# Patient Record
Sex: FEMALE | Race: WHITE | Employment: OTHER | URBAN - METROPOLITAN AREA
[De-identification: names, ages, dates, MRNs, and addresses within clinical notes are randomized per-mention and may not be internally consistent; named-entity substitution may affect disease eponyms.]

---

## 2017-01-03 ENCOUNTER — TELEPHONE (OUTPATIENT)
Dept: INTERNAL MEDICINE | Facility: CLINIC | Age: 82
End: 2017-01-03

## 2017-01-03 NOTE — TELEPHONE ENCOUNTER
[place], [facility] calling for additional home care orders.  Okayed per nursing standing orders.    Skilled nurse 1x week for 4 weeks, 2 PRN and OT and PT to eval and treat.       Jane Oliver RN

## 2017-01-04 ENCOUNTER — CARE COORDINATION (OUTPATIENT)
Dept: CASE MANAGEMENT | Facility: CLINIC | Age: 82
End: 2017-01-04

## 2017-01-04 NOTE — PROGRESS NOTES
Clinic Care Coordination Contact  Care Team Conversations    Notification received today that patient was discharged from [facility] on 12/30/2016 back home with [facility].  She was at [facility] from 12/9/2016 to 12/12/2016 after fall and left wrist fracture.    She has PCP appointment on 1/10/2017.    Plan: Secure e-mail sent to Nasreen Goff RN with [facility].  I identified my self and asked for progress updates and discharge plan when appropriate.  RN CC will check on home care status in 2-3 weeks.    GERALD Fuchs, RN, PHN  FPA Care Coordinator  [phone]  January 4, 2017

## 2017-01-10 ENCOUNTER — OFFICE VISIT (OUTPATIENT)
Dept: INTERNAL MEDICINE | Facility: CLINIC | Age: 82
End: 2017-01-10
Payer: COMMERCIAL

## 2017-01-10 ENCOUNTER — TELEPHONE (OUTPATIENT)
Dept: INTERNAL MEDICINE | Facility: CLINIC | Age: 82
End: 2017-01-10

## 2017-01-10 VITALS
HEIGHT: 61 IN | DIASTOLIC BLOOD PRESSURE: 70 MMHG | HEART RATE: 84 BPM | WEIGHT: 125.9 LBS | SYSTOLIC BLOOD PRESSURE: 134 MMHG | BODY MASS INDEX: 23.77 KG/M2 | TEMPERATURE: 97.7 F | OXYGEN SATURATION: 92 %

## 2017-01-10 DIAGNOSIS — I10 ESSENTIAL HYPERTENSION: ICD-10-CM

## 2017-01-10 DIAGNOSIS — S62.102D LEFT WRIST FRACTURE, WITH ROUTINE HEALING, SUBSEQUENT ENCOUNTER: ICD-10-CM

## 2017-01-10 DIAGNOSIS — Z09 HOSPITAL DISCHARGE FOLLOW-UP: Primary | ICD-10-CM

## 2017-01-10 LAB
ERYTHROCYTE [DISTWIDTH] IN BLOOD BY AUTOMATED COUNT: 12.8 % (ref 10–15)
HCT VFR BLD AUTO: 34.6 % (ref 35–47)
HGB BLD-MCNC: 11.1 G/DL (ref 11.7–15.7)
MCH RBC QN AUTO: 32.6 PG (ref 26.5–33)
MCHC RBC AUTO-ENTMCNC: 32.1 G/DL (ref 31.5–36.5)
MCV RBC AUTO: 102 FL (ref 78–100)
PLATELET # BLD AUTO: 212 10E9/L (ref 150–450)
RBC # BLD AUTO: 3.4 10E12/L (ref 3.8–5.2)
WBC # BLD AUTO: 8.7 10E9/L (ref 4–11)

## 2017-01-10 PROCEDURE — 85027 COMPLETE CBC AUTOMATED: CPT | Performed by: INTERNAL MEDICINE

## 2017-01-10 PROCEDURE — 99214 OFFICE O/P EST MOD 30 MIN: CPT | Performed by: INTERNAL MEDICINE

## 2017-01-10 PROCEDURE — 36415 COLL VENOUS BLD VENIPUNCTURE: CPT | Performed by: INTERNAL MEDICINE

## 2017-01-10 RX ORDER — LISINOPRIL 20 MG/1
20 TABLET ORAL 2 TIMES DAILY
Qty: 90 TABLET | Refills: 3 | Status: SHIPPED | OUTPATIENT
Start: 2017-01-10 | End: 2017-11-27

## 2017-01-10 RX ORDER — METOPROLOL SUCCINATE 50 MG/1
50 TABLET, EXTENDED RELEASE ORAL DAILY
Qty: 90 TABLET | Refills: 1 | Status: SHIPPED | OUTPATIENT
Start: 2017-01-10 | End: 2017-07-14

## 2017-01-10 NOTE — NURSING NOTE
"Chief Complaint   Patient presents with     Hospital F/U       Initial /70 mmHg  Pulse 84  Temp(Src) 97.7  F (36.5  C) (Oral)  Ht 5' 0.5\" (1.537 m)  Wt 125 lb 14.4 oz (57.108 kg)  BMI 24.17 kg/m2  SpO2 92% Estimated body mass index is 24.17 kg/(m^2) as calculated from the following:    Height as of this encounter: 5' 0.5\" (1.537 m).    Weight as of this encounter: 125 lb 14.4 oz (57.108 kg).  BP completed using cuff size: isael Lewis CMA      "

## 2017-01-10 NOTE — PROGRESS NOTES
SUBJECTIVE:                                                    Tracey Moran is a 89 year old female who presents to clinic today for the following health issues:      Hospital Follow-up Visit:    Hospital/Nursing Home/IP Rehab Facility: [facility]  Date of Admission: 12/9/16  Date of Discharge: 12/30/16  Reason(s) for Admission: Left wrist fracture            Problems taking medications regularly:  None       Medication changes since discharge: Patient not using Metoprolol Succinate        Problems adhering to non-medication therapy:  None    Summary of hospitalization:  [facility] discharge summary reviewed  Diagnostic Tests/Treatments reviewed.  Follow up needed: Orthopedics  Other Healthcare Providers Involved in Patient s Care:         None  Update since discharge: stable.     Post Discharge Medication Reconciliation: discharge medications reconciled, continue medications without change.  Plan of care communicated with patient     Coding guidelines for this visit:  Type of Medical   Decision Making Face-to-Face Visit       within 7 Days of discharge Face-to-Face Visit        within 14 days of discharge   Moderate Complexity 55735 [id number]   High Complexity 02880 44629            DISCHARGE DIAGNOSIS:    1.  Left wrist fracture, with routine healing, subsequent encounter     2.  Essential hypertension     3.  Physical deconditioning         [facility] Course:  Made slow but steady progress with therapies.  Minimal pain and has reached her rehab goals, and will benefit from continued PT/OT /HHA at home to maximize her recovery.  She has strong family support.  Denies any gi, gu, respiratory or cardiac issues.  Eating and drinking well.  She is very ready to get back home, feels she will get better rest in her own home.             Problem list and histories reviewed & adjusted, as indicated.  Additional history: as documented    Patient Active  Problem List   Diagnosis     Hyperlipidemia LDL goal <130     Weakness generalized     Weakness     Advanced directives, counseling/discussion     Essential hypertension     Fall     Left wrist fracture, with routine healing, subsequent encounter     Leukocytosis, unspecified type     Elevated CK     Abrasion of left scapular region, subsequent encounter     History reviewed. No pertinent past surgical history.    Social History   Substance Use Topics     Smoking status: Never Smoker      Smokeless tobacco: Never Used     Alcohol Use: Yes     History reviewed. No pertinent family history.      Current Outpatient Prescriptions   Medication Sig Dispense Refill     lisinopril (PRINIVIL/ZESTRIL) 20 MG tablet Take 20 mg by mouth 2 times daily Hold is SBP <110 call if held x 2 in a row or symptomatic       senna-docusate (SENOKOT-S;PERICOLACE) 8.6-50 MG per tablet Take 1 tablet by mouth 2 times daily as needed for constipation       acetaminophen (TYLENOL) 325 MG tablet Take 3 tablets (975 mg) by mouth 3 times daily 100 tablet      METOPROLOL SUCCINATE ER PO Take 50 mg by mouth daily Hold if SBP <110 or HR <55 call if held x2 in a row or symptomatic       ASPIRIN PO Take 325 mg by mouth daily       Ascorbic Acid (VITAMIN C PO) Take 1,000 mg by mouth daily       Allergies   Allergen Reactions     Demerol [Meperidine]      Dust Mites      Peanuts [Nuts]      Penicillins      Pollen Extract      BP Readings from Last 3 Encounters:   12/30/16 164/74   12/26/16 162/74   12/19/16 161/70    Wt Readings from Last 3 Encounters:   12/30/16 131 lb (59.421 kg)   12/26/16 131 lb (59.421 kg)   12/19/16 131 lb 9.6 oz (59.693 kg)                    ROS:  C: NEGATIVE for fever, chills, change in weight  E/M: NEGATIVE for ear, mouth and throat problems  R: NEGATIVE for significant cough or SOB  CV: NEGATIVE for chest pain, palpitations or peripheral edema  GI: NEGATIVE for nausea, abdominal pain, heartburn, or change in bowel habits  :  "NEGATIVE for frequency, dysuria, or hematuria  H: NEGATIVE for bleeding problems  P: NEGATIVE for changes in mood or affect    OBJECTIVE:                                                    /70 mmHg  Pulse 84  Temp(Src) 97.7  F (36.5  C) (Oral)  Ht 5' 0.5\" (1.537 m)  Wt 125 lb 14.4 oz (57.108 kg)  BMI 24.17 kg/m2  SpO2 92%  Body mass index is 24.17 kg/(m^2).  GENERAL: healthy, alert and no distress  RESP: lungs clear to auscultation - no rales, no rhonchi, no wheezes  CV: regular rates and rhythm, normal S1 S2, no S3 or S4 and no click or rub   MS: extremities- no gross deformities noted, no edema less left hand casted  PSYCH: Alert and oriented times 3; speech- coherent , normal rate and volume; able to articulate logical thoughts, able to abstract reason, no tangential thoughts, no hallucinations or delusions, affect- normal     ASSESSMENT/PLAN:                                                      (Z09) Hospital discharge follow-up  (primary encounter diagnosis)  Comment: stable as noted, repeat CBC per prior   Plan: CBC with platelets            (S62.102D) Left wrist fracture, with routine healing, subsequent encounter  Comment: resolving and f/u Orthopedics for cast removal  Plan: .    (I10) Essential hypertension  Comment: stable on therapy and refilled  Plan: lisinopril (PRINIVIL/ZESTRIL) 20 MG tablet,         metoprolol (TOPROL-XL) 50 MG 24 hr tablet              See Patient Instructions    Guilherme Graves MD  [facility]    THE MEDICATION LIST HAS BEEN FULLY RECONCILED BY THE M.D. AND THE NURSING STAFF.      "

## 2017-01-24 ENCOUNTER — DOCUMENTATION ONLY (OUTPATIENT)
Dept: CARE COORDINATION | Facility: CLINIC | Age: 82
End: 2017-01-24

## 2017-01-24 NOTE — PROGRESS NOTES
[facility] now requests orders and shares plan of care/discharge summaries for some patients through [employer].  Please REPLY TO THIS MESSAGE in order to give authorization for orders when needed.  This is considered a verbal order, you will still receive a faxed copy of orders for signature.  Thank you for your assistance in improving collaboration for our patients.    ORDER    OT 2w1, 1w1 for ADL, IADL . The plan will also include falls prevention plan, monitor and treat pain, monitor skin integrity,  and to achieve the following goals.1. Pt will be indep with meal prep  2. Pt will be safe and indep with shower tasks.

## 2017-02-02 ENCOUNTER — DOCUMENTATION ONLY (OUTPATIENT)
Dept: CASE MANAGEMENT | Facility: CLINIC | Age: 82
End: 2017-02-02

## 2017-02-02 NOTE — PROGRESS NOTES
Clinic Care Coordination Contact  Care Team Conversations    Per [facility] chart review patient was discharged from [facility] on 1/27/2017.  She is to follow up with ortho in February 2017.    She had fall in December 2016.  She is in left wrist splint.  She goes to exercise class 3 times per week.    Plan: RN CC will follow up with patient next week.      GERALD Fuchs, RN, PHN  [facility] Care Coordinator  [phone]  February 2, 2017

## 2017-02-10 ENCOUNTER — CARE COORDINATION (OUTPATIENT)
Dept: CASE MANAGEMENT | Facility: CLINIC | Age: 82
End: 2017-02-10

## 2017-02-10 NOTE — PROGRESS NOTES
Clinic Care Coordination Contact  Care Team Conversations    I spoke with Ayaka briefly.  She said that she is doing well and has started her exercise class again and is walking 2 miles per day.  She said she is now using Ibuprofen instead of Tylenol.    Plan:  TERI is on file for her 3 sons.  CC will reach out to family next week to further assess and check on status of ortho follow up.    GERALD Fuchs, RN, PHN  A Care Coordinator  [phone]  February 10, 2017

## 2017-02-16 NOTE — PROGRESS NOTES
Clinic Care Coordination Contact  [facility]/Vo[employer]    Referral Source: Home Care Discharge    Clinical Data: Care Coordinator Outreach    Outreach attempted x 2.  Left message on voicemail for son Jonny with call back information and requested return call.    Plan: Care Coordinator will try to reach patient again in 3-5 business days.      GERALD Fuchs, RN, PHN  FPA Care Coordinator  [phone]  February 16, 2017

## 2017-02-24 NOTE — PROGRESS NOTES
Clinic Care Coordination Contact  [facility]/Voicemail    Referral Source: Home Care Discharge    Clinical Data: Care Coordinator Outreach    Outreach attempted x 3.      Plan: Care Coordinator will do no further outreaches at this time.  VM left for son, Jonny, advising that he call me if he has questions, concerns or if his mother needs assistance.      GERALD Fuchs, RN, PHN  FPA Care Coordinator  [phone]  February 24, 2017

## 2017-05-18 ENCOUNTER — OFFICE VISIT (OUTPATIENT)
Dept: INTERNAL MEDICINE | Facility: CLINIC | Age: 82
End: 2017-05-18
Payer: COMMERCIAL

## 2017-05-18 VITALS
OXYGEN SATURATION: 95 % | DIASTOLIC BLOOD PRESSURE: 78 MMHG | BODY MASS INDEX: 23.74 KG/M2 | TEMPERATURE: 97.6 F | WEIGHT: 123.6 LBS | SYSTOLIC BLOOD PRESSURE: 122 MMHG | HEART RATE: 74 BPM

## 2017-05-18 DIAGNOSIS — I10 ESSENTIAL HYPERTENSION, BENIGN: ICD-10-CM

## 2017-05-18 DIAGNOSIS — D64.9 ANEMIA, UNSPECIFIED TYPE: Primary | ICD-10-CM

## 2017-05-18 DIAGNOSIS — H61.21 IMPACTED CERUMEN OF RIGHT EAR: ICD-10-CM

## 2017-05-18 DIAGNOSIS — Z23 NEED FOR PNEUMOCOCCAL VACCINATION: ICD-10-CM

## 2017-05-18 LAB — HGB BLD-MCNC: 11.5 G/DL (ref 11.7–15.7)

## 2017-05-18 PROCEDURE — 99214 OFFICE O/P EST MOD 30 MIN: CPT | Mod: 25 | Performed by: INTERNAL MEDICINE

## 2017-05-18 PROCEDURE — 90670 PCV13 VACCINE IM: CPT | Performed by: INTERNAL MEDICINE

## 2017-05-18 PROCEDURE — 36415 COLL VENOUS BLD VENIPUNCTURE: CPT | Performed by: INTERNAL MEDICINE

## 2017-05-18 PROCEDURE — G0009 ADMIN PNEUMOCOCCAL VACCINE: HCPCS | Performed by: INTERNAL MEDICINE

## 2017-05-18 PROCEDURE — 85018 HEMOGLOBIN: CPT | Performed by: INTERNAL MEDICINE

## 2017-05-18 NOTE — PROGRESS NOTES
SUBJECTIVE:                                                    Tracey Moran is a 89 year old female who presents to clinic today for the following health issues:    Multiple concerns:  1. Follow up on anemia  2. Concerns about increasing forgetfulness/ trouble sleeping  3. Irrigation of ears  4. Recommendations of chronic sore on heal -  using abx ointment, hydrocortisone cream   5. Review progress of healing left wrist fracture  6. Renew handicap parking permit     Problem list and histories reviewed & adjusted, as indicated.  Additional history: as documented    Patient Active Problem List   Diagnosis     Hyperlipidemia LDL goal <130     Weakness generalized     Weakness     Advanced directives, counseling/discussion     Essential hypertension     Fall     Left wrist fracture, with routine healing, subsequent encounter     Leukocytosis, unspecified type     Elevated CK     Abrasion of left scapular region, subsequent encounter     History reviewed. No pertinent surgical history.    Social History   Substance Use Topics     Smoking status: Never Smoker     Smokeless tobacco: Never Used     Alcohol use Yes     History reviewed. No pertinent family history.      Current Outpatient Prescriptions   Medication Sig Dispense Refill     lisinopril (PRINIVIL/ZESTRIL) 20 MG tablet Take 1 tablet (20 mg) by mouth 2 times daily Hold is SBP <110 call if held x 2 in a row or symptomatic 90 tablet 3     metoprolol (TOPROL-XL) 50 MG 24 hr tablet Take 1 tablet (50 mg) by mouth daily Hold if SBP <110 or HR <55 call if held x2 in a row or symptomatic 90 tablet 1     senna-docusate (SENOKOT-S;PERICOLACE) 8.6-50 MG per tablet Take 1 tablet by mouth 2 times daily as needed for constipation       acetaminophen (TYLENOL) 325 MG tablet Take 3 tablets (975 mg) by mouth 3 times daily 100 tablet      ASPIRIN PO Take 325 mg by mouth daily       Ascorbic Acid (VITAMIN C PO) Take 1,000 mg by mouth daily       Allergies   Allergen Reactions      Demerol [Meperidine]      Dust Mites      Peanuts [Nuts]      Penicillins      Pollen Extract      BP Readings from Last 3 Encounters:   01/10/17 134/70   12/30/16 164/74   12/26/16 162/74    Wt Readings from Last 3 Encounters:   01/10/17 125 lb 14.4 oz (57.1 kg)   12/30/16 131 lb (59.4 kg)   12/26/16 131 lb (59.4 kg)         Reviewed and updated as needed this visit by clinical staff  Tobacco  Allergies  Med Hx  Surg Hx  Fam Hx  Soc Hx      Reviewed and updated as needed this visit by Provider       ROS:  C: NEGATIVE for fever, chills, change in weight  E/M: NEGATIVE for ear, mouth and throat problems  R: NEGATIVE for significant cough or SOB  CV: NEGATIVE for chest pain, palpitations or peripheral edema  GI: NEGATIVE for nausea, abdominal pain, heartburn, or change in bowel habits  : NEGATIVE for frequency, dysuria, or hematuria  M: NEGATIVE for significant arthralgias or myalgia  H: NEGATIVE for bleeding problems    OBJECTIVE:                                                    /78  Pulse 74  Temp 97.6  F (36.4  C) (Oral)  Wt 123 lb 9.6 oz (56.1 kg)  SpO2 95%  BMI 23.74 kg/m2  Body mass index is 23.74 kg/(m^2).  GENERAL: alert and no distress  EYES: Eyes grossly normal to inspection, extraocular movements - intact, and PERRL  HENT: ear canals- normal; TMs- normal on left, mild cerumen right.; Nose- normal; Mouth- no ulcers, no lesions  RESP: lungs clear to auscultation - no rales, no rhonchi, no wheezes  CV: regular rates and rhythm, normal S1 S2, no S3 or S4 and no click or rub   MS: extremities- no gross deformities noted with healing left wrist fracture  PSYCH: Alert and oriented times 3; speech- coherent , normal rate and volume; able to articulate logical thoughts, able to abstract reason, mild tangential comments, no hallucinations or delusions, affect- normal.  Suspect mild memory impairment.  Benign changes skin forearms bilaterally.  Thickened dystrophic feet/heel and nails, L<R,  discussed need for Dermatology assessment       ASSESSMENT/PLAN:                                                      (D64.9) Anemia, unspecified type  (primary encounter diagnosis)  Comment: repeat lab as noted  as was slowly improving since hospitalization  Plan: Hemoglobin            (I10) Essential hypertension  Comment: stable on therapy  Plan: does not appear to have any orthostatic issues.  No changes    (H61.21) Impacted cerumen of right ear  Comment: irrigated and cleaned  Plan:     (Z23) Need for pneumococcal vaccination  Comment: updated  Plan: Pneumococcal vaccine 13 valent PCV13 IM         (Prevnar) [09347], ADMIN MEDICARE: Pneumococcal        Vaccine ()          I spent quite a bit of time with son discussing his numerous concerns, as listed above, from issues of her diet, weight, OTC medication use, and also the need for a disability parking sticker to be filled out.  I expressed to son that overall she appears to be stable and doing quite well and that at current age will have some good and bad days to to try and look at bigger picture of quality of life and not be to concerned about micro managing certain issues. Discussed also supplementing with Ensure daily.  Offered Neurology assessment for mild memory changes BUT informed him that I would not pursue this issue if it were my mother/father at this point.  He was agreeable.  Advised also Dermatology assessment for feet.    See Patient Instructions    Guilherme Graves MD  [facility]    30 minutes spent with this patient, face to face, discussing treatment options for listed problems above as well as side effects of appropriate medications.  Counseling time extended beyond 50% of the clinic visit.  Medication dosing, treatment plan and follow-up were discussed. Also reviewed need for primary care testing for patient.

## 2017-05-18 NOTE — LETTER
[facility]  [address]  [address]      May 18, 2017      Tracey Moran  [address]   [place] [address]          Dear Tracey,      I have enclosed a copy of your most recent labs done here at the clinic and if available some of your prior labs for comparison.     Your hemoglobin function tests remains slightly abnormal but stable from 12/2016 and should be rechecked here in the clinic in 3-6 months with a follow-up visit with me.  I will look forward to seeing you at that time and please call to make an appointment.    Please call me if you have further questions.        Guilherme Graves MD

## 2017-05-18 NOTE — MR AVS SNAPSHOT
"              After Visit Summary   5/18/2017    Tracey Moran    MRN: [id number]           Patient Information     Date Of Birth          6/16/1925        Visit Information        Provider Department      5/18/2017 10:00 AM Guilherme Graves MD [facility]        Today's Diagnoses     Anemia, unspecified type    -  1    Impacted cerumen of right ear        Essential hypertension, benign        Need for pneumococcal vaccination           Follow-ups after your visit        Follow-up notes from your care team     Return if symptoms worsen or fail to improve.      Who to contact     If you have questions or need follow up information about today's clinic visit or your schedule please contact [facility] directly at [phone].  Normal or non-critical lab and imaging results will be communicated to you by MyChart, letter or phone within 4 business days after the clinic has received the results. If you do not hear from us within 7 days, please contact the clinic through MyChart or phone. If you have a critical or abnormal lab result, we will notify you by phone as soon as possible.  Submit refill requests through [facility] or call your pharmacy and they will forward the refill request to us. Please allow 3 business days for your refill to be completed.          Additional Information About Your Visit        MyChart Information     [facility] lets you send messages to your doctor, view your test results, renew your prescriptions, schedule appointments and more. To sign up, go to [email or web address] . Click on \"Log in\" on the left side of the screen, which will take you to the Welcome page. Then click on \"Sign up Now\" on the right side of the page.     You will be asked to enter the access code listed below, as well as some personal information. Please follow the directions to create your username and password.     Your access code is: [email or web address]  Expires: 8/16/2017 11:54 " AM     Your access code will  in 90 days. If you need help or a new code, please call your [facility] clinic or [phone].        Care EveryWhere ID     This is your Care EveryWhere ID. This could be used by other organizations to access your [facility] medical records  [id number]        Your Vitals Were     Pulse Temperature Pulse Oximetry BMI (Body Mass Index)          74 97.6  F (36.4  C) (Oral) 95% 23.74 kg/m2         Blood Pressure from Last 3 Encounters:   17 122/78   01/10/17 134/70   16 164/74    Weight from Last 3 Encounters:   17 123 lb 9.6 oz (56.1 kg)   01/10/17 125 lb 14.4 oz (57.1 kg)   16 131 lb (59.4 kg)              We Performed the Following     ADMIN MEDICARE: Pneumococcal Vaccine ()     Hemoglobin     Pneumococcal vaccine 13 valent PCV13 IM (Prevnar) [[id number]]        Primary Care Provider Office Phone # Fax #    Guilherme Graves [phone]       [facility] [address] [facility] [address]        Thank you!     Thank you for choosing [facility]  for your care. Our goal is always to provide you with excellent care. Hearing back from our patients is one way we can continue to improve our services. Please take a few minutes to complete the written survey that you may receive in the mail after your visit with us. Thank you!             Your Updated Medication List - Protect others around you: Learn how to safely use, store and throw away your medicines at www.disposemymeds.org.          This list is accurate as of: 17 11:54 AM.  Always use your most recent med list.                   Brand Name Dispense Instructions for use    acetaminophen 325 MG tablet    TYLENOL    100 tablet    Take 3 tablets (975 mg) by mouth 3 times daily       ASPIRIN PO      Take 325 mg by mouth daily       lisinopril 20 MG tablet    PRINIVIL/ZESTRIL    90 tablet    Take 1 tablet (20 mg) by mouth 2 times daily Hold is SBP <110  call if held x 2 in a row or symptomatic       metoprolol 50 MG 24 hr tablet    TOPROL-XL    90 tablet    Take 1 tablet (50 mg) by mouth daily Hold if SBP <110 or HR <55 call if held x2 in a row or symptomatic       senna-docusate 8.6-50 MG per tablet    SENOKOT-S;PERICOLACE     Take 1 tablet by mouth 2 times daily as needed for constipation       VITAMIN C PO      Take 1,000 mg by mouth daily

## 2017-05-18 NOTE — NURSING NOTE
"Chief Complaint   Patient presents with     Anemia     Memory Loss     Forms     Handicap parking permit        Initial /78  Pulse 74  Temp 97.6  F (36.4  C) (Oral)  Wt 123 lb 9.6 oz (56.1 kg)  SpO2 95%  BMI 23.74 kg/m2 Estimated body mass index is 23.74 kg/(m^2) as calculated from the following:    Height as of 1/10/17: 5' 0.5\" (1.537 m).    Weight as of this encounter: 123 lb 9.6 oz (56.1 kg).  Medication Reconciliation: complete   Abena Lewis, SERGO      "

## 2017-07-06 ENCOUNTER — OFFICE VISIT (OUTPATIENT)
Dept: PODIATRY | Facility: CLINIC | Age: 82
End: 2017-07-06
Payer: COMMERCIAL

## 2017-07-06 VITALS
WEIGHT: 124 LBS | HEART RATE: 69 BPM | BODY MASS INDEX: 22.82 KG/M2 | HEIGHT: 62 IN | SYSTOLIC BLOOD PRESSURE: 162 MMHG | DIASTOLIC BLOOD PRESSURE: 77 MMHG

## 2017-07-06 DIAGNOSIS — S91.309A OPEN WOUND OF HEEL, UNSPECIFIED LATERALITY, INITIAL ENCOUNTER: ICD-10-CM

## 2017-07-06 DIAGNOSIS — B35.1 ONYCHOMYCOSIS: ICD-10-CM

## 2017-07-06 DIAGNOSIS — L60.0 ONYCHOCRYPTOSIS: ICD-10-CM

## 2017-07-06 DIAGNOSIS — L85.3 DRY SKIN: Primary | ICD-10-CM

## 2017-07-06 PROCEDURE — 99203 OFFICE O/P NEW LOW 30 MIN: CPT | Mod: 25 | Performed by: PODIATRIST

## 2017-07-06 PROCEDURE — 11720 DEBRIDE NAIL 1-5: CPT | Mod: 59 | Performed by: PODIATRIST

## 2017-07-06 PROCEDURE — 97597 DBRDMT OPN WND 1ST 20 CM/<: CPT | Performed by: PODIATRIST

## 2017-07-06 RX ORDER — AMMONIUM LACTATE 12 G/100G
CREAM TOPICAL 2 TIMES DAILY PRN
Qty: 385 G | Refills: 3 | Status: SHIPPED | OUTPATIENT
Start: 2017-07-06 | End: 2017-12-05

## 2017-07-06 NOTE — Clinical Note
Good morning  I saw Tracey on Thursday for bilateral heel and R hallux pain.  The wounds on her heels were debrided and she underwent a procedure on her R hallux nail, as it was incurvated and tender.  She was given wound care instructions and heel callus instructions and will follow up prn.  Thanks  Ethan

## 2017-07-06 NOTE — PROGRESS NOTES
"Foot & Ankle Surgery  July 6, 2017    CC: heel and toe pain    I was asked to see Tracey Moran regarding the chief complaint by:  Dr. ZOIE Graves    HPI:  Pt is a 89 year old female who presents with above complaint.  \"heels and toe\" pain bilateral for \"weeks, months\".  Describes deep ache.  Pain 7/10 with walking.  Has tried soaking her feet and bandages.  Dry skin on heels, cracks/fissures.  Also has pain R hallux, incurvated bilateral border lat > med.      ROS:   Pos for CC.  The patient denies current nausea, vomiting, chills, fevers, belly pain, calf pain, chest pain or SOB.  Complete remainder of ROS is otherwise neg.    VITALS:    Vitals:    07/06/17 0859   BP: 162/77   BP Location: Left arm   Patient Position: Sitting   Cuff Size: Adult Regular   Pulse: 69   Weight: 124 lb (56.2 kg)   Height: 5' 2.21\" (1.58 m)       PMH:    Past Medical History:   Diagnosis Date     Hypertension        SXHX:  No past surgical history on file.     MEDS:    Current Outpatient Prescriptions   Medication     lisinopril (PRINIVIL/ZESTRIL) 20 MG tablet     metoprolol (TOPROL-XL) 50 MG 24 hr tablet     senna-docusate (SENOKOT-S;PERICOLACE) 8.6-50 MG per tablet     acetaminophen (TYLENOL) 325 MG tablet     ASPIRIN PO     Ascorbic Acid (VITAMIN C PO)     No current facility-administered medications for this visit.        ALL:     Allergies   Allergen Reactions     Demerol [Meperidine]      Dust Mites      Peanuts [Nuts]      Penicillins      Pollen Extract        FMH:  No family history on file.    SocHx:    Social History     Social History     Marital status:      Spouse name: N/A     Number of children: N/A     Years of education: N/A     Occupational History     Not on file.     Social History Main Topics     Smoking status: Never Smoker     Smokeless tobacco: Never Used     Alcohol use Yes     Drug use: No     Sexual activity: Not Currently     Other Topics Concern     Not on file     Social History Narrative "           EXAMINATION:  Gen:   No apparent distress  Neuro:   A&Ox3, no deficits  Psych:    Answering questions appropriately for age and situation with normal affect  Head:    NCAT  Eye:    Visual scanning without deficit  Ear:    Response to auditory stimuli wnl  Lung:    Non-labored breathing on RA noted  Abd:    NTND per patient report  Lymph:    Neg for pitting/non-pitting edema BLE  Vasc:    Pulses palpable, CFT minimally delayed  Neuro:    Light touch sensation intact to all sensory nerve distributions without paresthesias  Derm:    Very dry scaling skin/callusing with fissures bilateral heels R>L without SOI.  R hallux nail thickened, mycotic, incurvated bilateral border lat > med without paronychia/SOI.  Very tender to palpation.    MSK:    ROM, strength wnl without limitation, no pain on palpation noted.  Calf:    Neg for redness, swelling or tenderness      Assessment:  89 year old female with callusing bilateral heels; fissures/partial thickness wounds bilateral heels; mycotic incurvated R hallux nail      Plan:  Discussed etiologies and options  1.  Callusing bilateral heels  -home callus instructions dispensed and discussed  -some of the peeling skin was scraped off, no procedure  -Rx for LacHydrin    2.  Partial thickness wounds/fissures bilateral heels  -Excisional debridement was performed to the wound, partial-thickness(limited to skin breakdown), sharply debridement the wound, excising nonviable tissue to the above dimensions with a tissue nipper  -daily cares - wash/dry, abx ointment/bandaid daily     3.  Onychocryptosis mycotic R hallux nail  -slantback performed bilateral border, leading edge  and removed  -nail care handout dispensed for routine debridement  -consider avulsion options      Follow up:  prn or sooner with acute issues      Patient's medical history was reviewed today    Body mass index is 22.53 kg/(m^2).          Ethan Lutz DPM   Podiatric Foot & Ankle  Surgeon  [facility]  [phone]

## 2017-07-06 NOTE — PATIENT INSTRUCTIONS
DR. DISLA'S CLINIC LOCATIONS:   MONDAY - [place]  TUESDAY - [place]   [address] [facility] [address]   [place] [address] [place] [address]   [phone]       THURSDAY AM - [place] THURSDAY PM - [place]   [address]   [place] [address] [place] [address]   [phone]       FRIDAY AM - [place] SCHEDULE SURGERY: [phone] [address] APPOINTMENTS: [phone]   [place] [address] AFTER HOURS: [phone] FAX NUMBER: [phone]     Follow Up: as needed    FOOT CARE NURSES  If you are interested in having a foot care nurse come out to your   home, please call one of these contacts for more information:  [facility]  [phone] [facility]  [phone]   [facility]  [phone]  [place] [phone]  [place]   [facility]  [phone]  At [place] [facility] [phone]     Tips for treating painful calluses:    1.  Pumice stone/charissa board therapy multiple times weekly to remove callus tissue as it builds up    2.  Good supportive shoes and minimizing barefoot ambulation can slow down callus formation, and can decrease pain levels    3.  Gel inserts can also provide padding to the bottom of the foot, to prevent pain and slow recurrence.    4.  Applying lotion daily/twice daily to the callus tissue can keep it softer and less painful.  Lotions with lactic acid or urea work well, but most lotions are sufficient

## 2017-07-06 NOTE — MR AVS SNAPSHOT
After Visit Summary   7/6/2017    Tracey Moran    MRN: [id number]           Patient Information     Date Of Birth          6/16/1925        Visit Information        Provider Department      7/6/2017 9:15 AM Ethan Disla DPM [facility]        Care Instructions      DR. DISLA'S CLINIC LOCATIONS:   MONDAY - [place] TUESDAY - [place]   [address] [facility] [address]   [place] [address] [place], [place] [address] [phone]       THURSDAY AM - [place] THURSDAY PM - [facility]   [address]   [place] [address] [place], [place] [address] [phone]       FRIDAY AM - [place] SCHEDULE SURGERY: [phone] [address] APPOINTMENTS: [phone]   [place] [address] AFTER HOURS: [phone] FAX NUMBER: [phone]     Follow Up: as needed    FOOT CARE NURSES  If you are interested in having a foot care nurse come out to your   home, please call one of these contacts for more information:  [facility]  [phone] [facility]  [phone]   [facility]  [phone]  [place] Foot Care Clinic [phone]  [place]   [facility]  [phone]  At [place] [facility] Foot Clinic [phone]     Tips for treating painful calluses:    1.  Pumice stone/charissa board therapy multiple times weekly to remove callus tissue as it builds up    2.  Good supportive shoes and minimizing barefoot ambulation can slow down callus formation, and can decrease pain levels    3.  Gel inserts can also provide padding to the bottom of the foot, to prevent pain and slow recurrence.    4.  Applying lotion daily/twice daily to the callus tissue can keep it softer and less painful.  Lotions with lactic acid or urea work well, but most lotions are sufficient            Follow-ups after your visit        Your next 10 appointments already scheduled     Jul 06,  "  9:15 AM CDT   New Visit with Ethan Lutz DPM   [facility]    [address] [address] [phone]              Who to contact     If you have questions or need follow up information about today's clinic visit or your schedule please contact [facility] directly at [phone].  Normal or non-critical lab and imaging results will be communicated to you by Andahart, letter or phone within 4 business days after the clinic has received the results. If you do not hear from us within 7 days, please contact the clinic through [employer] or phone. If you have a critical or abnormal lab result, we will notify you by phone as soon as possible.  Submit refill requests through [employer] or call your pharmacy and they will forward the refill request to us. Please allow 3 business days for your refill to be completed.          Additional Information About Your Visit        Andaharamiando Information     Active[employer] lets you send messages to your doctor, view your test results, renew your prescriptions, schedule appointments and more. To sign up, go to [email or web address] . Click on \"Log in\" on the left side of the screen, which will take you to the Welcome page. Then click on \"Sign up Now\" on the right side of the page.     You will be asked to enter the access code listed below, as well as some personal information. Please follow the directions to create your username and password.     Your access code is: [email or web address]  Expires: 2017 11:54 AM     Your access code will  in 90 days. If you need help or a new code, please call your [facility] clinic or [phone].        Care EveryWhere ID     This is your Care EveryWhere ID. This could be used by other organizations to access your [facility] medical records  [id number]        Your Vitals Were     Pulse Height BMI (Body Mass Index)             69 5' 2.21\" (1.58 m) 22.53 kg/m2          Blood " Pressure from Last 3 Encounters:   07/06/17 162/77   05/18/17 122/78   01/10/17 134/70    Weight from Last 3 Encounters:   07/06/17 124 lb (56.2 kg)   05/18/17 123 lb 9.6 oz (56.1 kg)   01/10/17 125 lb 14.4 oz (57.1 kg)              Today, you had the following     No orders found for display       Primary Care Provider Office Phone # Fax #    Guilherme Graves [phone]       [facility] [address] [facility] [address]        Equal Access to Services     [facility]: Hadii aad ku hadasho Sony, waaxda luqadaha, qaybta kaalmada adeegyada, jose j llanos hayclifford gomez . So [facility] [phone].    ATENCIÓN: Si habla español, tiene a harrison disposición servicios gratuitos de asistencia lingüística. [facility] [phone].    We comply with applicable federal civil rights laws and [place] laws. We do not discriminate on the basis of race, color, national origin, age, disability sex, sexual orientation or gender identity.            Thank you!     Thank you for choosing [facility]  for your care. Our goal is always to provide you with excellent care. Hearing back from our patients is one way we can continue to improve our services. Please take a few minutes to complete the written survey that you may receive in the mail after your visit with us. Thank you!             Your Updated Medication List - Protect others around you: Learn how to safely use, store and throw away your medicines at www.disposemymeds.org.          This list is accurate as of: 7/6/17  9:10 AM.  Always use your most recent med list.                   Brand Name Dispense Instructions for use Diagnosis    acetaminophen 325 MG tablet    TYLENOL    100 tablet    Take 3 tablets (975 mg) by mouth 3 times daily    Fracture of wrist, left, closed, initial encounter       ASPIRIN PO      Take 325 mg by mouth daily        lisinopril 20 MG tablet    PRINIVIL/ZESTRIL    90 tablet    Take 1 tablet (20  mg) by mouth 2 times daily Hold is SBP <110 call if held x 2 in a row or symptomatic    Essential hypertension       metoprolol 50 MG 24 hr tablet    TOPROL-XL    90 tablet    Take 1 tablet (50 mg) by mouth daily Hold if SBP <110 or HR <55 call if held x2 in a row or symptomatic    Essential hypertension       senna-docusate 8.6-50 MG per tablet    SENOKOT-S;PERICOLACE     Take 1 tablet by mouth 2 times daily as needed for constipation        VITAMIN C PO      Take 1,000 mg by mouth daily

## 2017-07-06 NOTE — LETTER
"      7/6/2017         RE: Tracey Moran  [address]  [facility] [address]        Dear Colleague,    Thank you for referring your patient, Tracey Moran, to the [facility]. Please see a copy of my visit note below.    Foot & Ankle Surgery  July 6, 2017    CC: heel and toe pain    I was asked to see Tracey Moran regarding the chief complaint by:  Dr. ZOIE Graves    HPI:  Pt is a 89 year old female who presents with above complaint.  \"heels and toe\" pain bilateral for \"weeks, months\".  Describes deep ache.  Pain 7/10 with walking.  Has tried soaking her feet and bandages.  Dry skin on heels, cracks/fissures.  Also has pain R hallux, incurvated bilateral border lat > med.      ROS:   Pos for CC.  The patient denies current nausea, vomiting, chills, fevers, belly pain, calf pain, chest pain or SOB.  Complete remainder of ROS is otherwise neg.    VITALS:    Vitals:    07/06/17 0859   BP: 162/77   BP Location: Left arm   Patient Position: Sitting   Cuff Size: Adult Regular   Pulse: 69   Weight: 124 lb (56.2 kg)   Height: 5' 2.21\" (1.58 m)       PMH:    Past Medical History:   Diagnosis Date     Hypertension        SXHX:  No past surgical history on file.     MEDS:    Current Outpatient Prescriptions   Medication     lisinopril (PRINIVIL/ZESTRIL) 20 MG tablet     metoprolol (TOPROL-XL) 50 MG 24 hr tablet     senna-docusate (SENOKOT-S;PERICOLACE) 8.6-50 MG per tablet     acetaminophen (TYLENOL) 325 MG tablet     ASPIRIN PO     Ascorbic Acid (VITAMIN C PO)     No current facility-administered medications for this visit.        ALL:     Allergies   Allergen Reactions     Demerol [Meperidine]      Dust Mites      Peanuts [Nuts]      Penicillins      Pollen Extract        FMH:  No family history on file.    SocHx:    Social History     Social History     Marital status:      Spouse name: N/A     Number of children: N/A     Years of education: N/A     Occupational History     Not on file. "     Social History Main Topics     Smoking status: Never Smoker     Smokeless tobacco: Never Used     Alcohol use Yes     Drug use: No     Sexual activity: Not Currently     Other Topics Concern     Not on file     Social History Narrative           EXAMINATION:  Gen:   No apparent distress  Neuro:   A&Ox3, no deficits  Psych:    Answering questions appropriately for age and situation with normal affect  Head:    NCAT  Eye:    Visual scanning without deficit  Ear:    Response to auditory stimuli wnl  Lung:    Non-labored breathing on RA noted  Abd:    NTND per patient report  Lymph:    Neg for pitting/non-pitting edema BLE  Vasc:    Pulses palpable, CFT minimally delayed  Neuro:    Light touch sensation intact to all sensory nerve distributions without paresthesias  Derm:    Very dry scaling skin/callusing with fissures bilateral heels R>L without SOI.  R hallux nail thickened, mycotic, incurvated bilateral border lat > med without paronychia/SOI.  Very tender to palpation.    MSK:    ROM, strength wnl without limitation, no pain on palpation noted.  Calf:    Neg for redness, swelling or tenderness      Assessment:  89 year old female with callusing bilateral heels; fissures/partial thickness wounds bilateral heels; mycotic incurvated R hallux nail      Plan:  Discussed etiologies and options  1.  Callusing bilateral heels  -home callus instructions dispensed and discussed  -some of the peeling skin was scraped off, no procedure  -Rx for LacHydrin    2.  Partial thickness wounds/fissures bilateral heels  -Excisional debridement was performed to the wound, partial-thickness(limited to skin breakdown), sharply debridement the wound, excising nonviable tissue to the above dimensions with a tissue nipper  -daily cares - wash/dry, abx ointment/bandaid daily     3.  Onychocryptosis mycotic R hallux nail  -slantback performed bilateral border, leading edge  and removed  -nail care handout dispensed for routine  debridement  -consider avulsion options      Follow up:  prn or sooner with acute issues      Patient's medical history was reviewed today    Body mass index is 22.53 kg/(m^2).          Ethan Lutz DPM   Podiatric Foot & Ankle Surgeon  [facility]  [phone]        Again, thank you for allowing me to participate in the care of your patient.        Sincerely,        Ethan Lutz DPM, VENESSA

## 2017-07-14 DIAGNOSIS — I10 ESSENTIAL HYPERTENSION: ICD-10-CM

## 2017-07-14 NOTE — TELEPHONE ENCOUNTER
metoprolol (TOPROL-XL) 50 MG 24 hr tablet      Last Written Prescription Date: 1/10/2017  Last Fill Quantity: 90, # refills: 1    Last Office Visit with [facility], [facility] or [facility] prescribing provider:  5/18/2017   Future Office Visit:        BP Readings from Last 3 Encounters:   07/06/17 162/77   05/18/17 122/78   01/10/17 134/70

## 2017-07-18 RX ORDER — METOPROLOL SUCCINATE 50 MG/1
50 TABLET, EXTENDED RELEASE ORAL DAILY
Qty: 90 TABLET | Refills: 2 | Status: SHIPPED | OUTPATIENT
Start: 2017-07-18 | End: 2018-05-16

## 2017-08-23 ENCOUNTER — HOSPITAL ENCOUNTER (EMERGENCY)
Facility: CLINIC | Age: 82
Discharge: HOME | End: 2017-08-23
Attending: EMERGENCY MEDICINE | Admitting: EMERGENCY MEDICINE
Payer: COMMERCIAL

## 2017-08-23 VITALS
TEMPERATURE: 97.7 F | HEIGHT: 62 IN | SYSTOLIC BLOOD PRESSURE: 180 MMHG | OXYGEN SATURATION: 97 % | WEIGHT: 125 LBS | BODY MASS INDEX: 23 KG/M2 | DIASTOLIC BLOOD PRESSURE: 81 MMHG | RESPIRATION RATE: 18 BRPM

## 2017-08-23 DIAGNOSIS — S41.111A SKIN TEAR OF RIGHT UPPER EXTREMITY: ICD-10-CM
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
## 2017-08-23 PROCEDURE — 99282 EMERGENCY DEPT VISIT SF MDM: CPT

## 2017-08-23 NOTE — ED NOTES
Pt states she was getting out of bed and fell on her right elbow. Pt denies LOC, head or neck pain. Pt has a skin tear to right elbow.

## 2017-08-23 NOTE — ED PROVIDER NOTES
"  History     Chief Complaint:  Fall      HPI   Tracey Moran is a 89 year old female who presents via EMS with concern after a fall. The patient states that she got out of bed too quickly this morning which caused her to fall, landing on her right elbow. She was unable to get up afterwards, as she usually ambulates with a walker and does not have the strength to get up on her own. She states her only complaint is some mild right elbow pain, though she specifically states that she does not think there is any bony problem.  Patient denies head trauma, loss of consciousness, neck pain, abdominal pain, fevers, vomiting, chest pain, palpitations, or being on blood thinners. Patient denies all other complaints.  She would like to have wound care to her elbow then go back home.        Allergies:  Demerol [Meperidine]  Dust Mites  Peanuts [Nuts]  Penicillins  Pollen Extract      Medications:    Toprol  Lac-Hydrin  Lisinopril  Senokot    Past Medical History:    Hypertension  Elevated CK  Left Wrist Fracture  Leukocytosis    Past Surgical History:    History reviewed. No pertinent surgical history.     Family History:    History reviewed. No pertinent family history.      Social History:  Presents via EMS   Tobacco use: never  Alcohol use: yes  PCP: Guilherme Graves    Marital Status:        Review of Systems   All other systems reviewed and are negative.    Physical Exam     Patient Vitals for the past 24 hrs:   BP Temp Temp src Heart Rate Resp SpO2 Height Weight   08/23/17 0556 (!) 156/123 97.8  F (36.6  C) Oral 69 20 97 % 1.575 m (5' 2\") 56.7 kg (125 lb)       Physical Exam  General: woman semi-recumbent in bed 5, son later at bedside  HENT: face nontender with full painless ROM mandible, no bony deformity, OP clear, no difficulty controlling secretions, skull nontender  Eyes: PERRL without proptosis  CV:  regular rhythm, cap refill normal in all extremities  Resp: CTAB, normal effort, no crackles or wheezing  GI: " "abdomen soft,  nontender, no guarding  MSK:  Cervical spine:  no midline tenderness, FROM  Thoracic spine: no midline tenderness, no CVAT  Lumbar spine: no midline tenderness  Chest wall: nontender without crepitus  Pelvis stable  Extremities: FROM R elbow without palpable effusion or acute bony deformity, no tenderness to extremities  Skin:   Approximately 4cm very superficial skin tear to R lateral elbow, no active bleeding  No ecchymosis  No laceration  Neuro: awake, alert, GCS 15, responds appropriately to commands, moves all extremities with good tone, ambulatory  Psych: cooperative, pleasant, smiles      Emergency Department Course   Emergency Department Course:  Past medical records, nursing notes, and vitals reviewed.  0609: I performed an exam of the patient and obtained history, as documented above.    The patient passed a \"road test\" prior to discharge from the ED.    0701: I rechecked the patient. Son at bedside agrees with discharge home.    I personally reviewed the findings with the Patient and answered all related questions prior to discharge.  Patient discharged home with instructions regarding supportive care, medications, and reasons to return. The importance of close follow-up was reviewed.        Impression & Plan    Medical Decision Making:  She clearly describes a mechanical fall so medical workup is not indicated.  Head to toe trauma exam is notable only for a skin tear. This was cleansed and Steri-Strips were placed by the tech. The patient does not wish for x-rays and I think this can be safely deferred. Her son agrees with supportive measures discharged without further ED testing or treatment. She is welcome back for acute problems and can otherwise follow up through primary care as needed.      Diagnosis:    ICD-10-CM   1. Skin tear of right upper extremity S41.111A   2. Fall, initial encounter W19.XXXA       Disposition:  Discharged to home with plan as outlined.     8/23/2017   [facility] " EMERGENCY DEPARTMENT  I, Michelle Parth, am serving as a scribe at 6:09 AM on 8/23/2017 to document services personally performed by Zain Silva, based on my observations and the provider's statements to me.       Zain Silva MD  08/23/17 0722

## 2017-08-23 NOTE — ED AVS SNAPSHOT
Emergency Department    [address] [facility] [address]    Phone:  [phone]    Fax:  [phone]                                       Tracey Moran   MRN: [id number]    Department:   Emergency Department   Date of Visit:  8/23/2017           Patient Information     Date Of Birth          6/16/1925        Your diagnoses for this visit were:     Skin tear of right upper extremity     Fall, initial encounter        You were seen by Zain Silva MD.      Follow-up Information     Call Guilherme Graves MD.    Specialty:  Internal Medicine    Why:  As needed    Contact information:    [address] [facility] [address] [phone]          Follow up with  Emergency Department.    Specialty:  EMERGENCY MEDICINE    Why:  As needed, If symptoms worsen    Contact information:    [address] [facility] [address] [phone]      Discharge References/Attachments     SKIN AVULSION (ENGLISH)      24 Hour Appointment Hotline       To make an appointment at any [facility], call [phone] ([phone]). If you don't have a family doctor or clinic, we will help you find one. [facility] clinics are conveniently located to serve the needs of you and your family.             Review of your medicines      Our records show that you are taking the medicines listed below. If these are incorrect, please call your family doctor or clinic.        Dose / Directions Last dose taken    acetaminophen 325 MG tablet   Commonly known as:  TYLENOL   Dose:  975 mg   Quantity:  100 tablet        Take 3 tablets (975 mg) by mouth 3 times daily   Refills:  0        ammonium lactate 12 % cream   Commonly known as:  LAC-HYDRIN   Quantity:  385 g        Apply topically 2 times daily as needed for dry skin   Refills:  3        ASPIRIN PO   Dose:  325 mg        Take 325 mg by mouth daily   Refills:  0        lisinopril 20 MG tablet   Commonly known as:  PRINIVIL/ZESTRIL   Dose:  20  mg   Quantity:  90 tablet        Take 1 tablet (20 mg) by mouth 2 times daily Hold is SBP <110 call if held x 2 in a row or symptomatic   Refills:  3        metoprolol 50 MG 24 hr tablet   Commonly known as:  TOPROL-XL   Dose:  50 mg   Quantity:  90 tablet        Take 1 tablet (50 mg) by mouth daily Hold if SBP <110 or HR <55 call if held x2 in a row or symptomatic   Refills:  2        senna-docusate 8.6-50 MG per tablet   Commonly known as:  SENOKOT-S;PERICOLACE   Dose:  1 tablet        Take 1 tablet by mouth 2 times daily as needed for constipation   Refills:  0        VITAMIN C PO   Dose:  1000 mg        Take 1,000 mg by mouth daily   Refills:  0                Orders Needing Specimen Collection     None      Pending Results     No orders found from 8/21/2017 to 8/24/2017.            Pending Culture Results     No orders found from 8/21/2017 to 8/24/2017.            Pending Results Instructions     If you had any lab results that were not finalized at the time of your Discharge, you can call the ED Lab Result RN at [phone]. You will be contacted by this team for any positive Lab results or changes in treatment. The nurses are available 7 days a week from 10A to 6:30P.  You can leave a message 24 hours per day and they will return your call.        Test Results From Your Hospital Stay               Clinical Quality Measure: Blood Pressure Screening     Your blood pressure was checked while you were in the emergency department today. The last reading we obtained was  BP: (!) 156/123 . Please read the guidelines below about what these numbers mean and what you should do about them.  If your systolic blood pressure (the top number) is less than 120 and your diastolic blood pressure (the bottom number) is less than 80, then your blood pressure is normal. There is nothing more that you need to do about it.  If your systolic blood pressure (the top number) is 120-139 or your diastolic blood pressure (the bottom  "number) is 80-89, your blood pressure may be higher than it should be. You should have your blood pressure rechecked within a year by a primary care provider.  If your systolic blood pressure (the top number) is 140 or greater or your diastolic blood pressure (the bottom number) is 90 or greater, you may have high blood pressure. High blood pressure is treatable, but if left untreated over time it can put you at risk for heart attack, stroke, or kidney failure. You should have your blood pressure rechecked by a primary care provider within the next 4 weeks.  If your provider in the emergency department today gave you specific instructions to follow-up with your doctor or provider even sooner than that, you should follow that instruction and not wait for up to 4 weeks for your follow-up visit.        Thank you for choosing [facility]       Thank you for choosing [facility] for your care. Our goal is always to provide you with excellent care. Hearing back from our patients is one way we can continue to improve our services. Please take a few minutes to complete the written survey that you may receive in the mail after you visit with us. Thank you!        [facility] Information     [facility] lets you send messages to your doctor, view your test results, renew your prescriptions, schedule appointments and more. To sign up, go to [email or web address] . Click on \"Log in\" on the left side of the screen, which will take you to the Welcome page. Then click on \"Sign up Now\" on the right side of the page.     You will be asked to enter the access code listed below, as well as some personal information. Please follow the directions to create your username and password.     Your access code is: [email or web address]  Expires: 2017  7:05 AM     Your access code will  in 90 days. If you need help or a new code, please call your [facility] clinic or [phone].        Care EveryWhere ID     This is your Care EveryWhere ID. This could " be used by other organizations to access your [facility] medical records  [id number]        Equal Access to Services     RUSSEL GREER : Hadii sohan Quintero, eliza moss, jose j martinez. So [facility] [phone].    ATENCIÓN: Si habla español, tiene a harrison disposición servicios gratuitos de asistencia lingüística. Llame al [phone].    We comply with applicable federal civil rights laws and [place] laws. We do not discriminate on the basis of race, color, national origin, age, disability sex, sexual orientation or gender identity.            After Visit Summary       This is your record. Keep this with you and show to your community pharmacist(s) and doctor(s) at your next visit.

## 2017-08-23 NOTE — ED AVS SNAPSHOT
Emergency Department    [address] [place] [address]    Phone:  [phone]    Fax:  [phone]                                       Tracey Moran   MRN: [id number]    Department:   Emergency Department   Date of Visit:  8/23/2017           After Visit Summary Signature Page     I have received my discharge instructions, and my questions have been answered. I have discussed any challenges I see with this plan with the nurse or doctor.    ..........................................................................................................................................  Patient/Patient Representative Signature      ..........................................................................................................................................  Patient Representative Print Name and Relationship to Patient    ..................................................               ................................................  Date                                            Time    ..........................................................................................................................................  Reviewed by Signature/Title    ...................................................              ..............................................  Date                                                            Time

## 2017-08-24 ENCOUNTER — CARE COORDINATION (OUTPATIENT)
Dept: CARE COORDINATION | Facility: CLINIC | Age: 82
End: 2017-08-24

## 2017-08-24 NOTE — LETTER
[place] CARE COORDINATION  [address] [facility] [address]  Phone: [phone]      August 25, 2017      Tracey Moran  [address]   [facility] [address]    Dear Tracey,  I am the Clinic Care Coordinator that works with your primary care provider's clinic. I wanted to introduce myself and provide you with my contact information for you to be able to call me with any questions or concerns. Below is a description of what Clinic Care Coordination is and how I can further assist you.     The Clinic Care Coordinator role is a Registered Nurse and/or  who understands the health care system. The goal of Clinic Care Coordination is to help you manage your health and improve access to the [facility] system in the most efficient manner.  The Registered Nurse can assist you in meeting your health care goals by providing education, coordinating services, and strengthening the communication among your providers. The  can assist you with financial, behavioral, psychosocial, and chemical dependency and counseling/psychiatric resources.    Please feel free to keep this letter and contact information to contact me at [phone] with any further questions or concerns that may arise. We at [facility] are focused on providing you with the highest-quality healthcare experience possible and that all starts with you.       Sincerely,     eKlly Sherwood

## 2017-08-25 NOTE — PROGRESS NOTES
Clinic Care Coordination Contact  OUTREACH    Referral Information:  Referral Source: ED Follow-Up  Reason for Contact: Fall at home  Care Conference: No     Universal Utilization:   ED Visits in last year: 1  Hospital visits in last year: 2  Last PCP appointment: 05/18/16  Missed Appointments: 0  Concerns: None  Multiple Providers or Specialists: Yes, ortho    Clinical Concerns:  Current Medical Concerns: Fall at home. Laceration elbow.    Current Behavioral Concerns: No concerns at this time.     Education Provided to patient: Reviewed discharge instructions.  Clinical Pathway Name: None      Medication Management:  Patient has understanding of regimen and is adherent:  Yes    Functional Status:  Mobility Status: Independent w/Device (SBA)  Equipment Currently Used at Home: walker, rolling (4ww)  Transportation: Drives, family           Psychosocial:  Current living arrangement:: I live alone, I live in a private home (condo)  Financial/Insurance: [facility]       Resources and Interventions:  Current Resources: [employer]        Advanced Care Plans/Directives on file:: No    Referrals Placed: (None at this time)     Patient/Caregiver understanding: Son reports patient is doing well since recent fall in her home.  She does not have any pain or new symptoms. Patient now has [employer]. She is keeping active where she lives, and family is very supportive.  Son brings patient to [employer] every week for shopping. Family does not have any new concerns about patient living in her home alone.    Frequency of Care Coordination:  (N/A)    Upcoming appointment:  (None)     Plan:   No further action at this time. Open to future contact as needed.   Will mail RN CC contact information to patient as requested by son.     Kelly Sherwood RN  Clinic Care Coordinator  [facility]  [phone]

## 2017-09-14 NOTE — NURSING NOTE
"Chief Complaint   Patient presents with     Foot Problems     bilateral heel - callus issues, R hallux - toenail       Initial /77 (BP Location: Left arm, Patient Position: Sitting, Cuff Size: Adult Regular)  Pulse 69  Ht 5' 2.21\" (1.58 m)  Wt 124 lb (56.2 kg)  BMI 22.53 kg/m2 Estimated body mass index is 22.53 kg/(m^2) as calculated from the following:    Height as of this encounter: 5' 2.21\" (1.58 m).    Weight as of this encounter: 124 lb (56.2 kg).  Medication Reconciliation: complete    " never used/caffeine

## 2017-11-24 DIAGNOSIS — I10 ESSENTIAL HYPERTENSION: ICD-10-CM

## 2017-11-24 RX ORDER — LISINOPRIL 20 MG/1
TABLET ORAL
Qty: 90 TABLET | Refills: 3 | OUTPATIENT
Start: 2017-11-24

## 2017-11-24 NOTE — TELEPHONE ENCOUNTER
Routing refill request to provider for review/approval because:   out of range:  Blood pressure

## 2017-11-27 RX ORDER — LISINOPRIL 20 MG/1
20 TABLET ORAL 2 TIMES DAILY
Qty: 60 TABLET | Refills: 0 | Status: SHIPPED | OUTPATIENT
Start: 2017-11-27 | End: 2017-12-13

## 2017-12-05 ENCOUNTER — HOSPITAL ENCOUNTER (INPATIENT)
Facility: CLINIC | Age: 82
LOS: 2 days | Discharge: HOME | DRG: 378 | End: 2017-12-07
Attending: EMERGENCY MEDICINE | Admitting: INTERNAL MEDICINE
Payer: COMMERCIAL

## 2017-12-05 ENCOUNTER — APPOINTMENT (OUTPATIENT)
Dept: GENERAL RADIOLOGY | Facility: CLINIC | Age: 82
DRG: 378 | End: 2017-12-05
Attending: EMERGENCY MEDICINE
Payer: COMMERCIAL

## 2017-12-05 DIAGNOSIS — K59.01 SLOW TRANSIT CONSTIPATION: Primary | ICD-10-CM

## 2017-12-05 DIAGNOSIS — K62.5 RECTAL BLEEDING: ICD-10-CM

## 2017-12-05 DIAGNOSIS — N17.9 AKI (ACUTE KIDNEY INJURY) (H): ICD-10-CM

## 2017-12-05 LAB
ALBUMIN SERPL-MCNC: 3.4 G/DL (ref 3.4–5)
ALP SERPL-CCNC: 49 U/L (ref 40–150)
ALT SERPL W P-5'-P-CCNC: 13 U/L (ref 0–50)
ANION GAP SERPL CALCULATED.3IONS-SCNC: 7 MMOL/L (ref 3–14)
APTT PPP: 25 SEC (ref 22–37)
AST SERPL W P-5'-P-CCNC: 9 U/L (ref 0–45)
BASOPHILS # BLD AUTO: 0 10E9/L (ref 0–0.2)
BASOPHILS NFR BLD AUTO: 0.2 %
BILIRUB DIRECT SERPL-MCNC: <0.1 MG/DL (ref 0–0.2)
BILIRUB SERPL-MCNC: 0.3 MG/DL (ref 0.2–1.3)
BLD PROD TYP BPU: NORMAL
BLD UNIT ID BPU: 0
BLOOD PRODUCT CODE: NORMAL
BPU ID: NORMAL
BUN SERPL-MCNC: 49 MG/DL (ref 7–30)
CALCIUM SERPL-MCNC: 8.5 MG/DL (ref 8.5–10.1)
CHLORIDE SERPL-SCNC: 107 MMOL/L (ref 94–109)
CO2 SERPL-SCNC: 28 MMOL/L (ref 20–32)
CREAT SERPL-MCNC: 1.23 MG/DL (ref 0.52–1.04)
DIFFERENTIAL METHOD BLD: ABNORMAL
EOSINOPHIL # BLD AUTO: 0.2 10E9/L (ref 0–0.7)
EOSINOPHIL NFR BLD AUTO: 1.1 %
ERYTHROCYTE [DISTWIDTH] IN BLOOD BY AUTOMATED COUNT: 13.1 % (ref 10–15)
GFR SERPL CREATININE-BSD FRML MDRD: 41 ML/MIN/1.7M2
GLUCOSE SERPL-MCNC: 142 MG/DL (ref 70–99)
HCT VFR BLD AUTO: 22.8 % (ref 35–47)
HGB BLD-MCNC: 7.7 G/DL (ref 11.7–15.7)
IMM GRANULOCYTES # BLD: 0 10E9/L (ref 0–0.4)
IMM GRANULOCYTES NFR BLD: 0.3 %
INR PPP: 1.15 (ref 0.86–1.14)
INTERPRETATION ECG - MUSE: NORMAL
LYMPHOCYTES # BLD AUTO: 1.7 10E9/L (ref 0.8–5.3)
LYMPHOCYTES NFR BLD AUTO: 11.9 %
MCH RBC QN AUTO: 33.3 PG (ref 26.5–33)
MCHC RBC AUTO-ENTMCNC: 33.8 G/DL (ref 31.5–36.5)
MCV RBC AUTO: 99 FL (ref 78–100)
MONOCYTES # BLD AUTO: 0.6 10E9/L (ref 0–1.3)
MONOCYTES NFR BLD AUTO: 4.2 %
NEUTROPHILS # BLD AUTO: 12 10E9/L (ref 1.6–8.3)
NEUTROPHILS NFR BLD AUTO: 82.3 %
NRBC # BLD AUTO: 0 10*3/UL
NRBC BLD AUTO-RTO: 0 /100
PLATELET # BLD AUTO: 206 10E9/L (ref 150–450)
POTASSIUM SERPL-SCNC: 4.4 MMOL/L (ref 3.4–5.3)
PROT SERPL-MCNC: 6.3 G/DL (ref 6.8–8.8)
RBC # BLD AUTO: 2.31 10E12/L (ref 3.8–5.2)
SODIUM SERPL-SCNC: 142 MMOL/L (ref 133–144)
TRANSFUSION STATUS PATIENT QL: NORMAL
TRANSFUSION STATUS PATIENT QL: NORMAL
WBC # BLD AUTO: 14.5 10E9/L (ref 4–11)

## 2017-12-05 PROCEDURE — 86850 RBC ANTIBODY SCREEN: CPT | Performed by: EMERGENCY MEDICINE

## 2017-12-05 PROCEDURE — 99285 EMERGENCY DEPT VISIT HI MDM: CPT | Mod: 25

## 2017-12-05 PROCEDURE — 71020 XR CHEST 2 VW: CPT

## 2017-12-05 PROCEDURE — 96361 HYDRATE IV INFUSION ADD-ON: CPT

## 2017-12-05 PROCEDURE — 85025 COMPLETE CBC W/AUTO DIFF WBC: CPT | Performed by: EMERGENCY MEDICINE

## 2017-12-05 PROCEDURE — 46600 DIAGNOSTIC ANOSCOPY SPX: CPT

## 2017-12-05 PROCEDURE — S0164 INJECTION PANTROPRAZOLE: HCPCS | Performed by: EMERGENCY MEDICINE

## 2017-12-05 PROCEDURE — 86900 BLOOD TYPING SEROLOGIC ABO: CPT | Performed by: EMERGENCY MEDICINE

## 2017-12-05 PROCEDURE — 80048 BASIC METABOLIC PNL TOTAL CA: CPT | Performed by: EMERGENCY MEDICINE

## 2017-12-05 PROCEDURE — 36430 TRANSFUSION BLD/BLD COMPNT: CPT

## 2017-12-05 PROCEDURE — 93005 ELECTROCARDIOGRAM TRACING: CPT

## 2017-12-05 PROCEDURE — 25000128 H RX IP 250 OP 636: Performed by: EMERGENCY MEDICINE

## 2017-12-05 PROCEDURE — 86901 BLOOD TYPING SEROLOGIC RH(D): CPT | Performed by: EMERGENCY MEDICINE

## 2017-12-05 PROCEDURE — 85730 THROMBOPLASTIN TIME PARTIAL: CPT | Performed by: EMERGENCY MEDICINE

## 2017-12-05 PROCEDURE — 25000125 ZZHC RX 250: Performed by: EMERGENCY MEDICINE

## 2017-12-05 PROCEDURE — P9016 RBC LEUKOCYTES REDUCED: HCPCS | Performed by: EMERGENCY MEDICINE

## 2017-12-05 PROCEDURE — 86923 COMPATIBILITY TEST ELECTRIC: CPT | Performed by: EMERGENCY MEDICINE

## 2017-12-05 PROCEDURE — 96366 THER/PROPH/DIAG IV INF ADDON: CPT

## 2017-12-05 PROCEDURE — 85610 PROTHROMBIN TIME: CPT | Performed by: EMERGENCY MEDICINE

## 2017-12-05 PROCEDURE — 0DJD8ZZ INSPECTION OF LOWER INTESTINAL TRACT, VIA NATURAL OR ARTIFICIAL OPENING ENDOSCOPIC: ICD-10-PCS | Performed by: EMERGENCY MEDICINE

## 2017-12-05 PROCEDURE — 12000007 ZZH R&B INTERMEDIATE

## 2017-12-05 PROCEDURE — 96365 THER/PROPH/DIAG IV INF INIT: CPT

## 2017-12-05 PROCEDURE — 99223 1ST HOSP IP/OBS HIGH 75: CPT | Mod: AI | Performed by: INTERNAL MEDICINE

## 2017-12-05 PROCEDURE — 40000884 ZZH STATISTIC STEP DOWN HRS NIGHT

## 2017-12-05 PROCEDURE — 80076 HEPATIC FUNCTION PANEL: CPT | Performed by: EMERGENCY MEDICINE

## 2017-12-05 RX ORDER — SODIUM CHLORIDE 9 MG/ML
1000 INJECTION, SOLUTION INTRAVENOUS CONTINUOUS
Status: DISCONTINUED | OUTPATIENT
Start: 2017-12-05 | End: 2017-12-06

## 2017-12-05 RX ADMIN — PANTOPRAZOLE SODIUM 40 MG: 40 INJECTION, POWDER, FOR SOLUTION INTRAVENOUS at 19:59

## 2017-12-05 RX ADMIN — SODIUM CHLORIDE 1000 ML: 9 INJECTION, SOLUTION INTRAVENOUS at 20:55

## 2017-12-05 RX ADMIN — SODIUM CHLORIDE 8 MG/HR: 9 INJECTION, SOLUTION INTRAVENOUS at 20:26

## 2017-12-05 RX ADMIN — SODIUM CHLORIDE 1000 ML: 9 INJECTION, SOLUTION INTRAVENOUS at 19:59

## 2017-12-05 ASSESSMENT — ENCOUNTER SYMPTOMS
SHORTNESS OF BREATH: 0
BLOOD IN STOOL: 1

## 2017-12-05 NOTE — IP AVS SNAPSHOT
MRN:[id number]                      After Visit Summary   12/5/2017    Tracey Moran    MRN: [id number]           Thank you!     Thank you for choosing [facility] for your care. Our goal is always to provide you with excellent care. Hearing back from our patients is one way we can continue to improve our services. Please take a few minutes to complete the written survey that you may receive in the mail after you visit with us. Thank you!        Patient Information     Date Of Birth          6/16/1925        Designated Caregiver       Most Recent Value    Caregiver    Will someone help with your care after discharge? yes    Name of designated caregiver Nick Shepherd    Phone number of caregiver [phone]    Caregiver address [place]      About your hospital stay     You were admitted on:  December 5, 2017 You last received care in the:  [address]    You were discharged on:  December 7, 2017        Reason for your hospital stay       GI bleed, suspect lower bleed related to constipation and disimpaction.                  Who to Call     For medical emergencies, please call 911.  For non-urgent questions about your medical care, please call your primary care provider or clinic, [phone]  For questions related to your surgery, please call your surgery clinic        Attending Provider     Provider Specialty    Shaw Vaughan DO Emergency Medicine    Dana Aburto MD Internal Medicine       Primary Care Provider Office Phone # Fax #    Guilherme Graves [phone]      After Care Instructions     Activity       Your activity upon discharge: avoid strenuous activity for 1 week, then activity as tolerated            Diet       Follow this diet upon discharge: Regular diet, no added salt.            Discharge Instructions       Call Dr. Murphy at Pager [phone] if questions, or Cell Phone [phone].                  Follow-up Appointments      "Adult [facility] Follow-up and recommended labs and tests       Follow up with primary care provider, Guilherme Graves, in 5 days, check Hgb and BMP then.                  Your next 10 appointments already scheduled     Dec 18, 2017 11:20 AM CST   Office Visit with Guilherme Graves MD   [facility]    [address] [id number] [phone]           Bring a current list of meds and any records pertaining to this visit. For Physicals, please bring immunization records and any forms needing to be filled out. Please arrive 10 minutes early to complete paperwork.              Pending Results     No orders found from 12/3/2017 to 12/6/2017.            Statement of Approval     Ordered          12/07/17 1219  I have reviewed and agree with all the recommendations and orders detailed in this document.  EFFECTIVE NOW     Approved and electronically signed by:  Corby Murphy MD             Admission Information     Date & Time Provider Department Dept. Phone    12/5/2017 Dana Aburto MD [place] [address] Oncology [phone]      Your Vitals Were     Blood Pressure Pulse Temperature Respirations Height Weight    177/63 73 96.4  F (35.8  C) (Oral) 16 1.575 m (5' 2\") 58.1 kg (128 lb)    Pulse Oximetry BMI (Body Mass Index)                99% 23.41 kg/m2          MyChart Information     [employer] lets you send messages to your doctor, view your test results, renew your prescriptions, schedule appointments and more. To sign up, go to [email or web address] . Click on \"Log in\" on the left side of the screen, which will take you to the Welcome page. Then click on \"Sign up Now\" on the right side of the page.     You will be asked to enter the access code listed below, as well as some personal information. Please follow the directions to create your username and password.     Your access code is: [email or web address]  Expires: 3/7/2018  " 1:45 PM     Your access code will  in 90 days. If you need help or a new code, please call your [facility] clinic or [phone].        Care EveryWhere ID     This is your Care EveryWhere ID. This could be used by other organizations to access your [facility] medical records  [id number]        Equal Access to Services     RUSSEL GREER : Hadii sohan rogers hadsiso Soomaali, waaxda luqadaha, qaybta kaalmada prernatexda, jose j pereyra yakelinedelmira gomez . So [facility] [phone].    ATENCIÓN: Si habla español, tiene a harrison disposición servicios gratuitos de asistencia lingüística. Estevan al [phone].    We comply with applicable federal civil rights laws and [place] laws. We do not discriminate on the basis of race, color, national origin, age, disability, sex, sexual orientation, or gender identity.               Review of your medicines      START taking        Dose / Directions    docusate sodium 100 MG capsule   Commonly known as:  COLACE        Dose:  200 mg   Take 2 capsules (200 mg) by mouth 2 times daily   Quantity:  100 capsule   Refills:  3       polyethylene glycol Packet   Commonly known as:  MIRALAX/GLYCOLAX        Dose:  17 g   Take 17 g by mouth 2 times daily as needed for constipation Use if no bowel movement for 2 days   Quantity:  7 packet   Refills:  1         CONTINUE these medicines which have NOT CHANGED        Dose / Directions    lisinopril 20 MG tablet   Commonly known as:  PRINIVIL/ZESTRIL   Used for:  Essential hypertension        Dose:  20 mg   Take 1 tablet (20 mg) by mouth 2 times daily Hold is SBP <110 call if held x 2 in a row or symptomatic   Quantity:  60 tablet   Refills:  0       metoprolol 50 MG 24 hr tablet   Commonly known as:  TOPROL-XL   Used for:  Essential hypertension        Dose:  50 mg   Take 1 tablet (50 mg) by mouth daily Hold if SBP <110 or HR <55 call if held x2 in a row or symptomatic   Quantity:  90 tablet   Refills:  2         STOP taking     MAGNESIUM PO            VITAMIN C PO                Where to get your medicines      These medications were sent to [facility] [place], [place] - [address]  [address] [place] [address]     Phone:  [phone]     docusate sodium 100 MG capsule    polyethylene glycol Packet                Protect others around you: Learn how to safely use, store and throw away your medicines at www.disposemymeds.org.             Medication List: This is a list of all your medications and when to take them. Check marks below indicate your daily home schedule. Keep this list as a reference.      Medications           Morning Afternoon Evening Bedtime As Needed    docusate sodium 100 MG capsule   Commonly known as:  COLACE   Take 2 capsules (200 mg) by mouth 2 times daily   Last time this was given:  200 mg on 12/7/2017  9:00 AM                    12-7-17                 lisinopril 20 MG tablet   Commonly known as:  PRINIVIL/ZESTRIL   Take 1 tablet (20 mg) by mouth 2 times daily Hold is SBP <110 call if held x 2 in a row or symptomatic   Last time this was given:  20 mg on 12/7/2017 12:50 PM                    12-7-17               metoprolol 50 MG 24 hr tablet   Commonly known as:  TOPROL-XL   Take 1 tablet (50 mg) by mouth daily Hold if SBP <110 or HR <55 call if held x2 in a row or symptomatic   Last time this was given:  25 mg on 12/7/2017  9:00 AM            12-8-17                         polyethylene glycol Packet   Commonly known as:  MIRALAX/GLYCOLAX   Take 17 g by mouth 2 times daily as needed for constipation Use if no bowel movement for 2 days

## 2017-12-05 NOTE — IP AVS SNAPSHOT
[address], [address]    [facility] [address]    Phone:  [phone]                                       After Visit Summary   12/5/2017    Tracey Moran    MRN: [id number]           After Visit Summary Signature Page     I have received my discharge instructions, and my questions have been answered. I have discussed any challenges I see with this plan with the nurse or doctor.    ..........................................................................................................................................  Patient/Patient Representative Signature      ..........................................................................................................................................  Patient Representative Print Name and Relationship to Patient    ..................................................               ................................................  Date                                            Time    ..........................................................................................................................................  Reviewed by Signature/Title    ...................................................              ..............................................  Date                                                            Time

## 2017-12-06 PROBLEM — D69.6 THROMBOCYTOPENIA (H): Status: ACTIVE | Noted: 2017-12-06

## 2017-12-06 PROBLEM — K92.2 GI BLEED: Status: ACTIVE | Noted: 2017-12-06

## 2017-12-06 PROBLEM — K59.01 SLOW TRANSIT CONSTIPATION: Status: ACTIVE | Noted: 2017-12-06

## 2017-12-06 PROBLEM — D62 ANEMIA DUE TO BLOOD LOSS, ACUTE: Status: ACTIVE | Noted: 2017-12-06

## 2017-12-06 PROBLEM — N18.30 CRF (CHRONIC RENAL FAILURE), STAGE 3 (MODERATE) (H): Status: ACTIVE | Noted: 2017-12-06

## 2017-12-06 LAB
ABO + RH BLD: NORMAL
ABO + RH BLD: NORMAL
ANION GAP SERPL CALCULATED.3IONS-SCNC: 10 MMOL/L (ref 3–14)
BLD GP AB SCN SERPL QL: NORMAL
BLD PROD TYP BPU: NORMAL
BLD PROD TYP BPU: NORMAL
BLD UNIT ID BPU: 0
BLOOD BANK CMNT PATIENT-IMP: NORMAL
BLOOD PRODUCT CODE: NORMAL
BPU ID: NORMAL
BUN SERPL-MCNC: 39 MG/DL (ref 7–30)
CALCIUM SERPL-MCNC: 7.4 MG/DL (ref 8.5–10.1)
CHLORIDE SERPL-SCNC: 115 MMOL/L (ref 94–109)
CO2 SERPL-SCNC: 22 MMOL/L (ref 20–32)
CREAT SERPL-MCNC: 0.98 MG/DL (ref 0.52–1.04)
ERYTHROCYTE [DISTWIDTH] IN BLOOD BY AUTOMATED COUNT: 14.8 % (ref 10–15)
GFR SERPL CREATININE-BSD FRML MDRD: 53 ML/MIN/1.7M2
GLUCOSE SERPL-MCNC: 83 MG/DL (ref 70–99)
HBA1C MFR BLD: NORMAL % (ref 4.3–6)
HCT VFR BLD AUTO: 21.8 % (ref 35–47)
HGB BLD-MCNC: 7 G/DL (ref 11.7–15.7)
HGB BLD-MCNC: 7.4 G/DL (ref 11.7–15.7)
HGB BLD-MCNC: 8 G/DL (ref 11.7–15.7)
HGB BLD-MCNC: 8.1 G/DL (ref 11.7–15.7)
MCH RBC QN AUTO: 31.8 PG (ref 26.5–33)
MCHC RBC AUTO-ENTMCNC: 33.9 G/DL (ref 31.5–36.5)
MCV RBC AUTO: 94 FL (ref 78–100)
NUM BPU REQUESTED: 3
PLATELET # BLD AUTO: 124 10E9/L (ref 150–450)
POTASSIUM SERPL-SCNC: 4.1 MMOL/L (ref 3.4–5.3)
RBC # BLD AUTO: 2.33 10E12/L (ref 3.8–5.2)
SODIUM SERPL-SCNC: 147 MMOL/L (ref 133–144)
SPECIMEN EXP DATE BLD: NORMAL
TRANSFUSION STATUS PATIENT QL: NORMAL
TRANSFUSION STATUS PATIENT QL: NORMAL
UPPER GI ENDOSCOPY: NORMAL
WBC # BLD AUTO: 9.1 10E9/L (ref 4–11)

## 2017-12-06 PROCEDURE — 25000132 ZZH RX MED GY IP 250 OP 250 PS 637: Performed by: INTERNAL MEDICINE

## 2017-12-06 PROCEDURE — 40000885 ZZH STATISTIC STEP DOWN HRS EVENING

## 2017-12-06 PROCEDURE — 80048 BASIC METABOLIC PNL TOTAL CA: CPT | Performed by: INTERNAL MEDICINE

## 2017-12-06 PROCEDURE — 43235 EGD DIAGNOSTIC BRUSH WASH: CPT | Performed by: INTERNAL MEDICINE

## 2017-12-06 PROCEDURE — 85018 HEMOGLOBIN: CPT | Performed by: INTERNAL MEDICINE

## 2017-12-06 PROCEDURE — 99232 SBSQ HOSP IP/OBS MODERATE 35: CPT | Performed by: INTERNAL MEDICINE

## 2017-12-06 PROCEDURE — 36415 COLL VENOUS BLD VENIPUNCTURE: CPT | Performed by: INTERNAL MEDICINE

## 2017-12-06 PROCEDURE — 25000125 ZZHC RX 250: Performed by: INTERNAL MEDICINE

## 2017-12-06 PROCEDURE — 25000128 H RX IP 250 OP 636: Performed by: INTERNAL MEDICINE

## 2017-12-06 PROCEDURE — 83036 HEMOGLOBIN GLYCOSYLATED A1C: CPT | Performed by: INTERNAL MEDICINE

## 2017-12-06 PROCEDURE — 0DJ08ZZ INSPECTION OF UPPER INTESTINAL TRACT, VIA NATURAL OR ARTIFICIAL OPENING ENDOSCOPIC: ICD-10-PCS | Performed by: INTERNAL MEDICINE

## 2017-12-06 PROCEDURE — G0500 MOD SEDAT ENDO SERVICE >5YRS: HCPCS | Performed by: INTERNAL MEDICINE

## 2017-12-06 PROCEDURE — S0164 INJECTION PANTROPRAZOLE: HCPCS | Performed by: INTERNAL MEDICINE

## 2017-12-06 PROCEDURE — P9016 RBC LEUKOCYTES REDUCED: HCPCS | Performed by: EMERGENCY MEDICINE

## 2017-12-06 PROCEDURE — 85027 COMPLETE CBC AUTOMATED: CPT | Performed by: INTERNAL MEDICINE

## 2017-12-06 PROCEDURE — 12000000 ZZH R&B MED SURG/OB

## 2017-12-06 RX ORDER — OXYCODONE HYDROCHLORIDE 5 MG/1
5 TABLET ORAL EVERY 4 HOURS PRN
Status: DISCONTINUED | OUTPATIENT
Start: 2017-12-06 | End: 2017-12-07 | Stop reason: HOSPADM

## 2017-12-06 RX ORDER — NITROGLYCERIN 0.4 MG/1
0.4 TABLET SUBLINGUAL EVERY 5 MIN PRN
Status: DISCONTINUED | OUTPATIENT
Start: 2017-12-06 | End: 2017-12-07 | Stop reason: HOSPADM

## 2017-12-06 RX ORDER — AMLODIPINE BESYLATE 5 MG/1
5 TABLET ORAL 2 TIMES DAILY PRN
Status: DISCONTINUED | OUTPATIENT
Start: 2017-12-06 | End: 2017-12-07 | Stop reason: HOSPADM

## 2017-12-06 RX ORDER — BISACODYL 10 MG
10 SUPPOSITORY, RECTAL RECTAL DAILY PRN
Status: DISCONTINUED | OUTPATIENT
Start: 2017-12-06 | End: 2017-12-07 | Stop reason: HOSPADM

## 2017-12-06 RX ORDER — ONDANSETRON 2 MG/ML
4 INJECTION INTRAMUSCULAR; INTRAVENOUS EVERY 6 HOURS PRN
Status: DISCONTINUED | OUTPATIENT
Start: 2017-12-06 | End: 2017-12-07 | Stop reason: HOSPADM

## 2017-12-06 RX ORDER — PANTOPRAZOLE SODIUM 40 MG/1
40 TABLET, DELAYED RELEASE ORAL EVERY MORNING
Status: DISCONTINUED | OUTPATIENT
Start: 2017-12-07 | End: 2017-12-07 | Stop reason: HOSPADM

## 2017-12-06 RX ORDER — ONDANSETRON 4 MG/1
4 TABLET, ORALLY DISINTEGRATING ORAL EVERY 6 HOURS PRN
Status: DISCONTINUED | OUTPATIENT
Start: 2017-12-06 | End: 2017-12-07 | Stop reason: HOSPADM

## 2017-12-06 RX ORDER — SODIUM CHLORIDE 9 MG/ML
INJECTION, SOLUTION INTRAVENOUS CONTINUOUS
Status: DISCONTINUED | OUTPATIENT
Start: 2017-12-06 | End: 2017-12-06

## 2017-12-06 RX ORDER — LIDOCAINE 40 MG/G
CREAM TOPICAL
Status: DISCONTINUED | OUTPATIENT
Start: 2017-12-06 | End: 2017-12-06 | Stop reason: HOSPADM

## 2017-12-06 RX ORDER — OXYCODONE HYDROCHLORIDE 5 MG/1
5-10 TABLET ORAL
Status: DISCONTINUED | OUTPATIENT
Start: 2017-12-06 | End: 2017-12-06

## 2017-12-06 RX ORDER — LIDOCAINE 40 MG/G
CREAM TOPICAL
Status: DISCONTINUED | OUTPATIENT
Start: 2017-12-06 | End: 2017-12-07 | Stop reason: HOSPADM

## 2017-12-06 RX ORDER — METOPROLOL SUCCINATE 25 MG/1
25 TABLET, EXTENDED RELEASE ORAL DAILY
Status: DISCONTINUED | OUTPATIENT
Start: 2017-12-06 | End: 2017-12-07 | Stop reason: HOSPADM

## 2017-12-06 RX ORDER — POTASSIUM CHLORIDE 29.8 MG/ML
20 INJECTION INTRAVENOUS
Status: DISCONTINUED | OUTPATIENT
Start: 2017-12-06 | End: 2017-12-06

## 2017-12-06 RX ORDER — PROCHLORPERAZINE MALEATE 5 MG
5 TABLET ORAL EVERY 6 HOURS PRN
Status: DISCONTINUED | OUTPATIENT
Start: 2017-12-06 | End: 2017-12-07 | Stop reason: HOSPADM

## 2017-12-06 RX ORDER — NALOXONE HYDROCHLORIDE 0.4 MG/ML
.1-.4 INJECTION, SOLUTION INTRAMUSCULAR; INTRAVENOUS; SUBCUTANEOUS
Status: DISCONTINUED | OUTPATIENT
Start: 2017-12-06 | End: 2017-12-07 | Stop reason: HOSPADM

## 2017-12-06 RX ORDER — HYDROMORPHONE HYDROCHLORIDE 1 MG/ML
0.2 INJECTION, SOLUTION INTRAMUSCULAR; INTRAVENOUS; SUBCUTANEOUS
Status: DISCONTINUED | OUTPATIENT
Start: 2017-12-06 | End: 2017-12-06

## 2017-12-06 RX ORDER — DOCUSATE SODIUM 100 MG/1
200 CAPSULE, LIQUID FILLED ORAL 2 TIMES DAILY
Status: DISCONTINUED | OUTPATIENT
Start: 2017-12-06 | End: 2017-12-07 | Stop reason: HOSPADM

## 2017-12-06 RX ORDER — LABETALOL HYDROCHLORIDE 5 MG/ML
10 INJECTION, SOLUTION INTRAVENOUS
Status: DISCONTINUED | OUTPATIENT
Start: 2017-12-06 | End: 2017-12-06

## 2017-12-06 RX ORDER — PROCHLORPERAZINE 25 MG
12.5 SUPPOSITORY, RECTAL RECTAL EVERY 12 HOURS PRN
Status: DISCONTINUED | OUTPATIENT
Start: 2017-12-06 | End: 2017-12-07 | Stop reason: HOSPADM

## 2017-12-06 RX ORDER — POLYETHYLENE GLYCOL 3350 17 G/17G
17 POWDER, FOR SOLUTION ORAL 2 TIMES DAILY PRN
Status: DISCONTINUED | OUTPATIENT
Start: 2017-12-06 | End: 2017-12-07 | Stop reason: HOSPADM

## 2017-12-06 RX ORDER — POTASSIUM CHLORIDE 1.5 G/1.58G
20-40 POWDER, FOR SOLUTION ORAL
Status: DISCONTINUED | OUTPATIENT
Start: 2017-12-06 | End: 2017-12-06

## 2017-12-06 RX ORDER — POTASSIUM CHLORIDE 7.45 MG/ML
10 INJECTION INTRAVENOUS
Status: DISCONTINUED | OUTPATIENT
Start: 2017-12-06 | End: 2017-12-06

## 2017-12-06 RX ORDER — POLYETHYLENE GLYCOL 3350 17 G/17G
17 POWDER, FOR SOLUTION ORAL DAILY PRN
Status: DISCONTINUED | OUTPATIENT
Start: 2017-12-06 | End: 2017-12-06

## 2017-12-06 RX ORDER — POTASSIUM CHLORIDE 1500 MG/1
20-40 TABLET, EXTENDED RELEASE ORAL
Status: DISCONTINUED | OUTPATIENT
Start: 2017-12-06 | End: 2017-12-06

## 2017-12-06 RX ORDER — ACETAMINOPHEN 325 MG/1
650 TABLET ORAL EVERY 4 HOURS PRN
Status: DISCONTINUED | OUTPATIENT
Start: 2017-12-06 | End: 2017-12-07 | Stop reason: HOSPADM

## 2017-12-06 RX ORDER — ACETAMINOPHEN 650 MG/1
650 SUPPOSITORY RECTAL EVERY 4 HOURS PRN
Status: DISCONTINUED | OUTPATIENT
Start: 2017-12-06 | End: 2017-12-07 | Stop reason: HOSPADM

## 2017-12-06 RX ORDER — POTASSIUM CL/LIDO/0.9 % NACL 10MEQ/0.1L
10 INTRAVENOUS SOLUTION, PIGGYBACK (ML) INTRAVENOUS
Status: DISCONTINUED | OUTPATIENT
Start: 2017-12-06 | End: 2017-12-06

## 2017-12-06 RX ADMIN — LABETALOL HYDROCHLORIDE 10 MG: 5 INJECTION, SOLUTION INTRAVENOUS at 01:03

## 2017-12-06 RX ADMIN — SODIUM CHLORIDE 8 MG/HR: 9 INJECTION, SOLUTION INTRAVENOUS at 05:35

## 2017-12-06 RX ADMIN — SODIUM CHLORIDE 8 MG/HR: 9 INJECTION, SOLUTION INTRAVENOUS at 01:00

## 2017-12-06 RX ADMIN — AMLODIPINE BESYLATE 5 MG: 5 TABLET ORAL at 18:57

## 2017-12-06 RX ADMIN — DOCUSATE SODIUM 200 MG: 100 CAPSULE, LIQUID FILLED ORAL at 18:17

## 2017-12-06 RX ADMIN — SODIUM CHLORIDE: 9 INJECTION, SOLUTION INTRAVENOUS at 00:59

## 2017-12-06 RX ADMIN — SODIUM CHLORIDE: 9 INJECTION, SOLUTION INTRAVENOUS at 15:03

## 2017-12-06 RX ADMIN — METOPROLOL SUCCINATE 25 MG: 25 TABLET, EXTENDED RELEASE ORAL at 15:01

## 2017-12-06 NOTE — ED PROVIDER NOTES
History     Chief Complaint:  Rectal Bleeding      HPI   Tracey (June) Dean is a 89 year old female who presents to the emergency department today for evaluation of rectal bleeding. The patient has been constipated over the last few days. Yesterday, she manually disimpacted herself then had a large bowel movement. Since then, patient has had significant rectal bleeding with maroon-colored stool. Bleeding continued today prompting her to called EMS. Patient was transported to the ED for further evaluation. Here, patient denies anticoagulation use. No history of blood in stool. No chest pain or shortness of breath. No abdominal pain. No other concerns voiced.     Allergies:  Demerol [Meperidine]  Penicillins    Medications:    lisinopril (PRINIVIL/ZESTRIL) 20 MG tablet  metoprolol (TOPROL-XL) 50 MG 24 hr tablet  ammonium lactate (LAC-HYDRIN) 12 % cream  senna-docusate (SENOKOT-S;PERICOLACE) 8.6-50 MG per tablet  ASPIRIN PO     Past Medical History:    HTN    Past Surgical History:    History reviewed. No pertinent past surgical history.    Family History:    History reviewed. No pertinent family history.     Social History:  The patient was accompanied to the ED by EMS.  Smoking Status: Never smoker  Smokeless Tobacco: Never used  Alcohol Use: Yes  Marital Status:   [5]     Review of Systems   Respiratory: Negative for shortness of breath.    Cardiovascular: Negative for chest pain.   Gastrointestinal: Positive for blood in stool.   All other systems reviewed and are negative.    Physical Exam   Vitals:  Patient Vitals for the past 24 hrs:   BP Temp Temp src Pulse Heart Rate Resp SpO2 Height Weight   12/05/17 2300 133/57 - - - 71 20 98 % - -   12/05/17 2240 138/59 - - - 71 22 96 % - -   12/05/17 2220 168/64 - - - 77 16 98 % - -   12/05/17 2200 135/56 - - - 75 22 97 % - -   12/05/17 2152 167/65 - - - 81 15 - - -   12/05/17 2140 169/64 - - - 81 14 97 % - -   12/05/17 2115 146/59 98.3  F (36.8  C) - - 75 18 97  "% - -   12/05/17 2100 143/59 - - - 74 11 98 % - -   12/05/17 2058 164/69 97.7  F (36.5  C) - - 74 20 - - -   12/05/17 2056 - - - - 75 14 97 % - -   12/05/17 2045 - - - - 75 13 98 % - -   12/05/17 2040 162/87 - - - 75 15 98 % - -   12/05/17 2030 - - - - 75 12 99 % - -   12/05/17 2016 - - - - 74 15 97 % - -   12/05/17 2015 - - - - 75 19 96 % - -   12/05/17 2005 150/62 - - - 80 14 - - -   12/05/17 1948 151/74 - - 91 - 18 97 % 1.575 m (5' 2\") 58.1 kg (128 lb)   12/05/17 1946 151/74 97.7  F (36.5  C) Oral - 98 16 98 % - -     Physical Exam  General: Alert and cooperative with exam. Patient in mild distress. Normal mentation.  Head:  Scalp is NC/AT  Eyes:  No scleral icterus, PERRL  ENT:  The external nose and ears are normal. The oropharynx is normal and without erythema; mucus membranes are moist. Uvula midline, no evidence of deep space infection.  Neck:  Normal range of motion without rigidity.  CV:  Regular rate and rhythm  Resp:  Breath sounds are clear bilaterally    Non-labored, no retractions or accessory muscle use  GI:  Abdomen is soft, no distension, no tenderness. No peritoneal signs  MS:  No lower extremity edema  Rectal: Moderate non bleeding non thrombosed external hemorrhoids, significant melanotic stool (runny/maroon-colored), anoscopy without identifiable source of bleeding   Skin:  Warm and dry, No rash or lesions noted.  Neuro: Oriented x 3. No gross motor deficits.    Emergency Department Course   ECG:  ECG taken at 2010, ECG read at 2020  Normal sinus rhythm  Left ventricular hypertrophy with reploarization abnormality  Lateral T wave inversions   Rate 77 bpm. SD interval 148. QRS duration 90. QT/QTc 372/420. P-R-T axes 39 -13 229.     Imaging:  Imaging findings were communicated with the patient who voiced understanding of the findings.    Chest XR, 2 Views:  Moderate cardiomegaly is present along with some  scoliosis. There is no evidence for congestive heart failure or any  infiltrates. No " pleural effusions. No evidence for free air.  Reading per radiology    Laboratory:  Laboratory findings were communicated with the patient who voiced understanding of the findings.    basic metabolic panel: Glucose: 142(H), BUN: 49(H), Creatinine: 1.23(H), GFR: 41(L)    CBC: WBC: 14.5(H), HGB: 7.7(L) o/w WNL. ()     ABO/Rh type and screen: A POS    Hepatic panel: Protein total: 6.3(L)    INR: 1.15(H)    PTT: 25    Procedures:    Anoscopy   Procedure explained and verbal consent obtained. The anoscope was pre-lubricatred and inserted, the obturator was removed, and the anoscope was rotated 360 degrees. patient was noted to have significant runny melena. Examination of the anal canal and distal rectum revealed no identified source of bleeding. The patient tolerated the procedure well.    Interventions:  1659- NS 1000 mL IV  Blood transfusion 1 Unit IV     Emergency Department Course:  Nursing notes and vitals reviewed.  I performed an exam of the patient as documented above.     IV was inserted and blood was drawn for laboratory testing, results above.    The patient was sent for a XR while in the emergency department, results above.      8:38 PM: I spoke with Dr. Irving of the [facility] service regarding patient's presentation, findings, and plan of care.      8:45 PM: I spoke with Dr. Aburto of the hospitalist service regarding patient's presentation, findings, and plan of care.    I discussed the treatment plan with the patient. They expressed understanding of this plan and consented to admission. I discussed the patient with Dr. Aburto, who will admit the patient to a monitored bed for further evaluation and treatment.    Impression & Plan      Medical Decision Making:  Tracey Moran is a 89 year old female with a history of hypertension who presents with GI bleeding; not anticoagulated. Patient's medical records and history reviewed. Initial consideration for, but not limited to, upper vs lower GI bleed, anal  fissure, bleeding from hemorrhoid, among others. Anoscopy was performed on initial arrival and demonstrated significant/watery melena on exam; grossly positive for blood. Vitals signs stable. 2 large bore IVs were placed and labs including type and screen were obtained. Labs notable for evaluated of WBC (14.5; no infectious symptoms), significant anemia (HGB 7.7, down from 11.5 in May), elevated BUN (49), and CASSIE (Creatinine 1.23). Chest XR obtained without evidence of free air. Patient was provided IV fluids as well as 1 unit of blood given low HGB and ongoing bleeding. EKG showed normal sinus rhythm without significant findings. Case was discussed in person with Dr. Irving of [facility] who saw the patient in the ED. Patient will be admitted to the hospitalist service for further evaluation and care. Patient remains stable throughout ED course.     Diagnosis:    ICD-10-CM    1. Rectal bleeding K62.5 Blood component     Blood component   2. CASSIE (acute kidney injury) (H) N17.9        Disposition:   Admit to [facility] [place]    Scribe Disclosure:  Zulay PANIAGUA, surekha serving as a scribe at 7:51 PM on 12/5/2017 to document services personally performed by Shaw Vaughan DO, based on my observations and the provider's statements to me.    12/5/2017    EMERGENCY DEPARTMENT       Shaw Vaughan DO  12/05/17 0705

## 2017-12-06 NOTE — ED NOTES
Patient prefers to be called June.  Her son Nick would like a phone call to update him and let him know what room she is in, how he can reach her.  If anything is needed, patient's grand daughter can be called -- Caryn at [phone]

## 2017-12-06 NOTE — CONSULTS
GASTROENTEROLOGY CONSULTATION      REASON FOR CONSULTATION:  Melena.      HISTORY OF PRESENT ILLNESS:  Tracey Moran is a 89-year-old woman who lives in a co-op fairly independently.  She states yesterday she was having difficulty having a bowel movement, attempted to digitally disimpact herself, and found that there was black stool in her rectum.  Also some red blood, but black stool apparently was confirmed by the emergency room as well.  She has had no prior episodes of this.  She is a nonsmoker, nondrinker, and initially denies any nonsteroidal anti-inflammatories.  Her granddaughter-in-law is with her and indicates that she does take ibuprofen on a semi-regular basis, presumably for arthritis.      PAST MEDICAL HISTORY:  She claims very little.  States that she does have hypertension, but denies being on any blood thinners.  She had a fractured wrist in December of last year, tonsils and adenoids as a child, cataract extraction, and a remote cholecystectomy.      SOCIAL HISTORY:  , 4 children.      FAMILY HISTORY:  Negative for colon cancer.      MEDICATIONS:  Lisinopril, metoprolol, Senna, and despite her denial it appears that she is taking oral aspirin.      REVIEW OF SYSTEMS:  A 10-point review of systems is  negative, except as noted above.      PHYSICAL EXAMINATION:   GENERAL:  Well-developed, well-nourished, very alert elderly woman in no acute distress.   VITAL SIGNS:  Temperature is 97.7, pulse 75, blood pressure 162/87, respirations 13.   HEENT:  Pupils round, reactive to light.  Status post cataract extractions bilaterally.  Pharynx benign.  Teeth in good repair.   NECK:  Supple, no obvious thyromegaly or adenopathy.   COR:  Normal S1, S2.   CHEST:  Clear to percussion and auscultation.   ABDOMEN:  Benign.  Bowel sounds are present.  No obvious hepatosplenomegaly, completely nontender.   EXTREMITIES:  Without cyanosis, clubbing or edema.   GASTROINTESTINAL:  Rectal exam done by emergency room  physician suggests melena.   MENTAL STATUS:  Alert, oriented x3.   SKIN:  Normal.      LABORATORY DATA:  BUN 49, creatinine 1.23.  This is a clear change from in 2016, suggesting an upper GI bleeding source.  Liver function tests are completely normal.  Her white count is 14.5, hemoglobin 7.7 (this was 11.1 in January of this year), Platelet count 202,000, MCV is 99.      STUDIES:  Twelve lead EKG  -- Some T-wave flattening, particularly in the precordial leads.  No obvious acute event.       ASSESSMENT:  Gastrointestinal bleed in an elderly woman with a probable history of non-steroidal anti-inflammatory drug use, both aspirin and ibuprofen.  Most likely this is an upper GI bleeding source, appears to be relatively stable at this point in time, and she is anticipated to be getting blood this evening.      PLAN:  Intravenous proton pump inhibitor therapy.  Would keep n.p.o. this evening and plan a likely upper endoscopy in the a.m. to further evaluate.  Further recommendations to follow.         WILBUR BOYER MD             D: 2017 20:57   T: 2017 23:16   MT: MARY#[id number]      Name:     LIV KINGSTON   MRN:      [id number]        Account:       [id number]   :      1925           Consult Date:  2017      Document: [id number]       cc: Guilherme Graves MD

## 2017-12-06 NOTE — ED NOTES
[facility]  ED Nurse Handoff Report    ED Chief complaint: Rectal Bleeding (patient manually disimpacted herself yesterday, passed hard stool followed by normal stool and had been passing bright red blood since disimpaction)      ED Diagnosis:   Final diagnoses:   Rectal bleeding   CASSIE (acute kidney injury) (H)       Code Status: Full Code    Allergies:   Allergies   Allergen Reactions     Demerol [Meperidine]      Dust Mites      Peanuts [Nuts]      Penicillins      Pollen Extract        Activity level - Baseline/Home:  Independent    Activity Level - Current:   Stand with Assist     Needed?: No    Isolation: No  Infection: Not Applicable    Bariatric?: No    Vital Signs:   Vitals:    12/05/17 2040 12/05/17 2045 12/05/17 2056 12/05/17 2058   BP: 162/87   164/69   Pulse:       Resp: 15 13 14 20   Temp:    97.7  F (36.5  C)   TempSrc:       SpO2: 98% 98% 97%    Weight:       Height:           Cardiac Rhythm: ,    sinus rhythm    Pain level:  0/10    Is this patient confused?: No, alert and oriented, hard of hearing      Patient Report: Initial Complaint: Patient presents with GI bleeding.  Patient states bleeding started yesterday and was a bright red/dark red blood. Patient states early yesterday she noted some black bleeding.  Patient states she has chronic constipation. Patient states yesterday she was attempting to manually disimpact her stool and she was able to pass a firm hard stool.  Patient reports normal bowel movements after.  Patient states after the hard stool passed she noted the red bleeding.   Focused Assessment: Patient is hard of hearing, is alert and oriented.  Patient denies light headedness, denies dizziness. Patient denies shortness of breath, denies chest pain, abdominal pain.  Patient has dark red blood coming from rectum.    Tests Performed: Labs, Type and screen, chest xray  Abnormal Results: Results for THEE LIV (MRN [id number]) as of 12/5/2017 21:16   Ref.  Range 12/5/2017 19:48   Sodium Latest Ref Range: 133 - 144 mmol/L 142   Potassium Latest Ref Range: 3.4 - 5.3 mmol/L 4.4   Chloride Latest Ref Range: 94 - 109 mmol/L 107   Carbon Dioxide Latest Ref Range: 20 - 32 mmol/L 28   Urea Nitrogen Latest Ref Range: 7 - 30 mg/dL 49 (H)   Creatinine Latest Ref Range: 0.52 - 1.04 mg/dL 1.23 (H)   GFR Estimate Latest Ref Range: >60 mL/min/1.7m2 41 (L)   GFR Estimate If Black Latest Ref Range: >60 mL/min/1.7m2 49 (L)   Calcium Latest Ref Range: 8.5 - 10.1 mg/dL 8.5   Anion Gap Latest Ref Range: 3 - 14 mmol/L 7   Albumin Latest Ref Range: 3.4 - 5.0 g/dL 3.4   Protein Total Latest Ref Range: 6.8 - 8.8 g/dL 6.3 (L)   Bilirubin Total Latest Ref Range: 0.2 - 1.3 mg/dL 0.3   Alkaline Phosphatase Latest Ref Range: 40 - 150 U/L 49   ALT Latest Ref Range: 0 - 50 U/L 13   AST Latest Ref Range: 0 - 45 U/L 9   Bilirubin Direct Latest Ref Range: 0.0 - 0.2 mg/dL <0.1   Glucose Latest Ref Range: 70 - 99 mg/dL 142 (H)   WBC Latest Ref Range: 4.0 - 11.0 10e9/L 14.5 (H)   Hemoglobin Latest Ref Range: 11.7 - 15.7 g/dL 7.7 (L)   Hematocrit Latest Ref Range: 35.0 - 47.0 % 22.8 (L)   Platelet Count Latest Ref Range: 150 - 450 10e9/L 206   RBC Count Latest Ref Range: 3.8 - 5.2 10e12/L 2.31 (L)   MCV Latest Ref Range: 78 - 100 fl 99   MCH Latest Ref Range: 26.5 - 33.0 pg 33.3 (H)   MCHC Latest Ref Range: 31.5 - 36.5 g/dL 33.8   RDW Latest Ref Range: 10.0 - 15.0 % 13.1   Diff Method Unknown Automated Method   % Neutrophils Latest Units: % 82.3   % Lymphocytes Latest Units: % 11.9   % Monocytes Latest Units: % 4.2   % Eosinophils Latest Units: % 1.1   % Basophils Latest Units: % 0.2   % Immature Granulocytes Latest Units: % 0.3   Nucleated RBCs Latest Ref Range: 0 /100 0   Absolute Neutrophil Latest Ref Range: 1.6 - 8.3 10e9/L 12.0 (H)   Absolute Lymphocytes Latest Ref Range: 0.8 - 5.3 10e9/L 1.7   Absolute Monocytes Latest Ref Range: 0.0 - 1.3 10e9/L 0.6   Absolute Eosinophils Latest Ref Range: 0.0 - 0.7  10e9/L 0.2   Absolute Basophils Latest Ref Range: 0.0 - 0.2 10e9/L 0.0   Abs Immature Granulocytes Latest Ref Range: 0 - 0.4 10e9/L 0.0   Absolute Nucleated RBC Unknown 0.0   INR Latest Ref Range: 0.86 - 1.14  1.15 (H)   PTT Latest Ref Range: 22 - 37 sec 25   ABO Unknown A   RH(D) Unknown Pos   Antibody Screen Unknown Neg   Test Valid Only At Latest Units:     Naylor [place]...   Specimen Expires Unknown 12/08/2017   Blood Component Type Unknown Red Blood Cells L...   Unit Number Unknown [id number]   Division Number Unknown 00   Status of Unit Unknown Released to care ...   Crossmatch Unknown Red Blood Cells   Unit Status Unknown ISS     Treatments provided: 1L Saline Bolus, 125ml Saline infusion.  40mg Protonix bolus with 80 mg protonix drip    Family Comments: Grand daughter Caryn at bedside.  Patient's son Nick is primary care giver.  Available by phone at [phone]. Other son aaron [phone].  Daughter in law Myrna will come tomorrow.     OBS brochure/video discussed/provided to patient: No    ED Medications:   Medications   pantoprazole (PROTONIX) 80 mg in NaCl 0.9 % 100 mL infusion (8 mg/hr Intravenous New Bag 12/5/17 2026)   0.9% sodium chloride BOLUS (0 mLs Intravenous Stopped 12/5/17 2055)     Followed by   0.9% sodium chloride infusion (1,000 mLs Intravenous New Bag 12/5/17 2055)   pantoprazole (PROTONIX) 40 mg IV push injection (40 mg Intravenous Given 12/5/17 1959)       Drips infusing?:  Yes-- protonix, blood infusing.       ED NURSE PHONE NUMBER: [phone]

## 2017-12-06 NOTE — PLAN OF CARE
Problem: Patient Care Overview  Goal: Plan of Care/Patient Progress Review  Outcome: No Change  Pt hemoglobin drop to 7.0 after 1 unit of blood. A second unit is transfusing at this time. No transfusion reaction noted. One dose of labetolol given x 1 for /67. Helpful. AVSS, 98% on room air. Sinus rhythm. Pt had dark red bloody stools. Incontinent of B&B, NPO, bedrest A&O X 4.

## 2017-12-06 NOTE — PLAN OF CARE
Problem: Gastrointestinal Bleeding (Adult)  Goal: Signs and Symptoms of Listed Potential Problems Will be Absent, Minimized or Managed (Gastrointestinal Bleeding)  Signs and symptoms of listed potential problems will be absent, minimized or managed by discharge/transition of care (reference Gastrointestinal Bleeding (Adult) CPG).   Outcome: Improving  RN: pt alert and exceptionally pleasant.  Vitals stable.  Hgb stable at 8.0.  No BM during day shift, reddish smear with vladimir cares.  Mepilex applied to coccyx due to maceration--WOC-RN viewed and deemed not a pressure injury, no consult needed.  Turning & repositioning q2h due to PI risk.  Went to EGD, no ulcers.  GI & Hospitalist following.  Tele sinus rhythm.

## 2017-12-06 NOTE — CONSULTS
GI:  Consult dictated  Will plan on EGD in AM.  PPI therapy tonight and transfuse.  Keep NPO (or clear liquids).  Nir Irving MD  [phone]  After 5 pm or on weekends  [phone]

## 2017-12-06 NOTE — ED NOTES
Patient arrives with [facility] from home for complaints of lower GI bleeding.  Patient reported that she was feeling constipated and attempted to manually disimpact herself yesterday.  Patient states she was able to pass hard stool which was followed by bleeding with more normal stool.  Today patient reports that there has been a lot of blood draining on its own. Patient denies use of blood thinners.  EMS reports that patient had several towels with blood on them in the shower at home as well as a washcloth with her.

## 2017-12-06 NOTE — ED NOTES
Bed: ED04  Expected date: 12/5/17  Expected time:   Means of arrival:   Comments:  536 89 f/gi bleed

## 2017-12-06 NOTE — ED NOTES
Patient had saturated brief, saturated chucks below her and blood through sheet at base of chux.  Patient was cleaned and bed change complete. Patient continues to deny shortness of breath. Patient denies back pain, chest discomfort.  Patient denies any change in condition since start of blood administration. Patient concerned about continuing to bleed.  Patient denies light headedness or dizziness in supine position, patient not up out of bed since arrival.

## 2017-12-06 NOTE — PROGRESS NOTES
"[facility]    Hospitalist Progress Note    Assessment & Plan   Tracey Moran is a 89 year old female who was admitted on 12/5/2017 with GI bleed and found to have:    Impression:   Principal Problem:    GI bleed -- with normal EGD, suspect lower bleed, ?from diverticuli   -- had rectal impaction which she disimpacted, bleeding started after that  Active Problems:    Leukocytosis, unspecified type    Essential hypertension, benign    Anemia due to blood loss, acute    Thrombocytopenia (H)    CRF (chronic renal failure), stage 3 (moderate)      Plan:  Clear liquid diet, serial hgb's, discussed with daughter who does not want to pursue colonoscopy.  Will advance diet tomorrow if hgb stable.  Stop IV PPI and give orally (probably not necessary but will continue until this episode resolved.      DVT Prophylaxis: Pneumatic Compression Devices  Code Status: Full Code    Disposition: Expected discharge in 2 days once hgb stable.    Corby Rodrigues MD  Pager [phone]  Cell Phone [phone]  Text Page (7am to 6pm)    Interval History   Feels OK, is hungry, no abdominal pain.  She thinks she caused the bleeding when she disimpacted herself -- the bright red blood, \"lots of it\", started then.  She has gotten 2 units so far.      Physical Exam   Temp: 98.6  F (37  C) Temp src: Oral BP: 149/65 Pulse: 73 Heart Rate: 76 Resp: 22 SpO2: 96 % O2 Device: None (Room air) Oxygen Delivery: 2 LPM  Vitals:    12/05/17 1948   Weight: 58.1 kg (128 lb)     Vital Signs with Ranges  Temp:  [97.7  F (36.5  C)-98.6  F (37  C)] 98.6  F (37  C)  Pulse:  [73-91] 73  Heart Rate:  [69-98] 76  Resp:  [10-26] 22  BP: (130-187)/(43-87) 149/65  SpO2:  [94 %-100 %] 96 %  I/O last 3 completed shifts:  In: 600   Out: -     Constitutional: Awake, alert, cooperative, no apparent distress  Respiratory: Clear to auscultation bilaterally, no crackles or wheezing  Cardiovascular: Regular rate and rhythm, normal S1 and S2, and no murmur " noted  GI: Normal bowel sounds, soft, non-distended, non-tender  Extrem: No calf tenderness, no ankle edema  Neuro: Ox3, no focal motor or sensory deficits    Medications        metoprolol  25 mg Oral Daily     sodium chloride (PF)  3 mL Intracatheter Q8H     [START ON 12/7/2017] pantoprazole  40 mg Oral QAM     docusate sodium  200 mg Oral BID       Data     Recent Labs  Lab 12/06/17  1159 12/06/17  0745 12/06/17  0255 12/05/17  1948   WBC  --  9.1  --  14.5*   HGB 8.0* 7.4* 7.0* 7.7*   MCV  --  94  --  99   PLT  --  124*  --  206   INR  --   --   --  1.15*   NA  --  147*  --  142   POTASSIUM  --  4.1  --  4.4   CHLORIDE  --  115*  --  107   CO2  --  22  --  28   BUN  --  39*  --  49*   CR  --  0.98  --  1.23*   ANIONGAP  --  10  --  7   RENETTA  --  7.4*  --  8.5   GLC  --  83  --  142*   ALBUMIN  --   --   --  3.4   PROTTOTAL  --   --   --  6.3*   BILITOTAL  --   --   --  0.3   ALKPHOS  --   --   --  49   ALT  --   --   --  13   AST  --   --   --  9       Imaging:   Recent Results (from the past 24 hour(s))   XR Chest 2 Views    Narrative    CHEST TWO VIEWS    12/5/2017 9:31 PM     HISTORY: Evaluate for free air - gastrointestinal bleed.    COMPARISON: 3/5/2016      Impression    IMPRESSION: Moderate cardiomegaly is present along with some  scoliosis. There is no evidence for congestive heart failure or any  infiltrates. No pleural effusions. No evidence for free air.    SON BAUM MD

## 2017-12-06 NOTE — PHARMACY-ADMISSION MEDICATION HISTORY
Admission medication history interview status for the 12/5/2017  admission is complete. See EPIC admission navigator for prior to admission medications     Medication history source reliability:Good    Actions taken by pharmacist (provider contacted, etc):Called son    Additional medication history information not noted on PTA med list :None    Medication reconciliation/reorder completed by provider prior to medication history? Yes    Time spent in this activity: 15 min    Prior to Admission medications    Medication Sig Last Dose Taking? Auth Provider   MAGNESIUM PO Take 1 tablet by mouth daily Unsure of dose.  Yes Unknown, Entered By History   lisinopril (PRINIVIL/ZESTRIL) 20 MG tablet Take 1 tablet (20 mg) by mouth 2 times daily Hold is SBP <110 call if held x 2 in a row or symptomatic 12/5/2017 at am Yes Guilherme Graves MD   metoprolol (TOPROL-XL) 50 MG 24 hr tablet Take 1 tablet (50 mg) by mouth daily Hold if SBP <110 or HR <55 call if held x2 in a row or symptomatic 12/5/2017 at Unknown time Yes Guilherme Graves MD   Ascorbic Acid (VITAMIN C PO) Take 1,000 mg by mouth daily 12/5/2017 at Unknown time Yes Unknown, Entered By History

## 2017-12-06 NOTE — H&P
DATE OF ADMISSION:  12/05/2017      PRIMARY CARE PHYSICIAN:  Dr. Guilherme Graves      CHIEF COMPLAINT:  Rectal bleed.      HISTORY OF PRESENT ILLNESS:  Ms. Tracey Moran is a delightful 89-year-old  lady with a past medical history notable for hypertension, urinary tract infection, mild chronic anemia and wrist fracture who has presented to the Emergency Department via ambulance for evaluation of rectal bleed.  The patient seems to be a reliable historian.  She states she was constipated for a couple of days and yesterday she did manual disimpaction.  Since then, she has had multiple episodes of bloody stools.  On direct questioning, she describes the color of her stool as being dark red.  She reports no associated chest pain, palpitations, dyspnea, abdominal pain, nausea or vomiting.  She has no previous history of rectal bleed and reports no colonoscopy.  She is not on anticoagulation therapy.  At home she takes aspirin 325 mg p.o. daily.      In the Emergency Department, the patient has been evaluated by Dr. Vaughan.  Her stool is maroon colored. The hemoglobin is 7.7, which is down from 11.5 back in 05/2017.  Chemistry profile is remarkable for creatinine of 1.23.  Liver function panel is normal.  Coagulation profile shows INR of 1.15 and PTT of 25.  Electrocardiogram reveals normal sinus rhythm with a rate of 77 beats per minute and voltage criteria for LVH.  She has stable vital signs with blood pressure of 143/59, heart rate of 74, and oxygen saturation of 97% on room air.  She is being admitted to the [facility] under inpatient status.      In the ER the patient received 40 mg of intravenous Protonix.  Plan is for transfusion with 1 unit of packed red blood cells.  Consent has been obtained by the ER attending physician.      PAST MEDICAL HISTORY:   1.  Hypertension.   2.  UTI.   3.  Chronic anemia with a baseline hemoglobin around 11.   4.  Constipation.   5.  Left wrist fracture, status post reduction.       PAST SURGICAL HISTORY:   1.  Bilateral cataract surgery.   2.  Cholecystectomy.   3.  Tonsillectomy.      FAMILY HISTORY:  Negative for colon cancer.      SOCIAL HISTORY:  The patient is  and lives in her apartment.  She ambulates with the assistance of a walker.  She does not smoke or drink alcohol.      MEDICATIONS:    Prior to Admission Medications   Prescriptions Last Dose Informant Patient Reported? Taking?   Ascorbic Acid (VITAMIN C PO) 12/5/2017 at Unknown time Self Yes Yes   Sig: Take 1,000 mg by mouth daily   MAGNESIUM PO   Yes Yes   Sig: Take 1 tablet by mouth daily Unsure of dose.   lisinopril (PRINIVIL/ZESTRIL) 20 MG tablet 12/5/2017 at am  No Yes   Sig: Take 1 tablet (20 mg) by mouth 2 times daily Hold is SBP <110 call if held x 2 in a row or symptomatic   metoprolol (TOPROL-XL) 50 MG 24 hr tablet 12/5/2017 at Unknown time  No Yes   Sig: Take 1 tablet (50 mg) by mouth daily Hold if SBP <110 or HR <55 call if held x2 in a row or symptomatic      Facility-Administered Medications: None        ALLERGIES AND  INTOLERANCES:  Demerol, penicillins.      REVIEW OF SYSTEMS:  A 10-point review of systems was performed thoroughly and was negative except as per stated in the history of present illness.      PHYSICAL EXAMINATION:   GENERAL:  This patient is a very pleasant elderly lady who is in no acute distress.   VITAL SIGNS:  Blood pressure 143/59, heart rate 74, respiratory rate 20, temperature 97.7 degrees Fahrenheit, oxygen saturation 97% on room air.   HEENT:  Pupils are irregular but reactive to light bilaterally.  There is conjunctival pallor.  The sclerae are anicteric.  Oral mucosa appears moist.   NECK:  Supple.  There is no elevated JVP.   LUNGS:  Clear to auscultation bilaterally.  No crackles, wheezing or rhonchi are audible.   CARDIOVASCULAR:  There is normal S1 and S2 with regular rate and rhythm.  No S3, S4 or murmur is audible.   ABDOMEN:  Soft, nontender, nondistended.  Bowel sounds are  present.   EXTREMITIES:  There is no calf tenderness or lower extremity edema.   SKIN:  There is no jaundice, cyanosis or rash.   NEUROLOGIC:  She is awake, alert, oriented x3.  She is hard of hearing.  There is no focal weakness on gross examination.  The speech is normal.      DATA:   1.  Twelve-lead electrocardiogram:  It shows normal sinus rhythm with a rate of 77 beats per minute and left ventricular hypertrophy.   2.  Chemistry profile:  Sodium 142, potassium 4.4, BUN 49, creatinine 1.23, calcium 8.5, total protein 6.3, alkaline phosphatase 49, ALT 13, AST 9, total bilirubin 0.3, direct bilirubin less than 0.9, glucose 142.   3.  Hematology profile:  White count 14.5, hemoglobin 7.7, hematocrit 22.8, platelet count 206,000, MCV 99 with differential of 82% neutrophils.   4.  Coagulation profile:  INR 1.15, PTT 25.      ASSESSMENT AND PLAN:  Ms. Tracey Moran is a delightful 89-year-old  lady with a past medical history notable for mild chronic anemia, hypertension, urinary tract infection and constipation who has presented with rectal bleed.  She is being admitted to [facility].   1.  Gastrointestinal bleed:  She has presented with rectal bleeding since yesterday following manual rectal disimpaction.  The stool is described as dark red blood which has been continuous all day long today.  She has elevated BUN/creatinine ratio of 49:1.23, which does raise the possibility of upper gastrointestinal bleed; however, the history is more consistent with a lower gastrointestinal source in view of lack of associated abdominal pain or other symptoms.  I will keep the patient n.p.o.  I will start the patient on intravenous Protonix drip.  I will monitor her hemoglobin every 6 hours.  I will place an order for transfusion if hemoglobin drops to less than 8. Additionally, I will hold her prior to admission aspirin.  I will keep the patient on Telemetry.  [facility] has been notified of consult.    2.  Acute  blood loss anemia:  Due to above.  She has chronic mild anemia with a baseline hemoglobin around 11.  Her today's hemoglobin is 7.7.  She will be transfused a unit of packed red blood cells per Emergency Room order.  I will also place a conditional order to transfuse for any hemoglobin less than 8.  Hemoglobin will be monitored every 6 hours.   3.  Acute kidney injury:  Creatinine is 1.23.  Previously she had normal renal function with creatinine of 0.7.  This could be prerenal as well as due to physiologic effect of lisinopril.  I will hold her prior to admission lisinopril.  I will hydrate the patient with intravenous normal saline.  I will monitor renal function.  I will avoid nonsteroidal anti-inflammatory drugs or other nephrotoxins.   4.  Hypertension:  The blood pressure is currently stable.  At home, she is on lisinopril 20 mg p.o. b.i.d. as well as Toprol-XL 50 mg p.o. daily.  Due to acute kidney injury, I will hold her lisinopril.  Given the potential risk of hypotension due to gastrointestinal bleed, I will reduce the dose of metoprolol XL from 50 to 25 mg p.o. daily with hold parameters.  Additionally, I will have intravenous labetalol available p.r.n.  Her blood pressure will be closely monitored.   5.  Hyperglycemic:  The blood glucose is 142 and elevated.  This is a nonfasting level.  I will repeat blood glucose in the morning.  Additionally, I will check hemoglobin A1c to rule out the possibility of prediabetes.   6.  Deep venous thrombosis prophylaxis:  Pneumatic compression device.   7.  Code status:  It was discussed with the patient in the presence of her granddaughter-in-law.  She requested to be full code.   8.  Disposition:  I anticipate more than 48 hours of hospital stay.  For now, she will be admitted to [facility] as there is a potential risk of hypotension and further gastrointestinal bleed.      I would like to thank Dr. Guilherme Graves for allowing the Hospitalist Service to participate in the  care of this patient.         JAYASHREE PERRIN MD             D: 2017 21:27   T: 2017 22:10   MT: HENRIQUE      Name:     LIV KINGSTON   MRN:      [id number]        Account:      [id number]   :      1925           Admitted:     [id number]      Document: [id number]       cc: Guilherme Graves MD

## 2017-12-06 NOTE — PROGRESS NOTES
"Notified GI PA pt's dtr-in-law has questions re: EGD appropriateness.  She came to assess.     Of note, night nurse reported incontinent BM, \"Pt had dark red bloody stools.\"  No stool for day shift.   "

## 2017-12-06 NOTE — ED NOTES
Patient denies light headenes, denies headache, denies chest pain, shortness of breath.  Patient complains of no abdominal discomfort.  Patient states she has been eating and drinking normally without problems, suffers from chronic constipation.

## 2017-12-07 VITALS
SYSTOLIC BLOOD PRESSURE: 177 MMHG | TEMPERATURE: 96.4 F | HEIGHT: 62 IN | HEART RATE: 73 BPM | DIASTOLIC BLOOD PRESSURE: 63 MMHG | RESPIRATION RATE: 16 BRPM | BODY MASS INDEX: 23.55 KG/M2 | WEIGHT: 128 LBS | OXYGEN SATURATION: 99 %

## 2017-12-07 LAB
ANION GAP SERPL CALCULATED.3IONS-SCNC: 7 MMOL/L (ref 3–14)
BUN SERPL-MCNC: 23 MG/DL (ref 7–30)
CALCIUM SERPL-MCNC: 8.1 MG/DL (ref 8.5–10.1)
CHLORIDE SERPL-SCNC: 109 MMOL/L (ref 94–109)
CO2 SERPL-SCNC: 26 MMOL/L (ref 20–32)
CREAT SERPL-MCNC: 0.78 MG/DL (ref 0.52–1.04)
GFR SERPL CREATININE-BSD FRML MDRD: 69 ML/MIN/1.7M2
GLUCOSE SERPL-MCNC: 83 MG/DL (ref 70–99)
HGB BLD-MCNC: 8.6 G/DL (ref 11.7–15.7)
HGB BLD-MCNC: 8.7 G/DL (ref 11.7–15.7)
PLATELET # BLD AUTO: 149 10E9/L (ref 150–450)
POTASSIUM SERPL-SCNC: 3.5 MMOL/L (ref 3.4–5.3)
SODIUM SERPL-SCNC: 142 MMOL/L (ref 133–144)

## 2017-12-07 PROCEDURE — 80048 BASIC METABOLIC PNL TOTAL CA: CPT | Performed by: INTERNAL MEDICINE

## 2017-12-07 PROCEDURE — 85049 AUTOMATED PLATELET COUNT: CPT | Performed by: INTERNAL MEDICINE

## 2017-12-07 PROCEDURE — 25000132 ZZH RX MED GY IP 250 OP 250 PS 637: Performed by: INTERNAL MEDICINE

## 2017-12-07 PROCEDURE — 99239 HOSP IP/OBS DSCHRG MGMT >30: CPT | Performed by: INTERNAL MEDICINE

## 2017-12-07 PROCEDURE — 36415 COLL VENOUS BLD VENIPUNCTURE: CPT | Performed by: INTERNAL MEDICINE

## 2017-12-07 PROCEDURE — 85018 HEMOGLOBIN: CPT | Performed by: INTERNAL MEDICINE

## 2017-12-07 RX ORDER — DOCUSATE SODIUM 100 MG/1
200 CAPSULE, LIQUID FILLED ORAL 2 TIMES DAILY
Qty: 100 CAPSULE | Refills: 3 | Status: SHIPPED | OUTPATIENT
Start: 2017-12-07 | End: 2018-11-29

## 2017-12-07 RX ORDER — POLYETHYLENE GLYCOL 3350 17 G/17G
17 POWDER, FOR SOLUTION ORAL 2 TIMES DAILY PRN
Qty: 7 PACKET | Refills: 1 | Status: SHIPPED | OUTPATIENT
Start: 2017-12-07 | End: 2018-11-29

## 2017-12-07 RX ORDER — LISINOPRIL 20 MG/1
20 TABLET ORAL 2 TIMES DAILY
Status: DISCONTINUED | OUTPATIENT
Start: 2017-12-07 | End: 2017-12-07 | Stop reason: HOSPADM

## 2017-12-07 RX ADMIN — PANTOPRAZOLE SODIUM 40 MG: 40 TABLET, DELAYED RELEASE ORAL at 09:00

## 2017-12-07 RX ADMIN — METOPROLOL SUCCINATE 25 MG: 25 TABLET, EXTENDED RELEASE ORAL at 09:00

## 2017-12-07 RX ADMIN — DOCUSATE SODIUM 200 MG: 100 CAPSULE, LIQUID FILLED ORAL at 09:00

## 2017-12-07 RX ADMIN — LISINOPRIL 20 MG: 20 TABLET ORAL at 12:50

## 2017-12-07 NOTE — PROGRESS NOTES
MD Notification    Notified Person:  MD    Notified Persons Name: Dr. Murphy    Notification Date/Time: 12/6/2017 6:41 PM    Notification Interaction:  Talked with Physician    Purpose of Notification: /73    Orders Received: Norvasc PO 5mg BID PRN for systolic greater than 180    Comments:

## 2017-12-07 NOTE — PROGRESS NOTES
SPIRITUAL HE[facility] SERVICES Progress Note  [facility]    Visited pt per admin request. Pt declined visit and was just starting to tell SH about her upcoming discharge when her son called. SH has no plans to follow per discharge but is available upon request.      Agustina Kenney  Chaplain Resident  Pager: [phone]  Office: [phone]

## 2017-12-07 NOTE — DISCHARGE SUMMARY
Discharge instructions reviewed with pt.  IV removed.  Pt had loose BM prior to leaving.  Discharge medications given to pt.  Pt left unit via wheelchair at 1500 to awaiting taxi cab.  Pt denied pain at time of discharge.

## 2017-12-07 NOTE — PLAN OF CARE
Problem: Patient Care Overview  Goal: Plan of Care/Patient Progress Review  Outcome: No Change  A&O x4, very pleasant. Denied pain on shift. Hypertensive at 176/53. Recheck= 169/56. Orders for PRN BP meds when systolic >180. Mepilex dressing in place to coccyx. Dressing changed on shift d/t it falling off. Clear liquid diet. Incontinent of bowel/bladder. Brief in place. T&R q2 hours. Right PIV SL. HGB to be checked at 0600. No bloody stool this shift. Did not get oob on shift. Continue to monitor.

## 2017-12-07 NOTE — PLAN OF CARE
Problem: Patient Care Overview  Goal: Plan of Care/Patient Progress Review  Outcome: No Change  A/Ox4. Hypertensive; /73; MD notified, orders for Norvasc 5mg PO BID PRN for SBP >180, administered x1; recheck 175/78. Reports tenderness to R shoulder/wrist d/t previous fall at home, declines intervention. Blanchable erythema/maceration to coccyx; Mepilex dressing CDI. Multiple bruises/scabs BUE/BLE noted. Tolerating clear liquid diet. Incontinent of bowel/bladder. 1 small BM this shift w/red smear noted. Hgb 8.1(orders to transfuse if less than 8), ordered q8h. Falls risk. Up w/assist x1 + walker. EGD today negative. Will continue to monitor and support.

## 2017-12-07 NOTE — DISCHARGE SUMMARY
[facility]    Discharge Summary  Hospitalist    Date of Admission:  12/5/2017  Date of Discharge:  No discharge date for patient encounter.  Discharging Provider: Corby Rodrigues MD    Discharge Diagnoses   Principal Problem:    GI bleed -- probably lower GI bleed, suspect diverticular, aggravated by constipation  Active Problems:    Leukocytosis -- suspect stress related    Essential hypertension, benign    Anemia due to blood loss, acute    Thrombocytopenia (H)    CRF (chronic renal failure), stage 3 (moderate)    Slow transit constipation    Elevated cardiac enzyme, probably demand ischemia    History of Present Illness   Tracey Moran is an 89 year old female who presented with bright red blood per rectum but also some darker blood as well, that started after she got constipated and manually disimpacted herself.  Hgb on admit was 7.7, down from 11.5 6 months ago.       Hospital Course   Was transfused 2 units of PRBC's and hgb got as low as 7.0 but had increased to 8.7 by time of dicharge, and no further bleeding noted.  Was seen by GI and an EGD was normal, and colonoscopy offered by she declined the procedure.  Suspect she probably had a diverticular bleed related to her constipation, and will treat with Colace 200 mg bid and Miralax 17 gms bid PRN (advised to take if no BM for 2 days).  Initial creat was 1.23 but down to 0.78 by time of discharge.  Advised checking hgb in 5 days.        Corby Rodrigues MD  Pager: [phone]  Cell Phone:  [phone]     Significant Results and Procedures   As above  Code Status   Full Code (discussed with patient twice, and she continued to ask for full code)       Primary Care Physician   Guilherme Graves    Physical Exam   Temp: 96.4  F (35.8  C) Temp src: Oral BP: 177/63   Heart Rate: 74 Resp: 16 SpO2: 99 % O2 Device: Nasal cannula Oxygen Delivery: 2 LPM  Vitals:    12/05/17 1948 12/06/17 1832   Weight: 58.1 kg (128 lb) 58.1 kg (128 lb)      Vital Signs with Ranges  Temp:  [96.4  F (35.8  C)-98.6  F (37  C)] 96.4  F (35.8  C)  Heart Rate:  [70-92] 74  Resp:  [15-24] 16  BP: (130-212)/(51-78) 177/63  SpO2:  [96 %-100 %] 99 %  I/O last 3 completed shifts:  In: 870 [P.O.:570]  Out: -     Exam on discharge: Abdomin soft, normal bowel sounds, non-tender    Discharge Disposition   Discharged to assisted living  Condition at discharge: Good    Consultations This Hospital Stay   GASTROENTEROLOGY IP CONSULT  SOCIAL WORK IP CONSULT    Time Spent on this Encounter   I spent a total of 35 minutes discharging this patient.     Discharge Orders     Reason for your hospital stay   GI bleed, suspect lower bleed related to constipation and disimpaction.     Adult [facility] Follow-up and recommended labs and tests   Follow up with primary care provider, Guilherme Graves, in 5 days, check Hgb and BMP then.     Activity   Your activity upon discharge: avoid strenuous activity for 1 week, then activity as tolerated     Discharge Instructions   Call Dr. Murphy at Pager [phone] if questions, or Cell Phone [phone].     Full Code     Diet   Follow this diet upon discharge: Regular diet, no added salt.       Discharge Medications   Current Discharge Medication List      START taking these medications    Details   docusate sodium (COLACE) 100 MG capsule Take 2 capsules (200 mg) by mouth 2 times daily  Qty: 100 capsule, Refills: 3    Comments: To prevent constipation  Associated Diagnoses: Slow transit constipation      polyethylene glycol (MIRALAX/GLYCOLAX) Packet Take 17 g by mouth 2 times daily as needed for constipation Use if no bowel movement for 2 days  Qty: 7 packet, Refills: 1    Associated Diagnoses: Slow transit constipation         CONTINUE these medications which have NOT CHANGED    Details   lisinopril (PRINIVIL/ZESTRIL) 20 MG tablet Take 1 tablet (20 mg) by mouth 2 times daily Hold is SBP <110 call if held x 2 in a row or symptomatic  Qty: 60 tablet,  Refills: 0    Associated Diagnoses: Essential hypertension      metoprolol (TOPROL-XL) 50 MG 24 hr tablet Take 1 tablet (50 mg) by mouth daily Hold if SBP <110 or HR <55 call if held x2 in a row or symptomatic  Qty: 90 tablet, Refills: 2    Associated Diagnoses: Essential hypertension         STOP taking these medications       MAGNESIUM PO Comments:   Reason for Stopping:         Ascorbic Acid (VITAMIN C PO) Comments:   Reason for Stopping:             Allergies   Allergies   Allergen Reactions     Demerol [Meperidine]      Dust Mites      Peanuts [Nuts]      Penicillins      Pollen Extract      Data   Most Recent 3 CBC's:  Recent Labs   Lab Test  12/07/17   1413  12/07/17   0610  12/06/17   2200   12/06/17 0745   12/05/17   1948   01/10/17   1039   WBC   --    --    --    --   9.1   --   14.5*   --   8.7   HGB  8.7*  8.6*  8.1*   < >  7.4*   < >  7.7*   < >  11.1*   MCV   --    --    --    --   94   --   99   --   102*   PLT   --   149*   --    --   124*   --   206   --   212    < > = values in this interval not displayed.      Most Recent 3 BMP's:  Recent Labs   Lab Test  12/07/17   0610  12/06/17 0745  12/05/17 1948   NA  142  147*  142   POTASSIUM  3.5  4.1  4.4   CHLORIDE  109  115*  107   CO2  26  22  28   BUN  23  39*  49*   CR  0.78  0.98  1.23*   ANIONGAP  7  10  7   RENETTA  8.1*  7.4*  8.5   GLC  83  83  142*     Most Recent 2 LFT's:  Recent Labs   Lab Test  12/05/17   1948  10/17/14   0755   AST  9  22   ALT  13  15   ALKPHOS  49  65   BILITOTAL  0.3  0.3     Most Recent INR's and Anticoagulation Dosing History:  Anticoagulation Dose History     Recent Dosing and Labs Latest Ref Rng & Units 12/5/2017    INR 0.86 - 1.14 1.15(H)        Most Recent 3 Troponin's:  Recent Labs   Lab Test  10/14/14   1542  10/14/14   0646  10/13/14   2041   TROPI  0.092*  0.121*  0.069*     Most Recent Cholesterol Panel:  Recent Labs   Lab Test  05/21/14   1044   CHOL  190   LDL  109   HDL  55   TRIG  124     Most Recent 6  Bacteria Isolates From Any Culture (See EPIC Reports for Culture Details):  Recent Labs   Lab Test  03/05/16   1524  10/16/14   1330  10/15/14   1923  10/13/14   1550  10/13/14   1515  10/13/14   1339   CULT  >100,000 colonies/mL mixed urogenital gloria  No growth  No growth  No growth  10,000 to 50,000 colonies/mL Escherichia coli  50,000 to 100,000 colonies/mL urogenital gloria  *  No growth     Most Recent TSH, T4 and A1c Labs:  Recent Labs   Lab Test  12/06/17   0745   A1C  Canceled, Test credited

## 2017-12-07 NOTE — PROGRESS NOTES
RENAY  I: RENAY was updated that patient would need transportation via cab arranged for today. Patient has no money to pay cab. RNEAY arranged cab ride through  acct. Ride is arranged for 1500.  will do door to door transport.

## 2017-12-07 NOTE — PROGRESS NOTES
GI    Patient seen.  She has not had further bleeding.    She does not want to have a colonoscopy.    I will advance her diet and from a GI standpoint she is OK to d/c.    Naheed Stoll [facility]  Office:  [phone] call if needed after 5PM  Cell:  [phone], not available after 5PM at this number

## 2017-12-08 ENCOUNTER — TELEPHONE (OUTPATIENT)
Dept: INTERNAL MEDICINE | Facility: CLINIC | Age: 82
End: 2017-12-08

## 2017-12-08 NOTE — PROGRESS NOTES
RENAY  I: RENAY spoke with patient's son and discussed d/c plan. RENAY was updated that patient paid for her own cab ride after it was paid for through RENAY acct. Patient, per son, paid $46.00. RENAY will discuss with CTS manager.

## 2017-12-08 NOTE — TELEPHONE ENCOUNTER
"Hospital/TCU/ED for chronic condition Discharge Protocol    \"Hi, my name is Rylie Huertas, a registered nurse, and I am calling from [facility].  I am calling to follow up and see how things are going for you after your recent emergency visit/hospital/TCU stay.\"    Tell me how you are doing now that you are home?\" doing really well      Discharge Instructions    \"Let's review your discharge instructions.  What is/are the follow-up recommendations?  Pt. Response: feeling much better    \"Has an appointment with your primary care provider been scheduled?\"   Yes. (confirm)    \"When you see the provider, I would recommend that you bring your medications with you.\"    Medications    \"Tell me what changed about your medicines when you discharged?\"    Changes to chronic meds?    0-1    \"What questions do you have about your medications?\"    None     New diagnoses of heart failure, COPD, diabetes, or MI?    No                  Medication reconciliation completed? Yes  Was MTM referral placed (*Make sure to put transitions as reason for referral)?   No    Call Summary    \"What questions or concerns do you have about your recent visit and your follow-up care?\"     none    \"If you have questions or things don't continue to improve, we encourage you contact us through the main clinic number (give number).  Even if the clinic is not open, triage nurses are available 24/7 to help you.     We would like you to know that our clinic has extended hours (provide information).  We also have urgent care (provide details on closest location and hours/contact info)\"      \"Thank you for your time and take care!\"         "

## 2017-12-09 LAB
BLD PROD TYP BPU: NORMAL
BLD UNIT ID BPU: 0
BLOOD PRODUCT CODE: NORMAL
BPU ID: NORMAL
TRANSFUSION STATUS PATIENT QL: NORMAL
TRANSFUSION STATUS PATIENT QL: NORMAL

## 2017-12-11 ENCOUNTER — TELEPHONE (OUTPATIENT)
Dept: NURSING | Facility: CLINIC | Age: 82
End: 2017-12-11

## 2017-12-11 NOTE — TELEPHONE ENCOUNTER
"Patient's son calling. Patient admitted to hospital last week with rectal bleeding and was given blood. Per son, they did endoscopy, held off on colonoscopy. Released on Thursday. Had BM Saturday. Took 6 docusate sodium 100 mg capsules yesterday. Son called poison control after she told him this and was told it should not be an issue. Encouraged fluids. Reports patient has been tired and confused. Anxious about not having BM. Not eating much because she wants to have BM. No cramping/pain/swelling/nausea/vomiting that son is aware of. No further rectal bleeding that he knows of. Patient's son said patient does not remember things perfectly. Lives on her own and he is concerned about this. Yesterday she seemed confused more than normal but this morning seemed better- Has good and bad days. Son told her not to take anything today and does not think she ever tried Miralax. Do you want her to restart Colace and start Miralax?  Current instructions on Miralax say to use if no BM for 2 days.   Son is in [place] but coming back tomorrow. Also, should hospital f/u be moved up sooner? They currently have appointment 12/18 for separate issue but d/c AVS says \"Follow up with primary care provider, Guilherme Graves, in 5 days, check Hgb and BMP then.\" Discharged 12/7. Son can bring her in anytime Wednesday or after.   "

## 2017-12-11 NOTE — TELEPHONE ENCOUNTER
Spoke with patient's son and informed him of Dr. Graves's recommendations.  Son understood and asked that writer call and inform patient of instructions.  Patient informed.  Also rescheduled follow-up appointment for 12/13/17.

## 2017-12-11 NOTE — TELEPHONE ENCOUNTER
Very difficult to assess over phone call.  Would avoid meds unless constipated then consider Miralax as ordered

## 2017-12-13 ENCOUNTER — OFFICE VISIT (OUTPATIENT)
Dept: INTERNAL MEDICINE | Facility: CLINIC | Age: 82
End: 2017-12-13
Payer: COMMERCIAL

## 2017-12-13 VITALS
OXYGEN SATURATION: 93 % | DIASTOLIC BLOOD PRESSURE: 64 MMHG | HEART RATE: 85 BPM | WEIGHT: 122.4 LBS | SYSTOLIC BLOOD PRESSURE: 136 MMHG | TEMPERATURE: 97.9 F | BODY MASS INDEX: 22.39 KG/M2

## 2017-12-13 DIAGNOSIS — Z09 HOSPITAL DISCHARGE FOLLOW-UP: Primary | ICD-10-CM

## 2017-12-13 DIAGNOSIS — K92.2 GASTROINTESTINAL HEMORRHAGE, UNSPECIFIED GASTROINTESTINAL HEMORRHAGE TYPE: ICD-10-CM

## 2017-12-13 DIAGNOSIS — I10 ESSENTIAL HYPERTENSION: ICD-10-CM

## 2017-12-13 DIAGNOSIS — D62 ANEMIA DUE TO BLOOD LOSS, ACUTE: ICD-10-CM

## 2017-12-13 LAB — HGB BLD-MCNC: 9 G/DL (ref 11.7–15.7)

## 2017-12-13 PROCEDURE — 85018 HEMOGLOBIN: CPT | Performed by: INTERNAL MEDICINE

## 2017-12-13 PROCEDURE — 99495 TRANSJ CARE MGMT MOD F2F 14D: CPT | Performed by: INTERNAL MEDICINE

## 2017-12-13 PROCEDURE — 36415 COLL VENOUS BLD VENIPUNCTURE: CPT | Performed by: INTERNAL MEDICINE

## 2017-12-13 RX ORDER — LISINOPRIL 20 MG/1
20 TABLET ORAL 2 TIMES DAILY
Qty: 180 TABLET | Refills: 1 | Status: SHIPPED | OUTPATIENT
Start: 2017-12-13 | End: 2018-10-31

## 2017-12-13 NOTE — PROGRESS NOTES
SUBJECTIVE:   Tracey Moran is a 89 year old female who presents to clinic today for the following health issues:    Hospital Follow-up Visit:    Hospital/Nursing Home/IP Rehab Facility: [facility]  Date of Admission: 12/5/17  Date of Discharge: 12/7/17  Reason(s) for Admission: GI Bleed             Problems taking medications regularly:  None       Medication changes since discharge: None       Problems adhering to non-medication therapy:  None    Summary of hospitalization:  [facility] discharge summary reviewed  Diagnostic Tests/Treatments reviewed.  Follow up needed: noted  Other Healthcare Providers Involved in Patient s Care:         None  Update since discharge: stable.     Post Discharge Medication Reconciliation: discharge medications reconciled, continue medications without change.  Plan of care communicated with patient     Coding guidelines for this visit:  Type of Medical   Decision Making Face-to-Face Visit       within 7 Days of discharge Face-to-Face Visit        within 14 days of discharge   Moderate Complexity [id number]   High Complexity [id number]            Discharge Diagnoses   Principal Problem:    GI bleed -- probably lower GI bleed, suspect diverticular, aggravated by constipation  Active Problems:    Leukocytosis -- suspect stress related    Essential hypertension, benign    Anemia due to blood loss, acute    Thrombocytopenia (H)    CRF (chronic renal failure), stage 3 (moderate)    Slow transit constipation    Elevated cardiac enzyme, probably demand ischemia        History of Present Illness     Tracey Moran is an 89 year old female who presented with bright red blood per rectum but also some darker blood as well, that started after she got constipated and manually disimpacted herself.  Hgb on admit was 7.7, down from 11.5 6 months ago.           Hospital Course      Was transfused 2 units of PRBC's and hgb got as low as 7.0 but had increased to 8.7 by time of  dicharge, and no further bleeding noted.  Was seen by GI and an EGD was normal, and colonoscopy offered by she declined the procedure.  Suspect she probably had a diverticular bleed related to her constipation, and will treat with Colace 200 mg bid and Miralax 17 gms bid PRN (advised to take if no BM for 2 days).  Initial creat was 1.23 but down to 0.78 by time of discharge.  Advised checking hgb in 5 days.      Problem list and histories reviewed & adjusted, as indicated.  Additional history: as documented    Patient Active Problem List   Diagnosis     Hyperlipidemia LDL goal <130     Advanced directives, counseling/discussion     Left wrist fracture, with routine healing, subsequent encounter     Leukocytosis, unspecified type     Elevated CK     Abrasion of left scapular region, subsequent encounter     Essential hypertension, benign     GI bleed     Anemia due to blood loss, acute     Thrombocytopenia (H)     CRF (chronic renal failure), stage 3 (moderate)     Slow transit constipation     Past Surgical History:   Procedure Laterality Date     ESOPHAGOSCOPY, GASTROSCOPY, DUODENOSCOPY (EGD), COMBINED N/A 12/6/2017    Procedure: COMBINED ESOPHAGOSCOPY, GASTROSCOPY, DUODENOSCOPY (EGD);  gastroscopy;  Surgeon: Melchor Mancera MD;  Location:  GI       Social History   Substance Use Topics     Smoking status: Never Smoker     Smokeless tobacco: Never Used     Alcohol use Yes     History reviewed. No pertinent family history.      Current Outpatient Prescriptions   Medication Sig Dispense Refill     docusate sodium (COLACE) 100 MG capsule Take 2 capsules (200 mg) by mouth 2 times daily 100 capsule 3     polyethylene glycol (MIRALAX/GLYCOLAX) Packet Take 17 g by mouth 2 times daily as needed for constipation Use if no bowel movement for 2 days 7 packet 1     lisinopril (PRINIVIL/ZESTRIL) 20 MG tablet Take 1 tablet (20 mg) by mouth 2 times daily Hold is SBP <110 call if held x 2 in a row or symptomatic 60 tablet 0      metoprolol (TOPROL-XL) 50 MG 24 hr tablet Take 1 tablet (50 mg) by mouth daily Hold if SBP <110 or HR <55 call if held x2 in a row or symptomatic 90 tablet 2     Allergies   Allergen Reactions     Demerol [Meperidine]      Dust Mites      Peanuts [Nuts]      Penicillins      Pollen Extract      BP Readings from Last 3 Encounters:   12/07/17 177/63   08/23/17 180/81   07/06/17 162/77    Wt Readings from Last 3 Encounters:   12/06/17 128 lb (58.1 kg)   08/23/17 125 lb (56.7 kg)   07/06/17 124 lb (56.2 kg)         Reviewed and updated as needed this visit by clinical staffTobacco  Allergies  Med Hx  Surg Hx  Fam Hx  Soc Hx      Reviewed and updated as needed this visit by Provider       ROS:  C: NEGATIVE for fever, chills, change in weight  E/M: NEGATIVE for ear, mouth and throat problems  R: NEGATIVE for significant cough or SOB  CV: NEGATIVE for chest pain, palpitations or peripheral edema  : NEGATIVE for frequency, dysuria, or hematuria  M: NEGATIVE for significant arthralgias or myalgia  H: NEGATIVE for bleeding problems  P: NEGATIVE for changes in mood or affect    OBJECTIVE:                                                    /64  Pulse 85  Temp 97.9  F (36.6  C) (Oral)  Wt 122 lb 6.4 oz (55.5 kg)  SpO2 93%  BMI 22.39 kg/m2  Body mass index is 22.39 kg/(m^2).  GENERAL: alert and no distress  Missing upper right central incisors  RESP: lungs clear to auscultation - no rales, no rhonchi, no wheezes  CV: regular rates and rhythm, normal S1 S2, no S3 or S4 and  no click or rub   MS: extremities- no gross deformities noted  NEURO: BASELINE MEMORY CHANGES  PSYCH: Alert and oriented times 3; speech- coherent , normal rate and volume; able to articulate logical thoughts, able to abstract reason, no tangential thoughts, no hallucinations or delusions, affect- normal.     Component      Latest Ref Rng & Units 12/6/2017 12/6/2017 12/7/2017 12/7/2017          11:59 AM 10:00 PM  6:10 AM  2:13 PM    Hemoglobin      11.7 - 15.7 g/dL 8.0 (L) 8.1 (L) 8.6 (L) 8.7 (L)       EGD:    Findings:        The esophagus was normal.        The stomach was normal.        The examined duodenum was normal.                                               ASSESSMENT/PLAN:                                                        (Z09) Hospital discharge follow-up  (primary encounter diagnosis)  Comment: appears stable  Plan: Hemoglobin        Son had a long lsit of questions all were answered as best possible.    (K92.2) Gastrointestinal hemorrhage, unspecified gastrointestinal hemorrhage type  Comment: recheck HGB as ordered  Plan: Hemoglobin, using bowel preps for constipation.            (D62) Anemia due to blood loss, acute  Comment:   Hemoglobin   Date Value Ref Range Status   12/13/2017 9.0 (L) 11.7 - 15.7 g/dL Final     Plan: recheck post adnmission    (I10) Essential hypertension  Comment: stable on therapy  Plan: lisinopril (PRINIVIL/ZESTRIL) 20 MG tablet          See Patient Instructions    Guilherme Graves MD  [facility]    THE MEDICATION LIST HAS BEEN FULLY RECONCILED BY THE M.D. AND THE NURSING STAFF.

## 2017-12-13 NOTE — NURSING NOTE
"Chief Complaint   Patient presents with     Hospital F/U       Initial /64  Pulse 85  Temp 97.9  F (36.6  C) (Oral)  Wt 122 lb 6.4 oz (55.5 kg)  SpO2 93%  BMI 22.39 kg/m2 Estimated body mass index is 22.39 kg/(m^2) as calculated from the following:    Height as of 12/6/17: 5' 2\" (1.575 m).    Weight as of this encounter: 122 lb 6.4 oz (55.5 kg).  Medication Reconciliation: complete   Abena Lewis CMA      "

## 2017-12-13 NOTE — LETTER
[facility]  [address] [address]  [phone]      12/13/2017       Tracey Moran  [address]   [facility] [place] [facility] [address]        Dear Tracey,    Your hemoglobin function test is still slightly abnormal and low but stable and should be rechecked here in the clinic 6-8 weeks with a follow-up lab visit with me.  I will look forward to seeing you at that time and please call to make an appointment and I will place orders for you.    Sincerely,      Guilherme Graves MD  Internal Medicine

## 2017-12-13 NOTE — MR AVS SNAPSHOT
"              After Visit Summary   12/13/2017    Tracey Moran    MRN: [id number]           Patient Information     Date Of Birth          6/16/1925        Visit Information        Provider Department      12/13/2017 11:40 AM Guilherme Graves MD [facility]        Today's Diagnoses     Hospital discharge follow-up    -  1    Gastrointestinal hemorrhage, unspecified gastrointestinal hemorrhage type        Anemia due to blood loss, acute        Essential hypertension           Follow-ups after your visit        Follow-up notes from your care team     Return if symptoms worsen or fail to improve.      Who to contact     If you have questions or need follow up information about today's clinic visit or your schedule please contact [facility] directly at [phone].  Normal or non-critical lab and imaging results will be communicated to you by MyChart, letter or phone within 4 business days after the clinic has received the results. If you do not hear from us within 7 days, please contact the clinic through MyChart or phone. If you have a critical or abnormal lab result, we will notify you by phone as soon as possible.  Submit refill requests through [facility] or call your pharmacy and they will forward the refill request to us. Please allow 3 business days for your refill to be completed.          Additional Information About Your Visit        MyChart Information     [facility] lets you send messages to your doctor, view your test results, renew your prescriptions, schedule appointments and more. To sign up, go to [email or web address] . Click on \"Log in\" on the left side of the screen, which will take you to the Welcome page. Then click on \"Sign up Now\" on the right side of the page.     You will be asked to enter the access code listed below, as well as some personal information. Please follow the directions to create your username and password.     Your access code is: " [email or web address]  Expires: 3/7/2018  1:45 PM     Your access code will  in 90 days. If you need help or a new code, please call your [facility] clinic or [phone].        Care EveryWhere ID     This is your Care EveryWhere ID. This could be used by other organizations to access your [facility] medical records  [id number]        Your Vitals Were     Pulse Temperature Pulse Oximetry BMI (Body Mass Index)          85 97.9  F (36.6  C) (Oral) 93% 22.39 kg/m2         Blood Pressure from Last 3 Encounters:   17 136/64   17 177/63   17 180/81    Weight from Last 3 Encounters:   17 122 lb 6.4 oz (55.5 kg)   17 128 lb (58.1 kg)   17 125 lb (56.7 kg)              We Performed the Following     Hemoglobin          Where to get your medicines      These medications were sent to [facility] [address] [place] [address] [id number] [place] [address]  [address] [facility] [address]     Phone:  [phone]     lisinopril 20 MG tablet          Primary Care Provider Office Phone # Fax #    Guilherme Graves [phone]       [address] [place] [phone]        Equal Access to Services     RUSSEL GREER : Hadii sohan ku hadasho Soomaali, waaxda luqadaha, qaybta kaalmada adetrevoryada, jose j walsh. So [facility] [phone].    ATENCIÓN: Si habla español, tiene a harrison disposición servicios gratuitos de asistencia lingüística. Llame al [phone].    We comply with applicable federal civil rights laws and [place] laws. We do not discriminate on the basis of race, color, national origin, age, disability, sex, sexual orientation, or gender identity.            Thank you!     Thank you for choosing [facility]  for your care. Our goal is always to provide you with excellent care. Hearing back from our patients is one way we can continue to improve our services. Please take a few minutes to complete the written survey that  you may receive in the mail after your visit with us. Thank you!             Your Updated Medication List - Protect others around you: Learn how to safely use, store and throw away your medicines at www.disposemymeds.org.          This list is accurate as of: 12/13/17 12:37 PM.  Always use your most recent med list.                   Brand Name Dispense Instructions for use Diagnosis    docusate sodium 100 MG capsule    COLACE    100 capsule    Take 2 capsules (200 mg) by mouth 2 times daily    Slow transit constipation       lisinopril 20 MG tablet    PRINIVIL/ZESTRIL    180 tablet    Take 1 tablet (20 mg) by mouth 2 times daily Hold is SBP <110 call if held x 2 in a row or symptomatic    Essential hypertension       metoprolol 50 MG 24 hr tablet    TOPROL-XL    90 tablet    Take 1 tablet (50 mg) by mouth daily Hold if SBP <110 or HR <55 call if held x2 in a row or symptomatic    Essential hypertension       polyethylene glycol Packet    MIRALAX/GLYCOLAX    7 packet    Take 17 g by mouth 2 times daily as needed for constipation Use if no bowel movement for 2 days    Slow transit constipation

## 2018-02-08 ENCOUNTER — HOSPITAL ENCOUNTER (INPATIENT)
Facility: CLINIC | Age: 83
LOS: 3 days | Discharge: HOME HEALTH | DRG: 871 | End: 2018-02-11
Attending: EMERGENCY MEDICINE | Admitting: INTERNAL MEDICINE
Payer: COMMERCIAL

## 2018-02-08 ENCOUNTER — APPOINTMENT (OUTPATIENT)
Dept: GENERAL RADIOLOGY | Facility: CLINIC | Age: 83
DRG: 871 | End: 2018-02-08
Attending: EMERGENCY MEDICINE
Payer: COMMERCIAL

## 2018-02-08 ENCOUNTER — APPOINTMENT (OUTPATIENT)
Dept: CT IMAGING | Facility: CLINIC | Age: 83
DRG: 871 | End: 2018-02-08
Attending: EMERGENCY MEDICINE
Payer: COMMERCIAL

## 2018-02-08 DIAGNOSIS — I21.4 NSTEMI (NON-ST ELEVATED MYOCARDIAL INFARCTION) (H): ICD-10-CM

## 2018-02-08 DIAGNOSIS — N30.00 ACUTE CYSTITIS WITHOUT HEMATURIA: Primary | ICD-10-CM

## 2018-02-08 DIAGNOSIS — R53.81 PHYSICAL DECONDITIONING: ICD-10-CM
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
## 2018-02-08 LAB
ALBUMIN UR-MCNC: 30 MG/DL
ANION GAP SERPL CALCULATED.3IONS-SCNC: 12 MMOL/L (ref 3–14)
APPEARANCE UR: ABNORMAL
BACTERIA #/AREA URNS HPF: ABNORMAL /HPF
BASOPHILS # BLD AUTO: 0 10E9/L (ref 0–0.2)
BASOPHILS NFR BLD AUTO: 0.1 %
BILIRUB UR QL STRIP: NEGATIVE
BUN SERPL-MCNC: 51 MG/DL (ref 7–30)
CALCIUM SERPL-MCNC: 9.2 MG/DL (ref 8.5–10.1)
CHLORIDE SERPL-SCNC: 104 MMOL/L (ref 94–109)
CK SERPL-CCNC: 786 U/L (ref 30–225)
CK SERPL-CCNC: 873 U/L (ref 30–225)
CO2 SERPL-SCNC: 24 MMOL/L (ref 20–32)
COLOR UR AUTO: YELLOW
CREAT SERPL-MCNC: 0.98 MG/DL (ref 0.52–1.04)
DIFFERENTIAL METHOD BLD: ABNORMAL
EOSINOPHIL # BLD AUTO: 0 10E9/L (ref 0–0.7)
EOSINOPHIL NFR BLD AUTO: 0 %
ERYTHROCYTE [DISTWIDTH] IN BLOOD BY AUTOMATED COUNT: 14.3 % (ref 10–15)
GFR SERPL CREATININE-BSD FRML MDRD: 53 ML/MIN/1.7M2
GLUCOSE SERPL-MCNC: 107 MG/DL (ref 70–99)
GLUCOSE UR STRIP-MCNC: NEGATIVE MG/DL
HCT VFR BLD AUTO: 29.9 % (ref 35–47)
HGB BLD-MCNC: 9.8 G/DL (ref 11.7–15.7)
HGB UR QL STRIP: ABNORMAL
IMM GRANULOCYTES # BLD: 0 10E9/L (ref 0–0.4)
IMM GRANULOCYTES NFR BLD: 0.2 %
INTERPRETATION ECG - MUSE: NORMAL
KETONES UR STRIP-MCNC: 40 MG/DL
LACTATE BLD-SCNC: 1.2 MMOL/L (ref 0.7–2)
LEUKOCYTE ESTERASE UR QL STRIP: ABNORMAL
LYMPHOCYTES # BLD AUTO: 0.8 10E9/L (ref 0.8–5.3)
LYMPHOCYTES NFR BLD AUTO: 6.4 %
MCH RBC QN AUTO: 29.2 PG (ref 26.5–33)
MCHC RBC AUTO-ENTMCNC: 32.8 G/DL (ref 31.5–36.5)
MCV RBC AUTO: 89 FL (ref 78–100)
MONOCYTES # BLD AUTO: 0.7 10E9/L (ref 0–1.3)
MONOCYTES NFR BLD AUTO: 5.3 %
NEUTROPHILS # BLD AUTO: 11.1 10E9/L (ref 1.6–8.3)
NEUTROPHILS NFR BLD AUTO: 88 %
NITRATE UR QL: POSITIVE
NRBC # BLD AUTO: 0 10*3/UL
NRBC BLD AUTO-RTO: 0 /100
PH UR STRIP: 6 PH (ref 5–7)
PLATELET # BLD AUTO: 308 10E9/L (ref 150–450)
POTASSIUM SERPL-SCNC: 4.1 MMOL/L (ref 3.4–5.3)
RBC # BLD AUTO: 3.36 10E12/L (ref 3.8–5.2)
RBC #/AREA URNS AUTO: 45 /HPF (ref 0–2)
SODIUM SERPL-SCNC: 140 MMOL/L (ref 133–144)
SOURCE: ABNORMAL
SP GR UR STRIP: 1.02 (ref 1–1.03)
SQUAMOUS #/AREA URNS AUTO: 2 /HPF (ref 0–1)
TROPONIN I SERPL-MCNC: 1.57 UG/L (ref 0–0.04)
TROPONIN I SERPL-MCNC: 1.86 UG/L (ref 0–0.04)
TROPONIN I SERPL-MCNC: 1.88 UG/L (ref 0–0.04)
UROBILINOGEN UR STRIP-MCNC: NORMAL MG/DL (ref 0–2)
WBC # BLD AUTO: 12.7 10E9/L (ref 4–11)
WBC #/AREA URNS AUTO: 401 /HPF (ref 0–2)

## 2018-02-08 PROCEDURE — 93005 ELECTROCARDIOGRAM TRACING: CPT

## 2018-02-08 PROCEDURE — 80048 BASIC METABOLIC PNL TOTAL CA: CPT | Performed by: EMERGENCY MEDICINE

## 2018-02-08 PROCEDURE — 71045 X-RAY EXAM CHEST 1 VIEW: CPT

## 2018-02-08 PROCEDURE — 84484 ASSAY OF TROPONIN QUANT: CPT | Performed by: EMERGENCY MEDICINE

## 2018-02-08 PROCEDURE — 87088 URINE BACTERIA CULTURE: CPT | Performed by: EMERGENCY MEDICINE

## 2018-02-08 PROCEDURE — 25000132 ZZH RX MED GY IP 250 OP 250 PS 637: Performed by: INTERNAL MEDICINE

## 2018-02-08 PROCEDURE — 74018 RADEX ABDOMEN 1 VIEW: CPT

## 2018-02-08 PROCEDURE — 93010 ELECTROCARDIOGRAM REPORT: CPT | Performed by: INTERNAL MEDICINE

## 2018-02-08 PROCEDURE — 21000001 ZZH R&B HEART CARE

## 2018-02-08 PROCEDURE — 27210995 ZZH RX 272: Performed by: EMERGENCY MEDICINE

## 2018-02-08 PROCEDURE — 70450 CT HEAD/BRAIN W/O DYE: CPT

## 2018-02-08 PROCEDURE — 85025 COMPLETE CBC W/AUTO DIFF WBC: CPT | Performed by: EMERGENCY MEDICINE

## 2018-02-08 PROCEDURE — 81001 URINALYSIS AUTO W/SCOPE: CPT | Performed by: EMERGENCY MEDICINE

## 2018-02-08 PROCEDURE — 83605 ASSAY OF LACTIC ACID: CPT | Performed by: INTERNAL MEDICINE

## 2018-02-08 PROCEDURE — 25000128 H RX IP 250 OP 636: Performed by: INTERNAL MEDICINE

## 2018-02-08 PROCEDURE — 36415 COLL VENOUS BLD VENIPUNCTURE: CPT | Performed by: INTERNAL MEDICINE

## 2018-02-08 PROCEDURE — 84484 ASSAY OF TROPONIN QUANT: CPT | Performed by: INTERNAL MEDICINE

## 2018-02-08 PROCEDURE — 99285 EMERGENCY DEPT VISIT HI MDM: CPT | Mod: 25

## 2018-02-08 PROCEDURE — 25000132 ZZH RX MED GY IP 250 OP 250 PS 637: Performed by: EMERGENCY MEDICINE

## 2018-02-08 PROCEDURE — 96365 THER/PROPH/DIAG IV INF INIT: CPT

## 2018-02-08 PROCEDURE — 87086 URINE CULTURE/COLONY COUNT: CPT | Performed by: EMERGENCY MEDICINE

## 2018-02-08 PROCEDURE — 82550 ASSAY OF CK (CPK): CPT | Performed by: INTERNAL MEDICINE

## 2018-02-08 PROCEDURE — 87186 SC STD MICRODIL/AGAR DIL: CPT | Performed by: EMERGENCY MEDICINE

## 2018-02-08 PROCEDURE — 25000128 H RX IP 250 OP 636: Performed by: EMERGENCY MEDICINE

## 2018-02-08 PROCEDURE — 82550 ASSAY OF CK (CPK): CPT | Performed by: EMERGENCY MEDICINE

## 2018-02-08 PROCEDURE — 99223 1ST HOSP IP/OBS HIGH 75: CPT | Mod: AI | Performed by: INTERNAL MEDICINE

## 2018-02-08 RX ORDER — ONDANSETRON 4 MG/1
4 TABLET, ORALLY DISINTEGRATING ORAL EVERY 6 HOURS PRN
Status: DISCONTINUED | OUTPATIENT
Start: 2018-02-08 | End: 2018-02-11 | Stop reason: HOSPADM

## 2018-02-08 RX ORDER — AMOXICILLIN 250 MG
2 CAPSULE ORAL 2 TIMES DAILY PRN
Status: DISCONTINUED | OUTPATIENT
Start: 2018-02-08 | End: 2018-02-11 | Stop reason: HOSPADM

## 2018-02-08 RX ORDER — ONDANSETRON 2 MG/ML
4 INJECTION INTRAMUSCULAR; INTRAVENOUS EVERY 6 HOURS PRN
Status: DISCONTINUED | OUTPATIENT
Start: 2018-02-08 | End: 2018-02-11 | Stop reason: HOSPADM

## 2018-02-08 RX ORDER — BISACODYL 10 MG
10 SUPPOSITORY, RECTAL RECTAL ONCE
Status: DISCONTINUED | OUTPATIENT
Start: 2018-02-08 | End: 2018-02-11 | Stop reason: HOSPADM

## 2018-02-08 RX ORDER — NALOXONE HYDROCHLORIDE 0.4 MG/ML
.1-.4 INJECTION, SOLUTION INTRAMUSCULAR; INTRAVENOUS; SUBCUTANEOUS
Status: DISCONTINUED | OUTPATIENT
Start: 2018-02-08 | End: 2018-02-11 | Stop reason: HOSPADM

## 2018-02-08 RX ORDER — POLYETHYLENE GLYCOL 3350 17 G/17G
17 POWDER, FOR SOLUTION ORAL DAILY PRN
Status: DISCONTINUED | OUTPATIENT
Start: 2018-02-08 | End: 2018-02-11 | Stop reason: HOSPADM

## 2018-02-08 RX ORDER — SODIUM CHLORIDE 9 MG/ML
INJECTION, SOLUTION INTRAVENOUS CONTINUOUS
Status: DISCONTINUED | OUTPATIENT
Start: 2018-02-08 | End: 2018-02-10

## 2018-02-08 RX ORDER — POTASSIUM CL/LIDO/0.9 % NACL 10MEQ/0.1L
10 INTRAVENOUS SOLUTION, PIGGYBACK (ML) INTRAVENOUS
Status: DISCONTINUED | OUTPATIENT
Start: 2018-02-08 | End: 2018-02-11 | Stop reason: HOSPADM

## 2018-02-08 RX ORDER — POTASSIUM CHLORIDE 7.45 MG/ML
10 INJECTION INTRAVENOUS
Status: DISCONTINUED | OUTPATIENT
Start: 2018-02-08 | End: 2018-02-11 | Stop reason: HOSPADM

## 2018-02-08 RX ORDER — PROCHLORPERAZINE 25 MG
12.5 SUPPOSITORY, RECTAL RECTAL EVERY 12 HOURS PRN
Status: DISCONTINUED | OUTPATIENT
Start: 2018-02-08 | End: 2018-02-11 | Stop reason: HOSPADM

## 2018-02-08 RX ORDER — ACETAMINOPHEN 325 MG/1
650 TABLET ORAL EVERY 4 HOURS PRN
Status: DISCONTINUED | OUTPATIENT
Start: 2018-02-08 | End: 2018-02-11 | Stop reason: HOSPADM

## 2018-02-08 RX ORDER — PROCHLORPERAZINE MALEATE 5 MG
5 TABLET ORAL EVERY 6 HOURS PRN
Status: DISCONTINUED | OUTPATIENT
Start: 2018-02-08 | End: 2018-02-11 | Stop reason: HOSPADM

## 2018-02-08 RX ORDER — ASPIRIN 81 MG/1
81 TABLET ORAL DAILY
Status: DISCONTINUED | OUTPATIENT
Start: 2018-02-09 | End: 2018-02-11 | Stop reason: HOSPADM

## 2018-02-08 RX ORDER — POTASSIUM CHLORIDE 1.5 G/1.58G
20-40 POWDER, FOR SOLUTION ORAL
Status: DISCONTINUED | OUTPATIENT
Start: 2018-02-08 | End: 2018-02-11 | Stop reason: HOSPADM

## 2018-02-08 RX ORDER — LISINOPRIL 20 MG/1
20 TABLET ORAL 2 TIMES DAILY
Status: DISCONTINUED | OUTPATIENT
Start: 2018-02-08 | End: 2018-02-11 | Stop reason: HOSPADM

## 2018-02-08 RX ORDER — DOCUSATE SODIUM 100 MG/1
100 CAPSULE, LIQUID FILLED ORAL 2 TIMES DAILY
Status: DISCONTINUED | OUTPATIENT
Start: 2018-02-08 | End: 2018-02-11 | Stop reason: HOSPADM

## 2018-02-08 RX ORDER — BISACODYL 10 MG
10 SUPPOSITORY, RECTAL RECTAL DAILY PRN
Status: DISCONTINUED | OUTPATIENT
Start: 2018-02-08 | End: 2018-02-11 | Stop reason: HOSPADM

## 2018-02-08 RX ORDER — ACETAMINOPHEN 650 MG/1
650 SUPPOSITORY RECTAL EVERY 4 HOURS PRN
Status: DISCONTINUED | OUTPATIENT
Start: 2018-02-08 | End: 2018-02-11 | Stop reason: HOSPADM

## 2018-02-08 RX ORDER — METOPROLOL SUCCINATE 50 MG/1
50 TABLET, EXTENDED RELEASE ORAL DAILY
Status: DISCONTINUED | OUTPATIENT
Start: 2018-02-09 | End: 2018-02-08

## 2018-02-08 RX ORDER — METOPROLOL SUCCINATE 50 MG/1
50 TABLET, EXTENDED RELEASE ORAL DAILY
Status: DISCONTINUED | OUTPATIENT
Start: 2018-02-08 | End: 2018-02-11 | Stop reason: HOSPADM

## 2018-02-08 RX ORDER — AMOXICILLIN 250 MG
1 CAPSULE ORAL 2 TIMES DAILY PRN
Status: DISCONTINUED | OUTPATIENT
Start: 2018-02-08 | End: 2018-02-11 | Stop reason: HOSPADM

## 2018-02-08 RX ORDER — ASPIRIN 325 MG
325 TABLET ORAL ONCE
Status: COMPLETED | OUTPATIENT
Start: 2018-02-08 | End: 2018-02-08

## 2018-02-08 RX ORDER — LISINOPRIL 20 MG/1
20 TABLET ORAL 2 TIMES DAILY
Status: DISCONTINUED | OUTPATIENT
Start: 2018-02-08 | End: 2018-02-08

## 2018-02-08 RX ORDER — POTASSIUM CHLORIDE 29.8 MG/ML
20 INJECTION INTRAVENOUS
Status: DISCONTINUED | OUTPATIENT
Start: 2018-02-08 | End: 2018-02-08 | Stop reason: RX

## 2018-02-08 RX ORDER — POTASSIUM CHLORIDE 1500 MG/1
20-40 TABLET, EXTENDED RELEASE ORAL
Status: DISCONTINUED | OUTPATIENT
Start: 2018-02-08 | End: 2018-02-11 | Stop reason: HOSPADM

## 2018-02-08 RX ADMIN — LISINOPRIL 20 MG: 20 TABLET ORAL at 19:04

## 2018-02-08 RX ADMIN — HEPARIN SODIUM 650 UNITS/HR: 10000 INJECTION, SOLUTION INTRAVENOUS at 20:06

## 2018-02-08 RX ADMIN — SODIUM CHLORIDE 1000 ML: 9 INJECTION, SOLUTION INTRAVENOUS at 16:40

## 2018-02-08 RX ADMIN — SODIUM CHLORIDE: 9 INJECTION, SOLUTION INTRAVENOUS at 19:04

## 2018-02-08 RX ADMIN — ASPIRIN 325 MG ORAL TABLET 325 MG: 325 PILL ORAL at 15:56

## 2018-02-08 RX ADMIN — METOPROLOL SUCCINATE 50 MG: 50 TABLET, EXTENDED RELEASE ORAL at 20:02

## 2018-02-08 RX ADMIN — Medication 3200 UNITS: at 20:06

## 2018-02-08 RX ADMIN — CEFTRIAXONE 1 G: 10 INJECTION, POWDER, FOR SOLUTION INTRAVENOUS at 14:40

## 2018-02-08 ASSESSMENT — ENCOUNTER SYMPTOMS: CONFUSION: 1

## 2018-02-08 NOTE — ED NOTES
Bed: ED02  Expected date: 2/8/18  Expected time:   Means of arrival:   Comments:  [id number]} 89F increased confusion

## 2018-02-08 NOTE — ED NOTES
DATE:  2/8/2018   TIME OF RECEIPT FROM LAB:  1402  LAB TEST:  Trop  LAB VALUE:  1.570  RESULTS GIVEN WITH READ-BACK TO (PROVIDER):      Dr Muñoz text   TIME LAB VALUE REPORTED TO PROVIDER:   3395

## 2018-02-08 NOTE — H&P
[facility]    History and Physical  Hospitalist       Date of Admission:  2/8/2018    Assessment & Plan   Tracey Moran is a 89 year-old female with history of hypertension, chronic kidney disease stage II-III, chronic normocytic anemia, recent GI bleed suspected diverticular in nature who presents after being found down by her son. Admitted 2/8/2018.    Fall  Found down on bathroom floor by son with last seen normal 2/7/18 afternoon. Multiple pathologies as below potentially contributing.   - Treat as belwo  - PT/OT consulted    Sepsis secondary to urinary tract infection  UA grossly abnormal with positive nitrite, moderate leukocyte esterase, 401 WBC. Associated with WBC 12.7, HRs in 90s-100s, confusion. Afebrile.    - Ceftriaxone IV  - Follow-up urine culture results  - Blood cultures not obtained prior to antibiotics, will defer for now and direct therapy at urine culture results as most likely infectious source    Troponin elevation  Troponin 1.570 on admission, associated with fall of unclear etiology. Associated with more prominent T wave inversions on V2 and V3 on EKG compared to 12/5/17. Patient denies any chest pain at present. No know prior cardiac history.  - Aspirin given in ED  - Echocardiogram to assess for wall motion abnormality, EF, any other structural abnormality  - Note son reports patient and family would be unlikely to pursue angiogram if recommended, can be readdressed if felt to be indicated when patient is more participatory  - Discussed heparin drip until troponin confirmed trending down, son declined at this time. Discussed risks / benefits extensively of heparin versus no heparin in context of possible ACS with son and he expressed understanding.    ADDENDUM: Troponin trending up. Re-evaluated patient, she continues to deny any chest pain or shortness of breath. Repeat EKG unchanged from previous. Discussed again with son, at this time will plan to start heparin drip at  least until peak. He continues to be hesitant to pursue any angiogram, therefore will still defer cardiology consult pending echocardiogram results. Metoprolol XL and lisinopril re-ordered from home medications.     Hypertension   - Continue prior to admission metoprolol XL and lisinopril    Prerenal azotemia  Chronic kidney disease stage II-III  Most recent creatinine 0.78 12/7/17. Creatinine 0.98 on admission, with associated elevated BUN and appears volume depleted on exam.   - IV fluids  - Avoid nephrotoxins  - Follow BMP    Mild rhabdomyolysis   associated with unknown time down. Well below threshold to expect renal consequences at this time.   - Continue IV fluids as above  - Will recheck CK following fluids    Encephalopathy, likely multifactorial  Patient significantly altered from baseline per son. No clear focal abnormalities on exam. Admission head CT negative. Concern for volume depletion, UTI, cardiac event as detailed above, all potential contributors   - Defer any further neuro-imaging at present as no clear focal abnormalities  - Treat as above and reassess  - Re-orient as needed    Chronic normocytic anemia  History of GI bleed 12/2017, which was suspected to be diverticular in nature. EGD unremarkable at that time, patient declined colonoscopy. Son noted a fair amount of stool when he found her in the bathroom with no report of blood. Hemoglobin 9.8 g/dl on admission with last value of 9.0 g/dl 12/13/17.   - Monitor hemoglobin and for clinical evidence of bleeding. Appears volume depleted so may expect some decline with fluids.     Constipation  Chronic issue. AXR with bowel loops at upper limits of normal, large amount of stool in distal sigmoid colon and rectum. Abdomen is soft, non-tender, doubt significant obstruction or ileus at this point.   - Manual disimpaction followed by suppository   - Scheduled docusate   - PRN bowel regimen ordered    DVT Prophylaxis: Pneumatic Compression  Devices  Code Status: Full Code    Disposition: Admit to inpatient. Anticipate greater than or equal to 2 midnights prior to discharge.    Tomy Keene    Primary Care Physician   Guilherme Graves    Chief Complaint   Found down by son    History is obtained from the patient's son, unable to obtain history from the patient due to her acute confusion     History of Present Illness   Tracey Moran is a 89 year old female who presents with the above chief complaint.    Patient's son reports that he was with her on Monday and she was in her usual state of health.  He typically visits her on Monday and Thursday.  She did not answer her phone prior to his usual visit which led to his initial concern.  He went to check on her at her senior co-op and found her laying on the ground in the bathroom. He noted a fair amount of stool in the toilet, with no report of blood. Based on her positioning he suspected she fell while trying to get up from the toilet. She was confused from baseline, but otherwise did not seem to offer specific complaints. He contacted the co-op  when she was hospitalized, and there were no specifically abnormalities noted by staff recently, and the son is otherwise unaware of any recent change in symptoms from baseline. At present the patient denies any chest pain, abdominal pain, shortness of breath, nausea.    In the Emergency Department, the patient was seen by Dr. Casper Muñoz, with whom I discussed the patient's presenting symptoms and emergency department course.  Initial vital signs were a temperature of 97.8F, HR 82, /44, RR 18, SpO2 100% on room air. Work-up was notable for grossly abnormal UA, WBC 12.7, troponin I of 1.570, , negative head CT, abdominal XR with 2 or 3 small bowel loops upper limits of normal, large amount of fecal material in distal sigmoid colon and rectum, CXR unremarkable. The patient was started on IV ceftriaxone and hospitalists were contacted  for admission to the hospital.     Past Medical History      Hypertension  Chronic kidney disease stage II-III   History of GI bleed, suspected diverticular   History of UTI  Chronic anemia  Constipation  History of left wrist fracture     Past Surgical History     Bilateral cataract surgery  Cholecystectomy  Tonsillectomy     Prior to Admission Medications   Prior to Admission Medications   Prescriptions Last Dose Informant Patient Reported? Taking?   docusate sodium (COLACE) 100 MG capsule   No No   Sig: Take 2 capsules (200 mg) by mouth 2 times daily   lisinopril (PRINIVIL/ZESTRIL) 20 MG tablet   No No   Sig: Take 1 tablet (20 mg) by mouth 2 times daily Hold is SBP <110 call if held x 2 in a row or symptomatic   metoprolol (TOPROL-XL) 50 MG 24 hr tablet   No No   Sig: Take 1 tablet (50 mg) by mouth daily Hold if SBP <110 or HR <55 call if held x2 in a row or symptomatic   polyethylene glycol (MIRALAX/GLYCOLAX) Packet   No No   Sig: Take 17 g by mouth 2 times daily as needed for constipation Use if no bowel movement for 2 days      Facility-Administered Medications: None     Allergies   Allergies   Allergen Reactions     Demerol [Meperidine]      Dust Mites      Peanuts [Nuts]      Penicillins      Pollen Extract        Social History   Never smoker. Son denies any alcohol use. No illicit or IV drug use    Lives in a senior co-op and is fairly independent at baseline. Uses a walker at times. Son visits twice a week on Monday and Thursdays.    Family History     Reviewed and non-contributory to chief complaint given the patient's age.    Review of Systems   The 10 point Review of Systems is negative other than noted in the HPI or here.    Physical Exam   Temp: 97.8  F (36.6  C) Temp src: Oral BP: 122/63   Heart Rate: 86 Resp: 13 SpO2: 96 % O2 Device: None (Room air)    Vital Signs with Ranges  Temp:  [97.8  F (36.6  C)] 97.8  F (36.6  C)  Heart Rate:  [81-96] 86  Resp:  [12-20] 13  BP: (112-142)/(44-77)  "122/63  SpO2:  [95 %-100 %] 96 %  0 lbs 0 oz    Constitutional: Elderly female in no acute distress.   Eyes: PERRL, EOMI  HENT: Dry mucous membranes, otherwise oropharynx clear  Respiratory: Clear to auscultation bilaterally, good air movement, normal effort   Cardiovascular: RRR, no m/r/g. Trace lower extremity edema bilaterally.  GI: Soft, non-tender, non-distended. No rebound tenderness or guarding.  Skin: Warm, dry, no apparent rashes  Neurologic: Alert. Speech intermittently intelligible. Oriented to self and her son at the bedside, reports being at \"[place]\", not oriented to date. Following commands. Face grossly symmetric and tongue midline. Strength grossly intact and symmetric bilaterally. More detailed neurologic exam limited by patient's present confusion.   Psychiatric: Normal affect, appropriate    Data   Data reviewed today:  I personally reviewed the EKG tracing showing sinus rhythm.    Recent Labs  Lab 02/08/18  1253   WBC 12.7*   HGB 9.8*   MCV 89         POTASSIUM 4.1   CHLORIDE 104   CO2 24   BUN 51*   CR 0.98   ANIONGAP 12   RENETTA 9.2   *   TROPI 1.570*       Recent Results (from the past 24 hour(s))   Head CT w/o contrast    Narrative    CT SCAN OF THE HEAD WITHOUT CONTRAST   2/8/2018 1:28 PM     HISTORY: altered, falling;     TECHNIQUE:  Axial images of the head and coronal reformations without  IV contrast material. Radiation dose for this scan was reduced using  automated exposure control, adjustment of the mA and/or kV according  to patient size, or iterative reconstruction technique.    COMPARISON: None.    FINDINGS:  There is generalized atrophy of the brain.  White matter  changes are present in the cerebral hemispheres that are consistent  with small vessel ischemic disease in this age patient. There is no  evidence of intracranial hemorrhage, mass, acute infarct or anomaly.  The visualized portions of the sinuses and mastoids appear normal.  There is no evidence of " trauma.      Impression    IMPRESSION: No acute pathology, no bleed, mass, or infarcts are seen.    No bleed or fracture identified.      TREVOR RODRIGUEZ MD   XR Abdomen 1 View    Narrative    ABDOMEN ONE VIEW  2/8/2018 1:32 PM    HISTORY:  Evaluate for swallowed dentures.    COMPARISON:  Pelvis 11/26/2014.      Impression    IMPRESSION:  No evidence for swallowed dentures in the abdomen or  pelvis. Prominent lumbar scoliosis convex left with multilevel  degenerative changes. Two or three small bowel loops in the right low  abdomen and upper pelvis are just above upper limits of normal. This  could represent focal ileus. Early obstruction is not excluded. Large  amount of fecal material in the distal sigmoid colon and rectum.    ZOHREH BRAVO MD   XR Chest 1 View    Narrative    CHEST ONE VIEW  2/8/2018 1:33 PM    HISTORY:  Weakness.    COMPARISON:  12/5/2017      Impression    IMPRESSION:  No acute process or significant interval change. Lungs  are clear. Heart size likely mildly prominent. The patient is tilted  to the right and rotated.    ZOHREH BRAVO MD

## 2018-02-08 NOTE — ED NOTES
[facility]  ED Nurse Handoff Report    ED Chief complaint: Fall (found on the floor by son, ? 24hrs last seen or talked to. Pt at independent living. Per son more confused.)      ED Diagnosis:   Final diagnoses:   None       Code Status: Hospitalist to address.     Allergies:   Allergies   Allergen Reactions     Demerol [Meperidine]      Dust Mites      Peanuts [Nuts]      Penicillins      Pollen Extract        Activity level - Baseline/Home:  Independent    Activity Level - Current:   Stand with Assist     Needed?: No    Isolation: No  Infection: Not Applicable    Bariatric?: No    Vital Signs:   Vitals:    02/08/18 1345 02/08/18 1400 02/08/18 1430 02/08/18 1500   BP:  121/47 112/50 122/63   Resp: 20 13 12 13   Temp:       TempSrc:       SpO2: 96% 95% 97% 96%       Cardiac Rhythm: ,        Pain level:      Is this patient confused?: Yes    Patient Report: Initial Complaint: Pt comes from an independent living via EMS, presents to the ED for evaluation of a fall. Her son reports that the patient has had multiple falls within the last year. This morning, the son called his mother. When she didn't respond, the son went to her AL at 1100 and found her on the bathroom floor with urine and stool incontinence. Son unsure of last time someone talked to pt or if she hit her head or had a LOC.     Focused Assessment: Pt pleasantly confused. Likes to go by June. Per son pt is a little more confused than normal.   Tests Performed:   Results for orders placed or performed during the hospital encounter of 02/08/18 (from the past 24 hour(s))   CBC with platelets differential   Result Value Ref Range    WBC 12.7 (H) 4.0 - 11.0 10e9/L    RBC Count 3.36 (L) 3.8 - 5.2 10e12/L    Hemoglobin 9.8 (L) 11.7 - 15.7 g/dL    Hematocrit 29.9 (L) 35.0 - 47.0 %    MCV 89 78 - 100 fl    MCH 29.2 26.5 - 33.0 pg    MCHC 32.8 31.5 - 36.5 g/dL    RDW 14.3 10.0 - 15.0 %    Platelet Count 308 150 - 450 10e9/L    Diff Method  Automated Method     % Neutrophils 88.0 %    % Lymphocytes 6.4 %    % Monocytes 5.3 %    % Eosinophils 0.0 %    % Basophils 0.1 %    % Immature Granulocytes 0.2 %    Nucleated RBCs 0 0 /100    Absolute Neutrophil 11.1 (H) 1.6 - 8.3 10e9/L    Absolute Lymphocytes 0.8 0.8 - 5.3 10e9/L    Absolute Monocytes 0.7 0.0 - 1.3 10e9/L    Absolute Eosinophils 0.0 0.0 - 0.7 10e9/L    Absolute Basophils 0.0 0.0 - 0.2 10e9/L    Abs Immature Granulocytes 0.0 0 - 0.4 10e9/L    Absolute Nucleated RBC 0.0    Basic metabolic panel   Result Value Ref Range    Sodium 140 133 - 144 mmol/L    Potassium 4.1 3.4 - 5.3 mmol/L    Chloride 104 94 - 109 mmol/L    Carbon Dioxide 24 20 - 32 mmol/L    Anion Gap 12 3 - 14 mmol/L    Glucose 107 (H) 70 - 99 mg/dL    Urea Nitrogen 51 (H) 7 - 30 mg/dL    Creatinine 0.98 0.52 - 1.04 mg/dL    GFR Estimate 53 (L) >60 mL/min/1.7m2    GFR Estimate If Black 64 >60 mL/min/1.7m2    Calcium 9.2 8.5 - 10.1 mg/dL   Troponin I   Result Value Ref Range    Troponin I ES 1.570 (HH) 0.000 - 0.045 ug/L   CK total   Result Value Ref Range    CK Total 786 (H) 30 - 225 U/L   UA with Microscopic   Result Value Ref Range    Color Urine Yellow     Appearance Urine Cloudy     Glucose Urine Negative NEG^Negative mg/dL    Bilirubin Urine Negative NEG^Negative    Ketones Urine 40 (A) NEG^Negative mg/dL    Specific Gravity Urine 1.025 1.003 - 1.035    Blood Urine Large (A) NEG^Negative    pH Urine 6.0 5.0 - 7.0 pH    Protein Albumin Urine 30 (A) NEG^Negative mg/dL    Urobilinogen mg/dL Normal 0.0 - 2.0 mg/dL    Nitrite Urine Positive (A) NEG^Negative    Leukocyte Esterase Urine Moderate (A) NEG^Negative    Source Midstream Urine     WBC Urine 401 (H) 0 - 2 /HPF    RBC Urine 45 (H) 0 - 2 /HPF    Bacteria Urine Many (A) NEG^Negative /HPF    Squamous Epithelial /HPF Urine 2 (H) 0 - 1 /HPF   Head CT w/o contrast    Narrative    CT SCAN OF THE HEAD WITHOUT CONTRAST   2/8/2018 1:28 PM     HISTORY: altered, falling;     TECHNIQUE:   Axial images of the head and coronal reformations without  IV contrast material. Radiation dose for this scan was reduced using  automated exposure control, adjustment of the mA and/or kV according  to patient size, or iterative reconstruction technique.    COMPARISON: None.    FINDINGS:  There is generalized atrophy of the brain.  White matter  changes are present in the cerebral hemispheres that are consistent  with small vessel ischemic disease in this age patient. There is no  evidence of intracranial hemorrhage, mass, acute infarct or anomaly.  The visualized portions of the sinuses and mastoids appear normal.  There is no evidence of trauma.      Impression    IMPRESSION: No acute pathology, no bleed, mass, or infarcts are seen.    No bleed or fracture identified.     XR Abdomen 1 View    Narrative    ABDOMEN ONE VIEW  2/8/2018 1:32 PM    HISTORY:  Evaluate for swallowed dentures.    COMPARISON:  Pelvis 11/26/2014.      Impression    IMPRESSION:  No evidence for swallowed dentures in the abdomen or  pelvis. Prominent lumbar scoliosis convex left with multilevel  degenerative changes. Two or three small bowel loops in the right low  abdomen and upper pelvis are just above upper limits of normal. This  could represent focal ileus. Early obstruction is not excluded. Large  amount of fecal material in the distal sigmoid colon and rectum.    ZOHREH BRAVO MD   XR Chest 1 View    Narrative    CHEST ONE VIEW  2/8/2018 1:33 PM    HISTORY:  Weakness.    COMPARISON:  12/5/2017      Impression    IMPRESSION:  No acute process or significant interval change. Lungs  are clear. Heart size likely mildly prominent. The patient is tilted  to the right and rotated.    ZOHREH BRAVO MD   EKG 12-lead, tracing only   Result Value Ref Range    Interpretation ECG Click View Image link to view waveform and result        Abnormal Results: see above.  Treatments provided: IV abx    Family Comments: son at bedside.     OBS  brochure/video discussed/provided to patient: N/A    ED Medications:   Medications   cefTRIAXone (ROCEPHIN) 1 g in 10 mL SWFI Premix Syringe (1 g Intravenous Given 2/8/18 1440)       Drips infusing?:  No      ED NURSE PHONE NUMBER: *[id number]

## 2018-02-08 NOTE — IP AVS SNAPSHOT
MRN:[id number]                      After Visit Summary   2/8/2018    Tracey Moran    MRN: [id number]           Thank you!     Thank you for choosing [facility] for your care. Our goal is always to provide you with excellent care. Hearing back from our patients is one way we can continue to improve our services. Please take a few minutes to complete the written survey that you may receive in the mail after you visit with us. Thank you!        Patient Information     Date Of Birth          6/16/1925        Designated Caregiver       Most Recent Value    Caregiver    Will someone help with your care after discharge? yes    Name of designated caregiver Nick    Phone number of caregiver [phone]    Caregiver address local      About your hospital stay     You were admitted on:  February 8, 2018 You last received care in the:  [facility]    You were discharged on:  February 11, 2018        Reason for your hospital stay       This is a 89 year old female admitted with a fall and a UTI.                  Who to Call     For medical emergencies, please call 911.  For non-urgent questions about your medical care, please call your primary care provider or clinic, [phone]          Attending Provider     Provider Specialty    Casper Muñoz MD Emergency Medicine    [facility]       Primary Care Provider Office Phone # Fax #    Guilherme Graves [phone]      After Care Instructions     Activity       Your activity upon discharge: activity as tolerated            Diet       Follow this diet upon discharge: Orders Placed This Encounter      Snacks/Supplements Adult: Ensure Plus (Adult); Between Meals      Regular Diet Adult                  Follow-up Appointments     Follow-up and recommended labs and tests        Follow up with primary care provider, Guilherme Graves, within 7 days for hospital follow- up.  No follow up labs  or test are needed.                  Your next 10 appointments already scheduled     Feb 15, 2018 10:20 AM CST   [facility]    [address] [address] [phone]           Bring a current list of meds and any records pertaining to this visit. For Physicals, please bring immunization records and any forms needing to be filled out. Please arrive 10 minutes early to complete paperwork.              Additional Services     Home Care OT Referral for Hospital Discharge       OT to eval and treat    Your provider has ordered home care - occupational therapy. If you have not been contacted within 2 days of your discharge please call the department phone number listed on the top of this document.            Home Care PT Referral for Hospital Discharge       PT to eval and treat    Your provider has ordered home care - physical therapy. If you have not been contacted within 2 days of your discharge please call the department phone number listed on the top of this document.            Home care nursing referral       RN skilled nursing visit. RN to assess vital signs and weight, respiratory and cardiac status, pain level and activity tolerance and home safety.  Home health aide to assist w/ ADLS    Your provider has ordered home care nursing services. If you have not been contacted within 2 days of your discharge please call the inpatient department phone number at [phone] .                  Further instructions from your care team       A follow-up appointment was scheduled for you [see above].  It is very important to go to it--bring these papers and your insurance card with you.  At the visit, talk about your hospital stay.  Tell your doctor how you feel.  Show your new list of medications.  Your doctor will update records, make sure you are still doing OK, and decide if any tests or  "medication changes are needed.        The patient will discharge home with [facility] with RN/PT/OT/HHA.  [facility] will contact the patient directly to arrange the first visit.  [facility]'s phone number is [phone].      Pending Results     Date and Time Order Name Status Description    2018 1806 EKG 12-lead, tracing only Preliminary             Statement of Approval     Ordered          18 1221  I have reviewed and agree with all the recommendations and orders detailed in this document.  EFFECTIVE NOW     Approved and electronically signed by:  Guilherme Zhou MD             Admission Information     Date & Time Provider Department Dept. Phone    2018 Tomy Keene MD [facility] [phone]      Your Vitals Were     Blood Pressure Pulse Temperature Respirations Height Weight    139/76 90 98.6  F (37  C) (Axillary) 16 1.448 m (4' 9\") 59.2 kg (130 lb 9.6 oz)    Pulse Oximetry BMI (Body Mass Index)                97% 28.26 kg/m2          [facility] Information     [employer] lets you send messages to your doctor, view your test results, renew your prescriptions, schedule appointments and more. To sign up, go to [email or web address] . Click on \"Log in\" on the left side of the screen, which will take you to the Welcome page. Then click on \"Sign up Now\" on the right side of the page.     You will be asked to enter the access code listed below, as well as some personal information. Please follow the directions to create your username and password.     Your access code is: [email or web address]  Expires: 3/7/2018  1:45 PM     Your access code will  in 90 days. If you need help or a new code, please call your [facility] clinic or [phone].        Care EveryWhere ID     This is your Care EveryWhere ID. This could be used by other organizations to access your [facility] medical records  [id number]        Equal Access to Services     RUSSEL GREER AH: " Hadii sohan rogers hadsiso Soomaali, waaxda luqadaha, qaybta kaalmada aderusty, jose j kentrellin hayaan hafsa orellana lamanjinderlaxmi nico. So [facility] [phone].    ATENCIÓN: Si habla mike, tiene a harrison disposición servicios gratuitos de asistencia lingüística. Llame al [phone].    We comply with applicable federal civil rights laws and [place] laws. We do not discriminate on the basis of race, color, national origin, age, disability, sex, sexual orientation, or gender identity.               Review of your medicines      START taking        Dose / Directions    cefdinir 300 MG capsule   Commonly known as:  OMNICEF   Indication:  Urinary Tract Infection   Used for:  Acute cystitis without hematuria        Dose:  300 mg   Take 1 capsule (300 mg) by mouth 2 times daily   Quantity:  7 capsule   Refills:  0         CONTINUE these medicines which have NOT CHANGED        Dose / Directions    docusate sodium 100 MG capsule   Commonly known as:  COLACE   Used for:  Slow transit constipation        Dose:  200 mg   Take 2 capsules (200 mg) by mouth 2 times daily   Quantity:  100 capsule   Refills:  3       lisinopril 20 MG tablet   Commonly known as:  PRINIVIL/ZESTRIL   Used for:  Essential hypertension        Dose:  20 mg   Take 1 tablet (20 mg) by mouth 2 times daily Hold is SBP <110 call if held x 2 in a row or symptomatic   Quantity:  180 tablet   Refills:  1       metoprolol succinate 50 MG 24 hr tablet   Commonly known as:  TOPROL-XL   Used for:  Essential hypertension        Dose:  50 mg   Take 1 tablet (50 mg) by mouth daily Hold if SBP <110 or HR <55 call if held x2 in a row or symptomatic   Quantity:  90 tablet   Refills:  2       polyethylene glycol Packet   Commonly known as:  MIRALAX/GLYCOLAX   Used for:  Slow transit constipation        Dose:  17 g   Take 17 g by mouth 2 times daily as needed for constipation Use if no bowel movement for 2 days   Quantity:  7 packet   Refills:  1            Where to get your medicines       These medications were sent to [facility] [place], [place] - [address]  [address], [place] [address]     Phone:  [phone]     cefdinir 300 MG capsule                Protect others around you: Learn how to safely use, store and throw away your medicines at www.disposemymeds.org.        ANTIBIOTIC INSTRUCTION     You've Been Prescribed an Antibiotic - Now What?  Your healthcare team thinks that you or your loved one might have an infection. Some infections can be treated with antibiotics, which are powerful, life-saving drugs. Like all medications, antibiotics have side effects and should only be used when necessary. There are some important things you should know about your antibiotic treatment.      Your healthcare team may run tests before you start taking an antibiotic.    Your team may take samples (e.g., from your blood, urine or other areas) to run tests to look for bacteria. These test can be important to determine if you need an antibiotic at all and, if you do, which antibiotic will work best.      Within a few days, your healthcare team might change or even stop your antibiotic.    Your team may start you on an antibiotic while they are working to find out what is making you sick.    Your team might change your antibiotic because test results show that a different antibiotic would be better to treat your infection.    In some cases, once your team has more information, they learn that you do not need an antibiotic at all. They may find out that you don't have an infection, or that the antibiotic you're taking won't work against your infection. For example, an infection caused by a virus can't be treated with antibiotics. Staying on an antibiotic when you don't need it is more likely to be harmful than helpful.      You may experience side effects from your antibiotic.    Like all medications, antibiotics have side effects. Some of these can be serious.    Let you  healthcare team know if you have any known allergies when you are admitted to the hospital.    One significant side effect of nearly all antibiotics is the risk of severe and sometimes deadly diarrhea caused by Clostridium difficile (C. Difficile). This occurs when a person takes antibiotics because some good germs are destroyed. Antibiotic use allows C. diificile to take over, putting patients at high risk for this serious infection.    As a patient or caregiver, it is important to understand your or your loved one's antibiotic treatment. It is especially important for caregivers to speak up when patients can't speak for themselves. Here are some important questions to ask your healthcare team.    What infection is this antibiotic treating and how do you know I have that infection?    What side effects might occur from this antibiotic?    How long will I need to take this antibiotic?    Is it safe to take this antibiotic with other medications or supplements (e.g., vitamins) that I am taking?     Are there any special directions I need to know about taking this antibiotic? For example, should I take it with food?    How will I be monitored to know whether my infection is responding to the antibiotic?    What tests may help to make sure the right antibiotic is prescribed for me?      Information provided by:  www.cdc.gov/getsmart  U.S. Department of Health and Human Services  Centers for disease Control and Prevention  National Center for Emerging and Zoonotic Infectious Diseases  Division of Healthcare Quality Promotion             Medication List: This is a list of all your medications and when to take them. Check marks below indicate your daily home schedule. Keep this list as a reference.      Medications           Morning Afternoon Evening Bedtime As Needed    cefdinir 300 MG capsule   Commonly known as:  OMNICEF   Take 1 capsule (300 mg) by mouth 2 times daily   Last time this was given:  300 mg on 2/11/2018  10:21 AM                                      docusate sodium 100 MG capsule   Commonly known as:  COLACE   Take 2 capsules (200 mg) by mouth 2 times daily   Last time this was given:  100 mg on 2/11/2018 10:22 AM                                      lisinopril 20 MG tablet   Commonly known as:  PRINIVIL/ZESTRIL   Take 1 tablet (20 mg) by mouth 2 times daily Hold is SBP <110 call if held x 2 in a row or symptomatic   Last time this was given:  20 mg on 2/11/2018 10:21 AM                                      metoprolol succinate 50 MG 24 hr tablet   Commonly known as:  TOPROL-XL   Take 1 tablet (50 mg) by mouth daily Hold if SBP <110 or HR <55 call if held x2 in a row or symptomatic   Last time this was given:  50 mg on 2/11/2018 10:21 AM                                   polyethylene glycol Packet   Commonly known as:  MIRALAX/GLYCOLAX   Take 17 g by mouth 2 times daily as needed for constipation Use if no bowel movement for 2 days

## 2018-02-08 NOTE — IP AVS SNAPSHOT
[facility]    [address] [place], [place] [address]., [address]    [place] [address]    Phone:  [phone]                                       After Visit Summary   2/8/2018    Tracey Moran    MRN: [id number]           After Visit Summary Signature Page     I have received my discharge instructions, and my questions have been answered. I have discussed any challenges I see with this plan with the nurse or doctor.    ..........................................................................................................................................  Patient/Patient Representative Signature      ..........................................................................................................................................  Patient Representative Print Name and Relationship to Patient    ..................................................               ................................................  Date                                            Time    ..........................................................................................................................................  Reviewed by Signature/Title    ...................................................              ..............................................  Date                                                            Time

## 2018-02-08 NOTE — ED PROVIDER NOTES
"  History     Chief Complaint:  Fall       The history is provided by a relative.      Tracey Moran is a 89 year old female with a history of chronic renal failure stage 3 and HTN who lives in independent living who presents to the ED for evaluation of fall. Her son reports that the patient has had multiple falls within the last year. The patient was last seen well yesterday. This morning, the son called his mother as he normally takes her out on Thursdays. When she didn't respond, the son went to her nursing home at 1100 and found her on the bathroom floor with urine and stool incontinence. EMS was called to bring her in for evaluation.     Here in the ED, the patient's son is unsure of the last time someone talked with her, if she lost consciousness, or if she hit her head, but reports visible right arm and elbow injuries. He feels that she is \"dazed\" and different from her baseline. He believes her speech to be normal \"in these situations\", although it is not normal. He and his wife are not only concerned for possible invisible injuries sustained, but also a UTI. Of note, the patient was lifted onto the bed for EMS, so it is unknown if she can ambulate, however, her son thought she had normal upper body strength.       Allergies:  Demerol [Meperidine]  Dust Mites  Peanuts [Nuts]  Penicillins  Pollen Extract    Medications:    Lisinopril  Colace    Past Medical History:    HTN  CRF stage 3    Past Surgical History:    The patient does not have any pertinent past surgical history.    Family History:    No past pertinent family history.    Social History:  Presents with her son.   Negative for tobacco use.  Positive for alcohol use.   Marital Status:   [5]    Review of Systems   Neurological: Negative for syncope.   Psychiatric/Behavioral: Positive for confusion.   All other systems reviewed and are negative.    Physical Exam   First Vitals:  Patient Vitals for the past 24 hrs:   BP Temp Temp src Heart Rate " Resp SpO2   02/08/18 1500 122/63 - - 86 13 96 %   02/08/18 1430 112/50 - - 83 12 97 %   02/08/18 1400 121/47 - - 81 13 95 %   02/08/18 1345 - - - 96 20 96 %   02/08/18 1344 140/77 - - 86 14 -   02/08/18 1342 - - - 90 18 -   02/08/18 1230 142/55 - - - - -   02/08/18 1200 123/44 97.8  F (36.6  C) Oral 82 18 100 %     Physical Exam   Constitutional: She is oriented to person, place, and time.   Cachectic sleepy lethargic moves extremities to command   HENT:   Head: Normocephalic and atraumatic.   Right Ear: External ear normal.   Mouth/Throat: Oropharynx is clear and moist. Mucous membranes are dry.   Eyes: Conjunctivae and EOM are normal. Pupils are equal, round, and reactive to light.   Neck: Normal range of motion. Neck supple. No JVD present.   Cardiovascular: Normal rate, regular rhythm and normal heart sounds.    Pulmonary/Chest: Effort normal and breath sounds normal.   Abdominal: Soft. Bowel sounds are normal. She exhibits no distension. There is no tenderness. There is no rebound.   Musculoskeletal: Normal range of motion.   Lymphadenopathy:     She has no cervical adenopathy.   Neurological: She is alert and oriented to person, place, and time. She displays normal reflexes. No cranial nerve deficit. She exhibits normal muscle tone. Coordination normal.   Skin: Skin is warm and dry.   Psychiatric: She has a normal mood and affect. Her behavior is normal. Judgment normal.   Nursing note and vitals reviewed.      Emergency Department Course   ECG:  Indication: Fall  Time: 1338  Vent. Rate 84 bpm. MN interval 140. QRS duration 78. QT/QTc 400/472. P-R-T axis 38 -18 -69. Normal sinus rhythm. Minimal voltage criteria for LVH, may be normal variant. Nonspecific ST and T wave abnormality. Abnormal ECG. Read time: 1342. Unchanged from previous.     Imaging:  Radiographic findings were communicated with the patient who voiced understanding of the findings.      CT Head without contrast:   IMPRESSION: No acute pathology,  no bleed, mass, or infarcts are seen.    No bleed or fracture identified. As per radiology.    XR Abdomen, 1 view:   IMPRESSION:  No evidence for swallowed dentures in the abdomen or  pelvis. Prominent lumbar scoliosis convex left with multilevel  degenerative changes. Two or three small bowel loops in the right low  abdomen and upper pelvis are just above upper limits of normal. This  could represent focal ileus. Early obstruction is not excluded. Large  amount of fecal material in the distal sigmoid colon and rectum. As per radiology.     XR Chest, 1 view:   IMPRESSION:  No acute process or significant interval change. Lungs  are clear. Heart size likely mildly prominent. The patient is tilted  to the right and rotated. As per radiology.       Laboratory:  1253 Troponin: 1.570 (HH)    CBC: WBC: 12.7 (H), HGB: 9.8 (L), PLT: 308  BMP: Glucose 107 (H), BUN 51 (H), GFR 53 (L), o/w WNL (Creatinine: 0.98)    UA with micro: blood large (A), albumin 30 (A), nitrite positive (A), leukocyte esterase moderate (A),  (H), RBC 45 (H), bacteria many (A), squamous epithelial 2 (H), o/w negative  Urine culture: pending    CK total: 786 (H)    Interventions:  1440 Rocephin 1 g IV    Emergency Department Course:  Nursing notes and vitals reviewed. 1234 I performed an exam of the patient as documented above. EKG obtained in the ED, see results above.     IV inserted. Medicine administered as documented above. Blood drawn. This was sent to the lab for further testing, results above.    The patient was sent for a CT Head and XR Abdomen and Chest while in the emergency department, findings above.     1409 I rechecked the patient and discussed the results of her workup thus far.     1512  I consulted with Dr. Courtney of the hospitalist services. They are in agreement to accept the patient for admission.    Findings and plan explained to the Patient who consents to admission. Discussed the patient with Dr. Courtney, who will  admit the patient to a inpatient [facility] bed for further monitoring, evaluation, and treatment.    Impression & Plan    Medical Decision Making:  Patient presents with found on floor.  Patient is a poor historian and unable to answer the cause of her inability to get up from the floor know how she got there.  Patient is here with her son.  He is convinced that this was clearly mechanical and that the patient could probably go home.  Patient's mouth is gaping open she looks dry her lab to suggest urine infection as well as I cannot rule out non-ST segment elevation MI.  Differential diagnosis also includes arrhythmia.  And syncope.  For now son is okay with hospitalization I did attempt to initiate anticoagulation for non-ST segment elevation MI but he refused this while the hospitalist was interviewing him.  We did give ceftriaxone for UTI.  This is pending culture.  For now we will admit pending reevaluation and look how she improves I am concerned the patient is very cachectic thin appearing and doubtful that she should be living home independently but this is a call between the hospital and her son and her for now I do not recommend discharge home due to her frail state and significantly abnormal lab tests and unclear cause for ending up on the ground.    Diagnosis:    ICD-10-CM   1. NSTEMI (non-ST elevated myocardial infarction) (H) I21.4       Disposition:  Admitted to Dr. Courtney of the hospitalist service.       I, Carla Greene, am serving as a scribe on 2/8/2018 at 12:34 PM to personally document services performed by Casper Muñoz MD based on my observations and the provider's statements to me.       Carla Greene  2/8/2018    EMERGENCY DEPARTMENT       Casper Muñoz MD  02/08/18 3393

## 2018-02-08 NOTE — PHARMACY-ADMISSION MEDICATION HISTORY
Admission medication history interview status for the 2/8/2018  admission is complete. See EPIC admission navigator for prior to admission medications     Medication history source reliability:Good    Actions taken by pharmacist (provider contacted, etc):None     Additional medication history information not noted on PTA med list :takes some supplements but son did not know them.    Medication reconciliation/reorder completed by provider prior to medication history? No    Time spent in this activity: 20min    Prior to Admission medications    Medication Sig Last Dose Taking? Auth Provider   lisinopril (PRINIVIL/ZESTRIL) 20 MG tablet Take 1 tablet (20 mg) by mouth 2 times daily Hold is SBP <110 call if held x 2 in a row or symptomatic 2/7/2018 at pm Yes Guilherme Graves MD   docusate sodium (COLACE) 100 MG capsule Take 2 capsules (200 mg) by mouth 2 times daily 2/7/2018 at pm Yes Corby Murphy MD   polyethylene glycol (MIRALAX/GLYCOLAX) Packet Take 17 g by mouth 2 times daily as needed for constipation Use if no bowel movement for 2 days Past Month at prn Yes Corby Murphy MD   metoprolol (TOPROL-XL) 50 MG 24 hr tablet Take 1 tablet (50 mg) by mouth daily Hold if SBP <110 or HR <55 call if held x2 in a row or symptomatic 2/7/2018 at am Yes Guilherme Graves MD

## 2018-02-09 ENCOUNTER — APPOINTMENT (OUTPATIENT)
Dept: CARDIOLOGY | Facility: CLINIC | Age: 83
DRG: 871 | End: 2018-02-09
Attending: INTERNAL MEDICINE
Payer: COMMERCIAL

## 2018-02-09 LAB
ANION GAP SERPL CALCULATED.3IONS-SCNC: 6 MMOL/L (ref 3–14)
BACTERIA SPEC CULT: ABNORMAL
BUN SERPL-MCNC: 40 MG/DL (ref 7–30)
CALCIUM SERPL-MCNC: 8 MG/DL (ref 8.5–10.1)
CHLORIDE SERPL-SCNC: 109 MMOL/L (ref 94–109)
CK SERPL-CCNC: 644 U/L (ref 30–225)
CO2 SERPL-SCNC: 25 MMOL/L (ref 20–32)
CREAT SERPL-MCNC: 0.82 MG/DL (ref 0.52–1.04)
ERYTHROCYTE [DISTWIDTH] IN BLOOD BY AUTOMATED COUNT: 14.4 % (ref 10–15)
GFR SERPL CREATININE-BSD FRML MDRD: 66 ML/MIN/1.7M2
GLUCOSE SERPL-MCNC: 94 MG/DL (ref 70–99)
HCT VFR BLD AUTO: 25.1 % (ref 35–47)
HGB BLD-MCNC: 8.2 G/DL (ref 11.7–15.7)
LMWH PPP CHRO-ACNC: 0.11 IU/ML
LMWH PPP CHRO-ACNC: 0.26 IU/ML
LMWH PPP CHRO-ACNC: 0.45 IU/ML
Lab: ABNORMAL
MCH RBC QN AUTO: 29.4 PG (ref 26.5–33)
MCHC RBC AUTO-ENTMCNC: 32.7 G/DL (ref 31.5–36.5)
MCV RBC AUTO: 90 FL (ref 78–100)
PLATELET # BLD AUTO: 239 10E9/L (ref 150–450)
POTASSIUM SERPL-SCNC: 3.4 MMOL/L (ref 3.4–5.3)
RBC # BLD AUTO: 2.79 10E12/L (ref 3.8–5.2)
SODIUM SERPL-SCNC: 140 MMOL/L (ref 133–144)
SPECIMEN SOURCE: ABNORMAL
TROPONIN I SERPL-MCNC: 1.66 UG/L (ref 0–0.04)
WBC # BLD AUTO: 11.8 10E9/L (ref 4–11)

## 2018-02-09 PROCEDURE — G0463 HOSPITAL OUTPT CLINIC VISIT: HCPCS

## 2018-02-09 PROCEDURE — 36415 COLL VENOUS BLD VENIPUNCTURE: CPT | Performed by: INTERNAL MEDICINE

## 2018-02-09 PROCEDURE — 93306 TTE W/DOPPLER COMPLETE: CPT | Mod: 26 | Performed by: INTERNAL MEDICINE

## 2018-02-09 PROCEDURE — 82550 ASSAY OF CK (CPK): CPT | Performed by: INTERNAL MEDICINE

## 2018-02-09 PROCEDURE — 85520 HEPARIN ASSAY: CPT | Performed by: INTERNAL MEDICINE

## 2018-02-09 PROCEDURE — 80048 BASIC METABOLIC PNL TOTAL CA: CPT | Performed by: INTERNAL MEDICINE

## 2018-02-09 PROCEDURE — 27210995 ZZH RX 272: Performed by: INTERNAL MEDICINE

## 2018-02-09 PROCEDURE — 36415 COLL VENOUS BLD VENIPUNCTURE: CPT | Performed by: PHYSICIAN ASSISTANT

## 2018-02-09 PROCEDURE — 25000128 H RX IP 250 OP 636: Performed by: PHYSICIAN ASSISTANT

## 2018-02-09 PROCEDURE — 21000001 ZZH R&B HEART CARE

## 2018-02-09 PROCEDURE — 25000128 H RX IP 250 OP 636: Performed by: INTERNAL MEDICINE

## 2018-02-09 PROCEDURE — 25000132 ZZH RX MED GY IP 250 OP 250 PS 637: Performed by: INTERNAL MEDICINE

## 2018-02-09 PROCEDURE — 85520 HEPARIN ASSAY: CPT | Performed by: PHYSICIAN ASSISTANT

## 2018-02-09 PROCEDURE — 84484 ASSAY OF TROPONIN QUANT: CPT | Performed by: INTERNAL MEDICINE

## 2018-02-09 PROCEDURE — 85027 COMPLETE CBC AUTOMATED: CPT | Performed by: INTERNAL MEDICINE

## 2018-02-09 PROCEDURE — 99232 SBSQ HOSP IP/OBS MODERATE 35: CPT | Performed by: PHYSICIAN ASSISTANT

## 2018-02-09 PROCEDURE — 93306 TTE W/DOPPLER COMPLETE: CPT

## 2018-02-09 RX ADMIN — METOPROLOL SUCCINATE 50 MG: 50 TABLET, EXTENDED RELEASE ORAL at 08:55

## 2018-02-09 RX ADMIN — LISINOPRIL 20 MG: 20 TABLET ORAL at 22:47

## 2018-02-09 RX ADMIN — ACETAMINOPHEN 650 MG: 325 TABLET, FILM COATED ORAL at 02:50

## 2018-02-09 RX ADMIN — CEFTRIAXONE 1 G: 10 INJECTION, POWDER, FOR SOLUTION INTRAVENOUS at 15:35

## 2018-02-09 RX ADMIN — Medication 1600 UNITS: at 03:52

## 2018-02-09 RX ADMIN — SODIUM CHLORIDE: 9 INJECTION, SOLUTION INTRAVENOUS at 22:49

## 2018-02-09 RX ADMIN — LISINOPRIL 20 MG: 20 TABLET ORAL at 08:55

## 2018-02-09 RX ADMIN — SODIUM CHLORIDE: 9 INJECTION, SOLUTION INTRAVENOUS at 02:48

## 2018-02-09 RX ADMIN — ASPIRIN 81 MG: 81 TABLET, COATED ORAL at 08:55

## 2018-02-09 NOTE — PROGRESS NOTES
[facility] Nurse Inpatient Adult Pressure Injury (PI) Assessment     Initial Assessment of PI(s) on pt's:   Coccyx, bilateral heels    Data:   Patient History:      per MD note(s): Tracey Moran is a 89 year-old female with history of hypertension, chronic kidney disease stage II-III, chronic normocytic anemia, recent GI bleed suspected diverticular in nature who presents after being found down by her son. Admitted 2018.       Thad Assessment and sub scores:   Thad Score  Av  Min: 16  Max: 16    Positioning: Mepilex dressing and Pillows    Mattress:  Standard , Atmos Air mattress       Moisture Management:  Incontinence Protocol and Diaper    Catheter secured? Not applicable      Current Diet / Nutrition:           Active Diet Order      Combination Diet Regular Diet Adult; Low Saturated Fat Na <2400mg Diet    Labs:   Recent Labs   Lab Test  18   0528   17   0745   17   1948   ALBUMIN   --    --    --    --   3.4   HGB  8.2*   < >  7.4*   < >  7.7*   INR   --    --    --    --   1.15*   WBC  11.8*   < >  9.1   --   14.5*   A1C   --    --   Canceled, Test credited   --    --     < > = values in this interval not displayed.                                                                                                                        Pressure Injury Assessment  (location):   Coccyx     Wound History:   Pt found down at home.  States has had issues previously to backside and knows 'I need to sleep on my side.'     Wound Base: base of upper coccyx crease has a superficial open area approx. 1.5 x 0.2 x 0.1cm, pale pink semi-moist base.  Periwound pink but blanchable and intact.      Left inner lower buttock with large area of scuffy serous dry tissue and right inner lower buttock with large purplish fading bruise, skin intact throughout.      Drainage:  none         Odor: none    Pain:  denies    Skin Assessment:   Bilateral heels    History:   Pt states has been  treating heels for a long time but history unclear and unreliable.      Right heel intact, dry callousy and peely tissue    Left heel with a few superficial cracks, scant dried bloody drainage, otherwise just dry callousy and peely tissue    No erythema, no edema, no pain, no drainage to feet or heels      Feet look relatively quite healthy           Intervention:     Patient's chart evaluated.      Thad Interventions:  Current Thad Interventions and Care Plan reviewed and updated, appropriate at this time.    Skin/wounds assessed, Mepilex to coccyx, Sween to heels    Orders  Written    Supplies  at bedside    Discussed plan of care with Patient and Nurse           Assessment:     Pressure Injury (PI) located on coccyx: coccyx crease with very small Stage 2, present on admission.  Rest of roughened and bruised areas to bilateral buttocks are likely traumas from fall, not pressure injuries; skin remains intact to buttocks and no topical treatment needed.  Pt able to reposition with minimal assist today.     Heels with cracking dry skin but no pressure injury.          Plan:     Nursing to notify the Provider(s) and re-consult the WO Nurse if wound(s) deteriorate(s)or if the wound care plan needs reevaluation.      Plan for PIP and wound care to coccyx:   - dressing: Mepilex 4x4 to upper coccyx, conforming to skin curves, change every other day and prn  - remind and assist pt to reposition every 1-2 hrs, side to side only  - float heels at all times; moisturize heels daily with Sween 24  - HOB as low as tolerated to minimize shear    WOC Nurse will return: weekly and prn

## 2018-02-09 NOTE — PLAN OF CARE
Problem: Patient Care Overview  Goal: Plan of Care/Patient Progress Review  PT - Order received, chart reviewed, noted troponins elevated and up-trending, per RN pt just had ECHO and is awaiting cardiology consult. Will check back later today or tomorrow once cardiology has seen and plan is in place to complete PT eval. RN also reports pt is up and moving quite well within the room.

## 2018-02-09 NOTE — PLAN OF CARE
Problem: Patient Care Overview  Goal: Plan of Care/Patient Progress Review  OT: Orders received, chart reviewed. Pts troponins elevated, awaiting cardiology consult for possible angio. Per nursing, hold OT eval at this time until plan in place.

## 2018-02-09 NOTE — PLAN OF CARE
Problem: Patient Care Overview  Goal: Plan of Care/Patient Progress Review  Outcome: Improving  Pt VSS on RA. Tele NSR. A&Ox4, speech sometimes garbled, neuros otherwise intact. Up with A of 1-2, using commode at bedside. Pt has extensive bruising on her back as well as on her arms and legs. C/o mild pain, given tylenol with relief. Slept. Plan for WOC consult, PT/OT, ECHO, trops. Continue to monitor.

## 2018-02-09 NOTE — PROGRESS NOTES
[facility]    Hospitalist Progress Note    Date of Service (when I saw the patient): 02/09/2018    Assessment & Plan   Tracey Moran is a 89 year-old female with history of hypertension, chronic kidney disease stage II-III, chronic normocytic anemia, recent GI bleed suspected diverticular in nature who presents after being found down by her son. Admitted 2/8/2018.     Fall  Found down on bathroom floor by son with last seen normal 2/7/18 afternoon. Multiple pathologies as below potentially contributing.   - Treat as below  - PT/OT consulted  - Ambulate 4x daily with assist     Sepsis secondary to urinary tract infection  UA grossly abnormal with positive nitrite, moderate leukocyte esterase, 401 WBC. Associated with WBC 12.7, HRs in 90s-100s, confusion. Afebrile.    - Ceftriaxone IV  - Urine culture growing E.coli, sensitivities pending  - Blood cultures not obtained prior to antibiotics, will defer for now and direct therapy at urine culture results as most likely infectious source     Troponin elevation, likely demand ischemia  Troponin 1.570 on admission, associated with fall of unclear etiology. Associated with more prominent T wave inversions on V2 and V3 on EKG compared to 12/5/17. Patient denies any chest pain at present. No know prior cardiac history.  - Continue with aspirin  - Repeat EKG unchanged from previous.   - Echocardiogram without any regional wall motion abnormalities. Grade 1 or early diastolic dysfunction.  LVEF 60-65%.  Mild aortic regurgitation.  - Troponin peaked at 1.881 and repeat this morning is trending down 1.658.    - Discussed options with pt's son.  He states that he and the patient would not want to pursue angiogram or cardiology consult at this time given normal echo, lack of sx, and patient's advanced age.  He would not want her to have a procedure unless it was absolutely necessary.  He understands the risks and benefits.  - Discontinue heparin    - Son requests  repeat troponin level tomorrow to ensure this is still downtrending, although he understands this is not routinely done and would not change the plan.      Hypertension   - Continue prior to admission metoprolol XL and lisinopril     Prerenal azotemia  Chronic kidney disease stage II-III  Most recent creatinine 0.78 12/7/17. Creatinine 0.98 on admission, with associated elevated BUN and appears volume depleted on admission.  - IV fluids, decrease to 75 ml/hr  - Avoid nephrotoxins  - Follow BMP     Mild rhabdomyolysis   associated with unknown time down. Well below threshold to expect renal consequences at this time.   - Continue IV fluids as above  - CK trending down, last level 644     Encephalopathy, likely multifactorial  Patient presented significantly altered from baseline per son. No clear focal abnormalities on exam. Admission head CT negative. Concern for volume depletion, UTI, cardiac event as detailed above, all potential contributors.  - She is clearing up and near her baseline today per her son  - Defer any further neuro-imaging at present as no clear focal abnormalities  - Re-orient as needed     Chronic normocytic anemia  History of GI bleed 12/2017, which was suspected to be diverticular in nature. EGD unremarkable at that time, patient declined colonoscopy. Son noted a fair amount of stool when he found her in the bathroom with no report of blood. Hemoglobin 9.8 g/dl on admission with last value of 9.0 g/dl 12/13/17.   - Hgb 8.2 on 2/9  - Monitor hemoglobin and for clinical evidence of bleeding.  Expect some decline with fluids.      Constipation  Chronic issue. AXR with bowel loops at upper limits of normal, large amount of stool in distal sigmoid colon and rectum. Abdomen is soft, non-tender, doubt significant obstruction or ileus at this point.   - Manual disimpaction followed by suppository   - Scheduled docusate   - PRN bowel regimen ordered     DVT Prophylaxis: Pneumatic Compression  Devices  Code Status: Full Code     Disposition: Inpatient. Anticipate potential discharge tomorrow pending culture results and clinical improvement.  PT/OT and SW to assist with dispo.    Casper Francisco J Muñoz    Interval History   Patient reports feeling well.  She denies any chest pain, shortness of breath, fever or chills.  No nausea, vomiting, abdominal pain.  She is asking when she can go home.  Her son is present at the bedside and updated on plan of care.    -Data reviewed today: I reviewed all new labs and imaging results over the last 24 hours.     Physical Exam   Temp: 97.6  F (36.4  C) Temp src: Oral BP: 156/70 Pulse: 94 Heart Rate: 92 Resp: 20 SpO2: 96 % O2 Device: None (Room air)    Vitals:    02/09/18 0200   Weight: 55.5 kg (122 lb 6.4 oz)     Vital Signs with Ranges  Temp:  [97.2  F (36.2  C)-97.8  F (36.6  C)] 97.6  F (36.4  C)  Pulse:  [] 94  Heart Rate:  [] 92  Resp:  [12-20] 20  BP: (112-184)/(44-77) 156/70  SpO2:  [95 %-100 %] 96 %  I/O last 3 completed shifts:  In: 240 [P.O.:240]  Out: 300 [Urine:300]    Constitutional: Alert, oriented to person, place, situation.  Cooperative, lying in bed in NAD.   Respiratory:  Lungs CTAB.  No crackles, wheezes, or rhonchi, no labored breathing.  Cardiovascular:  Heart RRR, no MRG, no edema.  GI:  Abdomen soft, NT/ND and with normoactive BS  Skin/Integumen:  Warm, dry, non-diaphoretic.  Skin tear noted on right elbow.  MSK: CMS x4 intact.      Medications     NaCl 125 mL/hr at 02/09/18 0248     HEParin 800 Units/hr (02/09/18 0351)       docusate sodium  100 mg Oral BID     cefTRIAXone  1 g Intravenous Q24H     bisacodyl  10 mg Rectal Once     aspirin EC  81 mg Oral Daily     lisinopril  20 mg Oral BID     metoprolol succinate  50 mg Oral Daily       Data     Recent Labs  Lab 02/09/18  0528 02/08/18  2117 02/08/18  1645 02/08/18  1253   WBC 11.8*  --   --  12.7*   HGB 8.2*  --   --  9.8*   MCV 90  --   --  89     --   --  308     --    --  140   POTASSIUM 3.4  --   --  4.1   CHLORIDE 109  --   --  104   CO2 25  --   --  24   BUN 40*  --   --  51*   CR 0.82  --   --  0.98   ANIONGAP 6  --   --  12   RENETTA 8.0*  --   --  9.2   GLC 94  --   --  107*   TROPI  --  1.881* 1.863* 1.570*       Recent Results (from the past 24 hour(s))   Head CT w/o contrast    Narrative    CT SCAN OF THE HEAD WITHOUT CONTRAST   2/8/2018 1:28 PM     HISTORY: altered, falling;     TECHNIQUE:  Axial images of the head and coronal reformations without  IV contrast material. Radiation dose for this scan was reduced using  automated exposure control, adjustment of the mA and/or kV according  to patient size, or iterative reconstruction technique.    COMPARISON: None.    FINDINGS:  There is generalized atrophy of the brain.  White matter  changes are present in the cerebral hemispheres that are consistent  with small vessel ischemic disease in this age patient. There is no  evidence of intracranial hemorrhage, mass, acute infarct or anomaly.  The visualized portions of the sinuses and mastoids appear normal.  There is no evidence of trauma.      Impression    IMPRESSION: No acute pathology, no bleed, mass, or infarcts are seen.    No bleed or fracture identified.      TREVOR RODRIGUEZ MD   XR Abdomen 1 View    Narrative    ABDOMEN ONE VIEW  2/8/2018 1:32 PM    HISTORY:  Evaluate for swallowed dentures.    COMPARISON:  Pelvis 11/26/2014.      Impression    IMPRESSION:  No evidence for swallowed dentures in the abdomen or  pelvis. Prominent lumbar scoliosis convex left with multilevel  degenerative changes. Two or three small bowel loops in the right low  abdomen and upper pelvis are just above upper limits of normal. This  could represent focal ileus. Early obstruction is not excluded. Large  amount of fecal material in the distal sigmoid colon and rectum.    ZOHREH BRAVO MD   XR Chest 1 View    Narrative    CHEST ONE VIEW  2/8/2018 1:33 PM    HISTORY:  Weakness.    COMPARISON:   12/5/2017      Impression    IMPRESSION:  No acute process or significant interval change. Lungs  are clear. Heart size likely mildly prominent. The patient is tilted  to the right and rotated.    ZOHREH BRAVO MD

## 2018-02-09 NOTE — PROGRESS NOTES
"SPIRITUAL HEALTH SERVICES Progress Note  [facility] [place] visit per pt request.  Pt and pt's son each shared that pt has \"known the Lord for 50 years,\" and pt said that--because of her eugene--she is not afraid to die.  Pt also speaks of the blessings present in her daily life, so that pt is happy to live \"until God calls me home.\"  Pt invited  to offer prayer and cites no other needs.   provided spiritual support and prayer.   also spoke individually with pt's son.  Pt's son noted that while pt has been living independently, she might need Assisted Living services soon.  Pt invited conversation about how to approach this topic with pt (who he imagines will be resistant to a move such as this).   provided  and support to pt's son re: this concern.                                                                                                                                           Nir Still M.Div., [facility]  Staff   Pager [phone]    "

## 2018-02-10 ENCOUNTER — APPOINTMENT (OUTPATIENT)
Dept: PHYSICAL THERAPY | Facility: CLINIC | Age: 83
DRG: 871 | End: 2018-02-10
Attending: INTERNAL MEDICINE
Payer: COMMERCIAL

## 2018-02-10 ENCOUNTER — APPOINTMENT (OUTPATIENT)
Dept: OCCUPATIONAL THERAPY | Facility: CLINIC | Age: 83
DRG: 871 | End: 2018-02-10
Attending: INTERNAL MEDICINE
Payer: COMMERCIAL

## 2018-02-10 LAB
ANION GAP SERPL CALCULATED.3IONS-SCNC: 7 MMOL/L (ref 3–14)
BUN SERPL-MCNC: 23 MG/DL (ref 7–30)
CALCIUM SERPL-MCNC: 7.6 MG/DL (ref 8.5–10.1)
CHLORIDE SERPL-SCNC: 111 MMOL/L (ref 94–109)
CO2 SERPL-SCNC: 24 MMOL/L (ref 20–32)
CREAT SERPL-MCNC: 0.65 MG/DL (ref 0.52–1.04)
ERYTHROCYTE [DISTWIDTH] IN BLOOD BY AUTOMATED COUNT: 14.4 % (ref 10–15)
GFR SERPL CREATININE-BSD FRML MDRD: 85 ML/MIN/1.7M2
GLUCOSE SERPL-MCNC: 86 MG/DL (ref 70–99)
HCT VFR BLD AUTO: 24.1 % (ref 35–47)
HGB BLD-MCNC: 7.8 G/DL (ref 11.7–15.7)
MCH RBC QN AUTO: 29.4 PG (ref 26.5–33)
MCHC RBC AUTO-ENTMCNC: 32.4 G/DL (ref 31.5–36.5)
MCV RBC AUTO: 91 FL (ref 78–100)
PLATELET # BLD AUTO: 241 10E9/L (ref 150–450)
POTASSIUM SERPL-SCNC: 3.2 MMOL/L (ref 3.4–5.3)
POTASSIUM SERPL-SCNC: 4.7 MMOL/L (ref 3.4–5.3)
RBC # BLD AUTO: 2.65 10E12/L (ref 3.8–5.2)
SODIUM SERPL-SCNC: 142 MMOL/L (ref 133–144)
TROPONIN I SERPL-MCNC: 0.84 UG/L (ref 0–0.04)
WBC # BLD AUTO: 8.3 10E9/L (ref 4–11)

## 2018-02-10 PROCEDURE — 97116 GAIT TRAINING THERAPY: CPT | Mod: GP | Performed by: PHYSICAL THERAPIST

## 2018-02-10 PROCEDURE — 40000193 ZZH STATISTIC PT WARD VISIT: Performed by: PHYSICAL THERAPIST

## 2018-02-10 PROCEDURE — 97530 THERAPEUTIC ACTIVITIES: CPT | Mod: GP | Performed by: PHYSICAL THERAPIST

## 2018-02-10 PROCEDURE — 25000132 ZZH RX MED GY IP 250 OP 250 PS 637: Performed by: INTERNAL MEDICINE

## 2018-02-10 PROCEDURE — 80048 BASIC METABOLIC PNL TOTAL CA: CPT | Performed by: PHYSICIAN ASSISTANT

## 2018-02-10 PROCEDURE — 84132 ASSAY OF SERUM POTASSIUM: CPT | Performed by: INTERNAL MEDICINE

## 2018-02-10 PROCEDURE — 36415 COLL VENOUS BLD VENIPUNCTURE: CPT | Performed by: PHYSICIAN ASSISTANT

## 2018-02-10 PROCEDURE — 99232 SBSQ HOSP IP/OBS MODERATE 35: CPT | Performed by: INTERNAL MEDICINE

## 2018-02-10 PROCEDURE — 21000001 ZZH R&B HEART CARE

## 2018-02-10 PROCEDURE — 36415 COLL VENOUS BLD VENIPUNCTURE: CPT | Performed by: INTERNAL MEDICINE

## 2018-02-10 PROCEDURE — 97166 OT EVAL MOD COMPLEX 45 MIN: CPT | Mod: GO

## 2018-02-10 PROCEDURE — 85027 COMPLETE CBC AUTOMATED: CPT | Performed by: PHYSICIAN ASSISTANT

## 2018-02-10 PROCEDURE — 40000133 ZZH STATISTIC OT WARD VISIT

## 2018-02-10 PROCEDURE — 97535 SELF CARE MNGMENT TRAINING: CPT | Mod: GO

## 2018-02-10 PROCEDURE — 84484 ASSAY OF TROPONIN QUANT: CPT | Performed by: PHYSICIAN ASSISTANT

## 2018-02-10 PROCEDURE — 97161 PT EVAL LOW COMPLEX 20 MIN: CPT | Mod: GP | Performed by: PHYSICAL THERAPIST

## 2018-02-10 RX ORDER — CEFDINIR 300 MG/1
300 CAPSULE ORAL 2 TIMES DAILY
Status: DISCONTINUED | OUTPATIENT
Start: 2018-02-10 | End: 2018-02-11 | Stop reason: HOSPADM

## 2018-02-10 RX ADMIN — CEFDINIR 300 MG: 300 CAPSULE ORAL at 21:01

## 2018-02-10 RX ADMIN — POTASSIUM CHLORIDE 20 MEQ: 1500 TABLET, EXTENDED RELEASE ORAL at 10:37

## 2018-02-10 RX ADMIN — LISINOPRIL 20 MG: 20 TABLET ORAL at 21:01

## 2018-02-10 RX ADMIN — METOPROLOL SUCCINATE 50 MG: 50 TABLET, EXTENDED RELEASE ORAL at 08:42

## 2018-02-10 RX ADMIN — POTASSIUM CHLORIDE 40 MEQ: 1500 TABLET, EXTENDED RELEASE ORAL at 08:42

## 2018-02-10 RX ADMIN — LISINOPRIL 20 MG: 20 TABLET ORAL at 08:42

## 2018-02-10 RX ADMIN — CEFDINIR 300 MG: 300 CAPSULE ORAL at 15:43

## 2018-02-10 RX ADMIN — ASPIRIN 81 MG: 81 TABLET, COATED ORAL at 08:42

## 2018-02-10 RX ADMIN — POTASSIUM CHLORIDE 40 MEQ: 1500 TABLET, EXTENDED RELEASE ORAL at 06:42

## 2018-02-10 ASSESSMENT — ACTIVITIES OF DAILY LIVING (ADL): PREVIOUS_RESPONSIBILITIES: MEAL PREP;HOUSEKEEPING;LAUNDRY

## 2018-02-10 NOTE — PLAN OF CARE
Problem: Patient Care Overview  Goal: Plan of Care/Patient Progress Review  OT evaluation completed and treatment initiated. Patient is a 89 year-old female with history of hypertension, chronic kidney disease stage II-III, chronic normocytic anemia, recent GI bleed suspected diverticular in nature who presents after being found down by her son. Admitted 2/8/2018. Patient states she lives in a senior co-op. Patient reports mod-I with ambulation/transferring with 4WW. Patient reports she does her own cooking/cleaning and ADLs. Has had 2 falls in the last 6 months.     Discharge Planner OT   Patient plan for discharge: return home   Current status:  Patient significantly [place], pleasant and laughing often with therapist. sit-stand Win with verbal cues. ambulated in room to bathroon with SBA and verbal/tactile cues for direction and for safety. transferred to standard toilet Win. Patient incontinent of urine. clothing managment SBA. donned new briefs Win to reach over feet. toilet hygiene SBA. sit-stand Win. patient stood at sink and completed x1 G/H task- washing hands Win with cues for technique. ambulated back to room and transferred to chair SBA with FWW and cues for safety to not run into objects Patient completed cognitive screen- SLUMs for safe d/c planning. Patient scored 12/30 indicating severe cognitive deficits. Areas of deficits include- STM/recall, executive functioning, language and orientation  Barriers to return to prior living situation: cognitive deficits, decreased balance, increased level of A for daily cares- dressing/toileting.   Recommendations for discharge: TCU. If patient returns home, would recommend increased supervision/assistance with daily cares. Supervision with cooking for safety, assistance with medications.   Rationale for recommendations: Due to patient's cognitive deficits- patient would benefit from increased supervision/assistance in daily activities to remain safe. Patient  currently requiring cues for safe navigation in room and increased level of A for daily ADLs such as dressing and toileting. She would benefit from continued therapy to increase independence and safety in ADLs/IADLs.        Entered by: Halina June 02/10/2018 10:48 AM

## 2018-02-10 NOTE — PROGRESS NOTES
02/10/18 1033   Quick Adds   Type of Visit Initial Occupational Therapy Evaluation   Living Environment   Lives With alone   Living Arrangements apartment  (senior co-op )   Home Accessibility no concerns   Number of Stairs to Enter Home 0   Number of Stairs Within Home 0   Transportation Available family or friend will provide   Self-Care   Dominant Hand right   Usual Activity Tolerance good   Current Activity Tolerance moderate   Regular Exercise yes   Activity/Exercise Type strength training   Exercise Amount/Frequency 3-5 times/wk   Equipment Currently Used at Home walker, rolling   Activity/Exercise/Self-Care Comment Patient does own cleaning and cooking    Functional Level Prior   Ambulation 1-->assistive equipment  (4WW)   Transferring 1-->assistive equipment   Toileting 1-->assistive equipment   Bathing 0-->independent   Dressing 0-->independent   Eating 0-->independent   Communication 0-->understands/communicates without difficulty   Swallowing 0-->swallows foods/liquids without difficulty   Cognition 0 - no cognition issues reported   Fall history within last six months yes   Number of times patient has fallen within last six months 2   Which of the above functional risks had a recent onset or change? ambulation;transferring;fall history;cognition   General Information   Onset of Illness/Injury or Date of Surgery - Date 02/08/18   Referring Physician Tomy Keene MD   Patient/Family Goals Statement return home    Additional Occupational Profile Info/Pertinent History of Current Problem per chart: Tracey Moran is a 89 year-old female with history of hypertension, chronic kidney disease stage II-III, chronic normocytic anemia, recent GI bleed suspected diverticular in nature who presents after being found down by her son. Admitted 2/8/2018; patient found to have Mild rhabdomyolysis, Sepsis secondary to urinary tract infection, elevated troponins thought to be demand ischemia; Chronic normocytic anemia ,  and resolving encephalopathy; see medical record for further information   Precautions/Limitations fall precautions   Weight-Bearing Status - LUE full weight-bearing   Weight-Bearing Status - RUE full weight-bearing   Weight-Bearing Status - LLE full weight-bearing   Weight-Bearing Status - RLE full weight-bearing   Cognitive Status Examination   Orientation person;place   Level of Consciousness alert   Able to Follow Commands mild impairment  (due to [place])   Personal Safety (Cognitive) mild impairment   Memory impaired  (STM )   Visual Perception   Visual Perception Wears glasses   Sensory Examination   Sensory Quick Adds No deficits were identified   Pain Assessment   Patient Currently in Pain No   Posture   Posture forward head position   Range of Motion (ROM)   ROM Quick Adds No deficits were identified   Muscle Tone Assessment   Muscle Tone Quick Adds No deficits were identified   Coordination   Upper Extremity Coordination No deficits were identified   Transfer Skill: Sit to Stand   Level of [place]: Sit/Stand stand-by assist   Physical Assist/Nonphysical Assist: Sit/Stand set-up required;supervision;verbal cues;1 person assist   Transfer Skill: Sit to Stand full weight-bearing   Assistive Device for Transfer: Sit/Stand standard walker   Transfer Skill: Toilet Transfer   Level of [place]: Toilet minimum assist (75% patients effort)   Physical Assist/Nonphysical Assist: Toilet set-up required;supervision;verbal cues;1 person assist   Weight-Bearing Restrictions: Toilet full weight-bearing   Assistive Device standard walker   Lower Body Dressing   Level of [place]: Dress Lower Body minimum assist (75% patients effort)   Physical Assist/Nonphysical Assist: Dress Lower Body verbal cues;supervision;set-up required;1 person assist   Toileting   Level of [place]: Toilet minimum assist (75% patients effort)   Physical Assist/Nonphysical Assist: Toilet set-up required;supervision;verbal cues;1 person  "assist   Grooming   Level of [place]: Grooming minimum assist (75% patients effort)   Physical Assist/Nonphysical Assist: Grooming set-up required;supervision;verbal cues   Instrumental Activities of Daily Living (IADL)   Previous Responsibilities meal prep;housekeeping;laundry   Activities of Daily Living Analysis   Impairments Contributing to Impaired Activities of Daily Living balance impaired;cognition impaired;coordination impaired;flexibility decreased;strength decreased   General Therapy Interventions   Planned Therapy Interventions ADL retraining;IADL retraining;balance training;bed mobility training;cognition;stretching;transfer training   Clinical Impression   Criteria for Skilled Therapeutic Interventions Met yes, treatment indicated   OT Diagnosis decreased independence in ADLs/IADLs   Influenced by the following impairments cognitive deficits, decreased balance, deconditioning/weakness    Assessment of Occupational Performance 3-5 Performance Deficits   Identified Performance Deficits home management, cooking, dressing, toileting, social skills    Clinical Decision Making (Complexity) Moderate complexity   Therapy Frequency daily   Predicted Duration of Therapy Intervention (days/wks) 3 days    Anticipated Discharge Disposition Home with Home Therapy;Transitional Care Facility   Risks and Benefits of Treatment have been explained. Yes   Patient, Family & other staff in agreement with plan of care Yes   [facility] TM \"6 Clicks\"   2016, Trustees of [facility], under license to [employer].  All rights reserved.   6 Clicks Short Forms Daily Activity Inpatient Short Form   [facility]  \"6 Clicks\" Daily Activity Inpatient Short Form   1. Putting on and taking off regular lower body clothing? 3 - A Little   2. Bathing (including washing, rinsing, drying)? 3 - A Little   3. Toileting, which includes using toilet, bedpan or urinal? 3 - A Little   4. Putting on and taking " off regular upper body clothing? 3 - A Little   5. Taking care of personal grooming such as brushing teeth? 3 - A Little   6. Eating meals? 4 - None   Daily Activity Raw Score (Score out of 24.Lower scores equate to lower levels of function) 19   Total Evaluation Time   Total Evaluation Time (Minutes) 10

## 2018-02-10 NOTE — PLAN OF CARE
Problem: Patient Care Overview  Goal: Plan of Care/Patient Progress Review   Pt in stable condition, has been up to the C x 2, tolerating diet, denies any pain.   patient and son would not want to pursue angiogram or cardiology consult at this time given normal echo, lack of sx, and patient's advanced age.  Family does not want her to have a procedure unless it was absolutely necessary.  Family and pt  understands the risks and benefits.  - Discontinued heparin. Seen by WOCHLOÉ, mepilex applied to cocyx area, loose stools, stool softener held today, 2 loose stools today.

## 2018-02-10 NOTE — PROGRESS NOTES
02/10/18 0838   Quick Adds   Type of Visit Initial PT Evaluation   Living Environment   Lives With alone   Living Arrangements apartment  ([employer] Co[employer])   Home Accessibility no concerns   Number of Stairs to Enter Home 0   Number of Stairs Within Home 0   Transportation Available car;family or friend will provide  (patient doesn't drive)   Living Environment Comment lives in a Sr. co-op   Self-Care   Usual Activity Tolerance good   Current Activity Tolerance moderate   Regular Exercise yes   Activity/Exercise Type strength training   Exercise Amount/Frequency 3-5 times/wk   Equipment Currently Used at Home walker, rolling  (4WW)   Activity/Exercise/Self-Care Comment does own cleaning and cooking   Functional Level Prior   Ambulation 1-->assistive equipment  (4WW)   Transferring 1-->assistive equipment  (4WW)   Toileting 1-->assistive equipment   Communication 0-->understands/communicates without difficulty  (Torres Martinez)   Swallowing 0-->swallows foods/liquids without difficulty   Cognition 0 - no cognition issues reported   Fall history within last six months yes   Number of times patient has fallen within last six months 2   Which of the above functional risks had a recent onset or change? ambulation;transferring;fall history;cognition   Prior Functional Level Comment patient normally reports independence with use of a 4WW   General Information   Onset of Illness/Injury or Date of Surgery - Date 02/08/18   Referring Physician Tomy Keene MD   Patient/Family Goals Statement return home   Pertinent History of Current Problem (include personal factors and/or comorbidities that impact the POC) per chart: Tracey Moran is a 89 year-old female with history of hypertension, chronic kidney disease stage II-III, chronic normocytic anemia, recent GI bleed suspected diverticular in nature who presents after being found down by her son. Admitted 2/8/2018; patient found to have Mild rhabdomyolysis, Sepsis secondary to urinary tract  infection, elevated troponins thought to be demand ischemia; Chronic normocytic anemia , and resolving encephalopathy; see medical record for further information   Precautions/Limitations fall precautions   General Observations patient very [place]   General Info Comments Activity: ambulate   Cognitive Status Examination   Orientation orientation to person, place and time   Level of Consciousness alert   Follows Commands and Answers Questions 75% of the time;100% of the time  (secondary to [place])   Personal Safety and Judgment at risk behaviors demonstrated   Memory intact   Cognitive Comment during eval; defer to OT for further testing   Pain Assessment   Patient Currently in Pain No   Integumentary/Edema   Integumentary/Edema Comments scabs and bruises noted on R arm > L   Posture    Posture Comments forward flexed   Range of Motion (ROM)   ROM Comment WFL   Strength   Strength Comments WFL; may have some mild functional weakness but patient reports she feels at her baseline   Bed Mobility   Bed Mobility Comments SBA to independent with bed flat and no bedrail; extra time; reports pulling on her rolling walker to assist at home   Transfer Skills   Transfer Comments sit<>stand and transfer to chair with CGA/SBA and safety cues; no gross LOB   Gait   Gait Comments able to ambulate 200 feet with a rolling walker and CGA/SBA; mild unsteadiness which patient reports is baseline; no gross LOB; needs safety cues with turns; normally uses a 4WW   Balance   Balance Comments baseline impairment and 2 recent falls; uses a 4WW   Sensory Examination   Sensory Perception Comments intact   General Therapy Interventions   Planned Therapy Interventions gait training;transfer training;strengthening;balance training   Clinical Impression   Criteria for Skilled Therapeutic Intervention yes, treatment indicated   PT Diagnosis impaired gait/transfers   Influenced by the following impairments mild functional weakness; impaired balance;  "decreased safety with gait/transfers   Functional limitations due to impairments impaired independence with functional mobility   Clinical Presentation Evolving/Changing   Clinical Presentation Rationale low Hgb; lives alone; low potassium; HTN response to activity   Clinical Decision Making (Complexity) Low complexity   Therapy Frequency` daily   Predicted Duration of Therapy Intervention (days/wks) 2 days   Anticipated Equipment Needs at Discharge front wheeled walker  (4WW)   Anticipated Discharge Disposition Home with Home Therapy;Home with Assist   Risk & Benefits of therapy have been explained Yes   Patient, Family & other staff in agreement with plan of care Yes   [facility] TM \"6 Clicks\"   2016, Trustees of [facility], under license to [employer].  All rights reserved.   6 Clicks Short Forms Basic Mobility Inpatient Short Form   [facility]  \"6 Clicks\" V.2 Basic Mobility Inpatient Short Form   1. Turning from your back to your side while in a flat bed without using bedrails? 4 - None   2. Moving from lying on your back to sitting on the side of a flat bed without using bedrails? 4 - None   3. Moving to and from a bed to a chair (including a wheelchair)? 3 - A Little   4. Standing up from a chair using your arms (e.g., wheelchair, or bedside chair)? 3 - A Little   5. To walk in hospital room? 3 - A Little   6. Climbing 3-5 steps with a railing? 3 - A Little   Basic Mobility Raw Score (Score out of 24.Lower scores equate to lower levels of function) 20   Total Evaluation Time   Total Evaluation Time (Minutes) 10     "

## 2018-02-10 NOTE — PROGRESS NOTES
[facility]    Hospitalist Progress Note    Date of Service (when I saw the patient): 02/10/2018    Assessment & Plan   Tracey Moran is a 89 year old female who was admitted on 2/8/2018 after a fall.    1. Fall  - Likely related to UTI  - Ambulates well per nursing and PT  - No arrhythmias on telemetry  - Echo is essentially unremarkable    2. Sepsis due to UTI w/ E coli  - Afebrile overnight and WBC normalized.  - Change Rocephin to Vantin    3. HTN  - BP elevated today  - Discussed with son, believes elevated BP due to hospitalization and does not want additional meds started currently  - Continue metoprolol and lisinopril    4. NSTEMI type 2  - Likely due to sepsis  - Echo w/o RWMA  - Trops now descending  - Continue ASA and metoprolol  - Son does not want further evaluation    5. Metabolic encephalopathy, likely underlying dementia  - Head CT negative  - OT recommends TCU  - Discussed with son, he would like her discharged to home  - Son willing to discuss with SW    DVT Prophylaxis: Pneumatic Compression Devices  Code Status: Full Code    Disposition: Expected discharge in 1-2 days.    Guilherme Zhuo  Text Page (7 am to 6 pm)    Interval History   The patient is sitting comfortably.  She has no acute complaints.  Ate breakfast w/o difficulty.    -Data reviewed today: I reviewed all new labs and imaging results over the last 24 hours. I personally reviewed no images or EKG's today.    Physical Exam   Temp: 97.9  F (36.6  C) Temp src: Oral BP: 166/65   Heart Rate: 93 Resp: 16 SpO2: 98 % O2 Device: None (Room air)    Vitals:    02/09/18 0200 02/10/18 0603   Weight: 55.5 kg (122 lb 6.4 oz) 58.2 kg (128 lb 4.8 oz)     Vital Signs with Ranges  Temp:  [97.5  F (36.4  C)-98.5  F (36.9  C)] 97.9  F (36.6  C)  Heart Rate:  [] 93  Resp:  [16-20] 16  BP: (145-204)/(63-84) 166/65  SpO2:  [95 %-98 %] 98 %  I/O last 3 completed shifts:  In: 5687 [P.O.:1090; I.V.:4597]  Out: 550 [Urine:550]    Gen:  Thin elderly female, no acute distressed  HEENT: Atraumatic, normocephalic  Lungs: Clear to ausculation without wheezes, rhonchi, or rales  Heart: Regular rate and rhythm, no murmurs, gallops, or rubs  GI: Bowel sound normal, no hepatosplenomegaly or masses  Lymph: No lymphadenopathy or edema  Skin: No rashes     Medications       docusate sodium  100 mg Oral BID     cefTRIAXone  1 g Intravenous Q24H     bisacodyl  10 mg Rectal Once     aspirin EC  81 mg Oral Daily     lisinopril  20 mg Oral BID     metoprolol succinate  50 mg Oral Daily       Data     Recent Labs  Lab 02/10/18  0518 02/09/18  0528 02/08/18  2117  02/08/18  1253   WBC 8.3 11.8*  --   --  12.7*   HGB 7.8* 8.2*  --   --  9.8*   MCV 91 90  --   --  89    239  --   --  308    140  --   --  140   POTASSIUM 3.2* 3.4  --   --  4.1   CHLORIDE 111* 109  --   --  104   CO2 24 25  --   --  24   BUN 23 40*  --   --  51*   CR 0.65 0.82  --   --  0.98   ANIONGAP 7 6  --   --  12   RENETTA 7.6* 8.0*  --   --  9.2   GLC 86 94  --   --  107*   TROPI 0.842* 1.658* 1.881*  < > 1.570*   < > = values in this interval not displayed.    No results found for this or any previous visit (from the past 24 hour(s)).

## 2018-02-10 NOTE — PLAN OF CARE
Problem: Patient Care Overview  Goal: Plan of Care/Patient Progress Review  Outcome: Improving  A&O, forgetful. SBP's elevated, all other VSS on room air. Tele: SR w/ HR 60-70's. No c/o SBO or CP. Up w/ 1 and walker to BSC. IVF running @ 75 ml/hr. Pt wt up 6 lbs today. Incontinent of urine at times, post void residual 132 this AM. Potassium 3.2 this AM, replacement protocol initiated. Per family request, trop drawn this AM. Troponin continuing to trend down. Plan for possible d/c today.

## 2018-02-10 NOTE — PLAN OF CARE
Problem: Patient Care Overview  Goal: Plan of Care/Patient Progress Review  Outcome: Improving  Bp's still slightly elevated 160's systolic, MD aware will continue to monitor.  Tele is SR.  Up with walker, assist of 1, ambulated in halls x2.  Skin bruised, ecchymotic, coccyx blanchable.  Pt denies any c/o of pain.  [place].  Forgetful at times.  Will recheck Hgb in the morning, K 3.2 replaced, now 4.7.  Possibly d/c to TCU tomorrow if stable.

## 2018-02-10 NOTE — PLAN OF CARE
"Problem: Patient Care Overview  Goal: Plan of Care/Patient Progress Review  PT: order received; Initial evaluation completed and treatment initiated. Patient lives in a Sr[employer] Co-op apt at baseline and reports doing her own cooking and cleaning, but \"they check on us frequently\"; Reports 2 falls in the last 6 months; has baseline balance impairment for which she uses a 4WW.  Discharge Planner PT   Patient plan for discharge: return home  Current status: Patient noted to be SBA to independent with bed mobility with extra time and set up assist; sit<>stand transfers with SBA and safety cues with walker; no gross LOB; gait x 200 feet with a rolling walker and CGA/SBA with use; mild path deviations which patient able to correct for; Hypertensive response to walking; /67 at rest and 204/84 with gait; just received BP meds and nurse aware; HR .  Barriers to return to prior living situation: lives alone; functional weakness; falls risk; decreased safety (likely near baseline)  Recommendations for discharge: home with assist from son as needed and Home PT safety eval; defer any further recommendations to OT pending check of patient's cognition. May need transition to a more supportive living environment.  Rationale for recommendations: functional weakness; impaired balance; impaired independence and safety with functional mobility       Entered by: Glenda Moran 02/10/2018 9:35 AM         "

## 2018-02-11 ENCOUNTER — APPOINTMENT (OUTPATIENT)
Dept: OCCUPATIONAL THERAPY | Facility: CLINIC | Age: 83
DRG: 871 | End: 2018-02-11
Payer: COMMERCIAL

## 2018-02-11 ENCOUNTER — APPOINTMENT (OUTPATIENT)
Dept: PHYSICAL THERAPY | Facility: CLINIC | Age: 83
DRG: 871 | End: 2018-02-11
Payer: COMMERCIAL

## 2018-02-11 VITALS
HEIGHT: 57 IN | DIASTOLIC BLOOD PRESSURE: 76 MMHG | RESPIRATION RATE: 16 BRPM | SYSTOLIC BLOOD PRESSURE: 139 MMHG | WEIGHT: 130.6 LBS | TEMPERATURE: 98.6 F | HEART RATE: 90 BPM | OXYGEN SATURATION: 97 % | BODY MASS INDEX: 28.18 KG/M2

## 2018-02-11 LAB
ANION GAP SERPL CALCULATED.3IONS-SCNC: 7 MMOL/L (ref 3–14)
BUN SERPL-MCNC: 14 MG/DL (ref 7–30)
CALCIUM SERPL-MCNC: 8.4 MG/DL (ref 8.5–10.1)
CHLORIDE SERPL-SCNC: 109 MMOL/L (ref 94–109)
CO2 SERPL-SCNC: 25 MMOL/L (ref 20–32)
CREAT SERPL-MCNC: 0.61 MG/DL (ref 0.52–1.04)
ERYTHROCYTE [DISTWIDTH] IN BLOOD BY AUTOMATED COUNT: 14.5 % (ref 10–15)
GFR SERPL CREATININE-BSD FRML MDRD: >90 ML/MIN/1.7M2
GLUCOSE SERPL-MCNC: 95 MG/DL (ref 70–99)
HCT VFR BLD AUTO: 25.6 % (ref 35–47)
HGB BLD-MCNC: 8.2 G/DL (ref 11.7–15.7)
MCH RBC QN AUTO: 29.1 PG (ref 26.5–33)
MCHC RBC AUTO-ENTMCNC: 32 G/DL (ref 31.5–36.5)
MCV RBC AUTO: 91 FL (ref 78–100)
PLATELET # BLD AUTO: 230 10E9/L (ref 150–450)
POTASSIUM SERPL-SCNC: 4.4 MMOL/L (ref 3.4–5.3)
RBC # BLD AUTO: 2.82 10E12/L (ref 3.8–5.2)
SODIUM SERPL-SCNC: 141 MMOL/L (ref 133–144)
WBC # BLD AUTO: 7.7 10E9/L (ref 4–11)

## 2018-02-11 PROCEDURE — 80048 BASIC METABOLIC PNL TOTAL CA: CPT | Performed by: INTERNAL MEDICINE

## 2018-02-11 PROCEDURE — 85027 COMPLETE CBC AUTOMATED: CPT | Performed by: INTERNAL MEDICINE

## 2018-02-11 PROCEDURE — 25000132 ZZH RX MED GY IP 250 OP 250 PS 637: Performed by: INTERNAL MEDICINE

## 2018-02-11 PROCEDURE — 97530 THERAPEUTIC ACTIVITIES: CPT | Mod: GO

## 2018-02-11 PROCEDURE — 40000133 ZZH STATISTIC OT WARD VISIT

## 2018-02-11 PROCEDURE — 36415 COLL VENOUS BLD VENIPUNCTURE: CPT | Performed by: INTERNAL MEDICINE

## 2018-02-11 PROCEDURE — 97535 SELF CARE MNGMENT TRAINING: CPT | Mod: GO

## 2018-02-11 PROCEDURE — 97116 GAIT TRAINING THERAPY: CPT | Mod: GP

## 2018-02-11 PROCEDURE — 40000193 ZZH STATISTIC PT WARD VISIT

## 2018-02-11 PROCEDURE — 99207 ZZC CDG-CODE CATEGORY CHANGED: CPT | Performed by: INTERNAL MEDICINE

## 2018-02-11 PROCEDURE — 99238 HOSP IP/OBS DSCHRG MGMT 30/<: CPT | Performed by: INTERNAL MEDICINE

## 2018-02-11 RX ORDER — CEFDINIR 300 MG/1
300 CAPSULE ORAL 2 TIMES DAILY
Qty: 7 CAPSULE | Refills: 0 | Status: SHIPPED | OUTPATIENT
Start: 2018-02-11 | End: 2018-02-15

## 2018-02-11 RX ADMIN — METOPROLOL SUCCINATE 50 MG: 50 TABLET, EXTENDED RELEASE ORAL at 10:21

## 2018-02-11 RX ADMIN — CEFDINIR 300 MG: 300 CAPSULE ORAL at 10:21

## 2018-02-11 RX ADMIN — ASPIRIN 81 MG: 81 TABLET, COATED ORAL at 10:21

## 2018-02-11 RX ADMIN — DOCUSATE SODIUM 100 MG: 100 CAPSULE, LIQUID FILLED ORAL at 10:22

## 2018-02-11 RX ADMIN — LISINOPRIL 20 MG: 20 TABLET ORAL at 10:21

## 2018-02-11 NOTE — PROGRESS NOTES
Care Transition Initial Assessment -   Reason For Consult: discharge planning  Met with: Patient and Family  (son Jonny)  Active Problems:    Fall       DATA  Lives With: alone  Living Arrangements: apartment  Description of Support System: Supportive, Involved  Who is your support system?: Children  Support Assessment: Adequate family and caregiver support.   Identified issues/concerns regarding health management:       Quality Of Family Relationships: supportive, involved  Transportation Available: family or friend will provide    Per social service protocol for discharge planning.  Patient was admitted on 2-8-18 after a fall.  The tentative date of discharge is 2-11-18.  Reviewed chart and spoke with patient and son regarding discharge plans.  Per patient and son report, patient lives alone in a senior co-op apartment.  Patient uses a rolling walker at baseline.  Patient does her own cooking and cleaning.  Reviewed the therapy discharge recommendations of tcu placement on discharge and patient and son and patient's son states he is not interested in discussing this at his time.  Patient's son states that the first time patient has been up is now and she is in the chair.  Explained to patient and son that it is never too early to discharge plan, we begin doing that the minute someone walks in the door.  Patient's son states that he has some concerns with patient's blood pressure and hemoglobin so he is not ready to discuss discharge planning.  Explained that we would be available when he is ready to discuss discharge planning.      ASSESSMENT  Cognitive Status:  awake  Concerns to be addressed: discharge planning.     PLAN  Financial costs for the patient includes N/A.  Patient given options and choices for discharge tcu versus homecare.  Patient/family is agreeable to the plan?  Yes  Patient Goals and Preferences: TBD.  Patient anticipates discharging to:  TBD.    Will continue to follow and assist with a safe  discharge plan.    RASHAAD Vidal, [facility]  [phone]

## 2018-02-11 NOTE — PLAN OF CARE
Problem: Patient Care Overview  Goal: Plan of Care/Patient Progress Review  Discharge Planner OT   Patient plan for discharge: Home    Current status: Pt required SB-min A with FWW for toilet transfer and standing at sink for morning ADL routine. Hypertensive at rest and with activity.     Barriers to return to prior living situation: Lives alone; Fall risk; Safety concerns with lack of insight into deficits    Recommendations for discharge: TCU; however, pt and family prefer return home. If discharges home, recommend 24 hr supervision for increased A with all IADLs, home RN for med mgmt, and home OT for ongoing intervention.     Rationale for recommendations: Pt continues to be limited by impaired cognition, safety, balance, and activity tolerance; thus, pt would benefit from ongoing OT intervention during hospitalization as well as following discharge.        Entered by: Janette Thurston 02/11/2018 9:28 AM

## 2018-02-11 NOTE — PLAN OF CARE
Problem: Patient Care Overview  Goal: Plan of Care/Patient Progress Review  Discharge Planner PT   Patient plan for discharge: return home  Current status: Sit to/from stand with FWW and SBA with repeated cues for safe hand placement. Amb 300 ft x 1 with FWW and CGA progressing to SBA. Mild lateral gait path deviations but no significant LOB or unsteadiness. Pt reports no negative symptoms throughout. VSS on RA post activity. Pt sitting up in chair with all needs in reach and chair alarm on upon PT exit.  Barriers to return to prior living situation: lives alone; functional weakness; falls risk; decreased safety   Recommendations for discharge: Per the plan established by the Physical Therapist, home with Home PT for home safety eval.  Rationale for recommendations: Pt likely near baseline for mobility, home safety eval for further safety recommendations in living environment.       Entered by: Vane Hayden 02/11/2018 11:54 AM         Pt discharged home today.    PT goals not met.  Physical Therapy Discharge Summary    Reason for therapy discharge:    Discharged to home with home therapy.    Progress towards therapy goal(s). See goals on Care Plan in [facility] electronic health record for goal details.  Goals not met.  Barriers to achieving goals:   discharge from facility.    Therapy recommendation(s):    Continued therapy is recommended.  Rationale/Recommendations:  Home PT to maximize return to PLOF.

## 2018-02-11 NOTE — DISCHARGE INSTRUCTIONS
A follow-up appointment was scheduled for you [see above].  It is very important to go to it--bring these papers and your insurance card with you.  At the visit, talk about your hospital stay.  Tell your doctor how you feel.  Show your new list of medications.  Your doctor will update records, make sure you are still doing OK, and decide if any tests or medication changes are needed.        The patient will discharge home with [facility] with RN/PT/OT/HHA.  [facility] will contact the patient directly to arrange the first visit.  [facility]'s phone number is [phone].

## 2018-02-11 NOTE — DISCHARGE SUMMARY
"Discharge Summary    Tracey Moran MRN# [id number]   YOB: 1925 Age: 89 year old     Date of Admission:  2/8/2018  Date of Discharge:  2/11/2018  Admitting Physician:  Tomy Keene MD  Discharge Physician:  Guilherme Zhou MD  Discharging Service:  Hospitalist     Primary Provider: Guilherme Graves          Admission Diagnoses:   NSTEMI (non-ST elevated myocardial infarction) type II.  Sepsis due to urinary tract infection secondary to E. Coli.  Physical deconditioning.  Hypertension.  Cognitive impairment.          Discharge Diagnosis:   Patient Active Problem List   Diagnosis     Hyperlipidemia LDL goal <130     Advanced directives, counseling/discussion     Left wrist fracture, with routine healing, subsequent encounter     Leukocytosis, unspecified type     Elevated CK     Abrasion of left scapular region, subsequent encounter     Essential hypertension, benign     Anemia due to blood loss, acute     Thrombocytopenia (H)     CRF (chronic renal failure), stage 3 (moderate)     Slow transit constipation     Gastrointestinal hemorrhage, unspecified gastrointestinal hemorrhage type     Fall             Condition on Discharge:       Discharge vitals: Blood pressure 139/76, pulse 90, temperature 98.6  F (37  C), temperature source Axillary, resp. rate 16, height 1.448 m (4' 9\"), weight 59.2 kg (130 lb 9.6 oz), SpO2 97 %.         Gen: Thin elderly female, no acute distressed  HEENT: Atraumatic, normocephalic  Lungs: Clear to ausculation without wheezes, rhonchi, or rales  Heart: Regular rate and rhythm, no murmurs, gallops, or rubs  GI: Bowel sound normal, no hepatosplenomegaly or masses  Lymph: No lymphadenopathy or edema  Skin: No rashes           Procedures / Labs / Imaging:   Most Recent 3 CBC's:  Recent Labs   Lab Test  02/11/18   0543  02/10/18   0518  02/09/18   0528   WBC  7.7  8.3  11.8*   HGB  8.2*  7.8*  8.2*   MCV  91  91  90   PLT  230  241  239      Most Recent 3 BMP's:  Recent Labs "   Lab Test  02/11/18   0543  02/10/18   1246  02/10/18   0518  02/09/18   0528   NA  141   --   142  140   POTASSIUM  4.4  4.7  3.2*  3.4   CHLORIDE  109   --   111*  109   CO2  25   --   24  25   BUN  14   --   23  40*   CR  0.61   --   0.65  0.82   ANIONGAP  7   --   7  6   RENETTA  8.4*   --   7.6*  8.0*   GLC  95   --   86  94     Most Recent 3 Troponin's:  Recent Labs   Lab Test  02/10/18   0518  02/09/18   0528  02/08/18   2117   TROPI  0.842*  1.658*  1.881*     Most Recent 3 INR's:  Recent Labs   Lab Test  12/05/17 1948   INR  1.15*     Most Recent 2 LFT's:  Recent Labs   Lab Test  12/05/17   1948  10/17/14   0755   AST  9  22   ALT  13  15   ALKPHOS  49  65   BILITOTAL  0.3  0.3     Most Recent Cholesterol Panel:  Recent Labs   Lab Test  05/21/14   1044   CHOL  190   LDL  109   HDL  55   TRIG  124     Most Recent 6 Bacteria Isolates From Any Culture (See EPIC Reports for Culture Details):  Recent Labs   Lab Test  02/08/18   1311  03/05/16   1524  10/16/14   1330  10/15/14   1923  10/13/14   1550  10/13/14   1515   CULT  >100,000 colonies/mL  Escherichia coli  *  >100,000 colonies/mL mixed urogenital gloria  No growth  No growth  No growth  10,000 to 50,000 colonies/mL Escherichia coli  50,000 to 100,000 colonies/mL urogenital gloria  *     Most Recent TSH, T4 and HgbA1c:   Recent Labs   Lab Test  12/06/17   0745   A1C  Canceled, Test credited     Results for orders placed or performed during the hospital encounter of 02/08/18   Head CT w/o contrast    Narrative    CT SCAN OF THE HEAD WITHOUT CONTRAST   2/8/2018 1:28 PM     HISTORY: altered, falling;     TECHNIQUE:  Axial images of the head and coronal reformations without  IV contrast material. Radiation dose for this scan was reduced using  automated exposure control, adjustment of the mA and/or kV according  to patient size, or iterative reconstruction technique.    COMPARISON: None.    FINDINGS:  There is generalized atrophy of the brain.  White  matter  changes are present in the cerebral hemispheres that are consistent  with small vessel ischemic disease in this age patient. There is no  evidence of intracranial hemorrhage, mass, acute infarct or anomaly.  The visualized portions of the sinuses and mastoids appear normal.  There is no evidence of trauma.      Impression    IMPRESSION: No acute pathology, no bleed, mass, or infarcts are seen.    No bleed or fracture identified.      TREVOR RODRIGUEZ MD   XR Chest 1 View    Narrative    CHEST ONE VIEW  2/8/2018 1:33 PM    HISTORY:  Weakness.    COMPARISON:  12/5/2017      Impression    IMPRESSION:  No acute process or significant interval change. Lungs  are clear. Heart size likely mildly prominent. The patient is tilted  to the right and rotated.    ZOHREH BRAVO MD   XR Abdomen 1 View    Narrative    ABDOMEN ONE VIEW  2/8/2018 1:32 PM    HISTORY:  Evaluate for swallowed dentures.    COMPARISON:  Pelvis 11/26/2014.      Impression    IMPRESSION:  No evidence for swallowed dentures in the abdomen or  pelvis. Prominent lumbar scoliosis convex left with multilevel  degenerative changes. Two or three small bowel loops in the right low  abdomen and upper pelvis are just above upper limits of normal. This  could represent focal ileus. Early obstruction is not excluded. Large  amount of fecal material in the distal sigmoid colon and rectum.    ZOHREH BRAVO MD             Medications Prior to Admission:     Prescriptions Prior to Admission   Medication Sig Dispense Refill Last Dose     lisinopril (PRINIVIL/ZESTRIL) 20 MG tablet Take 1 tablet (20 mg) by mouth 2 times daily Hold is SBP <110 call if held x 2 in a row or symptomatic 180 tablet 1 2/7/2018 at pm     docusate sodium (COLACE) 100 MG capsule Take 2 capsules (200 mg) by mouth 2 times daily 100 capsule 3 2/7/2018 at pm     polyethylene glycol (MIRALAX/GLYCOLAX) Packet Take 17 g by mouth 2 times daily as needed for constipation Use if no bowel movement for 2 days  7 packet 1 Past Month at prn     metoprolol (TOPROL-XL) 50 MG 24 hr tablet Take 1 tablet (50 mg) by mouth daily Hold if SBP <110 or HR <55 call if held x2 in a row or symptomatic 90 tablet 2 2/7/2018 at am             Discharge Medications:     Current Discharge Medication List      START taking these medications    Details   cefdinir (OMNICEF) 300 MG capsule Take 1 capsule (300 mg) by mouth 2 times daily  Qty: 7 capsule, Refills: 0    Associated Diagnoses: Acute cystitis without hematuria         CONTINUE these medications which have NOT CHANGED    Details   lisinopril (PRINIVIL/ZESTRIL) 20 MG tablet Take 1 tablet (20 mg) by mouth 2 times daily Hold is SBP <110 call if held x 2 in a row or symptomatic  Qty: 180 tablet, Refills: 1    Comments: Profile Rx: patient will contact pharmacy when needed  Associated Diagnoses: Essential hypertension      docusate sodium (COLACE) 100 MG capsule Take 2 capsules (200 mg) by mouth 2 times daily  Qty: 100 capsule, Refills: 3    Comments: To prevent constipation  Associated Diagnoses: Slow transit constipation      polyethylene glycol (MIRALAX/GLYCOLAX) Packet Take 17 g by mouth 2 times daily as needed for constipation Use if no bowel movement for 2 days  Qty: 7 packet, Refills: 1    Associated Diagnoses: Slow transit constipation      metoprolol (TOPROL-XL) 50 MG 24 hr tablet Take 1 tablet (50 mg) by mouth daily Hold if SBP <110 or HR <55 call if held x2 in a row or symptomatic  Qty: 90 tablet, Refills: 2    Associated Diagnoses: Essential hypertension                   Brief History of Illness:   Tracey Moran is a 89 year old female who was admitted for sepsis due to a urinary tract infection.          Hospital Course:   The patient presented to the emergency department after being found down at home by her son.  Patient was noted to have a positive urinalysis and was started on ceftriaxone.  Patient was fluid resuscitated with normal saline.  Troponin was noted to be trending  up and patient was admitted to a monitored bed.  There were no arrhythmias during hospitalization.  Echocardiogram demonstrated any normal ejection fraction without wall motion abnormalities.  Further workup was discussed with the patient's son who declined at this time.  Patient was evaluated by physical and Occupational Therapy who recommended the patient be discharged to a TCU due to cognitive impairment.  This is discussed with the patient's son who felt that the patient would do better at home.  Home physical and occupational therapy consults were ordered.  Patient was also noted to be markedly hypertensive during her hospitalization.  Intensifying the patient's antihypertensive regimen was discussed with the patient's son who declines at this time.  Urine cultures eventually grew E. coli and Rocephin was changed to Vantin at discharge.             Pending Results:   Unresulted Labs Ordered in the Past 30 Days of this Admission     No orders found from 12/10/2017 to 2/9/2018.

## 2018-02-11 NOTE — PLAN OF CARE
Problem: Patient Care Overview  Goal: Plan of Care/Patient Progress Review  Outcome: Improving  Pt A and O x4, but forgetful. BP hypertensive overnight, other VSS, on RA. Pain free. Incontinent x1 of bladder, otherwise up to BR with assist of 1 and walker.  Hgb better, 8.2. Probable discharge today, OT recommending TCU, but pt and son would like to prefer pt to go home- SW to address. Tele SD with PAC's

## 2018-02-11 NOTE — PLAN OF CARE
Problem: Patient Care Overview  Goal: Plan of Care/Patient Progress Review  Outcome: No Change  Pt is a/ox4(forgetful at times).Denies any cp and sob.BP elevated. Schedule lisinopril given.tele- SR/ pac's Assist x1/ walker. Ambulated x 1. No sign of bleeding noted. Voiding well/ incontinent at times. Regular diet. Plan to d/c TCU vs Home Will continue to monitor.

## 2018-02-11 NOTE — PROGRESS NOTES
A follow-up appointment has been made for this patient.     Feb 15, 2018 10:20 AM CST   Office Visit with Gu[facility]me Graves MD   [facility]    [address] [place] [id number] [phone]

## 2018-02-12 ENCOUNTER — TELEPHONE (OUTPATIENT)
Dept: INTERNAL MEDICINE | Facility: CLINIC | Age: 83
End: 2018-02-12

## 2018-02-12 NOTE — TELEPHONE ENCOUNTER
"Hospital/TCU/ED for chronic condition Discharge Protocol    \"Hi, my name is Emma Cat, a registered nurse, and I am calling from [facility].  I am calling to follow up and see how things are going for you after your recent emergency visit/hospital/TCU stay.\"    Tell me how you are doing now that you are home?\" Patient stated, \"I'm doing just fine.\"  Denies any questions or concerns.       Discharge Instructions    \"Let's review your discharge instructions.  What is/are the follow-up recommendations?  Pt. Response: Follow up with Dr. Graves     \"Has an appointment with your primary care provider been scheduled?\"   Yes. (confirm)    \"When you see the provider, I would recommend that you bring your medications with you.\"    Medications    \"Tell me what changed about your medicines when you discharged?\"    Changes to chronic meds?    0-1    \"What questions do you have about your medications?\"    None     New diagnoses of heart failure, COPD, diabetes, or MI?    No              Medication reconciliation completed? Yes  Was MTM referral placed (*Make sure to put transitions as reason for referral)?   No    Call Summary    \"What questions or concerns do you have about your recent visit and your follow-up care?\"     none    \"If you have questions or things don't continue to improve, we encourage you contact us through the main clinic number (give number).  Even if the clinic is not open, triage nurses are available 24/7 to help you.     We would like you to know that our clinic has extended hours (provide information).  We also have urgent care (provide details on closest location and hours/contact info)\"      \"Thank you for your time and take care!\"             "

## 2018-02-12 NOTE — PLAN OF CARE
Problem: Patient Care Overview  Goal: Plan of Care/Patient Progress Review  Occupational Therapy Discharge Summary    Reason for therapy discharge:    Discharged to home with home therapy.    Progress towards therapy goal(s). See [facility] on [facility] in [facility] record for goal details.  Goals not met.  Barriers to achieving goals:   discharge from facility.    Therapy recommendation(s):    Continued therapy is recommended.  Rationale/Recommendations:  TCU; however, pt and family prefer return home. If discharges home, recommend 24 hr supervision for increased A with all IADLs, home RN for med mgmt, and home OT for ongoing intervention. .

## 2018-02-13 ENCOUNTER — DOCUMENTATION ONLY (OUTPATIENT)
Dept: CARE COORDINATION | Facility: CLINIC | Age: 83
End: 2018-02-13

## 2018-02-13 ENCOUNTER — CARE COORDINATION (OUTPATIENT)
Dept: CARE COORDINATION | Facility: CLINIC | Age: 83
End: 2018-02-13

## 2018-02-13 NOTE — PROGRESS NOTES
Dear Dr. Graves  Medicare Home Health regulations requires [facility] to provide an initial assessment visit either within 48 hours of the patient's return home, or on the physician ordered Start of Care date.    There will be a delay in the Initial Assessment for Tracey Moran; MRN [id number]  We anticipate the Start of care will be on 2/14/18.      Sincerely [facility]  Ana Zaragoza  [phone]

## 2018-02-13 NOTE — PROGRESS NOTES
"Clinic Care Coordination Contact  OUTREACH    Referral Information:  Referral Source: IP/TCU Report  Reason for Contact: Post hospital Dx Sepsis due to urinary tract infection secondary to E.Coli.  Venessa Rodríguez  Date of Admission: 2/8/2018  Date of Discharge:  2/11/2018  Spoke to pt and son Jonny via telephone today.   Care Conference: No     Universal Utilization:   ED Visits in last year: 1  Hospital visits in last year: 2  Last PCP appointment: 12/13/17  Missed Appointments: 3     Multiple Providers or Specialists: Cardiology    Clinical Concerns:  Current Medical Concerns:   Patient Active Problem List   Diagnosis     Hyperlipidemia LDL goal <130     Advanced directives, counseling/discussion     Left wrist fracture, with routine healing, subsequent encounter     Leukocytosis, unspecified type     Elevated CK     Abrasion of left scapular region, subsequent encounter     Essential hypertension, benign     Anemia due to blood loss, acute     Thrombocytopenia (H)     CRF (chronic renal failure), stage 3 (moderate)     Slow transit constipation     Gastrointestinal hemorrhage, unspecified gastrointestinal hemorrhage type     Fall       Current Behavioral Concerns: Cognition issues    Education Provided to patient: To call clinic with all medical concerns.  Pt states understanding.     Clinical Pathway: None    Medication Management:  Pt is independent with medication management though unable to complete medication reconciliation as pt was unable to understand what was being asked.  Pt son believes pt is able to set up medications at this time and is safe to do so as he refills medications when needed and \"appears they are gone at the correct time.\"  He believes she is not always taking what is prescribed  as she has been prescribed colace and miralax and has not been taking this as she \"doesn't always want to be running to the bathroom\" he states.  Son \"knows pt has been having constipation issues and manually " "attempts to digitally remove stool from her rectum causing bleeding at times.     Functional Status:  Mobility Status: Independent w/Device  Equipment Currently Used at Home: walker, standard  Transportation: Family provide.     Psychosocial:  Current living arrangement:: I live alone  Financial/Insurance: [place].       Resources and Interventions:  Advanced Care Plans/Directives on file:: No  Patient/Caregiver understanding: Pt states she is \"doing well since returning home\"  She seems somewhat confused as responses to this CC questions are not appropriate for level of conversation.  This CC spoke to pt's son Jonny who states she seems \"to be getting stronger every day\"  Jonny believes pt is having incontinence issues and is not speaking to him about this.  He states when he found her on the floor prior to her visit to the hospital he states \"the room was a disaster\"  He thinks she is embarrassed about her incontinence issues and refuses to ask for help. Jonny feels she would be better to speak to a female person in regards to this.  Informed pt's son that the first step would be to have home care nurse to assess pt for needs.  Jonny states he is waiting to hear from the home care nurse at this time.    Frequency of Care Coordination: Every 2 weeks  Upcoming appointment: 02/15/18     Plan: CC called and spoke to  home care.  Intake person states nurse is scheduled to visit pt tomorrow.  This CC gave intake pt's son's cell phone number to arrange time of SOC with him for tomorrow.  Intake person states she will pass the number on to the nurse and will have her nurse call him as planned.  CC to reach out to pt in the next 2 weeks.       Hillary Briggs RN  [facility]  Care Coordination  [phone]  "

## 2018-02-14 ENCOUNTER — TELEPHONE (OUTPATIENT)
Dept: INTERNAL MEDICINE | Facility: CLINIC | Age: 83
End: 2018-02-14

## 2018-02-14 NOTE — TELEPHONE ENCOUNTER
[facility] calling for additional home care orders.  Okayed per nursing standing orders.    Skilled nurse 2x week for 4 weeks and 3 PRN  OT/PT to eval and treat  Social work evaluation       Jane Oliver RN

## 2018-02-15 ENCOUNTER — OFFICE VISIT (OUTPATIENT)
Dept: INTERNAL MEDICINE | Facility: CLINIC | Age: 83
End: 2018-02-15
Payer: COMMERCIAL

## 2018-02-15 VITALS
BODY MASS INDEX: 27.35 KG/M2 | SYSTOLIC BLOOD PRESSURE: 142 MMHG | HEART RATE: 72 BPM | TEMPERATURE: 98.8 F | WEIGHT: 126.4 LBS | DIASTOLIC BLOOD PRESSURE: 74 MMHG | OXYGEN SATURATION: 95 %

## 2018-02-15 DIAGNOSIS — K59.01 SLOW TRANSIT CONSTIPATION: ICD-10-CM

## 2018-02-15 DIAGNOSIS — Z09 HOSPITAL DISCHARGE FOLLOW-UP: Primary | ICD-10-CM

## 2018-02-15 DIAGNOSIS — N39.0 URINARY TRACT INFECTION WITHOUT HEMATURIA, SITE UNSPECIFIED: ICD-10-CM

## 2018-02-15 DIAGNOSIS — I10 ESSENTIAL HYPERTENSION, BENIGN: ICD-10-CM

## 2018-02-15 LAB — INTERPRETATION ECG - MUSE: NORMAL

## 2018-02-15 PROCEDURE — 99495 TRANSJ CARE MGMT MOD F2F 14D: CPT | Performed by: INTERNAL MEDICINE

## 2018-02-15 NOTE — MR AVS SNAPSHOT
"              After Visit Summary   2/15/2018    Tracey Mroan    MRN: [id number]           Patient Information     Date Of Birth          1925        Visit Information        Provider Department      2/15/2018 10:20 AM Guilehrme Graves MD [facility]        Today's Diagnoses     Hospital discharge follow-up    -  1    Urinary tract infection without hematuria, site unspecified        Slow transit constipation        Essential hypertension, benign           Follow-ups after your visit        Who to contact     If you have questions or need follow up information about today's clinic visit or your schedule please contact [facility] directly at [phone].  Normal or non-critical lab and imaging results will be communicated to you by MyChart, letter or phone within 4 business days after the clinic has received the results. If you do not hear from us within 7 days, please contact the clinic through MyChart or phone. If you have a critical or abnormal lab result, we will notify you by phone as soon as possible.  Submit refill requests through [facility] or call your pharmacy and they will forward the refill request to us. Please allow 3 business days for your refill to be completed.          Additional Information About Your Visit        MyChart Information     [facility] lets you send messages to your doctor, view your test results, renew your prescriptions, schedule appointments and more. To sign up, go to [email or web address] . Click on \"Log in\" on the left side of the screen, which will take you to the Welcome page. Then click on \"Sign up Now\" on the right side of the page.     You will be asked to enter the access code listed below, as well as some personal information. Please follow the directions to create your username and password.     Your access code is: [email or web address]  Expires: 3/7/2018  1:45 PM     Your access code will  in 90 days. If you need help or " a new code, please call your [place] clinic or [phone].        Care EveryWhere ID     This is your Care EveryWhere ID. This could be used by other organizations to access your [place] medical records  [id number]        Your Vitals Were     Pulse Temperature Pulse Oximetry BMI (Body Mass Index)          72 98.8  F (37.1  C) (Oral) 95% 27.35 kg/m2         Blood Pressure from Last 3 Encounters:   02/15/18 142/74   02/11/18 139/76   12/13/17 136/64    Weight from Last 3 Encounters:   02/15/18 126 lb 6.4 oz (57.3 kg)   02/11/18 130 lb 9.6 oz (59.2 kg)   12/13/17 122 lb 6.4 oz (55.5 kg)              Today, you had the following     No orders found for display       Primary Care Provider Office Phone # Fax #    Guilherme Graves [phone]       [address] [facility] [address]        Equal Access to Services     [facility]YES[facility] : Hadii aad ku hadasho Soomaali, waaxda luqadaha, qaybta kaalmada adeegyada, waxay viet gomez . So [facility] [phone].    ATENCIÓN: Si habla español, tiene a harrison disposición servicios gratuitos de asistencia lingüística. Llame al [phone].    We comply with applicable federal civil rights laws and [place] laws. We do not discriminate on the basis of race, color, national origin, age, disability, sex, sexual orientation, or gender identity.            Thank you!     Thank you for choosing [facility]  for your care. Our goal is always to provide you with excellent care. Hearing back from our patients is one way we can continue to improve our services. Please take a few minutes to complete the written survey that you may receive in the mail after your visit with us. Thank you!             Your Updated Medication List - Protect others around you: Learn how to safely use, store and throw away your medicines at www.disposemymeds.org.          This list is accurate as of 2/15/18 11:26 AM.  Always use your most recent med  list.                   Brand Name Dispense Instructions for use Diagnosis    docusate sodium 100 MG capsule    COLACE    100 capsule    Take 2 capsules (200 mg) by mouth 2 times daily    Slow transit constipation       lisinopril 20 MG tablet    PRINIVIL/ZESTRIL    180 tablet    Take 1 tablet (20 mg) by mouth 2 times daily Hold is SBP <110 call if held x 2 in a row or symptomatic    Essential hypertension       metoprolol succinate 50 MG 24 hr tablet    TOPROL-XL    90 tablet    Take 1 tablet (50 mg) by mouth daily Hold if SBP <110 or HR <55 call if held x2 in a row or symptomatic    Essential hypertension       polyethylene glycol Packet    MIRALAX/GLYCOLAX    7 packet    Take 17 g by mouth 2 times daily as needed for constipation Use if no bowel movement for 2 days    Slow transit constipation

## 2018-02-15 NOTE — NURSING NOTE
"Chief Complaint   Patient presents with     Hospital F/U       Initial /74  Pulse 72  Temp 98.8  F (37.1  C) (Oral)  Wt 126 lb 6.4 oz (57.3 kg)  SpO2 95%  BMI 27.35 kg/m2 Estimated body mass index is 27.35 kg/(m^2) as calculated from the following:    Height as of 2/9/18: 4' 9\" (1.448 m).    Weight as of this encounter: 126 lb 6.4 oz (57.3 kg).  Medication Reconciliation: complete   Abena Lewis CMA      "

## 2018-02-15 NOTE — PROGRESS NOTES
SUBJECTIVE:   Tracey Moran is a 89 year old female who presents to clinic today for the following health issues:    Hospital Follow-up Visit:    Hospital/Nursing Home/IP Rehab Facility: [facility]  Date of Admission: 2/8/18  Date of Discharge: 2/11/18  Reason(s) for Admission: Acute cystitis, NSTEMI            Problems taking medications regularly:  None       Medication changes since discharge: None       Problems adhering to non-medication therapy:  None    Summary of hospitalization:  [facility] discharge summary reviewed  Diagnostic Tests/Treatments reviewed.  Follow up needed: noted  Other Healthcare Providers Involved in Patient s Care:         None  Update since discharge: stable.     Post Discharge Medication Reconciliation: discharge medications reconciled, continue medications without change.  Plan of care communicated with patient     Coding guidelines for this visit:  Type of Medical   Decision Making Face-to-Face Visit       within 7 Days of discharge Face-to-Face Visit        within 14 days of discharge   Moderate Complexity 34063 [id number]   High Complexity 88860 36367               Hospital Course:   The patient presented to the emergency department after being found down at home by her son.  Patient was noted to have a positive urinalysis and was started on ceftriaxone.  Patient was fluid resuscitated with normal saline.  Troponin was noted to be trending up and patient was admitted to a monitored bed.  There were no arrhythmias during hospitalization.  Echocardiogram demonstrated any normal ejection fraction without wall motion abnormalities.  Further workup was discussed with the patient's son who declined at this time.  Patient was evaluated by physical and Occupational Therapy who recommended the patient be discharged to a TCU due to cognitive impairment.  This is discussed with the patient's son who felt that the patient would do better at home.  Home physical and  occupational therapy consults were ordered.  Patient was also noted to be markedly hypertensive during her hospitalization.  Intensifying the patient's antihypertensive regimen was discussed with the patient's son who declines at this time.  Urine cultures eventually grew E. coli and Rocephin was changed to Vantin at discharge.     Problem list and histories reviewed & adjusted, as indicated.  Additional history: as documented    Patient Active Problem List   Diagnosis     Hyperlipidemia LDL goal <130     Advanced directives, counseling/discussion     Left wrist fracture, with routine healing, subsequent encounter     Leukocytosis, unspecified type     Elevated CK     Abrasion of left scapular region, subsequent encounter     Essential hypertension, benign     Anemia due to blood loss, acute     Thrombocytopenia (H)     CRF (chronic renal failure), stage 3 (moderate)     Slow transit constipation     Gastrointestinal hemorrhage, unspecified gastrointestinal hemorrhage type     Fall     Past Surgical History:   Procedure Laterality Date     ESOPHAGOSCOPY, GASTROSCOPY, DUODENOSCOPY (EGD), COMBINED N/A 12/6/2017    Procedure: COMBINED ESOPHAGOSCOPY, GASTROSCOPY, DUODENOSCOPY (EGD);  gastroscopy;  Surgeon: Melchor Mancera MD;  Location: [facility]       Social History   Substance Use Topics     Smoking status: Never Smoker     Smokeless tobacco: Never Used     Alcohol use Yes     History reviewed. No pertinent family history.      Current Outpatient Prescriptions   Medication Sig Dispense Refill     lisinopril (PRINIVIL/ZESTRIL) 20 MG tablet Take 1 tablet (20 mg) by mouth 2 times daily Hold is SBP <110 call if held x 2 in a row or symptomatic 180 tablet 1     docusate sodium (COLACE) 100 MG capsule Take 2 capsules (200 mg) by mouth 2 times daily 100 capsule 3     polyethylene glycol (MIRALAX/GLYCOLAX) Packet Take 17 g by mouth 2 times daily as needed for constipation Use if no bowel movement for 2 days 7 packet 1      metoprolol (TOPROL-XL) 50 MG 24 hr tablet Take 1 tablet (50 mg) by mouth daily Hold if SBP <110 or HR <55 call if held x2 in a row or symptomatic 90 tablet 2     Allergies   Allergen Reactions     Demerol [Meperidine]      Dust Mites      Peanuts [Nuts]      Penicillins      Pollen Extract      BP Readings from Last 3 Encounters:   02/11/18 139/76   12/13/17 136/64   12/07/17 177/63    Wt Readings from Last 3 Encounters:   02/11/18 130 lb 9.6 oz (59.2 kg)   12/13/17 122 lb 6.4 oz (55.5 kg)   12/06/17 128 lb (58.1 kg)                    Reviewed and updated as needed this visit by clinical staff  Tobacco  Allergies  Meds  Med Hx  Surg Hx  Fam Hx  Soc Hx      Reviewed and updated as needed this visit by Provider         ROS:  C: NEGATIVE for fever, chills, change in weight  E/M: NEGATIVE for ear, mouth and throat problems  R: NEGATIVE for significant cough or SOB  CV: NEGATIVE for chest pain, palpitations or peripheral edema  GI: NEGATIVE for nausea, abdominal pain, heartburn, or change in bowel habits  : NEGATIVE for frequency, dysuria, or hematuria  M: NEGATIVE for significant arthralgias or myalgia  H: NEGATIVE for bleeding problems  P: NEGATIVE for changes in mood or affect    OBJECTIVE:                                                    /74  Pulse 72  Temp 98.8  F (37.1  C) (Oral)  Wt 126 lb 6.4 oz (57.3 kg)  SpO2 95%  BMI 27.35 kg/m2  There is no height or weight on file to calculate BMI.  GENERAL: alert and no distress  EYES: Eyes grossly normal to inspection, extraocular movements - intact, and PERRL  HENT: ear canals- normal; TMs- normal; Nose- normal; Mouth- no ulcers, no lesions  NECK: no tenderness, no adenopathy, no asymmetry, no masses, no stiffness; thyroid- normal to palpation  RESP: lungs clear to auscultation - no rales, no rhonchi, no wheezes  CV: regular rates and rhythm, normal S1 S2, no S3 or S4 and no click or rub   MS: extremities- no gross deformities noted  All scab noted  to right elbow appears healing.  Decreased skin turgor noted.  Mild baseline memory impairment noted  PSYCH: Alert and oriented times 3; speech- coherent , normal rate and volume; able to articulate logical thoughts, able to abstract reason, no tangential thoughts, no hallucinations or delusions, affect- normal     ASSESSMENT/PLAN:                                                      (Z09) Hospital discharge follow-up  (primary encounter diagnosis)  Comment: Appears to be improving and is markedly better.  Continue with home care as needed and PT OT as scheduled although at present time it appears that it would be of limited benefit.  Plan: Discussed with the son who has numerous questions about his mother and suggested that he needs to consider more aggressive monitoring of his mother rather than independent living situation    (N39.0) Urinary tract infection without hematuria, site unspecified  Comment: Resolved on antibiotics continue with encouragement of fluid  Plan:     (K59.01) Slow transit constipation  Comment: Discussed a constipation regimen with the son and the mother.  Encouraged him to maintain adequate hydration and suggest a daily dosing of MiraLAX and stool softeners  Plan:     (I10) Essential hypertension, benign  Comment: Stable on current therapy continue as directed.  Plan:       See Patient Instructions    Guilherme Graves MD  [facility]    THE MEDICATION LIST HAS BEEN FULLY RECONCILED BY THE M.D. AND THE NURSING STAFF.  >40 minutes spent with patient and sone.

## 2018-02-23 ENCOUNTER — TELEPHONE (OUTPATIENT)
Dept: NURSING | Facility: CLINIC | Age: 83
End: 2018-02-23

## 2018-02-23 DIAGNOSIS — D62 ANEMIA DUE TO BLOOD LOSS, ACUTE: Primary | ICD-10-CM

## 2018-02-23 NOTE — TELEPHONE ENCOUNTER
Nasreen from homecare calling. She has a few more visits left with patient (sees her once a week) and is wondering if PCP would like her to draw any bloodwork? Patient's son was wondering if repeat hemoglobin should be done. And/or if patient should follow up in clinic?

## 2018-02-23 NOTE — TELEPHONE ENCOUNTER
Nasreen KOLB informed. She will draw Tuesday and bring to a [facility] lab. Please order, unsure if you want differential or not?

## 2018-02-23 NOTE — TELEPHONE ENCOUNTER
I am okay with patient having a repeat cbc drawn at next available opportunity and I will review results  to determine need for follow-up clinic appointment

## 2018-02-27 ENCOUNTER — TELEPHONE (OUTPATIENT)
Dept: NURSING | Facility: CLINIC | Age: 83
End: 2018-02-27

## 2018-02-27 DIAGNOSIS — D62 ANEMIA DUE TO BLOOD LOSS, ACUTE: ICD-10-CM

## 2018-02-27 DIAGNOSIS — D62 ANEMIA DUE TO BLOOD LOSS, ACUTE: Primary | ICD-10-CM

## 2018-02-27 LAB
BASOPHILS # BLD AUTO: 0.1 10E9/L (ref 0–0.2)
BASOPHILS NFR BLD AUTO: 1 %
DIFFERENTIAL METHOD BLD: NORMAL
EOSINOPHIL # BLD AUTO: 0.7 10E9/L (ref 0–0.7)
EOSINOPHIL NFR BLD AUTO: 13.9 %
ERYTHROCYTE [DISTWIDTH] IN BLOOD BY AUTOMATED COUNT: 15.4 % (ref 10–15)
HCT VFR BLD AUTO: 29.4 % (ref 35–47)
HGB BLD-MCNC: 8.4 G/DL (ref 11.7–15.7)
LYMPHOCYTES # BLD AUTO: 1.4 10E9/L (ref 0.8–5.3)
LYMPHOCYTES NFR BLD AUTO: 26.9 %
MCH RBC QN AUTO: 29.1 PG (ref 26.5–33)
MCHC RBC AUTO-ENTMCNC: 28.6 G/DL (ref 31.5–36.5)
MCV RBC AUTO: 102 FL (ref 78–100)
MONOCYTES # BLD AUTO: 0.4 10E9/L (ref 0–1.3)
MONOCYTES NFR BLD AUTO: 7.9 %
NEUTROPHILS # BLD AUTO: 2.6 10E9/L (ref 1.6–8.3)
NEUTROPHILS NFR BLD AUTO: 50.3 %
PLATELET # BLD AUTO: 212 10E9/L (ref 150–450)
RBC # BLD AUTO: 2.89 10E12/L (ref 3.8–5.2)
WBC # BLD AUTO: 5.2 10E9/L (ref 4–11)

## 2018-02-27 PROCEDURE — 36415 COLL VENOUS BLD VENIPUNCTURE: CPT | Performed by: INTERNAL MEDICINE

## 2018-02-27 PROCEDURE — 85025 COMPLETE CBC W/AUTO DIFF WBC: CPT | Performed by: INTERNAL MEDICINE

## 2018-02-27 NOTE — TELEPHONE ENCOUNTER
Informed patient's son of result note from today's labs:     May inform son that white blood cell count as well as platelet count are normal.  Hemoglobin still remains low at 8.4 which is slightly better than 2 weeks ago and essentially unchanged from 2 months ago.    He is wondering when they should follow up next with Dr. Graves or do any further labs? Reports patient is doing well but would like to see hemoglobin get >9. Last home care visit potentially Friday.

## 2018-02-27 NOTE — TELEPHONE ENCOUNTER
Does not do a whole lot a good to repeat her hemoglobin so close together as it is unlikely to improve in such a short period of time.  I will place orders for a repeat hemoglobin in 3 weeks with iron studies also included and they can make a lab appointment at that time.  Might benefit from a short trial of over-the-counter iron I would want to get her iron studies done at the next lab prior to starting that.

## 2018-03-27 DIAGNOSIS — D62 ANEMIA DUE TO BLOOD LOSS, ACUTE: ICD-10-CM

## 2018-03-27 LAB
FERRITIN SERPL-MCNC: 9 NG/ML (ref 8–252)
HGB BLD-MCNC: 8.8 G/DL (ref 11.7–15.7)
IRON SATN MFR SERPL: 9 % (ref 15–46)
IRON SERPL-MCNC: 27 UG/DL (ref 35–180)
TIBC SERPL-MCNC: 316 UG/DL (ref 240–430)

## 2018-03-27 PROCEDURE — 83550 IRON BINDING TEST: CPT | Performed by: INTERNAL MEDICINE

## 2018-03-27 PROCEDURE — 82728 ASSAY OF FERRITIN: CPT | Performed by: INTERNAL MEDICINE

## 2018-03-27 PROCEDURE — 85018 HEMOGLOBIN: CPT | Performed by: INTERNAL MEDICINE

## 2018-03-27 PROCEDURE — 83540 ASSAY OF IRON: CPT | Performed by: INTERNAL MEDICINE

## 2018-03-27 PROCEDURE — 36415 COLL VENOUS BLD VENIPUNCTURE: CPT | Performed by: INTERNAL MEDICINE

## 2018-04-09 ENCOUNTER — OFFICE VISIT (OUTPATIENT)
Dept: INTERNAL MEDICINE | Facility: CLINIC | Age: 83
End: 2018-04-09
Payer: COMMERCIAL

## 2018-04-09 VITALS
DIASTOLIC BLOOD PRESSURE: 70 MMHG | BODY MASS INDEX: 26.53 KG/M2 | SYSTOLIC BLOOD PRESSURE: 140 MMHG | WEIGHT: 122.6 LBS | RESPIRATION RATE: 20 BRPM | HEART RATE: 76 BPM | OXYGEN SATURATION: 97 % | TEMPERATURE: 98.1 F

## 2018-04-09 DIAGNOSIS — R82.90 NONSPECIFIC FINDING ON EXAMINATION OF URINE: ICD-10-CM

## 2018-04-09 DIAGNOSIS — N30.00 ACUTE CYSTITIS WITHOUT HEMATURIA: Primary | ICD-10-CM

## 2018-04-09 LAB
ALBUMIN UR-MCNC: NEGATIVE MG/DL
APPEARANCE UR: CLEAR
BACTERIA #/AREA URNS HPF: ABNORMAL /HPF
BILIRUB UR QL STRIP: NEGATIVE
COLOR UR AUTO: YELLOW
GLUCOSE UR STRIP-MCNC: NEGATIVE MG/DL
HGB UR QL STRIP: ABNORMAL
KETONES UR STRIP-MCNC: NEGATIVE MG/DL
LEUKOCYTE ESTERASE UR QL STRIP: ABNORMAL
NITRATE UR QL: POSITIVE
PH UR STRIP: 6 PH (ref 5–7)
RBC #/AREA URNS AUTO: ABNORMAL /HPF
SOURCE: ABNORMAL
SP GR UR STRIP: 1.01 (ref 1–1.03)
UROBILINOGEN UR STRIP-ACNC: 0.2 EU/DL (ref 0.2–1)
WBC #/AREA URNS AUTO: >100 /HPF

## 2018-04-09 PROCEDURE — 99213 OFFICE O/P EST LOW 20 MIN: CPT | Performed by: PHYSICIAN ASSISTANT

## 2018-04-09 PROCEDURE — 81001 URINALYSIS AUTO W/SCOPE: CPT | Performed by: PHYSICIAN ASSISTANT

## 2018-04-09 PROCEDURE — 87086 URINE CULTURE/COLONY COUNT: CPT | Performed by: PHYSICIAN ASSISTANT

## 2018-04-09 RX ORDER — CEFDINIR 300 MG/1
300 CAPSULE ORAL 2 TIMES DAILY
Qty: 14 CAPSULE | Refills: 0 | Status: SHIPPED | OUTPATIENT
Start: 2018-04-09 | End: 2018-08-20

## 2018-04-09 ASSESSMENT — PAIN SCALES - GENERAL: PAINLEVEL: NO PAIN (0)

## 2018-04-09 NOTE — MR AVS SNAPSHOT
"              After Visit Summary   2018    Tracey Moran    MRN: [id number]           Patient Information     Date Of Birth          1925        Visit Information        Provider Department      2018 1:00 PM Abbie Perez PA-C [facility]        Today's Diagnoses     Dysuria    -  1      Care Instructions    Schedule follow up with Dr. Graves to discuss anemia     I will call with results          Follow-ups after your visit        Who to contact     If you have questions or need follow up information about today's clinic visit or your schedule please contact [facility] directly at [phone].  Normal or non-critical lab and imaging results will be communicated to you by MyChart, letter or phone within 4 business days after the clinic has received the results. If you do not hear from us within 7 days, please contact the clinic through MyChart or phone. If you have a critical or abnormal lab result, we will notify you by phone as soon as possible.  Submit refill requests through [employer] or call your pharmacy and they will forward the refill request to us. Please allow 3 business days for your refill to be completed.          Additional Information About Your Visit        MyChart Information     [employer] lets you send messages to your doctor, view your test results, renew your prescriptions, schedule appointments and more. To sign up, go to [email or web address] . Click on \"Log in\" on the left side of the screen, which will take you to the Welcome page. Then click on \"Sign up Now\" on the right side of the page.     You will be asked to enter the access code listed below, as well as some personal information. Please follow the directions to create your username and password.     Your access code is: [email or web address]  Expires: 2018  1:46 PM     Your access code will  in 90 days. If you need help or a new code, please call your [facility] " or [phone].        Care EveryWhere ID     This is your Care EveryWhere ID. This could be used by other organizations to access your [facility] medical records  [id number]        Your Vitals Were     Pulse Temperature Respirations Pulse Oximetry Breastfeeding? BMI (Body Mass Index)    76 98.1  F (36.7  C) (Oral) 20 97% No 26.53 kg/m2       Blood Pressure from Last 3 Encounters:   04/09/18 140/70   02/15/18 142/74   02/11/18 139/76    Weight from Last 3 Encounters:   04/09/18 122 lb 9.6 oz (55.6 kg)   02/15/18 126 lb 6.4 oz (57.3 kg)   02/11/18 130 lb 9.6 oz (59.2 kg)              We Performed the Following     UA with Microscopic reflex to Culture        Primary Care Provider Office Phone # Fax #    Guilherme Graves [phone]       [address] [place] [id number]        Equal Access to Services     SHARON GREER : Hadii aad ku hadasho Soomaali, waaxda luqadaha, qaybta kaalmada adeegyada, waxay viet gomez . So [facility] [phone].    ATENCIÓN: Si habla español, tiene a harrison disposición servicios gratuitos de asistencia lingüística. [facility] [phone].    We comply with applicable federal civil rights laws and [place] laws. We do not discriminate on the basis of race, color, national origin, age, disability, sex, sexual orientation, or gender identity.            Thank you!     Thank you for choosing [facility]  for your care. Our goal is always to provide you with excellent care. Hearing back from our patients is one way we can continue to improve our services. Please take a few minutes to complete the written survey that you may receive in the mail after your visit with us. Thank you!             Your Updated Medication List - Protect others around you: Learn how to safely use, store and throw away your medicines at www.disposemymeds.org.          This list is accurate as of 4/9/18  1:46 PM.  Always use your most recent med list.                    Brand Name Dispense Instructions for use Diagnosis    docusate sodium 100 MG capsule    COLACE    100 capsule    Take 2 capsules (200 mg) by mouth 2 times daily    Slow transit constipation       lisinopril 20 MG tablet    PRINIVIL/ZESTRIL    180 tablet    Take 1 tablet (20 mg) by mouth 2 times daily Hold is SBP <110 call if held x 2 in a row or symptomatic    Essential hypertension       metoprolol succinate 50 MG 24 hr tablet    TOPROL-XL    90 tablet    Take 1 tablet (50 mg) by mouth daily Hold if SBP <110 or HR <55 call if held x2 in a row or symptomatic    Essential hypertension       polyethylene glycol Packet    MIRALAX/GLYCOLAX    7 packet    Take 17 g by mouth 2 times daily as needed for constipation Use if no bowel movement for 2 days    Slow transit constipation

## 2018-04-09 NOTE — PROGRESS NOTES
SUBJECTIVE:   Tracey Moran is a 89 year old female who presents to clinic today for the following health issues:    Chief Complaint   Patient presents with     UTI     Screening for a UA- Pt was in the hospital for a UTI.         URINARY TRACT SYMPTOMS    - pt reports to clinic with her son   - son reports concerns for UTI  - Tracey denies any symptoms   - son reports Tracey was in the hospital back in february for UTI   - review of chart confirms this, pt was DC on cefdinir 300 mg bid   - son notes he is very cautious of recurrence and notes Tracey does get confusion with an acute UTI   - son notes she has been partly confused recently  - son reports his sister had trouble waking his mom a few days prior and had to call EMT  - son reports he was out of town and when he returned Tracey appeared tired       Problem list and histories reviewed & adjusted, as indicated.  Additional history: as documented    Reviewed and updated as needed this visit by clinical staff  Tobacco  Allergies  Meds  Problems       Reviewed and updated as needed this visit by Provider  Meds  Problems         OBJECTIVE:     /70  Pulse 76  Temp 98.1  F (36.7  C) (Oral)  Resp 20  Wt 122 lb 9.6 oz (55.6 kg)  SpO2 97%  Breastfeeding? No  BMI 26.53 kg/m2  Body mass index is 26.53 kg/(m^2).  GENERAL: healthy, alert and no distress  RESP: lungs clear to auscultation - no rales, rhonchi or wheezes  CV: regular rate and rhythm, normal S1 S2, no S3 or S4, no murmur, click or rub, no peripheral edema and peripheral pulses strong  ABDOMEN: soft, nontender, no hepatosplenomegaly, no masses and bowel sounds normal    Diagnostic Test Results:  Results for orders placed or performed in visit on 04/09/18 (from the past 24 hour(s))   UA with Microscopic reflex to Culture   Result Value Ref Range    Color Urine Yellow     Appearance Urine Clear     Glucose Urine Negative NEG^Negative mg/dL    Bilirubin Urine Negative NEG^Negative     Ketones Urine Negative NEG^Negative mg/dL    Specific Gravity Urine 1.015 1.003 - 1.035    pH Urine 6.0 5.0 - 7.0 pH    Protein Albumin Urine Negative NEG^Negative mg/dL    Urobilinogen Urine 0.2 0.2 - 1.0 EU/dL    Nitrite Urine Positive (A) NEG^Negative    Blood Urine Trace (A) NEG^Negative    Leukocyte Esterase Urine Large (A) NEG^Negative    Source Midstream Urine     WBC Urine >100 (A) OTO5^0 - 5 /HPF    RBC Urine O - 2 OTO2^O - 2 /HPF    Bacteria Urine Few (A) NEG^Negative /HPF       ASSESSMENT/PLAN:       1. Acute cystitis without hematuria  2. Nonspecific finding on examination of urine  - UA with Microscopic reflex to Culture  - Urine Culture Aerobic Bacterial  - cefdinir (OMNICEF) 300 MG capsule; Take 1 capsule (300 mg) by mouth 2 times daily  Dispense: 14 capsule; Refill: 0  - of note chart shows allergy to penicillin, son notes his mom has had penicillin without concerns and I noted she was DC on this from hospital and son noted she had no problems with this    - reviewed proper hydration   - will watch for culture results     Son had concerns for his mother's anemia, I reviewed chart and reviewed Dr. Graves's noted with recommendation to schedule follow up with Dr. Graves to discuss next step in evaluation process     Pt and her son agree to above plan and all questions are answered.     Abbie Perez PA-C  [facility]

## 2018-04-10 LAB
BACTERIA SPEC CULT: NORMAL
SPECIMEN SOURCE: NORMAL

## 2018-04-25 DIAGNOSIS — Z53.9 DIAGNOSIS NOT YET DEFINED: Primary | ICD-10-CM

## 2018-04-25 PROCEDURE — G0180 MD CERTIFICATION HHA PATIENT: HCPCS | Performed by: INTERNAL MEDICINE

## 2018-05-03 DIAGNOSIS — K92.2 GASTROINTESTINAL HEMORRHAGE, UNSPECIFIED GASTROINTESTINAL HEMORRHAGE TYPE: ICD-10-CM

## 2018-05-03 LAB — HGB BLD-MCNC: 8.6 G/DL (ref 11.7–15.7)

## 2018-05-03 PROCEDURE — 36415 COLL VENOUS BLD VENIPUNCTURE: CPT | Performed by: INTERNAL MEDICINE

## 2018-05-03 PROCEDURE — 85018 HEMOGLOBIN: CPT | Performed by: INTERNAL MEDICINE

## 2018-05-04 ENCOUNTER — TELEPHONE (OUTPATIENT)
Dept: INTERNAL MEDICINE | Facility: CLINIC | Age: 83
End: 2018-05-04

## 2018-05-04 DIAGNOSIS — D50.9 IRON DEFICIENCY ANEMIA, UNSPECIFIED IRON DEFICIENCY ANEMIA TYPE: Primary | ICD-10-CM

## 2018-05-04 NOTE — TELEPHONE ENCOUNTER
Yes, could get some OTC iron supplements and take BID with meals and do this for 4-6 weeks then recheck HGB and iron studies

## 2018-05-04 NOTE — TELEPHONE ENCOUNTER
Spoke with patient's son(CTC), stated understanding.  Will get OTC iron supplement for patient and have her make a lab appointment in 4-6 weeks.  Labs pended, please sign.

## 2018-05-04 NOTE — TELEPHONE ENCOUNTER
Spoke with patient's son per consent to communicate.  Son understood letter.  He states that they are not quite ready to consider GI and possible colonoscopy as discussed in past due to patient's age.  Son was wondering if there are any iron supplements or dietary changes that patient should try.  He is willing to come in with patient for follow-up appointment if necessary.  Please advise.

## 2018-05-04 NOTE — TELEPHONE ENCOUNTER
Pt having oral surgery on MOnday and son calling to have Hemoglobin faxed to oral surgeron office . Hgb faxed [phone]. MD with hemoglobin 8.6 any advisement ?

## 2018-05-04 NOTE — LETTER
[facility]  [address] [address]  [phone]        July 5, 2018    Tracey Moran  [address]   [place] [address]              Dear Tracey Moran    This is to remind you that your non-fasting labs is due.    You may call our office at [phone] to schedule an appointment.    Please disregard this notice if you have already had your labs drawn or made an appointment.        Sincerely,        Guilherme Graves MD

## 2018-05-16 DIAGNOSIS — I10 ESSENTIAL HYPERTENSION: ICD-10-CM

## 2018-05-16 RX ORDER — METOPROLOL SUCCINATE 50 MG/1
50 TABLET, EXTENDED RELEASE ORAL DAILY
Qty: 90 TABLET | Refills: 2 | Status: ON HOLD | OUTPATIENT
Start: 2018-05-16 | End: 2018-11-19

## 2018-08-14 ENCOUNTER — TELEPHONE (OUTPATIENT)
Dept: LAB | Facility: CLINIC | Age: 83
End: 2018-08-14

## 2018-08-20 ENCOUNTER — RADIANT APPOINTMENT (OUTPATIENT)
Dept: GENERAL RADIOLOGY | Facility: CLINIC | Age: 83
End: 2018-08-20
Attending: INTERNAL MEDICINE
Payer: COMMERCIAL

## 2018-08-20 ENCOUNTER — OFFICE VISIT (OUTPATIENT)
Dept: INTERNAL MEDICINE | Facility: CLINIC | Age: 83
End: 2018-08-20
Payer: COMMERCIAL

## 2018-08-20 VITALS
RESPIRATION RATE: 16 BRPM | BODY MASS INDEX: 28.2 KG/M2 | WEIGHT: 130.3 LBS | DIASTOLIC BLOOD PRESSURE: 80 MMHG | OXYGEN SATURATION: 94 % | SYSTOLIC BLOOD PRESSURE: 154 MMHG | HEART RATE: 76 BPM | TEMPERATURE: 98.3 F

## 2018-08-20 DIAGNOSIS — D62 ANEMIA DUE TO BLOOD LOSS, ACUTE: Primary | ICD-10-CM

## 2018-08-20 DIAGNOSIS — M25.539 PAIN IN WRIST, UNSPECIFIED LATERALITY: ICD-10-CM

## 2018-08-20 DIAGNOSIS — G89.29 CHRONIC PAIN OF RIGHT KNEE: ICD-10-CM

## 2018-08-20 DIAGNOSIS — M25.561 CHRONIC PAIN OF RIGHT KNEE: ICD-10-CM

## 2018-08-20 LAB
FERRITIN SERPL-MCNC: 11 NG/ML (ref 8–252)
HGB BLD-MCNC: 10.9 G/DL (ref 11.7–15.7)
IRON SERPL-MCNC: 96 UG/DL (ref 35–180)
VIT B12 SERPL-MCNC: 1508 PG/ML (ref 193–986)

## 2018-08-20 PROCEDURE — 82607 VITAMIN B-12: CPT | Performed by: INTERNAL MEDICINE

## 2018-08-20 PROCEDURE — 83540 ASSAY OF IRON: CPT | Performed by: INTERNAL MEDICINE

## 2018-08-20 PROCEDURE — 73562 X-RAY EXAM OF KNEE 3: CPT | Mod: RT

## 2018-08-20 PROCEDURE — 36415 COLL VENOUS BLD VENIPUNCTURE: CPT | Performed by: INTERNAL MEDICINE

## 2018-08-20 PROCEDURE — 85018 HEMOGLOBIN: CPT | Performed by: INTERNAL MEDICINE

## 2018-08-20 PROCEDURE — 99214 OFFICE O/P EST MOD 30 MIN: CPT | Performed by: INTERNAL MEDICINE

## 2018-08-20 PROCEDURE — 82728 ASSAY OF FERRITIN: CPT | Performed by: INTERNAL MEDICINE

## 2018-08-20 PROCEDURE — 73110 X-RAY EXAM OF WRIST: CPT | Mod: LT

## 2018-08-20 NOTE — LETTER
[facility]  [address] [address]  [phone]      8/21/2018       Tracey Moran  [address]   [place] [facility] [id number]        Dear Tracey,    Your hemoglobin result is still low but is slightly improved as have your iron studies.  Both of these results could be better but are showing improvement thus I would continue with iron rich diet and proper nutrition and see if this does not correct itself with a repeat of your labs again in 4-6 weeks for comparison.    Sincerely,      Guilherme Graves MD  Internal Medicine

## 2018-08-20 NOTE — PROGRESS NOTES
SUBJECTIVE:   Tracey Moran is a 89 year old female who presents to clinic today for the following health issues:    Follow up hgb from 5/3/18.     Component      Latest Ref Rng & Units 3/27/2018 5/3/2018   Iron      35 - 180 ug/dL 27 (L)    Iron Binding Cap      240 - 430 ug/dL 316    Iron Saturation Index      15 - 46 % 9 (L)    Hemoglobin      11.7 - 15.7 g/dL 8.8 (L) 8.6 (L)   Ferritin      8 - 252 ng/mL 9      Other concerns:  Patient is once again accompanied by her son who oftentimes has more concerned about the issues of pain note than the patient does.  Patient is primarily noting the intermittent right knee pain apparently started after she moved some heavy wooden chairs for friend of hers way back in April.  Son is noted that when they go walking at [employer] a couple times a week she is a little bit slower.  Son is also concerned about a little bit of a slight bony prominence noted to the lateral aspect of the left wrist.  Of note in December 2016 the patient was noted to have an impacted comminuted fracture of the distal radius with volar angulation and impaction noted.  This was treated  Son states he is also here to follow-up on regards to the patient's slightly lower hemoglobin.  We have discussed that in the past and at that time the patient was having significant problems with her dentition with poor oral intake and nutritional upkeep which is now corrected.  The son is here today under the guidance that they should have followed up sooner but would like to get her hemoglobin and iron studies repeated.  1. R knee pain- began in April after twisting wrong. Wearing knee brace and using Aleve. Per son, patient has been complaining more frequently  2. L wrist bump    Problem list and histories reviewed & adjusted, as indicated.  Additional history: as documented    Patient Active Problem List   Diagnosis     Hyperlipidemia LDL goal <130     Advanced directives, counseling/discussion     Left wrist  fracture, with routine healing, subsequent encounter     Leukocytosis, unspecified type     Elevated CK     Abrasion of left scapular region, subsequent encounter     Essential hypertension, benign     Anemia due to blood loss, acute     Thrombocytopenia (H)     CRF (chronic renal failure), stage 3 (moderate)     Slow transit constipation     Gastrointestinal hemorrhage, unspecified gastrointestinal hemorrhage type     Fall     Past Surgical History:   Procedure Laterality Date     ESOPHAGOSCOPY, GASTROSCOPY, DUODENOSCOPY (EGD), COMBINED N/A 12/6/2017    Procedure: COMBINED ESOPHAGOSCOPY, GASTROSCOPY, DUODENOSCOPY (EGD);  gastroscopy;  Surgeon: Melchor Mancera MD;  Location:  GI       Social History   Substance Use Topics     Smoking status: Never Smoker     Smokeless tobacco: Never Used     Alcohol use Yes     No family history on file.      Current Outpatient Prescriptions   Medication Sig Dispense Refill     cefdinir (OMNICEF) 300 MG capsule Take 1 capsule (300 mg) by mouth 2 times daily 14 capsule 0     docusate sodium (COLACE) 100 MG capsule Take 2 capsules (200 mg) by mouth 2 times daily 100 capsule 3     lisinopril (PRINIVIL/ZESTRIL) 20 MG tablet Take 1 tablet (20 mg) by mouth 2 times daily Hold is SBP <110 call if held x 2 in a row or symptomatic 180 tablet 1     metoprolol succinate (TOPROL-XL) 50 MG 24 hr tablet Take 1 tablet (50 mg) by mouth daily Hold if SBP <110 or HR <55 call if held x2 in a row or symptomatic 90 tablet 2     polyethylene glycol (MIRALAX/GLYCOLAX) Packet Take 17 g by mouth 2 times daily as needed for constipation Use if no bowel movement for 2 days 7 packet 1     Allergies   Allergen Reactions     Demerol [Meperidine]      Dust Mites      Peanuts [Nuts]      Penicillins      Pollen Extract      BP Readings from Last 3 Encounters:   04/09/18 140/70   02/15/18 142/74   02/11/18 139/76    Wt Readings from Last 3 Encounters:   04/09/18 122 lb 9.6 oz (55.6 kg)   02/15/18 126 lb 6.4 oz  (57.3 kg)   02/11/18 130 lb 9.6 oz (59.2 kg)           Reviewed and updated as needed this visit by clinical staff       Reviewed and updated as needed this visit by Provider         ROS:  CONSTITUTIONAL: NEGATIVE for fever, chills, change in weight  ENT/MOUTH: NEGATIVE for ear, mouth and throat problems  RESP: NEGATIVE for significant cough or SOB  CV: NEGATIVE for chest pain, palpitations or peripheral edema  GI: NEGATIVE for nausea, abdominal pain, heartburn, or change in bowel habits  : NEGATIVE for frequency, dysuria, or hematuria  MUSCULOSKELETAL: NEGATIVE for significant arthralgias or myalgia other than above  HEME: NEGATIVE for bleeding problems  PSYCHIATRIC: NEGATIVE for changes in mood or affect    OBJECTIVE:                                                    /80  Pulse 76  Temp 98.3  F (36.8  C) (Oral)  Resp 16  Wt 130 lb 4.8 oz (59.1 kg)  SpO2 94%  BMI 28.2 kg/m2    RESP: lungs clear to auscultation - no rales, no rhonchi, no wheezes  CV: regular rates and rhythm, normal S1 S2, no S3 or S4 and no murmur, no click or rub -  ABDOMEN: soft, no tenderness, no  hepatosplenomegaly, no masses, normal bowel sounds  MS: extremities- no gross deformities noted, no edema  SKIN: no suspicious lesions, no rashes  NEURO: strength and tone- normal, sensory exam- grossly normal, mentation- intact, speech- normal, reflexes- symmetric  The right knee demonstrates no distinct swelling and full range of motion with minimal point tenderness noted to the collateral ligaments with no posterior knee swelling.  The left wrist demonstrates slight variation consistent with her prior comminuted fracture and healing.  There is no point tenderness noted to the distal radial ulnar or radius at the base of the hand  PSYCH: Alert and oriented times 3; speech- coherent , normal rate and volume; able to articulate logical thoughts, able to abstract reason, no tangential thoughts, no hallucinations or delusions, affect-  normal       ASSESSMENT/PLAN:                                                      (D62) Anemia due to blood loss, acute  (primary encounter diagnosis)  Comment: We will recheck labs accordingly pending result  Plan: Hemoglobin, Ferritin, IRON, Vitamin B12            (M25.561,  G89.29) Chronic pain of right knee  Comment: Suspect soft tissue musculotendinous injury but will rule out bony abnormality and consideration of orthopedic referral for candidacy of steroid injection versus physical therapy treat  Plan: XR Knee Right 3 Views, ORTHO  REFERRAL            (M25.539) Pain in wrist, unspecified laterality  Comment: Suspect post trauma changes with no focal symptomatic nor functional change per  Plan: XR Wrist Left G/E 3 Views        Son would like to get x-ray for reassurance      See Patient Instructions    Guilherme Graves MD  [facility]    25 minutes spent with this patient, face to face, discussing treatment options for listed problems above as well as side effects of appropriate medications.  Counseling time extended beyond 50% of the clinic visit.  Medication dosing, treatment plan and follow-up were discussed. Also reviewed need for primary care testing for patient.

## 2018-08-20 NOTE — MR AVS SNAPSHOT
After Visit Summary   8/20/2018    Tracey Moran    MRN: [id number]           Patient Information     Date Of Birth          6/16/1925        Visit Information        Provider Department      8/20/2018 10:20 AM Guilherme Graves MD [facility]        Today's Diagnoses     Anemia due to blood loss, acute    -  1    Chronic pain of right knee        Pain in wrist, unspecified laterality           Follow-ups after your visit        Additional Services     ORTHO  REFERRAL       [facility] is referring you to the Orthopedic  Services at [facility].       The  Representative will assist you in the coordination of your Orthopedic and Musculoskeletal Care as prescribed by your physician.    The  Representative will call you within 1 business day to help schedule your appointment, or you may contact the  Representative at:    All areas ~ [phone]     Type of Referral : Non Surgical       Timeframe requested: 3 - 5 days    Coverage of these services is subject to the terms and limitations of your health insurance plan.  Please call member services at your health plan with any benefit or coverage questions.      If X-rays, CT or MRI's have been performed, please contact the facility where they were done to arrange for , prior to your scheduled appointment.  Please bring this referral request to your appointment and present it to your specialist.                  Follow-up notes from your care team     Return if symptoms worsen or fail to improve.      Future tests that were ordered for you today     Open Future Orders        Priority Expected Expires Ordered    XR Knee Right 3 Views Routine 8/20/2018 8/20/2019 8/20/2018    XR Wrist Left G/E 3 Views Routine 8/20/2018 8/20/2019 8/20/2018            Who to contact     If you have questions or need follow up information about today's clinic visit or  your schedule please contact [facility] directly at [phone].  Normal or non-critical lab and imaging results will be communicated to you by MyChart, letter or phone within 4 business days after the clinic has received the results. If you do not hear from us within 7 days, please contact the clinic through MyChart or phone. If you have a critical or abnormal lab result, we will notify you by phone as soon as possible.  Submit refill requests through [employer] or call your pharmacy and they will forward the refill request to us. Please allow 3 business days for your refill to be completed.          Additional Information About Your Visit        Care EveryWhere ID     This is your Care EveryWhere ID. This could be used by other organizations to access your [place] medical records  [id number]        Your Vitals Were     Pulse Temperature Respirations Pulse Oximetry BMI (Body Mass Index)       76 98.3  F (36.8  C) (Oral) 16 94% 28.2 kg/m2        Blood Pressure from Last 3 Encounters:   08/20/18 154/80   04/09/18 140/70   02/15/18 142/74    Weight from Last 3 Encounters:   08/20/18 130 lb 4.8 oz (59.1 kg)   04/09/18 122 lb 9.6 oz (55.6 kg)   02/15/18 126 lb 6.4 oz (57.3 kg)              We Performed the Following     Ferritin     Hemoglobin     IRON     ORTHO  REFERRAL     Vitamin B12        Primary Care Provider Office Phone # Fax #    Guilherme Graves [phone]       [address] [place] [address]        Equal Access to Services     RUSSEL GREER : Hadii aad ku hadasho Soomaali, waaxda luqadaha, qaybta kaalmada adeegyaivette, jose j walsh. So [facility] [phone].    ATENCIÓN: Si habla español, tiene a harrison disposición servicios gratuitos de asistencia lingüística. Llame al [phone].    We comply with applicable federal civil rights laws and [place] laws. We do not discriminate on the basis of race, color, national origin, age,  disability, sex, sexual orientation, or gender identity.            Thank you!     Thank you for choosing [facility]  for your care. Our goal is always to provide you with excellent care. Hearing back from our patients is one way we can continue to improve our services. Please take a few minutes to complete the written survey that you may receive in the mail after your visit with us. Thank you!             Your Updated Medication List - Protect others around you: Learn how to safely use, store and throw away your medicines at www.disposemymeds.org.          This list is accurate as of 8/20/18 11:02 AM.  Always use your most recent med list.                   Brand Name Dispense Instructions for use Diagnosis    docusate sodium 100 MG capsule    COLACE    100 capsule    Take 2 capsules (200 mg) by mouth 2 times daily    Slow transit constipation       lisinopril 20 MG tablet    PRINIVIL/ZESTRIL    180 tablet    Take 1 tablet (20 mg) by mouth 2 times daily Hold is SBP <110 call if held x 2 in a row or symptomatic    Essential hypertension       metoprolol succinate 50 MG 24 hr tablet    TOPROL-XL    90 tablet    Take 1 tablet (50 mg) by mouth daily Hold if SBP <110 or HR <55 call if held x2 in a row or symptomatic    Essential hypertension       polyethylene glycol Packet    MIRALAX/GLYCOLAX    7 packet    Take 17 g by mouth 2 times daily as needed for constipation Use if no bowel movement for 2 days    Slow transit constipation

## 2018-08-23 ENCOUNTER — TELEPHONE (OUTPATIENT)
Dept: INTERNAL MEDICINE | Facility: CLINIC | Age: 83
End: 2018-08-23

## 2018-08-23 DIAGNOSIS — M25.569 ACUTE KNEE PAIN, UNSPECIFIED LATERALITY: Primary | ICD-10-CM

## 2018-08-23 NOTE — TELEPHONE ENCOUNTER
Reason for Call: Request for an order or referral:    Order or referral being requested: Orthopedics    Date needed: as soon as possible    Has the patient been seen by the PCP for this problem? YES    Additional comments: Strained ligament in her knee, its getting worse by the day.    Phone number Patient can be reached at:  [phone] (Jonny (Son)    Best Time:  Anytime    Can we leave a detailed message on this number?  YES    Call taken on 8/23/2018 at 4:21 PM by SOPHIA RHODES

## 2018-08-24 ENCOUNTER — OFFICE VISIT (OUTPATIENT)
Dept: ORTHOPEDICS | Facility: CLINIC | Age: 83
End: 2018-08-24
Payer: COMMERCIAL

## 2018-08-24 VITALS — WEIGHT: 130 LBS | BODY MASS INDEX: 28.13 KG/M2 | SYSTOLIC BLOOD PRESSURE: 122 MMHG | DIASTOLIC BLOOD PRESSURE: 78 MMHG

## 2018-08-24 DIAGNOSIS — M17.11 PRIMARY OSTEOARTHRITIS OF RIGHT KNEE: Primary | ICD-10-CM

## 2018-08-24 PROCEDURE — 20610 DRAIN/INJ JOINT/BURSA W/O US: CPT | Mod: RT | Performed by: ORTHOPAEDIC SURGERY

## 2018-08-24 PROCEDURE — 99203 OFFICE O/P NEW LOW 30 MIN: CPT | Mod: 25 | Performed by: ORTHOPAEDIC SURGERY

## 2018-08-24 RX ORDER — LIDOCAINE HYDROCHLORIDE 10 MG/ML
8 INJECTION, SOLUTION EPIDURAL; INFILTRATION; INTRACAUDAL; PERINEURAL
Status: DISCONTINUED | OUTPATIENT
Start: 2018-08-24 | End: 2018-11-16

## 2018-08-24 RX ORDER — TESTOSTERONE CYPIONATE 200 MG/ML
2 INJECTION INTRAMUSCULAR
Status: DISCONTINUED | OUTPATIENT
Start: 2018-08-24 | End: 2018-11-16

## 2018-08-24 RX ADMIN — TESTOSTERONE CYPIONATE 2 ML: 200 INJECTION INTRAMUSCULAR at 10:46

## 2018-08-24 RX ADMIN — LIDOCAINE HYDROCHLORIDE 8 ML: 10 INJECTION, SOLUTION EPIDURAL; INFILTRATION; INTRACAUDAL; PERINEURAL at 10:46

## 2018-08-24 NOTE — MR AVS SNAPSHOT
After Visit Summary   8/24/2018    Tracey Moran    MRN: [id number]           Patient Information     Date Of Birth          6/16/1925        Visit Information        Provider Department      8/24/2018 9:50 AM Alec Barrera MD [facility] ORTHOPEDIC SURGERY        Today's Diagnoses     Primary osteoarthritis of right knee    -  1      Care Instructions    Return to clinic as needed  Straight leg raising for strengthening  Flat surface walking  Exercise biking          Follow-ups after your visit        Who to contact     If you have questions or need follow up information about today's clinic visit or your schedule please contact [facility] ORTHOPEDIC SURGERY directly at [phone].  Normal or non-critical lab and imaging results will be communicated to you by MyChart, letter or phone within 4 business days after the clinic has received the results. If you do not hear from us within 7 days, please contact the clinic through MyChart or phone. If you have a critical or abnormal lab result, we will notify you by phone as soon as possible.  Submit refill requests through [email or web address] or call your pharmacy and they will forward the refill request to us. Please allow 3 business days for your refill to be completed.          Additional Information About Your Visit        Care EveryWhere ID     This is your Care EveryWhere ID. This could be used by other organizations to access your [place] medical records  [id number]        Your Vitals Were     BMI (Body Mass Index)                   28.13 kg/m2            Blood Pressure from Last 3 Encounters:   08/24/18 122/78   08/20/18 154/80   04/09/18 140/70    Weight from Last 3 Encounters:   08/24/18 130 lb (59 kg)   08/20/18 130 lb 4.8 oz (59.1 kg)   04/09/18 122 lb 9.6 oz (55.6 kg)              We Performed the Following     Large Joint Injection/Arthocentesis        Primary Care Provider Office Phone # Fax #    Guilherme Graves [phone]       [address] [facility] [address]        Equal Access to Services     MINERVASHARON ZOEY : Hadii sohan ku sangita Quintero, waemanuelda luqadaha, qaybta kaalmada ayah, jose j kentrellin hayaalaxmi grafftrevor orellana laChacortaclifford walsh. So [facility] [phone].    ATENCIÓN: Si habla español, tiene a harrison disposición servicios gratuitos de asistencia lingüística. Llame al [phone].    We comply with applicable federal civil rights laws and [place] laws. We do not discriminate on the basis of race, color, national origin, age, disability, sex, sexual orientation, or gender identity.            Thank you!     Thank you for choosing [facility] ORTHOPEDIC SURGERY  for your care. Our goal is always to provide you with excellent care. Hearing back from our patients is one way we can continue to improve our services. Please take a few minutes to complete the written survey that you may receive in the mail after your visit with us. Thank you!             Your Updated Medication List - Protect others around you: Learn how to safely use, store and throw away your medicines at www.disposemymeds.org.          This list is accurate as of 8/24/18 11:02 AM.  Always use your most recent med list.                   Brand Name Dispense Instructions for use Diagnosis    docusate sodium 100 MG capsule    COLACE    100 capsule    Take 2 capsules (200 mg) by mouth 2 times daily    Slow transit constipation       lisinopril 20 MG tablet    PRINIVIL/ZESTRIL    180 tablet    Take 1 tablet (20 mg) by mouth 2 times daily Hold is SBP <110 call if held x 2 in a row or symptomatic    Essential hypertension       metoprolol succinate 50 MG 24 hr tablet    TOPROL-XL    90 tablet    Take 1 tablet (50 mg) by mouth daily Hold if SBP <110 or HR <55 call if held x2 in a row or symptomatic    Essential hypertension       polyethylene glycol Packet    MIRALAX/GLYCOLAX    7 packet    Take 17 g by mouth 2 times daily as needed for constipation Use if no bowel  movement for 2 days    Slow transit constipation

## 2018-08-24 NOTE — PATIENT INSTRUCTIONS
Return to clinic as needed  Straight leg raising for strengthening  Flat surface walking  Exercise biking

## 2018-08-24 NOTE — LETTER
8/24/2018         RE: Tracey Moran  [address]  [facility] [address]        Dear Colleague,    Thank you for referring your patient, Tracey Moran, to the [facility] ORTHOPEDIC SURGERY. Please see a copy of my visit note below.    HISTORY OF PRESENT ILLNESS:    Tracey Moran is a 89 year old female who is seen in consultation at the request of Dr. Graves for chronic right knee pain.  Patient reports that she has had knee increased knee pain over the last 3 weeks. Patient states she was walking and pulling chairs around.   Her son who came with her today tells me that she was doing well until about 4 months ago.  Since then her knee pain has gotten more steady.  She states that pain in her knee is present at all times it is a matter of how much some days worse than others.  She has been using a patella sleeve that belongs to her son.  She is not sure whether it is helping her.  She has been taking Aleve in addition to Tylenol since Tylenol alone seems to be very ineffective.    Present symptoms: anterior knee pain, sharp pain with movement, and from moving from sit to stand. Son notes altered gate. Denies swelling  Treatments tried to this point: Tylenol, Aleve, Compression sleeve.  Orthopedic PMH: none.    Past Medical History:   Diagnosis Date     Hypertension        Past Surgical History:   Procedure Laterality Date     ESOPHAGOSCOPY, GASTROSCOPY, DUODENOSCOPY (EGD), COMBINED N/A 12/6/2017    Procedure: COMBINED ESOPHAGOSCOPY, GASTROSCOPY, DUODENOSCOPY (EGD);  gastroscopy;  Surgeon: Melchor Mancera MD;  Location: [facility]       No family history on file.    Social History     Social History     Marital status:      Spouse name: N/A     Number of children: N/A     Years of education: N/A     Occupational History     Not on file.     Social History Main Topics     Smoking status: Never Smoker     Smokeless tobacco: Never Used     Alcohol use Yes     Drug use: No     Sexual activity: Not  Currently     Other Topics Concern     Not on file     Social History Narrative       Current Outpatient Prescriptions   Medication Sig Dispense Refill     docusate sodium (COLACE) 100 MG capsule Take 2 capsules (200 mg) by mouth 2 times daily 100 capsule 3     lisinopril (PRINIVIL/ZESTRIL) 20 MG tablet Take 1 tablet (20 mg) by mouth 2 times daily Hold is SBP <110 call if held x 2 in a row or symptomatic 180 tablet 1     metoprolol succinate (TOPROL-XL) 50 MG 24 hr tablet Take 1 tablet (50 mg) by mouth daily Hold if SBP <110 or HR <55 call if held x2 in a row or symptomatic 90 tablet 2     polyethylene glycol (MIRALAX/GLYCOLAX) Packet Take 17 g by mouth 2 times daily as needed for constipation Use if no bowel movement for 2 days 7 packet 1       Allergies   Allergen Reactions     Demerol [Meperidine]      Dust Mites      Peanuts [Nuts]      Penicillins      Pollen Extract        REVIEW OF SYSTEMS:  CONSTITUTIONAL:  NEGATIVE for fever, chills, change in weight  INTEGUMENTARY/SKIN:  NEGATIVE for worrisome rashes, moles or lesions  EYES:  NEGATIVE for vision changes or irritation  ENT/MOUTH:  NEGATIVE for ear, mouth and throat problems  RESP:  NEGATIVE for significant cough or SOB  BREAST:  NEGATIVE for masses, tenderness or discharge  CV:  Hypertension, NEGATIVE for chest pain, palpitations or peripheral edema  GI:  Constipation, NEGATIVE for nausea, abdominal pain, heartburn, or change in bowel habits  :  Negative   MUSCULOSKELETAL:  See HPI above  NEURO:  NEGATIVE for weakness, dizziness or paresthesias  ENDOCRINE:  NEGATIVE for temperature intolerance, skin/hair changes  HEME/ALLERGY/IMMUNE: low blood count NEGATIVE for bleeding problems  PSYCHIATRIC:  NEGATIVE for changes in mood or affect      PHYSICAL EXAM:  /78 (BP Location: Right arm, Patient Position: Chair, Cuff Size: Adult Regular)  Wt 130 lb (59 kg)  BMI 28.13 kg/m2  Body mass index is 28.13 kg/(m^2).   GENERAL APPEARANCE: healthy, alert and no  distress   HEENT: No apparent thyroid megaly. Clear sclera with normal ocular movement  RESPIRATORY: No labored breathing  SKIN: no suspicious lesions or rashes  NEURO: Normal strength and tone, mentation intact and speech normal  VASCULAR: Good pulses, and capillary refill   LYMPH: no lymphadenopathy   PSYCH:  mentation appears normal and affect normal/bright    MUSCULOSKELETAL:  Slow gait  Mild valgus alignment, right knee  No significant effusion  Range of motion is actually well-maintained  Minimal patellofemoral crepitus  Significant pain with patellofemoral compression  Extensor mechanism is intact  Skin is intact without erythema or warmth  Calf is not tender  No significant lateral joint line pain     ASSESSMENT:  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  Findings were thoroughly explained.  To minimize the use of naproxen which may cause internal bleeding, despite potential complications/side effects from cortisone injection, she may benefit from the cortisone injection.  We also talked in length about exercise program.  For her age group, what I would recommend is gentle exercise biking, straight leg raising for strengthening and flat surface walking.  All the questions were answered.  Follow-up as needed.        Large Joint Injection/Arthocentesis  Date/Time: 8/24/2018 10:46 AM  Performed by: MAYANK CONSTANTINO  Authorized by: MAYANK CONSTANTINO     Indications:  Pain and osteoarthritis  Needle Size:  22 G  Guidance: landmark guided    Approach:  Anteromedial  Location:  Knee  Site:  R knee joint  Medications:  2 mL Dexamethasone Sodium Phosphate 120 MG/30ML; 8 mL lidocaine (PF) 1 %  Outcome:  Tolerated well, no immediate complications  Procedure discussed: discussed risks, benefits, and alternatives    Consent Given by:  Patient  Timeout: timeout called immediately prior to procedure    Prep: patient was prepped and draped in  usual sterile fashion                Imaging Interpretation:       Recent Results (from the past 744 hour(s))   XR Wrist Left G/E 3 Views    Narrative    XR WRIST LEFT G/E 3 VIEWS 8/20/2018 12:14 PM    COMPARISON: 12/9/2016.    HISTORY: Pain.      Impression    IMPRESSION: Osteopenia about the LEFT wrist. Old healed distal LEFT  radial fracture with significant resultant dorsal angulation of the  distal radial articular surface. No acute fractures are suspected in  the LEFT wrist. Degenerative changes at the triscaphe and first  carpometacarpal joints.    GILMA HORN MD   XR Knee Right 3 Views    Narrative    XR KNEE RT 3 VW 8/20/2018 12:25 PM    COMPARISON: None.    HISTORY: Chronic RIGHT knee pain.      Impression    IMPRESSION: Osteopenia, but no fractures are seen in the RIGHT knee.  Mild joint space loss in the patellofemoral compartment. Small amount  of fluid in the joint.    MD Alec MENDEZ MD  Department of Orthopedic Surgery        Disclaimer: This note consists of symbols derived from keyboarding, dictation and/or voice recognition software. As a result, there may be errors in the script that have gone undetected. Please consider this when interpreting information found in this chart.      Again, thank you for allowing me to participate in the care of your patient.        Sincerely,        Alec Barrera MD

## 2018-08-24 NOTE — PROGRESS NOTES
HISTORY OF PRESENT ILLNESS:    Tracey Moran is a 89 year old female who is seen in consultation at the request of Dr. Graves for chronic right knee pain.  Patient reports that she has had knee increased knee pain over the last 3 weeks. Patient states she was walking and pulling chairs around.   Her son who came with her today tells me that she was doing well until about 4 months ago.  Since then her knee pain has gotten more steady.  She states that pain in her knee is present at all times it is a matter of how much some days worse than others.  She has been using a patella sleeve that belongs to her son.  She is not sure whether it is helping her.  She has been taking Aleve in addition to Tylenol since Tylenol alone seems to be very ineffective.    Present symptoms: anterior knee pain, sharp pain with movement, and from moving from sit to stand. Son notes altered gate. Denies swelling  Treatments tried to this point: Tylenol, Aleve, Compression sleeve.  Orthopedic PMH: none.    Past Medical History:   Diagnosis Date     Hypertension        Past Surgical History:   Procedure Laterality Date     ESOPHAGOSCOPY, GASTROSCOPY, DUODENOSCOPY (EGD), COMBINED N/A 12/6/2017    Procedure: COMBINED ESOPHAGOSCOPY, GASTROSCOPY, DUODENOSCOPY (EGD);  gastroscopy;  Surgeon: Melchor Mancera MD;  Location: [facility]       No family history on file.    Social History     Social History     Marital status:      Spouse name: N/A     Number of children: N/A     Years of education: N/A     Occupational History     Not on file.     Social History Main Topics     Smoking status: Never Smoker     Smokeless tobacco: Never Used     Alcohol use Yes     Drug use: No     Sexual activity: Not Currently     Other Topics Concern     Not on file     Social History Narrative       Current Outpatient Prescriptions   Medication Sig Dispense Refill     docusate sodium (COLACE) 100 MG capsule Take 2 capsules (200 mg) by mouth 2 times daily 100 capsule  3     lisinopril (PRINIVIL/ZESTRIL) 20 MG tablet Take 1 tablet (20 mg) by mouth 2 times daily Hold is SBP <110 call if held x 2 in a row or symptomatic 180 tablet 1     metoprolol succinate (TOPROL-XL) 50 MG 24 hr tablet Take 1 tablet (50 mg) by mouth daily Hold if SBP <110 or HR <55 call if held x2 in a row or symptomatic 90 tablet 2     polyethylene glycol (MIRALAX/GLYCOLAX) Packet Take 17 g by mouth 2 times daily as needed for constipation Use if no bowel movement for 2 days 7 packet 1       Allergies   Allergen Reactions     Demerol [Meperidine]      Dust Mites      Peanuts [Nuts]      Penicillins      Pollen Extract        REVIEW OF SYSTEMS:  CONSTITUTIONAL:  NEGATIVE for fever, chills, change in weight  INTEGUMENTARY/SKIN:  NEGATIVE for worrisome rashes, moles or lesions  EYES:  NEGATIVE for vision changes or irritation  ENT/MOUTH:  NEGATIVE for ear, mouth and throat problems  RESP:  NEGATIVE for significant cough or SOB  BREAST:  NEGATIVE for masses, tenderness or discharge  CV:  Hypertension, NEGATIVE for chest pain, palpitations or peripheral edema  GI:  Constipation, NEGATIVE for nausea, abdominal pain, heartburn, or change in bowel habits  :  Negative   MUSCULOSKELETAL:  See HPI above  NEURO:  NEGATIVE for weakness, dizziness or paresthesias  ENDOCRINE:  NEGATIVE for temperature intolerance, skin/hair changes  HEME/ALLERGY/IMMUNE: low blood count NEGATIVE for bleeding problems  PSYCHIATRIC:  NEGATIVE for changes in mood or affect      PHYSICAL EXAM:  /78 (BP Location: Right arm, Patient Position: Chair, Cuff Size: Adult Regular)  Wt 130 lb (59 kg)  BMI 28.13 kg/m2  Body mass index is 28.13 kg/(m^2).   GENERAL APPEARANCE: healthy, alert and no distress   HEENT: No apparent thyroid megaly. Clear sclera with normal ocular movement  RESPIRATORY: No labored breathing  SKIN: no suspicious lesions or rashes  NEURO: Normal strength and tone, mentation intact and speech normal  VASCULAR: Good pulses, and  capillary refill   LYMPH: no lymphadenopathy   PSYCH:  mentation appears normal and affect normal/bright    MUSCULOSKELETAL:  Slow gait  Mild valgus alignment, right knee  No significant effusion  Range of motion is actually well-maintained  Minimal patellofemoral crepitus  Significant pain with patellofemoral compression  Extensor mechanism is intact  Skin is intact without erythema or warmth  Calf is not tender  No significant lateral joint line pain     ASSESSMENT:  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  Findings were thoroughly explained.  To minimize the use of naproxen which may cause internal bleeding, despite potential complications/side effects from cortisone injection, she may benefit from the cortisone injection.  We also talked in length about exercise program.  For her age group, what I would recommend is gentle exercise biking, straight leg raising for strengthening and flat surface walking.  All the questions were answered.  Follow-up as needed.        Large Joint Injection/Arthocentesis  Date/Time: 8/24/2018 10:46 AM  Performed by: MAYANK CONSTANTINO  Authorized by: MAYANK CONSTANTINO     Indications:  Pain and osteoarthritis  Needle Size:  22 G  Guidance: landmark guided    Approach:  Anteromedial  Location:  Knee  Site:  R knee joint  Medications:  2 mL Dexamethasone Sodium Phosphate 120 MG/30ML; 8 mL lidocaine (PF) 1 %  Outcome:  Tolerated well, no immediate complications  Procedure discussed: discussed risks, benefits, and alternatives    Consent Given by:  Patient  Timeout: timeout called immediately prior to procedure    Prep: patient was prepped and draped in usual sterile fashion                Imaging Interpretation:       Recent Results (from the past 744 hour(s))   XR Wrist Left G/E 3 Views    Narrative    XR WRIST LEFT G/E 3 VIEWS 8/20/2018 12:14 PM    COMPARISON: 12/9/2016.    HISTORY: Pain.      Impression     IMPRESSION: Osteopenia about the LEFT wrist. Old healed distal LEFT  radial fracture with significant resultant dorsal angulation of the  distal radial articular surface. No acute fractures are suspected in  the LEFT wrist. Degenerative changes at the triscaphe and first  carpometacarpal joints.    GILMA HORN MD   XR Knee Right 3 Views    Narrative    XR KNEE RT 3 VW 8/20/2018 12:25 PM    COMPARISON: None.    HISTORY: Chronic RIGHT knee pain.      Impression    IMPRESSION: Osteopenia, but no fractures are seen in the RIGHT knee.  Mild joint space loss in the patellofemoral compartment. Small amount  of fluid in the joint.    MD Alec MENDEZ MD  Department of Orthopedic Surgery        Disclaimer: This note consists of symbols derived from keyboarding, dictation and/or voice recognition software. As a result, there may be errors in the script that have gone undetected. Please consider this when interpreting information found in this chart.

## 2018-09-06 NOTE — TELEPHONE ENCOUNTER
I have placed a referral for the patient to [facility] [phone] and the son may call and make an appointment.  I would suggest that they see the orthopedist first to determine what they feel would be appropriate treatment options and then at that point we can determine the benefit or need for an exercise program and/or pain management.

## 2018-09-06 NOTE — TELEPHONE ENCOUNTER
The son, Jonny, called; he is totally displeased with the treatment his mother is receiving from the [facility], his mother recently received a Cortizone injection, and it did not help & the ortho dept is not explaining what to expect from the injection and unhappy with their communication/release info (paper work) towards the patient.  The son would like to have a different referral, he did like [facility].  He would like to have pain management, exercise program set up - next steps as in what to do since the Cortizone inj did not work - regarding right knee.  Mild arthritis?  Soft tissue?, please advise for next step

## 2018-09-07 ENCOUNTER — TRANSFERRED RECORDS (OUTPATIENT)
Dept: HEALTH INFORMATION MANAGEMENT | Facility: CLINIC | Age: 83
End: 2018-09-07

## 2018-10-11 ENCOUNTER — RADIANT APPOINTMENT (OUTPATIENT)
Dept: GENERAL RADIOLOGY | Facility: CLINIC | Age: 83
End: 2018-10-11
Attending: INTERNAL MEDICINE
Payer: COMMERCIAL

## 2018-10-11 ENCOUNTER — OFFICE VISIT (OUTPATIENT)
Dept: INTERNAL MEDICINE | Facility: CLINIC | Age: 83
End: 2018-10-11
Payer: COMMERCIAL

## 2018-10-11 VITALS
HEART RATE: 78 BPM | BODY MASS INDEX: 27.27 KG/M2 | RESPIRATION RATE: 16 BRPM | OXYGEN SATURATION: 95 % | TEMPERATURE: 97.7 F | SYSTOLIC BLOOD PRESSURE: 130 MMHG | DIASTOLIC BLOOD PRESSURE: 50 MMHG | WEIGHT: 126 LBS

## 2018-10-11 DIAGNOSIS — M25.569 KNEE PAIN, UNSPECIFIED CHRONICITY, UNSPECIFIED LATERALITY: ICD-10-CM

## 2018-10-11 DIAGNOSIS — I10 ESSENTIAL HYPERTENSION, BENIGN: ICD-10-CM
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
## 2018-10-11 PROCEDURE — 72040 X-RAY EXAM NECK SPINE 2-3 VW: CPT | Mod: FY

## 2018-10-11 PROCEDURE — 99214 OFFICE O/P EST MOD 30 MIN: CPT | Performed by: INTERNAL MEDICINE

## 2018-10-11 PROCEDURE — 70150 X-RAY EXAM OF FACIAL BONES: CPT | Mod: FY

## 2018-10-11 NOTE — MR AVS SNAPSHOT
After Visit Summary   10/11/2018    Tracey Moran    MRN: [id number]           Patient Information     Date Of Birth          6/16/1925        Visit Information        Provider Department      10/11/2018 9:40 AM Guilherme Graves MD [facility]        Today's Diagnoses     Fall, subsequent encounter    -  1    Knee pain, unspecified chronicity, unspecified laterality        Essential hypertension, benign          Care Instructions      At your visit today, we discussed your risk for falls and preventive options.    Fall Prevention  Falls often occur due to slipping, tripping or losing your balance. Millions of people fall every year and injure themselves. Here are ways to reduce your risk of falling again.    Think about your fall, was there anything that caused your fall that can be fixed, removed, or replaced?    Make your home safe by keeping walkways clear of objects you may trip over.    Use non-slip pads under rugs. Do not use area rugs or small throw rugs.    Use non-slip mats in bathtubs and showers.    Install handrails and lights on staircases.    Do not walk in poorly lit areas.    Do not stand on chairs or wobbly ladders.    Use caution when reaching overhead or looking upward. This position can cause a loss of balance.    Be sure your shoes fit properly, have non-slip bottoms and are in good condition.     Wear shoes both inside and out. Avoid going barefoot or wearing slippers.    Be cautious when going up and down stairs, curbs, and when walking on uneven sidewalks.    If your balance is poor, consider using a cane or walker.    If your fall was related to alcohol use, stop or limit alcohol intake.     If your fall was related to use of sleeping medicines, talk to your doctor about this. You may need to reduce your dosage at bedtime if you awaken during the night to go to the bathroom.      To reduce the need for nighttime bathroom trips:  ? Avoid drinking fluids  for several hours before going to bed  ? Empty your bladder before going to bed  ? Men can keep a urinal at the bedside    Stay as active as you can. Balance, flexibility, strength, and endurance all come from exercise. They all play a role in preventing falls. Ask your healthcare provider which types of activity are right for you.    Get your vision checked on a regular basis.    If you have pets, know where they are before you stand up or walk so you don't trip over them.    Use night lights.  Date Last Reviewed: 11/5/2015 2000-2017 [employer]. [address] [place] [address]. All rights reserved. This information is not intended as a substitute for professional medical care. Always follow your healthcare professional's instructions.                Follow-ups after your visit        Follow-up notes from your care team     Return if symptoms worsen or fail to improve.      Future tests that were ordered for you today     Open Future Orders        Priority Expected Expires Ordered    XR Facial Bones 1/2 Views Routine 10/11/2018 10/11/2019 10/11/2018    XR Cervical Spine 2/3 Views Routine 10/11/2018 10/11/2019 10/11/2018            Who to contact     If you have questions or need follow up information about today's clinic visit or your schedule please contact [facility] directly at [phone].  Normal or non-critical lab and imaging results will be communicated to you by MyChart, letter or phone within 4 business days after the clinic has received the results. If you do not hear from us within 7 days, please contact the clinic through MyChart or phone. If you have a critical or abnormal lab result, we will notify you by phone as soon as possible.  Submit refill requests through [facility] or call your pharmacy and they will forward the refill request to us. Please allow 3 business days for your refill to be completed.          Additional Information About Your  Visit        Care EveryWhere ID     This is your Care EveryWhere ID. This could be used by other organizations to access your [facility] medical records  [id number]        Your Vitals Were     Pulse Temperature Respirations Pulse Oximetry BMI (Body Mass Index)       78 97.7  F (36.5  C) (Oral) 16 95% 27.27 kg/m2        Blood Pressure from Last 3 Encounters:   10/11/18 130/50   08/24/18 122/78   08/20/18 154/80    Weight from Last 3 Encounters:   10/11/18 126 lb (57.2 kg)   08/24/18 130 lb (59 kg)   08/20/18 130 lb 4.8 oz (59.1 kg)               Primary Care Provider Office Phone # Fax #    Guilherme Graves [phone]       [address] [place] [phone]        Equal Access to Services     [facility] HospitalYES[facility] : Hadii aad ku hadasho Soomaali, waaxda luqadaha, qaybta kaalmada adeegyada, waxay idiin hayaan hafsa gomez . So [facility] [phone].    ATENCIÓN: Si habla español, tiene a harrison disposición servicios gratuitos de asistencia lingüística. Llame al [phone].    We comply with applicable federal civil rights laws and [place] laws. We do not discriminate on the basis of race, color, national origin, age, disability, sex, sexual orientation, or gender identity.            Thank you!     Thank you for choosing [facility]  for your care. Our goal is always to provide you with excellent care. Hearing back from our patients is one way we can continue to improve our services. Please take a few minutes to complete the written survey that you may receive in the mail after your visit with us. Thank you!             Your Updated Medication List - Protect others around you: Learn how to safely use, store and throw away your medicines at www.disposemymeds.org.          This list is accurate as of 10/11/18 10:12 AM.  Always use your most recent med list.                   Brand Name Dispense Instructions for use Diagnosis    docusate sodium 100 MG capsule    COLACE    100  capsule    Take 2 capsules (200 mg) by mouth 2 times daily    Slow transit constipation       lisinopril 20 MG tablet    PRINIVIL/ZESTRIL    180 tablet    Take 1 tablet (20 mg) by mouth 2 times daily Hold is SBP <110 call if held x 2 in a row or symptomatic    Essential hypertension       metoprolol succinate 50 MG 24 hr tablet    TOPROL-XL    90 tablet    Take 1 tablet (50 mg) by mouth daily Hold if SBP <110 or HR <55 call if held x2 in a row or symptomatic    Essential hypertension       polyethylene glycol Packet    MIRALAX/GLYCOLAX    7 packet    Take 17 g by mouth 2 times daily as needed for constipation Use if no bowel movement for 2 days    Slow transit constipation

## 2018-10-11 NOTE — PROGRESS NOTES
SUBJECTIVE:   Tracey Moran is a 89 year old female who presents to clinic today for the following health issues:    Fall      Duration: Fell on 10/8/18    Description (location/character/radiation): Left side of face and back of head    Intensity:  No pain    Accompanying signs and symptoms: Brusing and swelling     History (similar episodes/previous evaluation): Fallen before    Precipitating or alleviating factors: Was emptying the . Did not notice how bad it was until someone else brought it up    Therapies tried and outcome: None     Patient states that she was emptying the  and slipped and fell without any obvious prodromal complaints.      Problem list and histories reviewed & adjusted, as indicated.  Additional history: as documented    Patient Active Problem List   Diagnosis     Hyperlipidemia LDL goal <130     Advanced directives, counseling/discussion     Left wrist fracture, with routine healing, subsequent encounter     Leukocytosis, unspecified type     Elevated CK     Abrasion of left scapular region, subsequent encounter     Essential hypertension, benign     Anemia due to blood loss, acute     Thrombocytopenia (H)     CRF (chronic renal failure), stage 3 (moderate) (H)     Slow transit constipation     Gastrointestinal hemorrhage, unspecified gastrointestinal hemorrhage type     Fall     Past Surgical History:   Procedure Laterality Date     ESOPHAGOSCOPY, GASTROSCOPY, DUODENOSCOPY (EGD), COMBINED N/A 12/6/2017    Procedure: COMBINED ESOPHAGOSCOPY, GASTROSCOPY, DUODENOSCOPY (EGD);  gastroscopy;  Surgeon: Melchor Mancera MD;  Location:  GI       Social History   Substance Use Topics     Smoking status: Never Smoker     Smokeless tobacco: Never Used     Alcohol use Yes     No family history on file.      Current Outpatient Prescriptions   Medication Sig Dispense Refill     docusate sodium (COLACE) 100 MG capsule Take 2 capsules (200 mg) by mouth 2 times daily 100 capsule 3      lisinopril (PRINIVIL/ZESTRIL) 20 MG tablet Take 1 tablet (20 mg) by mouth 2 times daily Hold is SBP <110 call if held x 2 in a row or symptomatic 180 tablet 1     metoprolol succinate (TOPROL-XL) 50 MG 24 hr tablet Take 1 tablet (50 mg) by mouth daily Hold if SBP <110 or HR <55 call if held x2 in a row or symptomatic 90 tablet 2     polyethylene glycol (MIRALAX/GLYCOLAX) Packet Take 17 g by mouth 2 times daily as needed for constipation Use if no bowel movement for 2 days 7 packet 1     Allergies   Allergen Reactions     Demerol [Meperidine]      Dust Mites      Peanuts [Nuts]      Penicillins      Pollen Extract      BP Readings from Last 3 Encounters:   08/24/18 122/78   08/20/18 154/80   04/09/18 140/70    Wt Readings from Last 3 Encounters:   08/24/18 130 lb (59 kg)   08/20/18 130 lb 4.8 oz (59.1 kg)   04/09/18 122 lb 9.6 oz (55.6 kg)         Reviewed and updated as needed this visit by clinical staff       Reviewed and updated as needed this visit by Provider       ROS:  CONSTITUTIONAL: NEGATIVE for fever, chills, change in weight  EYES: NEGATIVE for vision changes or irritation  ENT/MOUTH: NEGATIVE for ear, mouth and throat problems  RESP: NEGATIVE for significant cough or SOB  CV: NEGATIVE for chest pain, palpitations or peripheral edema  GI: NEGATIVE for nausea, abdominal pain, heartburn, or change in bowel habits  : NEGATIVE for frequency, dysuria, or hematuria  HEME: NEGATIVE for bleeding problems  PSYCHIATRIC: NEGATIVE for changes in mood or affect    OBJECTIVE:                                                    /50 (BP Location: Left arm, Patient Position: Chair, Cuff Size: Adult Regular)  Pulse 78  Temp 97.7  F (36.5  C) (Oral)  Resp 16  Wt 126 lb (57.2 kg)  SpO2 95%  BMI 27.27 kg/m2  Body mass index is 27.27 kg/(m^2).  GENERAL:  alert and no distress, frail  EYES: Eyes grossly normal to inspection, extraocular movements - intact, and PERRL  HENT: ear canals- normal; TMs- normal; Nose-  normal; Mouth- no ulcers, no lesions  NECK: no midline cervical tenderness, no adenopathy, no asymmetry, no masses, no stiffness; thyroid- normal to palpation  There is resolving superficial ecchymosis noted to the left eye supra and infraorbital ridge and the left temple area.  This area is nontender with no focal pain or discomfort and full range of motion of the ocular muscles.  There is also no focal cervical midline neck tenderness.  There is full range of motion of the neck without pain or discomfort noted  RESP: lungs clear to auscultation - no rales, no rhonchi, no wheezes  CV: regular rates and rhythm, normal S1 S2, no S3 or S4 and no click or rub   MS: extremities- no gross deformities noted.  PSYCH: Alert and oriented times 3; speech- coherent , normal rate and volume; able to articulate logical thoughts, able to abstract reason, no tangential thoughts, no hallucinations or delusions, affect- normal     ASSESSMENT/PLAN:                                                      (W19.XXXD) Fall, subsequent encounter  (primary encounter diagnosis)  Comment: Appears of mechanical fall with resolving superficial bruise.  Plan: XR Facial Bones 1/2 Views, XR Cervical Spine         2/3 Views        I suggest observation as has been done and conservative x-rays for reassurance.    (M25.569) Knee pain, unspecified chronicity, unspecified laterality  Comment: Status post injection clinically stable  Plan: See orthopedic consult note    (I10) Essential hypertension, benign  Comment: Stable on therapy continue with medical management with no distinct evidence of orthostasis  Plan:       See Patient Instructions    Guilherme Graves MD  [facility]    THE MEDICATION LIST HAS BEEN FULLY RECONCILED BY THE M.D. AND THE NURSING STAFF.

## 2018-10-11 NOTE — PATIENT INSTRUCTIONS
At your visit today, we discussed your risk for falls and preventive options.    Fall Prevention  Falls often occur due to slipping, tripping or losing your balance. Millions of people fall every year and injure themselves. Here are ways to reduce your risk of falling again.    Think about your fall, was there anything that caused your fall that can be fixed, removed, or replaced?    Make your home safe by keeping walkways clear of objects you may trip over.    Use non-slip pads under rugs. Do not use area rugs or small throw rugs.    Use non-slip mats in bathtubs and showers.    Install handrails and lights on staircases.    Do not walk in poorly lit areas.    Do not stand on chairs or wobbly ladders.    Use caution when reaching overhead or looking upward. This position can cause a loss of balance.    Be sure your shoes fit properly, have non-slip bottoms and are in good condition.     Wear shoes both inside and out. Avoid going barefoot or wearing slippers.    Be cautious when going up and down stairs, curbs, and when walking on uneven sidewalks.    If your balance is poor, consider using a cane or walker.    If your fall was related to alcohol use, stop or limit alcohol intake.     If your fall was related to use of sleeping medicines, talk to your doctor about this. You may need to reduce your dosage at bedtime if you awaken during the night to go to the bathroom.      To reduce the need for nighttime bathroom trips:  ? Avoid drinking fluids for several hours before going to bed  ? Empty your bladder before going to bed  ? Men can keep a urinal at the bedside    Stay as active as you can. Balance, flexibility, strength, and endurance all come from exercise. They all play a role in preventing falls. Ask your healthcare provider which types of activity are right for you.    Get your vision checked on a regular basis.    If you have pets, know where they are before you stand up or walk so you don't trip over  them.    Use night lights.  Date Last Reviewed: 11/5/2015 2000-2017 [employer]. [address] [place] [address], [place] [address]. All rights reserved. This information is not intended as a substitute for professional medical care. Always follow your healthcare professional's instructions.

## 2018-10-31 DIAGNOSIS — I10 ESSENTIAL HYPERTENSION: ICD-10-CM

## 2018-10-31 RX ORDER — LISINOPRIL 20 MG/1
TABLET ORAL
Qty: 180 TABLET | Refills: 0 | Status: SHIPPED | OUTPATIENT
Start: 2018-10-31 | End: 2019-02-21

## 2018-10-31 NOTE — TELEPHONE ENCOUNTER
"Requested Prescriptions   Pending Prescriptions Disp Refills     lisinopril (PRINIVIL/ZESTRIL) 20 MG tablet [Pharmacy Med Name: LISINOPRIL 20MG     TAB] 180 tablet 1     Sig: TAKE ONE TABLET BY MOUTH TWICE DAILY. HOLD IF SYSTOLIC BLOOD PRESSURE IS LESS THAN 110 MMHG. CALL IF HELD TWICE IN A ROW OR IF SYMPTOMATIC.    ACE Inhibitors (Including Combos) Protocol Passed    10/31/2018 11:33 AM       Passed - Blood pressure under 140/90 in past 12 months    BP Readings from Last 3 Encounters:   10/11/18 130/50   08/24/18 122/78   08/20/18 154/80                Passed - Recent (12 mo) or future (30 days) visit within the authorizing provider's specialty    Patient had office visit in the last 12 months or has a visit in the next 30 days with authorizing provider or within the authorizing provider's specialty.  See \"Patient Info\" tab in inbasket, or \"Choose Columns\" in Meds & Orders section of the refill encounter.             Passed - Patient is age 18 or older       Passed - No active pregnancy on record       Passed - Normal serum creatinine on file in past 12 months    Recent Labs   Lab Test  02/11/18   0543   CR  0.61            Passed - Normal serum potassium on file in past 12 months    Recent Labs   Lab Test  02/11/18   0543   POTASSIUM  4.4            Passed - No positive pregnancy test in past 12 months          "

## 2018-11-16 ENCOUNTER — HOSPITAL ENCOUNTER (INPATIENT)
Facility: CLINIC | Age: 83
LOS: 3 days | Discharge: NURSING, REHAB OR LONG-TERM CARE | DRG: 690 | End: 2018-11-19
Attending: EMERGENCY MEDICINE | Admitting: HOSPITALIST
Payer: COMMERCIAL

## 2018-11-16 ENCOUNTER — APPOINTMENT (OUTPATIENT)
Dept: GENERAL RADIOLOGY | Facility: CLINIC | Age: 83
DRG: 690 | End: 2018-11-16
Attending: EMERGENCY MEDICINE
Payer: COMMERCIAL

## 2018-11-16 ENCOUNTER — APPOINTMENT (OUTPATIENT)
Dept: CT IMAGING | Facility: CLINIC | Age: 83
DRG: 690 | End: 2018-11-16
Attending: EMERGENCY MEDICINE
Payer: COMMERCIAL

## 2018-11-16 DIAGNOSIS — I21.4 NSTEMI (NON-ST ELEVATED MYOCARDIAL INFARCTION) (H): ICD-10-CM

## 2018-11-16 DIAGNOSIS — N39.0 URINARY TRACT INFECTION IN FEMALE: ICD-10-CM

## 2018-11-16 DIAGNOSIS — R74.8 ELEVATED CK: ICD-10-CM
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
## 2018-11-16 DIAGNOSIS — M17.10 ARTHRITIS OF KNEE: Primary | ICD-10-CM

## 2018-11-16 DIAGNOSIS — E86.0 DEHYDRATION: ICD-10-CM

## 2018-11-16 LAB
ALBUMIN UR-MCNC: 30 MG/DL
ANION GAP SERPL CALCULATED.3IONS-SCNC: 8 MMOL/L (ref 3–14)
APPEARANCE UR: ABNORMAL
BACTERIA #/AREA URNS HPF: ABNORMAL /HPF
BASOPHILS # BLD AUTO: 0 10E9/L (ref 0–0.2)
BASOPHILS NFR BLD AUTO: 0.1 %
BILIRUB UR QL STRIP: NEGATIVE
BUN SERPL-MCNC: 23 MG/DL (ref 7–30)
CALCIUM SERPL-MCNC: 9.3 MG/DL (ref 8.5–10.1)
CHLORIDE SERPL-SCNC: 108 MMOL/L (ref 94–109)
CK SERPL-CCNC: 379 U/L (ref 30–225)
CO2 SERPL-SCNC: 26 MMOL/L (ref 20–32)
COLOR UR AUTO: YELLOW
CREAT SERPL-MCNC: 1.13 MG/DL (ref 0.52–1.04)
DIFFERENTIAL METHOD BLD: ABNORMAL
EOSINOPHIL # BLD AUTO: 0 10E9/L (ref 0–0.7)
EOSINOPHIL NFR BLD AUTO: 0.1 %
ERYTHROCYTE [DISTWIDTH] IN BLOOD BY AUTOMATED COUNT: 13.8 % (ref 10–15)
GFR SERPL CREATININE-BSD FRML MDRD: 45 ML/MIN/1.7M2
GLUCOSE SERPL-MCNC: 80 MG/DL (ref 70–99)
GLUCOSE UR STRIP-MCNC: NEGATIVE MG/DL
HCT VFR BLD AUTO: 33.1 % (ref 35–47)
HGB BLD-MCNC: 10.8 G/DL (ref 11.7–15.7)
HGB UR QL STRIP: ABNORMAL
IMM GRANULOCYTES # BLD: 0 10E9/L (ref 0–0.4)
IMM GRANULOCYTES NFR BLD: 0.2 %
INTERPRETATION ECG - MUSE: NORMAL
KETONES UR STRIP-MCNC: 5 MG/DL
LEUKOCYTE ESTERASE UR QL STRIP: ABNORMAL
LYMPHOCYTES # BLD AUTO: 0.9 10E9/L (ref 0.8–5.3)
LYMPHOCYTES NFR BLD AUTO: 5.7 %
MCH RBC QN AUTO: 32.1 PG (ref 26.5–33)
MCHC RBC AUTO-ENTMCNC: 32.6 G/DL (ref 31.5–36.5)
MCV RBC AUTO: 99 FL (ref 78–100)
MONOCYTES # BLD AUTO: 1.1 10E9/L (ref 0–1.3)
MONOCYTES NFR BLD AUTO: 7 %
MUCOUS THREADS #/AREA URNS LPF: PRESENT /LPF
NEUTROPHILS # BLD AUTO: 13.1 10E9/L (ref 1.6–8.3)
NEUTROPHILS NFR BLD AUTO: 86.9 %
NITRATE UR QL: POSITIVE
PH UR STRIP: 6 PH (ref 5–7)
PLATELET # BLD AUTO: 241 10E9/L (ref 150–450)
POTASSIUM SERPL-SCNC: 4 MMOL/L (ref 3.4–5.3)
RBC # BLD AUTO: 3.36 10E12/L (ref 3.8–5.2)
RBC #/AREA URNS AUTO: 32 /HPF (ref 0–2)
SODIUM SERPL-SCNC: 142 MMOL/L (ref 133–144)
SOURCE: ABNORMAL
SP GR UR STRIP: 1.01 (ref 1–1.03)
TROPONIN I SERPL-MCNC: 0.69 UG/L (ref 0–0.04)
TROPONIN I SERPL-MCNC: 1.59 UG/L (ref 0–0.04)
TROPONIN I SERPL-MCNC: 1.61 UG/L (ref 0–0.04)
UROBILINOGEN UR STRIP-MCNC: NORMAL MG/DL (ref 0–2)
WBC # BLD AUTO: 15.1 10E9/L (ref 4–11)
WBC #/AREA URNS AUTO: 149 /HPF (ref 0–5)

## 2018-11-16 PROCEDURE — 87800 DETECT AGNT MULT DNA DIREC: CPT | Performed by: EMERGENCY MEDICINE

## 2018-11-16 PROCEDURE — 12000000 ZZH R&B MED SURG/OB

## 2018-11-16 PROCEDURE — 80048 BASIC METABOLIC PNL TOTAL CA: CPT | Performed by: EMERGENCY MEDICINE

## 2018-11-16 PROCEDURE — 84484 ASSAY OF TROPONIN QUANT: CPT | Performed by: EMERGENCY MEDICINE

## 2018-11-16 PROCEDURE — 71046 X-RAY EXAM CHEST 2 VIEWS: CPT

## 2018-11-16 PROCEDURE — 25000132 ZZH RX MED GY IP 250 OP 250 PS 637: Performed by: HOSPITALIST

## 2018-11-16 PROCEDURE — 96361 HYDRATE IV INFUSION ADD-ON: CPT

## 2018-11-16 PROCEDURE — 87077 CULTURE AEROBIC IDENTIFY: CPT | Performed by: EMERGENCY MEDICINE

## 2018-11-16 PROCEDURE — 87186 SC STD MICRODIL/AGAR DIL: CPT | Performed by: EMERGENCY MEDICINE

## 2018-11-16 PROCEDURE — 84484 ASSAY OF TROPONIN QUANT: CPT | Performed by: HOSPITALIST

## 2018-11-16 PROCEDURE — 72125 CT NECK SPINE W/O DYE: CPT

## 2018-11-16 PROCEDURE — 36415 COLL VENOUS BLD VENIPUNCTURE: CPT

## 2018-11-16 PROCEDURE — 99285 EMERGENCY DEPT VISIT HI MDM: CPT | Mod: 25

## 2018-11-16 PROCEDURE — 81001 URINALYSIS AUTO W/SCOPE: CPT | Performed by: EMERGENCY MEDICINE

## 2018-11-16 PROCEDURE — 87088 URINE BACTERIA CULTURE: CPT | Performed by: EMERGENCY MEDICINE

## 2018-11-16 PROCEDURE — 25000128 H RX IP 250 OP 636: Performed by: HOSPITALIST

## 2018-11-16 PROCEDURE — 85025 COMPLETE CBC W/AUTO DIFF WBC: CPT | Performed by: EMERGENCY MEDICINE

## 2018-11-16 PROCEDURE — 25000128 H RX IP 250 OP 636: Performed by: EMERGENCY MEDICINE

## 2018-11-16 PROCEDURE — 25000132 ZZH RX MED GY IP 250 OP 250 PS 637: Performed by: EMERGENCY MEDICINE

## 2018-11-16 PROCEDURE — 96365 THER/PROPH/DIAG IV INF INIT: CPT

## 2018-11-16 PROCEDURE — 82550 ASSAY OF CK (CPK): CPT | Performed by: EMERGENCY MEDICINE

## 2018-11-16 PROCEDURE — 87040 BLOOD CULTURE FOR BACTERIA: CPT | Performed by: EMERGENCY MEDICINE

## 2018-11-16 PROCEDURE — 99223 1ST HOSP IP/OBS HIGH 75: CPT | Mod: AI | Performed by: HOSPITALIST

## 2018-11-16 PROCEDURE — 93005 ELECTROCARDIOGRAM TRACING: CPT

## 2018-11-16 PROCEDURE — 36415 COLL VENOUS BLD VENIPUNCTURE: CPT | Performed by: HOSPITALIST

## 2018-11-16 PROCEDURE — 70450 CT HEAD/BRAIN W/O DYE: CPT

## 2018-11-16 PROCEDURE — 87086 URINE CULTURE/COLONY COUNT: CPT | Performed by: EMERGENCY MEDICINE

## 2018-11-16 RX ORDER — ASPIRIN 81 MG/1
324 TABLET, CHEWABLE ORAL ONCE
Status: COMPLETED | OUTPATIENT
Start: 2018-11-16 | End: 2018-11-16

## 2018-11-16 RX ORDER — DOCUSATE SODIUM 100 MG/1
200 CAPSULE, LIQUID FILLED ORAL 2 TIMES DAILY
Status: DISCONTINUED | OUTPATIENT
Start: 2018-11-16 | End: 2018-11-19 | Stop reason: HOSPADM

## 2018-11-16 RX ORDER — POLYETHYLENE GLYCOL 3350 17 G/17G
17 POWDER, FOR SOLUTION ORAL 2 TIMES DAILY PRN
Status: DISCONTINUED | OUTPATIENT
Start: 2018-11-16 | End: 2018-11-19 | Stop reason: HOSPADM

## 2018-11-16 RX ORDER — METOPROLOL SUCCINATE 50 MG/1
50 TABLET, EXTENDED RELEASE ORAL DAILY
Status: DISCONTINUED | OUTPATIENT
Start: 2018-11-16 | End: 2018-11-17

## 2018-11-16 RX ORDER — MULTIPLE VITAMINS W/ MINERALS TAB 9MG-400MCG
1 TAB ORAL DAILY
Status: DISCONTINUED | OUTPATIENT
Start: 2018-11-17 | End: 2018-11-19 | Stop reason: HOSPADM

## 2018-11-16 RX ORDER — SODIUM CHLORIDE 9 MG/ML
INJECTION, SOLUTION INTRAVENOUS CONTINUOUS
Status: DISCONTINUED | OUTPATIENT
Start: 2018-11-16 | End: 2018-11-19

## 2018-11-16 RX ORDER — SODIUM CHLORIDE 9 MG/ML
INJECTION, SOLUTION INTRAVENOUS ONCE
Status: COMPLETED | OUTPATIENT
Start: 2018-11-16 | End: 2018-11-16

## 2018-11-16 RX ORDER — BISACODYL 10 MG
10 SUPPOSITORY, RECTAL RECTAL DAILY PRN
Status: DISCONTINUED | OUTPATIENT
Start: 2018-11-16 | End: 2018-11-19 | Stop reason: HOSPADM

## 2018-11-16 RX ORDER — ACETAMINOPHEN 500 MG
1000 TABLET ORAL EVERY 8 HOURS PRN
COMMUNITY
End: 2019-05-15

## 2018-11-16 RX ORDER — CALCIUM CARBONATE 500 MG/1
1000 TABLET, CHEWABLE ORAL 4 TIMES DAILY PRN
Status: DISCONTINUED | OUTPATIENT
Start: 2018-11-16 | End: 2018-11-19 | Stop reason: HOSPADM

## 2018-11-16 RX ORDER — ASPIRIN 81 MG/1
81 TABLET ORAL DAILY
Status: DISCONTINUED | OUTPATIENT
Start: 2018-11-17 | End: 2018-11-19 | Stop reason: HOSPADM

## 2018-11-16 RX ORDER — ONDANSETRON 4 MG/1
4 TABLET, ORALLY DISINTEGRATING ORAL EVERY 6 HOURS PRN
Status: DISCONTINUED | OUTPATIENT
Start: 2018-11-16 | End: 2018-11-19 | Stop reason: HOSPADM

## 2018-11-16 RX ORDER — CEFTRIAXONE 1 G/1
1 INJECTION, POWDER, FOR SOLUTION INTRAMUSCULAR; INTRAVENOUS EVERY 24 HOURS
Status: DISCONTINUED | OUTPATIENT
Start: 2018-11-17 | End: 2018-11-18

## 2018-11-16 RX ORDER — ACETAMINOPHEN 500 MG
1000 TABLET ORAL EVERY 8 HOURS PRN
Status: DISCONTINUED | OUTPATIENT
Start: 2018-11-16 | End: 2018-11-19 | Stop reason: HOSPADM

## 2018-11-16 RX ORDER — ACETAMINOPHEN 650 MG/1
650 SUPPOSITORY RECTAL EVERY 4 HOURS PRN
Status: DISCONTINUED | OUTPATIENT
Start: 2018-11-16 | End: 2018-11-19 | Stop reason: HOSPADM

## 2018-11-16 RX ORDER — CEFTRIAXONE 1 G/1
1 INJECTION, POWDER, FOR SOLUTION INTRAMUSCULAR; INTRAVENOUS ONCE
Status: COMPLETED | OUTPATIENT
Start: 2018-11-16 | End: 2018-11-16

## 2018-11-16 RX ORDER — NALOXONE HYDROCHLORIDE 0.4 MG/ML
.1-.4 INJECTION, SOLUTION INTRAMUSCULAR; INTRAVENOUS; SUBCUTANEOUS
Status: DISCONTINUED | OUTPATIENT
Start: 2018-11-16 | End: 2018-11-19 | Stop reason: HOSPADM

## 2018-11-16 RX ORDER — MULTIPLE VITAMINS W/ MINERALS TAB 9MG-400MCG
1 TAB ORAL DAILY
Status: ON HOLD | COMMUNITY
End: 2019-10-29

## 2018-11-16 RX ORDER — ONDANSETRON 2 MG/ML
4 INJECTION INTRAMUSCULAR; INTRAVENOUS EVERY 6 HOURS PRN
Status: DISCONTINUED | OUTPATIENT
Start: 2018-11-16 | End: 2018-11-19 | Stop reason: HOSPADM

## 2018-11-16 RX ADMIN — SODIUM CHLORIDE 1000 ML: 9 INJECTION, SOLUTION INTRAVENOUS at 13:23

## 2018-11-16 RX ADMIN — DOCUSATE SODIUM 200 MG: 100 CAPSULE, LIQUID FILLED ORAL at 21:37

## 2018-11-16 RX ADMIN — ASPIRIN 81 MG 324 MG: 81 TABLET ORAL at 14:49

## 2018-11-16 RX ADMIN — CEFTRIAXONE SODIUM 1 G: 1 INJECTION, POWDER, FOR SOLUTION INTRAMUSCULAR; INTRAVENOUS at 15:34

## 2018-11-16 RX ADMIN — SODIUM CHLORIDE: 9 INJECTION, SOLUTION INTRAVENOUS at 15:31

## 2018-11-16 RX ADMIN — METOPROLOL SUCCINATE 50 MG: 50 TABLET, EXTENDED RELEASE ORAL at 19:42

## 2018-11-16 RX ADMIN — SODIUM CHLORIDE: 9 INJECTION, SOLUTION INTRAVENOUS at 19:42

## 2018-11-16 ASSESSMENT — ENCOUNTER SYMPTOMS
LIGHT-HEADEDNESS: 0
NECK PAIN: 0
NUMBNESS: 0
FEVER: 0

## 2018-11-16 ASSESSMENT — ACTIVITIES OF DAILY LIVING (ADL): ADLS_ACUITY_SCORE: 19

## 2018-11-16 NOTE — IP AVS SNAPSHOT
` `     [place] [address] [facility] UNIT: [phone]            Medication Administration Report for Tracey Moran as of 11/19/18 1839   Legend:    Given Hold Not Given Due Canceled Entry Other Actions    Time Time (Time) Time  Time-Action       Inactive    Active    Linked        Medications 11/13/18 11/14/18 11/15/18 11/16/18 11/17/18 11/18/18 11/19/18      Dose: 650 mg  Freq: EVERY 4 HOURS PRN Route: RE  PRN Reason: mild pain  Start: 11/16/18 1802   End: 11/19/18 1826   Admin Instructions: Alternate ibuprofen (if ordered) with acetaminophen.  Maximum acetaminophen dose from all sources = 75 mg/kg/day not to exceed 4 grams/day.    Admin. Amount: 1 suppository (1 × 650 mg suppository)  Dispense Loc: [facility]           1826-Med Discontinued         Dose: 1,000 mg  Freq: EVERY 8 HOURS PRN Route: PO  PRN Reason: mild pain  Start: 11/16/18 1802   End: 11/19/18 1826   Admin Instructions: Maximum acetaminophen dose from all sources = 75 mg/kg/day not to exceed 4 gram    Admin. Amount: 2 tablet (2 × 500 mg tablet)  Last Admin: 11/19/18 1139  Dispense Loc:  [facility]          1733 (1,000 mg)-Given        0433 (1,000 mg)-Given       1139 (1,000 mg)-Given       1826-Med Discontinued         Dose: 81 mg  Freq: DAILY Route: PO  Start: 11/17/18 0900   End: 11/19/18 1826   Admin Instructions: DO NOT CRUSH.    Admin. Amount: 1 tablet (1 × 81 mg tablet)  Last Admin: 11/19/18 0832  Dispense Loc: [facility] 66A         0906 (81 mg)-Given        1017 (81 mg)-Given        0832 (81 mg)-Given       1826-Med Discontinued         Dose: 10 mg  Freq: DAILY PRN Route: RE  PRN Reason: constipation  Start: 11/16/18 1802   End: 11/19/18 1826   Admin Instructions: Hold for loose stools.  This is the third step of a three step constipation treatment.    Admin. Amount: 1 suppository (1 × 10 mg suppository)  Dispense Loc:  [facility]           1826-Med Discontinued         Dose: 1,000 mg  Freq: 4 TIMES DAILY PRN Route: PO  PRN Reason:  heartburn  Start: 11/16/18 1802   End: 11/19/18 1826   Admin. Amount: 2 tablet (2 × 500 mg tablet)  Dispense Loc:  [facility]           1826-Med Discontinued         Dose: 2 g  Freq: EVERY 12 HOURS Route: IV  Indications of Use: BACTEREMIA  Start: 11/18/18 0915   End: 11/19/18 1826   Admin. Amount: 2 g = 100 mL Conc: 2 g/100 mL  Last Admin: 11/19/18 0918  Dispense Loc:  Main Pharmacy  Infused Over: 30 Minutes  Volume: 100 mL          1018 (2 g)-Given       2022 (2 g)-Given        0918 (2 g)-Given       1826-Med Discontinued         Dose: 2 g  Freq: 4 TIMES DAILY PRN Route: TD  PRN Reason: moderate pain  Start: 11/16/18 1802   End: 11/19/18 1826   Admin Instructions: Apply to knees and back as needed  Send dosing card with product.    Admin. Amount: 2 g  Dispense Loc:  Main Pharmacy           1826-Med Discontinued         Dose: 200 mg  Freq: 2 TIMES DAILY Route: PO  Start: 11/16/18 2100   End: 11/19/18 1826   Admin. Amount: 2 capsule (2 × 100 mg capsule)  Last Admin: 11/19/18 0832  Dispense Loc:  [facility]        2137 (200 mg)-Given        0906 (200 mg)-Given       2205 (200 mg)-Given        1017 (200 mg)-Given       2023 (200 mg)-Given        0832 (200 mg)-Given       1826-Med Discontinued         Dose: 20 mg  Freq: 2 TIMES DAILY Route: PO  Start: 11/18/18 2100   End: 11/19/18 1826   Admin Instructions: Hold for SBP < 100    Admin. Amount: 1 tablet (1 × 20 mg tablet)  Last Admin: 11/19/18 0832  Dispense Loc:  [facility]          2023 (20 mg)-Given        0832 (20 mg)-Given       1826-Med Discontinued         Dose: 30 mL  Freq: DAILY PRN Route: PO  PRN Reason: constipation  Start: 11/16/18 1802   End: 11/19/18 1826   Admin Instructions: Hold for loose stools.  This is the second step of a three step constipation treatment.    Admin. Amount: 30 mL  Dispense Loc:  [facility]  Volume: 30 mL           1826-Med Discontinued         Dose: 1 mg  Freq: AT BEDTIME PRN Route: PO  PRN Reason: sleep  Start: 11/16/18 1802   End:  11/19/18 1826   Admin Instructions: Do not give unless at least 6 hours of uninterrupted sleep is expected.    Admin. Amount: 1 tablet (1 × 1 mg tablet)  Dispense Loc:  [facility]           1826-Med Discontinued         Dose: 75 mg  Freq: DAILY Route: PO  Start: 11/18/18 0900   End: 11/19/18 1826   Admin Instructions: DO NOT CRUSH. Tablet may be split in half along score line.    Hold it if SBP<100 or HR<55    Admin. Amount: 1 tablet (1 × 25 mg tablet) + 1 tablet (1 × 50 mg tablet)  Last Admin: 11/19/18 0832  Dispense Loc:  [facility]   Mixture Administration Information:   Medication Type Amount   metoprolol succinate 25 MG TB24 Medications 25 mg   metoprolol succinate 50 MG TB24 Medications 50 mg                  1017 (75 mg)-Given        0832 (75 mg)-Given       1826-Med Discontinued         Dose: 1 tablet  Freq: DAILY Route: PO  Start: 11/17/18 0900   End: 11/19/18 1826   Admin. Amount: 1 tablet  Last Admin: 11/19/18 0831  Dispense Loc:  [facility]         0906 (1 tablet)-Given        1017 (1 tablet)-Given        0831 (1 tablet)-Given       1826-Med Discontinued         Dose: 0.1-0.4 mg  Freq: EVERY 2 MIN PRN Route: IV  PRN Reason: opioid reversal  Start: 11/16/18 1802   End: 11/19/18 1826   Admin Instructions: For respiratory rate LESS than or EQUAL to 8.  Partial reversal dose:  0.1 mg titrated q 2 minutes for Analgesia Side Effects Monitoring Sedation Level of 3 (frequently drowsy, arousable, drifts to sleep during conversation).Full reversal dose:  0.4 mg bolus for Analgesia Side Effects Monitoring Sedation Level of 4 (somnolent, minimal or no response to stimulation).  For ordered IV doses 0.1-2mg give IVP. Give each 0.4mg over 15 seconds in emergency situations. For non-emergent situations further dilute in 9mL of NS to facilitate titration of response.    Admin. Amount: 0.1-0.4 mg = 0.25-1 mL Conc: 0.4 mg/mL  Dispense Loc:  [facility]  Volume: 1 mL           1826-Med Discontinued         Dose: 4 mg  Freq:  EVERY 6 HOURS PRN Route: PO  PRN Reasons: nausea,vomiting  Start: 11/16/18 1802   End: 11/19/18 1826   Admin Instructions: This is Step 1 of nausea and vomiting management.  If nausea not resolved in 15 minutes, go to Step 2 prochlorperazine (COMPAZINE). Do not push through foil backing. Peel back foil and gently remove. Place on tongue immediately. Administration with liquid unnecessary  With dry hands, peel back foil backing and gently remove tablet; do not push oral disintegrating tablet through foil backing; administer immediately on tongue and oral disintegrating tablet dissolves in seconds; then swallow with saliva; liquid not required.    Admin. Amount: 1 tablet (1 × 4 mg tablet)  Dispense Loc: [facility]           1826-Med Discontinued      Or    Dose: 4 mg  Freq: EVERY 6 HOURS PRN Route: IV  PRN Reasons: nausea,vomiting  Start: 11/16/18 1802   End: 11/19/18 1826   Admin Instructions: This is Step 1 of nausea and vomiting management.  If nausea not resolved in 15 minutes, go to Step 2 prochlorperazine (COMPAZINE).  Irritant. For ordered IV doses 0.1-4 mg, give IV Push undiluted over 2-5 minutes.    Admin. Amount: 4 mg = 2 mL Conc: 4 mg/2 mL  Dispense Loc: [facility]  Infused Over: 2-5 Minutes  Volume: 2 mL           1826-Med Discontinued         Dose: 17 g  Freq: 2 TIMES DAILY PRN Route: PO  PRN Reason: constipation  Start: 11/16/18 1802   End: 11/19/18 1826   Admin Instructions: 1 Packet = 17 grams. Mixed prescribed dose in 8 ounces of water. Follow with 8 oz. of water.    Admin. Amount: 17 g  Dispense Loc: [facility]           1826-Med Discontinued      Discontinued Medications  Medications 11/13/18 11/14/18 11/15/18 11/16/18 11/17/18 11/18/18 11/19/18         Dose: 1 g  Freq: EVERY 24 HOURS Route: IV  Indications of Use: URINARY TRACT INFECTION  Start: 11/17/18 1500   End: 11/18/18 0908   Admin. Amount: 1 g  Last Admin: 11/17/18 1438  Dispense Loc: [facility]  Infused Over: 15-30 Minutes  Volume: 10 mL          1438 (1 g)-New Bag        0908-Med Discontinued          Dose: 20 mg  Freq: DAILY Route: PO  Start: 11/17/18 2000   End: 11/18/18 1053   Admin Instructions: Hold for SBP < 100    Admin. Amount: 1 tablet (1 × 20 mg tablet)  Last Admin: 11/18/18 1017  Dispense Loc:  [facility]         2205 (20 mg)-Given        1017 (20 mg)-Given       1053-Med Discontinued          Dose: 50 mg  Freq: DAILY Route: PO  Start: 11/16/18 1815   End: 11/17/18 1505   Admin Instructions: DO NOT CRUSH. Tablet may be split in half along score line.    Admin. Amount: 1 tablet (1 × 50 mg tablet)  Last Admin: 11/17/18 0906  Dispense Loc:  [facility]        1942 (50 mg)-Given        0906 (50 mg)-Given       1505-Med Discontinued           Rate: 75 mL/hr   Freq: CONTINUOUS Route: IV  Start: 11/16/18 1815   End: 11/19/18 1157   Last Admin: 11/19/18 0924  Dispense Loc: [facility]  Volume: 1,000 mL        1942 ( )-New Bag        0518 ( )-New Bag       1445 ( )-New Bag       1645 ( )-Rate/Dose Change       2306 ( )-Rate/Dose Verify        0427 ( )-New Bag       1902 ( )-New Bag       2021 ( )-Rate/Dose Verify        0924 ( )-New Bag       1157-Med Discontinued         Dose: 1,250 mg  Freq: EVERY 24 HOURS Route: IV  Indications of Use: BACTEREMIA  Last Dose: 1,250 mg (11/17/18 2306)  Start: 11/17/18 2300   End: 11/18/18 0908   Admin Instructions: Vesicant.    Admin. Amount: 1,250 mg  Last Admin: 11/17/18 2306  Dispense Loc:  Main Pharmacy  Infused Over: 90 Minutes  Volume: 250 mL   Mixture Administration Information:   Medication Type Amount   vancomycin 100 mg/mL SOLR Medications 1,250 mg   sodium chloride 0.9 % SOLN Base 250 mL                 2306 (1,250 mg)-New Bag        0908-Med Discontinued     Medications 11/13/18 11/14/18 11/15/18 11/16/18 11/17/18 11/18/18 11/19/18

## 2018-11-16 NOTE — ED NOTES
"[facility]  ED Nurse Handoff Report    ED Chief complaint: Fall (Patient brought in by EMS. Patient states fell last night. States was unable to get up. Was crawling around on the floor. Patient states called 911 herself. Patient lives independently.)      ED Diagnosis:   Final diagnoses:   Fall, initial encounter   NSTEMI (non-ST elevated myocardial infarction) (H)   Urinary tract infection in female   Dehydration   Elevated CK       Code Status: Full Code in ED, to be addressed by admitting provider    Allergies:   Allergies   Allergen Reactions     Demerol [Meperidine]      Dust Mites      Peanuts [Nuts]      Penicillins      Pollen Extract        Activity level - Baseline/Home:  Independent with walker    Activity Level - Current:   Unable to Assess     Needed?: No    Isolation: No  Infection: Not Applicable  Bariatric?: No    Vital Signs:   Vitals:    11/16/18 1246 11/16/18 1400 11/16/18 1530   BP: 142/78     Pulse: 95     Resp: 16 14 21   Temp: 97.3  F (36.3  C)     TempSrc: Oral     SpO2: 95%     Weight: 54.9 kg (121 lb)     Height: 1.575 m (5' 2\")         Cardiac Rhythm: ,        Pain level: 0-10 Pain Scale: 0    Is this patient confused?: No  [place] - Suicide Severity Rating Scale Completed?  Yes  If yes, what color did the patient score?  White    Patient Report: Initial Complaint: fall  Focused Assessment: tachycardic to 100's, otherwise VSS on R/A. Pt presents to ED after a fall last night, pt unable to get off of floor independently, paramedics assisted pt up and to ED at 1300 today. Significant UTI noted and trop elevated. Respiratory exam WNL, Cardiac exam exhibits trigeminal PVC's at times, otherwise WNL. Neuro exam WNL. Plan to admit.  Tests Performed: labs, EKG, Head/neck CT, UA, Blood cultures x2, CXR  Abnormal Results:   Labs Ordered and Resulted from Time of ED Arrival Up to the Time of Departure from the ED   CBC WITH PLATELETS DIFFERENTIAL - Abnormal; Notable for " the following:        Result Value    WBC 15.1 (*)     RBC Count 3.36 (*)     Hemoglobin 10.8 (*)     Hematocrit 33.1 (*)     Absolute Neutrophil 13.1 (*)     All other components within normal limits   BASIC METABOLIC PANEL - Abnormal; Notable for the following:     Creatinine 1.13 (*)     GFR Estimate 45 (*)     GFR Estimate If Black 54 (*)     All other components within normal limits   TROPONIN I - Abnormal; Notable for the following:     Troponin I ES 0.687 (*)     All other components within normal limits   ROUTINE UA WITH MICROSCOPIC - Abnormal; Notable for the following:     Ketones Urine 5 (*)     Blood Urine Moderate (*)     Protein Albumin Urine 30 (*)     Nitrite Urine Positive (*)     Leukocyte Esterase Urine Large (*)     WBC Urine 149 (*)     RBC Urine 32 (*)     Bacteria Urine Moderate (*)     Mucous Urine Present (*)     All other components within normal limits   CK TOTAL - Abnormal; Notable for the following:     CK Total 379 (*)     All other components within normal limits   VITAL SIGNS   PULSE OXIMETRY NURSING   CARDIAC CONTINUOUS MONITORING   URINE CULTURE AEROBIC BACTERIAL   BLOOD CULTURE   BLOOD CULTURE       Treatments provided: 1 L NS bolus, 324 ASA PO chewable, 1g rocephin IV     Family Comments: son and daughter in law at bedside    OBS brochure/video discussed/provided to patient/family: N/A              Name of person given brochure if not patient: NA              Relationship to patient: NA    ED Medications:   Medications   cefTRIAXone (ROCEPHIN) 1 g vial to attach to  mL bag for ADULTS or NS 50 mL bag for PEDS (1 g Intravenous New Bag 11/16/18 1534)   0.9% sodium chloride BOLUS (0 mLs Intravenous Stopped 11/16/18 1430)   aspirin chewable tablet 324 mg (324 mg Oral Given 11/16/18 1449)   sodium chloride 0.9% infusion ( Intravenous New Bag 11/16/18 1531)       Drips infusing?:  Yes, MIVF    For the majority of the shift this patient was Green.   Interventions performed were  NA.    Severe Sepsis OR Septic Shock Diagnosis Present: No    To be done/followed up on inpatient unit:  N/A    ED NURSE PHONE NUMBER: [phone]

## 2018-11-16 NOTE — IP AVS SNAPSHOT
MRN:[id number]                      After Visit Summary   11/16/2018    Tracey Moran    MRN: [id number]           Thank you!     Thank you for choosing [facility] for your care. Our goal is always to provide you with excellent care. Hearing back from our patients is one way we can continue to improve our services. Please take a few minutes to complete the written survey that you may receive in the mail after you visit with us. Thank you!        Patient Information     Date Of Birth          6/16/1925        Designated Caregiver       Most Recent Value    Caregiver    Will someone help with your care after discharge? yes    Name of designated caregiver Jonny    Phone number of caregiver see chart    Caregiver address unknown      About your hospital stay     You were admitted on:  November 16, 2018 You last received care in the:  [place] [facility] [address] [facility]    You were discharged on:  November 19, 2018        Reason for your hospital stay       Fall, weakness, urinary tract infection                  Who to Call     For medical emergencies, please call 911.  For non-urgent questions about your medical care, please call your primary care provider or clinic, [phone]          Attending Provider     Provider Specialty    Katarina Lara MD Emergency Medicine    Richardson Rainey MD Internal Medicine       Primary Care Provider Office Phone # Fax #    Guilherme Graves [phone]      After Care Instructions     Activity - Up with assistive device           Advance Diet as Tolerated       Follow this diet upon discharge:       Combination Diet Low Saturated Fat Na <2400mg Diet            Fall precautions           General info for SNF       Length of Stay Estimate: Short Term Care: Estimated # of Days <30  Condition at Discharge: Improving  Level of care:skilled   Rehabilitation Potential: Good  Admission H&P remains valid and up-to-date: Yes  Recent  "Chemotherapy: N/A  Use Nursing Home Standing Orders: Yes            Mantoux instructions       Give two-step Mantoux (PPD) Per Facility Policy Yes                  Follow-up Appointments     Follow Up and recommended labs and tests       Follow up with Nursing home physician.  No follow up labs or test are needed.                  Additional Services     Occupational Therapy Adult Consult       Evaluate and treat as clinically indicated.    Reason:  Physical deconditioning, right knee pain            Physical Therapy Adult Consult       Evaluate and treat as clinically indicated.    Reason:  Physical deconditioning, right knee pain                  Further instructions from your care team       Discharge to [facility], phone [phone]    Pending Results     Date and Time Order Name Status Description    11/18/2018 0943 Blood culture Preliminary     11/18/2018 0943 Blood culture Preliminary     11/16/2018 1455 Blood culture Preliminary             Statement of Approval     Ordered          11/19/18 1439  I have reviewed and agree with all the recommendations and orders detailed in this document.  EFFECTIVE NOW     Approved and electronically signed by:  Jean Johnson MD             Admission Information     Date & Time Provider Department Dept. Phone    11/16/2018 Richardson Rainey MD [place] [address] [facility] [phone]      Your Vitals Were     Blood Pressure Pulse Temperature Respirations Height Weight    139/67 (BP Location: Left arm) 69 97.3  F (36.3  C) (Oral) 16 1.575 m (5' 2\") 62.4 kg (137 lb 9.1 oz)    Pulse Oximetry BMI (Body Mass Index)                93% 25.16 kg/m2          [employer] Information     [employer] lets you send messages to your doctor, view your test results, renew your prescriptions, schedule appointments and more. To sign up, go to [email or web address] . Click on \"Log in\" on the left side of the screen, which will take you to the Welcome " "page. Then click on \"Sign up Now\" on the right side of the page.     You will be asked to enter the access code listed below, as well as some personal information. Please follow the directions to create your username and password.     Your access code is: [email or web address]  Expires: 2019  3:22 PM     Your access code will  in 90 days. If you need help or a new code, please call your Agency clinic or [phone].        Care EveryWhere ID     This is your Care EveryWhere ID. This could be used by other organizations to access your Agency medical records  [id number]        Equal Access to Services     [facility] HospitalYESENIA : Mona Quintero, eliza moss, meño poon, jose j gomez . So [facility] [phone].    ATENCIÓN: Si habla español, tiene a harrison disposición servicios gratuitos de asistencia lingüística. Llame al [phone].    We comply with applicable federal civil rights laws and [place] laws. We do not discriminate on the basis of race, color, national origin, age, disability, sex, sexual orientation, or gender identity.               Review of your medicines      START taking        Dose / Directions    aspirin 81 MG EC tablet   Used for:  NSTEMI (non-ST elevated myocardial infarction) (H)        Dose:  81 mg   Start taking on:  2018   Take 1 tablet (81 mg) by mouth daily   Refills:  0       cephALEXin 500 MG capsule   Commonly known as:  KEFLEX   Used for:  Urinary tract infection in female        Dose:  500 mg   Take 1 capsule (500 mg) by mouth 3 times daily for 10 days   Quantity:  28 capsule   Refills:  0       diclofenac 1 % Gel topical gel   Commonly known as:  VOLTAREN   Used for:  Arthritis of knee        Dose:  2 g   Place 2 g onto the skin 4 times daily as needed for moderate pain   Refills:  0         CONTINUE these medicines which may have CHANGED, or have new prescriptions. If we are uncertain of the size of tablets/capsules you " have at home, strength may be listed as something that might have changed.        Dose / Directions    metoprolol succinate 25 MG 24 hr tablet   Commonly known as:  TOPROL-XL   This may have changed:    - medication strength  - how much to take  - additional instructions   Used for:  NSTEMI (non-ST elevated myocardial infarction) (H)        Dose:  75 mg   Start taking on:  11/20/2018   Take 3 tablets (75 mg) by mouth daily   Quantity:  30 tablet   Refills:  0         CONTINUE these medicines which have NOT CHANGED        Dose / Directions    acetaminophen 500 MG tablet   Commonly known as:  TYLENOL        Dose:  1000 mg   Take 1,000 mg by mouth every 8 hours as needed for mild pain   Refills:  0       docusate sodium 100 MG capsule   Commonly known as:  COLACE   Used for:  Slow transit constipation        Dose:  200 mg   Take 2 capsules (200 mg) by mouth 2 times daily   Quantity:  100 capsule   Refills:  3       lisinopril 20 MG tablet   Commonly known as:  PRINIVIL/ZESTRIL   Used for:  Essential hypertension        TAKE ONE TABLET BY MOUTH TWICE DAILY. HOLD IF SYSTOLIC BLOOD PRESSURE IS LESS THAN 110 MMHG. CALL IF HELD TWICE IN A ROW OR IF SYMPTOMATIC.   Quantity:  180 tablet   Refills:  0       multivitamin, therapeutic with minerals Tabs tablet        Dose:  1 tablet   Take 1 tablet by mouth daily   Refills:  0       polyethylene glycol Packet   Commonly known as:  MIRALAX/GLYCOLAX   Used for:  Slow transit constipation        Dose:  17 g   Take 17 g by mouth 2 times daily as needed for constipation Use if no bowel movement for 2 days   Quantity:  7 packet   Refills:  1            Where to get your medicines      Some of these will need a paper prescription and others can be bought over the counter. Ask your nurse if you have questions.     You don't need a prescription for these medications     aspirin 81 MG EC tablet    cephALEXin 500 MG capsule    diclofenac 1 % Gel topical gel    metoprolol succinate 25 MG 24  hr tablet                Protect others around you: Learn how to safely use, store and throw away your medicines at www.disposemymeds.org.        ANTIBIOTIC INSTRUCTION     You've Been Prescribed an Antibiotic - Now What?  Your healthcare team thinks that you or your loved one might have an infection. Some infections can be treated with antibiotics, which are powerful, life-saving drugs. Like all medications, antibiotics have side effects and should only be used when necessary. There are some important things you should know about your antibiotic treatment.      Your healthcare team may run tests before you start taking an antibiotic.    Your team may take samples (e.g., from your blood, urine or other areas) to run tests to look for bacteria. These test can be important to determine if you need an antibiotic at all and, if you do, which antibiotic will work best.      Within a few days, your healthcare team might change or even stop your antibiotic.    Your team may start you on an antibiotic while they are working to find out what is making you sick.    Your team might change your antibiotic because test results show that a different antibiotic would be better to treat your infection.    In some cases, once your team has more information, they learn that you do not need an antibiotic at all. They may find out that you don't have an infection, or that the antibiotic you're taking won't work against your infection. For example, an infection caused by a virus can't be treated with antibiotics. Staying on an antibiotic when you don't need it is more likely to be harmful than helpful.      You may experience side effects from your antibiotic.    Like all medications, antibiotics have side effects. Some of these can be serious.    Let you healthcare team know if you have any known allergies when you are admitted to the hospital.    One significant side effect of nearly all antibiotics is the risk of severe and sometimes  deadly diarrhea caused by Clostridium difficile (C. Difficile). This occurs when a person takes antibiotics because some good germs are destroyed. Antibiotic use allows C. diificile to take over, putting patients at high risk for this serious infection.    As a patient or caregiver, it is important to understand your or your loved one's antibiotic treatment. It is especially important for caregivers to speak up when patients can't speak for themselves. Here are some important questions to ask your healthcare team.    What infection is this antibiotic treating and how do you know I have that infection?    What side effects might occur from this antibiotic?    How long will I need to take this antibiotic?    Is it safe to take this antibiotic with other medications or supplements (e.g., vitamins) that I am taking?     Are there any special directions I need to know about taking this antibiotic? For example, should I take it with food?    How will I be monitored to know whether my infection is responding to the antibiotic?    What tests may help to make sure the right antibiotic is prescribed for me?      Information provided by:  www.cdc.gov/getsmart  U.S. Department of Health and Human Services  Centers for disease Control and Prevention  National Center for Emerging and Zoonotic Infectious Diseases  Division of Healthcare Quality Promotion             Medication List: This is a list of all your medications and when to take them. Check marks below indicate your daily home schedule. Keep this list as a reference.      Medications           Morning Afternoon Evening Bedtime As Needed    acetaminophen 500 MG tablet   Commonly known as:  TYLENOL   Take 1,000 mg by mouth every 8 hours as needed for mild pain   Last time this was given:  1,000 mg on 11/19/2018 11:39 AM                                aspirin 81 MG EC tablet   Take 1 tablet (81 mg) by mouth daily   Start taking on:  11/20/2018   Last time this was given:  81  mg on 11/19/2018  8:32 AM                                cephALEXin 500 MG capsule   Commonly known as:  KEFLEX   Take 1 capsule (500 mg) by mouth 3 times daily for 10 days                                diclofenac 1 % Gel topical gel   Commonly known as:  VOLTAREN   Place 2 g onto the skin 4 times daily as needed for moderate pain                                docusate sodium 100 MG capsule   Commonly known as:  COLACE   Take 2 capsules (200 mg) by mouth 2 times daily   Last time this was given:  200 mg on 11/19/2018  8:32 AM                                lisinopril 20 MG tablet   Commonly known as:  PRINIVIL/ZESTRIL   TAKE ONE TABLET BY MOUTH TWICE DAILY. HOLD IF SYSTOLIC BLOOD PRESSURE IS LESS THAN 110 MMHG. CALL IF HELD TWICE IN A ROW OR IF SYMPTOMATIC.   Last time this was given:  20 mg on 11/19/2018  8:32 AM                                metoprolol succinate 25 MG 24 hr tablet   Commonly known as:  TOPROL-XL   Take 3 tablets (75 mg) by mouth daily   Start taking on:  11/20/2018   Last time this was given:  75 mg on 11/19/2018  8:32 AM                                multivitamin, therapeutic with minerals Tabs tablet   Take 1 tablet by mouth daily   Last time this was given:  1 tablet on 11/19/2018  8:31 AM                                polyethylene glycol Packet   Commonly known as:  MIRALAX/GLYCOLAX   Take 17 g by mouth 2 times daily as needed for constipation Use if no bowel movement for 2 days

## 2018-11-16 NOTE — IP AVS SNAPSHOT
[facility] [address] [facility]    [address] [place] [address]    Phone:  [phone]                                       After Visit Summary   11/16/2018    Tracey Moran    MRN: [id number]           After Visit Summary Signature Page     I have received my discharge instructions, and my questions have been answered. I have discussed any challenges I see with this plan with the nurse or doctor.    ..........................................................................................................................................  Patient/Patient Representative Signature      ..........................................................................................................................................  Patient Representative Print Name and Relationship to Patient    ..................................................               ................................................  Date                                   Time    ..........................................................................................................................................  Reviewed by Signature/Title    ...................................................              ..............................................  Date                                               Time          22EPIC Rev 08/18

## 2018-11-16 NOTE — PHARMACY-ADMISSION MEDICATION HISTORY
Admission medication history interview status for the 11/16/2018  admission is complete. See EPIC admission navigator for prior to admission medications     Medication history source reliability:Good    Actions taken by pharmacist (provider contacted, etc): Interviewed patient who reported she didn't take her morning medications because she did not have breakfast. Also interviewed family member who could report doses and frequencies of medications.      Additional medication history information not noted on PTA med list :None    Medication reconciliation/reorder completed by provider prior to medication history? No    Time spent in this activity: 10 minutes    Prior to Admission medications    Medication Sig Last Dose Taking? Auth Provider   acetaminophen (TYLENOL) 500 MG tablet Take 1,000 mg by mouth every 8 hours as needed for mild pain 11/15/2018 at Unknown time Yes Unknown, Entered By History   docusate sodium (COLACE) 100 MG capsule Take 2 capsules (200 mg) by mouth 2 times daily 11/15/2018 at 2000 Yes Corby Murphy MD   lisinopril (PRINIVIL/ZESTRIL) 20 MG tablet TAKE ONE TABLET BY MOUTH TWICE DAILY. HOLD IF SYSTOLIC BLOOD PRESSURE IS LESS THAN 110 MMHG. CALL IF HELD TWICE IN A ROW OR IF SYMPTOMATIC. 11/15/2018 at 2000 Yes Guilherme Graves MD   metoprolol succinate (TOPROL-XL) 50 MG 24 hr tablet Take 1 tablet (50 mg) by mouth daily Hold if SBP <110 or HR <55 call if held x2 in a row or symptomatic 11/15/2018 at 0800 Yes Guilherme Graves MD   multivitamin, therapeutic with minerals (THERA-VIT-M) TABS tablet Take 1 tablet by mouth daily 11/15/2018 at 0800 Yes Unknown, Entered By History   polyethylene glycol (MIRALAX/GLYCOLAX) Packet Take 17 g by mouth 2 times daily as needed for constipation Use if no bowel movement for 2 days Past Week at Unknown time Yes Corby Murphy MD

## 2018-11-16 NOTE — IP AVS SNAPSHOT
"    [place] [address] [facility]: [phone]                                              INTERAGENCY TRANSFER FORM - PHYSICIAN ORDERS   2018                    Hospital Admission Date: 2018  LIV [place]   : 1925  Sex: Female        Attending Provider: (none)    Allergies:  Demerol [Meperidine], Dust Mites, Peanuts [Nuts], Penicillins, Pollen Extract    Infection:  None   Service:  HOSPITALIST    Ht:  1.575 m (5' 2\")   Wt:  62.4 kg (137 lb 9.1 oz)   Admission Wt:  54.9 kg (121 lb)    BMI:  25.16 kg/m 2   BSA:  1.65 m 2            Patient PCP Information     Provider PCP Type    Guilherme Graves MD General      ED Clinical Impression     Diagnosis Description Comment Added By Time Added    Fall, initial encounter [W19.XXXA] Fall, initial encounter [W19.XXXA]  Katarina Lara MD 2018  3:11 PM    NSTEMI (non-ST elevated myocardial infarction) (H) [I21.4] NSTEMI (non-ST elevated myocardial infarction) (H) [I21.4]  Katarina Lara MD 2018  3:11 PM    Urinary tract infection in female [N39.0] Urinary tract infection in female [N39.0]  Katarina Lara MD 2018  3:11 PM    Dehydration [E86.0] Dehydration [E86.0]  Katarina Lara MD 2018  3:12 PM    Elevated CK [R74.8] Elevated CK [R74.8]  Katarina Lara MD 2018  3:12 PM      Hospital Problems as of 2018              Priority Class Noted POA    Fall Medium  2018 Yes      Non-Hospital Problems as of 2018              Priority Class Noted    Hyperlipidemia LDL goal <130 Medium  2014    Advanced directives, counseling/discussion Medium  2014    Left wrist fracture, with routine healing, subsequent encounter Medium  2016    Leukocytosis, unspecified type Medium  2016    Elevated CK Medium  12/15/2016    Abrasion of left scapular region, subsequent encounter Medium  12/15/2016    Essential hypertension, benign Medium  2017    Anemia " due to blood loss, acute Medium  12/6/2017    Thrombocytopenia (H) Medium  12/6/2017    CRF (chronic renal failure), stage 3 (moderate) (H) Medium  12/6/2017    Slow transit constipation Medium  12/6/2017    Gastrointestinal hemorrhage, unspecified gastrointestinal hemorrhage type Medium  12/13/2017      Code Status History     Date Active Date Inactive Code Status Order ID Comments User Context    11/16/2018  6:02 PM 11/19/2018  6:26 PM Full Code [id number]  Richardson Rainey MD Inpatient    2/11/2018 12:20 PM 11/16/2018  6:02 PM Full Code [id number]  Guilherme Zhou MD Outpatient    2/8/2018  4:11 PM 2/11/2018 12:20 PM Full Code [id number]  Tomy Keene MD Inpatient    12/7/2017 12:18 PM 2/8/2018  4:11 PM Full Code [id number]  Corby Murphy MD Outpatient    12/6/2017 12:20 AM 12/7/2017 12:18 PM Full Code [id number]  Dana Aburto MD Inpatient    12/12/2016  9:38 AM 12/6/2017 12:20 AM Full Code [id number]  Willam Martinez PA-C Outpatient    12/9/2016  9:29 PM 12/12/2016  9:38 AM Full Code [id number]  Adan Mohan MD ED    10/21/2014 10:50 AM 12/9/2016  9:29 PM Full Code [id number]  Hair Bello MD Outpatient    10/14/2014 12:55 PM 10/21/2014 10:50 AM Full Code [id number]  Candido Martinez MD Inpatient         Medication Review      START taking        Dose / Directions Comments    aspirin 81 MG EC tablet   Used for:  NSTEMI (non-ST elevated myocardial infarction) (H)        Dose:  81 mg   Start taking on:  11/20/2018   Take 1 tablet (81 mg) by mouth daily   Refills:  0        cephALEXin 500 MG capsule   Commonly known as:  KEFLEX   Used for:  Urinary tract infection in female        Dose:  500 mg   Take 1 capsule (500 mg) by mouth 3 times daily for 10 days   Quantity:  28 capsule   Refills:  0        diclofenac 1 % Gel topical gel   Commonly known as:  VOLTAREN   Used for:  Arthritis of knee        Dose:  2 g   Place 2 g onto the skin 4 times daily as needed for  moderate pain   Refills:  0          CONTINUE these medications which may have CHANGED, or have new prescriptions. If we are uncertain of the size of tablets/capsules you have at home, strength may be listed as something that might have changed.        Dose / Directions Comments    metoprolol succinate 25 MG 24 hr tablet   Commonly known as:  TOPROL-XL   This may have changed:    - medication strength  - how much to take  - additional instructions   Used for:  NSTEMI (non-ST elevated myocardial infarction) (H)        Dose:  75 mg   Start taking on:  11/20/2018   Take 3 tablets (75 mg) by mouth daily   Quantity:  30 tablet   Refills:  0          CONTINUE these medications which have NOT CHANGED        Dose / Directions Comments    acetaminophen 500 MG tablet   Commonly known as:  TYLENOL        Dose:  1000 mg   Take 1,000 mg by mouth every 8 hours as needed for mild pain   Refills:  0        docusate sodium 100 MG capsule   Commonly known as:  COLACE   Used for:  Slow transit constipation        Dose:  200 mg   Take 2 capsules (200 mg) by mouth 2 times daily   Quantity:  100 capsule   Refills:  3    To prevent constipation       lisinopril 20 MG tablet   Commonly known as:  PRINIVIL/ZESTRIL   Used for:  Essential hypertension        TAKE ONE TABLET BY MOUTH TWICE DAILY. HOLD IF SYSTOLIC BLOOD PRESSURE IS LESS THAN 110 MMHG. CALL IF HELD TWICE IN A ROW OR IF SYMPTOMATIC.   Quantity:  180 tablet   Refills:  0        multivitamin, therapeutic with minerals Tabs tablet        Dose:  1 tablet   Take 1 tablet by mouth daily   Refills:  0        polyethylene glycol Packet   Commonly known as:  MIRALAX/GLYCOLAX   Used for:  Slow transit constipation        Dose:  17 g   Take 17 g by mouth 2 times daily as needed for constipation Use if no bowel movement for 2 days   Quantity:  7 packet   Refills:  1                  Further instructions from your care team       Discharge to [facility], phone  [phone]    Summary of Visit     Reason for your hospital stay       Fall, weakness, urinary tract infection             After Care     Activity - Up with assistive device           Advance Diet as Tolerated       Follow this diet upon discharge:       Combination Diet Low Saturated Fat Na <2400mg Diet       Fall precautions           General info for SNF       Length of Stay Estimate: Short Term Care: Estimated # of Days <30  Condition at Discharge: Improving  Level of care:skilled   Rehabilitation Potential: Good  Admission H&P remains valid and up-to-date: Yes  Recent Chemotherapy: N/A  Use Nursing Home Standing Orders: Yes       Mantoux instructions       Give two-step Mantoux (PPD) Per Facility Policy Yes             Referrals     Occupational Therapy Adult Consult       Evaluate and treat as clinically indicated.    Reason:  Physical deconditioning, right knee pain       Physical Therapy Adult Consult       Evaluate and treat as clinically indicated.    Reason:  Physical deconditioning, right knee pain             Follow-Up Appointment Instructions     Future Labs/Procedures    Follow Up and recommended labs and tests     Comments:    Follow up with Nursing home physician.  No follow up labs or test are needed.      Follow-Up Appointment Instructions     Follow Up and recommended labs and tests       Follow up with Nursing home physician.  No follow up labs or test are needed.             Statement of Approval     Ordered          11/19/18 1439  I have reviewed and agree with all the recommendations and orders detailed in this document.  EFFECTIVE NOW     Approved and electronically signed by:  Jena Johnson MD

## 2018-11-16 NOTE — H&P
[facility]    History and Physical  Hospitalist    Tracey Moran MRN# [id number]   Age: 89 year old YOB: 1925     Date of Admission:  11/16/2018    Primary care provider: Guilherme Graves          Assessment and Plan:     Tracey Moran is a 89 year old  female with medical history of hypertension, history of GI bleed in the past, dementia brought into the ED via EMS after a fall at home.      Status post fall at home unclear etiology -likely from UTI/physical deconditioning.  Physical deconditioning likely from senile fragility  Patient/son feels that she fell around 2100 last night and was not able to get up, laid on the floor all night.  Patient thinks she was wearing socks she slipped and fell on the rug.  She is unable to provide any history regarding the fall but able to recollect that she called 911 this morning.  Unclear if patient lost any consciousness [history limited as patient has dementia]  In the ED at the time of evaluation hemodynamically stable, abrasions on bilateral knee/elbow areas. CBC 15.1, hemoglobin 10.8, platelets 241.  Creatinine 1.13. CT head no acute pathology. CT C-spine no acute pathology. Chest x-ray left basilar atelectasis.  Admit inpatient.  Telemetry monitoring.  Neurochecks overnight.  Treat underlying UTI/elevated troponin as below.  Aggressive incentive spirometry  PRN Tylenol for pain.  Voltaren gel topical application.  Wound care evaluation for skin tears.  Will check TSH, CPK levels, magnesium, phosphorus and vitamin B12 and folic acid levels.  PT, OT assessment a.m.   assistance with transition of care.    Urinary tract infection  Leukocytosis likely reactive from fall/UTI  Has mild incontinence of urine, increased frequency of urination.  UA moderate blood, nitrite positive, large leukocyte esterase, moderate bacteria, 149 WBC.  Follow urine culture/blood culture results.  Continue IV ceftriaxone 1 g daily.    Elevated troponin  likely in the setting of fall/mild rhabdomyolysis/acute kidney injury.  Denies any chest pain or palpitations or shortness of breath.  EKG showed sinus tachycardia, left ventricular hypertrophy, ST-T wave changes V5 to V6 [no significant change when compared to previous EKG]  , troponin 0.687.  Patient admitted in February 2018 with similar complaints of fall in the setting of urinary tract infection and had rhabdomyolysis and elevated troponin [1.570].   Echocardiogram 2/2018 showed normal left ventricular size, estimated EF at 60-65%.  Grade 1 early diastolic dysfunction.  Mild aortic regurgitation/mild pulmonary hypertension/ascending aorta mildly dilated.She was managed medically and discharged home.    Telemetry monitoring.  Trend troponin.  Note son reports patient and family decline echocardiogram at this time, they would like to trend troponin and discuss further tomorrow.  Discussed heparin drip until troponin confirmed trending down, son declined at this time.  Continue PTA metoprolol XL  Unclear why patient not on aspirin at home [possible previous GI bleed] received 325 mg aspirin in the ED, started on 81 mg aspirin.  Need to discuss with family a.m. if he would like to continue aspirin on discharge.  Check lipid panel a.m.    Prerenal azotemia  Most recent creatinine 0.61 in 2/2018.   Creatinine 1.13 on admission, with associated elevated BUN and appears volume depleted on exam.   IV fluids. Follow BMP  Avoid nephrotoxins.  Hold PTA lisinopril overnight.    Mild rhabdomyolysis   associated with unknown time down. Well below threshold to expect renal consequences at this time.   Continue IV fluids as above  Will recheck CK following fluids     Acute Encephalopathy, likely multifactorial   No clear focal abnormalities on exam.   Admission head CT negative.   Concern for volume depletion, UTI, cardiac event as detailed above, all potential contributors   Defer any further neuro-imaging at  present as no clear focal abnormalities  Treat as above and reassess  Re-orient as needed     Chronic normocytic anemia  History of GI bleed 12/2017, which was suspected to be diverticular in nature.   EGD unremarkable at that time, patient declined colonoscopy.   Hemoglobin 10.8 g/dl on admission with last value of 8.6 g/dl in 5/2018.   Appears volume depleted so may expect some decline with fluids.     Hypertension   Continue prior to admission metoprolol XL   PTA lisinopril, consider restarting a.m. after following up BMP.     Constipation  Chronic issue.   Scheduled docusate   PRN bowel regimen ordered    DVT Prophylaxis: scd  Code Status: Full Code, discussed with patient and her son by the bedside    Disposition: Expected discharge in 2 days pending clinical improvement  Patient, family, interdisciplinary team involved in care and agrees with plan.    Total time  > 40 minutes. More than 50% of time spent in direct patient care, care coordination, patient/caregiver counseling, and formalizing plan of care.    Richardson Rainey MD          Chief Complaint:     History is obtained from the patient, her family by the bedside    Tracey Moran is a 89 year old  female with medical history of hypertension, history of GI bleed in the past, dementia brought into the ED via EMS after a fall at home.      Lives alone at home, unclear etiology of fall.  Last spoke to her son last night.  Patient/son feels that she fell around 2100 last night and was not able to get up, laid on the floor all night.  Patient thinks she was wearing socks she slipped and fell on the rug.  She is unable to provide any history regarding the fall but able to recollect that she called 911 this morning.  Unclear if patient lost any consciousness [history limited as patient has dementia]    She denies any chest pain or palpitations.  No shortness of breath, no cough or wheezing.  No neck pain or hip pain.  No focal weakness, no history of seizures.   Denies any diarrhea or constipation.  No nausea vomiting.  Has mild incontinence of urine, increased frequency of urination.  No fever or chills.    Patient admitted in February 2018 with similar complaints of fall in the setting of urinary tract infection and had rhabdomyolysis and elevated troponin.  She was managed medically and discharged home.    In the ED at the time of evaluation hemodynamically stable, abrasions on bilateral knee/elbow areas.  EKG showed sinus tachycardia, left ventricular hypertrophy, ST-T wave changes V5 to V6 [no significant change when compared to previous EKG]  CT head no acute pathology.  CT C-spine no acute pathology.  Chest x-ray left basilar atelectasis.    CBC 15.1, hemoglobin 10.8, platelets 241.  Creatinine 1.13.  , troponin 0 0.687.  UA moderate blood, nitrite positive, large leukocyte esterase, moderate bacteria, 149 WBC.  Received 1 L fluid bolus, aspirin 325 mg and Rocephin 1 g IV in the ED.            Review of Systems:     GENERAL: No weight changes, no fever or chills  EENT: No new vision changes, no sinus problems, no difficulty swallowing, no hearing difficulty  PULMONARY: No shortness of breath, no cough, no wheezing  CARDIAC: no chest pain, no irregular or fast heart beats   GI: No abdominal pain, nausea, vomiting, diarrhea, constipation, black or bloody stools  :  urgency, no hematuria.   NEURO: No significant headaches, no slurred speech, no dizziness  ENDOCRINE: No excessive thirst, no excessive bruising.  MUSCULOSKELETAL: Chronic knee pains  SKIN: Skin tears  PSYCHIATRY no anxiety or depression    Medical History:     Past Medical History:   Diagnosis Date     Hypertension         Surgical History:      Past Surgical History:   Procedure Laterality Date     ESOPHAGOSCOPY, GASTROSCOPY, DUODENOSCOPY (EGD), COMBINED N/A 12/6/2017    Procedure: COMBINED ESOPHAGOSCOPY, GASTROSCOPY, DUODENOSCOPY (EGD);  gastroscopy;  Surgeon: Melchor Mancera MD;  Location: [facility]              Social History:      Social History   Substance Use Topics     Smoking status: Never Smoker     Smokeless tobacco: Never Used     Alcohol use Yes             Family History:   Reviewed and non-contributory to chief complaint given the patient's age.         Allergies:     Allergies   Allergen Reactions     Demerol [Meperidine]      Dust Mites      Peanuts [Nuts]      Penicillins      Pollen Extract              Medications:   Home medications reviewed         Physical Exam      Admission Weight: 54.9 kg (121 lb)  Current Weight: 54.9 kg (121 lb)    Vital Signs with Ranges  Temp:  [97.3  F (36.3  C)] 97.3  F (36.3  C)  Pulse:  [95] 95  Heart Rate:  [108] 108  Resp:  [14-16] 14  BP: (142)/(78) 142/78  SpO2:  [95 %] 95 %    PHYSICAL EXAM  GENERAL: Patient is in no distress.  Awake and able to answer simple commands.  HEENT: Oropharynx pink, moist. Pupils equal  HEART: Regular rate and rhythm. S1S2.  Systolic murmur right sternal area.  LUNGS: Clear to auscultation bilaterally. No expiratory wheeze.  Respirations unlabored  ABDOMEN: Soft, no abdominal tenderness, bowel sounds heard   NEURO: Cranial nerves II-XII intact. Motor and sensory system grossly intact  EXTREMITIES: No pedal edema. 2+ peripheral pulses.  SKIN: Warm, dry.  Multiple areas of skin tears over bilateral elbows and bilateral knees noninfected.  No oozing  PSYCHIATRY Cooperative, pleasantly demented.       Data:     Results for orders placed or performed during the hospital encounter of 11/16/18 (from the past 24 hour(s))   CBC with platelets differential   Result Value Ref Range    WBC 15.1 (H) 4.0 - 11.0 10e9/L    RBC Count 3.36 (L) 3.8 - 5.2 10e12/L    Hemoglobin 10.8 (L) 11.7 - 15.7 g/dL    Hematocrit 33.1 (L) 35.0 - 47.0 %    MCV 99 78 - 100 fl    MCH 32.1 26.5 - 33.0 pg    MCHC 32.6 31.5 - 36.5 g/dL    RDW 13.8 10.0 - 15.0 %    Platelet Count 241 150 - 450 10e9/L    Diff Method Automated Method     % Neutrophils 86.9 %    % Lymphocytes  5.7 %    % Monocytes 7.0 %    % Eosinophils 0.1 %    % Basophils 0.1 %    % Immature Granulocytes 0.2 %    Absolute Neutrophil 13.1 (H) 1.6 - 8.3 10e9/L    Absolute Lymphocytes 0.9 0.8 - 5.3 10e9/L    Absolute Monocytes 1.1 0.0 - 1.3 10e9/L    Absolute Eosinophils 0.0 0.0 - 0.7 10e9/L    Absolute Basophils 0.0 0.0 - 0.2 10e9/L    Abs Immature Granulocytes 0.0 0 - 0.4 10e9/L   Basic metabolic panel   Result Value Ref Range    Sodium 142 133 - 144 mmol/L    Potassium 4.0 3.4 - 5.3 mmol/L    Chloride 108 94 - 109 mmol/L    Carbon Dioxide 26 20 - 32 mmol/L    Anion Gap 8 3 - 14 mmol/L    Glucose 80 70 - 99 mg/dL    Urea Nitrogen 23 7 - 30 mg/dL    Creatinine 1.13 (H) 0.52 - 1.04 mg/dL    GFR Estimate 45 (L) >60 mL/min/1.7m2    GFR Estimate If Black 54 (L) >60 mL/min/1.7m2    Calcium 9.3 8.5 - 10.1 mg/dL   Troponin I   Result Value Ref Range    Troponin I ES 0.687 (HH) 0.000 - 0.045 ug/L   CK total   Result Value Ref Range    CK Total 379 (H) 30 - 225 U/L   EKG 12-lead, tracing only   Result Value Ref Range    Interpretation ECG Click View Image link to view waveform and result    UA with Microscopic   Result Value Ref Range    Color Urine Yellow     Appearance Urine Slightly Cloudy     Glucose Urine Negative NEG^Negative mg/dL    Bilirubin Urine Negative NEG^Negative    Ketones Urine 5 (A) NEG^Negative mg/dL    Specific Gravity Urine 1.011 1.003 - 1.035    Blood Urine Moderate (A) NEG^Negative    pH Urine 6.0 5.0 - 7.0 pH    Protein Albumin Urine 30 (A) NEG^Negative mg/dL    Urobilinogen mg/dL Normal 0.0 - 2.0 mg/dL    Nitrite Urine Positive (A) NEG^Negative    Leukocyte Esterase Urine Large (A) NEG^Negative    Source Catheterized Urine     WBC Urine 149 (H) 0 - 5 /HPF    RBC Urine 32 (H) 0 - 2 /HPF    Bacteria Urine Moderate (A) NEG^Negative /HPF    Mucous Urine Present (A) NEG^Negative /LPF   CT Cervical Spine w/o Contrast    Narrative    CT CERVICAL SPINE WITHOUT CONTRAST   11/16/2018 2:06 PM     HISTORY: Patient  fell. Neck trauma.     TECHNIQUE: Axial images of the cervical spine were obtained without  intravenous contrast. Multiplanar reformations were performed.   Radiation dose for this scan was reduced using automated exposure  control, adjustment of the mA and/or kV according to patient size, or  iterative reconstruction technique.    COMPARISON: None.    FINDINGS: There is no evidence of fracture. There is multilevel  degenerative disc disease and degenerative facet arthropathy,  especially from C2-C3 through C6-C7. There is acquired fusion of the  left C2-C3 facet joint. Degenerative changes are seen at the medial  atlantoaxial joint with soft tissue posterior to the joint with a few  calcifications and with sclerotic rimmed erosions at the base of the  odontoid suggesting a crystalline arthropathy such as gout or CPPD.  There is no neural impingement.    Right mastoid air cells are partially opacified. This is unchanged  since 2/8/2018.      Impression    IMPRESSION:  1. No evidence of acute trauma.  2. Degenerative changes.  3. Chronic opacification of right mastoid air cells. This is  unchanged.    RAFAELA HERNANDEZ MD   CT Head w/o Contrast    Narrative    CT SCAN OF THE HEAD WITHOUT CONTRAST   11/16/2018 2:06 PM     HISTORY:  Fall.     TECHNIQUE:  Axial images of the head and coronal reformations without  IV contrast material. Radiation dose for this scan was reduced using  automated exposure control, adjustment of the mA and/or kV according  to patient size, or iterative reconstruction technique.    COMPARISON: 2/8/2018.    FINDINGS: There is no evidence of intracranial hemorrhage, mass, acute  infarct or anomaly. There is generalized atrophy of the brain. There  is low attenuation in the white matter of the cerebral hemispheres  consistent with sequelae of small vessel ischemic disease. Ventricular  size is within normal limits without evidence of hydrocephalus.     Mild mucosal thickening in the paranasal  sinuses. The bony calvarium  and bones of the skull base appear intact.       Impression    IMPRESSION:     1. No evidence of acute intracranial hemorrhage, mass, or herniation.  2. There is generalized atrophy of the brain. White matter changes are  present in the cerebral hemispheres that are consistent with small  vessel ischemic disease in this age patient.     PETER JACKSON MD   Chest XR,  PA & LAT    Narrative    XR CHEST 2 VW 11/16/2018 2:31 PM    COMPARISON: 2/8/2018    HISTORY: Fall.      Impression    IMPRESSION: Enlarged cardiac silhouette is again seen. LEFT basilar  atelectasis is noted, lungs otherwise clear. No pleural effusion or  pneumothorax seen on either side.    GILMA HORN MD

## 2018-11-16 NOTE — ED NOTES
DATE:  11/16/2018   TIME OF RECEIPT FROM LAB:  1401  LAB TEST:  Troponin  LAB VALUE:  0.687  RESULTS GIVEN WITH READ-BACK TO (PROVIDER):  Dr. Lara  TIME LAB VALUE REPORTED TO PROVIDER:   1899

## 2018-11-16 NOTE — IP AVS SNAPSHOT
` `     [place] [address] [facility]: [phone]                 INTERAGENCY TRANSFER FORM - NOTES (H&P, Discharge Summary, Consults, Procedures, Therapies)   2018                    Hospital Admission Date: 2018  LIV KINGSTON   : 1925  Sex: Female        Patient PCP Information     Provider PCP Type    Guilherme Graves MD General         History & Physicals      H&P by Richardson Rainey MD at 2018  3:19 PM     Author:  Richardson Rainey MD Service:  Hospitalist Author Type:  Physician    Filed:  2018  8:46 PM Date of Service:  2018  3:19 PM Creation Time:  2018  3:18 PM    Status:  Addendum :  Richardson Rainey MD (Physician)         [facility]    History and Physical  Hospitalist    Liv Kingston MRN# [id number]   Age: 89 year old YOB: 1925     Date of Admission:  2018    Primary care provider: Guilherme Graves          Assessment and Plan:     Liv Kingston is a 89 year old  female with medical history of[SN1.1] hypertension, history of GI bleed in the past, dementia brought into the ED via EMS after a fall at home.      Status post fall at home unclear etiology -likely from UTI/physical deconditioning.  Physical deconditioning likely from senile fragility  Patient/son feels that she fell around 2100 last night and was not able to get up, laid on the floor all night.  Patient thinks she was wearing socks she slipped and fell on the rug.  She is unable to provide any history regarding the fall but able to recollect that she called 911 this morning.  Unclear if patient lost any consciousness [history limited as patient has dementia]  In the ED at the time of evaluation hemodynamically stable, abrasions on bilateral knee/elbow areas. CBC 15.1, hemoglobin 10.8, platelets 241.  Creatinine 1.13. CT head no acute pathology. CT C-spine no acute pathology. Chest x-ray left basilar atelectasis.  Admit  inpatient.  Telemetry monitoring.  Neurochecks overnight.  Treat underlying UTI/elevated troponin as below.  Aggressive incentive spirometry  PRN Tylenol for pain.  Voltaren gel topical application.  Wound care evaluation for skin tears.  Will check TSH, CPK levels, magnesium, phosphorus and vitamin B12 and folic acid levels.  PT, OT assessment a.m.   assistance with transition of care.    Urinary tract infection  Leukocytosis likely reactive from fall/UTI  Has mild incontinence of urine, increased frequency of urination.  UA moderate blood, nitrite positive, large leukocyte esterase, moderate bacteria, 149 WBC.  Follow urine culture/blood culture results.  Continue IV ceftriaxone 1 g daily.    Elevated troponin likely in the setting of fall/mild rhabdomyolysis/acute kidney injury.  Denies any chest pain or palpitations or shortness of breath.  EKG showed sinus tachycardia, left ventricular hypertrophy, ST-T wave changes V5 to V6 [no significant change when compared to previous EKG]  , troponin 0.687.  Patient admitted in February 2018 with similar complaints of fall in the setting of urinary tract infection and had rhabdomyolysis and elevated troponin [1.570].   Echocardiogram 2/2018 showed normal left ventricular size, estimated EF at 60-65%.  Grade 1 early diastolic dysfunction.  Mild aortic regurgitation/mild pulmonary hypertension/ascending aorta mildly dilated.She was managed medically and discharged home.    Telemetry monitoring.  Trend troponin.[SN1.2]  Note son reports patient and family[SN1.3] decline echocardiogram at this time, they would like to trend troponin and discuss further tomorrow.[SN1.2]  Discussed heparin drip until troponin confirmed trending down, son declined at this time.[SN1.3]  Continue PTA m[SN1.2]etoprolol XL[SN1.3]  Unclear why patient not on aspirin at home [possible previous GI bleed] received 325 mg aspirin in the ED, started on 81 mg aspirin.  Need to discuss  with family a.m. if he would like to continue aspirin on discharge.  Check lipid panel a.m.[SN1.2]    Prerenal azotemia  Most recent creatinine 0.61 in 2/2018.   Creatinine 1.13 on admission, with associated elevated BUN and appears volume depleted on exam.   IV fluids[SN1.3].[SN1.2] Follow BMP  Avoid nephrotoxins[SN1.3].  Hold PTA lisinopril overnight.[SN1.2]    Mild rhabdomyolysis   associated with unknown time down. Well below threshold to expect renal consequences at this time.   Continue IV fluids as above  Will recheck CK following fluids   [SN1.3]  Acute[SN1.2] Encephalopathy, likely multifactorial   No clear focal abnormalities on exam.   Admission head CT negative.   Concern for volume depletion, UTI, cardiac event as detailed above, all potential contributors   Defer any further neuro-imaging at present as no clear focal abnormalities  Treat as above and reassess  Re-orient as needed     Chronic normocytic anemia  History of GI bleed 12/2017, which was suspected to be diverticular in nature.   EGD unremarkable at that time, patient declined colonoscopy.   Hemoglobin 10.8 g/dl on admission with last value of 8.6 g/dl in 5/2018.   Appears volume depleted so may expect some decline with fluids.     Hypertension   Continue prior to admission metoprolol XL[SN1.3]   PTA lisinopril, consider restarting a.m. after following up BMP.[SN1.2]     Constipation  Chronic issue.   Scheduled docusate   PRN bowel regimen ordered[SN1.3]    DVT Prophylaxis:[SN1.1] scd[SN1.2]  Code Status:[SN1.1] Full Code, discussed with patient and her son by the bedside[SN1.2]    Disposition: Expected discharge in[SN1.1] 2 days pending clinical improvement[SN1.2]  Patient, family, interdisciplinary team involved in care and agrees with plan.    Total time[SN1.1]  >[SN1.2] 40 minutes. More than 50% of time spent in direct patient care, care coordination, patient/caregiver counseling, and formalizing plan of care.[SN1.1]    Richardson  Brigid[SN1.3], MD          Chief Complaint:[SN1.1]     History is obtained from the patient, her family by the bedside[SN1.2]    Tracey Moran is a 89 year old  female with medical history of[SN1.1] hypertension, history of GI bleed in the past, dementia brought into the ED via EMS after a fall at home.      Lives alone at home, unclear etiology of fall.  Last spoke to her son last night.  Patient/son feels that she fell around 2100 last night and was not able to get up, laid on the floor all night.  Patient thinks she was wearing socks she slipped and fell on the rug.  She is unable to provide any history regarding the fall but able to recollect that she called 911 this morning.  Unclear if patient lost any consciousness [history limited as patient has dementia]    She denies any chest pain or palpitations.  No shortness of breath, no cough or wheezing.  No neck pain or hip pain.  No focal weakness, no history of seizures.  Denies any diarrhea or constipation.  No nausea vomiting.  Has mild incontinence of urine, increased frequency of urination.  No fever or chills.    Patient admitted in February 2018 with similar complaints of fall in the setting of urinary tract infection and had rhabdomyolysis and elevated troponin.  She was managed medically and discharged home.    In the ED at the time of evaluation hemodynamically stable, abrasions on bilateral knee/elbow areas.  EKG showed sinus tachycardia, left ventricular hypertrophy, ST-T wave changes V5 to V6 [no significant change when compared to previous EKG]  CT head no acute pathology.  CT C-spine no acute pathology.  Chest x-ray left basilar atelectasis.    CBC 15.1, hemoglobin 10.8, platelets 241.  Creatinine 1.13.  , troponin 0 0.687.  UA moderate blood, nitrite positive, large leukocyte esterase, moderate bacteria, 149 WBC.  Received 1 L fluid bolus, aspirin 325 mg and Rocephin 1 g IV in the ED.[SN1.2]            Review of Systems:     GENERAL: No  weight changes, no fever or chills  EENT: No new vision changes, no sinus problems, no difficulty swallowing, no hearing difficulty  PULMONARY: No shortness of breath, no cough, no wheezing  CARDIAC: no chest pain, no irregular or fast heart beats   GI: No abdominal pain, nausea, vomiting, diarrhea, constipation, black or bloody stools  :  urgency, no hematuria.   NEURO: No significant headaches, no slurred speech, no dizziness  ENDOCRINE: No excessive thirst, no excessive bruising.  MUSCULOSKELETAL:[SN1.1] Chronic knee pains[SN1.2]  SKIN:[SN1.1] Skin tears[SN1.2]  PSYCHIATRY no anxiety or depression    Medical History:[SN1.1]     Past Medical History:   Diagnosis Date     Hypertension[SN1.3]         Surgical History:[SN1.1]      Past Surgical History:   Procedure Laterality Date     ESOPHAGOSCOPY, GASTROSCOPY, DUODENOSCOPY (EGD), COMBINED N/A 12/6/2017    Procedure: COMBINED ESOPHAGOSCOPY, GASTROSCOPY, DUODENOSCOPY (EGD);  gastroscopy;  Surgeon: Melchor Mancera MD;  Location:  GI[SN1.3]             Social History:[SN1.1]      Social History   Substance Use Topics     Smoking status: Never Smoker     Smokeless tobacco: Never Used     Alcohol use Yes[SN1.3]             Family History:[SN1.1]   Reviewed and non-contributory to chief complaint given the patient's age.[SN1.2]         Allergies:[SN1.1]     Allergies   Allergen Reactions     Demerol [Meperidine]      Dust Mites      Peanuts [Nuts]      Penicillins      Pollen Extract[SN1.3]              Medications:[SN1.1]   Home medications reviewed[SN1.2]         Physical Exam      Admission Weight: 54.9 kg (121 lb)  Current Weight: 54.9 kg (121 lb)    Vital Signs with Ranges[SN1.1]  Temp:  [97.3  F (36.3  C)] 97.3  F (36.3  C)  Pulse:  [95] 95  Heart Rate:  [108] 108  Resp:  [14-16] 14  BP: (142)/(78) 142/78  SpO2:  [95 %] 95 %[SN1.3]    PHYSICAL EXAM  GENERAL: Patient is in no distress.[SN1.1]  Awake and able to answer simple commands.[SN1.2]  HEENT: Oropharynx  pink, moist. Pupils equal  HEART: Regular rate and rhythm. S1S2.[SN1.1]  Systolic murmur right sternal area.[SN1.2]  LUNGS: Clear to auscultation bilaterally. No expiratory wheeze.  Respirations unlabored  ABDOMEN: Soft, no abdominal tenderness, bowel sounds heard   NEURO: Cranial nerves II-XII intact. Motor and sensory system[SN1.1] grossly intact[SN1.2]  EXTREMITIES: No pedal edema. 2+ peripheral pulses.  SKIN: Warm, dry.[SN1.1]  Multiple areas of skin tears over bilateral elbows and bilateral knees noninfected.  No oozing[SN1.2]  PSYCHIATRY Cooperative[SN1.1], pleasantly demented.[SN1.2]       Data:[SN1.1]     Results for orders placed or performed during the hospital encounter of 11/16/18 (from the past 24 hour(s))   CBC with platelets differential   Result Value Ref Range    WBC 15.1 (H) 4.0 - 11.0 10e9/L    RBC Count 3.36 (L) 3.8 - 5.2 10e12/L    Hemoglobin 10.8 (L) 11.7 - 15.7 g/dL    Hematocrit 33.1 (L) 35.0 - 47.0 %    MCV 99 78 - 100 fl    MCH 32.1 26.5 - 33.0 pg    MCHC 32.6 31.5 - 36.5 g/dL    RDW 13.8 10.0 - 15.0 %    Platelet Count 241 150 - 450 10e9/L    Diff Method Automated Method     % Neutrophils 86.9 %    % Lymphocytes 5.7 %    % Monocytes 7.0 %    % Eosinophils 0.1 %    % Basophils 0.1 %    % Immature Granulocytes 0.2 %    Absolute Neutrophil 13.1 (H) 1.6 - 8.3 10e9/L    Absolute Lymphocytes 0.9 0.8 - 5.3 10e9/L    Absolute Monocytes 1.1 0.0 - 1.3 10e9/L    Absolute Eosinophils 0.0 0.0 - 0.7 10e9/L    Absolute Basophils 0.0 0.0 - 0.2 10e9/L    Abs Immature Granulocytes 0.0 0 - 0.4 10e9/L   Basic metabolic panel   Result Value Ref Range    Sodium 142 133 - 144 mmol/L    Potassium 4.0 3.4 - 5.3 mmol/L    Chloride 108 94 - 109 mmol/L    Carbon Dioxide 26 20 - 32 mmol/L    Anion Gap 8 3 - 14 mmol/L    Glucose 80 70 - 99 mg/dL    Urea Nitrogen 23 7 - 30 mg/dL    Creatinine 1.13 (H) 0.52 - 1.04 mg/dL    GFR Estimate 45 (L) >60 mL/min/1.7m2    GFR Estimate If Black 54 (L) >60 mL/min/1.7m2    Calcium  9.3 8.5 - 10.1 mg/dL   Troponin I   Result Value Ref Range    Troponin I ES 0.687 (HH) 0.000 - 0.045 ug/L   CK total   Result Value Ref Range    CK Total 379 (H) 30 - 225 U/L   EKG 12-lead, tracing only   Result Value Ref Range    Interpretation ECG Click View Image link to view waveform and result    UA with Microscopic   Result Value Ref Range    Color Urine Yellow     Appearance Urine Slightly Cloudy     Glucose Urine Negative NEG^Negative mg/dL    Bilirubin Urine Negative NEG^Negative    Ketones Urine 5 (A) NEG^Negative mg/dL    Specific Gravity Urine 1.011 1.003 - 1.035    Blood Urine Moderate (A) NEG^Negative    pH Urine 6.0 5.0 - 7.0 pH    Protein Albumin Urine 30 (A) NEG^Negative mg/dL    Urobilinogen mg/dL Normal 0.0 - 2.0 mg/dL    Nitrite Urine Positive (A) NEG^Negative    Leukocyte Esterase Urine Large (A) NEG^Negative    Source Catheterized Urine     WBC Urine 149 (H) 0 - 5 /HPF    RBC Urine 32 (H) 0 - 2 /HPF    Bacteria Urine Moderate (A) NEG^Negative /HPF    Mucous Urine Present (A) NEG^Negative /LPF   CT Cervical Spine w/o Contrast    Narrative    CT CERVICAL SPINE WITHOUT CONTRAST   11/16/2018 2:06 PM     HISTORY: Patient fell. Neck trauma.     TECHNIQUE: Axial images of the cervical spine were obtained without  intravenous contrast. Multiplanar reformations were performed.   Radiation dose for this scan was reduced using automated exposure  control, adjustment of the mA and/or kV according to patient size, or  iterative reconstruction technique.    COMPARISON: None.    FINDINGS: There is no evidence of fracture. There is multilevel  degenerative disc disease and degenerative facet arthropathy,  especially from C2-C3 through C6-C7. There is acquired fusion of the  left C2-C3 facet joint. Degenerative changes are seen at the medial  atlantoaxial joint with soft tissue posterior to the joint with a few  calcifications and with sclerotic rimmed erosions at the base of the  odontoid suggesting a  crystalline arthropathy such as gout or CPPD.  There is no neural impingement.    Right mastoid air cells are partially opacified. This is unchanged  since 2/8/2018.      Impression    IMPRESSION:  1. No evidence of acute trauma.  2. Degenerative changes.  3. Chronic opacification of right mastoid air cells. This is  unchanged.    RAFAELA HERNANDEZ MD   CT Head w/o Contrast    Narrative    CT SCAN OF THE HEAD WITHOUT CONTRAST   11/16/2018 2:06 PM     HISTORY:  Fall.     TECHNIQUE:  Axial images of the head and coronal reformations without  IV contrast material. Radiation dose for this scan was reduced using  automated exposure control, adjustment of the mA and/or kV according  to patient size, or iterative reconstruction technique.    COMPARISON: 2/8/2018.    FINDINGS: There is no evidence of intracranial hemorrhage, mass, acute  infarct or anomaly. There is generalized atrophy of the brain. There  is low attenuation in the white matter of the cerebral hemispheres  consistent with sequelae of small vessel ischemic disease. Ventricular  size is within normal limits without evidence of hydrocephalus.     Mild mucosal thickening in the paranasal sinuses. The bony calvarium  and bones of the skull base appear intact.       Impression    IMPRESSION:     1. No evidence of acute intracranial hemorrhage, mass, or herniation.  2. There is generalized atrophy of the brain. White matter changes are  present in the cerebral hemispheres that are consistent with small  vessel ischemic disease in this age patient.     PETER JACKSON MD   Chest XR,  PA & LAT    Narrative    XR CHEST 2 VW 11/16/2018 2:31 PM    COMPARISON: 2/8/2018    HISTORY: Fall.      Impression    IMPRESSION: Enlarged cardiac silhouette is again seen. LEFT basilar  atelectasis is noted, lungs otherwise clear. No pleural effusion or  pneumothorax seen on either side.    GILMA HORN MD[SN1.2]        Revision History        User Key Date/Time User Provider Type Action     > [N/A] 11/16/2018  8:46 PM Richardson Rainey MD Physician Addend     SN1.2 11/16/2018  4:23 PM Richardson Rainey MD Physician Sign     SN1.3 11/16/2018  3:19 PM Richardson Rainey MD Physician      SN1.1 11/16/2018  3:18 PM Richardson Rainey MD Physician                      Discharge Summaries      Discharge Summaries by Jena Johnson MD at 11/19/2018  1:45 PM     Author:  Jena Johnson MD Service:  Hospitalist Author Type:  Physician    Filed:  11/19/2018  2:45 PM Date of Service:  11/19/2018  1:45 PM Creation Time:  11/19/2018  1:45 PM    Status:  Signed :  Jena Johnson MD (Physician)         [facility]  Discharge Summary  Hospitalist    Date of Admission:  11/16/2018  Date of Discharge:  11/19/2018  Discharging Provider:[MW1.1] Jena Johnson[MW1.2], MD[MW1.1]    Discharge Diagnoses[MW1.2]   Urinary tract infection  Bacteremia with staph lugdunensis  Physical deconditioning and fall  Rhabdomyolysis, mild, resolved  Elevated troponin secondary to demand ischemia  Acute kidney injury, resolved  Acute encephalopathy, resolving, multifactorial  Hypertension  Chronic constipation[MW1.1]    History of Present Illness[MW1.2]   Tracey Moran is a 89 year old  female with PMH of hypertension, history of GI bleed in the past, dementia brought into the ED via EMS after a fall at home. Please see admission H&P for complete details.[MW1.1]    Hospital Course[MW1.2]   Tracey Moran was admitted on 11/16/2018.  The following problems were addressed during her hospitalization:    Urinary tract infection  Presented with mild incontinence of urine, increased frequency of urination. UA moderate blood, nitrite positive, large leukocyte esterase, moderate bacteria, 149 WBC; leucocytosis WBCs 15.1 on admission  - started on Ceftriaxone on admission  - UC positive for >100k col E coli[MW1.1].[MW1.3]  pansensitive  - WBCs down from 18.5 to 14.5 today  - also with  bacteriemia for Strep[MW1.1] lugdunensis[MW1.3] so ABX changed to Ancef as per ID[MW1.1]  -discharge with keflex 500 mg po TID x 10 days[MW1.3]  -Leukocytosis resolved     Positive blood culture for Staph lugdunensis  - 1/2 bottles from 11/16  - ? contaminant vs true bacteriemia  - ID consulted  - switched to Ancef   - repeat BC 11/18; follow up final cultures  - if more BC return back positive for Staph lugdunensis may need further workup     s/p fall-likely from UTI/physical deconditioning.  Physical deconditioning likely from senile fragility  - Patient/son feels that she fell around 2100 last night and was not able to get up, laid on the floor all night.  Patient thinks she was wearing socks she slipped and fell on the rug.  She is unable to provide any history regarding the fall but able to recollect that she called 911.  Unclear if patient lost any consciousness [history limited as patient has dementia]  - CT head no acute pathology. CT C-spine no acute pathology. Chest x-ray left basilar atelectasis.  - Telemetry  - echo EF 55-60%, mod pulm HTN, mild AO regurg; TSH 2.95  - treat UTI as above  - iv hydration  - Wound care evaluation for skin tears.  - PT/OT- rec TCU  - SW     Mild rhabdomyolysis  - from prolonged immobilization  - CK on admission 379--up to 657--184 today     Elevated troponin- likely demand ischemia in the setting of fall/mild rhabdomyolysis/acute kidney injury.  - Denies any chest pain or palpitations or shortness of breath.  - EKG showed sinus tachycardia, left ventricular hypertrophy, ST-T wave changes V5 to V6 [no significant change when compared to previous EKG]  -   - serial troponins 0.0687--1.615--1.593--1.007- trending down  Patient admitted in February 2018 with similar complaints of fall in the setting of urinary tract infection and had rhabdomyolysis and elevated troponin [1.570].   Echocardiogram 2/2018 showed normal left ventricular size, estimated EF at 60-65%.  Grade 1  early diastolic dysfunction.  Mild aortic regurgitation/mild pulmonary hypertension/ascending aorta mildly dilated.She was managed medically and discharged home.  - admitting provider discussed with the patient son on admission and he declined heparin  - started on ASA  - PTA Toprol was increased from 50 mg po daily to 75 mg po daily  - echo as above  - fasting lipid profile- LDL 55, HDL 62, total choles 134     CASSIE, multifactorial, resolved  Most recent creatinine 0.61 in 2/2018.  Likely prerenal etiology from dehydration and ATN from rhabdomyolysis.  Creatinine 1.13 on admission, with associated elevated BUN and appeared volume depleted on exam.   - started on iv fluids- cr improved to 0.70  - resumed PTA Lisinopril       Acute Encephalopathy, likely multifactorial   - CT head negative  - Concern for volume depletion, UTI, cardiac event as detailed above, all potential contributors   - Treat as above and reassess  - Re-orient as needed      Chronic normocytic anemia  History of GI bleed 12/2017, which was suspected to be diverticular in nature.   EGD unremarkable at that time, patient declined colonoscopy.   Hemoglobin 10.8 g/dl on admission with last value of 8.6 g/dl in 5/2018.   - Hb 11.4--10.2- stable      Hypertension   [PTA on Toprol XL 50 mg po daily and Lisinopril 20 mg po BID]  - BP on higher side; HR also in 90's  - increased PTA Toprol to 75 mg po daily on 11/17  - resume PTA Lisinopril at 20 mg BID with holding parameters  - adjust meds as needed.      Constipation  Chronic issue.   Scheduled docusate   PRN bowel regimen ordered    Jena Johnson MD  ~~~~~~~~~~~~~~~~~~~~~~~~~~~~~~~~~~~~~~~~~~~~~~~~~~~~~~~~~~~[MW1.1]    Pending Results[MW1.2]   These results will be followed up by Hospitalist.[MW1.1]  Unresulted Labs Ordered in the Past 30 Days of this Admission     Date and Time Order Name Status Description    11/18/2018 0943 Blood culture Preliminary     11/18/2018 0943 Blood culture Preliminary      11/16/2018 1455 Blood culture Preliminary           Code Status[MW1.2]   Full Code[MW1.3]       Primary Care Physician   Guilherme Graves    Physical Exam   Temp: 97.3  F (36.3  C) Temp src: Oral BP: 139/67 Pulse: 69 Heart Rate: 69 Resp: 16 SpO2: 93 % O2 Device: None (Room air)    Vitals:    11/16/18 1246 11/18/18 0645 11/19/18 0657   Weight: 54.9 kg (121 lb) 59.4 kg (130 lb 15.3 oz) 62.4 kg (137 lb 9.1 oz)[MW1.2]     Vital Signs with Ranges[MW1.1]  Temp:  [97.3  F (36.3  C)-99.4  F (37.4  C)] 97.3  F (36.3  C)  Pulse:  [69] 69  Heart Rate:  [69-95] 69  Resp:  [16] 16  BP: (132-150)/(66-88) 139/67  SpO2:  [93 %-95 %] 93 %  I/O last 3 completed shifts:  In: 2273.5 [P.O.:100; I.V.:2173.5]  Out: -[MW1.2]     Constitutional: Alert, NAD, pleasant and cooperative[MW1.1]      Discharge Disposition[MW1.2]   Discharged to short-term care facility[MW1.3]  Condition at discharge:[MW1.1] Stable[MW1.3]    Consultations This Hospital Stay   SOCIAL WORK IP CONSULT  PHYSICAL THERAPY ADULT IP CONSULT  OCCUPATIONAL THERAPY ADULT IP CONSULT  WOUND OSTOMY CONTINENCE NURSE  IP CONSULT  PHARMACY TO DOSE VANCO  INFECTIOUS DISEASES IP CONSULT  PHYSICAL THERAPY ADULT IP CONSULT  OCCUPATIONAL THERAPY ADULT IP CONSULT    Time Spent on this Encounter[MW1.2]   Jena PANIAGUA, personally saw the patient today and spent 35 minutes discharging this patient.[MW1.1]    Discharge Orders     General info for SNF   Length of Stay Estimate: Short Term Care: Estimated # of Days <30  Condition at Discharge: Improving  Level of care:skilled   Rehabilitation Potential: Good  Admission H&P remains valid and up-to-date: Yes  Recent Chemotherapy: N/A  Use Nursing Home Standing Orders: Yes     Mantoux instructions   Give two-step Mantoux (PPD) Per Facility Policy Yes     Follow Up and recommended labs and tests   Follow up with Nursing home physician.  No follow up labs or test are needed.     Reason for your hospital stay   Fall, weakness, urinary tract  infection     Activity - Up with assistive device     Physical Therapy Adult Consult   Evaluate and treat as clinically indicated.    Reason:  Physical deconditioning, right knee pain     Occupational Therapy Adult Consult   Evaluate and treat as clinically indicated.    Reason:  Physical deconditioning, right knee pain     Fall precautions     Advance Diet as Tolerated   Follow this diet upon discharge:      Combination Diet Low Saturated Fat Na <2400mg Diet       Discharge Medications   Current Discharge Medication List      START taking these medications    Details   aspirin 81 MG EC tablet Take 1 tablet (81 mg) by mouth daily    Associated Diagnoses: NSTEMI (non-ST elevated myocardial infarction) (H)      cephALEXin (KEFLEX) 500 MG capsule Take 1 capsule (500 mg) by mouth 3 times daily for 10 days  Qty: 28 capsule, Refills: 0    Associated Diagnoses: Urinary tract infection in female      diclofenac (VOLTAREN) 1 % GEL topical gel Place 2 g onto the skin 4 times daily as needed for moderate pain    Associated Diagnoses: Arthritis of knee         CONTINUE these medications which have CHANGED    Details   metoprolol succinate (TOPROL-XL) 25 MG 24 hr tablet Take 3 tablets (75 mg) by mouth daily  Qty: 30 tablet    Associated Diagnoses: NSTEMI (non-ST elevated myocardial infarction) (H)         CONTINUE these medications which have NOT CHANGED    Details   acetaminophen (TYLENOL) 500 MG tablet Take 1,000 mg by mouth every 8 hours as needed for mild pain      docusate sodium (COLACE) 100 MG capsule Take 2 capsules (200 mg) by mouth 2 times daily  Qty: 100 capsule, Refills: 3    Comments: To prevent constipation  Associated Diagnoses: Slow transit constipation      lisinopril (PRINIVIL/ZESTRIL) 20 MG tablet TAKE ONE TABLET BY MOUTH TWICE DAILY. HOLD IF SYSTOLIC BLOOD PRESSURE IS LESS THAN 110 MMHG. CALL IF HELD TWICE IN A ROW OR IF SYMPTOMATIC.  Qty: 180 tablet, Refills: 0    Associated Diagnoses: Essential hypertension       multivitamin, therapeutic with minerals (THERA-VIT-M) TABS tablet Take 1 tablet by mouth daily      polyethylene glycol (MIRALAX/GLYCOLAX) Packet Take 17 g by mouth 2 times daily as needed for constipation Use if no bowel movement for 2 days  Qty: 7 packet, Refills: 1    Associated Diagnoses: Slow transit constipation           Allergies   Allergies   Allergen Reactions     Demerol [Meperidine]      Dust Mites      Peanuts [Nuts]      Penicillins      Pollen Extract      Data[MW1.2]          Revision History        User Key Date/Time User Provider Type Action    > MW1.2 11/19/2018  2:45 PM Jena Johnson MD Physician Sign     MW1.3 11/19/2018  2:44 PM Jena Johnson MD Physician      MW1.1 11/19/2018  1:45 PM Jena Johnson MD Physician                      Consult Notes      Consults by Gustabo Youssef at 11/19/2018 11:19 AM     Author:  Gustabo Youssef Service:  Social Work Author Type:      Filed:  11/19/2018  3:00 PM Date of Service:  11/19/2018 11:19 AM Creation Time:  11/19/2018 11:19 AM    Status:  Addendum :  Gustabo Youssef ()     Consult Orders:    1. Social Work IP Consult [[id number]] ordered by Richardson Rainey MD at 11/16/18 1600                Care Transition Initial Assessment -   Reason For Consult: discharge planning  Spoke with:  Jonny Gill  Active Problems:    Fall       DATA  Lives With: alone  Living Arrangements: apartment, independent living facility ([facility], large apt [address] SF)       Identified issues/concerns regarding health management: SW was consulted for discharge planning. Patient is a 89 year old female anticipated to be ready for a TCU stay today. Spoke with Jonny gill about TCU options and he prefers [facility]. If no availability there, [facility] is his second choice. He will provide transport at discharge. Referrals were sent via [facility]charge on the [facility].        Transportation Available: family or friend  will provide  ASSESSMENT  Cognitive Status: Some disorientation/forgetfulness noted per RN notes.  Concerns to be addressed: SW is following to coordinate the discharge   PLAN  Financial costs for the patient includes: Private room fees reviewed with Jonny.  Patient/family is agreeable to the plan? Yes  Patient/family Goals and Preferences: TCU  Patient anticipates discharging to: TCU[RH1.1]      Addendum  PAS-RR    Per DHS regulation, SW completed and submitted PAS-RR to [place] [employer] Direct Connect via the Senior LinkAge Line.  PAS-RR confirmation # is :[id number]  Further questions may be directed to [facility] LinkAge Line at #[phone], option #4 for PAS-RR staff.[RH1.2]  Patient has been accepted at [facility], per Joanna in Admissions.  Discharge orders are completed and have been faxed to [facility].   Spoke with son, Jonny who will provide transport at 1600.[RH1.3]     Revision History        User Key Date/Time User Provider Type Action    > RH1.3 11/19/2018  3:00 PM Gustabo Youssef  Addend     RH1.2 11/19/2018 11:46 AM Gustabo Youssef  Addend     RH1.1 11/19/2018 11:23 AM Gustabo Youssef  Sign            Consults signed by Anant Miranda MD at 11/18/2018  2:58 PM      Author:  Anant Miranda MD Service:  Infectious Disease Author Type:  Physician    Filed:  11/18/2018  2:58 PM Date of Service:  11/18/2018  9:21 AM Creation Time:  11/18/2018  9:39 AM    Status:  Signed :  Anant Miranda MD (Physician)         Consult Date:  11/18/2018      IMPRESSION:   1.  A 89-year-old female with a fall at home, by history appears to have been a slip and fall without preceding signs of infection or other illness.  No clear loss of consciousness.   2.  At presentation leukocytosis, but no fever, neither prior to or currently any obvious infection symptoms.   3.  Abnormal urinalysis and positive urine culture, E. coli in urine that is pansensitive, no clear symptoms  and not bacteremic with this.  Probably just asymptomatic bacteria but hard to exclude completely.   4.  Late positive blood culture 1 out of 2 for staph lugdunensis.  This is probably a contaminant, although Staph lugdunensis unlike other coagulase-negative Staph is some potential for a true bacteremia occurring, likely view is a contaminant unless second culture positive, conceivably a transient bacteremia related to the fall and skin related transient bacteremia.  No artificial material or obvious secondary site of infection.   5.  Rhabdomyolysis and renal insufficiency, improving.   6.  Encephalopathy.  At presentation, improving.   7.  Penicillin allergy.      RECOMMENDATIONS:   1.  Simplify to Ancef, which covers both of the isolated organisms.   2.  If no other positive cultures, probably simply treat with oral antibiotics as soon as the next day or so if the second culture comes up positive for Staph lugdunensis will be of some further note consideration of bigger workup, although it sounds like some minimizing of workup is the plan from the family's standpoint.      HISTORY OF PRESENT ILLNESS:  This 89-year-old female is seen in consultation due to fall and now bacteremia and positive urine culture.  The patient has a history of functioning reasonably well.  She slipped on the floor and fell.  She remembers well before the procedure that she did not otherwise have signs of illness including no fevers, chills, sweats or localizing symptoms.  This included no respiratory, cardiovascular or urinary tract symptoms.  When she was finally found to have been down for an indeterminate amount of time and at presentation and mild rhabdomyolysis.  She had leukocytosis, presumed reactive from this, but was cultured.  The urine was grossly abnormal and urine cultures growing E. coli.  The blood cultures now having of one positive for Staph lugdunensis.  She has no major artificial material in place and has not had major  bacteremia problems historically.      PAST MEDICAL HISTORY:  Minimal infection problems historically, generally healthy 89-year-old.      SOCIAL AND  FAMILY HISTORY:  Living independently prior to this.      ALLERGIES:  Penicillin, the patient does not recall what type of reaction she had.      MEDICATIONS:  As listed.      REVIEW OF SYSTEMS:  Currently not really having much pain feels relatively okay.  Unclear with her mental status is still off baseline, but seems slightly abnormal still.      PHYSICAL EXAMINATION:   GENERAL:  The patient appears her stated age.  She is quite delightful.   VITAL SIGNS:  Currently are normal and she is afebrile.   HEENT:  No thrush or intraoral lesions.  Pupils reactive.   NECK:  Supple, nontender.   HEART:  Regular rhythm, no particular major murmur, slight irregularity with missed beats.   LUNGS:  Relatively clear, but a few crackles at the right base.   ABDOMEN:  Soft and nontender.   EXTREMITIES:  No major joint abnormalities.  Slight abrasion in the face.  No significant signs of infection of the skin.      LABORATORY DATA:  White count still 14,000 with mildly elevated initially.  Creatinine initially elevated, now improved.  Blood cultures 1 of 2 growing Staph lugdunensis.  Urinalysis 149 white cells and culture with a pansensitive E. coli.         CRISTI JONES MD             D: 2018   T: 2018   MT: JOSE CARLOS      Name:     LIV KINGSTON   MRN:      [id number]        Account:       [id number]   :      1925           Consult Date:  2018      Document: [id number]    [SD1.1]   Revision History        User Key Date/Time User Provider Type Action    > SD1.1 2018  2:58 PM Cristi Jones MD Physician Sign            Consults by Cristi Jones MD at 2018  9:13 AM     Author:  Cristi Jones MD Service:  Infectious Disease Author Type:  Physician    Filed:  2018  9:13 AM Date of Service:  2018  9:13 AM Creation Time:  2018   9:12 AM    Status:  Signed :  Anant Miranda MD (Physician)         ID consult dictated IMP 1 89 yo fall wo preceding infection sxs, in SUAREZ 1/2 BC staph lugadensis, UC E coli    REC 1 unclear significance, for now to ancef covering both cxs[SD1.1]     Revision History        User Key Date/Time User Provider Type Action    > SD1.1 11/18/2018  9:13 AM Anant Miranda MD Physician Sign                     Progress Notes - Physician (Notes from 11/16/18 through 11/19/18)      Progress Notes by Jena Johnson MD at 11/19/2018  1:26 PM     Author:  Jena Johnson MD Service:  Hospitalist Author Type:  Physician    Filed:  11/19/2018  1:27 PM Date of Service:  11/19/2018  1:26 PM Creation Time:  11/19/2018  1:26 PM    Status:  Signed :  Jena Johnson MD (Physician)         Hospitalist Progress Note  11/19/2018    Patient seen this morning. Stable for discharge to TCU when bed available.[MW1.1]    Jena Johnson[MW1.2], MD[MW1.1]     Revision History        User Key Date/Time User Provider Type Action    > MW1.2 11/19/2018  1:27 PM Jena Johnson MD Physician Sign     MW1.1 11/19/2018  1:26 PM Jena Johnson MD Physician             Progress Notes by Anant Miranda MD at 11/19/2018  8:22 AM     Author:  Anant Miranda MD Service:  Infectious Disease Author Type:  Physician    Filed:  11/19/2018  8:25 AM Date of Service:  11/19/2018  8:22 AM Creation Time:  11/19/2018  8:22 AM    Status:  Signed :  Anant Miranda MD (Physician)         [facility]  Infectious Disease Progress Note          Assessment and Plan:   IMPRESSION:   1.  A 89-year-old female with a fall at home, by history appears to have been a slip and fall without preceding signs of infection or other illness.  No clear loss of consciousness.   2.  At presentation leukocytosis, but no fever, neither prior to or currently any obvious infection symptoms.   3.   "Abnormal urinalysis and positive urine culture, E. coli in urine that is pansensitive, no clear symptoms and not bacteremic with this.  Probably just asymptomatic bacteria but hard to exclude completely.   4.  Late positive blood culture 1 out of 2 for staph lugdunensis.  This is probably a contaminant, although Staph lugdunensis unlike other coagulase-negative Staph is some potential for a true bacteremia occurring, likely view is a contaminant unless second culture positive, conceivably a transient bacteremia related to the fall and skin related transient bacteremia.  No artificial material or obvious secondary site of infection.   5.  Rhabdomyolysis and renal insufficiency, improving.   6.  Encephalopathy.  At presentation, improving.   7.  Penicillin allergy.       RECOMMENDATIONS:   1.  Continue Ancef, which covers both of the isolated organisms.   2. With no other positive cultures, probably simply treating with oral antibiotics as soon as now if disposition, if so 10 days keflex 500 tid, if here any way continiue IV         Interval History:   no new complaints and doing well; no cp, sob, n/v/d, or abd pain. No fever cxs same WBC 10 K              Medications:[SD1.1]       aspirin  81 mg Oral Daily     ceFAZolin  2 g Intravenous Q12H     docusate sodium  200 mg Oral BID     lisinopril  20 mg Oral BID     metoprolol succinate  75 mg Oral Daily     multivitamin, therapeutic with minerals  1 tablet Oral Daily[SD1.2]                  Physical Exam:[SD1.1]   Blood pressure 139/67, pulse 69, temperature 97.3  F (36.3  C), temperature source Oral, resp. rate 16, height 1.575 m (5' 2\"), weight 62.4 kg (137 lb 9.1 oz), SpO2 93 %, not currently breastfeeding.  Wt Readings from Last 2 Encounters:   11/19/18 62.4 kg (137 lb 9.1 oz)   10/11/18 57.2 kg (126 lb)[SD1.2]     Vital Signs with Ranges[SD1.1]  Temp:  [97.3  F (36.3  C)-99.4  F (37.4  C)] 97.3  F (36.3  C)  Pulse:  [69] 69  Heart Rate:  [69-95] 69  Resp:  [16] " 16  BP: (132-150)/(66-88) 139/67  SpO2:  [93 %-95 %] 93 %[SD1.2]    Constitutional: Awake, alert, cooperative, no apparent distress   Lungs: Clear to auscultation bilaterally, no crackles or wheezing   Cardiovascular: Regular rate and rhythm, normal S1 and S2, and no murmur noted   Abdomen: Normal bowel sounds, soft, non-distended, non-tender   Skin: No rashes, no cyanosis, no edema   Other:           Data:   All microbiology laboratory data reviewed.[SD1.1]  Recent Labs   Lab Test  11/19/18   0755  11/18/18   0830  11/17/18   0925   WBC  10.0  14.5*  18.5*   HGB  9.1*  10.2*  11.4*   HCT  28.1*  31.1*  34.8*   MCV  100  98  99   PLT  183  211  270     Recent Labs   Lab Test  11/17/18   0925  11/16/18   1318  02/11/18   0543   CR  0.79  1.13*  0.61     No lab results found.  Recent Labs   Lab Test  11/18/18   1034  11/18/18   1027  11/16/18   1510  11/16/18   1505  11/16/18   1404  04/09/18   1326  02/08/18   1311  03/05/16   1524  10/16/14   1330   CULT  No growth after 12 hours  No growth after 12 hours  No growth after 3 days  Cultured on the 1st day of incubation:  Staphylococcus lugdunensis  *  Critical Value/Significant Value, preliminary result only, called to and read back by  MARIA ALEJANDRA NEVILLE RN  11.17.18 ND    (Note)  POSITIVE for STAPHYLOCOCCUS LUGDUNENSIS by DoubleVerify multiplex nucleic  acid test. Although human commensal, S. lugdunensis is a common  pathogen involved in foreign-body-related infections, e.g. prosthetic  valve endocarditis. Final identification and antimicrobial  susceptibility testing will be verified by standard methods.    Specimen tested with Verigene multiplex, gram-positive blood culture  nucleic acid test for the following targets: Staph aureus, Staph  epidermidis, Staph lugdunensis, other Staph species, Enterococcus  faecalis, Enterococcus faecium, Streptococcus species, S. agalactiae,  S. anginosus grp., S. pneumoniae, S. pyogenes, Listeria sp., mecA  (methicillin  resistance) and Robert/B (vancomycin resistance).    Critical Value/Significant Value called to and read back by BRODY NATH RN @0039 11/18/18. SCG      >100,000 colonies/mL  Escherichia coli  *  >100,000 colonies/mL  mixed urogenital gloria  Susceptibility testing not routinely done    >100,000 colonies/mL  Escherichia coli  *  >100,000 colonies/mL mixed urogenital gloria  No growth[SD1.2]          Revision History        User Key Date/Time User Provider Type Action    > SD1.2 11/19/2018  8:25 AM Anant Miranda MD Physician Sign     SD1.1 11/19/2018  8:22 AM Anant Miranda MD Physician             Progress Notes by Manfred Smith OT at 11/18/2018  1:49 PM     Author:  Manfred Smith OT Service:  Acute IP Rehab Author Type:  Occupational Therapist    Filed:  11/18/2018  1:49 PM Date of Service:  11/18/2018  1:49 PM Creation Time:  11/18/2018  1:49 PM    Status:  Signed :  Manfred Smith OT (Occupational Therapist)          11/18/18 1056   Quick Adds   Type of Visit Initial Occupational Therapy Evaluation   Living Environment   Lives With alone   Living Arrangements apartment;independent living facility  ([facility], large apt [address] SF)   Home Accessibility no concerns   Number of Stairs to Enter Home 0   Number of Stairs Within Home 0   Transportation Available family or friend will provide   Self-Care   Dominant Hand right   Usual Activity Tolerance good   Current Activity Tolerance poor   Regular Exercise yes   Activity/Exercise Type strength training   Exercise Amount/Frequency 3-5 times/wk  (exercise class)   Equipment Currently Used at Home walker, rolling;grab bar;raised toilet  (4ww, HH shower, toilet frame, Lifeline necklace)   Activity/Exercise/Self-Care Comment Pt walks to exerice room with her 4ww   Functional Level Prior   Ambulation 1-->assistive equipment   Transferring 1-->assistive equipment   Toileting 1-->assistive equipment   Bathing 1-->assistive equipment   Dressing  0-->independent   Eating 0-->independent   Communication 0-->understands/communicates without difficulty   Swallowing 0-->swallows foods/liquids without difficulty   Cognition 1 - attention or memory deficits   Fall history within last six months yes   Number of times patient has fallen within last six months 4   Prior Functional Level Comment Son confirmed pt indep with all self-cares including bathing (stands up in walk-in shower), meds (takes 2 only plus vitamins), meal prep, laundry and cleaning. Family provides transport when needed and does grocery shopping.    General Information   Onset of Illness/Injury or Date of Surgery - Date 11/16/18   Referring Physician Dr. Rainey   Patient/Family Goals Statement son open to TCU   Additional Occupational Profile Info/Pertinent History of Current Problem 89yo female admitted following fall at her apartment,  was down at least overnight. Diagnosed with UTI, elevated troponins (now downtrending), mild rhabdomyolis/acute kidney injury   Precautions/Limitations fall precautions   Cognitive Status Examination   Orientation person;place   Level of Consciousness confused   Able to Follow Commands success, 1-step commands   Personal Safety (Cognitive) decreased awareness, need for assist;at risk behaviors demonstrated   Memory impaired   Cognitive Comment Pt now wearing hearing aids -was not during PT eval earlier   Visual Perception   Visual Perception No deficits were identified   Pain Assessment   Patient Currently in Pain Yes, see Vital Sign flowsheet  (c/o pain in legs from fall)   Integumentary/Edema   Integumentary/Edema Comments multiple bruises, wounds from fall, covered in bandaging. See nursing notes for details. Son reports bruises on arms caused from repeatedly attempting to get up from floor, not from fall.   Range of Motion (ROM)   ROM Quick Adds No deficits were identified   Strength   Strength Comments B ofelia 3+/5 due to OA   Hand Strength   Hand Strength  Comments B  somewhat weak but functional   Coordination   Coordination Comments Able to bilaterally manipulate ADL supplies, eating utensils   Mobility   Bed Mobility Comments Mod A of 1   Transfer Skill: Bed to Chair/Chair to Bed   Level of Russell: Bed to Chair moderate assist (50% patients effort)   Physical Assist/Nonphysical Assist: Bed to Chair 1 person assist   Weight-Bearing Restrictions full weight-bearing   Assistive Device - Transfer Skill Bed to Chair Chair to Bed Rehab Eval rolling walker   Transfer Skill: Sit to Stand   Level of Russell: Sit/Stand moderate assist (50% patients effort)   Physical Assist/Nonphysical Assist: Sit/Stand 1 person assist   Transfer Skill: Sit to Stand full weight-bearing   Assistive Device for Transfer: Sit/Stand rolling walker   Balance   Balance Comments Unsteady in standing, able to maintain static sitting balance indep   Grooming   Level of Russell: Grooming stand-by assist   Physical Assist/Nonphysical Assist: Grooming verbal cues;set-up required  (sitting up in bed)   Eating/Self Feeding   Level of Russell: Eating independent   Physical Assist/Nonphysical Assist: Eating set-up required  (sitting up in bed)   Activities of Daily Living Analysis   Impairments Contributing to Impaired Activities of Daily Living balance impaired;cognition impaired;pain;strength decreased   General Therapy Interventions   Planned Therapy Interventions ADL retraining;transfer training;cognition   Clinical Impression   Criteria for Skilled Therapeutic Interventions Met yes, treatment indicated   OT Diagnosis decreased ADL performance   Influenced by the following impairments strength, cognition, balance, pain from fall   Assessment of Occupational Performance 3-5 Performance Deficits   Identified Performance Deficits functional mobility, grooming, dressing, toileting   Clinical Decision Making (Complexity) Low complexity   Therapy Frequency daily   Predicted Duration of  "Therapy Intervention (days/wks) 3 days   Anticipated Discharge Disposition Transitional Care Facility   Risks and Benefits of Treatment have been explained. Yes   Patient, Family & other staff in agreement with plan of care Yes   [facility] TM \"6 Clicks\"   2016, Trustees of [facility], under license to [employer].  All rights reserved.   6 Clicks Short Forms Daily Activity Inpatient Short Form   [facility]  \"6 Clicks\" Daily Activity Inpatient Short Form   1. Putting on and taking off regular lower body clothing? 2 - A Lot   2. Bathing (including washing, rinsing, drying)? 2 - A Lot   3. Toileting, which includes using toilet, bedpan or urinal? 2 - A Lot   4. Putting on and taking off regular upper body clothing? 3 - A Little   5. Taking care of personal grooming such as brushing teeth? 4 - None   6. Eating meals? 4 - None   Daily Activity Raw Score (Score out of 24.Lower scores equate to lower levels of function) 17   Total Evaluation Time   Total Evaluation Time (Minutes) 15[MR1.1]        Revision History        User Key Date/Time User Provider Type Action    > MR1.1 11/18/2018  1:49 PM Manfred Smith, OT Occupational Therapist Sign            Progress Notes by Johana aPtterson MD at 11/18/2018 10:46 AM     Author:  Johana Patterson MD Service:  Hospitalist Author Type:  Physician    Filed:  11/18/2018 11:04 AM Date of Service:  11/18/2018 10:46 AM Creation Time:  11/18/2018 10:46 AM    Status:  Signed :  Johana Patterson MD (Physician)         [facility]    Hospitalist Progress Note      Assessment & Plan   Tracey Moran is a 89 year old  female with PMH of hypertension, history of GI bleed in the past, dementia brought into the ED via EMS after a fall at home.     UTI  - Has mild incontinence of urine, increased frequency of urination.  - UA moderate blood, nitrite positive, large leukocyte esterase, moderate bacteria, 149 WBC; " leucocytosis WBCs 15.1 on admission  - started on Ceftriaxone on admission  - UC positive for >[id number] E coli- pansensitive  - WBCs down from 18.5 to 14.5 today  - also with bacteriemia for Strep du    - see below- so ABX chabged to Ancef as per ID  - monitor fever curve and WBCs trend    Positive blood culture for Staph lugdunensis  - 1/2 bottles from 11/16  - ?contaminant vs true bacteriemia  - ID consulted  - switched to Ancef   - repeat BC today 11/18; follow up cultures  - if more BC return back positive for Staph lugdunensis- may need further workup    s/p fall-likely from UTI/physical deconditioning.  Physical deconditioning likely from senile fragility  - Patient/son feels that she fell around 2100 last night and was not able to get up, laid on the floor all night.  Patient thinks she was wearing socks she slipped and fell on the rug.  She is unable to provide any history regarding the fall but able to recollect that she called 911.  Unclear if patient lost any consciousness [history limited as patient has dementia]  - CT head no acute pathology. CT C-spine no acute pathology. Chest x-ray left basilar atelectasis.  - Telemetry  - echo EF 55-60%, mod pulm HTN, mild AO regurg; TSH 2.95  - treat UTI as above  - iv hydration  - Wound care evaluation for skin tears.  - PT/OT- rec TCU  - SW    Mild rhabdomyolysis  - from prolonged immobilization  - CK on admission 379--up to 657--184 today    Elevated troponin- likely demand ischemia in the setting of fall/mild rhabdomyolysis/acute kidney injury.  - Denies any chest pain or palpitations or shortness of breath.  - EKG showed sinus tachycardia, left ventricular hypertrophy, ST-T wave changes V5 to V6 [no significant change when compared to previous EKG]  -   - serial troponins 0.0687--1.615--1.593--1.007- trending down  Patient admitted in February 2018 with similar complaints of fall in the setting of urinary tract infection and had rhabdomyolysis and  elevated troponin [1.570].   Echocardiogram 2/2018 showed normal left ventricular size, estimated EF at 60-65%.  Grade 1 early diastolic dysfunction.  Mild aortic regurgitation/mild pulmonary hypertension/ascending aorta mildly dilated.She was managed medically and discharged home.  - admitting provider discussed with the patient son on admission and he declined heparin  - started on ASA  - PTA Toprol was increased from 50 mg po daily to 75 mg po daily  - echo as above  - fasting lipid profile- LDL 55, HDL 62, total choles 134    CASSIE- resolved  Most recent creatinine 0.61 in 2/2018.   Creatinine 1.13 on admission, with associated elevated BUN and appeared volume depleted on exam.   - started on iv fluids- cr improved to 0.70  - resumed PTA Lisinopril      Acute Encephalopathy, likely multifactorial   - CT head negative; no FNDs  - Concern for volume depletion, UTI, cardiac event as detailed above, all potential contributors   - Treat as above and reassess  - Re-orient as needed  - now seems at baseline      Chronic normocytic anemia  History of GI bleed 12/2017, which was suspected to be diverticular in nature.   EGD unremarkable at that time, patient declined colonoscopy.   Hemoglobin 10.8 g/dl on admission with last value of 8.6 g/dl in 5/2018.   - Hb 11.4--10.2- stable     Hypertension   [PTA on Toprol XL 50 mg po daily and Lisinopril 20 mg po BID]  - BP on higher side; HR also in 90's  - increased PTA Toprol to 75 mg po daily on 11/17  - resume PTA Lisinopril at 20 mg BID with holding parameters  - adjust meds as needed.      Constipation  Chronic issue.   Scheduled docusate   PRN bowel regimen ordered    DVT Prophylaxis: Pneumatic Compression Devices  Code Status: Full Code  Expected discharge: in 1-2 days, recommended to TCU vs home once known plan for ABX    Johana Patterson MD    Interval History   Doing fine, up in the chair, denies chest pain, no SOB; no abd pain, no N/V; states that her legs are weak  and sore; discussed with RN     -Data reviewed today: I reviewed all new labs and imaging results over the last 24 hours. I personally reviewed no images or EKG's today.    Physical Exam   Temp: 97.8  F (36.6  C) Temp src: Oral BP: 172/84   Heart Rate: 102 Resp: 16 SpO2: 93 % O2 Device: None (Room air)    Vitals:    11/16/18 1246 11/18/18 0645   Weight: 54.9 kg (121 lb) 59.4 kg (130 lb 15.3 oz)     Vital Signs with Ranges  Temp:  [97.1  F (36.2  C)-98.8  F (37.1  C)] 97.8  F (36.6  C)  Heart Rate:  [] 102  Resp:  [16] 16  BP: (137-172)/(67-84) 172/84  SpO2:  [93 %-96 %] 93 %  I/O last 3 completed shifts:  In: 2682 [P.O.:360; I.V.:2322]  Out: -     Constitutional: Awake, alert, NAD  Respiratory: Bilateral air entry, no wheezing, no rales, no crackles  Cardiovascular: S1S2, RRR, no murmurs, no rubs  GI: abdomen- soft, nonT, nonD, BS present  Skin/Integumen: bilateral knees ecchymosis and some skin tear  Neuro: AAO to self and partially to time, no FNDs    Medications     sodium chloride 75 mL/hr at 11/18/18 0427       aspirin  81 mg Oral Daily     ceFAZolin  2 g Intravenous Q12H     docusate sodium  200 mg Oral BID     lisinopril  20 mg Oral Daily     metoprolol succinate  75 mg Oral Daily     multivitamin, therapeutic with minerals  1 tablet Oral Daily       Data     Recent Labs  Lab 11/18/18  0830 11/17/18  0925 11/16/18  2245 11/16/18  1920 11/16/18  1318   WBC 14.5* 18.5*  --   --  15.1*   HGB 10.2* 11.4*  --   --  10.8*   MCV 98 99  --   --  99    270  --   --  241   NA  --  139  --   --  142   POTASSIUM  --  3.5  --   --  4.0   CHLORIDE  --  106  --   --  108   CO2  --  24  --   --  26   BUN  --  15  --   --  23   CR  --  0.79  --   --  1.13*   ANIONGAP  --  9  --   --  8   RENETTA  --  8.2*  --   --  9.3   GLC  --  103*  --   --  80   ALBUMIN  --  3.0*  --   --   --    PROTTOTAL  --  6.5*  --   --   --    BILITOTAL  --  0.7  --   --   --    ALKPHOS  --  62  --   --   --    ALT  --  19  --   --   --     AST  --  42  --   --   --    TROPI  --  1.007* 1.593* 1.615* 0.687*       No results found for this or any previous visit (from the past 24 hour(s)).[DN1.1]     Revision History        User Key Date/Time User Provider Type Action    > DN1.1 11/18/2018 11:04 AM Johana Patterson MD Physician Sign            Progress Notes by Sunita Barillas PT at 11/18/2018  9:12 AM     Author:  Sunita Barillas PT Service:  (none) Author Type:  Physical Therapist    Filed:  11/18/2018  9:12 AM Date of Service:  11/18/2018  9:12 AM Creation Time:  11/18/2018  9:12 AM    Status:  Signed :  Sunita Barillas PT (Physical Therapist)          11/18/18 0900   Quick Adds   Type of Visit Initial PT Evaluation   Living Environment   Lives With alone   Living Arrangements apartment   Home Accessibility no concerns   Number of Stairs to Enter Home 0   Number of Stairs Within Home 0   Living Environment Comment Pt lives alone in sr apt   Self-Care   Dominant Hand right   Usual Activity Tolerance good   Current Activity Tolerance fair   Regular Exercise yes   Activity/Exercise Type (pt states she goes to exercise group )   Exercise Amount/Frequency 3-5 times/wk   Equipment Currently Used at Home walker, rolling   Functional Level Prior   Ambulation 1-->assistive equipment   Transferring 1-->assistive equipment   Toileting 1-->assistive equipment   Bathing 1-->assistive equipment   Dressing 0-->independent   Eating 0-->independent   Communication 0-->understands/communicates without difficulty   Swallowing 0-->swallows foods/liquids without difficulty   Cognition 1 - attention or memory deficits   Fall history within last six months yes   Number of times patient has fallen within last six months 4   Which of the above functional risks had a recent onset or change? fall history   Prior Functional Level Comment Pt normally is IND with 4wh walker   General Information   Onset of Illness/Injury or Date of Surgery - Date 11/16/18    Referring Physician Dr Rainey   Patient/Family Goals Statement pt confused, pleasant   Pertinent History of Current Problem (include personal factors and/or comorbidities that impact the POC) Pt fell at her apt, was down at least overnight. Admitted with fall, UTI, elevated troponins (now downtrending), mild rhabdomyolis/acute kidney injury   Precautions/Limitations fall precautions   Weight-Bearing Status - LUE full weight-bearing   Weight-Bearing Status - RUE full weight-bearing   Weight-Bearing Status - LLE full weight-bearing   Weight-Bearing Status - RLE full weight-bearing   Cognitive Status Examination   Orientation person   Level of Consciousness confused   Follows Commands and Answers Questions 50% of the time;able to follow single-step instructions   Personal Safety and Judgment impaired;at risk behaviors demonstrated;impulsive   Memory impaired   Pain Assessment   Patient Currently in Pain (yes but unable to rate pain - feet )   Integumentary/Edema   Integumentary/Edema Comments multiple bruises, wounds from fall, covered in bandaging. See nursing notes for details.   Posture    Posture Forward head position   Range of Motion (ROM)   ROM Comment LE ROM decreased   Strength   Strength Comments decreased d/t pain and weakness, grossly 4/5 but symmetrical   Bed Mobility   Bed Mobility Comments min assist 1 and rail   Transfer Skills   Transfer Comments sit to/from stand with mod assist 1   Gait   Gait Comments took 3 steps with walker and min to mod assist 2   Balance   Balance Comments unsteady with all standing activity even with walker   Sensory Examination   Sensory Perception no deficits were identified   Coordination   Coordination no deficits were identified   Muscle Tone   Muscle Tone no deficits were identified   General Therapy Interventions   Planned Therapy Interventions balance training;gait training;strengthening;transfer training   Clinical Impression   Criteria for Skilled Therapeutic  "Intervention yes, treatment indicated   PT Diagnosis impaired functional mobility   Influenced by the following impairments decreased strength, confusion, balance impairment   Functional limitations due to impairments fall risk, inability to ambulate household distances, increased caregiver burden   Clinical Presentation Stable/Uncomplicated   Clinical Presentation Rationale per medical record   Clinical Decision Making (Complexity) Low complexity   Therapy Frequency` daily   Predicted Duration of Therapy Intervention (days/wks) 3 days   Anticipated Discharge Disposition Transitional Care Facility   Risk & Benefits of therapy have been explained Yes   Patient, Family & other staff in agreement with plan of care Yes   [facility] \"6 Clicks\"   2016, Trustees of [facility], under license to [employer].  All rights reserved.   6 Clicks Short Forms Basic Mobility Inpatient Short Form   [facility]  \"6 Clicks\" V.2 Basic Mobility Inpatient Short Form   1. Turning from your back to your side while in a flat bed without using bedrails? 3 - A Little   2. Moving from lying on your back to sitting on the side of a flat bed without using bedrails? 3 - A Little   3. Moving to and from a bed to a chair (including a wheelchair)? 2 - A Lot   4. Standing up from a chair using your arms (e.g., wheelchair, or bedside chair)? 2 - A Lot   5. To walk in hospital room? 2 - A Lot   6. Climbing 3-5 steps with a railing? 1 - Total   Basic Mobility Raw Score (Score out of 24.Lower scores equate to lower levels of function) 13   Total Evaluation Time   Total Evaluation Time (Minutes) 15[RB1.1]        Revision History        User Key Date/Time User Provider Type Action    > RB1.1 11/18/2018  9:12 AM Sunita Barillas, PT Physical Therapist Sign            Progress Notes by Cristofer Edwards, RN at 11/17/2018 10:23 PM     Author:  Cristofer Edwards, RN Service:  (none) Author Type:  Registered Nurse    Filed:  " 11/17/2018 10:24 PM Date of Service:  11/17/2018 10:23 PM Creation Time:  11/17/2018 10:23 PM    Status:  Signed :  Cristofer Edwards, RN (Registered Nurse)         Called and left sonJonny a voicemail with direction to call back.  No detailed information left on voicemail, but want to inform Jonny regarding positive blood culture and starting IV Vanco.[RP1.1]     Revision History        User Key Date/Time User Provider Type Action    > RP1.1 11/17/2018 10:24 PM Cristofer Edwards, RN Registered Nurse Sign            Progress Notes by Tomy Keene MD at 11/17/2018  9:56 PM     Author:  Tomy Keene MD Service:  Hospitalist Author Type:  Physician    Filed:  11/17/2018  9:57 PM Date of Service:  11/17/2018  9:56 PM Creation Time:  11/17/2018  9:56 PM    Status:  Signed :  Tomy Keene MD (Physician)         Hospitalist service cross-cover note:     Called regarding positive blood cultures. Admitted with concern for UTI with E coli in urine, blood cultures with GPC in clusters. Given discordant results, may be secondary to contaminant, but pending further data will start vancomycin IV.     Tomy Keene MD   Hospitalist  [phone][IM1.1]         Revision History        User Key Date/Time User Provider Type Action    > IM1.1 11/17/2018  9:57 PM Tomy Keene MD Physician Sign            Progress Notes by Johana Patterson MD at 11/17/2018  3:06 PM     Author:  Johana Patterson MD Service:  Hospitalist Author Type:  Physician    Filed:  11/17/2018  8:09 PM Date of Service:  11/17/2018  3:06 PM Creation Time:  11/17/2018  3:06 PM    Status:  Signed :  Johana Patterson MD (Physician)         [facility]    Hospitalist Progress Note      Assessment & Plan   Tracey Moran is a 89 year old  female with PMH of hypertension, history of GI bleed in the past, dementia brought into the ED via EMS after a fall at home.[DN1.1]     UTI  -[DN1.2] Has mild incontinence  of urine, increased frequency of urination.[DN1.1]  -[DN1.2] UA moderate blood, nitrite positive, large leukocyte esterase, moderate bacteria, 149 WBC[DN1.1]; leucocytosis WBCs 15.1 on admission  - started on Ceftriaxone on admission  - UC positive for >[id number] E coli  - follow up sensitivities  - WBCs up to 18.5 today  - continue Ceftriaxone for now and monitor fever curve and WBCs trend    s/p[DN1.2] fall at home unclear etiology -likely from UTI/physical deconditioning.  Physical deconditioning likely from senile fragility[DN1.1]  -[DN1.2] Patient/son feels that she fell around 2100 last night and was not able to get up, laid on the floor all night.  Patient thinks she was wearing socks she slipped and fell on the rug.  She is unable to provide any history regarding the fall but able to recollect that she called 91[DN1.1]1.[DN1.2]  Unclear if patient lost any consciousness [history limited as patient has dementia][DN1.1]  -[DN1.2] CT head no acute pathology. CT C-spine no acute pathology. Chest x-ray left basilar atelectasis.[DN1.1]  - Telemetry  - echo EF 55-60%, mod pulm HTN, mild AO regurg; TSH 2.95  - treat UTI as above  - iv hydration  -[DN1.2] Wound care evaluation for skin tears.[DN1.1]  - PT/OT  - SW    Mild rhabdomyolysis  - from prolonged immobilization  - CK on admission 379--up to 657 today  - continue iv fluids  - repeat CK in am[DN1.2]     Elevated troponin[DN1.1]-[DN1.2] likely[DN1.1]  Demand ischemia in the setting of[DN1.2] fall/mild rhabdomyolysis/acute kidney injury.[DN1.1]  -[DN1.2] Denies any chest pain or palpitations or shortness of breath.[DN1.1]  -[DN1.2] EKG showed sinus tachycardia, left ventricular hypertrophy, ST-T wave changes V5 to V6 [no significant change when compared to previous EKG]  , troponin 0.687.[DN1.1]  - serial troponins 0.0687--1.615--1.593--1.007- trending down[DN1.2]  Patient admitted in February 2018 with similar complaints of fall in the setting of urinary  tract infection and had rhabdomyolysis and elevated troponin [1.570].   Echocardiogram 2/2018 showed normal left ventricular size, estimated EF at 60-65%.  Grade 1 early diastolic dysfunction.  Mild aortic regurgitation/mild pulmonary hypertension/ascending aorta mildly dilated.She was managed medically and discharged home.[DN1.1]  - admitting provider discussed with the patient son on admission and he declined heparin  - started on ASA  - PTA Toprol was increased from 50 mg po daily to 75 mg po daily  - echo as above  - fasting lipid profile- LDL 55, HDL 62, total choles 134    CASSIE- resolved[DN1.2]  Most recent creatinine 0.61 in 2/2018.   Creatinine 1.13 on admission, with associated elevated BUN and appears volume depleted on exam.[DN1.1]   - started on iv fluids- cr today 0.70  - resume PTA Lisinopril (at a lowe dose 20 mg po daily) starting tomorrow[DN1.2]     Acute Encephalopathy, likely multifactorial[DN1.1]   - CT head negative; no FNDs  -[DN1.2] Concern for volume depletion, UTI, cardiac event as detailed above, all potential contributors[DN1.1]   -[DN1.2] Treat as above and reassess[DN1.1]  -[DN1.2] Re-orient as needed      Chronic normocytic anemia  History of GI bleed 12/2017, which was suspected to be diverticular in nature.   EGD unremarkable at that time, patient declined colonoscopy.   Hemoglobin 10.8 g/dl on admission with last value of 8.6 g/dl in 5/2018.[DN1.1]   - Hb today 11.4[DN1.2]      Hypertension[DN1.1]   [PTA on Toprol XL 50 mg po daily and Lisinopril 20 mg po BID]  - BP on higher side; HR also in 90's  - plan to increase PTA Toprol to 75 mg po daily  - resume PTA Lisinopril at 20 mg po daily  - adjust meds as needed[DN1.2].      Constipation  Chronic issue.   Scheduled docusate   PRN bowel regimen ordered    DVT Prophylaxis:[DN1.1] Pneumatic Compression Devices[DN1.2]  Code Status: Full Code  Expected discharge:[DN1.1] couple of days[DN1.2], recommended to[DN1.1] TCU vs home[DN1.2]  once[DN1.1] clinically better[DN1.2].    Johana aPtterson MD    Interval History[DN1.1]   Doing better; denies chest pain, no SOB; no abd pain, no N/V; remembers the fall, states she felt because of slippery floor; she said that she cooks and clean for herself; discussed with RN and son at bedside[DN1.2]     -Data reviewed today: I reviewed all new labs and imaging results over the last 24 hours. I personally reviewed[DN1.1] the echo image(s) showing as above[DN1.2].    Physical Exam   Temp: 97.6  F (36.4  C) Temp src: Oral BP: 144/85   Heart Rate: 94 Resp: 16 SpO2: 95 % O2 Device: None (Room air)    Vitals:    11/16/18 1246   Weight: 54.9 kg (121 lb)     Vital Signs with Ranges  Temp:  [97.4  F (36.3  C)-98.2  F (36.8  C)] 97.6  F (36.4  C)  Heart Rate:  [] 94  Resp:  [11-22] 16  BP: (132-152)/(60-85) 144/85  SpO2:  [94 %-97 %] 95 %  I/O last 3 completed shifts:  In: 2414 [P.O.:480; I.V.:1934]  Out: -     Constitutional:[DN1.1] Awake, alert, NAD[DN1.2]  Respiratory:[DN1.1] Bilateral air entry, no wheezing, no rales, no crackles[DN1.2]  Cardiovascular:[DN1.1] S1S2, RRR, no murmurs, no rubs[DN1.2]  GI:[DN1.1] abdomen- soft, nonT, nonD, BS present[DN1.2]  Skin/Integumen:[DN1.1] bilateral knees ecchymosis and some skin tear  Neuro: AAO to self and partially to time, no FNDs[DN1.2]    Medications     sodium chloride 100 mL/hr at 11/17/18 1445       aspirin  81 mg Oral Daily     cefTRIAXone  1 g Intravenous Q24H     docusate sodium  200 mg Oral BID     [START ON 11/18/2018] metoprolol succinate  75 mg Oral Daily     multivitamin, therapeutic with minerals  1 tablet Oral Daily       Data     Recent Labs  Lab 11/17/18  0925 11/16/18  2245 11/16/18  1920 11/16/18  1318   WBC 18.5*  --   --  15.1*   HGB 11.4*  --   --  10.8*   MCV 99  --   --  99     --   --  241     --   --  142   POTASSIUM 3.5  --   --  4.0   CHLORIDE 106  --   --  108   CO2 24  --   --  26   BUN 15  --   --  23   CR 0.79  --   --   1.13*   ANIONGAP 9  --   --  8   RENETTA 8.2*  --   --  9.3   *  --   --  80   ALBUMIN 3.0*  --   --   --    PROTTOTAL 6.5*  --   --   --    BILITOTAL 0.7  --   --   --    ALKPHOS 62  --   --   --    ALT 19  --   --   --    AST 42  --   --   --    TROPI 1.007* 1.593* 1.615* 0.687*       No results found for this or any previous visit (from the past 24 hour(s)).[DN1.1]     Revision History        User Key Date/Time User Provider Type Action    > DN1.2 11/17/2018  8:09 PM Johana Patterson MD Physician Sign     DN1.1 11/17/2018  3:06 PM Johana Patterson MD Physician             ED Provider Notes by Katarina Lara MD at 11/16/2018 12:33 PM     Author:  Katarina Lara MD Service:  Emergency Medicine Author Type:  Physician    Filed:  11/17/2018  2:08 PM Date of Service:  11/16/2018 12:33 PM Creation Time:  11/16/2018  1:02 PM    Status:  Signed :  Katarina Lara MD (Physician)           History     Chief Complaint:  Fall    HPI   Tracey Moran is a 89 year old female who presents[BA1.1] via EMS after a fall. Patient has a history of falls, anemia, and chronic right knee pain. Patient fell last night around 2100 and was not able to get up.[BA1.2] She was able to crawl around on her knees.[MB1.1] She states that she laid on the floor all night. She states that she was wearing socks, slipped, and fell. She denies feeling light-headed prior to the fall. She lives alone. She hit her face during the fall but denies any loss of consciousness. She denies any chest pain, neck pain, and hip pain.[BA1.2] She is not on any blood thinners. C collar was placed by EMS per their protocol.[MB1.1] She denies any new numbness.[BA1.2]    Allergies:[BA1.1]  Demerol  Dust mites  Peanuts  Penicillins  Pollen extract[BA1.2]     Medications:[BA1.1]    Docusate sodium (colace) 100 mg capsule  Lisinopril (prinivil/zestril) 20 mg tablet  Metoprolol succinate (toprol-xl) 50 mg 24 hr tablet  Polyethylene  "glycol (miralax/glycolax) packet[BA1.2]    Past Medical History:[BA1.1]    Hypertension  Hyperlipidemia LDL goal <130  Advanced directives, counseling/discussion  Left wrist fracture, with routine healing, subsequent encounter  Leukocytosis, unspecified type  Elevated CK  Abrasion of left scapular region, subsequent encounter  Anemia due to blood loss, acute  Thrombocytopenia (H)  CRF (chronic renal failure), stage 3 (moderate) (H)  Slow transit constipation  Gastrointestinal hemorrhage, unspecified gastrointestinal hemorrhage type  Fall[BA1.2]    Past Surgical History:[BA1.1]    History reviewed. No pertinent past surgical history.[BA1.2]    Family History:[BA1.1]    History reviewed. No pertinent family history.[BA1.2]    Social History:[BA1.1]  Patient is   Tobacco Use: No  Alcohol Use: Yes  PCP:[BA1.2] Guilherme Graves[BA1.3]     Review of Systems   Constitutional: Negative for[BA1.1] fever[BA1.2].   Cardiovascular: Negative for[BA1.1] chest pain[BA1.2].   Musculoskeletal: Negative for[BA1.1] neck pain[BA1.2].[BA1.1]        No hip pain[BA1.2]   Neurological: Negative for[BA1.1] light-headedness[BA1.2] and[BA1.1] numbness[BA1.2].[BA1.1]        No LOC   All other systems reviewed and are negative[BA1.2].    Physical Exam   First Vitals:[BA1.1]  Patient Vitals for the past 24 hrs:   BP Temp Temp src Pulse Heart Rate Resp SpO2 Height Weight   11/16/18 1530 - - - - 107 21 - - -   11/16/18 1400 - - - - 108 14 - - -   11/16/18 1246 142/78 97.3  F (36.3  C) Oral 95 - 16 95 % 1.575 m (5' 2\") 54.9 kg (121 lb)[BA1.3]       Physical Exam[BA1.1]    Physical Exam   Constitutional:  Patient is oriented to person[BA1.2] and place only[BA1.4]. They appear well-developed and well-nourished. Mild distress secondary to[BA1.2] C-collar in place.[BA1.5]   HENT:[BA1.2]    Patient has residual blood on her left lower cheek. No evidence of malocclusion. Mucous membranes are dry.[BA1.4]   Mouth/Throat:   Oropharynx is clear[BA1.2] " but dry mucous membranes[MB1.1]. No dental injury.[BA1.4]  Eyes:    Conjunctivae normal and EOM are normal. Pupils are equal, round, and reactive to light.   Neck:[BA1.2]    Patient is in a cervical collar.[BA1.4]  Cardiovascular:[BA1.2] Mildly tachycardic[BA1.4] rate, regular rhythm and normal heart sounds.  Exam reveals no gallop and no friction rub.  No murmur heard.  Pulmonary/Chest:  Effort normal and breath sounds normal. Patient has no wheezes. Patient has no rales.   Abdominal:   Soft. Bowel sounds are normal. Patient exhibits no mass. There is no tenderness. There is no rebound and no guarding.   Musculoskeletal:[BA1.2]  Patient has abrasions on bilateral knee. Normal ROM of knees, ankles and hips.[BA1.4]   Neurological:   Patient is alert and oriented to person[BA1.2] and place[BA1.4]. Patient[BA1.2] is generally weak[MB1.1]. No cranial nerve deficit or sensory deficit. GCS 15[BA1.2]. Appears to have memory impairment.[BA1.4]   Skin:[BA1.2]   Abrasions to both knees[MB1.1]  Psychiatric:[BA1.2]   Normal mood.[BA1.4]         Emergency Department Course   ECG:[BA1.1]  @[BA1.6] 1355[BA1.7]  Indication:[BA1.6] Medical Evaluation[BA1.8]  Vent. Rate[BA1.6] 111[BA1.7] bpm. RI interval[BA1.6] 136[BA1.7] ms. QRS duration[BA1.6] 86[BA1.7] ms. QT/QTc[BA1.6] 368[BA1.7]/[BA1.6]500[BA1.7] ms. P-R-T axis[BA1.6] 62 -15 145[BA1.7].[BA1.6]   Sinus tachycardia. Possible left atrial enlargement. Left ventricular hypertrophy. Cannot rule out septal infarct, age undetermined. ST & T wave abnormality, consider lateral ischemia.[BA1.7] Abnormal ECG.  No significant change when compared to previous ECG from[BA1.6] 2[BA1.7]/[BA1.6]8[BA1.7]/[BA1.6]18[BA1.7]   Read @[BA1.6] 1400[BA1.7] by [BA1.6] Jane[BA1.8].[BA1.6]    Imaging:[BA1.1]  Radiographic findings were communicated with the patient who voiced understanding of the findings.[BA1.9]  CT head w/o contrast:[BA1.10]  1. No evidence of acute intracranial hemorrhage, mass, or  herniation.  2. There is generalized atrophy of the brain. White matter changes are  present in the cerebral hemispheres that are consistent with small  vessel ischemic disease in this age patient. Per Radiology read.[BA1.9]  CT cervical spine w/o contrast:[BA1.10]  1. No evidence of acute trauma.  2. Degenerative changes.  3. Chronic opacification of right mastoid air cells. This is  Unchanged. Per Radiology read.  XR chest 2 views:[BA1.11]  Enlarged cardiac silhouette is again seen. LEFT basilar  atelectasis is noted, lungs otherwise clear. No pleural effusion or  pneumothorax seen on either side. Per Radiology read.[BA1.12]    Laboratory:[BA1.1]  CBC:  WBC[BA1.10] 15.1 high[BA1.13], HGB[BA1.10] 10.8 low[BA1.13], PLT[BA1.10] 241[BA1.13]  BMP:[BA1.10] Creatinine 1.13 high, GFR 45 low,[BA1.9] o/w WNL.   CK Total:[BA1.10] 379 high[BA1.9]  Troponin:[BA1.10] 0.687 (HH)[BA1.9]  UA:[BA1.10] Blood moderate (A), Protein Albumin 30 (A), Nitrite positive (A), Leukocyte Esterase large (A),  high, RBC 32 high, Bacteria moderate (A), Mucous present (A), o/w WNL[BA1.11]  Urine Culture: Pending  Blood Culture x2: Pending[BA1.14]    Interventions:[BA1.1]  1323: NS 1L IV[BA1.9]  1449: Aspirin 324mg PO[BA1.11]  1534[BA1.4]: Rocephin 1g IV[BA1.8]    Emergency Department Course:[BA1.1]  Nursing notes and vitals reviewed.  I performed an exam of the patient as documented above.[BA1.5]   Findings and plan explained to the patient who consents to admission.   3:13 PM I discussed the patient with Dr. Rainey of the hospitalist service, who will admit the patient to a[BA1.8] [facility][BA1.7] bed for further monitoring, evaluation, and treatment.[BA1.8]      Impression & Plan      Medical Decision Making:[BA1.1]  Tracey Moran is a 89 year old female[BA1.5] presenting after an unwitnessed fall. She states this was last night and due to wearing the socks on a hardwood floor. She tells me this was a mechanical fall although there is no one  else to collaborate. She does live independently and her son called her this morning at 0800 and she had not mentioned that she had fallen. He does state that she has needed to go to the bathroom more frequently. On her initial evaluation I did keep her in the cervical collar and scanned her head and neck. Fortunately there is intercranial injury, skull injury, or cervical spine injury. Urinalysis is positive for infection, urine culture is pending, and she was given rocephin for this. Her CBC shows an elevated white blood cell count. She is chronically anemic. Her metabolic panel shows a slightly elevated creatinine otherwise unremarkable. Her cardiac enzyme is elevated at 0.687. I note that she was admitted one year ago for very similar presentation with a fall, non STEMI, elevated troponin, elevated CPK. At the time she was discharged the troponin did come to 0.84 but never did normalize. Her EKG here does show a mild tachycardia with ST segment depression in the lateral leads. This is relatively unchanged from her EKG earlier this year.     At this time she was given a liter of normal saline and then to maintenance of 125cc's an hour. After seeing that her head did not show any acute injury, she was given aspirin orally. Family did arrive and was able to give more information. She does live on her own. [facility] and she did have to go to the bathroom when they were at [facility] the other day and son said that is unusual. She otherwise does quite well living on her own.    At this time I am admitting her for her UTI, non STEMI, dehydration. I spoke with Dr. Rainey the hospitalist and discussed heparinizing here but at this time, I think she is more chronically elevated in her troponin. Her EKG does not show any significant change. They will trend her troponins and anticoagulate as necessary.[BA1.4]      Diagnosis:[BA1.1]    ICD-10-CM    1. Fall, initial encounter W19.XXXA  Blood culture     Blood culture   2. NSTEMI (non-ST elevated myocardial infarction) (H) I21.4    3. Urinary tract infection in female N39.0    4. Dehydration E86.0    5. Elevated CK R74.8[BA1.3]        Disposition:[BA1.1]  Admitted to the hospitalist[BA1.8]    I, Bradley Aasen, am serving as a scribe on 11/16/2018 at 1:03 PM to personally document services performed by Katarina Lara MD based on my observations and the provider's statements to me.[BA1.2]        Katarina Lara MD  11/17/18 1408  [MB1.2]     Revision History        User Key Date/Time User Provider Type Action    > MB1.2 11/17/2018  2:08 PM Katarina Lara MD Physician Sign     MB1.1 11/17/2018  2:04 PM Katarina Lara MD Physician      BA1.3 11/16/2018  3:48 PM Aasen, Shamar Scribe Share     BA1.12 11/16/2018  3:47 PM Aasen, Shamar Scribe      BA1.4 11/16/2018  3:36 PM Aaseileen, Shamar Scribe Share     [N/A] 11/16/2018  3:17 PM Aasen, Shamar Scribe Share     BA1.7 11/16/2018  3:15 PM Aaseileen, Shamar Scribe Share     BA1.8 11/16/2018  3:13 PM Aaseileen, Shamar Scribe      BA1.14 11/16/2018  3:06 PM Aasen, Shamar Scribe Share     BA1.11 11/16/2018  3:01 PM Aasen, Shamar Scribe Share     BA1.9 11/16/2018  2:18 PM Aaseileen, Shamar Scribe Share     BA1.13 11/16/2018  2:02 PM Aaseileen, Shamar Scribe Share     BA1.6 11/16/2018  1:20 PM Aasen, Shamar Scribe Share     BA1.10 11/16/2018  1:17 PM Aaseileen, Shamar Scribe Share     BA1.5 11/16/2018  1:12 PM Aasen, Shamar Scribe Share     BA1.2 11/16/2018  1:09 PM Aasen, Bradley Scribe Share     BA1.1 11/16/2018  1:02 PM Aasen, Bradley Scribe Share            Progress Notes by Rylie Aguilar RN at 11/16/2018  9:41 PM     Author:  Rylie Aguilar, RN Service:   Nursing Author Type:  Registered Nurse    Filed:  11/16/2018 10:05 PM Date of Service:  11/16/2018  9:41 PM Creation Time:  11/16/2018  9:41 PM    Status:  Signed :  Rylie Aguilar, RN (Registered Nurse)          Talked with son Jonny[AW1.1], pt agreed it was OK to give information, code given. Jonny[AW1.2] expressed concerns about[AW1.1] rising troponin levels and clarification of path forward if level goes up. Said he was taken off guard earlier when MD called. Still reluctant to blood thinners. Does want clarification about what was done with the last admission with similar issues. Asked about advanced directives, he is not aware of any ADs. Pt c/o being unable to chew meat at dinner and stated she has dentures, nurse asked son to bring in pt's dentures, he said she would not benefit from them.[AW1.2]      Revision History        User Key Date/Time User Provider Type Action    > AW1.2 11/16/2018 10:05 PM Rylie Aguilar, RN Registered Nurse Sign     AW1.1 11/16/2018  9:41 PM Rylie Aguilar RN Registered Nurse             Progress Notes by Richardson Rainey MD at 11/16/2018  8:46 PM     Author:  Richardson Rainey MD Service:  Hospitalist Author Type:  Physician    Filed:  11/16/2018  8:52 PM Date of Service:  11/16/2018  8:46 PM Creation Time:  11/16/2018  8:46 PM    Status:  Signed :  Richardson Rainey MD (Physician)         Troponin increased to 1.615  Patient appears calm comfortable and asymptomatic.    Earlier at the time of admission this evening, Discussed risks / benefits extensively of heparin versus no heparin in context of possible ACS with son and he expressed understanding, declined heparin.    Called patient's son and discussed again at this time to consider restarting heparin drip.  He declined heparin drip or echocardiogram.  He would like his mom to get some sleep overnight and would like to follow a.m. troponin/discuss further in the morning.  Plan of care discussed with patient's RN.[SN1.1]       Revision History        User Key Date/Time User Provider Type Action    > SN1.1 11/16/2018  8:52 PM Richadrson Rainey MD Physician Sign            ED Notes by Agustina Gr RN at 11/16/2018   "3:39 PM     Author:  Agustina Gr RN Service:  Emergency Medicine Author Type:  Registered Nurse    Filed:  11/16/2018  3:45 PM Date of Service:  11/16/2018  3:39 PM Creation Time:  11/16/2018  3:45 PM    Status:  Signed :  Agustina Gr RN (Registered Nurse)         [facility]  ED Nurse Handoff Report    ED Chief complaint:[EW1.1] Fall (Patient brought in by EMS. Patient states fell last night. States was unable to get up. Was crawling around on the floor. Patient states called 911 herself. Patient lives independently.)[EW1.2]      ED Diagnosis:[EW1.1]   Final diagnoses:   Fall, initial encounter   NSTEMI (non-ST elevated myocardial infarction) (H)   Urinary tract infection in female   Dehydration   Elevated CK[EW1.2]       Code Status: Full Code in ED, to be addressed by admitting provider    Allergies:[EW1.1]   Allergies   Allergen Reactions     Demerol [Meperidine]      Dust Mites      Peanuts [Nuts]      Penicillins      Pollen Extract[EW1.2]        Activity level - Baseline/Home:  Independent with walker    Activity Level - Current:   Unable to Assess     Needed?: No    Isolation: No  Infection: Not Applicable  Bariatric?: No    Vital Signs:[EW1.1]   Vitals:    11/16/18 1246 11/16/18 1400 11/16/18 1530   BP: 142/78     Pulse: 95     Resp: 16 14 21   Temp: 97.3  F (36.3  C)     TempSrc: Oral     SpO2: 95%     Weight: 54.9 kg (121 lb)     Height: 1.575 m (5' 2\")[EW1.2]         Cardiac Rhythm: ,        Pain level:[EW1.1] 0-10 Pain Scale: 0[EW1.2]    Is this patient confused?: No  [place] - Suicide Severity Rating Scale Completed?  Yes  If yes, what color did the patient score?  White    Patient Report: Initial Complaint: fall  Focused Assessment: tachycardic to 100's, otherwise VSS on R/A. Pt presents to ED after a fall last night, pt unable to get off of floor independently, paramedics assisted pt up and to ED at 1300 today. Significant UTI noted and trop elevated. " Respiratory exam WNL, Cardiac exam exhibits trigeminal PVC's at times, otherwise WNL. Neuro exam WNL. Plan to admit.  Tests Performed: labs, EKG, Head/neck CT, UA, Blood cultures x2, CXR  Abnormal Results:[EW1.1]   Labs Ordered and Resulted from Time of ED Arrival Up to the Time of Departure from the ED   CBC WITH PLATELETS DIFFERENTIAL - Abnormal; Notable for the following:        Result Value    WBC 15.1 (*)     RBC Count 3.36 (*)     Hemoglobin 10.8 (*)     Hematocrit 33.1 (*)     Absolute Neutrophil 13.1 (*)     All other components within normal limits   BASIC METABOLIC PANEL - Abnormal; Notable for the following:     Creatinine 1.13 (*)     GFR Estimate 45 (*)     GFR Estimate If Black 54 (*)     All other components within normal limits   TROPONIN I - Abnormal; Notable for the following:     Troponin I ES 0.687 (*)     All other components within normal limits   ROUTINE UA WITH MICROSCOPIC - Abnormal; Notable for the following:     Ketones Urine 5 (*)     Blood Urine Moderate (*)     Protein Albumin Urine 30 (*)     Nitrite Urine Positive (*)     Leukocyte Esterase Urine Large (*)     WBC Urine 149 (*)     RBC Urine 32 (*)     Bacteria Urine Moderate (*)     Mucous Urine Present (*)     All other components within normal limits   CK TOTAL - Abnormal; Notable for the following:     CK Total 379 (*)     All other components within normal limits   VITAL SIGNS   PULSE OXIMETRY NURSING   CARDIAC CONTINUOUS MONITORING   URINE CULTURE AEROBIC BACTERIAL   BLOOD CULTURE   BLOOD CULTURE[EW1.3]       Treatments provided: 1 L NS bolus, 324 ASA PO chewable, 1g rocephin IV     Family Comments: son and daughter in law at bedside    OBS brochure/video discussed/provided to patient/family: N/A              Name of person given brochure if not patient: NA              Relationship to patient: NA    ED Medications:[EW1.1]   Medications   cefTRIAXone (ROCEPHIN) 1 g vial to attach to  mL bag for ADULTS or NS 50 mL bag for  PEDS (1 g Intravenous New Bag 11/16/18 1534)   0.9% sodium chloride BOLUS (0 mLs Intravenous Stopped 11/16/18 1430)   aspirin chewable tablet 324 mg (324 mg Oral Given 11/16/18 1449)   sodium chloride 0.9% infusion ( Intravenous New Bag 11/16/18 1531)[EW1.2]       Drips infusing?:  Yes, MIVF    For the majority of the shift this patient was Green.   Interventions performed were NA.    Severe Sepsis OR Septic Shock Diagnosis Present: No    To be done/followed up on inpatient unit:  N/A    ED NURSE PHONE NUMBER: [phone][EW1.1]            Revision History        User Key Date/Time User Provider Type Action    > EW1.3 11/16/2018  3:45 PM Agustina Gr RN Registered Nurse Sign     EW1.2 11/16/2018  3:40 PM Agustina Gr RN Registered Nurse      EW1.1 11/16/2018  3:39 PM Agusitna Gr RN Registered Nurse             ED Notes by Caryn France RN at 11/16/2018  2:08 PM     Author:  Caryn France RN Service:  (none) Author Type:  Registered Nurse    Filed:  11/16/2018  2:11 PM Date of Service:  11/16/2018  2:08 PM Creation Time:  11/16/2018  2:11 PM    Status:  Signed :  Caryn France RN (Registered Nurse)         DATE:  11/16/2018   TIME OF RECEIPT FROM LAB:  1408  LAB TEST:  Troponin  LAB VALUE:  0.687  RESULTS GIVEN WITH READ-BACK TO (PROVIDER):  Dr. Lara  TIME LAB VALUE REPORTED TO PROVIDER:   1410[CK1.1]       Revision History        User Key Date/Time User Provider Type Action    > CK1.1 11/16/2018  2:11 PM Caryn France RN Registered Nurse Sign            ED Notes by Sofia Rosa RN at 11/16/2018 12:52 PM     Author:  Sofia Rosa RN Service:  (none) Author Type:  Registered Nurse    Filed:  11/16/2018 12:52 PM Date of Service:  11/16/2018 12:52 PM Creation Time:  11/16/2018 12:52 PM    Status:  Signed :  Van Grinsven, Gloria, RN (Registered Nurse)         Bed: ED27  Expected date:   Expected time:   Means of arrival:   Comments:  511  89 F last  night/non injuries/bigeminy  1225     Revision History        User Key Date/Time User Provider Type Action    > MV1.1 11/16/2018 12:52 PM Sofia Rosa RN Registered Nurse Sign                  Procedure Notes     No notes of this type exist for this encounter.         Progress Notes - Therapies (Notes from 11/16/18 through 11/19/18)      Progress Notes by Manfred Smith OT at 11/18/2018  1:49 PM     Author:  Manfred Smith OT Service:  Acute IP Rehab Author Type:  Occupational Therapist    Filed:  11/18/2018  1:49 PM Date of Service:  11/18/2018  1:49 PM Creation Time:  11/18/2018  1:49 PM    Status:  Signed :  Mafnred Smith OT (Occupational Therapist)          11/18/18 1056   Quick Adds   Type of Visit Initial Occupational Therapy Evaluation   Living Environment   Lives With alone   Living Arrangements apartment;independent living facility  ([employer] [employer], large [address])   Home Accessibility no concerns   Number of Stairs to Enter Home 0   Number of Stairs Within Home 0   Transportation Available family or friend will provide   Self-Care   Dominant Hand right   Usual Activity Tolerance good   Current Activity Tolerance poor   Regular Exercise yes   Activity/Exercise Type strength training   Exercise Amount/Frequency 3-5 times/wk  (exercise class)   Equipment Currently Used at Home walker, rolling;grab bar;raised toilet  (4ww, HH shower, toilet frame, [employer] necklace)   Activity/Exercise/Self-Care Comment Pt walks to exerice room with her 4ww   Functional Level Prior   Ambulation 1-->assistive equipment   Transferring 1-->assistive equipment   Toileting 1-->assistive equipment   Bathing 1-->assistive equipment   Dressing 0-->independent   Eating 0-->independent   Communication 0-->understands/communicates without difficulty   Swallowing 0-->swallows foods/liquids without difficulty   Cognition 1 - attention or memory deficits   Fall history within last six months yes   Number of times  patient has fallen within last six months 4   Prior Functional Level Comment Son confirmed pt indep with all self-cares including bathing (stands up in walk-in shower), meds (takes 2 only plus vitamins), meal prep, laundry and cleaning. Family provides transport when needed and does grocery shopping.    General Information   Onset of Illness/Injury or Date of Surgery - Date 11/16/18   Referring Physician Dr. Rainey   Patient/Family Goals Statement son open to TCU   Additional Occupational Profile Info/Pertinent History of Current Problem 89yo female admitted following fall at her apartment,  was down at least overnight. Diagnosed with UTI, elevated troponins (now downtrending), mild rhabdomyolis/acute kidney injury   Precautions/Limitations fall precautions   Cognitive Status Examination   Orientation person;place   Level of Consciousness confused   Able to Follow Commands success, 1-step commands   Personal Safety (Cognitive) decreased awareness, need for assist;at risk behaviors demonstrated   Memory impaired   Cognitive Comment Pt now wearing hearing aids -was not during PT eval earlier   Visual Perception   Visual Perception No deficits were identified   Pain Assessment   Patient Currently in Pain Yes, see Vital Sign flowsheet  (c/o pain in legs from fall)   Integumentary/Edema   Integumentary/Edema Comments multiple bruises, wounds from fall, covered in bandaging. See nursing notes for details. Son reports bruises on arms caused from repeatedly attempting to get up from floor, not from fall.   Range of Motion (ROM)   ROM Quick Adds No deficits were identified   Strength   Strength Comments B ofelia 3+/5 due to OA   Hand Strength   Hand Strength Comments B  somewhat weak but functional   Coordination   Coordination Comments Able to bilaterally manipulate ADL supplies, eating utensils   Mobility   Bed Mobility Comments Mod A of 1   Transfer Skill: Bed to Chair/Chair to Bed   Level of [place]: Bed to Chair  "moderate assist (50% patients effort)   Physical Assist/Nonphysical Assist: Bed to Chair 1 person assist   Weight-Bearing Restrictions full weight-bearing   Assistive Device - Transfer Skill Bed to Chair Chair to Bed Rehab Eval rolling walker   Transfer Skill: Sit to Stand   Level of [place]: Sit/Stand moderate assist (50% patients effort)   Physical Assist/Nonphysical Assist: Sit/Stand 1 person assist   Transfer Skill: Sit to Stand full weight-bearing   Assistive Device for Transfer: Sit/Stand rolling walker   Balance   Balance Comments Unsteady in standing, able to maintain static sitting balance indep   Grooming   Level of [place]: Grooming stand-by assist   Physical Assist/Nonphysical Assist: Grooming verbal cues;set-up required  (sitting up in bed)   Eating/Self Feeding   Level of [place]: Eating independent   Physical Assist/Nonphysical Assist: Eating set-up required  (sitting up in bed)   Activities of Daily Living Analysis   Impairments Contributing to Impaired Activities of Daily Living balance impaired;cognition impaired;pain;strength decreased   General Therapy Interventions   Planned Therapy Interventions ADL retraining;transfer training;cognition   Clinical Impression   Criteria for Skilled Therapeutic Interventions Met yes, treatment indicated   OT Diagnosis decreased ADL performance   Influenced by the following impairments strength, cognition, balance, pain from fall   Assessment of Occupational Performance 3-5 Performance Deficits   Identified Performance Deficits functional mobility, grooming, dressing, toileting   Clinical Decision Making (Complexity) Low complexity   Therapy Frequency daily   Predicted Duration of Therapy Intervention (days/wks) 3 days   Anticipated Discharge Disposition Transitional Care Facility   Risks and Benefits of Treatment have been explained. Yes   Patient, Family & other staff in agreement with plan of care Yes   [facility] AM-PAC TM \"6 Clicks\"   " "2016, Trustees of [facility], under license to [employer].  All rights reserved.   6 Clicks Short Forms Daily Activity Inpatient Short Form   [facility] AM-PAC  \"6 Clicks\" Daily Activity Inpatient Short Form   1. Putting on and taking off regular lower body clothing? 2 - A Lot   2. Bathing (including washing, rinsing, drying)? 2 - A Lot   3. Toileting, which includes using toilet, bedpan or urinal? 2 - A Lot   4. Putting on and taking off regular upper body clothing? 3 - A Little   5. Taking care of personal grooming such as brushing teeth? 4 - None   6. Eating meals? 4 - None   Daily Activity Raw Score (Score out of 24.Lower scores equate to lower levels of function) 17   Total Evaluation Time   Total Evaluation Time (Minutes) 15[MR1.1]        Revision History        User Key Date/Time User Provider Type Action    > MR1.1 11/18/2018  1:49 PM Manfred Smith OT Occupational Therapist Sign            Progress Notes by Sunita Barillas PT at 11/18/2018  9:12 AM     Author:  Sunita Barillas PT Service:  (none) Author Type:  Physical Therapist    Filed:  11/18/2018  9:12 AM Date of Service:  11/18/2018  9:12 AM Creation Time:  11/18/2018  9:12 AM    Status:  Signed :  Sunita Barillas PT (Physical Therapist)          11/18/18 0900   Quick Adds   Type of Visit Initial PT Evaluation   Living Environment   Lives With alone   Living Arrangements apartment   Home Accessibility no concerns   Number of Stairs to Enter Home 0   Number of Stairs Within Home 0   Living Environment Comment Pt lives alone in sr apt   Self-Care   Dominant Hand right   Usual Activity Tolerance good   Current Activity Tolerance fair   Regular Exercise yes   Activity/Exercise Type (pt states she goes to exercise group )   Exercise Amount/Frequency 3-5 times/wk   Equipment Currently Used at Home walker, rolling   Functional Level Prior   Ambulation 1-->assistive equipment   Transferring 1-->assistive equipment   Toileting " 1-->assistive equipment   Bathing 1-->assistive equipment   Dressing 0-->independent   Eating 0-->independent   Communication 0-->understands/communicates without difficulty   Swallowing 0-->swallows foods/liquids without difficulty   Cognition 1 - attention or memory deficits   Fall history within last six months yes   Number of times patient has fallen within last six months 4   Which of the above functional risks had a recent onset or change? fall history   Prior Functional Level Comment Pt normally is IND with 4wh walker   General Information   Onset of Illness/Injury or Date of Surgery - Date 11/16/18   Referring Physician Dr Rainey   Patient/Family Goals Statement pt confused, pleasant   Pertinent History of Current Problem (include personal factors and/or comorbidities that impact the POC) Pt fell at her apt, was down at least overnight. Admitted with fall, UTI, elevated troponins (now downtrending), mild rhabdomyolis/acute kidney injury   Precautions/Limitations fall precautions   Weight-Bearing Status - LUE full weight-bearing   Weight-Bearing Status - RUE full weight-bearing   Weight-Bearing Status - LLE full weight-bearing   Weight-Bearing Status - RLE full weight-bearing   Cognitive Status Examination   Orientation person   Level of Consciousness confused   Follows Commands and Answers Questions 50% of the time;able to follow single-step instructions   Personal Safety and Judgment impaired;at risk behaviors demonstrated;impulsive   Memory impaired   Pain Assessment   Patient Currently in Pain (yes but unable to rate pain - feet )   Integumentary/Edema   Integumentary/Edema Comments multiple bruises, wounds from fall, covered in bandaging. See nursing notes for details.   Posture    Posture Forward head position   Range of Motion (ROM)   ROM Comment LE ROM decreased   Strength   Strength Comments decreased d/t pain and weakness, grossly 4/5 but symmetrical   Bed Mobility   Bed Mobility Comments min  "assist 1 and rail   Transfer Skills   Transfer Comments sit to/from stand with mod assist 1   Gait   Gait Comments took 3 steps with walker and min to mod assist 2   Balance   Balance Comments unsteady with all standing activity even with walker   Sensory Examination   Sensory Perception no deficits were identified   Coordination   Coordination no deficits were identified   Muscle Tone   Muscle Tone no deficits were identified   General Therapy Interventions   Planned Therapy Interventions balance training;gait training;strengthening;transfer training   Clinical Impression   Criteria for Skilled Therapeutic Intervention yes, treatment indicated   PT Diagnosis impaired functional mobility   Influenced by the following impairments decreased strength, confusion, balance impairment   Functional limitations due to impairments fall risk, inability to ambulate household distances, increased caregiver burden   Clinical Presentation Stable/Uncomplicated   Clinical Presentation Rationale per medical record   Clinical Decision Making (Complexity) Low complexity   Therapy Frequency` daily   Predicted Duration of Therapy Intervention (days/wks) 3 days   Anticipated Discharge Disposition Transitional Care Facility   Risk & Benefits of therapy have been explained Yes   Patient, Family & other staff in agreement with plan of care Yes   [facility] TM \"6 Clicks\"   2016, Trustees of [facility], under license to [employer].  All rights reserved.   6 Clicks Short Forms Basic Mobility Inpatient Short Form   [facility] AM-PAC  \"6 Clicks\" V.2 Basic Mobility Inpatient Short Form   1. Turning from your back to your side while in a flat bed without using bedrails? 3 - A Little   2. Moving from lying on your back to sitting on the side of a flat bed without using bedrails? 3 - A Little   3. Moving to and from a bed to a chair (including a wheelchair)? 2 - A Lot   4. Standing up from a chair using your arms (e.g., " wheelchair, or bedside chair)? 2 - A Lot   5. To walk in hospital room? 2 - A Lot   6. Climbing 3-5 steps with a railing? 1 - Total   Basic Mobility Raw Score (Score out of 24.Lower scores equate to lower levels of function) 13   Total Evaluation Time   Total Evaluation Time (Minutes) 15[RB1.1]        Revision History        User Key Date/Time User Provider Type Action    > RB1.1 11/18/2018  9:12 AM Sunita Barillas, PT Physical Therapist Sign

## 2018-11-16 NOTE — IP AVS SNAPSHOT
"` `     [place] [address] [facility]: [phone]                                              INTERAGENCY TRANSFER FORM - NURSING   2018                    Hospital Admission Date: 2018  LIV KINGSTON   : 1925  Sex: Female        Attending Provider: (none)    Allergies:  Demerol [Meperidine], Dust Mites, Peanuts [Nuts], Penicillins, Pollen Extract    Infection:  None   Service:  HOSPITALIST    Ht:  1.575 m (5' 2\")   Wt:  62.4 kg (137 lb 9.1 oz)   Admission Wt:  54.9 kg (121 lb)    BMI:  25.16 kg/m 2   BSA:  1.65 m 2            Patient PCP Information     Provider PCP Type    Guihlerme Graves MD General      Current Code Status     Date Active Code Status Order ID Comments User Context       Prior      Code Status History     Date Active Date Inactive Code Status Order ID Comments User Context    2018  6:02 PM 2018  6:26 PM Full Code [id number]  Richardson Rainey MD Inpatient    2018 12:20 PM 2018  6:02 PM Full Code [id number]  Guilherme Zhou MD Outpatient    2018  4:11 PM 2018 12:20 PM Full Code [id number]  Tomy Keene MD Inpatient    2017 12:18 PM 2018  4:11 PM Full Code [id number]  Corby Murphy MD Outpatient    2017 12:20 AM 2017 12:18 PM Full Code [id number]  Dana Aburto MD Inpatient    2016  9:38 AM 2017 12:20 AM Full Code [id number]  Willam Martinez PA-C Outpatient    2016  9:29 PM 2016  9:38 AM Full Code [id number]  Adan Mohan MD ED    10/21/2014 10:50 AM 2016  9:29 PM Full Code [id number]  Hair Bello MD Outpatient    10/14/2014 12:55 PM 10/21/2014 10:50 AM Full Code [id number]  Candido Martinez MD Inpatient      Advance Directives        Scanned docmt in ACP Activity?           No scanned doc        Hospital Problems as of 2018              Priority Class Noted POA    Fall Medium  2018 Yes      Non-Hospital Problems as of " 11/19/2018              Priority Class Noted    Hyperlipidemia LDL goal <130 Medium  5/21/2014    Advanced directives, counseling/discussion Medium  12/8/2014    Left wrist fracture, with routine healing, subsequent encounter Medium  12/13/2016    Leukocytosis, unspecified type Medium  12/13/2016    Elevated CK Medium  12/15/2016    Abrasion of left scapular region, subsequent encounter Medium  12/15/2016    Essential hypertension, benign Medium  5/18/2017    Anemia due to blood loss, acute Medium  12/6/2017    Thrombocytopenia (H) Medium  12/6/2017    CRF (chronic renal failure), stage 3 (moderate) (H) Medium  12/6/2017    Slow transit constipation Medium  12/6/2017    Gastrointestinal hemorrhage, unspecified gastrointestinal hemorrhage type Medium  12/13/2017      Immunizations     Name Date      Pneumo Conj 13-V (2010&after) 05/18/17     Pneumococcal 23 valent 01/01/12     Tdap (Adacel,Boostrix) 01/01/12          END      ASSESSMENT     Discharge Profile Flowsheet     DISCHARGE NEEDS ASSESSMENT     Resources List  Home Care 02/11/18 1245    Concerns To Be Addressed  adjustment to diagnosis/illness concerns 02/13/18 1533   Other Resources  Home Care 02/11/18 1245    Concerns Comments  -- 08/25/17 1503   PAS Number  -- 08/25/17 1503    Equipment Currently Used at Home  walker, rolling;grab bar;raised toilet (4ww, HH shower, toilet frame, Lif[employer] necklace) 11/18/18 1349   Existing Resources/Services  None 02/12/15 1122    Transportation Available  family or friend will provide 11/18/18 1349   SKIN      # of Referrals Placed by CTS  Senior Linkage Line;Post Acute Facilities;Transportation 12/12/16 1437   Inspection of bony prominences  Full 11/19/18 1112    [facility] Name  [facility] 02/11/18 1238   Full except areas not inspected   Spine;Hip, left;Hip, right;Buttock, left;Buttock, right;Sacrum;Coccyx 11/17/18 2300    Primary Care MD Name  Guilherme Graves 02/11/18 1238   Inspection under devices  Full 11/19/18  "1112    Equipment Used at Home  walker, rolling 02/12/15 1122   Not Inspected under devices  -- (L chin dressing bilat lower arms) 11/16/18 1932    GASTROINTESTINAL (ADULT,PEDIATRIC,OB)     Skin WDL  ex 11/19/18 1112    GI WDL  ex 11/19/18 0506   Skin Color/Characteristics  bruised (ecchymotic) 11/19/18 1112    Abdominal Appearance  obese 11/19/18 1112   Skin Temperature  warm 11/19/18 1112    All Quadrants Bowel Sounds  audible and active in all quadrants 11/19/18 1112   Skin Moisture  dry 11/19/18 1112    Last Bowel Movement  11/18/18 11/18/18 1936   Skin Elasticity  slow return to original state 11/19/18 1112    GI Signs/Symptoms  fecal incontinence 11/19/18 1112   Skin Integrity  abrasion(s);bruise(s);scab(s);scar(s);skin tear(s) 11/19/18 1112    Passing flatus  yes 11/19/18 0506   SAFETY      COMMUNICATION ASSESSMENT     Safety WDL  WDL 11/19/18 1112    Patient's communication style  spoken language (English or Bilingual) 11/16/18 1236   Safety Factors  ID band on;upper side rails raised x 2;call light in reach;wheels locked;bed in low position 11/19/18 1112    FINAL RESOURCES     All Alarms  alarm(s) activated and audible 11/19/18 1112                 Assessment WDL (Within Defined Limits) Definitions           Safety WDL     Effective: 09/28/15    Row Information: <b>WDL Definition:</b> Bed in low position, wheels locked; call light in reach; upper side rails up x 2; ID band on<br> <font color=\"gray\"><i>Item=AS safety wdl>>List=AS safety wdl>>Version=F14</i></font>      Skin WDL     Effective: 09/28/15    Row Information: <b>WDL Definition:</b> Warm; dry; intact; elastic; without discoloration; pressure points without redness<br> <font color=\"gray\"><i>Item=AS skin wdl>>List=AS skin wdl>>Version=F14</i></font>      Vitals     Vital Signs Flowsheet     VITAL SIGNS     Side Effects Monitoring: Respiratory Depth  N 11/19/18 0555    Temp  98.1  F (36.7  C) 11/19/18 1556   Side Effects Monitoring: Sedation Level  S " "11/19/18 0555    Temp src  Oral 11/19/18 1556   CARSON COMA SCALE      Resp  16 11/19/18 1556   Best Eye Response  4-->(E4) spontaneous 11/19/18 1112    Pulse  82 11/19/18 1556   Best Motor Response  6-->(M6) obeys commands 11/19/18 1112    Heart Rate  69 11/19/18 0801   Best Verbal Response  5-->(V5) oriented 11/19/18 1112    Pulse/Heart Rate Source  Monitor 11/19/18 1556   Carson Coma Scale Score  15 11/19/18 1112    BP  97/42 11/19/18 1556   HEIGHT AND WEIGHT      BP Location  Right arm 11/19/18 1556   Height  1.575 m (5' 2\") 11/16/18 1250    OXYGEN THERAPY     Weight  62.4 kg (137 lb 9.1 oz) 11/19/18 0658    SpO2  97 % 11/19/18 1556   Weight Method  Bed scale 11/19/18 0658    O2 Device  None (Room air) 11/19/18 1556   BSA (Calculated - sq m)  1.55 11/16/18 1250    PAIN/COMFORT     BMI (Calculated)  22.18 11/16/18 1250    Patient Currently in Pain  sleeping: patient not able to self report 11/19/18 0555   EKG MONITORING      Preferred Pain Scale  number (Numeric Rating Pain Scale) 11/19/18 0432   Cardiac Regularity  Regular 11/16/18 1256    Patient's Stated Pain Goal  No pain 11/19/18 0432   POSITIONING      0-10 Pain Scale  5 11/19/18 0432   Body Position  supine, head elevated 11/19/18 1331    Pain Location  Knee 11/19/18 0432   Head of Bed (HOB)  HOB at 20-30 degrees 11/19/18 1331    Pain Orientation  Right 11/19/18 0432   Positioning/Transfer Devices  pillows;in use 11/19/18 1331    Pain Descriptors  Discomfort 11/18/18 1905   Chair  Upright in chair 11/19/18 1207    Pain Intervention(s)  Medication (See eMAR) 11/19/18 0432   DAILY CARE      Response to Interventions  Absence of nonverbal indicators of pain 11/19/18 0555   Activity Management  ambulated in room 11/19/18 1331    ANALGESIA SIDE EFFECTS MONITORING     Activity Assistance Provided  assistance, 1 person 11/19/18 1331    Side Effects Monitoring: Respiratory Quality  R 11/19/18 0555   Assistive Device Utilized  gait belt;walker 11/19/18 1331       "      Patient Lines/Drains/Airways Status    Active LINES/DRAINS/AIRWAYS     None            Patient Lines/Drains/Airways Status    Active PICC/CVC     None            Intake/Output Detail Report     Date Intake     Output    Shift P.O. I.V. IV Piggyback Total Total       Day 11/18/18 0700 - 11/18/18 1459 -- -- -- -- -- 0    Yahaira 11/18/18 1500 - 11/18/18 2259 100 1577.5 -- 1677.5 -- 1677.5    Noc 11/18/18 2300 - 11/19/18 0659 -- 596 -- 596 -- 596    Day 11/19/18 0700 - 11/19/18 1459 -- -- -- -- -- 0    Yahaira 11/19/18 1500 - 11/19/18 2259 -- -- -- -- -- 0      Last Void/BM       Most Recent Value    Urine Occurrence 1 at 11/19/2018 1207    Stool Occurrence 1 at 11/19/2018 1207      Case Management/Discharge Planning     Case Management/Discharge Planning Flowsheet     REFERRAL INFORMATION     Transportation Available  family or friend will provide 11/18/18 1349    Arrived From  home or self-care 02/11/18 0952   Equipment Used at Home  walker, rolling 02/12/15 1122    Referral Source  physician 11/19/18 1115   FINAL RESOURCES      # of Referrals Placed by CTS  Senior Linkage Line;Post Acute Facilities;Transportation 12/12/16 1437   Equipment Currently Used at Home  walker, rolling;grab bar;raised toilet (4ww, HH shower, toilet frame, Lif[employer] necklace) 11/18/18 1349    Reason For Consult  discharge planning 11/19/18 1115   Resources List  Home Care 02/11/18 1245    CTS Assigned to Case  -- (Gustabo Youssef) 11/19/18 1115   Other Resources  Home Care 02/11/18 1245    Primary Care Clinic Name  [facility] 02/11/18 1238   PAS Number  -- 08/25/17 1503    Primary Care MD Name  Guilherme Graves 02/11/18 1238   Existing Resources/Services  None 02/12/15 1122    LIVING ENVIRONMENT     ABUSE RISK SCREEN      Lives With  alone 11/18/18 1057   QUESTION TO PATIENT:  Has a member of your family or a partner(now or in the past) intimidated, hurt, manipulated, or controlled you in any way?  no 11/16/18 1254    Living Arrangements   apartment;independent living facility ([facility], large apt [address]) 11/18/18 1349   QUESTION TO PATIENT: Do you feel safe going back to the place where you are living?  yes 11/16/18 1251    COPING/STRESS     OBSERVATION: Is there reason to believe there has been maltreatment of a vulnerable adult (ie. Physical/Sexual/Emotional abuse, self neglect, lack of adequate food, shelter, medical care, or financial exploitation)?  no 11/16/18 1251    Major Change/Loss/Stressor  none 11/16/18 2106   OTHER      ASSESSMENT/CONCERNS TO BE ADDRESSED     Are you depressed or being treated for depression?  No 11/16/18 2106    Concerns To Be Addressed  adjustment to diagnosis/illness concerns 02/13/18 1533   HOMICIDE RISK      Concerns Comments  -- 08/25/17 1503   Feels Like Hurting Others  no 11/16/18 1251    DISCHARGE PLANNING

## 2018-11-16 NOTE — ED PROVIDER NOTES
History     Chief Complaint:  Fall    HPI   Tracey Moran is a 89 year old female who presents via EMS after a fall. Patient has a history of falls, anemia, and chronic right knee pain. Patient fell last night around 2100 and was not able to get up. She was able to crawl around on her knees. She states that she laid on the floor all night. She states that she was wearing socks, slipped, and fell. She denies feeling light-headed prior to the fall. She lives alone. She hit her face during the fall but denies any loss of consciousness. She denies any chest pain, neck pain, and hip pain. She is not on any blood thinners. C collar was placed by EMS per their protocol. She denies any new numbness.    Allergies:  Demerol  Dust mites  Peanuts  Penicillins  Pollen extract     Medications:    Docusate sodium (colace) 100 mg capsule  Lisinopril (prinivil/zestril) 20 mg tablet  Metoprolol succinate (toprol-xl) 50 mg 24 hr tablet  Polyethylene glycol (miralax/glycolax) packet    Past Medical History:    Hypertension  Hyperlipidemia LDL goal <130  Advanced directives, counseling/discussion  Left wrist fracture, with routine healing, subsequent encounter  Leukocytosis, unspecified type  Elevated CK  Abrasion of left scapular region, subsequent encounter  Anemia due to blood loss, acute  Thrombocytopenia (H)  CRF (chronic renal failure), stage 3 (moderate) (H)  Slow transit constipation  Gastrointestinal hemorrhage, unspecified gastrointestinal hemorrhage type  Fall    Past Surgical History:    History reviewed. No pertinent past surgical history.    Family History:    History reviewed. No pertinent family history.    Social History:  Patient is   Tobacco Use: No  Alcohol Use: Yes  PCP: Guilherme Graves     Review of Systems   Constitutional: Negative for fever.   Cardiovascular: Negative for chest pain.   Musculoskeletal: Negative for neck pain.        No hip pain   Neurological: Negative for light-headedness and numbness.  "       No LOC   All other systems reviewed and are negative.    Physical Exam   First Vitals:  Patient Vitals for the past 24 hrs:   BP Temp Temp src Pulse Heart Rate Resp SpO2 Height Weight   11/16/18 1530 - - - - 107 21 - - -   11/16/18 1400 - - - - 108 14 - - -   11/16/18 1246 142/78 97.3  F (36.3  C) Oral 95 - 16 95 % 1.575 m (5' 2\") 54.9 kg (121 lb)       Physical Exam    Physical Exam   Constitutional:  Patient is oriented to person and place only. They appear well-developed and well-nourished. Mild distress secondary to C-collar in place.   HENT:    Patient has residual blood on her left lower cheek. No evidence of malocclusion. Mucous membranes are dry.   Mouth/Throat:   Oropharynx is clear but dry mucous membranes. No dental injury.  Eyes:    Conjunctivae normal and EOM are normal. Pupils are equal, round, and reactive to light.   Neck:    Patient is in a cervical collar.  Cardiovascular: Mildly tachycardic rate, regular rhythm and normal heart sounds.  Exam reveals no gallop and no friction rub.  No murmur heard.  Pulmonary/Chest:  Effort normal and breath sounds normal. Patient has no wheezes. Patient has no rales.   Abdominal:   Soft. Bowel sounds are normal. Patient exhibits no mass. There is no tenderness. There is no rebound and no guarding.   Musculoskeletal:  Patient has abrasions on bilateral knee. Normal ROM of knees, ankles and hips.   Neurological:   Patient is alert and oriented to person and place. Patient is generally weak. No cranial nerve deficit or sensory deficit. GCS 15. Appears to have memory impairment.   Skin:   Abrasions to both knees  Psychiatric:   Normal mood.         Emergency Department Course   ECG:  @ 1355  Indication: Medical Evaluation  Vent. Rate 111 bpm. NV interval 136 ms. QRS duration 86 ms. QT/QTc 368/500 ms. P-R-T axis 62 -15 145.   Sinus tachycardia. Possible left atrial enlargement. Left ventricular hypertrophy. Cannot rule out septal infarct, age undetermined. ST & " T wave abnormality, consider lateral ischemia. Abnormal ECG.  No significant change when compared to previous ECG from 2/8/18   Read @ 1400 by Dr. Lara.    Imaging:  Radiographic findings were communicated with the patient who voiced understanding of the findings.  CT head w/o contrast:  1. No evidence of acute intracranial hemorrhage, mass, or herniation.  2. There is generalized atrophy of the brain. White matter changes are  present in the cerebral hemispheres that are consistent with small  vessel ischemic disease in this age patient. Per Radiology read.  CT cervical spine w/o contrast:  1. No evidence of acute trauma.  2. Degenerative changes.  3. Chronic opacification of right mastoid air cells. This is  Unchanged. Per Radiology read.  XR chest 2 views:  Enlarged cardiac silhouette is again seen. LEFT basilar  atelectasis is noted, lungs otherwise clear. No pleural effusion or  pneumothorax seen on either side. Per Radiology read.    Laboratory:  CBC:  WBC 15.1 high, HGB 10.8 low,   BMP: Creatinine 1.13 high, GFR 45 low, o/w WNL.   CK Total: 379 high  Troponin: 0.687 ()  UA: Blood moderate (A), Protein Albumin 30 (A), Nitrite positive (A), Leukocyte Esterase large (A),  high, RBC 32 high, Bacteria moderate (A), Mucous present (A), o/w WNL  Urine Culture: Pending  Blood Culture x2: Pending    Interventions:  1323: NS 1L IV  1449: Aspirin 324mg PO  1534: Rocephin 1g IV    Emergency Department Course:  Nursing notes and vitals reviewed.  I performed an exam of the patient as documented above.   Findings and plan explained to the patient who consents to admission.   3:13 PM I discussed the patient with Dr. Rainey of the hospitalist service, who will admit the patient [facility] [place] [facility] [place] for further monitoring, evaluation, and treatment.      Impression & Plan      Medical Decision Making:  Tracey Moran is a 89 year old female presenting after an unwitnessed fall. She states this was last night and  due to wearing the socks on a hardwood floor. She tells me this was a mechanical fall although there is no one else to collaborate. She does live independently and her son called her this morning at 0800 and she had not mentioned that she had fallen. He does state that she has needed to go to the bathroom more frequently. On her initial evaluation I did keep her in the cervical collar and scanned her head and neck. Fortunately there is intercranial injury, skull injury, or cervical spine injury. Urinalysis is positive for infection, urine culture is pending, and she was given rocephin for this. Her CBC shows an elevated white blood cell count. She is chronically anemic. Her metabolic panel shows a slightly elevated creatinine otherwise unremarkable. Her cardiac enzyme is elevated at 0.687. I note that she was admitted one year ago for very similar presentation with a fall, non STEMI, elevated troponin, elevated CPK. At the time she was discharged the troponin did come to 0.84 but never did normalize. Her EKG here does show a mild tachycardia with ST segment depression in the lateral leads. This is relatively unchanged from her EKG earlier this year.     At this time she was given a liter of normal saline and then to maintenance of 125cc's an hour. After seeing that her head did not show any acute injury, she was given aspirin orally. Family did arrive and was able to give more information. She does live on her own. Son takes her to [employer] to walk every other day and she did have [facility] and son said that is unusual. She otherwise does quite well living on her own.    At this time I am admitting her for her UTI, non STEMI, dehydration. I spoke with Dr. Rainey the hospitalist and discussed heparinizing here but at this time, I think she is more chronically elevated in her troponin. Her EKG does not show any significant change. They will trend her troponins and  anticoagulate as necessary.      Diagnosis:    ICD-10-CM    1. Fall, initial encounter W19.XXXA Blood culture     Blood culture   2. NSTEMI (non-ST elevated myocardial infarction) (H) I21.4    3. Urinary tract infection in female N39.0    4. Dehydration E86.0    5. Elevated CK R74.8        Disposition:  Admitted to the hospitalist    I, Bradley Aasen, am serving as a scribe on 11/16/2018 at 1:03 PM to personally document services performed by Katarina Lara MD based on my observations and the provider's statements to me.        Katarina Lara MD  11/17/18 [id number]

## 2018-11-16 NOTE — ED NOTES
Bed: ED27  Expected date:   Expected time:   Means of arrival:   Comments:  511  89 F last night/non injuries/bigeminy  1223

## 2018-11-16 NOTE — IP AVS SNAPSHOT
"[facility] [place] [address] [facility]: [phone]                                              INTERAGENCY TRANSFER FORM - LAB / IMAGING / EKG / EMG RESULTS   2018                    Hospital Admission Date: 2018  LIV KINGSTON   : 1925  Sex: Female        Attending Provider: Richardson Rainey MD     Allergies:  Demerol [Meperidine], Dust Mites, Peanuts [Nuts], Penicillins, Pollen Extract    Infection:  None   Service:  HOSPITALIST    Ht:  1.575 m (5' 2\")   Wt:  62.4 kg (137 lb 9.1 oz)   Admission Wt:  54.9 kg (121 lb)    BMI:  25.16 kg/m 2   BSA:  1.65 m 2            Patient PCP Information     Provider PCP Type    Guilherme Graves MD General         Lab Results - 3 Days      Blood culture [[id number]]  Resulted: 18 1449, Result status: Preliminary result    Ordering provider: Johana Patterson MD  18 0943 Resulting lab: [facility]AT[facility]    Specimen Information    Type Source Collected On   Blood  18 1027   Comment:  Right Arm          Components       Value Reference Range Flag Lab   Specimen Description Blood Right Arm      Special Requests Aerobic and anaerobic bottles received   75   Culture Micro No growth after 20 hours   225            Blood culture [[id number]]  Resulted: 18 1449, Result status: Preliminary result    Ordering provider: Johana Patterson MD  18 0943 Resulting lab: [facility]    Specimen Information    Type Source Collected On   Blood  18 1034   Comment:  Right Hand          Components       Value Reference Range Flag Lab   Specimen Description Blood Right Hand      Special Requests Received in aerobic bottle only   75   Culture Micro No growth after 20 hours   225            CBC with platelets [[id number]] (Abnormal)  Resulted: 18 0806, Result status: Final result    Ordering provider: Johana Patterson MD  18 0000 Resulting lab: [facility] " [facility]    Specimen Information    Type Source Collected On   Blood  11/19/18 0755          Components       Value Reference Range Flag Lab   WBC 10.0 4.0 - 11.0 10e9/L  FrStHsLb   RBC Count 2.82 3.8 - 5.2 10e12/L L FrStHsLb   Hemoglobin 9.1 11.7 - 15.7 g/dL L FrStHsLb   Hematocrit 28.1 35.0 - 47.0 % L FrStHsLb    78 - 100 fl  FrStHsLb   MCH 32.3 26.5 - 33.0 pg  FrStHsLb   MCHC 32.4 31.5 - 36.5 g/dL  FrStHsLb   RDW 13.5 10.0 - 15.0 %  FrStHsLb   Platelet Count 183 150 - 450 10e9/L  FrStHsLb            Blood culture [[id number]] (Abnormal)  Resulted: 11/19/18 0740, Result status: Final result    Ordering provider: Katarina Lara MD  11/16/18 1455 Resulting lab: MICRO RAPID TESTING LAB    Specimen Information    Type Source Collected On   Blood Arm, Right 11/16/18 1505   Comment:  Right Arm          Components       Value Reference Range Flag Lab   Specimen Description Blood Right Arm      Special Requests Aerobic and anaerobic bottles received   FrStHsLb   Culture Micro --  A 226   Result:         Cultured on the 1st day of incubation:  Staphylococcus lugdunensis     Culture Micro --   226   Result:         Critical Value/Significant Value, preliminary result only, called to and read back by  MARIA ALEJANDRA NEVILLE RN [id number] 11.17.18 ND     Culture Micro --   226   Result:         (Note)  POSITIVE for STAPHYLOCOCCUS LUGDUNENSIS by Verigene multiplex nucleic  acid test. Although human commensal, S. lugdunensis is a common  pathogen involved in foreign-body-related infections, e.g. prosthetic  valve endocarditis. Final identification and antimicrobial  susceptibility testing will be verified by standard methods.    Specimen tested with Verigene multiplex, gram-positive blood culture  nucleic acid test for the following targets: Staph aureus, Staph  epidermidis, Staph lugdunensis, other Staph species, Enterococcus  faecalis, Enterococcus faecium, Streptococcus species, S. agalactiae,  S. anginosus  grp., S. pneumoniae, S. pyogenes, Listeria sp., mecA  (methicillin resistance) and Robert/B (vancomycin resistance).    Critical Value/Significant Value called to and read back by BRODY NATH RN @0039 11/18/18. [facility] [place]                Blood culture [[id number]]  Resulted: 11/19/18 0705, Result status: Preliminary result    Ordering provider: Katarina Lara MD  11/16/18 1450 Resulting lab: INFECTIOUS DISE[facility]ST[facility] LAB[facility]AT[facility]    Specimen Information    Type Source Collected On   Blood Arm, Forearm Only, Right 11/16/18 1510   Comment:  Right Arm          Components       Value Reference Range Flag Lab   Specimen Description Blood Right Arm      Special Requests Aerobic and anaerobic bottles received   FrStHsLb   Culture Micro No growth after 3 days   225            CK total [[id number]]  Resulted: 11/18/18 0857, Result status: Final result    Ordering provider: Johana Patterson MD  11/18/18 0000 Resulting lab: [facility]    Specimen Information    Type Source Collected On   Blood  11/18/18 0830          Components       Value Reference Range Flag Lab   CK Total 184 30 - 225 U/L  FrStHsLb            Lactic acid level STAT for sepsis protocol [[id number]]  Resulted: 11/18/18 0841, Result status: Final result    Ordering provider: Johana Patterson MD  11/18/18 0818 Resulting lab: [facility]    Specimen Information    Type Source Collected On   Blood  11/18/18 0830          Components       Value Reference Range Flag Lab   Lactate for Sepsis Protocol 0.7 0.7 - 2.0 mmol/L  FrStHsLb            CBC with platelets [[id number]] (Abnormal)  Resulted: 11/18/18 0840, Result status: Final result    Ordering provider: Johana Patterson MD  11/18/18 0000 Resulting lab: [facility]    Specimen Information    Type Source Collected On   Blood  11/18/18 0830          Components       Value Reference Range Flag Lab   WBC 14.5 4.0 - 11.0 10e9/L H FrStHsLb   RBC Count 3.16  3.8 - 5.2 10e12/L L FrStHsLb   Hemoglobin 10.2 11.7 - 15.7 g/dL L FrStHsLb   Hematocrit 31.1 35.0 - 47.0 % L FrStHsLb   MCV 98 78 - 100 fl  FrStHsLb   MCH 32.3 26.5 - 33.0 pg  FrStHsLb   MCHC 32.8 31.5 - 36.5 g/dL  FrStHsLb   RDW 13.6 10.0 - 15.0 %  FrStHsLb   Platelet Count 211 150 - 450 10e9/L  FrStHsLb            Urine Culture [[id number]] (Abnormal)  Resulted: 11/17/18 2216, Result status: Final result    Ordering provider: Katarina Lara MD  11/16/18 1420 Resulting lab: [facility]    Specimen Information    Type Source Collected On   Catheterized Urine  11/16/18 1404          Components       Value Reference Range Flag Lab   Specimen Description Catheterized Urine      Special Requests Specimen received in preservative   75   Culture Micro --  A 225   Result:         >100,000 colonies/mL  Escherichia coli              Vitamin B12 [[id number]] (Abnormal)  Resulted: 11/17/18 1808, Result status: Final result    Ordering provider: Richardson Rainey MD  11/17/18 0000 Resulting lab: [facility]    Specimen Information    Type Source Collected On   Blood  11/17/18 0925          Components       Value Reference Range Flag Lab   Vitamin B12 4403 193 - 986 pg/mL H 51            Folate [[id number]]  Resulted: 11/17/18 1416, Result status: Final result    Ordering provider: Richardson Rainey MD  11/17/18 0000 Resulting lab: [facility]    Specimen Information    Type Source Collected On   Blood  11/17/18 0925          Components       Value Reference Range Flag Lab   Folate 51.5 >5.4 ng/mL  51            TSH [[id number]]  Resulted: 11/17/18 0955, Result status: Final result    Ordering provider: Richardson Rainey MD  11/17/18 0000 Resulting lab: [facility]    Specimen Information    Type Source Collected On   Blood  11/17/18 0925          Components       Value Reference Range Flag Lab   TSH 2.95 0.40 -  4.00 mU/L  FrStHsLb            Troponin I [[id number]] (Abnormal)  Resulted: 11/17/18 0953, Result status: Final result    Ordering provider: Richardson Rainey MD  11/17/18 0000 Resulting lab: [facility]    Specimen Information    Type Source Collected On   Blood  11/17/18 0925          Components       Value Reference Range Flag Lab   Troponin I ES 1.007 0.000 - 0.045 ug/L HH FrStHsLb   Comment:         The 99th percentile for upper reference range is 0.045 ug/L.  Troponin values   in the range of 0.045 - 0.120 ug/L may be associated with risks of adverse   clinical events.  Critical result, provider not notified due to previous critical result   notification.              CK total [[id number]] (Abnormal)  Resulted: 11/17/18 0952, Result status: Final result    Ordering provider: Richardson Rainey MD  11/17/18 0000 Resulting lab: [facility]    Specimen Information    Type Source Collected On   Blood  11/17/18 0925          Components       Value Reference Range Flag Lab   CK Total 657 30 - 225 U/L H FrStHsLb            Magnesium [[id number]]  Resulted: 11/17/18 0952, Result status: Final result    Ordering provider: Richardson Rainey MD  11/17/18 0000 Resulting lab: [facility]    Specimen Information    Type Source Collected On   Blood  11/17/18 0925          Components       Value Reference Range Flag Lab   Magnesium 1.6 1.6 - 2.3 mg/dL  FrStHsLb            Phosphorus [[id number]]  Resulted: 11/17/18 0952, Result status: Final result    Ordering provider: Richardson Rainey MD  11/17/18 0000 Resulting lab: [facility]    Specimen Information    Type Source Collected On   Blood  11/17/18 0925          Components       Value Reference Range Flag Lab   Phosphorus 3.1 2.5 - 4.5 mg/dL  FrStHsLb            Lipid panel reflex to direct LDL [[id number]]  Resulted: 11/17/18 0952, Result status: Final result    Ordering provider: Richardson Rainey MD   11/17/18 0000 Resulting lab: [facility]    Specimen Information    Type Source Collected On   Blood  11/17/18 0925          Components       Value Reference Range Flag Lab   Cholesterol 134 <200 mg/dL  FrStHsLb   Triglycerides 84 <150 mg/dL  FrStHsLb   HDL Cholesterol 62 >49 mg/dL  FrStHsLb   LDL Cholesterol Calculated 55 <100 mg/dL  FrStHsLb   Comment:  Desirable:       <100 mg/dl   Non HDL Cholesterol 72 <130 mg/dL  FrStHsLb            Comprehensive metabolic panel [[id number]] (Abnormal)  Resulted: 11/17/18 0952, Result status: Final result    Ordering provider: Richardson Rainey MD  11/17/18 0000 Resulting lab: [facility]    Specimen Information    Type Source Collected On   Blood  11/17/18 0925          Components       Value Reference Range Flag Lab   Sodium 139 133 - 144 mmol/L  FrStHsLb   Potassium 3.5 3.4 - 5.3 mmol/L  FrStHsLb   Chloride 106 94 - 109 mmol/L  FrStHsLb   Carbon Dioxide 24 20 - 32 mmol/L  FrStHsLb   Anion Gap 9 3 - 14 mmol/L  FrStHsLb   Glucose 103 70 - 99 mg/dL H FrStHsLb   Urea Nitrogen 15 7 - 30 mg/dL  FrStHsLb   Creatinine 0.79 0.52 - 1.04 mg/dL  FrStHsLb   GFR Estimate 67 >60 mL/min/1.7m2  FrStHsLb   Comment:  Non  GFR Calc   GFR Estimate If Black 82 >60 mL/min/1.7m2  FrStHsLb   Comment:  African American GFR Calc   Calcium 8.2 8.5 - 10.1 mg/dL L FrStHsLb   Bilirubin Total 0.7 0.2 - 1.3 mg/dL  FrStHsLb   Albumin 3.0 3.4 - 5.0 g/dL L FrStHsLb   Protein Total 6.5 6.8 - 8.8 g/dL L FrStHsLb   Alkaline Phosphatase 62 40 - 150 U/L  FrStHsLb   ALT 19 0 - 50 U/L  FrStHsLb   AST 42 0 - 45 U/L  FrStHsLb            CBC with platelets [[id number]] (Abnormal)  Resulted: 11/17/18 0937, Result status: Final result    Ordering provider: Richardson Rainey MD  11/17/18 0000 Resulting lab: [facility]    Specimen Information    Type Source Collected On   Blood  11/17/18 0925          Components       Value Reference Range Flag Lab   WBC 18.5  4.0 - 11.0 10e9/L H FrStHsLb   RBC Count 3.53 3.8 - 5.2 10e12/L L FrStHsLb   Hemoglobin 11.4 11.7 - 15.7 g/dL L FrStHsLb   Hematocrit 34.8 35.0 - 47.0 % L FrStHsLb   MCV 99 78 - 100 fl  FrStHsLb   MCH 32.3 26.5 - 33.0 pg  FrStHsLb   MCHC 32.8 31.5 - 36.5 g/dL  FrStHsLb   RDW 13.9 10.0 - 15.0 %  FrStHsLb   Platelet Count 270 150 - 450 10e9/L  FrStHsLb            Troponin I [[id number]] (Abnormal)  Resulted: 11/16/18 2325, Result status: Final result    Ordering provider: Richardson Rainey MD  11/16/18 2046 Resulting lab: [facility]    Specimen Information    Type Source Collected On   Blood  11/16/18 2245          Components       Value Reference Range Flag Lab   Troponin I ES 1.593 0.000 - 0.045 ug/L  FrStHsLb   Comment:         The 99th percentile for upper reference range is 0.045 ug/L.  Troponin values   in the range of 0.045 - 0.120 ug/L may be associated with risks of adverse   clinical events.  Critical result, provider not notified due to previous critical result   notification.              Troponin I [[id number]] (Abnormal)  Resulted: 11/16/18 1958, Result status: Final result    Ordering provider: Richardson Rainey MD  11/16/18 1802 Resulting lab: [facility]    Specimen Information    Type Source Collected On   Blood  11/16/18 1920          Components       Value Reference Range Flag Lab   Troponin I ES 1.615 0.000 - 0.045 ug/L  FrStHsLb   Comment:         The 99th percentile for upper reference range is 0.045 ug/L.  Troponin values   in the range of 0.045 - 0.120 ug/L may be associated with risks of adverse   clinical events.  Critical result, provider not notified due to previous critical result   notification.              UA with Microscopic [[id number]] (Abnormal)  Resulted: 11/16/18 1420, Result status: Final result    Ordering provider: Katarina Lara MD  11/16/18 1315 Resulting lab: [facility]    Specimen Information    Type Source  Collected On   Catheterized Urine Urine catheter 11/16/18 1404          Components       Value Reference Range Flag Lab   Color Urine Yellow   FrStHsLb   Appearance Urine Slightly Cloudy   FrStHsLb   Glucose Urine Negative NEG^Negative mg/dL  FrStHsLb   Bilirubin Urine Negative NEG^Negative  FrStHsLb   Ketones Urine 5 NEG^Negative mg/dL A FrStHsLb   Specific [place] Urine 1.011 1.003 - 1.035  FrStHsLb   Blood Urine Moderate NEG^Negative A FrStHsLb   pH Urine 6.0 5.0 - 7.0 pH  FrStHsLb   Protein Albumin Urine 30 NEG^Negative mg/dL A FrStHsLb   Urobilinogen mg/dL Normal 0.0 - 2.0 mg/dL  FrStHsLb   Nitrite Urine Positive NEG^Negative A FrStHsLb   Leukocyte Esterase Urine Large NEG^Negative A FrStHsLb   Source Catheterized Urine   FrStHsLb   WBC Urine 149 0 - 5 /HPF H FrStHsLb   RBC Urine 32 0 - 2 /HPF H FrStHsLb   Bacteria Urine Moderate NEG^Negative /HPF A FrStHsLb   Mucous Urine Present NEG^Negative /LPF A FrStHsLb            Troponin I [[id number]] (Abnormal)  Resulted: 11/16/18 1408, Result status: Final result    Ordering provider: Katarina Lara MD  11/16/18 1315 Resulting lab: [facility]    Specimen Information    Type Source Collected On   Blood  11/16/18 1318          Components       Value Reference Range Flag Lab   Troponin I ES 0.687 0.000 - 0.045 ug/L HH FrStHsLb   Comment:         The 99th percentile for upper reference range is 0.045 ug/L.  Troponin values   in the range of 0.045 - 0.120 ug/L may be associated with risks of adverse   clinical events.  Critical Value called to and read back by  ROBBY HEIN RN ER @ 1403 BY DW.              CK total [[id number]] (Abnormal)  Resulted: 11/16/18 1405, Result status: Final result    Ordering provider: Katarina Lara MD  11/16/18 1315 Resulting lab: [facility]    Specimen Information    Type Source Collected On   Blood  11/16/18 1318          Components       Value Reference Range Flag Lab   CK Total 379 30 -  225 U/L H FrStHsLb            Basic metabolic panel [[id number]] (Abnormal)  Resulted: 11/16/18 1402, Result status: Final result    Ordering provider: Katarina Lara MD  11/16/18 1312 Resulting lab: [facility]    Specimen Information    Type Source Collected On   Blood  11/16/18 1318          Components       Value Reference Range Flag Lab   Sodium 142 133 - 144 mmol/L  FrStHsLb   Potassium 4.0 3.4 - 5.3 mmol/L  FrStHsLb   Chloride 108 94 - 109 mmol/L  FrStHsLb   Carbon Dioxide 26 20 - 32 mmol/L  FrStHsLb   Anion Gap 8 3 - 14 mmol/L  FrStHsLb   Glucose 80 70 - 99 mg/dL  FrStHsLb   Urea Nitrogen 23 7 - 30 mg/dL  FrStHsLb   Creatinine 1.13 0.52 - 1.04 mg/dL H FrStHsLb   GFR Estimate 45 >60 mL/min/1.7m2 L FrStHsLb   Comment:  Non  GFR Calc   GFR Estimate If Black 54 >60 mL/min/1.7m2 L FrStHsLb   Comment:  African American GFR Calc   Calcium 9.3 8.5 - 10.1 mg/dL  FrStHsLb            CBC with platelets differential [[id number]] (Abnormal)  Resulted: 11/16/18 1331, Result status: Final result    Ordering provider: Katarina Lara MD  11/16/18 0591 Resulting lab: [facility]    Specimen Information    Type Source Collected On   Blood  11/16/18 1318          Components       Value Reference Range Flag Lab   WBC 15.1 4.0 - 11.0 10e9/L H FrStHsLb   RBC Count 3.36 3.8 - 5.2 10e12/L L FrStHsLb   Hemoglobin 10.8 11.7 - 15.7 g/dL L FrStHsLb   Hematocrit 33.1 35.0 - 47.0 % L FrStHsLb   MCV 99 78 - 100 fl  FrStHsLb   MCH 32.1 26.5 - 33.0 pg  FrStHsLb   MCHC 32.6 31.5 - 36.5 g/dL  FrStHsLb   RDW 13.8 10.0 - 15.0 %  FrStHsLb   Platelet Count 241 150 - 450 10e9/L  FrStHsLb   Diff Method Automated Method   FrStHsLb   % Neutrophils 86.9 %  FrStHsLb   % Lymphocytes 5.7 %  FrStHsLb   % Monocytes 7.0 %  FrStHsLb   % Eosinophils 0.1 %  FrStHsLb   % Basophils 0.1 %  FrStHsLb   % Immature Granulocytes 0.2 %  FrStHsLb   Absolute Neutrophil 13.1 1.6 - 8.3 10e9/L H FrStHsLb    Absolute Lymphocytes 0.9 0.8 - 5.3 10e9/L  FrStHsLb   Absolute Monocytes 1.1 0.0 - 1.3 10e9/L  FrStHsLb   Absolute Eosinophils 0.0 0.0 - 0.7 10e9/L  FrStHsLb   Absolute Basophils 0.0 0.0 - 0.2 10e9/L  FrStHsLb   Abs Immature Granulocytes 0.0 0 - 0.4 10e9/L  FrStHsLb            Testing Performed By     Lab - Abbreviation Name Director Address Valid Date Range    14 - FrStHsLb [facility] Unknown [address] [place] [address] 05/08/15 1057 - Present    51 - Unknown [facility] [place] Unknown [address] [facility] [address] 12/31/14 1010 - Present    75 - Unknown [facility] Unknown [address] [facility] [address] 01/15/15 1019 - Present    225 - Unknown [facility] Unknown [address] [facility] [address] 12/19/14 0954 - Present    226 - Unknown MICRO RAPID TESTING LAB Unknown [address] [facility] [address] 12/19/14 0955 - Present            Unresulted Labs     None         Imaging Results - 3 Days      Chest XR,  PA & LAT [[id number]]  Resulted: 11/16/18 1544, Result status: Final result    Ordering provider: Katarina Lara MD  11/16/18 1347 Resulted by: Gilma Horn MD    Performed: 11/16/18 1417 - 11/16/18 1431 Resulting lab: RADIOLOGY RESULTS    Narrative:       XR CHEST 2 VW 11/16/2018 2:31 PM    COMPARISON: 2/8/2018    HISTORY: Fall.      Impression:       IMPRESSION: Enlarged cardiac silhouette is again seen. LEFT basilar  atelectasis is noted, lungs otherwise clear. No pleural effusion or  pneumothorax seen on either side.    GILMA HORN MD      CT Cervical Spine w/o Contrast [[id number]]  Resulted: 11/16/18 1456, Result status: Final result    Ordering provider: Katarina Lara MD  11/16/18 1315 Resulted by: Jona Logan MD    Performed: 11/16/18 1332 - 11/16/18 1406 Resulting lab: RADIOLOGY RESULTS    Narrative:       CT CERVICAL SPINE WITHOUT  CONTRAST   11/16/2018 2:06 PM     HISTORY: Patient fell. Neck trauma.     TECHNIQUE: Axial images of the cervical spine were obtained without  intravenous contrast. Multiplanar reformations were performed.   Radiation dose for this scan was reduced using automated exposure  control, adjustment of the mA and/or kV according to patient size, or  iterative reconstruction technique.    COMPARISON: None.    FINDINGS: There is no evidence of fracture. There is multilevel  degenerative disc disease and degenerative facet arthropathy,  especially from C2-C3 through C6-C7. There is acquired fusion of the  left C2-C3 facet joint. Degenerative changes are seen at the medial  atlantoaxial joint with soft tissue posterior to the joint with a few  calcifications and with sclerotic rimmed erosions at the base of the  odontoid suggesting a crystalline arthropathy such as gout or CPPD.  There is no neural impingement.    Right mastoid air cells are partially opacified. This is unchanged  since 2/8/2018.      Impression:       IMPRESSION:  1. No evidence of acute trauma.  2. Degenerative changes.  3. Chronic opacification of right mastoid air cells. This is  unchanged.    RAFAELA HERNANDEZ MD      CT Head w/o Contrast [[id number]]  Resulted: 11/16/18 1419, Result status: Final result    Ordering provider: Katarina Lara MD  11/16/18 1315 Resulted by: Clark Bellamy MD    Performed: 11/16/18 1331 - 11/16/18 1406 Resulting lab: RADIOLOGY RESULTS    Narrative:       CT SCAN OF THE HEAD WITHOUT CONTRAST   11/16/2018 2:06 PM     HISTORY:  Fall.     TECHNIQUE:  Axial images of the head and coronal reformations without  IV contrast material. Radiation dose for this scan was reduced using  automated exposure control, adjustment of the mA and/or kV according  to patient size, or iterative reconstruction technique.    COMPARISON: 2/8/2018.    FINDINGS: There is no evidence of intracranial hemorrhage, mass, acute  infarct or anomaly. There  is generalized atrophy of the brain. There  is low attenuation in the white matter of the cerebral hemispheres  consistent with sequelae of small vessel ischemic disease. Ventricular  size is within normal limits without evidence of hydrocephalus.     Mild mucosal thickening in the paranasal sinuses. The bony calvarium  and bones of the skull base appear intact.       Impression:       IMPRESSION:     1. No evidence of acute intracranial hemorrhage, mass, or herniation.  2. There is generalized atrophy of the brain. White matter changes are  present in the cerebral hemispheres that are consistent with small  vessel ischemic disease in this age patient.     PETER JACKSON MD      Testing Performed By     Lab - Abbreviation Name Director Address Valid Date Range    104 - Rad Rslts RADIOLOGY RESULTS Unknown Unknown 05 1553 - Present               ECG/EMG Results      Echocardiogram Complete [[id number]]  Resulted: 18 1102, Result status: Edited Result - FINAL    Ordering provider: Christy Hawkins MD  18 Resulted by: Francisco J Sethi MD    Performed: 18 1131 - 18 1132 Resulting lab: RADIOLOGY RESULTS    Narrative:       [id number]  [facility]  [id number]  [id number]^ROSS^CHRISTY           [facility]  Echocardiography Laboratory  [address] [place] [address]        Name: LIV KINGSTON  MRN: [id number]  : 1925  Study Date: 2018 11:02 AM  Age: 89 yrs  Gender: Female  Patient Location: [facility]  Reason For Study: Abn EKG  Ordering Physician: CHRISTY HAWKINS  Referring Physician: Chiquis  Performed By: Kamila Kulkarni RDCS     BSA: 1.5 m2  Height: 62 in  Weight: 121 lb  HR: 77  BP: 130/81 mmHg  _____________________________________________________________________________  __        Procedure  Complete Portable Echo Adult.  _____________________________________________________________________________  __        Interpretation Summary     The visual  ejection fraction is estimated at 55-60%.  There is moderate concentric left ventricular hypertrophy.  The left ventricle is normal in size.  The left atrium is mildly dilated.  Right ventricular systolic pressure is elevated, consistent with moderate  pulmonary hypertension.  There is mild (1+) aortic regurgitation.  Mild aortic root dilatation.  The ascending aorta is Mildly dilated.     No significant change since 2/9/2018  _____________________________________________________________________________  __        Left Ventricle  The left ventricle is normal in size. There is moderate concentric left  ventricular hypertrophy. Left ventricular systolic function is normal. The  visual ejection fraction is estimated at 55-60%. Grade II or moderate  diastolic dysfunction. No regional wall motion abnormalities noted. There is  no thrombus seen in the left ventricle.     Right Ventricle  The right ventricle is normal in structure, function and size. There is no  mass or thrombus in the right ventricle.     Atria  The left atrium is mildly dilated. Right atrial size is normal. There is no  atrial shunt seen. The left atrial appendage is not well visualized.     Mitral Valve  There is mild mitral annular calcification. There is no mitral regurgitation  noted. There is no mitral valve stenosis.        Tricuspid Valve  Normal tricuspid valve. The right ventricular systolic pressure is  approximated at 49.7 mmHg plus the right atrial pressure. Right ventricular  systolic pressure is elevated, consistent with moderate pulmonary  hypertension. There is moderate (2+) tricuspid regurgitation. There is no  tricuspid stenosis.     Aortic Valve  There is mild trileaflet aortic sclerosis. There is mild (1+) aortic  regurgitation. No aortic stenosis is present.     Pulmonic Valve  The pulmonic valve is not well seen, but is grossly normal. There is no  pulmonic valvular regurgitation. There is no pulmonic valvular stenosis.      Vessels  Mild aortic root dilatation. The ascending aorta is Mildly dilated. The IVC is  normal in size and reactivity with respiration, suggesting normal central  venous pressure. The pulmonary artery is normal size.     Pericardium  The pericardium appears normal. There is no pleural effusion.        Rhythm  Sinus rhythm was noted.  _____________________________________________________________________________  __  MMode/2D Measurements & Calculations  IVSd: 1.5 cm     LVIDd: 4.3 cm  LVIDs: 2.8 cm  LVPWd: 1.4 cm  FS: 33.7 %  LV mass(C)d: 242.1 grams  LV mass(C)dI: 156.8 grams/m2  Ao root diam: 3.9 cm  asc Aorta Diam: 4.0 cm  LA Volume (BP): 41.1 ml  LA Volume Index (BP): 26.7 ml/m2  RWT: 0.65           Doppler Measurements & Calculations  MV E max laura: 60.6 cm/sec  MV A max laura: 107.2 cm/sec  MV E/A: 0.57  MV dec time: 0.31 sec  AI P1/2t: 687.3 msec  TR max laura: 352.4 cm/sec  TR max P.7 mmHg  E/E' av.5  Lateral E/e': 11.6  Medial E/e': 15.5           _____________________________________________________________________________  __           Report approved by: Dr. Francisco J Young 2018 12:45 PM       1    Type Source Collected On     18 1102          View Image (below)              Encounter-Level Documents:     There are no encounter-level documents.      Order-Level Documents:     There are no order-level documents.

## 2018-11-17 ENCOUNTER — APPOINTMENT (OUTPATIENT)
Dept: CARDIOLOGY | Facility: CLINIC | Age: 83
DRG: 690 | End: 2018-11-17
Attending: HOSPITALIST
Payer: COMMERCIAL

## 2018-11-17 LAB
ALBUMIN SERPL-MCNC: 3 G/DL (ref 3.4–5)
ALP SERPL-CCNC: 62 U/L (ref 40–150)
ALT SERPL W P-5'-P-CCNC: 19 U/L (ref 0–50)
ANION GAP SERPL CALCULATED.3IONS-SCNC: 9 MMOL/L (ref 3–14)
AST SERPL W P-5'-P-CCNC: 42 U/L (ref 0–45)
BACTERIA SPEC CULT: ABNORMAL
BILIRUB SERPL-MCNC: 0.7 MG/DL (ref 0.2–1.3)
BUN SERPL-MCNC: 15 MG/DL (ref 7–30)
CALCIUM SERPL-MCNC: 8.2 MG/DL (ref 8.5–10.1)
CHLORIDE SERPL-SCNC: 106 MMOL/L (ref 94–109)
CHOLEST SERPL-MCNC: 134 MG/DL
CK SERPL-CCNC: 657 U/L (ref 30–225)
CO2 SERPL-SCNC: 24 MMOL/L (ref 20–32)
CREAT SERPL-MCNC: 0.79 MG/DL (ref 0.52–1.04)
ERYTHROCYTE [DISTWIDTH] IN BLOOD BY AUTOMATED COUNT: 13.9 % (ref 10–15)
FOLATE SERPL-MCNC: 51.5 NG/ML
GFR SERPL CREATININE-BSD FRML MDRD: 67 ML/MIN/1.7M2
GLUCOSE SERPL-MCNC: 103 MG/DL (ref 70–99)
HCT VFR BLD AUTO: 34.8 % (ref 35–47)
HDLC SERPL-MCNC: 62 MG/DL
HGB BLD-MCNC: 11.4 G/DL (ref 11.7–15.7)
LDLC SERPL CALC-MCNC: 55 MG/DL
Lab: ABNORMAL
MAGNESIUM SERPL-MCNC: 1.6 MG/DL (ref 1.6–2.3)
MCH RBC QN AUTO: 32.3 PG (ref 26.5–33)
MCHC RBC AUTO-ENTMCNC: 32.8 G/DL (ref 31.5–36.5)
MCV RBC AUTO: 99 FL (ref 78–100)
NONHDLC SERPL-MCNC: 72 MG/DL
PHOSPHATE SERPL-MCNC: 3.1 MG/DL (ref 2.5–4.5)
PLATELET # BLD AUTO: 270 10E9/L (ref 150–450)
POTASSIUM SERPL-SCNC: 3.5 MMOL/L (ref 3.4–5.3)
PROT SERPL-MCNC: 6.5 G/DL (ref 6.8–8.8)
RBC # BLD AUTO: 3.53 10E12/L (ref 3.8–5.2)
SODIUM SERPL-SCNC: 139 MMOL/L (ref 133–144)
SPECIMEN SOURCE: ABNORMAL
TRIGL SERPL-MCNC: 84 MG/DL
TROPONIN I SERPL-MCNC: 1.01 UG/L (ref 0–0.04)
TSH SERPL DL<=0.005 MIU/L-ACNC: 2.95 MU/L (ref 0.4–4)
VIT B12 SERPL-MCNC: 4403 PG/ML (ref 193–986)
WBC # BLD AUTO: 18.5 10E9/L (ref 4–11)

## 2018-11-17 PROCEDURE — 84100 ASSAY OF PHOSPHORUS: CPT | Performed by: HOSPITALIST

## 2018-11-17 PROCEDURE — 25000132 ZZH RX MED GY IP 250 OP 250 PS 637: Performed by: HOSPITALIST

## 2018-11-17 PROCEDURE — 82550 ASSAY OF CK (CPK): CPT | Performed by: HOSPITALIST

## 2018-11-17 PROCEDURE — 82607 VITAMIN B-12: CPT | Performed by: HOSPITALIST

## 2018-11-17 PROCEDURE — 80053 COMPREHEN METABOLIC PANEL: CPT | Performed by: HOSPITALIST

## 2018-11-17 PROCEDURE — 99232 SBSQ HOSP IP/OBS MODERATE 35: CPT | Performed by: INTERNAL MEDICINE

## 2018-11-17 PROCEDURE — 84443 ASSAY THYROID STIM HORMONE: CPT | Performed by: HOSPITALIST

## 2018-11-17 PROCEDURE — 85027 COMPLETE CBC AUTOMATED: CPT | Performed by: HOSPITALIST

## 2018-11-17 PROCEDURE — 12000000 ZZH R&B MED SURG/OB

## 2018-11-17 PROCEDURE — 25000132 ZZH RX MED GY IP 250 OP 250 PS 637: Performed by: INTERNAL MEDICINE

## 2018-11-17 PROCEDURE — 93306 TTE W/DOPPLER COMPLETE: CPT

## 2018-11-17 PROCEDURE — 36415 COLL VENOUS BLD VENIPUNCTURE: CPT | Performed by: HOSPITALIST

## 2018-11-17 PROCEDURE — 25000128 H RX IP 250 OP 636: Performed by: HOSPITALIST

## 2018-11-17 PROCEDURE — 83735 ASSAY OF MAGNESIUM: CPT | Performed by: HOSPITALIST

## 2018-11-17 PROCEDURE — 80061 LIPID PANEL: CPT | Performed by: HOSPITALIST

## 2018-11-17 PROCEDURE — 82746 ASSAY OF FOLIC ACID SERUM: CPT | Performed by: HOSPITALIST

## 2018-11-17 PROCEDURE — 93306 TTE W/DOPPLER COMPLETE: CPT | Mod: 26 | Performed by: INTERNAL MEDICINE

## 2018-11-17 PROCEDURE — 84484 ASSAY OF TROPONIN QUANT: CPT | Performed by: HOSPITALIST

## 2018-11-17 RX ORDER — CEFAZOLIN SODIUM 1 G/50ML
1250 SOLUTION INTRAVENOUS EVERY 24 HOURS
Status: DISCONTINUED | OUTPATIENT
Start: 2018-11-17 | End: 2018-11-18

## 2018-11-17 RX ORDER — LISINOPRIL 20 MG/1
20 TABLET ORAL DAILY
Status: DISCONTINUED | OUTPATIENT
Start: 2018-11-17 | End: 2018-11-18

## 2018-11-17 RX ADMIN — SODIUM CHLORIDE: 9 INJECTION, SOLUTION INTRAVENOUS at 05:18

## 2018-11-17 RX ADMIN — DOCUSATE SODIUM 200 MG: 100 CAPSULE, LIQUID FILLED ORAL at 22:05

## 2018-11-17 RX ADMIN — CEFTRIAXONE SODIUM 1 G: 1 INJECTION, POWDER, FOR SOLUTION INTRAMUSCULAR; INTRAVENOUS at 14:38

## 2018-11-17 RX ADMIN — MULTIPLE VITAMINS W/ MINERALS TAB 1 TABLET: TAB at 09:06

## 2018-11-17 RX ADMIN — SODIUM CHLORIDE: 9 INJECTION, SOLUTION INTRAVENOUS at 14:45

## 2018-11-17 RX ADMIN — ASPIRIN 81 MG: 81 TABLET, COATED ORAL at 09:06

## 2018-11-17 RX ADMIN — DOCUSATE SODIUM 200 MG: 100 CAPSULE, LIQUID FILLED ORAL at 09:06

## 2018-11-17 RX ADMIN — VANCOMYCIN HYDROCHLORIDE 1250 MG: 5 INJECTION, POWDER, LYOPHILIZED, FOR SOLUTION INTRAVENOUS at 23:06

## 2018-11-17 RX ADMIN — LISINOPRIL 20 MG: 20 TABLET ORAL at 22:05

## 2018-11-17 RX ADMIN — METOPROLOL SUCCINATE 50 MG: 50 TABLET, EXTENDED RELEASE ORAL at 09:06

## 2018-11-17 ASSESSMENT — ACTIVITIES OF DAILY LIVING (ADL)
ADLS_ACUITY_SCORE: 19
ADLS_ACUITY_SCORE: 20
ADLS_ACUITY_SCORE: 19
ADLS_ACUITY_SCORE: 19
ADLS_ACUITY_SCORE: 21
ADLS_ACUITY_SCORE: 20

## 2018-11-17 NOTE — PROGRESS NOTES
Troponin increased to 1.615  Patient appears calm comfortable and asymptomatic.    Earlier at the time of admission this evening, Discussed risks / benefits extensively of heparin versus no heparin in context of possible ACS with son and he expressed understanding, declined heparin.    Called patient's son and discussed again at this time to consider restarting heparin drip.  He declined heparin drip or echocardiogram.  He would like his mom to get some sleep overnight and would like to follow a.m. troponin/discuss further in the morning.  Plan of care discussed with patient's RN.

## 2018-11-17 NOTE — PROGRESS NOTES
Talked with son Jonny, pt agreed it was OK to give information, code given. Jonny expressed concerns about rising troponin levels and clarification of path forward if level goes up. Said he was taken off guard earlier when MD called. Still reluctant to blood thinners. Does want clarification about what was done with the last admission with similar issues. Asked about advanced directives, he is not aware of any ADs. Pt c/o being unable to chew meat at dinner and stated she has dentures, nurse asked son to bring in pt's dentures, he said she would not benefit from them.

## 2018-11-17 NOTE — PLAN OF CARE
Problem: Patient Care Overview  Goal: Plan of Care/Patient Progress Review  Outcome: No Change  A&O x4 but forgetful.  Low sat fat/low Na+ diet.  Refused to get out of bed to walk to the bathroom due to fear of falling, said she uses a walker at home but does not want to fall again.  VSS on room air.  Denies pain.  [place], hearing aids in place.  Tele NSR.  Neuros intact.  Incontinent of bowel/bladder at times; asks to use the bedpan at times.  Dressing on L chin and bilateral forearms CDI.  Abrasions on bilateral knees. PIV with IVF infusing @ 100.  Troponin 1.596 trending down.  Asymptomatic. Denies SOB/pain.  Continue to monitor.

## 2018-11-17 NOTE — PLAN OF CARE
Problem: Patient Care Overview  Goal: Plan of Care/Patient Progress Review  Outcome: No Change  VSS. Tele:SR. Denies pain. Neuros intact. Wounds covered in dressings from ED, WOC to see. Therapies consulted. Report called to 66 RN, Rylie. Pt and her belongings will transfer to [place] [id number].

## 2018-11-17 NOTE — PROGRESS NOTES
[facility]    Hospitalist Progress Note      Assessment & Plan   Tracey Moran is a 89 year old  female with PMH of hypertension, history of GI bleed in the past, dementia brought into the ED via EMS after a fall at home.     UTI  - Has mild incontinence of urine, increased frequency of urination.  - UA moderate blood, nitrite positive, large leukocyte esterase, moderate bacteria, 149 WBC; leucocytosis WBCs 15.1 on admission  - started on Ceftriaxone on admission  - UC positive for >[id number] E coli  - follow up sensitivities  - WBCs up to 18.5 today  - continue Ceftriaxone for now and monitor fever curve and WBCs trend    s/p fall at home unclear etiology -likely from UTI/physical deconditioning.  Physical deconditioning likely from senile fragility  - Patient/son feels that she fell around 2100 last night and was not able to get up, laid on the floor all night.  Patient thinks she was wearing socks she slipped and fell on the rug.  She is unable to provide any history regarding the fall but able to recollect that she called 911.  Unclear if patient lost any consciousness [history limited as patient has dementia]  - CT head no acute pathology. CT C-spine no acute pathology. Chest x-ray left basilar atelectasis.  - Telemetry  - echo EF 55-60%, mod pulm HTN, mild AO regurg; TSH 2.95  - treat UTI as above  - iv hydration  - Wound care evaluation for skin tears.  - PT/OT  - SW    Mild rhabdomyolysis  - from prolonged immobilization  - CK on admission 379--up to 657 today  - continue iv fluids  - repeat CK in am     Elevated troponin- likely  Demand ischemia in the setting of fall/mild rhabdomyolysis/acute kidney injury.  - Denies any chest pain or palpitations or shortness of breath.  - EKG showed sinus tachycardia, left ventricular hypertrophy, ST-T wave changes V5 to V6 [no significant change when compared to previous EKG]  , troponin 0.687.  - serial troponins 0.0687--1.615--1.593--1.007-  trending down  Patient admitted in February 2018 with similar complaints of fall in the setting of urinary tract infection and had rhabdomyolysis and elevated troponin [1.570].   Echocardiogram 2/2018 showed normal left ventricular size, estimated EF at 60-65%.  Grade 1 early diastolic dysfunction.  Mild aortic regurgitation/mild pulmonary hypertension/ascending aorta mildly dilated.She was managed medically and discharged home.  - admitting provider discussed with the patient son on admission and he declined heparin  - started on ASA  - PTA Toprol was increased from 50 mg po daily to 75 mg po daily  - echo as above  - fasting lipid profile- LDL 55, HDL 62, total choles 134    CASSIE- resolved  Most recent creatinine 0.61 in 2/2018.   Creatinine 1.13 on admission, with associated elevated BUN and appears volume depleted on exam.   - started on iv fluids- cr today 0.70  - resume PTA Lisinopril (at a lowe dose 20 mg po daily) starting tomorrow     Acute Encephalopathy, likely multifactorial   - CT head negative; no FNDs  - Concern for volume depletion, UTI, cardiac event as detailed above, all potential contributors   - Treat as above and reassess  - Re-orient as needed      Chronic normocytic anemia  History of GI bleed 12/2017, which was suspected to be diverticular in nature.   EGD unremarkable at that time, patient declined colonoscopy.   Hemoglobin 10.8 g/dl on admission with last value of 8.6 g/dl in 5/2018.   - Hb today 11.4      Hypertension   [PTA on Toprol XL 50 mg po daily and Lisinopril 20 mg po BID]  - BP on higher side; HR also in 90's  - plan to increase PTA Toprol to 75 mg po daily  - resume PTA Lisinopril at 20 mg po daily  - adjust meds as needed.      Constipation  Chronic issue.   Scheduled docusate   PRN bowel regimen ordered    DVT Prophylaxis: Pneumatic Compression Devices  Code Status: Full Code  Expected discharge: couple of days, recommended to TCU vs home once clinically better.    Johana Garcia  MD Leonardo    Interval History   Doing better; denies chest pain, no SOB; no abd pain, no N/V; remembers the fall, states she felt because of slippery floor; she said that she cooks and clean for herself; discussed with RN and son at bedside     -Data reviewed today: I reviewed all new labs and imaging results over the last 24 hours. I personally reviewed the echo image(s) showing as above.    Physical Exam   Temp: 97.6  F (36.4  C) Temp src: Oral BP: 144/85   Heart Rate: 94 Resp: 16 SpO2: 95 % O2 Device: None (Room air)    Vitals:    11/16/18 1246   Weight: 54.9 kg (121 lb)     Vital Signs with Ranges  Temp:  [97.4  F (36.3  C)-98.2  F (36.8  C)] 97.6  F (36.4  C)  Heart Rate:  [] 94  Resp:  [11-22] 16  BP: (132-152)/(60-85) 144/85  SpO2:  [94 %-97 %] 95 %  I/O last 3 completed shifts:  In: 2414 [P.O.:480; I.V.:1934]  Out: -     Constitutional: Awake, alert, NAD  Respiratory: Bilateral air entry, no wheezing, no rales, no crackles  Cardiovascular: S1S2, RRR, no murmurs, no rubs  GI: abdomen- soft, nonT, nonD, BS present  Skin/Integumen: bilateral knees ecchymosis and some skin tear  Neuro: AAO to self and partially to time, no FNDs    Medications     sodium chloride 100 mL/hr at 11/17/18 1445       aspirin  81 mg Oral Daily     cefTRIAXone  1 g Intravenous Q24H     docusate sodium  200 mg Oral BID     [START ON 11/18/2018] metoprolol succinate  75 mg Oral Daily     multivitamin, therapeutic with minerals  1 tablet Oral Daily       Data     Recent Labs  Lab 11/17/18  0925 11/16/18  2245 11/16/18  1920 11/16/18  1318   WBC 18.5*  --   --  15.1*   HGB 11.4*  --   --  10.8*   MCV 99  --   --  99     --   --  241     --   --  142   POTASSIUM 3.5  --   --  4.0   CHLORIDE 106  --   --  108   CO2 24  --   --  26   BUN 15  --   --  23   CR 0.79  --   --  1.13*   ANIONGAP 9  --   --  8   RENETTA 8.2*  --   --  9.3   *  --   --  80   ALBUMIN 3.0*  --   --   --    PROTTOTAL 6.5*  --   --   --    BILITOTAL  0.7  --   --   --    ALKPHOS 62  --   --   --    ALT 19  --   --   --    AST 42  --   --   --    TROPI 1.007* 1.593* 1.615* 0.687*       No results found for this or any previous visit (from the past 24 hour(s)).

## 2018-11-17 NOTE — PLAN OF CARE
Problem: Patient Care Overview  Goal: Plan of Care/Patient Progress Review  PT: Orders received; Patient having an ECHO at time of attempted evaluation. Patient has elevated troponins and Abnormal EKG per chart review.

## 2018-11-17 NOTE — PLAN OF CARE
Problem: Patient Care Overview  Goal: Plan of Care/Patient Progress Review  OT: OT orders received, however, patient has elevated troponins and abnormal EKG per chart review. Defer OT evaluation to tomorrow.

## 2018-11-17 NOTE — PLAN OF CARE
Problem: Patient Care Overview  Goal: Plan of Care/Patient Progress Review  Outcome: Improving  AOx4, [place], VSS on r/a.  Denies pain.  Up to chair for meals.  Troponin trending down, 1.007 now.  IVF remains at 100 mL/hr to RFA PIV.  Tele SR with PVC's.  Echo done, see results.  Tolerating low sat fat/<2400 g Na diet.   Up with 1 with GB to chair today.  11/16 BC's report no growth. Encouraging IS/pulmonary hygiene.  Incontinent of B&B, soft BM today, pt refused Senna, educated regarding Colace.      Problem: Fall Risk (Adult)  Goal: Identify Related Risk Factors and Signs and Symptoms  Related risk factors and signs and symptoms are identified upon initiation of Human Response Clinical Practice Guideline (CPG).   Outcome: Improving  Up to chair with 2 and GB.  Strength improving.  Encouraged to be in chair for meals at minimum.  Family educated regarding safety and adaptive equipment use.

## 2018-11-17 NOTE — PLAN OF CARE
Problem: Patient Care Overview  Goal: Plan of Care/Patient Progress Review  Outcome: No Change  Pt transferred to unit at 1930. Pt A/O x4, forgetful. VSS on RA. Pt denies pain/nausea/SOB. Troponins elevated 0.687/1.615, MD aware and ordered echo for AM. Next trop draw scheduled for 2230. Denies chest pain. Family declining heparin drip. CT head/spine negative. Cardic diet, fair appetite for dinner. Incontinent of urine, can use bed pan. IVF infusing 100ml/hr. On IV rocephin for UTI. Abrasions/cuts to bilateral elbows and knees, dressings to bilateral arms and L chin. Abrasion to 2nd toe on right foot, dressing applied. Bruising to bilat arms. WOC consulted. Nursing continue to monitor.    2250: updated Jonny about plan for echocardiogram tomorrow. Son agreeable.

## 2018-11-17 NOTE — PROVIDER NOTIFICATION
MD Notification    Notified Person: MD    Notified Person Name: Brigid    Notification Date/Time: 11/16/18 2035    Notification Interaction: Talked to MD    Purpose of Notification: troponin 1.615 @ 1920, up from 0.687 @ 1318. Pt is asymptomatic, denies chest pain.     Orders Received: MD is going to call son to see what interventions he wants to pursue. The son has declined intervention. Order to redraw troponin at 2230 to trend. Call MD with update.     Comments:

## 2018-11-18 ENCOUNTER — APPOINTMENT (OUTPATIENT)
Dept: PHYSICAL THERAPY | Facility: CLINIC | Age: 83
DRG: 690 | End: 2018-11-18
Attending: HOSPITALIST
Payer: COMMERCIAL

## 2018-11-18 ENCOUNTER — APPOINTMENT (OUTPATIENT)
Dept: OCCUPATIONAL THERAPY | Facility: CLINIC | Age: 83
DRG: 690 | End: 2018-11-18
Attending: HOSPITALIST
Payer: COMMERCIAL

## 2018-11-18 LAB
CK SERPL-CCNC: 184 U/L (ref 30–225)
ERYTHROCYTE [DISTWIDTH] IN BLOOD BY AUTOMATED COUNT: 13.6 % (ref 10–15)
HCT VFR BLD AUTO: 31.1 % (ref 35–47)
HGB BLD-MCNC: 10.2 G/DL (ref 11.7–15.7)
LACTATE BLD-SCNC: 0.7 MMOL/L (ref 0.7–2)
MCH RBC QN AUTO: 32.3 PG (ref 26.5–33)
MCHC RBC AUTO-ENTMCNC: 32.8 G/DL (ref 31.5–36.5)
MCV RBC AUTO: 98 FL (ref 78–100)
PLATELET # BLD AUTO: 211 10E9/L (ref 150–450)
RBC # BLD AUTO: 3.16 10E12/L (ref 3.8–5.2)
WBC # BLD AUTO: 14.5 10E9/L (ref 4–11)

## 2018-11-18 PROCEDURE — 85027 COMPLETE CBC AUTOMATED: CPT | Performed by: INTERNAL MEDICINE

## 2018-11-18 PROCEDURE — 99232 SBSQ HOSP IP/OBS MODERATE 35: CPT | Performed by: INTERNAL MEDICINE

## 2018-11-18 PROCEDURE — 82550 ASSAY OF CK (CPK): CPT | Performed by: INTERNAL MEDICINE

## 2018-11-18 PROCEDURE — 97161 PT EVAL LOW COMPLEX 20 MIN: CPT | Mod: GP | Performed by: PHYSICAL THERAPIST

## 2018-11-18 PROCEDURE — 97165 OT EVAL LOW COMPLEX 30 MIN: CPT | Mod: GO

## 2018-11-18 PROCEDURE — 25000132 ZZH RX MED GY IP 250 OP 250 PS 637: Performed by: HOSPITALIST

## 2018-11-18 PROCEDURE — 25000128 H RX IP 250 OP 636: Performed by: INTERNAL MEDICINE

## 2018-11-18 PROCEDURE — 97530 THERAPEUTIC ACTIVITIES: CPT | Mod: GP | Performed by: PHYSICAL THERAPIST

## 2018-11-18 PROCEDURE — 40000133 ZZH STATISTIC OT WARD VISIT

## 2018-11-18 PROCEDURE — 83605 ASSAY OF LACTIC ACID: CPT | Performed by: INTERNAL MEDICINE

## 2018-11-18 PROCEDURE — 97530 THERAPEUTIC ACTIVITIES: CPT | Mod: GO

## 2018-11-18 PROCEDURE — 87040 BLOOD CULTURE FOR BACTERIA: CPT | Performed by: INTERNAL MEDICINE

## 2018-11-18 PROCEDURE — 97116 GAIT TRAINING THERAPY: CPT | Mod: GP | Performed by: PHYSICAL THERAPIST

## 2018-11-18 PROCEDURE — 97535 SELF CARE MNGMENT TRAINING: CPT | Mod: GO

## 2018-11-18 PROCEDURE — 12000000 ZZH R&B MED SURG/OB

## 2018-11-18 PROCEDURE — 40000193 ZZH STATISTIC PT WARD VISIT: Performed by: PHYSICAL THERAPIST

## 2018-11-18 PROCEDURE — 25000132 ZZH RX MED GY IP 250 OP 250 PS 637: Performed by: INTERNAL MEDICINE

## 2018-11-18 PROCEDURE — 36415 COLL VENOUS BLD VENIPUNCTURE: CPT | Performed by: INTERNAL MEDICINE

## 2018-11-18 RX ORDER — CEFAZOLIN SODIUM 2 G/100ML
2 INJECTION, SOLUTION INTRAVENOUS EVERY 12 HOURS
Status: DISCONTINUED | OUTPATIENT
Start: 2018-11-18 | End: 2018-11-19 | Stop reason: HOSPADM

## 2018-11-18 RX ORDER — LISINOPRIL 20 MG/1
20 TABLET ORAL 2 TIMES DAILY
Status: DISCONTINUED | OUTPATIENT
Start: 2018-11-18 | End: 2018-11-19 | Stop reason: HOSPADM

## 2018-11-18 RX ADMIN — LISINOPRIL 20 MG: 20 TABLET ORAL at 20:23

## 2018-11-18 RX ADMIN — LISINOPRIL 20 MG: 20 TABLET ORAL at 10:17

## 2018-11-18 RX ADMIN — SODIUM CHLORIDE: 9 INJECTION, SOLUTION INTRAVENOUS at 19:02

## 2018-11-18 RX ADMIN — DOCUSATE SODIUM 200 MG: 100 CAPSULE, LIQUID FILLED ORAL at 20:23

## 2018-11-18 RX ADMIN — ACETAMINOPHEN 1000 MG: 500 TABLET, FILM COATED ORAL at 17:33

## 2018-11-18 RX ADMIN — CEFAZOLIN SODIUM 2 G: 2 INJECTION, SOLUTION INTRAVENOUS at 10:18

## 2018-11-18 RX ADMIN — CEFAZOLIN SODIUM 2 G: 2 INJECTION, SOLUTION INTRAVENOUS at 20:22

## 2018-11-18 RX ADMIN — MULTIPLE VITAMINS W/ MINERALS TAB 1 TABLET: TAB at 10:17

## 2018-11-18 RX ADMIN — METOPROLOL SUCCINATE 75 MG: 50 TABLET, EXTENDED RELEASE ORAL at 10:17

## 2018-11-18 RX ADMIN — DOCUSATE SODIUM 200 MG: 100 CAPSULE, LIQUID FILLED ORAL at 10:17

## 2018-11-18 RX ADMIN — SODIUM CHLORIDE: 9 INJECTION, SOLUTION INTRAVENOUS at 04:27

## 2018-11-18 RX ADMIN — ASPIRIN 81 MG: 81 TABLET, COATED ORAL at 10:17

## 2018-11-18 ASSESSMENT — ACTIVITIES OF DAILY LIVING (ADL)
ADLS_ACUITY_SCORE: 20
ADLS_ACUITY_SCORE: 20
ADLS_ACUITY_SCORE: 22
ADLS_ACUITY_SCORE: 21
ADLS_ACUITY_SCORE: 22
ADLS_ACUITY_SCORE: 21

## 2018-11-18 NOTE — PROGRESS NOTES
11/18/18 0900   Quick Adds   Type of Visit Initial PT Evaluation   Living Environment   Lives With alone   Living Arrangements apartment   Home Accessibility no concerns   Number of Stairs to Enter Home 0   Number of Stairs Within Home 0   Living Environment Comment Pt lives alone in sr apt   Self-Care   Dominant Hand right   Usual Activity Tolerance good   Current Activity Tolerance fair   Regular Exercise yes   Activity/Exercise Type (pt states she goes to exercise group )   Exercise Amount/Frequency 3-5 times/wk   Equipment Currently Used at Home walker, rolling   Functional Level Prior   Ambulation 1-->assistive equipment   Transferring 1-->assistive equipment   Toileting 1-->assistive equipment   Bathing 1-->assistive equipment   Dressing 0-->independent   Eating 0-->independent   Communication 0-->understands/communicates without difficulty   Swallowing 0-->swallows foods/liquids without difficulty   Cognition 1 - attention or memory deficits   Fall history within last six months yes   Number of times patient has fallen within last six months 4   Which of the above functional risks had a recent onset or change? fall history   Prior Functional Level Comment Pt normally is IND with 4wh walker   General Information   Onset of Illness/Injury or Date of Surgery - Date 11/16/18   Referring Physician Dr Rainey   Patient/Family Goals Statement pt confused, pleasant   Pertinent History of Current Problem (include personal factors and/or comorbidities that impact the POC) Pt fell at her apt, was down at least overnight. Admitted with fall, UTI, elevated troponins (now downtrending), mild rhabdomyolis/acute kidney injury   Precautions/Limitations fall precautions   Weight-Bearing Status - LUE full weight-bearing   Weight-Bearing Status - RUE full weight-bearing   Weight-Bearing Status - LLE full weight-bearing   Weight-Bearing Status - RLE full weight-bearing   Cognitive Status Examination   Orientation person   Level  of Consciousness confused   Follows Commands and Answers Questions 50% of the time;able to follow single-step instructions   Personal Safety and Judgment impaired;at risk behaviors demonstrated;impulsive   Memory impaired   Pain Assessment   Patient Currently in Pain (yes but unable to rate pain - feet )   Integumentary/Edema   Integumentary/Edema Comments multiple bruises, wounds from fall, covered in bandaging. See nursing notes for details.   Posture    Posture Forward head position   Range of Motion (ROM)   ROM Comment LE ROM decreased   Strength   Strength Comments decreased d/t pain and weakness, grossly 4/5 but symmetrical   Bed Mobility   Bed Mobility Comments min assist 1 and rail   Transfer Skills   Transfer Comments sit to/from stand with mod assist 1   Gait   Gait Comments took 3 steps with walker and min to mod assist 2   Balance   Balance Comments unsteady with all standing activity even with walker   Sensory Examination   Sensory Perception no deficits were identified   Coordination   Coordination no deficits were identified   Muscle Tone   Muscle Tone no deficits were identified   General Therapy Interventions   Planned Therapy Interventions balance training;gait training;strengthening;transfer training   Clinical Impression   Criteria for Skilled Therapeutic Intervention yes, treatment indicated   PT Diagnosis impaired functional mobility   Influenced by the following impairments decreased strength, confusion, balance impairment   Functional limitations due to impairments fall risk, inability to ambulate household distances, increased caregiver burden   Clinical Presentation Stable/Uncomplicated   Clinical Presentation Rationale per medical record   Clinical Decision Making (Complexity) Low complexity   Therapy Frequency` daily   Predicted Duration of Therapy Intervention (days/wks) 3 days   Anticipated Discharge Disposition Transitional Care Facility   Risk & Benefits of therapy have been explained  "Yes   Patient, Family & other staff in agreement with plan of care Yes   [facility] AM-PAC TM \"6 Clicks\"   2016, Trustees of [facility], under license to [employer].  All rights reserved.   6 Clicks Short Forms Basic Mobility Inpatient Short Form   [facility] AM-PAC  \"6 Clicks\" V.2 Basic Mobility Inpatient Short Form   1. Turning from your back to your side while in a flat bed without using bedrails? 3 - A Little   2. Moving from lying on your back to sitting on the side of a flat bed without using bedrails? 3 - A Little   3. Moving to and from a bed to a chair (including a wheelchair)? 2 - A Lot   4. Standing up from a chair using your arms (e.g., wheelchair, or bedside chair)? 2 - A Lot   5. To walk in hospital room? 2 - A Lot   6. Climbing 3-5 steps with a railing? 1 - Total   Basic Mobility Raw Score (Score out of 24.Lower scores equate to lower levels of function) 13   Total Evaluation Time   Total Evaluation Time (Minutes) 15     "

## 2018-11-18 NOTE — PLAN OF CARE
Problem: Patient Care Overview  Goal: Plan of Care/Patient Progress Review  Outcome: No Change  A&O x3, disoriented to time.  [place].  Low sat fat/low Na+ diet.  Assist of 2, no out of bed overnight.  VSS on room air.  Denies pain except some soreness with repositioning.  Turn/repo q 2hrs.  Incontinent of bowel/bladder.  Tele NSR.  Neuros intact.  PIV with IVF infusing @75.  IV abx.  Dressings on L side of chin and bilateral forearms CDI.  Continue to monitor.

## 2018-11-18 NOTE — PLAN OF CARE
Problem: Patient Care Overview  Goal: Plan of Care/Patient Progress Review  Outcome: No Change  2000-2300: Alert, disoriented to time.  Refused to get out of bed, p[resumably an Ax2 due to weakness.  Turn/repo.  Incontinent.  Low fat, 2gm Na diet.  Running IVF.  Tele: SR with PAC's.  Dressings on face, arms, and knees from injuries related to fall PTA.  Neuros intact.  [place].  Positive blood culture, will start IV Vanco tonight.  Urine sensitive returned, currently on IV rocephin.  Tolerates PCD's.  PT, OT, SW, and WOC consults.

## 2018-11-18 NOTE — CONSULTS
ID consult dictated IMP 1 89 yo fall wo preceding infection sxs, in SUAREZ 1/2 BC staph lugadensis, UC E coli    REC 1 unclear significance, for now to ancef covering both cxs

## 2018-11-18 NOTE — PROGRESS NOTES
[facility]    Hospitalist Progress Note      Assessment & Plan   Tarcey Moran is a 89 year old  female with PMH of hypertension, history of GI bleed in the past, dementia brought into the ED via EMS after a fall at home.     UTI  - Has mild incontinence of urine, increased frequency of urination.  - UA moderate blood, nitrite positive, large leukocyte esterase, moderate bacteria, 149 WBC; leucocytosis WBCs 15.1 on admission  - started on Ceftriaxone on admission  - UC positive for >500130cte E coli- pansensitive  - WBCs down from 18.5 to 14.5 today  - also with bacteriemia for Strep du    - see below- so ABX chabged to Ancef as per ID  - monitor fever curve and WBCs trend    Positive blood culture for Staph lugdunensis  - 1/2 bottles from 11/16  - ?contaminant vs true bacteriemia  - ID consulted  - switched to Ancef   - repeat BC today 11/18; follow up cultures  - if more BC return back positive for Staph lugdunensis- may need further workup    s/p fall-likely from UTI/physical deconditioning.  Physical deconditioning likely from senile fragility  - Patient/son feels that she fell around 2100 last night and was not able to get up, laid on the floor all night.  Patient thinks she was wearing socks she slipped and fell on the rug.  She is unable to provide any history regarding the fall but able to recollect that she called 911.  Unclear if patient lost any consciousness [history limited as patient has dementia]  - CT head no acute pathology. CT C-spine no acute pathology. Chest x-ray left basilar atelectasis.  - Telemetry  - echo EF 55-60%, mod pulm HTN, mild AO regurg; TSH 2.95  - treat UTI as above  - iv hydration  - Wound care evaluation for skin tears.  - PT/OT- rec TCU  - SW    Mild rhabdomyolysis  - from prolonged immobilization  - CK on admission 379--up to 657--184 today    Elevated troponin- likely demand ischemia in the setting of fall/mild rhabdomyolysis/acute kidney injury.  - Denies any  chest pain or palpitations or shortness of breath.  - EKG showed sinus tachycardia, left ventricular hypertrophy, ST-T wave changes V5 to V6 [no significant change when compared to previous EKG]  -   - serial troponins 0.0687--1.615--1.593--1.007- trending down  Patient admitted in February 2018 with similar complaints of fall in the setting of urinary tract infection and had rhabdomyolysis and elevated troponin [1.570].   Echocardiogram 2/2018 showed normal left ventricular size, estimated EF at 60-65%.  Grade 1 early diastolic dysfunction.  Mild aortic regurgitation/mild pulmonary hypertension/ascending aorta mildly dilated.She was managed medically and discharged home.  - admitting provider discussed with the patient son on admission and he declined heparin  - started on ASA  - PTA Toprol was increased from 50 mg po daily to 75 mg po daily  - echo as above  - fasting lipid profile- LDL 55, HDL 62, total choles 134    CASSIE- resolved  Most recent creatinine 0.61 in 2/2018.   Creatinine 1.13 on admission, with associated elevated BUN and appeared volume depleted on exam.   - started on iv fluids- cr improved to 0.70  - resumed PTA Lisinopril      Acute Encephalopathy, likely multifactorial   - CT head negative; no FNDs  - Concern for volume depletion, UTI, cardiac event as detailed above, all potential contributors   - Treat as above and reassess  - Re-orient as needed  - now seems at baseline      Chronic normocytic anemia  History of GI bleed 12/2017, which was suspected to be diverticular in nature.   EGD unremarkable at that time, patient declined colonoscopy.   Hemoglobin 10.8 g/dl on admission with last value of 8.6 g/dl in 5/2018.   - Hb 11.4--10.2- stable     Hypertension   [PTA on Toprol XL 50 mg po daily and Lisinopril 20 mg po BID]  - BP on higher side; HR also in 90's  - increased PTA Toprol to 75 mg po daily on 11/17  - resume PTA Lisinopril at 20 mg BID with holding parameters  - adjust meds as  needed.      Constipation  Chronic issue.   Scheduled docusate   PRN bowel regimen ordered    DVT Prophylaxis: Pneumatic Compression Devices  Code Status: Full Code  Expected discharge: in 1-2 days, recommended to TCU vs home once known plan for ABX    Johana Patterson MD    Interval History   Doing fine, up in the chair, denies chest pain, no SOB; no abd pain, no N/V; states that her legs are weak and sore; discussed with RN     -Data reviewed today: I reviewed all new labs and imaging results over the last 24 hours. I personally reviewed no images or EKG's today.    Physical Exam   Temp: 97.8  F (36.6  C) Temp src: Oral BP: 172/84   Heart Rate: 102 Resp: 16 SpO2: 93 % O2 Device: None (Room air)    Vitals:    11/16/18 1246 11/18/18 0645   Weight: 54.9 kg (121 lb) 59.4 kg (130 lb 15.3 oz)     Vital Signs with Ranges  Temp:  [97.1  F (36.2  C)-98.8  F (37.1  C)] 97.8  F (36.6  C)  Heart Rate:  [] 102  Resp:  [16] 16  BP: (137-172)/(67-84) 172/84  SpO2:  [93 %-96 %] 93 %  I/O last 3 completed shifts:  In: 2682 [P.O.:360; I.V.:2322]  Out: -     Constitutional: Awake, alert, NAD  Respiratory: Bilateral air entry, no wheezing, no rales, no crackles  Cardiovascular: S1S2, RRR, no murmurs, no rubs  GI: abdomen- soft, nonT, nonD, BS present  Skin/Integumen: bilateral knees ecchymosis and some skin tear  Neuro: AAO to self and partially to time, no FNDs    Medications     sodium chloride 75 mL/hr at 11/18/18 0427       aspirin  81 mg Oral Daily     ceFAZolin  2 g Intravenous Q12H     docusate sodium  200 mg Oral BID     lisinopril  20 mg Oral Daily     metoprolol succinate  75 mg Oral Daily     multivitamin, therapeutic with minerals  1 tablet Oral Daily       Data     Recent Labs  Lab 11/18/18  0830 11/17/18  0925 11/16/18  2245 11/16/18  1920 11/16/18  1318   WBC 14.5* 18.5*  --   --  15.1*   HGB 10.2* 11.4*  --   --  10.8*   MCV 98 99  --   --  99    270  --   --  241   NA  --  139  --   --  142    POTASSIUM  --  3.5  --   --  4.0   CHLORIDE  --  106  --   --  108   CO2  --  24  --   --  26   BUN  --  15  --   --  23   CR  --  0.79  --   --  1.13*   ANIONGAP  --  9  --   --  8   RENETTA  --  8.2*  --   --  9.3   GLC  --  103*  --   --  80   ALBUMIN  --  3.0*  --   --   --    PROTTOTAL  --  6.5*  --   --   --    BILITOTAL  --  0.7  --   --   --    ALKPHOS  --  62  --   --   --    ALT  --  19  --   --   --    AST  --  42  --   --   --    TROPI  --  1.007* 1.593* 1.615* 0.687*       No results found for this or any previous visit (from the past 24 hour(s)).

## 2018-11-18 NOTE — PLAN OF CARE
"Problem: Patient Care Overview  Goal: Plan of Care/Patient Progress Review  Discharge Planner PT   Patient plan for discharge: none stated, pt very confused  Current status: PT - Eval completed and treatment initiated. Pt is a 89 y/o female admitted after a fall, was down at home at least overnight. She states she wears a lifeline type pendant and when they called asking if she needed help she said no because she could \"still roll onto her back.\" Pt has h/o dementia, now has UTI, elevated troponins (now downtrending) likely d/t fall with mild rhabdomyolysis/acute kidney injury. Pt is extremely confused, only oriented to self, unable to give history and living situation, looking at old chart it appears pt lives in independent senior apartment, pt does report she does her own cooking and laundry, RN states son told him that he calls her daily and checks in on her 2x/week. Pt normally ambulates with wh walker and modified IND. Currently, pt reports painful legs and feet from fall, transfers with min to mod assist 2, took 4 steps with a walker and then unable to continue due to pain. Very difficult transfer to chair d/t confusion/difficulty following commands. Pt is very pleasant and cooperative, but extremely confused.  Barriers to return to prior living situation: Lives alone, currently unable to ambulate household distances, heavy assist 2 for mobility.  Recommendations for discharge: TCU  Rationale for recommendations: Pt would benefit from continued therapy at TCU to address mobility and strength deficits.       Entered by: Sunita Barillas 11/18/2018 9:19 AM           "

## 2018-11-18 NOTE — PLAN OF CARE
"Problem: Patient Care Overview  Goal: Plan of Care/Patient Progress Review  Discharge Planner OT   Patient plan for discharge: did not state, son reports he and she open to TCU at [facility]  Current status: OT eval completed and treatment initiated on 89yo female admitted after fall with UTI. Son present who helped provide and confirm baseline information. Son put patient's hearing aids in which greatly helped her to participate. Pt lives in a large,  indep co-op apartment. She ambulates indep with her 4ww, she is indep with all self-cares including dressing, shower, toileting, meal prep, med management, laundry (on another floor and she uses elevator) and cleaning. Family provides transportation. She has had several falls, which she contributes to her slipper linoleum floor in kitchen. During this fall, she did push her Lifeline button but then told them she was \"okay\", she told her son the same thing when he called, but she could not get up and spend the whole night on the floor (?). Son reports that the bruises on her arms were not caused from backward fall at refrigerator but from her attempting to repeatedly get up from floor. She does have some cognitive impairment at baseline per son but manages very well in current setting. Today patient oriented to self, , place, not month and required cueing to get year correct. She did follow 1-step commands 100% of time however required frequent redirection to task and conversation. Performed bed mobility with Mod A of 1, sit-stand and bed to chair Mod A of 1 using FWW. Son will bring in her 4ww. Pt c/o R knee and R foot pain with standing (son reports she does have baseline R knee and BLE weakness due to OA and recently completed 8 weeks of OP PT). BUE ROM WNL, B ofelia strength 3+/5. Able to don socks from BS chair.   Barriers to return to prior living situation: impaired balance, fall risk, weakness, requiring assist with self-cares  Recommendations for " discharge: TCU  Rationale for recommendations: pt will benefit from daily therapies in TCU setting to advance safety and indep with ADLs and mobilities prior to return home to current [facility] living situation       Entered by: Manfred Smith 11/18/2018 1:27 PM

## 2018-11-18 NOTE — PROVIDER NOTIFICATION
MD Notification    Notified Person: MD    Notified Person Name:  Bernadineyocasta    Notification Date/Time: 11/17/18 2147    Notification Interaction: Paged    Purpose of Notification: 2145: Notified of positive blood culture collected 11/16/18 from right arm, gram positive cocci in clusters.    Orders Received: MD ordered IV vancomycin at 2200.    Comments: Patient is on Rocephin and had 1gm IV at 1438.

## 2018-11-18 NOTE — PHARMACY-VANCOMYCIN DOSING SERVICE
Pharmacy Vancomycin Initial Note  Date of Service 2018  Patient's  1925  89 year old, female    Indication: Bacteremia    Current estimated CrCl = Estimated Creatinine Clearance: 38.6 mL/min (based on Cr of 0.79).    Creatinine for last 3 days  2018:  1:18 PM Creatinine 1.13 mg/dL  2018:  9:25 AM Creatinine 0.79 mg/dL    Recent Vancomycin Level(s) for last 3 days  No results found for requested labs within last 72 hours.      Vancomycin IV Administrations (past 72 hours)      No vancomycin orders with administrations in past 72 hours.                Nephrotoxins and other renal medications (Future)    Start     Dose/Rate Route Frequency Ordered Stop    18 2300  vancomycin (VANCOCIN) 1,250 mg in sodium chloride 0.9 % 250 mL intermittent infusion      1,250 mg  over 90 Minutes Intravenous EVERY 24 HOURS 18  lisinopril (PRINIVIL/ZESTRIL) tablet 20 mg      20 mg Oral DAILY 18 194            Contrast Orders - past 72 hours     None                Plan:  1.  Start vancomycin  1250 mg IV q24h.   2.  Goal Trough Level: 15-20 mg/L   3.  Pharmacy will check trough levels as appropriate in 1-3 Days.    4. Serum creatinine levels will be ordered daily for the first week of therapy and at least twice weekly for subsequent weeks.    5. [place] method utilized to dose vancomycin therapy: Method 1    Zan Portillo

## 2018-11-18 NOTE — CONSULTS
Consult Date:  11/18/2018      IMPRESSION:   1.  A 89-year-old female with a fall at home, by history appears to have been a slip and fall without preceding signs of infection or other illness.  No clear loss of consciousness.   2.  At presentation leukocytosis, but no fever, neither prior to or currently any obvious infection symptoms.   3.  Abnormal urinalysis and positive urine culture, E. coli in urine that is pansensitive, no clear symptoms and not bacteremic with this.  Probably just asymptomatic bacteria but hard to exclude completely.   4.  Late positive blood culture 1 out of 2 for staph lugdunensis.  This is probably a contaminant, although Staph lugdunensis unlike other coagulase-negative Staph is some potential for a true bacteremia occurring, likely view is a contaminant unless second culture positive, conceivably a transient bacteremia related to the fall and skin related transient bacteremia.  No artificial material or obvious secondary site of infection.   5.  Rhabdomyolysis and renal insufficiency, improving.   6.  Encephalopathy.  At presentation, improving.   7.  Penicillin allergy.      RECOMMENDATIONS:   1.  Simplify to Ancef, which covers both of the isolated organisms.   2.  If no other positive cultures, probably simply treat with oral antibiotics as soon as the next day or so if the second culture comes up positive for Staph lugdunensis will be of some further note consideration of bigger workup, although it sounds like some minimizing of workup is the plan from the family's standpoint.      HISTORY OF PRESENT ILLNESS:  This 89-year-old female is seen in consultation due to fall and now bacteremia and positive urine culture.  The patient has a history of functioning reasonably well.  She slipped on the floor and fell.  She remembers well before the procedure that she did not otherwise have signs of illness including no fevers, chills, sweats or localizing symptoms.  This included no  respiratory, cardiovascular or urinary tract symptoms.  When she was finally found to have been down for an indeterminate amount of time and at presentation and mild rhabdomyolysis.  She had leukocytosis, presumed reactive from this, but was cultured.  The urine was grossly abnormal and urine cultures growing E. coli.  The blood cultures now having of one positive for Staph lugdunensis.  She has no major artificial material in place and has not had major bacteremia problems historically.      PAST MEDICAL HISTORY:  Minimal infection problems historically, generally healthy 89-year-old.      SOCIAL AND  FAMILY HISTORY:  Living independently prior to this.      ALLERGIES:  Penicillin, the patient does not recall what type of reaction she had.      MEDICATIONS:  As listed.      REVIEW OF SYSTEMS:  Currently not really having much pain feels relatively okay.  Unclear with her mental status is still off baseline, but seems slightly abnormal still.      PHYSICAL EXAMINATION:   GENERAL:  The patient appears her stated age.  She is quite delightful.   VITAL SIGNS:  Currently are normal and she is afebrile.   HEENT:  No thrush or intraoral lesions.  Pupils reactive.   NECK:  Supple, nontender.   HEART:  Regular rhythm, no particular major murmur, slight irregularity with missed beats.   LUNGS:  Relatively clear, but a few crackles at the right base.   ABDOMEN:  Soft and nontender.   EXTREMITIES:  No major joint abnormalities.  Slight abrasion in the face.  No significant signs of infection of the skin.      LABORATORY DATA:  White count still 14,000 with mildly elevated initially.  Creatinine initially elevated, now improved.  Blood cultures 1 of 2 growing Staph lugdunensis.  Urinalysis 149 white cells and culture with a pansensitive E. coli.         CRISTI JONES MD             D: 2018   T: 2018   MT: JOSE CARLOS      Name:     LIV KINGSTON   MRN:      [id number]        Account:       [id number]   :       06/16/1925           Consult Date:  11/18/2018      Document: [id number]

## 2018-11-18 NOTE — PROGRESS NOTES
Called and left son, Jonny a voicemail with direction to call back.  No detailed information left on voicemail, but want to inform Jonny regarding positive blood culture and starting IV Vanco.

## 2018-11-18 NOTE — PLAN OF CARE
Problem: Fall Risk (Adult)  Goal: Identify Related Risk Factors and Signs and Symptoms  Related risk factors and signs and symptoms are identified upon initiation of Human Response Clinical Practice Guideline (CPG).   Outcome: Improving  VSS, A/O, forgetful and very [place].  Neuro checks WDL.  Troponins trending down.  Incontinent of bowel and bladder, turn and reposition q 2hrs.  Up with assist 2 and GB.  Dressing over many abrasions from fall CDI.

## 2018-11-18 NOTE — PROGRESS NOTES
Hospitalist service cross-cover note:     Called regarding positive blood cultures. Admitted with concern for UTI with E coli in urine, blood cultures with GPC in clusters. Given discordant results, may be secondary to contaminant, but pending further data will start vancomycin IV.     Tomy Keene MD   Hospitalist  [phone]

## 2018-11-18 NOTE — PLAN OF CARE
Problem: Patient Care Overview  Goal: Plan of Care/Patient Progress Review  Outcome: No Change  AOx3, reoriented to time.  VSS on r/a, ex HTN.  Medication adjustment by MD today.  IVF remains at 100 mL/hr, ancef added per ID.  Soft BM's continue with incontinence of B&B, although intermittently continent of urine.  Up with strong 2 to chair.  PT unable to walk distance with PT due to soreness, likely bruising from PTA fall.  PT recommending TCU per conversation.    Sepsis protocol today, lactic for sepsis = 0.7 now.    Problem: Fall Risk (Adult)  Goal: Identify Related Risk Factors and Signs and Symptoms  Related risk factors and signs and symptoms are identified upon initiation of Human Response Clinical Practice Guideline (CPG).   Outcome: Improving  Encouraging up for meals and additional mobility if able to tolerate.

## 2018-11-19 ENCOUNTER — APPOINTMENT (OUTPATIENT)
Dept: PHYSICAL THERAPY | Facility: CLINIC | Age: 83
DRG: 690 | End: 2018-11-19
Payer: COMMERCIAL

## 2018-11-19 VITALS
HEART RATE: 82 BPM | HEIGHT: 62 IN | RESPIRATION RATE: 16 BRPM | TEMPERATURE: 98.1 F | BODY MASS INDEX: 25.32 KG/M2 | WEIGHT: 137.57 LBS | DIASTOLIC BLOOD PRESSURE: 42 MMHG | SYSTOLIC BLOOD PRESSURE: 97 MMHG | OXYGEN SATURATION: 97 %

## 2018-11-19 LAB
BACTERIA SPEC CULT: ABNORMAL
ERYTHROCYTE [DISTWIDTH] IN BLOOD BY AUTOMATED COUNT: 13.5 % (ref 10–15)
HCT VFR BLD AUTO: 28.1 % (ref 35–47)
HGB BLD-MCNC: 9.1 G/DL (ref 11.7–15.7)
Lab: ABNORMAL
MCH RBC QN AUTO: 32.3 PG (ref 26.5–33)
MCHC RBC AUTO-ENTMCNC: 32.4 G/DL (ref 31.5–36.5)
MCV RBC AUTO: 100 FL (ref 78–100)
PLATELET # BLD AUTO: 183 10E9/L (ref 150–450)
RBC # BLD AUTO: 2.82 10E12/L (ref 3.8–5.2)
SPECIMEN SOURCE: ABNORMAL
WBC # BLD AUTO: 10 10E9/L (ref 4–11)

## 2018-11-19 PROCEDURE — 99239 HOSP IP/OBS DSCHRG MGMT >30: CPT | Performed by: INTERNAL MEDICINE

## 2018-11-19 PROCEDURE — 25000128 H RX IP 250 OP 636: Performed by: INTERNAL MEDICINE

## 2018-11-19 PROCEDURE — 25000132 ZZH RX MED GY IP 250 OP 250 PS 637: Performed by: HOSPITALIST

## 2018-11-19 PROCEDURE — 97110 THERAPEUTIC EXERCISES: CPT | Mod: GP

## 2018-11-19 PROCEDURE — 85027 COMPLETE CBC AUTOMATED: CPT | Performed by: INTERNAL MEDICINE

## 2018-11-19 PROCEDURE — 36415 COLL VENOUS BLD VENIPUNCTURE: CPT | Performed by: INTERNAL MEDICINE

## 2018-11-19 PROCEDURE — 97530 THERAPEUTIC ACTIVITIES: CPT | Mod: GP

## 2018-11-19 PROCEDURE — 25000132 ZZH RX MED GY IP 250 OP 250 PS 637: Performed by: INTERNAL MEDICINE

## 2018-11-19 PROCEDURE — 40000193 ZZH STATISTIC PT WARD VISIT

## 2018-11-19 RX ORDER — CEPHALEXIN 500 MG/1
500 CAPSULE ORAL 3 TIMES DAILY
Qty: 28 CAPSULE | Refills: 0 | DISCHARGE
Start: 2018-11-19 | End: 2019-02-25

## 2018-11-19 RX ORDER — METOPROLOL SUCCINATE 25 MG/1
75 TABLET, EXTENDED RELEASE ORAL DAILY
Qty: 30 TABLET | DISCHARGE
Start: 2018-11-20 | End: 2018-11-23

## 2018-11-19 RX ADMIN — SODIUM CHLORIDE: 9 INJECTION, SOLUTION INTRAVENOUS at 09:24

## 2018-11-19 RX ADMIN — CEFAZOLIN SODIUM 2 G: 2 INJECTION, SOLUTION INTRAVENOUS at 09:18

## 2018-11-19 RX ADMIN — DOCUSATE SODIUM 200 MG: 100 CAPSULE, LIQUID FILLED ORAL at 08:32

## 2018-11-19 RX ADMIN — ASPIRIN 81 MG: 81 TABLET, COATED ORAL at 08:32

## 2018-11-19 RX ADMIN — METOPROLOL SUCCINATE 75 MG: 50 TABLET, EXTENDED RELEASE ORAL at 08:32

## 2018-11-19 RX ADMIN — ACETAMINOPHEN 1000 MG: 500 TABLET, FILM COATED ORAL at 11:39

## 2018-11-19 RX ADMIN — LISINOPRIL 20 MG: 20 TABLET ORAL at 08:32

## 2018-11-19 RX ADMIN — MULTIPLE VITAMINS W/ MINERALS TAB 1 TABLET: TAB at 08:31

## 2018-11-19 RX ADMIN — ACETAMINOPHEN 1000 MG: 500 TABLET, FILM COATED ORAL at 04:33

## 2018-11-19 ASSESSMENT — ACTIVITIES OF DAILY LIVING (ADL)
ADLS_ACUITY_SCORE: 21
ADLS_ACUITY_SCORE: 20
ADLS_ACUITY_SCORE: 21
ADLS_ACUITY_SCORE: 20
ADLS_ACUITY_SCORE: 20

## 2018-11-19 NOTE — PLAN OF CARE
Problem: Patient Care Overview  Goal: Plan of Care/Patient Progress Review  Outcome: Improving  Awaiting bed.  AOx3, reoriented to time.  VSS on r/a.  IV SL, ancef given.  WBC's 10 now.  Tylenol x1 for knee discomfort.  Son to provide transportation to TCU upon bed availability. Tele NSR. Tolerating low fat/low Na diet.  Up with 1 to chair and bathroom/BSC. SW has sent 2 referrals out.

## 2018-11-19 NOTE — PLAN OF CARE
Problem: Patient Care Overview  Goal: Plan of Care/Patient Progress Review  Occupational Therapy Discharge Summary    Reason for therapy discharge:    pt to d/c to TCU - will defer OT care to next level    Progress towards therapy goal(s). See goals on Care Plan in [facility] electronic health record for goal details.  Goals not met.  Barriers to achieving goals:   goals to be worked on at next level of care.    Therapy recommendation(s):    Continued therapy is recommended.  Rationale/Recommendations:  continue skilled OT at TCU .

## 2018-11-19 NOTE — PLAN OF CARE
Problem: Patient Care Overview  Goal: Plan of Care/Patient Progress Review  Discharge Planner PT   Patient plan for discharge: Not stated  Current status:     Pt seated in chair upon arrival. Pt continues to be very confused. [place]. Assist to frandy hearing aids prior to session. Continue sto have difficulty following commands. Not appropriate for TE exercises due to impaired cogntion this day. Focus on functional mobility and STS transfers. STS from low chair with CGA and use of 4WW, locks brakes appropriately. SPT chair > bed with cues however pt reaching for bed and performed transfer without safely using walker. No LOB. Pt impulsive. Attempts to lay down prior to staff preparedness. Pt returned to sitting EOB with CGA. Pt performed 2 STS from EOB with use of 4WW. Standing tolerance < 1 minute, attempted gait however pt ambulated 3 steps forward then stated she wants to return to bed. Sit > supine CGA. min A x 1 to scoot toward HOB. Alarm on end of session   Barriers to return to prior living situation: A x 1-2 for mobility, impaired cognition, weakness, impaired balance, fall risk  Recommendations for discharge: TCU  Rationale for recommendations: TCU to improve above impairments as pt below baseline and is currently a fall risk          Entered by: Carmen Lozano 11/19/2018 10:08 AM       Physical Therapy Discharge Summary    Reason for therapy discharge:    Discharged to transitional care facility.    Progress towards therapy goal(s). See goals on Care Plan in [facility] electronic health record for goal details.  Goals partially met.  Barriers to achieving goals:   discharge from facility.    Therapy recommendation(s):    Continued therapy is recommended.  Rationale/Recommendations:  See above.

## 2018-11-19 NOTE — DISCHARGE SUMMARY
[facility]  Discharge Summary  Hospitalist    Date of Admission:  11/16/2018  Date of Discharge:  11/19/2018  Discharging Provider: Jena Johnson MD    Discharge Diagnoses   Urinary tract infection  Bacteremia with staph lugdunensis  Physical deconditioning and fall  Rhabdomyolysis, mild, resolved  Elevated troponin secondary to demand ischemia  Acute kidney injury, resolved  Acute encephalopathy, resolving, multifactorial  Hypertension  Chronic constipation    History of Present Illness   Tracey Moran is a 89 year old  female with PMH of hypertension, history of GI bleed in the past, dementia brought into the ED via EMS after a fall at home. Please see admission H&P for complete details.    Hospital Course   Tracey Moran was admitted on 11/16/2018.  The following problems were addressed during her hospitalization:    Urinary tract infection  Presented with mild incontinence of urine, increased frequency of urination. UA moderate blood, nitrite positive, large leukocyte esterase, moderate bacteria, 149 WBC; leucocytosis WBCs 15.1 on admission  - started on Ceftriaxone on admission  - UC positive for >100k col E coli.  pansensitive  - WBCs down from 18.5 to 14.5 today  - also with bacteriemia for Strep lugdunensis so ABX changed to Ancef as per ID  -discharge with keflex 500 mg po TID x 10 days  -Leukocytosis resolved     Positive blood culture for Staph lugdunensis  - 1/2 bottles from 11/16  - ? contaminant vs true bacteriemia  - ID consulted  - switched to Ancef   - repeat BC 11/18; follow up final cultures  - if more BC return back positive for Staph lugdunensis may need further workup     s/p fall-likely from UTI/physical deconditioning.  Physical deconditioning likely from senile fragility  - Patient/son feels that she fell around 2100 last night and was not able to get up, laid on the floor all night.  Patient thinks she was wearing socks she slipped and fell on the rug.  She is unable to  provide any history regarding the fall but able to recollect that she called 911.  Unclear if patient lost any consciousness [history limited as patient has dementia]  - CT head no acute pathology. CT C-spine no acute pathology. Chest x-ray left basilar atelectasis.  - Telemetry  - echo EF 55-60%, mod pulm HTN, mild AO regurg; TSH 2.95  - treat UTI as above  - iv hydration  - Wound care evaluation for skin tears.  - PT/OT- rec TCU  - SW     Mild rhabdomyolysis  - from prolonged immobilization  - CK on admission 379--up to 657--184 today     Elevated troponin- likely demand ischemia in the setting of fall/mild rhabdomyolysis/acute kidney injury.  - Denies any chest pain or palpitations or shortness of breath.  - EKG showed sinus tachycardia, left ventricular hypertrophy, ST-T wave changes V5 to V6 [no significant change when compared to previous EKG]  -   - serial troponins 0.0687--1.615--1.593--1.007- trending down  Patient admitted in February 2018 with similar complaints of fall in the setting of urinary tract infection and had rhabdomyolysis and elevated troponin [1.570].   Echocardiogram 2/2018 showed normal left ventricular size, estimated EF at 60-65%.  Grade 1 early diastolic dysfunction.  Mild aortic regurgitation/mild pulmonary hypertension/ascending aorta mildly dilated.She was managed medically and discharged home.  - admitting provider discussed with the patient son on admission and he declined heparin  - started on ASA  - PTA Toprol was increased from 50 mg po daily to 75 mg po daily  - echo as above  - fasting lipid profile- LDL 55, HDL 62, total choles 134     CASSIE, multifactorial, resolved  Most recent creatinine 0.61 in 2/2018.  Likely prerenal etiology from dehydration and from rhabdomyolysis.  Creatinine 1.13 on admission, with associated elevated BUN and appeared volume depleted on exam.   - started on iv fluids- cr improved to 0.70  - resumed PTA Lisinopril       Acute Encephalopathy,  likely multifactorial   - CT head negative  - Concern for volume depletion, UTI, cardiac event as detailed above, all potential contributors   - Treat as above and reassess  - Re-orient as needed      Chronic normocytic anemia  History of GI bleed 12/2017, which was suspected to be diverticular in nature.   EGD unremarkable at that time, patient declined colonoscopy.   Hemoglobin 10.8 g/dl on admission with last value of 8.6 g/dl in 5/2018.   - Hb 11.4--10.2- stable      Hypertension   [PTA on Toprol XL 50 mg po daily and Lisinopril 20 mg po BID]  - BP on higher side; HR also in 90's  - increased PTA Toprol to 75 mg po daily on 11/17  - resume PTA Lisinopril at 20 mg BID with holding parameters  - adjust meds as needed.      Constipation  Chronic issue.   Scheduled docusate   PRN bowel regimen ordered    Jena Johnson MD  ~~~~~~~~~~~~~~~~~~~~~~~~~~~~~~~~~~~~~~~~~~~~~~~~~~~~~~~~~~~    Pending Results   These results will be followed up by Hospitalist.  Unresulted Labs Ordered in the Past 30 Days of this Admission     Date and Time Order Name Status Description    11/18/2018 0943 Blood culture Preliminary     11/18/2018 0943 Blood culture Preliminary     11/16/2018 1455 Blood culture Preliminary           Code Status   Full Code       Primary Care Physician   Guilherme Graves    Physical Exam   Temp: 97.3  F (36.3  C) Temp src: Oral BP: 139/67 Pulse: 69 Heart Rate: 69 Resp: 16 SpO2: 93 % O2 Device: None (Room air)    Vitals:    11/16/18 1246 11/18/18 0645 11/19/18 0657   Weight: 54.9 kg (121 lb) 59.4 kg (130 lb 15.3 oz) 62.4 kg (137 lb 9.1 oz)     Vital Signs with Ranges  Temp:  [97.3  F (36.3  C)-99.4  F (37.4  C)] 97.3  F (36.3  C)  Pulse:  [69] 69  Heart Rate:  [69-95] 69  Resp:  [16] 16  BP: (132-150)/(66-88) 139/67  SpO2:  [93 %-95 %] 93 %  I/O last 3 completed shifts:  In: 2273.5 [P.O.:100; I.V.:2173.5]  Out: -     Constitutional: Alert, NAD, pleasant and cooperative      Discharge Disposition   Discharged to  short-term care facility  Condition at discharge: Stable    Consultations This Hospital Stay   SOCIAL WORK IP CONSULT  PHYSICAL THERAPY ADULT IP CONSULT  OCCUPATIONAL THERAPY ADULT IP CONSULT  WOUND OSTOMY CONTINENCE NURSE  IP CONSULT  PHARMACY TO DOSE VANCO  INFECTIOUS DISEASES IP CONSULT  PHYSICAL THERAPY ADULT IP CONSULT  OCCUPATIONAL THERAPY ADULT IP CONSULT    Time Spent on this Encounter   I, Jena Johnson, personally saw the patient today and spent 35 minutes discharging this patient.    Discharge Orders     General info for SNF   Length of Stay Estimate: Short Term Care: Estimated # of Days <30  Condition at Discharge: Improving  Level of care:skilled   Rehabilitation Potential: Good  Admission H&P remains valid and up-to-date: Yes  Recent Chemotherapy: N/A  Use Nursing Home Standing Orders: Yes     Mantoux instructions   Give two-step Mantoux (PPD) Per Facility Policy Yes     Follow Up and recommended labs and tests   Follow up with Nursing home physician.  No follow up labs or test are needed.     Reason for your hospital stay   Fall, weakness, urinary tract infection     Activity - Up with assistive device     Physical Therapy Adult Consult   Evaluate and treat as clinically indicated.    Reason:  Physical deconditioning, right knee pain     Occupational Therapy Adult Consult   Evaluate and treat as clinically indicated.    Reason:  Physical deconditioning, right knee pain     Fall precautions     Advance Diet as Tolerated   Follow this diet upon discharge:      Combination Diet Low Saturated Fat Na <2400mg Diet       Discharge Medications   Current Discharge Medication List      START taking these medications    Details   aspirin 81 MG EC tablet Take 1 tablet (81 mg) by mouth daily    Associated Diagnoses: NSTEMI (non-ST elevated myocardial infarction) (H)      cephALEXin (KEFLEX) 500 MG capsule Take 1 capsule (500 mg) by mouth 3 times daily for 10 days  Qty: 28 capsule, Refills: 0    Associated  Diagnoses: Urinary tract infection in female      diclofenac (VOLTAREN) 1 % GEL topical gel Place 2 g onto the skin 4 times daily as needed for moderate pain    Associated Diagnoses: Arthritis of knee         CONTINUE these medications which have CHANGED    Details   metoprolol succinate (TOPROL-XL) 25 MG 24 hr tablet Take 3 tablets (75 mg) by mouth daily  Qty: 30 tablet    Associated Diagnoses: NSTEMI (non-ST elevated myocardial infarction) (H)         CONTINUE these medications which have NOT CHANGED    Details   acetaminophen (TYLENOL) 500 MG tablet Take 1,000 mg by mouth every 8 hours as needed for mild pain      docusate sodium (COLACE) 100 MG capsule Take 2 capsules (200 mg) by mouth 2 times daily  Qty: 100 capsule, Refills: 3    Comments: To prevent constipation  Associated Diagnoses: Slow transit constipation      lisinopril (PRINIVIL/ZESTRIL) 20 MG tablet TAKE ONE TABLET BY MOUTH TWICE DAILY. HOLD IF SYSTOLIC BLOOD PRESSURE IS LESS THAN 110 MMHG. CALL IF HELD TWICE IN A ROW OR IF SYMPTOMATIC.  Qty: 180 tablet, Refills: 0    Associated Diagnoses: Essential hypertension      multivitamin, therapeutic with minerals (THERA-VIT-M) TABS tablet Take 1 tablet by mouth daily      polyethylene glycol (MIRALAX/GLYCOLAX) Packet Take 17 g by mouth 2 times daily as needed for constipation Use if no bowel movement for 2 days  Qty: 7 packet, Refills: 1    Associated Diagnoses: Slow transit constipation           Allergies   Allergies   Allergen Reactions     Demerol [Meperidine]      Dust Mites      Peanuts [Nuts]      Penicillins      Pollen Extract      Data

## 2018-11-19 NOTE — PLAN OF CARE
Problem: Patient Care Overview  Goal: Plan of Care/Patient Progress Review  Outcome: No Change  [id number]  Disoriented to time. LS clear. BS present. Assist x2, walker and GB to bedside chair. Low grade temp. VSS on RA. Pain on RLE, managed with prn tylenol. Discharge pending.

## 2018-11-19 NOTE — PLAN OF CARE
Problem: Patient Care Overview  Goal: Plan of Care/Patient Progress Review  Discharge Planner PT   Patient plan for discharge: -  Current status: order received. Per discussion with facility, pt is assisted with all ADL's at baseline. Pt is having home OT to assess new power chair in home setting. Will defer OT evaluation at this time.   Barriers to return to prior living situation: defer to medical team  Recommendations for discharge: defer to medical team  Rationale for recommendations: per discussion with facility, pt does not have acute OT needs at this time as facility A with all ADL's and is set to have HHOT for power chair assessment. Defer to next level of care.        Entered by: Agustina Obrien 11/19/2018 11:50 AM

## 2018-11-19 NOTE — CONSULTS
[facility]  [address] [facility] [address]  Phone: [phone]    08/01/18    Aysha Alegre  [address] [id number]      To whom it may concern:     Aysha is my patient. She struggles with anxiety and insomnia. She would benefit from her own dorm room to help her best manage her anxiety and to promote a healthy sleep environment.       Sincerely,          Bernice Paige MD             Care Transition Initial Assessment - RENAY  Reason For Consult: discharge planning  Spoke with:  Jonny Gill  Active Problems:    Fall       DATA  Lives With: alone  Living Arrangements: apartment, independent living facility ([facility], large apt [address] SF)       Identified issues/concerns regarding health management: SW was consulted for discharge planning. Patient is a 89 year old female anticipated to be ready for a TCU stay today. Spoke with Jonny gill about TCU options and he prefers [facility]. If no availability there, [facility] is his second choice. He will provide transport at discharge. Referrals were sent via Discharge on the Double.        Transportation Available: family or friend will provide  ASSESSMENT  Cognitive Status: Some disorientation/forgetfulness noted per RN notes.  Concerns to be addressed: SW is following to coordinate the discharge   PLAN  Financial costs for the patient includes: Private room fees reviewed with Jonny.  Patient/family is agreeable to the plan? Yes  Patient/family Goals and Preferences: TCU  Patient anticipates discharging to: TCU      Addendum  PAS-RR    Per DHS regulation, RENAY completed and submitted PAS-RR to [place] [employer] Direct Connect via the [facility] LinkAge Line.  PAS-RR confirmation # is :[id number]  Further questions may be directed to [facility] LinkAge Line at #[phone], option #4 for PAS-RR staff.  Patient has been accepted at [facility], per Joanna in Admissions.  Discharge orders are completed and have been faxed to [facility].   Spoke with Jonny gill who will provide transport at 1600.

## 2018-11-19 NOTE — PLAN OF CARE
Problem: Patient Care Overview  Goal: Plan of Care/Patient Progress Review  Outcome: No Change  RN [id number]  Alert and oriented, forgetful.  Hard of hearing, wears hearing aides.  Up with 1-2 assist.  Fall risk.  Denies pain.  Iv fluids and antibiotics per orders.  Tele: sinus dysrhythmia.  Lung sounds diminished, 93% room air.  Incontinent of B&B.  Dressing CDI to left arm. Neuro check WNL.

## 2018-11-19 NOTE — PROGRESS NOTES
Hospitalist Progress Note  11/19/2018    Patient seen this morning. Stable for discharge to TCU when bed available.    Jena Johnson MD

## 2018-11-19 NOTE — PLAN OF CARE
Problem: Patient Care Overview  Goal: Plan of Care/Patient Progress Review  Outcome: No Change  A&O 3, disoriented to time.  [place].  Low sat fat/low Na+ diet.  Assist of 2.  Turn/repo q 2hrs.  Incontinent of bowel/bladder.  VSS on room air.  Tele NSR.  Neuros intact.  Tylenol given x1 for R knee pain, effective for decrease in pain; states pain started 2 weeks ago with a fall.  PIV with IVF infusing @75.  Continue to monitor.

## 2018-11-19 NOTE — PROGRESS NOTES
[facility]  Infectious Disease Progress Note          Assessment and Plan:   IMPRESSION:   1.  A 89-year-old female with a fall at home, by history appears to have been a slip and fall without preceding signs of infection or other illness.  No clear loss of consciousness.   2.  At presentation leukocytosis, but no fever, neither prior to or currently any obvious infection symptoms.   3.  Abnormal urinalysis and positive urine culture, E. coli in urine that is pansensitive, no clear symptoms and not bacteremic with this.  Probably just asymptomatic bacteria but hard to exclude completely.   4.  Late positive blood culture 1 out of 2 for staph lugdunensis.  This is probably a contaminant, although Staph lugdunensis unlike other coagulase-negative Staph is some potential for a true bacteremia occurring, likely view is a contaminant unless second culture positive, conceivably a transient bacteremia related to the fall and skin related transient bacteremia.  No artificial material or obvious secondary site of infection.   5.  Rhabdomyolysis and renal insufficiency, improving.   6.  Encephalopathy.  At presentation, improving.   7.  Penicillin allergy.       RECOMMENDATIONS:   1.  Continue Ancef, which covers both of the isolated organisms.   2. With no other positive cultures, probably simply treating with oral antibiotics as soon as now if disposition, if so 10 days keflex 500 tid, if here any way continiue IV         Interval History:   no new complaints and doing well; no cp, sob, n/v/d, or abd pain. No fever cxs same WBC 10 K              Medications:       aspirin  81 mg Oral Daily     ceFAZolin  2 g Intravenous Q12H     docusate sodium  200 mg Oral BID     lisinopril  20 mg Oral BID     metoprolol succinate  75 mg Oral Daily     multivitamin, therapeutic with minerals  1 tablet Oral Daily                  Physical Exam:   Blood pressure 139/67, pulse 69, temperature 97.3  F (36.3  C),  "temperature source Oral, resp. rate 16, height 1.575 m (5' 2\"), weight 62.4 kg (137 lb 9.1 oz), SpO2 93 %, not currently breastfeeding.  Wt Readings from Last 2 Encounters:   11/19/18 62.4 kg (137 lb 9.1 oz)   10/11/18 57.2 kg (126 lb)     Vital Signs with Ranges  Temp:  [97.3  F (36.3  C)-99.4  F (37.4  C)] 97.3  F (36.3  C)  Pulse:  [69] 69  Heart Rate:  [69-95] 69  Resp:  [16] 16  BP: (132-150)/(66-88) 139/67  SpO2:  [93 %-95 %] 93 %    Constitutional: Awake, alert, cooperative, no apparent distress   Lungs: Clear to auscultation bilaterally, no crackles or wheezing   Cardiovascular: Regular rate and rhythm, normal S1 and S2, and no murmur noted   Abdomen: Normal bowel sounds, soft, non-distended, non-tender   Skin: No rashes, no cyanosis, no edema   Other:           Data:   All microbiology laboratory data reviewed.  Recent Labs   Lab Test  11/19/18   0755  11/18/18   0830  11/17/18   0925   WBC  10.0  14.5*  18.5*   HGB  9.1*  10.2*  11.4*   HCT  28.1*  31.1*  34.8*   MCV  100  98  99   PLT  183  211  270     Recent Labs   Lab Test  11/17/18   0925  11/16/18   1318  02/11/18   0543   CR  0.79  1.13*  0.61     No lab results found.  Recent Labs   Lab Test  11/18/18   1034  11/18/18   1027  11/16/18   1510  11/16/18   1505  11/16/18   1404  04/09/18   1326  02/08/18   1311  03/05/16   1524  10/16/14   1330   CULT  No growth after 12 hours  No growth after 12 hours  No growth after 3 days  Cultured on the 1st day of incubation:  Staphylococcus lugdunensis  *  Critical Value/Significant Value, preliminary result only, called to and read back by  MARIA ALEJANDRA NEVILLE RN  11.17.18 NDP    (Note)  POSITIVE for STAPHYLOCOCCUS LUGDUNENSIS by [employer] multiplex nucleic  acid test. Although human commensal, S. lugdunensis is a common  pathogen involved in foreign-body-related infections, e.g. prosthetic  valve endocarditis. Final identification and antimicrobial  susceptibility testing will be verified by standard " methods.    Specimen tested with [employer] multiplex, gram-positive blood culture  nucleic acid test for the following targets: Staph aureus, Staph  epidermidis, Staph lugdunensis, other Staph species, Enterococcus  faecalis, Enterococcus faecium, Streptococcus species, S. agalactiae,  S. anginosus grp., S. pneumoniae, S. pyogenes, Listeria sp., mecA  (methicillin resistance) and Robert/B (vancomycin resistance).    Critical Value/Significant Value called to and read back by BRODY NATH RN @0039 11/18/18. SCG      >100,000 colonies/mL  Escherichia coli  *  >100,000 colonies/mL  mixed urogenital gloria  Susceptibility testing not routinely done    >100,000 colonies/mL  Escherichia coli  *  >100,000 colonies/mL mixed urogenital gloria  No growth

## 2018-11-19 NOTE — PROGRESS NOTES
Discharge    Patient discharged to [facility].  via private transportation with son  Care plan note: Pt is A&O x 3, calm and cooperative for cares. AVS printout explained to pt and son, questions answered. Pt and son expressed understanding. Pt has been discharged to [facility]. TCU. Transpiration provided by son Jonny. Pt left from station 66 at 1600.     Listed belongings gathered and returned to patient. Yes  Care Plan and Patient education resolved: Yes  Prescriptions if needed, hard copies sent with patient  No  Home and hospital acquired medications returned to patient: NA  Medication Bin checked and emptied on discharge Yes  Follow up appointment made for patient: Yes

## 2018-11-20 ENCOUNTER — TRANSFERRED RECORDS (OUTPATIENT)
Dept: HEALTH INFORMATION MANAGEMENT | Facility: CLINIC | Age: 83
End: 2018-11-20

## 2018-11-20 ENCOUNTER — NURSING HOME VISIT (OUTPATIENT)
Dept: GERIATRICS | Facility: CLINIC | Age: 83
End: 2018-11-20
Payer: COMMERCIAL

## 2018-11-20 VITALS
HEIGHT: 62 IN | WEIGHT: 136.6 LBS | RESPIRATION RATE: 18 BRPM | BODY MASS INDEX: 25.14 KG/M2 | TEMPERATURE: 97.2 F | SYSTOLIC BLOOD PRESSURE: 115 MMHG | DIASTOLIC BLOOD PRESSURE: 72 MMHG | HEART RATE: 84 BPM | OXYGEN SATURATION: 95 %

## 2018-11-20 DIAGNOSIS — N18.30 CRF (CHRONIC RENAL FAILURE), STAGE 3 (MODERATE) (H): ICD-10-CM
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
## 2018-11-20 DIAGNOSIS — R79.89 ELEVATED TROPONIN: ICD-10-CM

## 2018-11-20 DIAGNOSIS — I10 ESSENTIAL HYPERTENSION: ICD-10-CM

## 2018-11-20 DIAGNOSIS — N17.9 ACUTE KIDNEY INJURY (H): ICD-10-CM

## 2018-11-20 DIAGNOSIS — I24.89 DEMAND ISCHEMIA (H): ICD-10-CM

## 2018-11-20 DIAGNOSIS — Z87.39 HISTORY OF RHABDOMYOLYSIS: ICD-10-CM

## 2018-11-20 DIAGNOSIS — R53.81 PHYSICAL DECONDITIONING: ICD-10-CM

## 2018-11-20 DIAGNOSIS — K59.09 CHRONIC CONSTIPATION: ICD-10-CM

## 2018-11-20 DIAGNOSIS — N39.0 URINARY TRACT INFECTION WITHOUT HEMATURIA, SITE UNSPECIFIED: Primary | ICD-10-CM

## 2018-11-20 DIAGNOSIS — G93.40 ENCEPHALOPATHY: ICD-10-CM

## 2018-11-20 PROCEDURE — 99310 SBSQ NF CARE HIGH MDM 45: CPT | Performed by: NURSE PRACTITIONER

## 2018-11-20 NOTE — LETTER
11/20/2018        RE: Tracey Moran  [address]  [facility] [address]        [facility]  PRIMARY CARE PROVIDER AND CLINIC:  Guilherme Graves [address] [place] [address] / [facility] [address]  Chief Complaint   Patient presents with     Establish Care     [facility] Record Number:  [id number]  Place of Service where encounter took place:  [facility] ([facility]) [[id number]]    HPI:    Tracey Moran is a 89 year old  (6/16/1925),admitted to the above facility from  [facility].  Hospital stay 11/16/18 through 11/19/18.  Admitted to this facility for  rehab, medical management and nursing care.  HPI information obtained from: facility chart records, facility staff, patient report and [facility] chart review.      Hospital Course  Tracey Moran was admitted on 11/16/2018.  The following problems were addressed during her hospitalization:  Urinary tract infection  Presented with mild incontinence of urine, increased frequency of urination. UA moderate blood, nitrite positive, large leukocyte esterase, moderate bacteria, 149 WBC; leucocytosis WBCs 15.1 on admission  - started on Ceftriaxone on admission  - UC positive for >100k col E coli.  pansensitive  - WBCs down from 18.5 to 14.5 today  - also with bacteriemia for Strep lugdunensis so ABX changed to Ancef as per ID  -discharge with keflex 500 mg po TID x 10 days  -Leukocytosis resolved  Positive blood culture for Staph lugdunensis  - 1/2 bottles from 11/16  - ? contaminant vs true bacteriemia  - ID consulted  - switched to Ancef   - repeat BC 11/18; follow up final cultures  - if more BC return back positive for Staph lugdunensis may need further workup  s/p fall-likely from UTI/physical deconditioning.  Physical deconditioning likely from senile fragility  - Patient/son feels that she fell around 2100 last night and was not able to get up, laid on the floor all night.  Patient thinks she was  wearing socks she slipped and fell on the rug.  She is unable to provide any history regarding the fall but able to recollect that she called 911.  Unclear if patient lost any consciousness [history limited as patient has dementia]  - CT head no acute pathology. CT C-spine no acute pathology. Chest x-ray left basilar atelectasis.  - Telemetry  - echo EF 55-60%, mod pulm HTN, mild AO regurg; TSH 2.95  - treat UTI as above  - iv hydration  - Wound care evaluation for skin tears.  - PT/OT- rec TCU  - SW  Mild rhabdomyolysis  - from prolonged immobilization  - CK on admission 379--up to 657--184 today  Elevated troponin- likely demand ischemia in the setting of fall/mild rhabdomyolysis/acute kidney injury.  - Denies any chest pain or palpitations or shortness of breath.  - EKG showed sinus tachycardia, left ventricular hypertrophy, ST-T wave changes V5 to V6 [no significant change when compared to previous EKG]  -   - serial troponins 0.0687--1.615--1.593--1.007- trending down  Patient admitted in February 2018 with similar complaints of fall in the setting of urinary tract infection and had rhabdomyolysis and elevated troponin [1.570].   Echocardiogram 2/2018 showed normal left ventricular size, estimated EF at 60-65%.  Grade 1 early diastolic dysfunction.  Mild aortic regurgitation/mild pulmonary hypertension/ascending aorta mildly dilated.She was managed medically and discharged home.  - admitting provider discussed with the patient son on admission and he declined heparin  - started on ASA  - PTA Toprol was increased from 50 mg po daily to 75 mg po daily  - echo as above  - fasting lipid profile- LDL 55, HDL 62, total choles 134  CASSIE, multifactorial, resolved  Most recent creatinine 0.61 in 2/2018.  Likely prerenal etiology from dehydration and ATN from rhabdomyolysis.  Creatinine 1.13 on admission, with associated elevated BUN and appeared volume depleted on exam.   - started on iv fluids- cr improved  to 0.70  - resumed PTA Lisinopril   Acute Encephalopathy, likely multifactorial   - CT head negative  - Concern for volume depletion, UTI, cardiac event as detailed above, all potential contributors   - Treat as above and reassess  - Re-orient as needed  Chronic normocytic anemia  History of GI bleed 12/2017, which was suspected to be diverticular in nature.   EGD unremarkable at that time, patient declined colonoscopy.   Hemoglobin 10.8 g/dl on admission with last value of 8.6 g/dl in 5/2018.   - Hb 11.4--10.2- stable  Hypertension   [PTA on Toprol XL 50 mg po daily and Lisinopril 20 mg po BID]  - BP on higher side; HR also in 90's  - increased PTA Toprol to 75 mg po daily on 11/17  - resume PTA Lisinopril at 20 mg BID with holding parameters  - adjust meds as needed.  Constipation  Chronic issue.   Scheduled docusate   PRN bowel regimen ordered    Current issues are:      Urinary tract infection without hematuria, site unspecified  Physical deconditioning  Fall, subsequent encounter  Patient denies bladder symptoms and is afebrile. At TCU for rehab. [place] lives in [place], she is typically up with walker. She is completing Keflex course and second set of BC still pending, negative/no growth to date.   Lab Results   Component Value Date    WBC 10.0 11/19/2018    WBC 14.5 11/18/2018     History of rhabdomyolysis  Acute kidney injury (H)  CRF (chronic renal failure), stage 3 (moderate) (H)  Most recently renal function stabilized. Encouraging intake.   Lab Results   Component Value Date    CR 0.79 11/17/2018    CR 1.13 11/16/2018     Lab Results   Component Value Date    POTASSIUM 3.5 11/17/2018    POTASSIUM 4.0 11/16/2018       Elevated troponin  Demand ischemia (H)  Essential hypertension  Denies chest pain. Since admission -139. Takes ASA, Toprol XL, lisinopril. Renal function as noted above.     Chronic constipation  Reports regular stools with scheduled Colace and PRN miralax.     Encephalopathy  Alert,  oriented to self and month/year. Pleasant.     Last 3 BPs:       HR Ranges:   Pulse Readings from Last 4 Encounters:   11/19/18 84   11/19/18 82   10/11/18 78   08/20/18 76     Admission Weight: 136.6 lbs    Current Weight:   Wt Readings from Last 5 Encounters:   11/19/18 136 lb 9.6 oz (62 kg)   11/19/18 137 lb 9.1 oz (62.4 kg)   10/11/18 126 lb (57.2 kg)   08/24/18 130 lb (59 kg)   08/20/18 130 lb 4.8 oz (59.1 kg)              CODE STATUS/ADVANCE DIRECTIVES DISCUSSION:   CPR/Full code   Patient's living condition: lives alone    ALLERGIES:Demerol [meperidine]; Dust mites; Peanuts [nuts]; Penicillins; and Pollen extract  PAST MEDICAL HISTORY:  has a past medical history of Hypertension.  PAST SURGICAL HISTORY:  has a past surgical history that includes Esophagoscopy, gastroscopy, duodenoscopy (EGD), combined (N/A, 12/6/2017).  FAMILY HISTORY: family history is not on file.  SOCIAL HISTORY:  reports that she has never smoked. She has never used smokeless tobacco. She reports that she drinks alcohol. She reports that she does not use illicit drugs.    Post Discharge Medication Reconciliation Status: discharge medications reconciled, continue medications without change.  Current Outpatient Prescriptions   Medication Sig Dispense Refill     acetaminophen (TYLENOL) 500 MG tablet Take 1,000 mg by mouth every 8 hours as needed for mild pain       aspirin 81 MG EC tablet Take 1 tablet (81 mg) by mouth daily       cephALEXin (KEFLEX) 500 MG capsule Take 1 capsule (500 mg) by mouth 3 times daily for 10 days 28 capsule 0     diclofenac (VOLTAREN) 1 % GEL topical gel Place 2 g onto the skin 4 times daily as needed for moderate pain       docusate sodium (COLACE) 100 MG capsule Take 2 capsules (200 mg) by mouth 2 times daily 100 capsule 3     lisinopril (PRINIVIL/ZESTRIL) 20 MG tablet TAKE ONE TABLET BY MOUTH TWICE DAILY. HOLD IF SYSTOLIC BLOOD PRESSURE IS LESS THAN 110 MMHG. CALL IF HELD TWICE IN A ROW OR IF SYMPTOMATIC. 180  "tablet 0     metoprolol succinate (TOPROL-XL) 25 MG 24 hr tablet Take 3 tablets (75 mg) by mouth daily 30 tablet      multivitamin, therapeutic with minerals (THERA-VIT-M) TABS tablet Take 1 tablet by mouth daily       polyethylene glycol (MIRALAX/GLYCOLAX) Packet Take 17 g by mouth 2 times daily as needed for constipation Use if no bowel movement for 2 days 7 packet 1       ROS:  10 point ROS of systems including Constitutional, Eyes, Respiratory, Cardiovascular, Gastroenterology, Genitourinary, Integumentary, Musculoskeletal, Psychiatric were all negative except for pertinent positives noted in my HPI.    Exam:  /72  Pulse 84  Temp 97.2  F (36.2  C)  Resp 18  Ht 5' 2\" (1.575 m)  Wt 136 lb 9.6 oz (62 kg)  SpO2 95%  BMI 24.98 kg/m2  GENERAL APPEARANCE:  Alert, in no distress, pleasant, cooperative, oriented x self, month/year  EYES:  EOM, lids, pupils and irises normal, sclera clear and conjunctiva normal, no discharge or mattering on lids or lashes noted  ENT:  Mouth normal, moist mucous membranes, nose normal without drainage or crusting, external ears without lesions, hearing acuity impaired wears hearing aids. Poor dentition and missing teeth  NECK: supple, symmetrical  RESP:  respiratory effort and palpation of chest normal, no chest wall tenderness, no respiratory distress, Lung sounds scattered crackles both bases, patient is on room air no cough  CV:  Palpation and auscultation of heart done, rate and rhythm controlled and regular, no murmur, no rub or gallop. Edema trace bilateral lower extremities. VASCULAR: no open areas lower legs  ABDOMEN:  normal bowel sounds, soft, nontender.  M/S:   Gait and station wheelchair bound and unsafe without assistance, no tenderness or swelling of the joints; able to move all extremities reports arthritic pain right knee  SKIN:  Inspection and palpation of skin and subcutaneous tissue: skin tears/bruising right arm and leg otherwise warm, dry and intact " without rashes  NEURO: cranial nerves 2-12 grossly intact, no facial asymmetry, no speech deficits and able to follow directions, moves all extremities symmetrically  PSYCH:  insight and judgement at baseline, memory forgetful, affect and mood normal     Lab/Diagnostic data:  CBC RESULTS:   Recent Labs   Lab Test  11/19/18   0755  11/18/18   0830   WBC  10.0  14.5*   RBC  2.82*  3.16*   HGB  9.1*  10.2*   HCT  28.1*  31.1*   MCV  100  98   MCH  32.3  32.3   MCHC  32.4  32.8   RDW  13.5  13.6   PLT  183  211       Last Basic Metabolic Panel:  Recent Labs   Lab Test  11/17/18   0925  11/16/18   1318   NA  139  142   POTASSIUM  3.5  4.0   CHLORIDE  106  108   RENETTA  8.2*  9.3   CO2  24  26   BUN  15  23   CR  0.79  1.13*   GLC  103*  80       Liver Function Studies -   Recent Labs   Lab Test  11/17/18   0925  12/05/17   1948   PROTTOTAL  6.5*  6.3*   ALBUMIN  3.0*  3.4   BILITOTAL  0.7  0.3   ALKPHOS  62  49   AST  42  9   ALT  19  13       TSH   Date Value Ref Range Status   11/17/2018 2.95 0.40 - 4.00 mU/L Final       Lab Results   Component Value Date    A1C Canceled, Test credited 12/06/2017       ASSESSMENT/PLAN:  Urinary tract infection without hematuria, site unspecified  Physical deconditioning  Fall, subsequent encounter  Acute onset with fall, injury, infection. Completing keflex - f/u with BC final results. Monitor vs. Therapies - f/u with progress next week. Monitor for safety.     History of rhabdomyolysis  Acute kidney injury (H)  CRF (chronic renal failure), stage 3 (moderate) (H)  Improving, chronic renal impairment - appears at baseline. Encourage fluids. Avoid nephrotoxic meds. BMP next week.     Elevated troponin  Demand ischemia (H)  Essential hypertension  No symptoms, chronic HTN. VS stable since admission. Meds and VS checks as ordered. BMP next week.     Chronic constipation  Chronic, well managed with current meds. No changes. Staff to update provider if not effective.      Encephalopathy  Resolved. Monitor for safety.     Orders:  1. BMP, CBC on 11/26 diagnosis UTI    Total time spent with patient visit at the skilled nursing facility was 35 min including patient visit and review of past records. Greater than 50% of total time spent with counseling and coordinating care due to review of history, current status and issues and POC to address them    Electronically signed by:  WILLIAM Gutierrez CNP                    Sincerely,        WILLIAM Gutierrez CNP

## 2018-11-20 NOTE — PROGRESS NOTES
[place] [facility]  PRIMARY CARE PROVIDER AND CLINIC:  Guilherme Graves [address] [place] [address] / [facility] [address]  Chief Complaint   Patient presents with     [facility] Care     [place] Medical Record Number:  [id number]  Place of Service where encounter took place:  [facility] ([facility] [[id number]]    HPI:    Tracey Moran is a 89 year old  (6/16/1925),admitted to the above facility from  [facility].  Hospital stay 11/16/18 through 11/19/18.  Admitted to this facility for  rehab, medical management and nursing care.  HPI information obtained from: facility chart records, facility staff, patient report and [facility] chart review.      Hospital Course  Tracey Moran was admitted on 11/16/2018.  The following problems were addressed during her hospitalization:  Urinary tract infection  Presented with mild incontinence of urine, increased frequency of urination. UA moderate blood, nitrite positive, large leukocyte esterase, moderate bacteria, 149 WBC; leucocytosis WBCs 15.1 on admission  - started on Ceftriaxone on admission  - UC positive for >100k col E coli.  pansensitive  - WBCs down from 18.5 to 14.5 today  - also with bacteriemia for Strep lugdunensis so ABX changed to Ancef as per ID  -discharge with keflex 500 mg po TID x 10 days  -Leukocytosis resolved  Positive blood culture for Staph lugdunensis  - 1/2 bottles from 11/16  - ? contaminant vs true bacteriemia  - ID consulted  - switched to Ancef   - repeat BC 11/18; follow up final cultures  - if more BC return back positive for Staph lugdunensis may need further workup  s/p fall-likely from UTI/physical deconditioning.  Physical deconditioning likely from senile fragility  - Patient/son feels that she fell around 2100 last night and was not able to get up, laid on the floor all night.  Patient thinks she was wearing socks she slipped and fell on the rug.  She is unable to provide any history regarding  the fall but able to recollect that she called 911.  Unclear if patient lost any consciousness [history limited as patient has dementia]  - CT head no acute pathology. CT C-spine no acute pathology. Chest x-ray left basilar atelectasis.  - Telemetry  - echo EF 55-60%, mod pulm HTN, mild AO regurg; TSH 2.95  - treat UTI as above  - iv hydration  - Wound care evaluation for skin tears.  - PT/OT- rec TCU  - SW  Mild rhabdomyolysis  - from prolonged immobilization  - CK on admission 379--up to 657--184 today  Elevated troponin- likely demand ischemia in the setting of fall/mild rhabdomyolysis/acute kidney injury.  - Denies any chest pain or palpitations or shortness of breath.  - EKG showed sinus tachycardia, left ventricular hypertrophy, ST-T wave changes V5 to V6 [no significant change when compared to previous EKG]  -   - serial troponins 0.0687--1.615--1.593--1.007- trending down  Patient admitted in February 2018 with similar complaints of fall in the setting of urinary tract infection and had rhabdomyolysis and elevated troponin [1.570].   Echocardiogram 2/2018 showed normal left ventricular size, estimated EF at 60-65%.  Grade 1 early diastolic dysfunction.  Mild aortic regurgitation/mild pulmonary hypertension/ascending aorta mildly dilated.She was managed medically and discharged home.  - admitting provider discussed with the patient son on admission and he declined heparin  - started on ASA  - PTA Toprol was increased from 50 mg po daily to 75 mg po daily  - echo as above  - fasting lipid profile- LDL 55, HDL 62, total choles 134  CASSIE, multifactorial, resolved  Most recent creatinine 0.61 in 2/2018.  Likely prerenal etiology from dehydration and ATN from rhabdomyolysis.  Creatinine 1.13 on admission, with associated elevated BUN and appeared volume depleted on exam.   - started on iv fluids- cr improved to 0.70  - resumed PTA Lisinopril   Acute Encephalopathy, likely multifactorial   - CT head  negative  - Concern for volume depletion, UTI, cardiac event as detailed above, all potential contributors   - Treat as above and reassess  - Re-orient as needed  Chronic normocytic anemia  History of GI bleed 12/2017, which was suspected to be diverticular in nature.   EGD unremarkable at that time, patient declined colonoscopy.   Hemoglobin 10.8 g/dl on admission with last value of 8.6 g/dl in 5/2018.   - Hb 11.4--10.2- stable  Hypertension   [PTA on Toprol XL 50 mg po daily and Lisinopril 20 mg po BID]  - BP on higher side; HR also in 90's  - increased PTA Toprol to 75 mg po daily on 11/17  - resume PTA Lisinopril at 20 mg BID with holding parameters  - adjust meds as needed.  Constipation  Chronic issue.   Scheduled docusate   PRN bowel regimen ordered    Current issues are:      Urinary tract infection without hematuria, site unspecified  Physical deconditioning  Fall, subsequent encounter  Patient denies bladder symptoms and is afebrile. At TCU for rehab. Reports lives in [place], she is typically up with walker. She is completing Keflex course and second set of BC still pending, negative/no growth to date.   Lab Results   Component Value Date    WBC 10.0 11/19/2018    WBC 14.5 11/18/2018     History of rhabdomyolysis  Acute kidney injury (H)  CRF (chronic renal failure), stage 3 (moderate) (H)  Most recently renal function stabilized. Encouraging intake.   Lab Results   Component Value Date    CR 0.79 11/17/2018    CR 1.13 11/16/2018     Lab Results   Component Value Date    POTASSIUM 3.5 11/17/2018    POTASSIUM 4.0 11/16/2018       Elevated troponin  Demand ischemia (H)  Essential hypertension  Denies chest pain. Since admission -139. Takes ASA, Toprol XL, lisinopril. Renal function as noted above.     Chronic constipation  Reports regular stools with scheduled Colace and PRN miralax.     Encephalopathy  Alert, oriented to self and month/year. Pleasant.     Last 3 BPs:       HR Ranges:   Pulse Readings  from Last 4 Encounters:   11/19/18 84   11/19/18 82   10/11/18 78   08/20/18 76     Admission Weight: 136.6 lbs    Current Weight:   Wt Readings from Last 5 Encounters:   11/19/18 136 lb 9.6 oz (62 kg)   11/19/18 137 lb 9.1 oz (62.4 kg)   10/11/18 126 lb (57.2 kg)   08/24/18 130 lb (59 kg)   08/20/18 130 lb 4.8 oz (59.1 kg)              CODE STATUS/ADVANCE DIRECTIVES DISCUSSION:   CPR/Full code   Patient's living condition: lives alone    ALLERGIES:Demerol [meperidine]; Dust mites; Peanuts [nuts]; Penicillins; and Pollen extract  PAST MEDICAL HISTORY:  has a past medical history of Hypertension.  PAST SURGICAL HISTORY:  has a past surgical history that includes Esophagoscopy, gastroscopy, duodenoscopy (EGD), combined (N/A, 12/6/2017).  FAMILY HISTORY: family history is not on file.  SOCIAL HISTORY:  reports that she has never smoked. She has never used smokeless tobacco. She reports that she drinks alcohol. She reports that she does not use illicit drugs.    Post Discharge Medication Reconciliation Status: discharge medications reconciled, continue medications without change.  Current Outpatient Prescriptions   Medication Sig Dispense Refill     acetaminophen (TYLENOL) 500 MG tablet Take 1,000 mg by mouth every 8 hours as needed for mild pain       aspirin 81 MG EC tablet Take 1 tablet (81 mg) by mouth daily       cephALEXin (KEFLEX) 500 MG capsule Take 1 capsule (500 mg) by mouth 3 times daily for 10 days 28 capsule 0     diclofenac (VOLTAREN) 1 % GEL topical gel Place 2 g onto the skin 4 times daily as needed for moderate pain       docusate sodium (COLACE) 100 MG capsule Take 2 capsules (200 mg) by mouth 2 times daily 100 capsule 3     lisinopril (PRINIVIL/ZESTRIL) 20 MG tablet TAKE ONE TABLET BY MOUTH TWICE DAILY. HOLD IF SYSTOLIC BLOOD PRESSURE IS LESS THAN 110 MMHG. CALL IF HELD TWICE IN A ROW OR IF SYMPTOMATIC. 180 tablet 0     metoprolol succinate (TOPROL-XL) 25 MG 24 hr tablet Take 3 tablets (75 mg) by  "mouth daily 30 tablet      multivitamin, therapeutic with minerals (THERA-VIT-M) TABS tablet Take 1 tablet by mouth daily       polyethylene glycol (MIRALAX/GLYCOLAX) Packet Take 17 g by mouth 2 times daily as needed for constipation Use if no bowel movement for 2 days 7 packet 1       ROS:  10 point ROS of systems including Constitutional, Eyes, Respiratory, Cardiovascular, Gastroenterology, Genitourinary, Integumentary, Musculoskeletal, Psychiatric were all negative except for pertinent positives noted in my HPI.    Exam:  /72  Pulse 84  Temp 97.2  F (36.2  C)  Resp 18  Ht 5' 2\" (1.575 m)  Wt 136 lb 9.6 oz (62 kg)  SpO2 95%  BMI 24.98 kg/m2  GENERAL APPEARANCE:  Alert, in no distress, pleasant, cooperative, oriented x self, month/year  EYES:  EOM, lids, pupils and irises normal, sclera clear and conjunctiva normal, no discharge or mattering on lids or lashes noted  ENT:  Mouth normal, moist mucous membranes, nose normal without drainage or crusting, external ears without lesions, hearing acuity impaired wears hearing aids. Poor dentition and missing teeth  NECK: supple, symmetrical  RESP:  respiratory effort and palpation of chest normal, no chest wall tenderness, no respiratory distress, Lung sounds scattered crackles both bases, patient is on room air no cough  CV:  Palpation and auscultation of heart done, rate and rhythm controlled and regular, no murmur, no rub or gallop. Edema trace bilateral lower extremities. VASCULAR: no open areas lower legs  ABDOMEN:  normal bowel sounds, soft, nontender.  M/S:   Gait and station wheelchair bound and unsafe without assistance, no tenderness or swelling of the joints; able to move all extremities reports arthritic pain right knee  SKIN:  Inspection and palpation of skin and subcutaneous tissue: skin tears/bruising right arm and leg otherwise warm, dry and intact without rashes  NEURO: cranial nerves 2-12 grossly intact, no facial asymmetry, no speech " deficits and able to follow directions, moves all extremities symmetrically  PSYCH:  insight and judgement at baseline, memory forgetful, affect and mood normal     Lab/Diagnostic data:  CBC RESULTS:   Recent Labs   Lab Test  11/19/18   0755  11/18/18   0830   WBC  10.0  14.5*   RBC  2.82*  3.16*   HGB  9.1*  10.2*   HCT  28.1*  31.1*   MCV  100  98   MCH  32.3  32.3   MCHC  32.4  32.8   RDW  13.5  13.6   PLT  183  211       Last Basic Metabolic Panel:  Recent Labs   Lab Test  11/17/18   0925  11/16/18   1318   NA  139  142   POTASSIUM  3.5  4.0   CHLORIDE  106  108   RENETTA  8.2*  9.3   CO2  24  26   BUN  15  23   CR  0.79  1.13*   GLC  103*  80       Liver Function Studies -   Recent Labs   Lab Test  11/17/18   0925  12/05/17   1948   PROTTOTAL  6.5*  6.3*   ALBUMIN  3.0*  3.4   BILITOTAL  0.7  0.3   ALKPHOS  62  49   AST  42  9   ALT  19  13       TSH   Date Value Ref Range Status   11/17/2018 2.95 0.40 - 4.00 mU/L Final       Lab Results   Component Value Date    A1C Canceled, Test credited 12/06/2017       ASSESSMENT/PLAN:  Urinary tract infection without hematuria, site unspecified  Physical deconditioning  Fall, subsequent encounter  Acute onset with fall, injury, infection. Completing keflex - f/u with BC final results. Monitor vs. Therapies - f/u with progress next week. Monitor for safety.     History of rhabdomyolysis  Acute kidney injury (H)  CRF (chronic renal failure), stage 3 (moderate) (H)  Improving, chronic renal impairment - appears at baseline. Encourage fluids. Avoid nephrotoxic meds. BMP next week.     Elevated troponin  Demand ischemia (H)  Essential hypertension  No symptoms, chronic HTN. VS stable since admission. Meds and VS checks as ordered. BMP next week.     Chronic constipation  Chronic, well managed with current meds. No changes. Staff to update provider if not effective.     Encephalopathy  Resolved. Monitor for safety.     Orders:  1. BMP, CBC on 11/26 diagnosis UTI    Total time spent  with patient visit at the skilled nursing facility was 35 min including patient visit and review of past records. Greater than 50% of total time spent with counseling and coordinating care due to review of history, current status and issues and POC to address them    Electronically signed by:  WILLIAM Gutierrez CNP

## 2018-11-20 NOTE — MR AVS SNAPSHOT
"              After Visit Summary   11/20/2018    Tracey Moran    MRN: [id number]           Patient Information     Date Of Birth          6/16/1925        Visit Information        Provider Department      11/20/2018 9:00 AM Mariana Salazar APRN CNP Geriatrics Transitional [facility]        Today's Diagnoses     Urinary tract infection without hematuria, site unspecified    -  1    Physical deconditioning        Fall, subsequent encounter        History of rhabdomyolysis        Elevated troponin        Demand ischemia (H)        Acute kidney injury (H)        Essential hypertension        Chronic constipation        Encephalopathy        CRF (chronic renal failure), stage 3 (moderate) (H)           Follow-ups after your visit        Who to contact     If you have questions or need follow up information about today's clinic visit or your schedule please contact GERIATRICS TRANSITIONAL CARE directly at [phone].  Normal or non-critical lab and imaging results will be communicated to you by ZoomCarehart, letter or phone within 4 business days after the clinic has received the results. If you do not hear from us within 7 days, please contact the clinic through ZoomCarehart or phone. If you have a critical or abnormal lab result, we will notify you by phone as soon as possible.  Submit refill requests through CloudMade or call your pharmacy and they will forward the refill request to us. Please allow 3 business days for your refill to be completed.          Additional Information About Your Visit        ZoomCareharTalkBin Information     CloudMade lets you send messages to your doctor, view your test results, renew your prescriptions, schedule appointments and more. To sign up, go to [email or web address] . Click on \"Log in\" on the left side of the screen, which will take you to the Welcome page. Then click on \"Sign up Now\" on the right side of the page.     You will be asked to enter the access code listed below, as well as some personal " "information. Please follow the directions to create your username and password.     Your access code is: [email or web address]  Expires: 2019  3:22 PM     Your access code will  in 90 days. If you need help or a new code, please call your [facility] clinic or [phone].        Care EveryWhere ID     This is your Care EveryWhere ID. This could be used by other organizations to access your [facility] medical records  [id number]        Your Vitals Were     Pulse Temperature Respirations Height Pulse Oximetry BMI (Body Mass Index)    84 97.2  F (36.2  C) 18 5' 2\" (1.575 m) 95% 24.98 kg/m2       Blood Pressure from Last 3 Encounters:   18 115/72   18 97/42   10/11/18 130/50    Weight from Last 3 Encounters:   18 136 lb 9.6 oz (62 kg)   18 137 lb 9.1 oz (62.4 kg)   10/11/18 126 lb (57.2 kg)              Today, you had the following     No orders found for display       Primary Care Provider Office Phone # Fax #    Guilherme Graves [phone]       [address] [place] [phone]        Equal Access to Services     RUSSEL GREER AH: Hadii sohan ku hadasho Soomaali, waaxda luqadaha, qaybta kaalmada adeegyada, waxay viet hayclifford gomez . So [facility] [phone].    ATENCIÓN: Si habla español, tiene a harrison disposición servicios gratuitos de asistencia lingüística. [facility] [phone].    We comply with applicable federal civil rights laws and [place] laws. We do not discriminate on the basis of race, color, national origin, age, disability, sex, sexual orientation, or gender identity.            Thank you!     Thank you for choosing G[facility]AT[facility]  for your care. Our goal is always to provide you with excellent care. Hearing back from our patients is one way we can continue to improve our services. Please take a few minutes to complete the written survey that you may receive in the mail after your visit with us. Thank you!             Your Updated " Medication List - Protect others around you: Learn how to safely use, store and throw away your medicines at www.disposemymeds.org.          This list is accurate as of 11/20/18 10:25 AM.  Always use your most recent med list.                   Brand Name Dispense Instructions for use Diagnosis    acetaminophen 500 MG tablet    TYLENOL     Take 1,000 mg by mouth every 8 hours as needed for mild pain        aspirin 81 MG EC tablet      Take 1 tablet (81 mg) by mouth daily    NSTEMI (non-ST elevated myocardial infarction) (H)       cephALEXin 500 MG capsule    KEFLEX    28 capsule    Take 1 capsule (500 mg) by mouth 3 times daily for 10 days    Urinary tract infection in female       diclofenac 1 % Gel topical gel    VOLTAREN     Place 2 g onto the skin 4 times daily as needed for moderate pain    Arthritis of knee       docusate sodium 100 MG capsule    COLACE    100 capsule    Take 2 capsules (200 mg) by mouth 2 times daily    Slow transit constipation       lisinopril 20 MG tablet    PRINIVIL/ZESTRIL    180 tablet    TAKE ONE TABLET BY MOUTH TWICE DAILY. HOLD IF SYSTOLIC BLOOD PRESSURE IS LESS THAN 110 MMHG. CALL IF HELD TWICE IN A ROW OR IF SYMPTOMATIC.    Essential hypertension       metoprolol succinate 25 MG 24 hr tablet    TOPROL-XL    30 tablet    Take 3 tablets (75 mg) by mouth daily    NSTEMI (non-ST elevated myocardial infarction) (H)       multivitamin, therapeutic with minerals Tabs tablet      Take 1 tablet by mouth daily        polyethylene glycol Packet    MIRALAX/GLYCOLAX    7 packet    Take 17 g by mouth 2 times daily as needed for constipation Use if no bowel movement for 2 days    Slow transit constipation

## 2018-11-22 VITALS
RESPIRATION RATE: 18 BRPM | OXYGEN SATURATION: 93 % | BODY MASS INDEX: 24.87 KG/M2 | TEMPERATURE: 96.8 F | HEART RATE: 75 BPM | WEIGHT: 136 LBS | SYSTOLIC BLOOD PRESSURE: 171 MMHG | DIASTOLIC BLOOD PRESSURE: 88 MMHG

## 2018-11-22 LAB
BACTERIA SPEC CULT: NO GROWTH
Lab: NORMAL
SPECIMEN SOURCE: NORMAL

## 2018-11-23 ENCOUNTER — NURSING HOME VISIT (OUTPATIENT)
Dept: GERIATRICS | Facility: CLINIC | Age: 83
End: 2018-11-23
Payer: COMMERCIAL

## 2018-11-23 DIAGNOSIS — D62 ANEMIA DUE TO BLOOD LOSS, ACUTE: ICD-10-CM

## 2018-11-23 DIAGNOSIS — N17.9 ACUTE RENAL FAILURE, UNSPECIFIED ACUTE RENAL FAILURE TYPE (H): ICD-10-CM

## 2018-11-23 DIAGNOSIS — B96.20 E. COLI UTI (URINARY TRACT INFECTION): Primary | ICD-10-CM

## 2018-11-23 DIAGNOSIS — R53.81 PHYSICAL DECONDITIONING: ICD-10-CM

## 2018-11-23 DIAGNOSIS — M62.81 GENERALIZED MUSCLE WEAKNESS: ICD-10-CM

## 2018-11-23 DIAGNOSIS — N18.2 CKD (CHRONIC KIDNEY DISEASE) STAGE 2, GFR 60-89 ML/MIN: ICD-10-CM

## 2018-11-23 DIAGNOSIS — I10 ESSENTIAL HYPERTENSION, BENIGN: ICD-10-CM

## 2018-11-23 DIAGNOSIS — N39.0 E. COLI UTI (URINARY TRACT INFECTION): Primary | ICD-10-CM

## 2018-11-23 PROBLEM — E03.9 HYPOTHYROIDISM, UNSPECIFIED TYPE: Status: ACTIVE | Noted: 2018-11-23

## 2018-11-23 PROCEDURE — 99309 SBSQ NF CARE MODERATE MDM 30: CPT | Performed by: NURSE PRACTITIONER

## 2018-11-23 RX ORDER — METOPROLOL SUCCINATE 50 MG/1
100 TABLET, EXTENDED RELEASE ORAL DAILY
COMMUNITY
Start: 2018-11-24 | End: 2019-02-21

## 2018-11-23 NOTE — PROGRESS NOTES
[place] [facility]    Chief Complaint   Patient presents with     custodial Acute       [place] Medical Record Number:  [id number]  Place of Service where encounter took place:  [facility] [[id number]]    HPI:    Tracey Moran is a 89 year old  (6/16/1925), who is being seen today for an episodic care visit.  HPI information obtained from: facility chart records, facility staff, patient report and [facility] chart review.Today's concern is: pt feeling stronger.   2nd set BC's came out at (-) for final result     Essential hypertension, benign  E. coli UTI (urinary tract infection)   CKD (chronic kidney disease) stage 2, GFR 60-89 ml/min  Acute renal failure, unspecified acute renal failure type (H)  Generalized muscle weakness  Physical deconditioning  Anemia due to blood loss, acute     HPI/ROS:  No CP, SOB, Cough, dizziness, fevers, chills, HA, N/V, dysuria or Bowel Abnormalities except stool occas looser. Appetite is good.  No pains    ALLERGIES: Demerol [meperidine]; Dust mites; Peanuts [nuts]; Penicillins; and Pollen extract  Past Medical, Surgical, Family and Social History reviewed and updated in [facility].    Current Outpatient Prescriptions   Medication Sig Dispense Refill     acetaminophen (TYLENOL) 500 MG tablet Take 1,000 mg by mouth every 8 hours as needed for mild pain       aspirin 81 MG EC tablet Take 1 tablet (81 mg) by mouth daily       cephALEXin (KEFLEX) 500 MG capsule Take 1 capsule (500 mg) by mouth 3 times daily for 10 days (Patient taking differently: Take 500 mg by mouth 3 times daily 11/19 thru 11/29/18 then dc) 28 capsule 0     diclofenac (VOLTAREN) 1 % GEL topical gel Place 2 g onto the skin 4 times daily as needed for moderate pain       docusate sodium (COLACE) 100 MG capsule Take 2 capsules (200 mg) by mouth 2 times daily 100 capsule 3     lisinopril (PRINIVIL/ZESTRIL) 20 MG tablet TAKE ONE TABLET BY MOUTH TWICE DAILY. HOLD IF SYSTOLIC  BLOOD PRESSURE IS LESS THAN 110 MMHG. CALL IF HELD TWICE IN A ROW OR IF SYMPTOMATIC. 180 tablet 0     [START ON 11/24/2018] metoprolol succinate (TOPROL-XL) 50 MG 24 hr tablet Take 50 mg by mouth every 12 hours Hold if SBP < 110 or HR <55 and call if held twice in a row or symptoms.    FYI: *dose increased on 11/23/18 from 75 mg daily**       multivitamin, therapeutic with minerals (THERA-VIT-M) TABS tablet Take 1 tablet by mouth daily       polyethylene glycol (MIRALAX/GLYCOLAX) Packet Take 17 g by mouth 2 times daily as needed for constipation Use if no bowel movement for 2 days 7 packet 1     Medications reviewed:  Medications reconciled to facility chart and changes were made to reflect current medications as identified as above med list. Below are the changes that were made:   Medications stopped since last EPIC medication reconciliation:   There are no discontinued medications.    Medications started since last [facility] medication reconciliation:  No orders of the defined types were placed in this encounter.         REVIEW OF SYSTEMS:  See above under HPI    Physical Exam:  /88  Pulse 75  Temp 96.8  F (36  C)  Resp 18  Wt 136 lb (61.7 kg)  SpO2 93%  BMI 24.87 kg/m2   GENERAL APPEARANCE:  Alert, in no distress, oriented, cooperative  ENT:  Mouth and posterior oropharynx normal, moist mucous membranes, [place]  EYES:  EOM, conjunctivae, lids, pupils and irises normal  NECK:  No adenopathy,masses or thyromegaly  RESP:  respiratory effort and palpation of chest normal, lungs clear to auscultation except a few crackles in bases, no respiratory distress  CV:  Palpation and auscultation of heart done , regular rate and rhythm, no murmur, rub, or gallop. Trace pedal edema.  ABDOMEN:  normal bowel sounds, soft, nontender, no hepatosplenomegaly or other masses  M/S:   RUIZ equally, up with assist  SKIN:  Inspection of skin and subcutaneous tissue baseline   NEURO:   Cranial nerves 2-12 are normal tested and grossly  at patient's baseline  PSYCH:  normal insight, judgement and memory, affect and mood normal       Recent Labs:      CBC RESULTS:   Recent Labs   Lab Test  11/19/18   0755  11/18/18   0830   WBC  10.0  14.5*   RBC  2.82*  3.16*   HGB  9.1*  10.2*   HCT  28.1*  31.1*   MCV  100  98   MCH  32.3  32.3   MCHC  32.4  32.8   RDW  13.5  13.6   PLT  183  211       Last Basic Metabolic Panel:  Recent Labs   Lab Test  11/17/18   0925  11/16/18   1318   NA  139  142   POTASSIUM  3.5  4.0   CHLORIDE  106  108   RENETTA  8.2*  9.3   CO2  24  26   BUN  15  23   CR  0.79  1.13*   GLC  103*  80       Liver Function Studies -   Recent Labs   Lab Test  11/17/18   0925  12/05/17   1948   PROTTOTAL  6.5*  6.3*   ALBUMIN  3.0*  3.4   BILITOTAL  0.7  0.3   ALKPHOS  62  49   AST  42  9   ALT  19  13       TSH   Date Value Ref Range Status   11/17/2018 2.95 0.40 - 4.00 mU/L Final        ASSESSMENT / PLAN:  (N39.0,  B96.20) E. coli UTI (urinary tract infection)  (primary encounter diagnosis)  Comment/Plan: Abx thru 11/29--no symptoms currently monitor.    (I10) Essential hypertension, benign  Comment/Plan: trending higher, will increased the BB from 75 mg daily to 50 mg  Bid and give additional 25 mg tonight.  Cont with ACE_I also. Monitor.    (N18.2) CKD (chronic kidney disease) stage 2, GFR 60-89 ml/min   (N17.9) Acute renal failure, unspecified acute renal failure type (H)  Comment/Plan: corrected and  at baseline, check prn if condition warrants    (M62.81) Generalized muscle weakness   (R53.81) Physical deconditioning  Comment/Plan: PT OT    (D62) Anemia due to blood loss, acute  Comment/Plan: steady Hgb, check prn as condition warrants, has hx GIB        Electronically signed by  WILLIAM Ornelas CNP

## 2018-11-24 LAB
BACTERIA SPEC CULT: NO GROWTH
BACTERIA SPEC CULT: NO GROWTH
Lab: NORMAL
Lab: NORMAL
SPECIMEN SOURCE: NORMAL
SPECIMEN SOURCE: NORMAL

## 2018-11-26 ENCOUNTER — TRANSFERRED RECORDS (OUTPATIENT)
Dept: HEALTH INFORMATION MANAGEMENT | Facility: CLINIC | Age: 83
End: 2018-11-26

## 2018-11-26 ENCOUNTER — RECORDS - HEALTHEAST (OUTPATIENT)
Dept: LAB | Facility: CLINIC | Age: 83
End: 2018-11-26

## 2018-11-26 ENCOUNTER — NURSING HOME VISIT (OUTPATIENT)
Dept: GERIATRICS | Facility: CLINIC | Age: 83
End: 2018-11-26
Payer: COMMERCIAL

## 2018-11-26 VITALS
WEIGHT: 137.1 LBS | BODY MASS INDEX: 25.23 KG/M2 | DIASTOLIC BLOOD PRESSURE: 63 MMHG | HEIGHT: 62 IN | OXYGEN SATURATION: 96 % | HEART RATE: 78 BPM | SYSTOLIC BLOOD PRESSURE: 153 MMHG | RESPIRATION RATE: 18 BRPM | TEMPERATURE: 97.8 F

## 2018-11-26 DIAGNOSIS — B96.20 E. COLI UTI (URINARY TRACT INFECTION): Primary | ICD-10-CM

## 2018-11-26 DIAGNOSIS — I10 ESSENTIAL HYPERTENSION, BENIGN: ICD-10-CM

## 2018-11-26 DIAGNOSIS — B37.2 YEAST DERMATITIS: ICD-10-CM

## 2018-11-26 DIAGNOSIS — M62.81 GENERALIZED MUSCLE WEAKNESS: ICD-10-CM

## 2018-11-26 DIAGNOSIS — D62 ANEMIA DUE TO BLOOD LOSS, ACUTE: ICD-10-CM

## 2018-11-26 DIAGNOSIS — N39.0 E. COLI UTI (URINARY TRACT INFECTION): Primary | ICD-10-CM

## 2018-11-26 DIAGNOSIS — R53.81 PHYSICAL DECONDITIONING: ICD-10-CM

## 2018-11-26 DIAGNOSIS — N18.2 CKD (CHRONIC KIDNEY DISEASE) STAGE 2, GFR 60-89 ML/MIN: ICD-10-CM

## 2018-11-26 LAB
ANION GAP SERPL CALCULATED.3IONS-SCNC: 7 MMOL/L (ref 5–18)
ANION GAP SERPL CALCULATED.3IONS-SCNC: 7 MMOL/L (ref 5–18)
BASOPHILS # BLD AUTO: 0.1 THOU/UL (ref 0–0.2)
BASOPHILS NFR BLD AUTO: 1 % (ref 0–2)
BUN SERPL-MCNC: 9 MG/DL (ref 8–28)
BUN SERPL-MCNC: 9 MG/DL (ref 8–28)
CALCIUM SERPL-MCNC: 8.9 MG/DL (ref 8.5–10.5)
CALCIUM SERPL-MCNC: 8.9 MG/DL (ref 8.5–10.5)
CHLORIDE BLD-SCNC: 106 MMOL/L (ref 98–107)
CHLORIDE SERPLBLD-SCNC: 106 MMOL/L (ref 98–107)
CO2 SERPL-SCNC: 28 MMOL/L (ref 22–31)
CO2 SERPL-SCNC: 28 MMOL/L (ref 22–31)
CREAT SERPL-MCNC: 0.65 MG/DL (ref 0.6–1.1)
CREAT SERPL-MCNC: 0.65 MG/DL (ref 0.6–1.1)
DIFFERENTIAL: ABNORMAL
EOSINOPHIL # BLD AUTO: 0.7 THOU/UL (ref 0–0.4)
EOSINOPHIL NFR BLD AUTO: 8 % (ref 0–6)
ERYTHROCYTE [DISTWIDTH] IN BLOOD BY AUTOMATED COUNT: 13.7 % (ref 11–14.5)
ERYTHROCYTE [DISTWIDTH] IN BLOOD BY AUTOMATED COUNT: 13.7 % (ref 11–14.5)
GFR SERPL CREATININE-BSD FRML MDRD: >60 ML/MIN/1.73M2
GFR SERPL CREATININE-BSD FRML MDRD: >60 ML/MIN/1.73M2
GLUCOSE BLD-MCNC: 76 MG/DL (ref 70–125)
GLUCOSE SERPL-MCNC: 76 MG/DL (ref 70–125)
HCT VFR BLD AUTO: 29.8 % (ref 35–47)
HCT VFR BLD AUTO: 29.8 % (ref 35–47)
HEMOGLOBIN: 9.6 G/DL (ref 12–16)
HGB BLD-MCNC: 9.6 G/DL (ref 12–16)
LYMPHOCYTES # BLD AUTO: 1.3 THOU/UL (ref 0.8–4.4)
LYMPHOCYTES NFR BLD AUTO: 15 % (ref 20–40)
MCH RBC QN AUTO: 32.5 PG (ref 27–34)
MCH RBC QN AUTO: 32.5 PG (ref 27–34)
MCHC RBC AUTO-ENTMCNC: 32.2 G/DL (ref 32–36)
MCHC RBC AUTO-ENTMCNC: 32.2 G/DL (ref 32–36)
MCV RBC AUTO: 101 FL (ref 80–100)
MCV RBC AUTO: 101 FL (ref 80–100)
MONOCYTES # BLD AUTO: 0.6 THOU/UL (ref 0–0.9)
MONOCYTES NFR BLD AUTO: 7 % (ref 2–10)
NEUTROPHILS # BLD AUTO: 6.3 THOU/UL (ref 2–7.7)
NEUTROPHILS NFR BLD AUTO: 71 % (ref 50–70)
PLATELET # BLD AUTO: 297 THOU/UL (ref 140–440)
PLATELET # BLD AUTO: 297 THOU/UL (ref 140–440)
PMV BLD AUTO: 10.2 FL (ref 8.5–12.5)
POTASSIUM BLD-SCNC: 3.6 MMOL/L (ref 3.5–5)
POTASSIUM SERPL-SCNC: 3.6 MMOL/L (ref 3.5–5)
RBC # BLD AUTO: 2.95 MILL/UL (ref 3.8–5.4)
RBC # BLD AUTO: 2.95 MILL/UL (ref 3.8–5.4)
SODIUM SERPL-SCNC: 141 MMOL/L (ref 136–145)
SODIUM SERPL-SCNC: 141 MMOL/L (ref 136–145)
WBC # BLD AUTO: 8.9 THOU/UL (ref 4–11)
WBC: 8.9 THOU/UL (ref 4–11)

## 2018-11-26 PROCEDURE — 99309 SBSQ NF CARE MODERATE MDM 30: CPT | Performed by: NURSE PRACTITIONER

## 2018-11-26 RX ORDER — NYSTATIN 100000 [USP'U]/G
POWDER TOPICAL 2 TIMES DAILY
COMMUNITY
End: 2018-11-26

## 2018-11-26 RX ORDER — NYSTATIN 100000 [USP'U]/G
POWDER TOPICAL 2 TIMES DAILY PRN
COMMUNITY
Start: 2018-11-26 | End: 2018-12-10

## 2018-11-26 NOTE — PROGRESS NOTES
[facility]    Chief Complaint   Patient presents with     Results       [place] Medical Record Number:  [id number]  Place of Service where encounter took place:  [facility] [[id number]]    HPI:    Tracey Moran is a 89 year old  (6/16/1925), who is being seen today for an episodic care visit.  HPI information obtained from: facility chart records, facility staff, patient report and [facility] chart review.Today's concern is:        E. coli UTI (urinary tract infection)  Yeast dermatitis--groin hardik frontal perineal/vaginal soreness  Essential hypertension, benign: 150-160's SBP mostly  CKD (chronic kidney disease) stage 2, GFR 60-89 ml/min  Generalized muscle weakness  Physical deconditioning--feeling stronger but per staff/PT still very weak and no LCD  Anemia due to blood loss, acute    HPI/ROS:  No CP, SOB, Cough, dizziness, fevers, chills, HA, N/V, dysuria or Bowel Abnormalities Appetite is good.  No pains    ALLERGIES: Demerol [meperidine]; Dust mites; Peanuts [nuts]; Penicillins; and Pollen extract  Past Medical, Surgical, Family and Social History reviewed and updated in [facility].    Current Outpatient Prescriptions   Medication Sig Dispense Refill     acetaminophen (TYLENOL) 500 MG tablet Take 1,000 mg by mouth every 8 hours as needed for mild pain       aspirin 81 MG EC tablet Take 1 tablet (81 mg) by mouth daily       cephALEXin (KEFLEX) 500 MG capsule Take 1 capsule (500 mg) by mouth 3 times daily for 10 days (Patient taking differently: Take 500 mg by mouth 3 times daily 11/19 thru 11/29/18 then dc) 28 capsule 0     diclofenac (VOLTAREN) 1 % GEL topical gel Place 2 g onto the skin 4 times daily as needed for moderate pain       docusate sodium (COLACE) 100 MG capsule Take 2 capsules (200 mg) by mouth 2 times daily 100 capsule 3     lisinopril (PRINIVIL/ZESTRIL) 20 MG tablet TAKE ONE TABLET BY MOUTH TWICE DAILY. HOLD IF SYSTOLIC BLOOD PRESSURE IS LESS  "THAN 110 MMHG. CALL IF HELD TWICE IN A ROW OR IF SYMPTOMATIC. 180 tablet 0     metoprolol succinate (TOPROL-XL) 50 MG 24 hr tablet Take 50 mg by mouth every 12 hours Hold if SBP < 110 or HR <55 and call if held twice in a row or symptoms.    FYI: *dose increased on 11/23/18 from 75 mg daily**       multivitamin, therapeutic with minerals (THERA-VIT-M) TABS tablet Take 1 tablet by mouth daily       nystatin (MYCOSTATIN) 895707 UNIT/GM POWD Apply topically 2 times daily as needed Apply to bilateral groin rash prn       polyethylene glycol (MIRALAX/GLYCOLAX) Packet Take 17 g by mouth 2 times daily as needed for constipation Use if no bowel movement for 2 days 7 packet 1     Medications reviewed:  Medications reconciled to facility chart and changes were made to reflect current medications as identified as above med list. Below are the changes that were made:   Medications stopped since last EPIC medication reconciliation:   There are no discontinued medications.    Medications started since last [facility] medication reconciliation:  No orders of the defined types were placed in this encounter.         REVIEW OF SYSTEMS:  See above under HPI    Physical Exam:  /63  Pulse 78  Temp 97.8  F (36.6  C)  Resp 18  Ht 5' 2\" (1.575 m)  Wt 137 lb 1.6 oz (62.2 kg)  SpO2 96%  BMI 25.08 kg/m2   GENERAL APPEARANCE:  Alert, in no distress, oriented, cooperative, forgetful  ENT:  Mouth and posterior oropharynx normal, moist mucous membranes, [place]  RESP:  respiratory effort  of chest normal, lungs clear to auscultation, no respiratory distress  CV:  Auscultation of heart done ,RRR, no murmur, rub, or gallop. Trace pedal edema bilat.  ABDOMEN:  normal bowel sounds, soft, nontender, no hepatosplenomegaly or other masses  M/S:   RUIZ equally, up with assist  SKIN:  Inspection of skin and subcutaneous tissue baseline  G/U: perineal, outer labial red rash C/w yeast--no open areas  NEURO:   Cranial nerves 2-12 are normal tested and " grossly at patient's baseline  PSYCH:  normal insight, judgement and memory, affect and mood normal     Last 3 BPs: 11/26: 153/63, 11/25: 157/87, 11/24: 163/70 mmHg      Recent Labs:   Hemoglobin   Date Value Ref Range Status   11/26/2018 9.6 (L) 12.0 - 16.0 g/dL Final   11/19/2018 9.1 (L) 11.7 - 15.7 g/dL Final       CBC RESULTS:   Recent Labs   Lab Test  11/19/18   0755  11/18/18   0830   WBC  10.0  14.5*   RBC  2.82*  3.16*   HGB  9.1*  10.2*   HCT  28.1*  31.1*   MCV  100  98   MCH  32.3  32.3   MCHC  32.4  32.8   RDW  13.5  13.6   PLT  183  211     Last Comprehensive Metabolic Panel:  Sodium   Date Value Ref Range Status   11/26/2018 141 136 - 145 mmol/L Final     Potassium   Date Value Ref Range Status   11/26/2018 3.6 3.5 - 5.0 mmol/L Final     Chloride   Date Value Ref Range Status   11/26/2018 106 98 - 107 mmol/L Final     Carbon Dioxide   Date Value Ref Range Status   11/26/2018 28 22 - 31 mmol/L Final     Anion Gap   Date Value Ref Range Status   11/26/2018 7 5 - 18 mmol/L Final     Glucose   Date Value Ref Range Status   11/26/2018 76 70 - 125 mg/dL Final     Urea Nitrogen   Date Value Ref Range Status   11/26/2018 9 8 - 28 mg/dL Final     Creatinine   Date Value Ref Range Status   11/26/2018 0.65 0.60 - 1.10 mg/dL Final     GFR Estimate   Date Value Ref Range Status   11/26/2018 >60 >60 ml/min/1.73m2 Final     Calcium   Date Value Ref Range Status   11/26/2018 8.9 8.5 - 10.5 mg/dL Final         Last Basic Metabolic Panel:  Recent Labs   Lab Test  11/17/18   0925  11/16/18   1318   NA  139  142   POTASSIUM  3.5  4.0   CHLORIDE  106  108   RENETTA  8.2*  9.3   CO2  24  26   BUN  15  23   CR  0.79  1.13*   GLC  103*  80           ASSESSMENT / PLAN:  (N39.0,  B96.20) E. coli UTI (urinary tract infection)  (primary encounter diagnosis)  Comment/Plan: Abx thru 11/29--no symptoms currently monitor.    (B37.2) Yeast dermatitis  Comment/Plan: good skin care, add nystatin, monitor.    (I10) Essential hypertension,  benign  Comment/Plan: a wee but better, have increased the BB from 75 mg daily to 50 mg  Bid last week.  Cont with ACE_I also. Monitor.    (N18.2) CKD (chronic kidney disease) stage 2, GFR 60-89 ml/min  Comment/Plan: corrected and  at baseline, check prn if condition warrants    (M62.81) Generalized muscle weakness   (R53.81) Physical deconditioning  Comment/Plan: PT OT--no LCD but stronger    (D62) Anemia due to blood loss, acute  Comment/Plan: steady Hgb==9.6 today, will, check prn as condition warrants, has hx GIB        Electronically signed by  WILLIAM Ornelas CNP

## 2018-11-29 ENCOUNTER — NURSING HOME VISIT (OUTPATIENT)
Dept: GERIATRICS | Facility: CLINIC | Age: 83
End: 2018-11-29
Payer: COMMERCIAL

## 2018-11-29 VITALS
RESPIRATION RATE: 20 BRPM | DIASTOLIC BLOOD PRESSURE: 67 MMHG | WEIGHT: 126.2 LBS | TEMPERATURE: 97.6 F | HEIGHT: 62 IN | BODY MASS INDEX: 23.22 KG/M2 | HEART RATE: 81 BPM | SYSTOLIC BLOOD PRESSURE: 140 MMHG | OXYGEN SATURATION: 95 %

## 2018-11-29 DIAGNOSIS — F03.90 SENILE DEMENTIA WITHOUT BEHAVIORAL DISTURBANCE (H): ICD-10-CM

## 2018-11-29 DIAGNOSIS — N39.0 E. COLI UTI: ICD-10-CM

## 2018-11-29 DIAGNOSIS — B96.20 E. COLI UTI: ICD-10-CM

## 2018-11-29 DIAGNOSIS — I10 ESSENTIAL HYPERTENSION: ICD-10-CM

## 2018-11-29 DIAGNOSIS — R53.81 PHYSICAL DECONDITIONING: Primary | ICD-10-CM

## 2018-11-29 PROCEDURE — 99305 1ST NF CARE MODERATE MDM 35: CPT | Performed by: INTERNAL MEDICINE

## 2018-11-29 RX ORDER — POLYETHYLENE GLYCOL 3350 17 G/17G
17 POWDER, FOR SOLUTION ORAL DAILY PRN
COMMUNITY
End: 2021-01-18

## 2018-11-29 NOTE — LETTER
11/29/2018        RE: Tracey Moran  [address]  [facility] [address]        PRIMARY CARE PROVIDER AND CLINIC RESPONSIBLE:  Guilherme Graves, [address]  / [facility] [address]        ADMISSION HISTORY AND PHYSICAL EXAMINATION     Chief Complaint   Patient presents with     Fatigue     Fall         HISTORY OF PRESENT ILLNESS:  89 year old female, (6/16/1925), admitted to the [facility] TCU for continuation of medical care and rehab.  HPI information obtained from: facility chart records, facility staff, patient report and [facility] chart review.    Tracey Moran is a 89 year old female with history of hypertension, dementia and GI bleed who was admitted at [facility] from 11/16 to 11/19/18 due to fall at home. She found to have E coli UTI and bacteriemia for Strep lugdunensis. She was seen ID and treated IV Ancef and then Keflex. She also rhabdomyolysis and acute renal failure, treated with IV fluid and improved. She has confusion related to encephalopathy. CT head no acute pathology. CT C-spine no acute pathology. Chest x-ray left basilar atelectasis. She elevated troponin. Echo EF 55-60%, mod pulm HTN, mild AO regurgitation. She has skin tears at knees. She had constipation, stool laxatives was started but has diarrhea, changed laxative prn today. Her BP is elevated. Slept poor, appetite good. she is poor historian. She denies fever, cp, abdominal pain. Patient is seen and examined by ESAU Salazar NP. Please see ESAU Salazar's admit noted dated 11/20/18 for details of admission, past medical history, family history, allergies, medication list, social history and other details pertinent with this admission. Hospital admission and dc summary reviewed.      Past Medical History:   Diagnosis Date     Hypertension        Past Surgical History:   Procedure Laterality Date     ESOPHAGOSCOPY, GASTROSCOPY, DUODENOSCOPY (EGD), COMBINED N/A 12/6/2017    Procedure: COMBINED  ESOPHAGOSCOPY, GASTROSCOPY, DUODENOSCOPY (EGD);  gastroscopy;  Surgeon: Melchor Mancera MD;  Location:  GI       Current Outpatient Prescriptions   Medication Sig     acetaminophen (TYLENOL) 500 MG tablet Take 1,000 mg by mouth every 8 hours as needed for mild pain     aspirin 81 MG EC tablet Take 1 tablet (81 mg) by mouth daily     cephALEXin (KEFLEX) 500 MG capsule Take 1 capsule (500 mg) by mouth 3 times daily for 10 days (Patient taking differently: Take 500 mg by mouth 3 times daily 11/19 thru 11/29/18 then dc)     diclofenac (VOLTAREN) 1 % GEL topical gel Place 2 g onto the skin 4 times daily as needed for moderate pain     docusate sodium (COLACE) 100 MG capsule Take 2 capsules (200 mg) by mouth 2 times daily     lisinopril (PRINIVIL/ZESTRIL) 20 MG tablet TAKE ONE TABLET BY MOUTH TWICE DAILY. HOLD IF SYSTOLIC BLOOD PRESSURE IS LESS THAN 110 MMHG. CALL IF HELD TWICE IN A ROW OR IF SYMPTOMATIC.     metoprolol succinate (TOPROL-XL) 50 MG 24 hr tablet Take 50 mg by mouth every 12 hours Hold if SBP < 110 or HR <55 and call if held twice in a row or symptoms.    FYI: *dose increased on 11/23/18 from 75 mg daily**     multivitamin, therapeutic with minerals (THERA-VIT-M) TABS tablet Take 1 tablet by mouth daily     nystatin (MYCOSTATIN) 207929 UNIT/GM POWD Apply topically 2 times daily as needed Apply to bilateral groin rash prn     polyethylene glycol (MIRALAX/GLYCOLAX) Packet Take 17 g by mouth 2 times daily as needed for constipation Use if no bowel movement for 2 days     No current facility-administered medications for this visit.        Allergies   Allergen Reactions     Demerol [Meperidine]      Dust Mites      Peanuts [Nuts]      Penicillins      Pollen Extract        Social History     Social History     Marital status:      Spouse name: N/A     Number of children: N/A     Years of education: N/A     Occupational History     Not on file.     Social History Main Topics     Smoking status: Never  "Smoker     Smokeless tobacco: Never Used     Alcohol use Yes     Drug use: No     Sexual activity: Not Currently     Other Topics Concern     Not on file     Social History Narrative          Information reviewed:  Medications, vital signs, orders, nursing notes, problem list, hospital information.     ROS: All 10 point review of system completed, those pertinent positive, please see H&P, the remaining ROS is negative.    /67  Pulse 81  Temp 97.6  F (36.4  C)  Resp 20  Ht 5' 2\" (1.575 m)  Wt 126 lb 3.2 oz (57.2 kg)  SpO2 95%  BMI 23.08 kg/m2    PHYSICAL EXAMINATION:   GENERAL:  No acute distress.  SKIN:  Dry and warm.  There is no rash at area of skin examined.  HEENT:  Head without trauma.  Pupils round, reactive. Exam of conjunctiva and lids are normal. Sclera without icterus. There is no oral thrush. Hard hearing.  NECK:  Supple. No jugular venous distension.  CHEST: No reproducible chest tenderness.   LUNGS:  Normal respiratory effort. Lungs reveal decreased breath sounds at bases. No rales or wheezes.  HEART:  Regular rate and rhythm.  No murmur, gallops or rubs auscultated.  ABDOMEN:  Soft, bowel sounds positive.  There is no tenderness or guarding.   EXTREMITIES: No edema. Bilateral knee cap wounds left>right.   NEUROLOGIC:  Alert and oriented x3.  Moving all ext. Gait not tested.  PSYCH: Recent and remote memory is impaired. Mood and affect are normal.    Lab/Diagnostic data:  Reviewed    CBC Lab Results (Last 5 results in 365 days)       WBC RBC Hgb Hct MCV MCH MCHC RDW Plt Neut # Lymph # Eos # Baso # Immat. Gran #   11/26/18 0000 8.9 2.95 9.6 29.8 101 32.5 32.2 13.7 297 -- -- -- -- --   11/19/18 0755 10.0 2.82 9.1 28.1 100 32.3 32.4 13.5 183 -- -- -- -- --   11/18/18 0830 14.5 3.16 10.2 31.1 98 32.3 32.8 13.6 211 -- -- -- -- --   11/17/18 0925 18.5 3.53 11.4 34.8 99 32.3 32.8 13.9 270 -- -- -- -- --   11/16/18 1318 15.1 3.36 10.8 33.1 99 32.1 32.6 13.8 241 13.1 0.9 0.0 0.0 0.0   [facility] (Last " 5 results in 365 days)       Na+ K+ Cl CO2 ANION GAP Glc BUN Creat GFR GFR-Black Calcium Mg ALT AST A1C TSH LDL HIV   11/26/18 0000 141 3.6 106 28 7 76 9 0.65 >60 >60 8.9 -- -- -- -- -- -- --   11/17/18 0925 -- -- -- -- -- -- -- -- -- -- -- -- -- -- -- -- 55 --   11/17/18 0925 -- -- -- -- -- -- -- -- -- -- -- 1.6 -- -- -- -- -- --   11/17/18 0925 139 3.5 106 24 9 103 15 0.79 67 82 8.2 -- 19 42 -- -- -- --   11/16/18 1318 142 4.0 108 26 8 80 23 1.13 45 54 9.3 -- -- -- -- -- -- --     Results for orders placed or performed during the hospital encounter of 11/16/18   CT Head w/o Contrast    Narrative    CT SCAN OF THE HEAD WITHOUT CONTRAST   11/16/2018 2:06 PM     HISTORY:  Fall.     TECHNIQUE:  Axial images of the head and coronal reformations without  IV contrast material. Radiation dose for this scan was reduced using  automated exposure control, adjustment of the mA and/or kV according  to patient size, or iterative reconstruction technique.    COMPARISON: 2/8/2018.    FINDINGS: There is no evidence of intracranial hemorrhage, mass, acute  infarct or anomaly. There is generalized atrophy of the brain. There  is low attenuation in the white matter of the cerebral hemispheres  consistent with sequelae of small vessel ischemic disease. Ventricular  size is within normal limits without evidence of hydrocephalus.     Mild mucosal thickening in the paranasal sinuses. The bony calvarium  and bones of the skull base appear intact.       Impression    IMPRESSION:     1. No evidence of acute intracranial hemorrhage, mass, or herniation.  2. There is generalized atrophy of the brain. White matter changes are  present in the cerebral hemispheres that are consistent with small  vessel ischemic disease in this age patient.     PETER JACKSON MD   CT Cervical Spine w/o Contrast    Narrative    CT CERVICAL SPINE WITHOUT CONTRAST   11/16/2018 2:06 PM     HISTORY: Patient fell. Neck trauma.     TECHNIQUE: Axial images of the cervical  spine were obtained without  intravenous contrast. Multiplanar reformations were performed.   Radiation dose for this scan was reduced using automated exposure  control, adjustment of the mA and/or kV according to patient size, or  iterative reconstruction technique.    COMPARISON: None.    FINDINGS: There is no evidence of fracture. There is multilevel  degenerative disc disease and degenerative facet arthropathy,  especially from C2-C3 through C6-C7. There is acquired fusion of the  left C2-C3 facet joint. Degenerative changes are seen at the medial  atlantoaxial joint with soft tissue posterior to the joint with a few  calcifications and with sclerotic rimmed erosions at the base of the  odontoid suggesting a crystalline arthropathy such as gout or CPPD.  There is no neural impingement.    Right mastoid air cells are partially opacified. This is unchanged  since 2/8/2018.      Impression    IMPRESSION:  1. No evidence of acute trauma.  2. Degenerative changes.  3. Chronic opacification of right mastoid air cells. This is  unchanged.    RAFAELA HERNANDEZ MD   Chest XR,  PA & LAT    Narrative    XR CHEST 2 VW 11/16/2018 2:31 PM    COMPARISON: 2/8/2018    HISTORY: Fall.      Impression    IMPRESSION: Enlarged cardiac silhouette is again seen. LEFT basilar  atelectasis is noted, lungs otherwise clear. No pleural effusion or  pneumothorax seen on either side.    GILMA HORN MD         ASSESSMENT / PLAN:     Physical deconditioning  Plan: PT/OT with increase independence, self-care, strength and endurance and other cares that help return to home/facility of origin, fall precautions. Care conference with patient and family for the progress of rehab and disposition issues will be discussed as planned. Rehab evaluation and other evaluations including CPT are at rehab logs, to be reviewed separately.  Fall risk assessment as well as cognitive evaluation so far performed:  CHRISTIANO:  5/30  Tinetti: 4 /28 --> 14/28  Bed  mobility: sba  Transfers: sba  Ambulating distance: 400  Feet  Fww    Essential hypertension  Plan: BP is elevated, increase Toprol  mg daily, add Hydralazine 10 mg tid and continue Lisinopril.    E. coli UTI  Plan: Continue Keflex    Senile dementia without behavioral disturbance  Plan: Fall and delirium prevention.    Other problems with same care. Primary care doctor and other specialists to address those chronic problems in next clinic appointment to be scheduled upon discharge from the TCU.      Total time spent with patient visit was 35 min including patient visit, review of past records, patients counseling and coordinating care.      Electronically signed by:  Kike Burns MD            Sincerely,        Kike Burns MD

## 2018-11-29 NOTE — PROGRESS NOTES
PRIMARY CARE PROVIDER AND CLINIC RESPONSIBLE:  Guilherme Graves, [address] [place] [id number]        ADMISSION HISTORY AND PHYSICAL EXAMINATION     Chief Complaint   Patient presents with     Fatigue     Fall         HISTORY OF PRESENT ILLNESS:  89 year old female, (6/16/1925), admitted to the [facility] TCU for continuation of medical care and rehab.  HPI information obtained from: facility chart records, facility staff, patient report and [facility] chart review.    Tracey Moran is a 89 year old female with history of hypertension, dementia and GI bleed who was admitted at [facility] from 11/16 to 11/19/18 due to fall at home. She found to have E coli UTI and bacteriemia for Strep lugdunensis. She was seen ID and treated IV Ancef and then Keflex. She also rhabdomyolysis and acute renal failure, treated with IV fluid and improved. She has confusion related to encephalopathy. CT head no acute pathology. CT C-spine no acute pathology. Chest x-ray left basilar atelectasis. She elevated troponin. Echo EF 55-60%, mod pulm HTN, mild AO regurgitation. She has skin tears at knees. She had constipation, stool laxatives was started but has diarrhea, changed laxative prn today. Her BP is elevated. Slept poor, appetite good. she is poor historian. She denies fever, cp, abdominal pain. Patient is seen and examined by ESAU Salazar NP. Please see ESAU Salazar's admit noted dated 11/20/18 for details of admission, past medical history, family history, allergies, medication list, social history and other details pertinent with this admission. Hospital admission and dc summary reviewed.      Past Medical History:   Diagnosis Date     Hypertension        Past Surgical History:   Procedure Laterality Date     ESOPHAGOSCOPY, GASTROSCOPY, DUODENOSCOPY (EGD), COMBINED N/A 12/6/2017    Procedure: COMBINED ESOPHAGOSCOPY, GASTROSCOPY, DUODENOSCOPY (EGD);  gastroscopy;  Surgeon: Melchor Mancera MD;  Location:  [facility]       Current Outpatient Prescriptions   Medication Sig     acetaminophen (TYLENOL) 500 MG tablet Take 1,000 mg by mouth every 8 hours as needed for mild pain     aspirin 81 MG EC tablet Take 1 tablet (81 mg) by mouth daily     cephALEXin (KEFLEX) 500 MG capsule Take 1 capsule (500 mg) by mouth 3 times daily for 10 days (Patient taking differently: Take 500 mg by mouth 3 times daily 11/19 thru 11/29/18 then dc)     diclofenac (VOLTAREN) 1 % GEL topical gel Place 2 g onto the skin 4 times daily as needed for moderate pain     docusate sodium (COLACE) 100 MG capsule Take 2 capsules (200 mg) by mouth 2 times daily     lisinopril (PRINIVIL/ZESTRIL) 20 MG tablet TAKE ONE TABLET BY MOUTH TWICE DAILY. HOLD IF SYSTOLIC BLOOD PRESSURE IS LESS THAN 110 MMHG. CALL IF HELD TWICE IN A ROW OR IF SYMPTOMATIC.     metoprolol succinate (TOPROL-XL) 50 MG 24 hr tablet Take 50 mg by mouth every 12 hours Hold if SBP < 110 or HR <55 and call if held twice in a row or symptoms.    FYI: *dose increased on 11/23/18 from 75 mg daily**     multivitamin, therapeutic with minerals (THERA-VIT-M) TABS tablet Take 1 tablet by mouth daily     nystatin (MYCOSTATIN) 843318 UNIT/GM POWD Apply topically 2 times daily as needed Apply to bilateral groin rash prn     polyethylene glycol (MIRALAX/GLYCOLAX) Packet Take 17 g by mouth 2 times daily as needed for constipation Use if no bowel movement for 2 days     No current facility-administered medications for this visit.        Allergies   Allergen Reactions     Demerol [Meperidine]      Dust Mites      Peanuts [Nuts]      Penicillins      Pollen Extract        Social History     Social History     Marital status:      Spouse name: N/A     Number of children: N/A     Years of education: N/A     Occupational History     Not on file.     Social History Main Topics     Smoking status: Never Smoker     Smokeless tobacco: Never Used     Alcohol use Yes     Drug use: No     Sexual activity: Not  "Currently     Other Topics Concern     Not on file     Social History Narrative          Information reviewed:  Medications, vital signs, orders, nursing notes, problem list, hospital information.     ROS: All 10 point review of system completed, those pertinent positive, please see H&P, the remaining ROS is negative.    /67  Pulse 81  Temp 97.6  F (36.4  C)  Resp 20  Ht 5' 2\" (1.575 m)  Wt 126 lb 3.2 oz (57.2 kg)  SpO2 95%  BMI 23.08 kg/m2    PHYSICAL EXAMINATION:   GENERAL:  No acute distress.  SKIN:  Dry and warm.  There is no rash at area of skin examined.  HEENT:  Head without trauma.  Pupils round, reactive. Exam of conjunctiva and lids are normal. Sclera without icterus. There is no oral thrush. Hard hearing.  NECK:  Supple. No jugular venous distension.  CHEST: No reproducible chest tenderness.   LUNGS:  Normal respiratory effort. Lungs reveal decreased breath sounds at bases. No rales or wheezes.  HEART:  Regular rate and rhythm.  No murmur, gallops or rubs auscultated.  ABDOMEN:  Soft, bowel sounds positive.  There is no tenderness or guarding.   EXTREMITIES: No edema. Bilateral knee cap wounds left>right.   NEUROLOGIC:  Alert and oriented x3.  Moving all ext. Gait not tested.  PSYCH: Recent and remote memory is impaired. Mood and affect are normal.    Lab/Diagnostic data:  Reviewed    CBC Lab Results (Last 5 results in 365 days)       WBC RBC Hgb Hct MCV MCH MCHC RDW Plt Neut # Lymph # Eos # Baso # Immat. Gran #   11/26/18 0000 8.9 2.95 9.6 29.8 101 32.5 32.2 13.7 297 -- -- -- -- --   11/19/18 0755 10.0 2.82 9.1 28.1 100 32.3 32.4 13.5 183 -- -- -- -- --   11/18/18 0830 14.5 3.16 10.2 31.1 98 32.3 32.8 13.6 211 -- -- -- -- --   11/17/18 0925 18.5 3.53 11.4 34.8 99 32.3 32.8 13.9 270 -- -- -- -- --   11/16/18 1318 15.1 3.36 10.8 33.1 99 32.1 32.6 13.8 241 13.1 0.9 0.0 0.0 0.0   CMP Labs (Last 5 results in 365 days)       Na+ K+ Cl CO2 ANION GAP Glc BUN Creat GFR GFR-Black Calcium Mg ALT AST " A1C TSH LDL HIV   11/26/18 0000 141 3.6 106 28 7 76 9 0.65 >60 >60 8.9 -- -- -- -- -- -- --   11/17/18 0925 -- -- -- -- -- -- -- -- -- -- -- -- -- -- -- -- 55 --   11/17/18 0925 -- -- -- -- -- -- -- -- -- -- -- 1.6 -- -- -- -- -- --   11/17/18 0925 139 3.5 106 24 9 103 15 0.79 67 82 8.2 -- 19 42 -- -- -- --   11/16/18 1318 142 4.0 108 26 8 80 23 1.13 45 54 9.3 -- -- -- -- -- -- --     Results for orders placed or performed during the hospital encounter of 11/16/18   CT Head w/o Contrast    Narrative    CT SCAN OF THE HEAD WITHOUT CONTRAST   11/16/2018 2:06 PM     HISTORY:  Fall.     TECHNIQUE:  Axial images of the head and coronal reformations without  IV contrast material. Radiation dose for this scan was reduced using  automated exposure control, adjustment of the mA and/or kV according  to patient size, or iterative reconstruction technique.    COMPARISON: 2/8/2018.    FINDINGS: There is no evidence of intracranial hemorrhage, mass, acute  infarct or anomaly. There is generalized atrophy of the brain. There  is low attenuation in the white matter of the cerebral hemispheres  consistent with sequelae of small vessel ischemic disease. Ventricular  size is within normal limits without evidence of hydrocephalus.     Mild mucosal thickening in the paranasal sinuses. The bony calvarium  and bones of the skull base appear intact.       Impression    IMPRESSION:     1. No evidence of acute intracranial hemorrhage, mass, or herniation.  2. There is generalized atrophy of the brain. White matter changes are  present in the cerebral hemispheres that are consistent with small  vessel ischemic disease in this age patient.     PETER JACKSON MD   CT Cervical Spine w/o Contrast    Narrative    CT CERVICAL SPINE WITHOUT CONTRAST   11/16/2018 2:06 PM     HISTORY: Patient fell. Neck trauma.     TECHNIQUE: Axial images of the cervical spine were obtained without  intravenous contrast. Multiplanar reformations were performed.    Radiation dose for this scan was reduced using automated exposure  control, adjustment of the mA and/or kV according to patient size, or  iterative reconstruction technique.    COMPARISON: None.    FINDINGS: There is no evidence of fracture. There is multilevel  degenerative disc disease and degenerative facet arthropathy,  especially from C2-C3 through C6-C7. There is acquired fusion of the  left C2-C3 facet joint. Degenerative changes are seen at the medial  atlantoaxial joint with soft tissue posterior to the joint with a few  calcifications and with sclerotic rimmed erosions at the base of the  odontoid suggesting a crystalline arthropathy such as gout or CPPD.  There is no neural impingement.    Right mastoid air cells are partially opacified. This is unchanged  since 2/8/2018.      Impression    IMPRESSION:  1. No evidence of acute trauma.  2. Degenerative changes.  3. Chronic opacification of right mastoid air cells. This is  unchanged.    RAFAELA HERNANDEZ MD   Chest XR,  PA & LAT    Narrative    XR CHEST 2 VW 11/16/2018 2:31 PM    COMPARISON: 2/8/2018    HISTORY: Fall.      Impression    IMPRESSION: Enlarged cardiac silhouette is again seen. LEFT basilar  atelectasis is noted, lungs otherwise clear. No pleural effusion or  pneumothorax seen on either side.    GILMA HORN MD         ASSESSMENT / PLAN:     Physical deconditioning  Plan: PT/OT with increase independence, self-care, strength and endurance and other cares that help return to home/facility of origin, fall precautions. Care conference with patient and family for the progress of rehab and disposition issues will be discussed as planned. Rehab evaluation and other evaluations including CPT are at rehab logs, to be reviewed separately.  Fall risk assessment as well as cognitive evaluation so far performed:  SLUMS:  5/30  Tinetti: 4 /28 --> 14/28  Bed mobility: sba  Transfers: sba  Ambulating distance: 400  Feet  Fww    Essential hypertension  Plan:  BP is elevated, increase Toprol  mg daily, add Hydralazine 10 mg tid and continue Lisinopril.    E. coli UTI  Plan: Continue Keflex    Senile dementia without behavioral disturbance  Plan: Fall and delirium prevention.    Other problems with same care. Primary care doctor and other specialists to address those chronic problems in next clinic appointment to be scheduled upon discharge from the TCU.      Total time spent with patient visit was 35 min including patient visit, review of past records, patients counseling and coordinating care.      Electronically signed by:  Kike Burns MD

## 2018-12-04 ENCOUNTER — TELEPHONE (OUTPATIENT)
Dept: INTERNAL MEDICINE | Facility: CLINIC | Age: 83
End: 2018-12-04

## 2018-12-04 VITALS
RESPIRATION RATE: 20 BRPM | DIASTOLIC BLOOD PRESSURE: 63 MMHG | WEIGHT: 123.4 LBS | HEART RATE: 90 BPM | SYSTOLIC BLOOD PRESSURE: 137 MMHG | BODY MASS INDEX: 22.71 KG/M2 | HEIGHT: 62 IN | TEMPERATURE: 97.6 F | OXYGEN SATURATION: 96 %

## 2018-12-04 NOTE — TELEPHONE ENCOUNTER
Changes appear appropriate but patient should be seen to have blood pressure checked to determine if therapy is adequate

## 2018-12-04 NOTE — TELEPHONE ENCOUNTER
Reason for Call:  Other call back    Detailed comments: Pt's son called to check on the pt's medication. Pt was in the hospital for a fall and a UTI.  She is at [facility].  Her BP spiked, so they changed her medication. Metoprolol was changed to 100 mg and hydralazine was added.  Pt's son wants to know if Dr Graves approves of these med changes. Pt's son would like pt to go back to her previous dose.    Phone Number Patient can be reached at: Other phone number: [phone]    Best Time: anytime    Can we leave a detailed message on this number? YES    Call taken on 12/4/2018 at 12:52 PM by LORAINE GOLD

## 2018-12-04 NOTE — TELEPHONE ENCOUNTER
Pt's son Jonny is calling.  Pt is at [facility] and doing well. Her mobility is coming back, and she seems to be getting stronger (after getting out of the hospital for a fall and UTI).  And her BP is now under control (BP was elevated getting out of hospital and transferring to TCU).  Son is wondering if pt needs to be taking the HYDRALAZINE HCL 10mg? Because this is a diuretic, and he is worried that it is just an extra RX that pt doesn't need to be taking. He says her dementia seems to be getting worse, but  He is wondering about long term treatment for pt, if there needs to be any med changes, or any additions?  Informed son that PCP does want to see her in clinic for evaluation and BP recheck. He says he will plan to call back to schedule appt.

## 2018-12-05 ENCOUNTER — NURSING HOME VISIT (OUTPATIENT)
Dept: GERIATRICS | Facility: CLINIC | Age: 83
End: 2018-12-05
Payer: COMMERCIAL

## 2018-12-05 ENCOUNTER — TELEPHONE (OUTPATIENT)
Dept: INTERNAL MEDICINE | Facility: CLINIC | Age: 83
End: 2018-12-05

## 2018-12-05 DIAGNOSIS — I10 BENIGN ESSENTIAL HYPERTENSION: Primary | ICD-10-CM

## 2018-12-05 DIAGNOSIS — R53.81 PHYSICAL DECONDITIONING: ICD-10-CM

## 2018-12-05 DIAGNOSIS — F03.90 DEMENTIA WITHOUT BEHAVIORAL DISTURBANCE, UNSPECIFIED DEMENTIA TYPE: ICD-10-CM

## 2018-12-05 PROCEDURE — 99309 SBSQ NF CARE MODERATE MDM 30: CPT | Performed by: NURSE PRACTITIONER

## 2018-12-05 NOTE — TELEPHONE ENCOUNTER
Son has been notified.  He will talk with the NP at the TCU also.  He will be calling to schedule an appointment in the near future.

## 2018-12-05 NOTE — TELEPHONE ENCOUNTER
Reason for Call:  Same Day Appointment, Requested Provider:  Guilherme Cisneros MD    PCP: Guilherme Cisneros    Reason for visit: BP -  diuretic    Duration of symptoms: in records    Have you been treated for this in the past? Yes    Additional comments: the son would like to the patient to be seen today/tomorrow regarding her BP - diuretic .  Per son, he had indicated that dr cisneros wants to see patient asap, and the son is wondering why a 89 yr old is kept on a  diuretic.  Transitional care follow-up too    Can we leave a detailed message on this number? YES    Phone number patient can be reached at: Cell number on file:    [phone]      Best Time: anytime    Call taken on 12/5/2018 at 10:31 AM by Aster Juarez

## 2018-12-05 NOTE — TELEPHONE ENCOUNTER
Suggest continue with current medication until patient is seen in clinic and can recheck blood pressure and discuss in full discontinuation of medication.

## 2018-12-05 NOTE — LETTER
12/5/2018        RE: Tracey Moran  [address]  [facility] [place] [facility] [address]        [facility]    Chief Complaint   Patient presents with     [facility] Acute       [place] [facility] Record Number:  [id number]  Place of Service where encounter took place:  [facility] [[id number]]    HPI:    Tracey Moran is a 89 year old  (6/16/1925), who is being seen today for an episodic care visit.  HPI information obtained from: facility chart records, facility staff, patient report and [facility] chart review.Today's concern is:    Hypertension. Metoprolol Succinate increased on 11/23/18 to 50 mg BID. On 11/29/18, Metoprolol Succinate changed to 100 mg daily and Hydralazine 10 mg PO TID added. Upon review of blood pressures over the past 5 days, systolic range from 110-197, most < 150. Diastolic range from 58-82. Readings more elevated in the morning.     Dementia. CPT Score 3.9/5.6. SLUMS Score 5/30. Recommend 24 hour supervision. Care conference held 12/3/18 and son is considering discharge options. PTA lived alone in a senior co-op.     Physical Deconditioning. Working with Physical and Occupational Therapy. Working on upper extremity strengthening. Ambulating up to 400 feet with [id number]. Requires cueing for safety. Tinetti Score 16/28. SBA for toileting. Independent with upper body dressing. Independent-to-minimal assistance with lower body dressing.    Last 3 BPs:       HR Ranges: 70-98    Current Weight:       ALLERGIES: Demerol [meperidine]; Dust mites; Peanuts [nuts]; Penicillins; and Pollen extract  Past Medical, Surgical, Family and Social History reviewed and updated in [facility].    Current Outpatient Prescriptions   Medication Sig Dispense Refill     acetaminophen (TYLENOL) 500 MG tablet Take 1,000 mg by mouth every 8 hours as needed for mild pain       aspirin 81 MG EC tablet Take 1 tablet (81 mg) by mouth daily       diclofenac  "(VOLTAREN) 1 % GEL topical gel Place 2 g onto the skin 4 times daily as needed for moderate pain       DOCUSATE SODIUM PO Take 100 mg by mouth daily as needed for constipation       HYDRALAZINE HCL PO Take 10 mg by mouth every 8 hours HOLD if SBP <90       lisinopril (PRINIVIL/ZESTRIL) 20 MG tablet TAKE ONE TABLET BY MOUTH TWICE DAILY. HOLD IF SYSTOLIC BLOOD PRESSURE IS LESS THAN 110 MMHG. CALL IF HELD TWICE IN A ROW OR IF SYMPTOMATIC. 180 tablet 0     metoprolol succinate (TOPROL-XL) 50 MG 24 hr tablet Take 100 mg by mouth daily Hold if SBP < 110 or HR <55 and call if held twice in a row or symptoms.       multivitamin, therapeutic with minerals (THERA-VIT-M) TABS tablet Take 1 tablet by mouth daily       nystatin (MYCOSTATIN) [id number] UNIT/GM POWD Apply topically 2 times daily as needed Apply to bilateral groin rash prn       polyethylene glycol (MIRALAX/GLYCOLAX) packet Take 17 g by mouth as needed        Medications reviewed:  Medications reconciled to facility chart and changes were made to reflect current medications as identified as above med list. Below are the changes that were made:   Medications stopped since last EPIC medication reconciliation:   There are no discontinued medications.    Medications started since last [facility] medication reconciliation:  No orders of the defined types were placed in this encounter.    REVIEW OF SYSTEMS:  Limited secondary to cognitive impairment but today pt reports no pain    Physical Exam:  /63  Pulse 90  Temp 97.6  F (36.4  C)  Resp 20  Ht 5' 2\" (1.575 m)  Wt 123 lb 6.4 oz (56 kg)  SpO2 96%  BMI 22.57 kg/m2  GENERAL APPEARANCE:  Alert, in no distress  ENT:  Mouth and posterior oropharynx normal, moist mucous membranes  EYES:  EOM, conjunctivae, lids, pupils and irises normal  NECK:  Did no palpate  RESP:  respiratory effort and palpation of chest normal, lungs clear to auscultation , no respiratory distress  CV:  Palpation and auscultation of heart done , " regular rate and rhythm, no murmur, rub, or gallop  ABDOMEN:  normal bowel sounds, soft, nontender, no hepatosplenomegaly or other masses  M/S:   Active movement of bilateral upper and lower extremities. No edema.  SKIN:  Inspection of skin and subcutaneous tissue baseline, Palpation of skin and subcutaneous tissue baseline  NEURO:   Cranial nerves 2-12 are normal tested and grossly at patient's baseline  PSYCH:  memory impaired     Recent Labs:   Last Complete Blood Count:  Lab Results   Component Value Date    WBC 8.9 11/26/2018     Lab Results   Component Value Date    RBC 2.95 11/26/2018     Lab Results   Component Value Date    HGB 9.6 11/26/2018     Lab Results   Component Value Date    HCT 29.8 11/26/2018     Lab Results   Component Value Date     11/26/2018     Lab Results   Component Value Date    MCH 32.5 11/26/2018     Lab Results   Component Value Date    MCHC 32.2 11/26/2018     Lab Results   Component Value Date    RDW 13.7 11/26/2018     Lab Results   Component Value Date     11/26/2018     Last Comprehensive Metabolic Panel:  Sodium   Date Value Ref Range Status   11/26/2018 141 136 - 145 mmol/L Final     Potassium   Date Value Ref Range Status   11/26/2018 3.6 3.5 - 5.0 mmol/L Final     Chloride   Date Value Ref Range Status   11/26/2018 106 98 - 107 mmol/L Final     Carbon Dioxide   Date Value Ref Range Status   11/26/2018 28 22 - 31 mmol/L Final     Anion Gap   Date Value Ref Range Status   11/26/2018 7 5 - 18 mmol/L Final     Glucose   Date Value Ref Range Status   11/26/2018 76 70 - 125 mg/dL Final     Urea Nitrogen   Date Value Ref Range Status   11/26/2018 9 8 - 28 mg/dL Final     Creatinine   Date Value Ref Range Status   11/26/2018 0.65 0.60 - 1.10 mg/dL Final     GFR Estimate   Date Value Ref Range Status   11/26/2018 >60 >60 ml/min/1.73m2 Final     Calcium   Date Value Ref Range Status   11/26/2018 8.9 8.5 - 10.5 mg/dL Final     Assessment/Plan:  Hypertension. Metoprolol  Succinate increased on 11/23/18 to 50 mg BID. On 11/29/18, Metoprolol Succinate changed to 100 mg daily and Hydralazine 10 mg PO TID added. Since that time, blood pressure has improved with higher readings in the morning likely prior to medication administration. Per review of EPIC, noted son, Jonny, reached out to PCP regarding blood pressure medication changes. Contacted son via telephone and explained rationale for blood pressure medication changes. Explained Hydralazine was not a diuretic. Recommended continuing medications as ordered with close PCP follow-up outpatient. Son was in agreement with plan. Wants to ensure PCP is notified of any changes. Explained PCP was able to view TCU documentation.     Dementia. CPT Score 3.9/5.6. SLUMS Score 5/30. Recommend 24 hour supervision based on cognitive testing. Per review of documentation, and discussion with son, alternate discharge options are being considered.  to remain involved to ensure appropriate discharge disposition.     Physical Deconditioning. Secondary to co-morbidities and recent hospitalization. Continue Physical and Occupational Therapy. Son reports possible discharge next week.     Electronically signed by  WILLIAM Mora CNP                      Sincerely,        WILLIAM Mora CNP

## 2018-12-05 NOTE — TELEPHONE ENCOUNTER
Please note I did not say that I needed to see the patient as soon as possible.  I did not start the hydralazine as I was not involved with the patient's care while she was in the hospital but the medication was started due to poorly controlled blood pressure.  If the son is that concerned about the medication then can stop the medicine and have her follow-up in the clinic to recheck her blood pressure.  I have suggested that they continue with the medicine until they are seen in the clinic for follow-up of a blood pressure check.  The patient will have to be scheduled when appointments are available for routine post hospital follow-up and if wants to see sooner then could potentially see Katarina

## 2018-12-05 NOTE — PROGRESS NOTES
[facility]    Chief Complaint   Patient presents with     halfway Acute       [place] Medical Record Number:  [id number]  Place of Service where encounter took place:  [facility] ([facility]) [[id number]]    HPI:    Tracey Moran is a 89 year old  (6/16/1925), who is being seen today for an episodic care visit.  HPI information obtained from: facility chart records, facility staff, patient report and [facility] chart review.Today's concern is:    Hypertension. Metoprolol Succinate increased on 11/23/18 to 50 mg BID. On 11/29/18, Metoprolol Succinate changed to 100 mg daily and Hydralazine 10 mg PO TID added. Upon review of blood pressures over the past 5 days, systolic range from 110-197, most < 150. Diastolic range from 58-82. Readings more elevated in the morning.     Dementia. CPT Score 3.9/5.6. SLUMS Score 5/30. Recommend 24 hour supervision. Care conference held 12/3/18 and son is considering discharge options. PTA lived alone in a senior co-op.     Physical Deconditioning. Working with Physical and Occupational Therapy. Working on upper extremity strengthening. Ambulating up to 400 feet with [id number]. Requires cueing for safety. Tinetti Score 16/28. SBA for toileting. Independent with upper body dressing. Independent-to-minimal assistance with lower body dressing.    Last 3 BPs:       HR Ranges: 70-98    Current Weight:       ALLERGIES: Demerol [meperidine]; Dust mites; Peanuts [nuts]; Penicillins; and Pollen extract  Past Medical, Surgical, Family and Social History reviewed and updated in [facility].    Current Outpatient Prescriptions   Medication Sig Dispense Refill     acetaminophen (TYLENOL) 500 MG tablet Take 1,000 mg by mouth every 8 hours as needed for mild pain       aspirin 81 MG EC tablet Take 1 tablet (81 mg) by mouth daily       diclofenac (VOLTAREN) 1 % GEL topical gel Place 2 g onto the skin 4 times daily as needed for moderate pain        "DOCUSATE SODIUM PO Take 100 mg by mouth daily as needed for constipation       HYDRALAZINE HCL PO Take 10 mg by mouth every 8 hours HOLD if SBP <90       lisinopril (PRINIVIL/ZESTRIL) 20 MG tablet TAKE ONE TABLET BY MOUTH TWICE DAILY. HOLD IF SYSTOLIC BLOOD PRESSURE IS LESS THAN 110 MMHG. CALL IF HELD TWICE IN A ROW OR IF SYMPTOMATIC. 180 tablet 0     metoprolol succinate (TOPROL-XL) 50 MG 24 hr tablet Take 100 mg by mouth daily Hold if SBP < 110 or HR <55 and call if held twice in a row or symptoms.       multivitamin, therapeutic with minerals (THERA-VIT-M) TABS tablet Take 1 tablet by mouth daily       nystatin (MYCOSTATIN) 053046 UNIT/GM POWD Apply topically 2 times daily as needed Apply to bilateral groin rash prn       polyethylene glycol (MIRALAX/GLYCOLAX) packet Take 17 g by mouth as needed        Medications reviewed:  Medications reconciled to facility chart and changes were made to reflect current medications as identified as above med list. Below are the changes that were made:   Medications stopped since last EPIC medication reconciliation:   There are no discontinued medications.    Medications started since last [facility] medication reconciliation:  No orders of the defined types were placed in this encounter.    REVIEW OF SYSTEMS:  Limited secondary to cognitive impairment but today pt reports no pain    Physical Exam:  /63  Pulse 90  Temp 97.6  F (36.4  C)  Resp 20  Ht 5' 2\" (1.575 m)  Wt 123 lb 6.4 oz (56 kg)  SpO2 96%  BMI 22.57 kg/m2  GENERAL APPEARANCE:  Alert, in no distress  ENT:  Mouth and posterior oropharynx normal, moist mucous membranes  EYES:  EOM, conjunctivae, lids, pupils and irises normal  NECK:  Did no palpate  RESP:  respiratory effort and palpation of chest normal, lungs clear to auscultation , no respiratory distress  CV:  Palpation and auscultation of heart done , regular rate and rhythm, no murmur, rub, or gallop  ABDOMEN:  normal bowel sounds, soft, nontender, no " hepatosplenomegaly or other masses  M/S:   Active movement of bilateral upper and lower extremities. No edema.  SKIN:  Inspection of skin and subcutaneous tissue baseline, Palpation of skin and subcutaneous tissue baseline  NEURO:   Cranial nerves 2-12 are normal tested and grossly at patient's baseline  PSYCH:  memory impaired     Recent Labs:   Last Complete Blood Count:  Lab Results   Component Value Date    WBC 8.9 11/26/2018     Lab Results   Component Value Date    RBC 2.95 11/26/2018     Lab Results   Component Value Date    HGB 9.6 11/26/2018     Lab Results   Component Value Date    HCT 29.8 11/26/2018     Lab Results   Component Value Date     11/26/2018     Lab Results   Component Value Date    MCH 32.5 11/26/2018     Lab Results   Component Value Date    MCHC 32.2 11/26/2018     Lab Results   Component Value Date    RDW 13.7 11/26/2018     Lab Results   Component Value Date     11/26/2018     Last Comprehensive Metabolic Panel:  Sodium   Date Value Ref Range Status   11/26/2018 141 136 - 145 mmol/L Final     Potassium   Date Value Ref Range Status   11/26/2018 3.6 3.5 - 5.0 mmol/L Final     Chloride   Date Value Ref Range Status   11/26/2018 106 98 - 107 mmol/L Final     Carbon Dioxide   Date Value Ref Range Status   11/26/2018 28 22 - 31 mmol/L Final     Anion Gap   Date Value Ref Range Status   11/26/2018 7 5 - 18 mmol/L Final     Glucose   Date Value Ref Range Status   11/26/2018 76 70 - 125 mg/dL Final     Urea Nitrogen   Date Value Ref Range Status   11/26/2018 9 8 - 28 mg/dL Final     Creatinine   Date Value Ref Range Status   11/26/2018 0.65 0.60 - 1.10 mg/dL Final     GFR Estimate   Date Value Ref Range Status   11/26/2018 >60 >60 ml/min/1.73m2 Final     Calcium   Date Value Ref Range Status   11/26/2018 8.9 8.5 - 10.5 mg/dL Final     Assessment/Plan:  Hypertension. Metoprolol Succinate increased on 11/23/18 to 50 mg BID. On 11/29/18, Metoprolol Succinate changed to 100 mg daily and  Hydralazine 10 mg PO TID added. Since that time, blood pressure has improved with higher readings in the morning likely prior to medication administration. Per review of EPIC, noted son, Jonny, reached out to PCP regarding blood pressure medication changes. Contacted son via telephone and explained rationale for blood pressure medication changes. Explained Hydralazine was not a diuretic. Recommended continuing medications as ordered with close PCP follow-up outpatient. Son was in agreement with plan. Wants to ensure PCP is notified of any changes. Explained PCP was able to view TCU documentation.     Dementia. CPT Score 3.9/5.6. SLUMS Score 5/30. Recommend 24 hour supervision based on cognitive testing. Per review of documentation, and discussion with son, alternate discharge options are being considered.  to remain involved to ensure appropriate discharge disposition.     Physical Deconditioning. Secondary to co-morbidities and recent hospitalization. Continue Physical and Occupational Therapy. Son reports possible discharge next week.     Electronically signed by  WILLIAM Mora CNP

## 2018-12-06 NOTE — TELEPHONE ENCOUNTER
Patients son informed of message below. [facility] and feels her care right now is well managed. He will call to schedule an appointment to follow up from the hospital for either next week or the week after.   Halie Christine CMA on 12/6/2018 at 10:53 AM

## 2018-12-10 ENCOUNTER — NURSING HOME VISIT (OUTPATIENT)
Dept: GERIATRICS | Facility: CLINIC | Age: 83
End: 2018-12-10
Payer: COMMERCIAL

## 2018-12-10 VITALS
WEIGHT: 120.6 LBS | HEART RATE: 75 BPM | BODY MASS INDEX: 22.19 KG/M2 | HEIGHT: 62 IN | RESPIRATION RATE: 20 BRPM | TEMPERATURE: 97.6 F | SYSTOLIC BLOOD PRESSURE: 145 MMHG | DIASTOLIC BLOOD PRESSURE: 66 MMHG | OXYGEN SATURATION: 96 %

## 2018-12-10 DIAGNOSIS — N18.2 CKD (CHRONIC KIDNEY DISEASE) STAGE 2, GFR 60-89 ML/MIN: ICD-10-CM

## 2018-12-10 DIAGNOSIS — B37.2 YEAST DERMATITIS: ICD-10-CM

## 2018-12-10 DIAGNOSIS — M62.81 GENERALIZED MUSCLE WEAKNESS: ICD-10-CM

## 2018-12-10 DIAGNOSIS — I10 ESSENTIAL HYPERTENSION: ICD-10-CM

## 2018-12-10 DIAGNOSIS — R53.81 PHYSICAL DECONDITIONING: ICD-10-CM

## 2018-12-10 DIAGNOSIS — N39.0 E. COLI UTI (URINARY TRACT INFECTION): Primary | ICD-10-CM

## 2018-12-10 DIAGNOSIS — D62 ANEMIA DUE TO BLOOD LOSS, ACUTE: ICD-10-CM

## 2018-12-10 DIAGNOSIS — B96.20 E. COLI UTI (URINARY TRACT INFECTION): Primary | ICD-10-CM

## 2018-12-10 PROCEDURE — 99316 NF DSCHRG MGMT 30 MIN+: CPT | Performed by: NURSE PRACTITIONER

## 2018-12-10 ASSESSMENT — MIFFLIN-ST. JEOR: SCORE: 905.29

## 2018-12-10 NOTE — PROGRESS NOTES
[facility] DISCHARGE SUMMARY    PATIENT'S NAME: Tracey Moran  YOB: 1925    PRIMARY CARE PROVIDER AND CLINIC RESPONSIBLE AFTER TRANSFER: Guilherme Graves     CODE STATUS: Full Code    TRANSFERRING PROVIDERS: Carolee Caruso, WILLIAM GOMEZ, Dr. Kike Burns,      DATE OF SNF ADMISSION:  November / 19 / 2018    DATE OF SNF DISCHARGE (including anticipating DC): December / 12 / 2018    DISCHARGE DISPOSITION: [facility] Provider    Nursing Facility: [facility] stay 11/16/18 to 11/19/19     Condition on Discharge:  Improving.    Function:  Ambulates: 500 ft. W/ fww, Transfers: Sup  Cognitive Scores: SLUMS 5/30-, CPT 3.9/5.6 and personal meds 0/6,10.5/27 safety, Tinetti (date):4/28 14/28 now 16/28    Equipment: walker    DISCHARGE DIAGNOSIS:      E. coli UTI (urinary tract infection)  Yeast dermatitis  Essential hypertension  CKD (chronic kidney disease) stage 2, GFR 60-89 ml/min  Generalized muscle weakness  Physical deconditioning  Anemia due to blood loss, acute        HOSPITAL COURSE:   Hospital Course  Tracey Moran was admitted on 11/16/2018.  The following problems were addressed during her hospitalization:  Urinary tract infection  Presented with mild incontinence of urine, increased frequency of urination. UA moderate blood, nitrite positive, large leukocyte esterase, moderate bacteria, 149 WBC; leucocytosis WBC's 15.1 on admission  - started on Ceftriaxone on admission  - UC positive for >100k col E coli.  pan sensitive  - WBC's down from 18.5 to 14.5 today  - also with bacteriemia for Strep lugdunensis so ABX changed to Ancef as per ID  -discharge with keflex 500 mg po TID x 10 days  -Leukocytosis resolved  Positive blood culture for Staph lugdunensis  - 1/2 bottles from 11/16  - ? contaminant vs true bacteriemia  - ID consulted  - switched to Ancef   - repeat BC 11/18; follow up final cultures  - if more BC return back  positive for Staph lugdunensis may need further workup  s/p fall-likely from UTI/physical deconditioning.  Physical deconditioning likely from senile fragility  - Patient/son feels that she fell around 2100 last night and was not able to get up, laid on the floor all night.  Patient thinks she was wearing socks she slipped and fell on the rug.  She is unable to provide any history regarding the fall but able to recollect that she called 911.  Unclear if patient lost any consciousness [history limited as patient has dementia]  - CT head no acute pathology. CT C-spine no acute pathology. Chest x-ray left basilar atelectasis.  - Telemetry  - echo EF 55-60%, mod pulm HTN, mild AO regurg; TSH 2.95  - treat UTI as above  - iv hydration  - Wound care evaluation for skin tears.  - PT/OT- rec TCU  - SW  Mild rhabdomyolysis  - from prolonged immobilization  - CK on admission 379--up to 657--184 today  Elevated troponin- likely demand ischemia in the setting of fall/mild rhabdomyolysis/acute kidney injury.  - Denies any chest pain or palpitations or shortness of breath.  - EKG showed sinus tachycardia, left ventricular hypertrophy, ST-T wave changes V5 to V6 [no significant change when compared to previous EKG]  -   - serial troponins 0.0687--1.615--1.593--1.007- trending down  Patient admitted in February 2018 with similar complaints of fall in the setting of urinary tract infection and had rhabdomyolysis and elevated troponin [1.570].   Echocardiogram 2/2018 showed normal left ventricular size, estimated EF at 60-65%.  Grade 1 early diastolic dysfunction.  Mild aortic regurgitation/mild pulmonary hypertension/ascending aorta mildly dilated.She was managed medically and discharged home.  - admitting provider discussed with the patient son on admission and he declined heparin  - started on ASA  - PTA Toprol was increased from 50 mg po daily to 75 mg po daily  - echo as above  - fasting lipid profile- LDL 55, HDL 62,  total choles 134  CASSIE, multifactorial, resolved  Most recent creatinine 0.61 in 2/2018.  Likely prerenal etiology from dehydration and ATN from rhabdomyolysis.  Creatinine 1.13 on admission, with associated elevated BUN and appeared volume depleted on exam.   - started on iv fluids- cr improved to 0.70  - resumed PTA Lisinopril   Acute Encephalopathy, likely multifactorial   - CT head negative  - Concern for volume depletion, UTI, cardiac event as detailed above, all potential contributors   - Treat as above and reassess  - Re-orient as needed  Chronic normocytic anemia  History of GI bleed 12/2017, which was suspected to be diverticular in nature.   EGD unremarkable at that time, patient declined colonoscopy.   Hemoglobin 10.8 g/dl on admission with last value of 8.6 g/dl in 5/2018.   - Hb 11.4--10.2- stable  Hypertension   [PTA on Toprol XL 50 mg po daily and Lisinopril 20 mg po BID]  - BP on higher side; HR also in 90's  - increased PTA Toprol to 75 mg po daily on 11/17  - resume PTA Lisinopril at 20 mg BID with holding parameters  - adjust meds as needed.  Constipation  Chronic issue.   Scheduled docusate   PRN bowel regimen ordered     Current issues are:      Urinary tract infection without hematuria, site unspecified  Physical deconditioning  Fall, subsequent encounter  Patient denies bladder symptoms and is afebrile. At TCU for rehab. [place], she is typically up with walker. She is completing Keflex course and second set of BC still pending, negative/no growth to date.         Lab Results   Component Value Date     WBC 10.0 11/19/2018     WBC 14.5 11/18/2018      History of rhabdomyolysis  Acute kidney injury (H)  CRF (chronic renal failure), stage 3 (moderate) (H)  Most recently renal function stabilized. Encouraging intake.         Lab Results   Component Value Date     CR 0.79 11/17/2018     CR 1.13 11/16/2018            Lab Results   Component Value Date     POTASSIUM 3.5 11/17/2018      POTASSIUM 4.0 11/16/2018         Elevated troponin  Demand ischemia (H)  Essential hypertension  Denies chest pain. Since admission -139. Takes ASA, Toprol XL, lisinopril. Renal function as noted above.      Chronic constipation  Reports regular stools with scheduled Colace and PRN Miralax.      Encephalopathy  Alert, oriented to self and month/year. Pleasant.      Last 3 BPs:       PAST MEDICAL HISTORY:  Past Medical History:   Diagnosis Date     Hypertension        DISCHARGE MEDICATIONS:  Current Outpatient Medications   Medication Sig Dispense Refill     acetaminophen (TYLENOL) 500 MG tablet Take 1,000 mg by mouth every 8 hours as needed for mild pain       aspirin 81 MG EC tablet Take 1 tablet (81 mg) by mouth daily       HYDRALAZINE HCL PO Take 10 mg by mouth every 8 hours HOLD if SBP <90       lisinopril (PRINIVIL/ZESTRIL) 20 MG tablet TAKE ONE TABLET BY MOUTH TWICE DAILY. HOLD IF SYSTOLIC BLOOD PRESSURE IS LESS THAN 110 MMHG. CALL IF HELD TWICE IN A ROW OR IF SYMPTOMATIC. 180 tablet 0     metoprolol succinate (TOPROL-XL) 50 MG 24 hr tablet Take 100 mg by mouth daily Hold if SBP < 110 or HR <55 and call if held twice in a row or symptoms.       multivitamin, therapeutic with minerals (THERA-VIT-M) TABS tablet Take 1 tablet by mouth daily       polyethylene glycol (MIRALAX/GLYCOLAX) packet Take 17 g by mouth as needed          MEDICATION CHANGES/RATIONALE:   Hydralazine  added for high BP and metoprolol increased from 75 to 100 mg.  Voltaren oint dc'd    TCU/SNF COURSE: Pt has progressed with PT and OT, though is at a lower baseline than in past.  SLUMS 5/30.  CPT 3.9/5.6.  More help rec'd at home including having 24 care/monitoring. Had groin rash--resolved.    HPI/ROS:  limited due to dementia.  No CP, SOB, Cough, dizziness,   HA, N/V, dysuria or Bowel Abnormalities. Appetite is good.  No pains. Forgetful, laughs a lot during exam.      /66   Pulse 75   Temp 97.6  F (36.4  C)   Resp 20   Ht  "1.575 m (5' 2\")   Wt 54.7 kg (120 lb 9.6 oz)   SpO2 96%   BMI 22.06 kg/m      PHYSICAL EXAM Today:  GENERAL APPEARANCE:  Alert, in no distress, oriented to person, cooperative, forgetful  ENT:  Mouth with moist mucous membranes, [place]  RESP:  respiratory effort  of chest normal, lungs clear to auscultation, no respiratory distress  CV:  Auscultation of heart done ,RRR, no murmur, rub, or gallop. Trace pedal edema bilat.  ABDOMEN:  normal bowel sounds, soft, nontender  M/S:   RUIZ equally, up with assist  SKIN:  Inspection of skin and subcutaneous tissue baseline  NEURO:   Cranial nerves 2-12 are grossly intact and at patient's baseline  PSYCH:  normal insight, judgement and memory, affect and mood normal , laughs a lot    SNF LABS:   CBC RESULTS:   Recent Labs   Lab Test 11/26/18 11/19/18  0755   WBC 8.9 10.0   RBC 2.95* 2.82*   HGB 9.6* 9.1*   HCT 29.8* 28.1*   * 100   MCH 32.5 32.3   MCHC 32.2 32.4   RDW 13.7 13.5    183       Last Basic Metabolic Panel:  Recent Labs   Lab Test 11/26/18 11/17/18  0925    139   POTASSIUM 3.6 3.5   CHLORIDE 106 106   RENETTA 8.9 8.2*   CO2 28 24   BUN 9 15   CR 0.65 0.79   GLC 76 103*       Liver Function Studies -   Recent Labs   Lab Test 11/17/18  0925 12/05/17  1948   PROTTOTAL 6.5* 6.3*   ALBUMIN 3.0* 3.4   BILITOTAL 0.7 0.3   ALKPHOS 62 49   AST 42 9   ALT 19 13       TSH   Date Value Ref Range Status   11/17/2018 2.95 0.40 - 4.00 mU/L Final       Lab Results   Component Value Date    A1C Canceled, Test credited 12/06/2017               DISCHARGE PLAN: Returning home with 24 hour care from family and/or [facility]    Physical Therapy, Home Health Aide and From:  TBA Follow-up with PCP in 7 days: 7 days   Current [place] or other scheduled appointments:No future appointments.     MTM referral needed and placed by this provider: none    Pending labs: none     Discharge Treatments:none       TOTAL DISCHARGE TIME:   Greater than 30 minutes    WILLIAM Ornelas " CNP        Documentation of Face-to-Face and Certification for Home Health Services     Patient: Tracey Moran   YOB: 1925  MR Number: [id number]  Today's Date: 12/10/2018    I certify that patient: Tracey Moran is under my care and that I, or a nurse practitioner or physician's assistant working with me, had a face-to-face encounter that meets the physician face-to-face encounter requirements with this patient on: 12/10/2018.    This encounter with the patient was in whole, or in part, for the following medical condition, which is the primary reason for home health care:      E. coli UTI (urinary tract infection)  Yeast dermatitis  Essential hypertension  CKD (chronic kidney disease) stage 2, GFR 60-89 ml/min  Generalized muscle weakness  Physical deconditioning  Anemia due to blood loss, acute  .    I certify that, based on my findings, the following services are medically necessary home health services: Physical Therapy and HHA.    My clinical findings support the need for the above services because: Physical Therapy Services are needed to assess and treat the following functional impairments:  . for deconditioning and weakness    Further, I certify that my clinical findings support that this patient is homebound (i.e. absences from home require considerable and taxing effort and are for medical reasons or [employer] services or infrequently or of short duration when for other reasons) because: Requires assistance of another person or specialized equipment to access medical services because patient: Requires supervision of another for safe transfer...    Based on the above findings. I certify that this patient is confined to the home and needs intermittent skilled nursing care, physical therapy and/or speech therapy.  The patient is under my care, and I have initiated the establishment of the plan of care.  This patient will be followed by a physician who will periodically review the plan of  care.  Physician/Provider to provide follow up care: Guilherme Graves    Responsible Medicare certified PECOS Physician: Dr Kike Burns  Physician Signature: See electronic signature associated with these discharge orders.  Date: 12/10/2018

## 2018-12-10 NOTE — PATIENT INSTRUCTIONS
[facility] Discharge Orders    Name: Tracey Moran  : 1925  Planned Discharge Date: 18  Discharged to: with family to home    MEDICAL FOLLOW UP  Follow up with PCP in 1-2 weeks --family to make appt  Follow up with [facility] per PCP      FUTURE LABS: No labs orders/due    ORDER CHANGES:  See below    DISCHARGE MEDICATIONS:  The patient s pharmacy is authorized to dispense a 30-day supply of medications. Refill requests should be directed to the primary provider, Guilherme Graves   No narcotics are prescribed at time of discharge.   Current Outpatient Medications   Medication Sig Dispense Refill     acetaminophen (TYLENOL) 500 MG tablet Take 1,000 mg by mouth every 8 hours as needed for mild pain       aspirin 81 MG EC tablet Take 1 tablet (81 mg) by mouth daily       HYDRALAZINE HCL PO Take 10 mg by mouth every 8 hours HOLD if SBP <90       lisinopril (PRINIVIL/ZESTRIL) 20 MG tablet TAKE ONE TABLET BY MOUTH TWICE DAILY. HOLD IF SYSTOLIC BLOOD PRESSURE IS LESS THAN 110 MMHG. CALL IF HELD TWICE IN A ROW OR IF SYMPTOMATIC. 180 tablet 0     metoprolol succinate (TOPROL-XL) 50 MG 24 hr tablet Take 100 mg by mouth daily Hold if SBP < 110 or HR <55 and call if held twice in a row or symptoms.       multivitamin, therapeutic with minerals (THERA-VIT-M) TABS tablet Take 1 tablet by mouth daily       polyethylene glycol (MIRALAX/GLYCOLAX) packet Take 17 g by mouth as needed          SERVICES:  Home Care:  Physical Therapy, Home Health Aide and From:  Agency not chosen yet by son    ADDITIONAL INSTRUCTIONS:  None    WILLIAM Ornelas CNP  This document was electronically signed on December 10, 2018

## 2018-12-13 ENCOUNTER — PATIENT OUTREACH (OUTPATIENT)
Dept: CARE COORDINATION | Facility: CLINIC | Age: 83
End: 2018-12-13

## 2018-12-13 ENCOUNTER — TELEPHONE (OUTPATIENT)
Dept: INTERNAL MEDICINE | Facility: CLINIC | Age: 83
End: 2018-12-13

## 2018-12-14 ENCOUNTER — PATIENT OUTREACH (OUTPATIENT)
Dept: CARE COORDINATION | Facility: CLINIC | Age: 83
End: 2018-12-14

## 2018-12-14 NOTE — PROGRESS NOTES
Clinic Care Coordination Contact  [facility]/Voicemail    Referral Source: SNF/TCU  Clinical Data: Care Coordinator Outreach  Outreach attempted x 1.  Left message on voicemail with call back information and requested return call.  Plan:. Care Coordinator will try to reach patient again in 3-5 business days.    Krzysztof Herrera [facility]  Clinic Care Coordinator  [facility]  [phone]  [email or web address]

## 2018-12-18 ENCOUNTER — MEDICAL CORRESPONDENCE (OUTPATIENT)
Dept: HEALTH INFORMATION MANAGEMENT | Facility: CLINIC | Age: 83
End: 2018-12-18

## 2018-12-20 ENCOUNTER — OFFICE VISIT (OUTPATIENT)
Dept: INTERNAL MEDICINE | Facility: CLINIC | Age: 83
End: 2018-12-20
Payer: COMMERCIAL

## 2018-12-20 VITALS
DIASTOLIC BLOOD PRESSURE: 60 MMHG | OXYGEN SATURATION: 99 % | WEIGHT: 122 LBS | RESPIRATION RATE: 16 BRPM | SYSTOLIC BLOOD PRESSURE: 102 MMHG | HEART RATE: 74 BPM | TEMPERATURE: 97.5 F | BODY MASS INDEX: 22.31 KG/M2

## 2018-12-20 DIAGNOSIS — N18.2 CKD (CHRONIC KIDNEY DISEASE) STAGE 2, GFR 60-89 ML/MIN: ICD-10-CM

## 2018-12-20 DIAGNOSIS — R53.81 PHYSICAL DECONDITIONING: ICD-10-CM

## 2018-12-20 DIAGNOSIS — Z09 HOSPITAL DISCHARGE FOLLOW-UP: Primary | ICD-10-CM

## 2018-12-20 DIAGNOSIS — I10 ESSENTIAL HYPERTENSION, BENIGN: ICD-10-CM

## 2018-12-20 DIAGNOSIS — N39.0 URINARY TRACT INFECTION WITHOUT HEMATURIA, SITE UNSPECIFIED: ICD-10-CM

## 2018-12-20 LAB — HGB BLD-MCNC: 11.1 G/DL (ref 11.7–15.7)

## 2018-12-20 PROCEDURE — 36415 COLL VENOUS BLD VENIPUNCTURE: CPT | Performed by: INTERNAL MEDICINE

## 2018-12-20 PROCEDURE — 99214 OFFICE O/P EST MOD 30 MIN: CPT | Performed by: INTERNAL MEDICINE

## 2018-12-20 PROCEDURE — 85018 HEMOGLOBIN: CPT | Performed by: INTERNAL MEDICINE

## 2018-12-20 RX ORDER — LISINOPRIL 20 MG/1
TABLET ORAL
Qty: 180 TABLET | Refills: 3 | Status: CANCELLED | OUTPATIENT
Start: 2018-12-20

## 2018-12-20 NOTE — PROGRESS NOTES
SUBJECTIVE:   Tracey Moran is a 89 year old female who presents to clinic today for the following health issues:    Recently signed home health care orders for this patient and she is again today accompanied by her son.    Hospital Follow-up Visit:    Hospital/Nursing Home/ Rehab Facility: [facility]  Date of Admission: 11/16/18  Date of Discharge: 12/12/18  Reason(s) for Admission: E Coli UTI, yeast dermatitis             Problems taking medications regularly:  None       Medication changes since discharge: NA       Problems adhering to non-medication therapy:  None    Summary of hospitalization:  Discharge summary unavailable  Diagnostic Tests/Treatments reviewed.  Follow up needed: noted  Other Healthcare Providers Involved in Patient s Care:         Homecare  Update since discharge: stable.     Post Discharge Medication Reconciliation: discharge medications reconciled, continue medications without change.  Plan of care communicated with patient     Coding guidelines for this visit:  Type of Medical   Decision Making Face-to-Face Visit       within 7 Days of discharge Face-to-Face Visit        within 14 days of discharge   Moderate Complexity [id number]   High Complexity [id number]            Tracey Moran was admitted on 11/16/2018.  The following problems were addressed during her hospitalization:  Urinary tract infection  Presented with mild incontinence of urine, increased frequency of urination. UA moderate blood, nitrite positive, large leukocyte esterase, moderate bacteria, 149 WBC; leucocytosis WBC's 15.1 on admission  - started on Ceftriaxone on admission  - UC positive for >100k col E coli.  pan sensitive  - WBC's down from 18.5 to 14.5 today  - also with bacteriemia for Strep lugdunensis so ABX changed to Ancef as per ID  -discharge with keflex 500 mg po TID x 10 days  -Leukocytosis resolved  Positive blood culture for Staph lugdunensis  -  1/2 bottles from 11/16  - ? contaminant vs true bacteriemia  - ID consulted  - switched to Ancef   - repeat BC 11/18; follow up final cultures  - if more BC return back positive for Staph lugdunensis may need further workup  s/p fall-likely from UTI/physical deconditioning.  Physical deconditioning likely from senile fragility  - Patient/son feels that she fell around 2100 last night and was not able to get up, laid on the floor all night.  Patient thinks she was wearing socks she slipped and fell on the rug.  She is unable to provide any history regarding the fall but able to recollect that she called 911.  Unclear if patient lost any consciousness [history limited as patient has dementia]  - CT head no acute pathology. CT C-spine no acute pathology. Chest x-ray left basilar atelectasis.  - Telemetry  - echo EF 55-60%, mod pulm HTN, mild AO regurg; TSH 2.95  - treat UTI as above  - iv hydration  - Wound care evaluation for skin tears.  - PT/OT- rec TCU  - SW  Mild rhabdomyolysis  - from prolonged immobilization  - CK on admission 379--up to 657--184 today  Elevated troponin- likely demand ischemia in the setting of fall/mild rhabdomyolysis/acute kidney injury.  - Denies any chest pain or palpitations or shortness of breath.  - EKG showed sinus tachycardia, left ventricular hypertrophy, ST-T wave changes V5 to V6 [no significant change when compared to previous EKG]  -   - serial troponins 0.0687--1.615--1.593--1.007- trending down  Patient admitted in February 2018 with similar complaints of fall in the setting of urinary tract infection and had rhabdomyolysis and elevated troponin [1.570].   Echocardiogram 2/2018 showed normal left ventricular size, estimated EF at 60-65%.  Grade 1 early diastolic dysfunction.  Mild aortic regurgitation/mild pulmonary hypertension/ascending aorta mildly dilated.She was managed medically and discharged home.  - admitting provider discussed with the patient son on admission  and he declined heparin  - started on ASA  - PTA Toprol was increased from 50 mg po daily to 75 mg po daily  - echo as above  - fasting lipid profile- LDL 55, HDL 62, total choles 134  CASSIE, multifactorial, resolved  Most recent creatinine 0.61 in 2/2018.  Likely prerenal etiology from dehydration and ATN from rhabdomyolysis.  Creatinine 1.13 on admission, with associated elevated BUN and appeared volume depleted on exam.   - started on iv fluids- cr improved to 0.70  - resumed PTA Lisinopril   Acute Encephalopathy, likely multifactorial   - CT head negative  - Concern for volume depletion, UTI, cardiac event as detailed above, all potential contributors   - Treat as above and reassess  - Re-orient as needed  Chronic normocytic anemia  History of GI bleed 12/2017, which was suspected to be diverticular in nature.   EGD unremarkable at that time, patient declined colonoscopy.   Hemoglobin 10.8 g/dl on admission with last value of 8.6 g/dl in 5/2018.   - Hb 11.4--10.2- stable  Hypertension   [PTA on Toprol XL 50 mg po daily and Lisinopril 20 mg po BID]  - BP on higher side; HR also in 90's  - increased PTA Toprol to 75 mg po daily on 11/17  - resume PTA Lisinopril at 20 mg BID with holding parameters  - adjust meds as needed.  Constipation  Chronic issue.   Scheduled docusate   PRN bowel regimen ordered     Problem list and histories reviewed & adjusted, as indicated.  Additional history: as documented    Patient Active Problem List   Diagnosis     Hyperlipidemia LDL goal <130     Advanced directives, counseling/discussion     Leukocytosis, unspecified type     Elevated CK     Abrasion of left scapular region, subsequent encounter     Essential hypertension, benign     Anemia due to blood loss, acute     Thrombocytopenia (H)     CRF (chronic renal failure), stage 3 (moderate) (H)     Slow transit constipation     Gastrointestinal hemorrhage, unspecified gastrointestinal hemorrhage type     Fall     CKD (chronic kidney  disease) stage 2, GFR 60-89 ml/min     Physical deconditioning     Hypothyroidism, unspecified type     Generalized muscle weakness     Yeast dermatitis     Past Surgical History:   Procedure Laterality Date     ESOPHAGOSCOPY, GASTROSCOPY, DUODENOSCOPY (EGD), COMBINED N/A 12/6/2017    Procedure: COMBINED ESOPHAGOSCOPY, GASTROSCOPY, DUODENOSCOPY (EGD);  gastroscopy;  Surgeon: Melchor Mancera MD;  Location:  GI       Social History     Tobacco Use     Smoking status: Never Smoker     Smokeless tobacco: Never Used   Substance Use Topics     Alcohol use: Yes     No family history on file.      Current Outpatient Medications   Medication Sig Dispense Refill     acetaminophen (TYLENOL) 500 MG tablet Take 1,000 mg by mouth every 8 hours as needed for mild pain       aspirin 81 MG EC tablet Take 1 tablet (81 mg) by mouth daily       HYDRALAZINE HCL PO Take 10 mg by mouth every 8 hours HOLD if SBP <90       lisinopril (PRINIVIL/ZESTRIL) 20 MG tablet TAKE ONE TABLET BY MOUTH TWICE DAILY. HOLD IF SYSTOLIC BLOOD PRESSURE IS LESS THAN 110 MMHG. CALL IF HELD TWICE IN A ROW OR IF SYMPTOMATIC. 180 tablet 0     metoprolol succinate (TOPROL-XL) 50 MG 24 hr tablet Take 100 mg by mouth daily Hold if SBP < 110 or HR <55 and call if held twice in a row or symptoms.       multivitamin, therapeutic with minerals (THERA-VIT-M) TABS tablet Take 1 tablet by mouth daily       polyethylene glycol (MIRALAX/GLYCOLAX) packet Take 17 g by mouth as needed        Allergies   Allergen Reactions     Demerol [Meperidine]      Dust Mites      Peanuts [Nuts]      Penicillins      Pollen Extract      BP Readings from Last 3 Encounters:   12/10/18 145/66   12/04/18 137/63   11/29/18 140/67    Wt Readings from Last 3 Encounters:   12/10/18 54.7 kg (120 lb 9.6 oz)   12/04/18 56 kg (123 lb 6.4 oz)   11/29/18 57.2 kg (126 lb 3.2 oz)           Reviewed and updated as needed this visit by clinical staff       Reviewed and updated as needed this visit by  Provider       ROS:  CONSTITUTIONAL: NEGATIVE for fever, chills, change in weight  ENT/MOUTH: NEGATIVE for ear, mouth and throat problems  RESP: NEGATIVE for significant cough or SOB  CV: NEGATIVE for chest pain, palpitations or peripheral edema  GI: NEGATIVE for nausea, abdominal pain, heartburn, or change in bowel habits  : NEGATIVE for frequency, dysuria, or hematuria  MUSCULOSKELETAL: NEGATIVE for significant arthralgias or myalgia  NEURO: NEGATIVE for weakness, dizziness or paresthesias  HEME: NEGATIVE for bleeding problems  PSYCHIATRIC: NEGATIVE for changes in mood or affect    OBJECTIVE:                                                    /60   Pulse 74   Temp 97.5  F (36.4  C) (Oral)   Resp 16   Wt 55.3 kg (122 lb)   SpO2 99%   BMI 22.31 kg/m    Body mass index is 22.31 kg/m .  GENERAL: alert and no distress but frail using walker  EYES: Eyes grossly normal to inspection, extraocular movements - intact, and PERRL  HENT: ear canals- normal; TMs- normal; Nose- normal; Mouth- no ulcers, no lesions  NECK: no tenderness, no adenopathy, no asymmetry, no masses, no stiffness; thyroid- normal to palpation  RESP: lungs clear to auscultation - no rales, no rhonchi, no wheezes  CV: regular rates and rhythm, normal S1 S2, no S3 or S4 and no click or rub   MS: extremities- no gross deformities noted  NEURO: no focal changes  PSYCH: Alert and oriented times 3; speech- coherent , normal rate and volume; able to articulate logical thoughts, able to abstract reason, no tangential thoughts, no hallucinations or delusions, affect- normal.     ASSESSMENT/PLAN:                                                      (Z09) Hospital discharge follow-up  (primary encounter diagnosis)  Comment: Appears stable overall.  Plan: PAF COMPLETED, Hemoglobin, CARE COORDINATION         REFERRAL        Suggest care coordination referral to aid family and determination of ongoing need for disposition and housing situation    (N39.0)  Urinary tract infection without hematuria, site unspecified  Comment: Resolved without current symptoms, discussed with family need for observation of new onset confusion or urinary complaints.  Plan: Hemoglobin        Encourage hydration, frequent bathroom breaks and good vaginal hygiene    (R53.81) Physical deconditioning  Comment: Continue in encourage ambulatory use of the walker  Plan: PT and OT as scheduled    (I10) Essential hypertension, benign  Comment: Stable on current therapy with no evidence of orthostasis.  Plan: Stopping hydralazine.    (N18.2) CKD (chronic kidney disease) stage 2, GFR 60-89 ml/min  Comment: Creatinine appears stable without need for further change  Plan:       See Patient Instructions    Guilherme Graves MD  [facility]    THE MEDICATION LIST HAS BEEN FULLY RECONCILED BY THE M.D. AND THE NURSING STAFF.

## 2018-12-20 NOTE — LETTER
[facility]  [address] [place] [address]  [phone]      12/20/2018       rTacey Moran  [address]   [facility] [address]        Dear Tracey,    Your hemoglobin is still slightly low but has significantly improved as expected.  I would expect for reassurance we should recheck this again in 3 months.    Component      Latest Ref Rng & Units 11/26/2018 12/20/2018   Hemoglobin      11.7 - 15.7 g/dL 9.6 (L) 11.1 (L)       Sincerely,      Guilherme Graves MD  Internal Medicine

## 2018-12-21 ENCOUNTER — PATIENT OUTREACH (OUTPATIENT)
Dept: CARE COORDINATION | Facility: CLINIC | Age: 83
End: 2018-12-21

## 2018-12-21 ASSESSMENT — ACTIVITIES OF DAILY LIVING (ADL): DEPENDENT_IADLS:: CLEANING;LAUNDRY

## 2018-12-21 NOTE — PROGRESS NOTES
Clinic Care Coordination Contact    Clinic Care Coordination Contact  OUTREACH    Referral Information:  Referral Source: PCP    Primary Diagnosis: Genitourinary Disorders    Chief Complaint   Patient presents with     Clinic Care Coordination - Initial     Additional Supports/Alternate Living setting discussions        Clinical Concerns:  Sw outreached to son, Jonny.  Jonny indicates he appreciates the call, but informed SW he thinks he has things pretty well in order.  Jonny indicates he has been working with a SW at [facility] as well as a contact at the [facility].  SW offered SW contact information in the event any further questions arise.       Resources and Interventions:  Current Resources:   List of home care services:: Skilled Nursing, Home Health Aid, Physicial Therapy, Occupational Therapy;   Community Resources: None  Supplies used at home:: None  Equipment Currently Used at Home: walker, rolling, grab bar, toilet    Referrals Placed: Home Care, Community Resources, [facility] Resources     Patient/Caregiver understanding: Pt reports understanding and denies any additional questions or concerns at this times. SW CC engaged in AIDET communication during encounter.    Plan: Pt/family to follow up with clinic or SW for any further needs.  Care Coordination declined at this time. No further outreaches will be made at this time unless a new referral is made or a change in the pt's status occurs. Patient was provided with this writer's contact information and encouraged to call with any questions or concerns.    Krzysztof Herrera, [facility]  Clinic Care Coordinator  [facility]  [phone]  [email or web address]

## 2018-12-24 ENCOUNTER — TELEPHONE (OUTPATIENT)
Dept: INTERNAL MEDICINE | Facility: CLINIC | Age: 83
End: 2018-12-24

## 2018-12-24 NOTE — TELEPHONE ENCOUNTER
Pt is requesting hydralazine that isn't on her med list per [facility].

## 2018-12-24 NOTE — TELEPHONE ENCOUNTER
Routing refill request to provider for review/approval because:  Drug not active on patient's medication list discontinued by PCP

## 2019-02-21 ENCOUNTER — TELEPHONE (OUTPATIENT)
Dept: INTERNAL MEDICINE | Facility: CLINIC | Age: 84
End: 2019-02-21

## 2019-02-21 DIAGNOSIS — I10 ESSENTIAL HYPERTENSION, BENIGN: Primary | ICD-10-CM

## 2019-02-21 DIAGNOSIS — I10 ESSENTIAL HYPERTENSION: ICD-10-CM

## 2019-02-21 RX ORDER — LISINOPRIL 20 MG/1
TABLET ORAL
Qty: 180 TABLET | Refills: 2 | Status: ON HOLD | OUTPATIENT
Start: 2019-02-21 | End: 2021-02-08

## 2019-02-21 RX ORDER — METOPROLOL SUCCINATE 50 MG/1
100 TABLET, EXTENDED RELEASE ORAL DAILY
Qty: 180 TABLET | Refills: 2 | Status: SHIPPED | OUTPATIENT
Start: 2019-02-21 | End: 2019-02-25

## 2019-02-21 NOTE — TELEPHONE ENCOUNTER
Triage spoke with patient, reviewed current medication list with patient.     Lida HAYNES RN, BSN, PHN

## 2019-02-21 NOTE — TELEPHONE ENCOUNTER
"Requested Prescriptions   Pending Prescriptions Disp Refills     lisinopril (PRINIVIL/ZESTRIL) 20 MG tablet [Pharmacy Med Name: LISINOPRIL 20MG     TAB] 180 tablet 0     Sig: TAKE ONE TABLET BY MOUTH TWICE DAILY. HOLD IF SYSTOLIC BLOOD PRESSURE IS LESS THAN 110 MMHG. CALL IF HELD TWICE IN A ROW OR IF SYMPTOMATIC.    ACE Inhibitors (Including Combos) Protocol Passed - 2/21/2019  8:49 AM       Passed - Blood pressure under 140/90 in past 12 months    BP Readings from Last 3 Encounters:   12/20/18 102/60   12/10/18 145/66   12/04/18 137/63                Passed - Recent (12 mo) or future (30 days) visit within the authorizing provider's specialty    Patient had office visit in the last 12 months or has a visit in the next 30 days with authorizing provider or within the authorizing provider's specialty.  See \"Patient Info\" tab in inbasket, or \"Choose Columns\" in Meds & Orders section of the refill encounter.             Passed - Medication is active on med list       Passed - Patient is age 18 or older       Passed - No active pregnancy on record       Passed - Normal serum creatinine on file in past 12 months    Recent Labs   Lab Test 11/26/18   CR 0.65            Passed - Normal serum potassium on file in past 12 months    Recent Labs   Lab Test 11/26/18   POTASSIUM 3.6            Passed - No positive pregnancy test within past 12 months          "

## 2019-02-21 NOTE — TELEPHONE ENCOUNTER
Requested Prescriptions   Pending Prescriptions Disp Refills     metoprolol succinate ER (TOPROL-XL) 50 MG 24 hr tablet       Sig: Take 2 tablets (100 mg) by mouth daily Hold if SBP < 110 or HR <55 and call if held twice in a row or symptoms.    There is no refill protocol information for this order      Last Written Prescription Date:  11/24/18  Last Fill Quantity: ,  # refills:    Last office visit: 12/20/2018 with prescribing provider:  12/20/18   Future Office Visit:  0

## 2019-02-21 NOTE — TELEPHONE ENCOUNTER
Reason for Call:  Medication or medication refill:    Do you use a [facility]?  Name of the pharmacy and phone number for the current request[facility] [phone]    Name of the medication requested: metoprolol, new dose    Other request: pharmacy states that the pt is more confused and should take 1 a day? Call pharmacy for more info    Can we leave a detailed message on this number?     Phone number patient can be reached at: Other phone number:  See above    Best Time:     Call taken on 2/21/2019 at 12:42 PM by Tarsha Newberry

## 2019-02-21 NOTE — TELEPHONE ENCOUNTER
"Requested Prescriptions   Pending Prescriptions Disp Refills     metoprolol succinate ER (TOPROL-XL) 50 MG 24 hr tablet       Sig: Take 2 tablets (100 mg) by mouth daily Hold if SBP < 110 or HR <55 and call if held twice in a row or symptoms.    Beta-Blockers Protocol Passed - 2/21/2019  9:29 AM       Passed - Blood pressure under 140/90 in past 12 months    BP Readings from Last 3 Encounters:   12/20/18 102/60   12/10/18 145/66   12/04/18 137/63                Passed - Patient is age 6 or older       Passed - Recent (12 mo) or future (30 days) visit within the authorizing provider's specialty    Patient had office visit in the last 12 months or has a visit in the next 30 days with authorizing provider or within the authorizing provider's specialty.  See \"Patient Info\" tab in inbasket, or \"Choose Columns\" in Meds & Orders section of the refill encounter.             Passed - Medication is active on med list        Routing refill request to provider for review/approval because:  Medication is reported/historical        "

## 2019-02-22 ENCOUNTER — TELEPHONE (OUTPATIENT)
Dept: INTERNAL MEDICINE | Facility: CLINIC | Age: 84
End: 2019-02-22

## 2019-02-22 NOTE — TELEPHONE ENCOUNTER
Patient informed but she said the metoprolol makes her dizzy. Was taking one tablet a day. Hard to get information from patient, she was talking about multiple things. Says she broke wrist when fell the first time 2 years ago when started taking the medication. Wrist feels numb today. Said it started feeling numb today but then also said it will always feel numb because of her arthritis. Said she has had multiple episodes of blacking out/passing out and falling. Says she blacked out and fell at [place] time but did not want to tell her son. Denies any episodes of falls/blacking out since last appt with PCP, although this was on 12/20.  Says blacked out 2 years ago too. Patient does not think she should be taking the metoprolol because she thinks it makes her dizzy. Said she has not taken it for 2 years, but then later said she has not taken it for one month.  Also said she takes sleeping pills, nothing on current med list, gets OTC.  Afraid she is going to pass out again. Trouble walking but knee is better. Said knee has painful ring around it but says an xray was taken. Knee still swollen. Exercises to make her son happy but she feels she needs rest.     Patient very concerned about taking the metoprolol again. Concerned it will make her dizzy and possibly fall. Notes she has not fallen since off the metoprolol. Patient is willing to take it again though. Is it ok for her to stay off until further can be discussed at appt or should she restart now?

## 2019-02-22 NOTE — TELEPHONE ENCOUNTER
I am not in clinic today, need to call patient and advise to take meds and f/u in clinic as scheduled

## 2019-02-22 NOTE — TELEPHONE ENCOUNTER
"Patient's son, Jonny, calling. Patient had been on lisinopril and metoprolol for her BP. Son asked for refills and, per son, that turned into a nurse calling patient and telling her it was ok to not take metoprolol if she is dizzy. Per son, patient has had falls over the last few years and blames metoprolol, says it is her \"scape goat\". Patient has dementia. After talking to the nurse last evening, patient is now refusing to take the metoprolol and is hiding pills. Jonny has been staying with patient most of the times and tries to make sure she gets meds Dr. Graves prescribed and BP and pulse have been good. Notes that he is unsure of strength of metoprolol but thinks it is 50 mg. Son reports it was increased to 2 tabs at TCU and then son bumped her back down to 1 tablet because 2 seemed like too much and one has been working fine, says he cleared this with Dr. Graves. Unsure how long she has been hiding pills for. Notes that she had UTIs when had falls but patient still blames metoprolol.  Thinks patient told nurse yesterday that pills make her dizzy so nurse told her not to take it if she feels dizzy but patient's son does not think dizziness is caused by med.    Patient has appt Monday. Wants patient to be called today and tell her she needs to take her metoprolol. Please advise. Current sig is : \"Sig - Route: Take 2 tablets (100 mg) by mouth daily Hold if SBP < 110 or HR <55 and call if held twice in a row or symptoms. - Oral\". Please advise. Also would patient benefit from CC referral?  "

## 2019-02-25 ENCOUNTER — OFFICE VISIT (OUTPATIENT)
Dept: INTERNAL MEDICINE | Facility: CLINIC | Age: 84
End: 2019-02-25
Payer: COMMERCIAL

## 2019-02-25 VITALS
DIASTOLIC BLOOD PRESSURE: 70 MMHG | SYSTOLIC BLOOD PRESSURE: 140 MMHG | BODY MASS INDEX: 23.35 KG/M2 | RESPIRATION RATE: 15 BRPM | HEIGHT: 62 IN | HEART RATE: 77 BPM | WEIGHT: 126.9 LBS | OXYGEN SATURATION: 96 % | TEMPERATURE: 98.1 F

## 2019-02-25 DIAGNOSIS — I10 ESSENTIAL HYPERTENSION, BENIGN: Primary | ICD-10-CM

## 2019-02-25 LAB — HGB BLD-MCNC: 11.6 G/DL (ref 11.7–15.7)

## 2019-02-25 PROCEDURE — 36415 COLL VENOUS BLD VENIPUNCTURE: CPT | Performed by: INTERNAL MEDICINE

## 2019-02-25 PROCEDURE — 99214 OFFICE O/P EST MOD 30 MIN: CPT | Performed by: INTERNAL MEDICINE

## 2019-02-25 PROCEDURE — 85018 HEMOGLOBIN: CPT | Performed by: INTERNAL MEDICINE

## 2019-02-25 RX ORDER — DIPHENHYDRAMINE HCL 25 MG
25 TABLET ORAL
COMMUNITY
End: 2019-02-25

## 2019-02-25 RX ORDER — METOPROLOL SUCCINATE 50 MG/1
25 TABLET, EXTENDED RELEASE ORAL DAILY
Qty: 180 TABLET | Refills: 2
Start: 2019-02-25 | End: 2019-05-15

## 2019-02-25 ASSESSMENT — MIFFLIN-ST. JEOR: SCORE: 933.86

## 2019-02-25 NOTE — LETTER
[facility]  [address] [address]  [phone]      2/26/2019       Tracey Moran  [address]   [facility] [address]        Dear Tracey,    Your hemoglobin function tests are slightly abnormal but stable and should be rechecked here in the clinic in 3 months with a follow-up visit with me.  I will look forward to seeing you at that time and please call to make an appointment.    Sincerely,      Guilherme Graves MD  Internal Medicine

## 2019-02-25 NOTE — TELEPHONE ENCOUNTER
"Patient informed. She said she did stay off the metoprolol this weekend. Then talked to patient's son, Jonny, but he says patient did take the metoprolol this weekend. Patient's son said patient did not like the price of the metoprolol and uses it as her \"scape goat\" for why she gets dizziness. Son is trying to figure out what causes her dizziness. Maybe needs lisinopril adjusted? Also has had low Hgb in past. More tired in last couple weeks. Son thinks maybe patient has UTI again or needs iron supplement? Unsure if she is hiding pills. Patient did report dizziness over the weekend. She has been having more of a struggle to get up and sit down. Dry sinuses. Wants to review meds and how to takes meds with PCP. Advised son to bring updated med list to appt including what patient is taking OTC. She had previously mentioned she takes OTC sleeping pill. Also takes different dose of metoprolol than on current med list.     Patient has appt today with PCP. Please discuss issues at appt and enter CC referral if appropriate.   "

## 2019-02-26 NOTE — TELEPHONE ENCOUNTER
Pharmacy did not receive the new rx that was sent yesterday. Gave them a verbal of the metoprolol rx.

## 2019-02-26 NOTE — TELEPHONE ENCOUNTER
[facility] pharmacy called to get clarification on Metoprolol.   Please call back [phone]  .

## 2019-03-19 DIAGNOSIS — I10 ESSENTIAL HYPERTENSION, BENIGN: Primary | ICD-10-CM

## 2019-03-19 RX ORDER — METOPROLOL SUCCINATE 25 MG/1
25 TABLET, EXTENDED RELEASE ORAL DAILY
Qty: 90 TABLET | Refills: 3 | Status: SHIPPED | OUTPATIENT
Start: 2019-03-19 | End: 2019-05-28

## 2019-03-19 NOTE — TELEPHONE ENCOUNTER
Pt's son called to say that the 1/2 strength of Metoprolol 25mg is working well as we discussed in last office visit. Please change the dosage from 50mg to 25mg once per day.  Nick Shepherd [phone], ok to lv detailed message.

## 2019-05-15 ENCOUNTER — APPOINTMENT (OUTPATIENT)
Dept: ULTRASOUND IMAGING | Facility: CLINIC | Age: 84
End: 2019-05-15
Attending: EMERGENCY MEDICINE
Payer: COMMERCIAL

## 2019-05-15 ENCOUNTER — HOSPITAL ENCOUNTER (OUTPATIENT)
Facility: CLINIC | Age: 84
Setting detail: OBSERVATION
Discharge: NURSING, REHAB OR LONG-TERM CARE | End: 2019-05-18
Attending: EMERGENCY MEDICINE | Admitting: INTERNAL MEDICINE
Payer: COMMERCIAL

## 2019-05-15 ENCOUNTER — APPOINTMENT (OUTPATIENT)
Dept: GENERAL RADIOLOGY | Facility: CLINIC | Age: 84
End: 2019-05-15
Attending: EMERGENCY MEDICINE
Payer: COMMERCIAL

## 2019-05-15 DIAGNOSIS — M25.562 ACUTE PAIN OF LEFT KNEE: Primary | ICD-10-CM

## 2019-05-15 DIAGNOSIS — R26.2 INABILITY TO AMBULATE DUE TO KNEE: ICD-10-CM

## 2019-05-15 DIAGNOSIS — K59.00 CONSTIPATION, UNSPECIFIED CONSTIPATION TYPE: ICD-10-CM

## 2019-05-15 DIAGNOSIS — M25.561 ACUTE PAIN OF RIGHT KNEE: ICD-10-CM

## 2019-05-15 DIAGNOSIS — M17.11 PRIMARY OSTEOARTHRITIS OF RIGHT KNEE: ICD-10-CM

## 2019-05-15 PROCEDURE — 99207 ZZC CDG-HISTORY COMP: MEETS EXP. PROB FOCUSED- DOWN CODED LACK OF PFSH: CPT | Performed by: INTERNAL MEDICINE

## 2019-05-15 PROCEDURE — 99218 ZZC INITIAL OBSERVATION CARE,LEVL I: CPT | Performed by: INTERNAL MEDICINE

## 2019-05-15 PROCEDURE — 25000132 ZZH RX MED GY IP 250 OP 250 PS 637: Performed by: EMERGENCY MEDICINE

## 2019-05-15 PROCEDURE — 99285 EMERGENCY DEPT VISIT HI MDM: CPT | Mod: 25

## 2019-05-15 PROCEDURE — G0378 HOSPITAL OBSERVATION PER HR: HCPCS

## 2019-05-15 PROCEDURE — 93971 EXTREMITY STUDY: CPT | Mod: RT

## 2019-05-15 PROCEDURE — 73562 X-RAY EXAM OF KNEE 3: CPT | Mod: RT

## 2019-05-15 RX ORDER — TRAMADOL HYDROCHLORIDE 50 MG/1
50 TABLET ORAL ONCE
Status: DISCONTINUED | OUTPATIENT
Start: 2019-05-15 | End: 2019-05-17 | Stop reason: CLARIF

## 2019-05-15 RX ORDER — ACETAMINOPHEN 500 MG
1000 TABLET ORAL ONCE
Status: COMPLETED | OUTPATIENT
Start: 2019-05-15 | End: 2019-05-15

## 2019-05-15 RX ADMIN — ACETAMINOPHEN 1000 MG: 500 TABLET, FILM COATED ORAL at 21:16

## 2019-05-15 ASSESSMENT — ENCOUNTER SYMPTOMS
JOINT SWELLING: 1
ABDOMINAL PAIN: 0

## 2019-05-15 NOTE — ED AVS SNAPSHOT
[facility]  [address]  [facility] [address]  Phone:  [phone]  Fax:  [phone]                                    Tracey Moran   MRN: [id number]    Department:  [facility]   Date of Visit:  5/15/2019           After Visit Summary Signature Page    I have received my discharge instructions, and my questions have been answered. I have discussed any challenges I see with this plan with the nurse or doctor.    ..........................................................................................................................................  Patient/Patient Representative Signature      ..........................................................................................................................................  Patient Representative Print Name and Relationship to Patient    ..................................................               ................................................  Date                                   Time    ..........................................................................................................................................  Reviewed by Signature/Title    ...................................................              ..............................................  Date                                               Time          22EPIC Rev 08/18

## 2019-05-15 NOTE — ED TRIAGE NOTES
Patient presents to ED due to pain to RLE. Chronic hx  Arthritis. Over the last week increased swelling to R calf and medial side of R knee.

## 2019-05-16 ENCOUNTER — APPOINTMENT (OUTPATIENT)
Dept: PHYSICAL THERAPY | Facility: CLINIC | Age: 84
End: 2019-05-16
Attending: INTERNAL MEDICINE
Payer: COMMERCIAL

## 2019-05-16 ENCOUNTER — APPOINTMENT (OUTPATIENT)
Dept: CT IMAGING | Facility: CLINIC | Age: 84
End: 2019-05-16
Attending: PHYSICIAN ASSISTANT
Payer: COMMERCIAL

## 2019-05-16 PROBLEM — M25.561 RIGHT KNEE PAIN: Status: ACTIVE | Noted: 2019-05-16

## 2019-05-16 LAB
ANION GAP SERPL CALCULATED.3IONS-SCNC: 4 MMOL/L (ref 3–14)
BUN SERPL-MCNC: 36 MG/DL (ref 7–30)
CALCIUM SERPL-MCNC: 8.4 MG/DL (ref 8.5–10.1)
CHLORIDE SERPL-SCNC: 111 MMOL/L (ref 94–109)
CO2 SERPL-SCNC: 27 MMOL/L (ref 20–32)
CREAT SERPL-MCNC: 0.99 MG/DL (ref 0.52–1.04)
GFR SERPL CREATININE-BSD FRML MDRD: 49 ML/MIN/{1.73_M2}
GLUCOSE SERPL-MCNC: 85 MG/DL (ref 70–99)
POTASSIUM SERPL-SCNC: 4 MMOL/L (ref 3.4–5.3)
SODIUM SERPL-SCNC: 142 MMOL/L (ref 133–144)

## 2019-05-16 PROCEDURE — 25000128 H RX IP 250 OP 636: Performed by: PHYSICIAN ASSISTANT

## 2019-05-16 PROCEDURE — 25000128 H RX IP 250 OP 636: Performed by: INTERNAL MEDICINE

## 2019-05-16 PROCEDURE — 36415 COLL VENOUS BLD VENIPUNCTURE: CPT | Performed by: INTERNAL MEDICINE

## 2019-05-16 PROCEDURE — 96375 TX/PRO/DX INJ NEW DRUG ADDON: CPT

## 2019-05-16 PROCEDURE — 96374 THER/PROPH/DIAG INJ IV PUSH: CPT | Mod: 59

## 2019-05-16 PROCEDURE — 97162 PT EVAL MOD COMPLEX 30 MIN: CPT | Mod: GP | Performed by: PHYSICAL THERAPIST

## 2019-05-16 PROCEDURE — G0378 HOSPITAL OBSERVATION PER HR: HCPCS

## 2019-05-16 PROCEDURE — 99225 ZZC SUBSEQUENT OBSERVATION CARE,LEVEL II: CPT | Performed by: PHYSICIAN ASSISTANT

## 2019-05-16 PROCEDURE — 73700 CT LOWER EXTREMITY W/O DYE: CPT | Mod: RT,XS

## 2019-05-16 PROCEDURE — 25000132 ZZH RX MED GY IP 250 OP 250 PS 637: Performed by: INTERNAL MEDICINE

## 2019-05-16 PROCEDURE — 73700 CT LOWER EXTREMITY W/O DYE: CPT | Mod: RT

## 2019-05-16 PROCEDURE — 80048 BASIC METABOLIC PNL TOTAL CA: CPT | Performed by: INTERNAL MEDICINE

## 2019-05-16 RX ORDER — POLYETHYLENE GLYCOL 3350 17 G/17G
17 POWDER, FOR SOLUTION ORAL DAILY PRN
Status: DISCONTINUED | OUTPATIENT
Start: 2019-05-16 | End: 2019-05-18 | Stop reason: HOSPADM

## 2019-05-16 RX ORDER — AMOXICILLIN 250 MG
1 CAPSULE ORAL 2 TIMES DAILY PRN
Status: DISCONTINUED | OUTPATIENT
Start: 2019-05-16 | End: 2019-05-18 | Stop reason: HOSPADM

## 2019-05-16 RX ORDER — BISACODYL 10 MG
10 SUPPOSITORY, RECTAL RECTAL DAILY PRN
Status: DISCONTINUED | OUTPATIENT
Start: 2019-05-16 | End: 2019-05-18 | Stop reason: HOSPADM

## 2019-05-16 RX ORDER — AMOXICILLIN 250 MG
2 CAPSULE ORAL 2 TIMES DAILY PRN
Status: DISCONTINUED | OUTPATIENT
Start: 2019-05-16 | End: 2019-05-18 | Stop reason: HOSPADM

## 2019-05-16 RX ORDER — LISINOPRIL 20 MG/1
20 TABLET ORAL DAILY
Status: DISCONTINUED | OUTPATIENT
Start: 2019-05-16 | End: 2019-05-17

## 2019-05-16 RX ORDER — METOPROLOL SUCCINATE 25 MG/1
25 TABLET, EXTENDED RELEASE ORAL DAILY
Status: DISCONTINUED | OUTPATIENT
Start: 2019-05-16 | End: 2019-05-18 | Stop reason: HOSPADM

## 2019-05-16 RX ORDER — ACETAMINOPHEN 500 MG
1000 TABLET ORAL
Status: DISCONTINUED | OUTPATIENT
Start: 2019-05-16 | End: 2019-05-18 | Stop reason: HOSPADM

## 2019-05-16 RX ORDER — IBUPROFEN 400 MG/1
400 TABLET, FILM COATED ORAL EVERY 6 HOURS PRN
Status: DISCONTINUED | OUTPATIENT
Start: 2019-05-16 | End: 2019-05-18 | Stop reason: HOSPADM

## 2019-05-16 RX ORDER — ONDANSETRON 2 MG/ML
4 INJECTION INTRAMUSCULAR; INTRAVENOUS EVERY 6 HOURS PRN
Status: DISCONTINUED | OUTPATIENT
Start: 2019-05-16 | End: 2019-05-18 | Stop reason: HOSPADM

## 2019-05-16 RX ORDER — NALOXONE HYDROCHLORIDE 0.4 MG/ML
.1-.4 INJECTION, SOLUTION INTRAMUSCULAR; INTRAVENOUS; SUBCUTANEOUS
Status: DISCONTINUED | OUTPATIENT
Start: 2019-05-16 | End: 2019-05-18 | Stop reason: HOSPADM

## 2019-05-16 RX ORDER — HYDRALAZINE HYDROCHLORIDE 20 MG/ML
10 INJECTION INTRAMUSCULAR; INTRAVENOUS EVERY 4 HOURS PRN
Status: DISCONTINUED | OUTPATIENT
Start: 2019-05-16 | End: 2019-05-18 | Stop reason: HOSPADM

## 2019-05-16 RX ORDER — ONDANSETRON 4 MG/1
4 TABLET, ORALLY DISINTEGRATING ORAL EVERY 6 HOURS PRN
Status: DISCONTINUED | OUTPATIENT
Start: 2019-05-16 | End: 2019-05-18 | Stop reason: HOSPADM

## 2019-05-16 RX ADMIN — METOPROLOL SUCCINATE 25 MG: 25 TABLET, EXTENDED RELEASE ORAL at 09:15

## 2019-05-16 RX ADMIN — ACETAMINOPHEN 1000 MG: 500 TABLET, FILM COATED ORAL at 13:02

## 2019-05-16 RX ADMIN — LISINOPRIL 20 MG: 20 TABLET ORAL at 09:15

## 2019-05-16 RX ADMIN — ONDANSETRON 4 MG: 2 INJECTION INTRAMUSCULAR; INTRAVENOUS at 16:18

## 2019-05-16 RX ADMIN — HYDRALAZINE HYDROCHLORIDE 10 MG: 20 INJECTION INTRAMUSCULAR; INTRAVENOUS at 18:36

## 2019-05-16 RX ADMIN — TRAMADOL HYDROCHLORIDE 25 MG: 50 TABLET ORAL at 09:22

## 2019-05-16 RX ADMIN — ACETAMINOPHEN 1000 MG: 500 TABLET, FILM COATED ORAL at 18:36

## 2019-05-16 RX ADMIN — ACETAMINOPHEN 1000 MG: 500 TABLET, FILM COATED ORAL at 09:15

## 2019-05-16 NOTE — PHARMACY-ADMISSION MEDICATION HISTORY
Admission medication history interview status for this patient is complete. See [facility] admission navigator for allergy information, prior to admission medications and immunization status.     Medication history interview source(s):dwayne  Medication history resources (including written lists, pill bottles, clinic record):None    Changes made to PTA medication list:  Added: voltaren gel  Deleted: toprol (duplicate)  Changed: miralax     Actions taken by pharmacist (provider contacted, etc):None     Additional medication history information:None    Medication reconciliation/reorder completed by provider prior to medication history? No    For patients on insulin therapy: no (Yes/No)   Lantus/levemir/NPH/Mix 70/30 dose: ___ in AM/PM or twice daily   Sliding scale Novolog Y/N   If Yes, do you have a baseline novolog pre-meal dose: ______units with meals   Patients eat three meals a day: Y/N ---  How many episodes of hypoglycemia (low blood glucose) do you have weekly: ---   How many missed doses do you have a week: ---  How many times do you check your blood glucose per day: ---  Any Barriers to therapy: cost of medications/comfortable with giving injections (if applicable)/ comfortable and confident with current diabetes regimen ---      Prior to Admission medications    Medication Sig Last Dose Taking? Auth Provider   acetaminophen 500 MG CAPS Take 2 capsules by mouth every 8 hours as needed For aches, pain, fever  Yes Clark Rodriguez MD   diclofenac (VOLTAREN) 1 % topical gel Apply 2 g topically 2 times daily as needed  5/14/2019 at Unknown time Yes Reported, Patient   lisinopril (PRINIVIL/ZESTRIL) 20 MG tablet TAKE ONE TABLET BY MOUTH TWICE DAILY. HOLD IF SYSTOLIC BLOOD PRESSURE IS LESS THAN 110 MMHG. CALL IF HELD TWICE IN A ROW OR IF SYMPTOMATIC. 5/15/2019 at am Yes Guilherme Graves MD   metoprolol succinate ER (TOPROL-XL) 25 MG 24 hr tablet Take 1 tablet (25 mg) by mouth daily 5/15/2019 at am Yes Guilherme Graves MD    multivitamin, therapeutic with minerals (THERA-VIT-M) TABS tablet Take 1 tablet by mouth daily 5/14/2019 at Unknown time Yes Unknown, Entered By History   polyethylene glycol (MIRALAX/GLYCOLAX) packet Take 17 g by mouth daily as needed   Yes Reported, Patient

## 2019-05-16 NOTE — PLAN OF CARE
PRIMARY DIAGNOSIS: ACUTE PAIN  OUTPATIENT/OBSERVATION GOALS TO BE MET BEFORE DISCHARGE:  1. Pain Status: Improved with use of alternative comfort measures i.e.: positioning    2. Return to near baseline physical activity: No    3. Cleared for discharge by consultants (if involved): No    Discharge Planner Nurse   Safe discharge environment identified: Yes  Barriers to discharge: No       Entered by: Dejon Davila 05/16/2019 5:48 AM     No pain reported once in bed.  PT, social work, ortho to evaluate this AM. Patient confused.

## 2019-05-16 NOTE — CONSULTS
Discharge Planner   Discharge Plans in progress: Yes. Patient admitted with right knee pain and a fall one month prior to admission.   Barriers to discharge plan: Pending ortho and PT consults. Patient lives alone in a senior coop apartment. Son Jonny provides support and assistance to patient.   Follow up plan: CM will continue to follow for discharge planning. Patient has U care insurance and would need prior auth if TCU is recommended.        Entered by: Deepika Deleon 05/16/2019 10:43 AM

## 2019-05-16 NOTE — PROGRESS NOTES
Returned to discuss findings and plan with son who was present in room.  For clarity and aid with possibly getting her into custodial will order a CT of knee and tibia    Will make updated recommendations as soon as CT images available  In interim continue knee immobilizer when out of bed and Non-WB right JESSICA Rinaldi PAC

## 2019-05-16 NOTE — PLAN OF CARE
PRIMARY DIAGNOSIS: ACUTE PAIN  OUTPATIENT/OBSERVATION GOALS TO BE MET BEFORE DISCHARGE:  1. Pain Status: Improved-controlled with oral pain medications.    2. Return to near baseline physical activity: No    3. Cleared for discharge by consultants (if involved): No    Discharge Planner Nurse   Safe discharge environment identified: Yes  Barriers to discharge: No       Entered by: Graciela Ha 05/16/2019 6:31 PM     Please review provider order for any additional goals.   Nurse to notify provider when observation goals have been met and patient is ready for discharge.    Patient is alert and oriented to self and situation. Patient is confused and laughs a lot to blow things off. Patient's BP is elevated, will re-check d/t recent emesis. Patient has CT pending. Plan is to discharge to TCU after insurance auth goes through.

## 2019-05-16 NOTE — CONSULTS
[facility]    Orthopedic Consultation    Tracey Moran MRN# [id number]   Age: 89 year old YOB: 1925     Date of Admission:  5/15/2019    Reason for consult: Right knee pan        Requesting physician: Dr. Andrea Shore       Level of consult: One-time consult to assist in determining a diagnosis and to recommend an appropriate treatment plan           Assessment and Plan:   Assessment:   Questionable non-displaced right knee lateral tibial plateau fracture      Plan:   Conservative treatment  Continue knee immobilizer  Non-WB on the right LE  May pivot transfer with assist/walker  Pain medication as needed, minimize narcotics as able  Ice PRN  Consider Ace wrap for comfort           Chief Complaint:   Right knee pain         History of Present Illness:   This patient is a 89 year old female who presents with the following condition requiring a hospital admission:    Mrs. Moran states that she has had many falls in the preceding weeks. She has had chronic right knee pain which she was seen for in clinic with Dr. Martines - 9/17/18. At that visit it was discussed that she had previously seen a provider at [facility] and given a cortisone injection which did not provide her with any long term relief, likely improvement that she felt was secondary to short lasting anesthetic. At her visit with Dr. Martines she was prescribed with Voltaren gel due to inability to take NSAIDs safely/cotraindications and PT for quad and glute strengthening. She had been doing well until the last few days where she was having trouble ambulating all together due to knee pain. It is unclear from discussing with patient when and to what extent she has fallen. [facility] but family has been working to get her into [facility] for some time.   She is not on any long term anticoagulation.  She denies any fevers, chills, malaise, night sweats or unexplained weight loss.        Past Medical  History:     Past Medical History:   Diagnosis Date     Hypertension              Past Surgical History:     Past Surgical History:   Procedure Laterality Date     ESOPHAGOSCOPY, GASTROSCOPY, DUODENOSCOPY (EGD), COMBINED N/A 12/6/2017    Procedure: COMBINED ESOPHAGOSCOPY, GASTROSCOPY, DUODENOSCOPY (EGD);  gastroscopy;  Surgeon: Melchor Mancera MD;  Location: [facility]             Social History:     Social History     Tobacco Use     Smoking status: Never Smoker     Smokeless tobacco: Never Used   Substance Use Topics     Alcohol use: Yes             Family History:   No family history on file.          Immunizations:     VACCINE/DOSE   Diptheria   DPT   DTAP   HBIG   Hepatitis A   Hepatitis B   HIB   Influenza   Measles   Meningococcal   MMR   Mumps   Pneumococcal   Polio   Rubella   Small Pox   TDAP   Varicella   Zoster             Allergies:     Allergies   Allergen Reactions     Demerol [Meperidine]      Dust Mites      Peanuts [Nuts]      Penicillins      Pollen Extract              Medications:     Current Facility-Administered Medications   Medication     acetaminophen (TYLENOL) tablet 1,000 mg     bisacodyl (DULCOLAX) Suppository 10 mg     diclofenac (VOLTAREN) 1 % topical gel 2 g     ibuprofen (ADVIL/MOTRIN) tablet 400 mg     lisinopril (PRINIVIL/ZESTRIL) tablet 20 mg     magnesium hydroxide (MILK OF MAGNESIA) suspension 30 mL     metoprolol succinate ER (TOPROL-XL) 24 hr tablet 25 mg     naloxone (NARCAN) injection 0.1-0.4 mg     ondansetron (ZOFRAN-ODT) ODT tab 4 mg    Or     ondansetron (ZOFRAN) injection 4 mg     polyethylene glycol (MIRALAX/GLYCOLAX) Packet 17 g     senna-docusate (SENOKOT-S/PERICOLACE) 8.6-50 MG per tablet 1 tablet    Or     senna-docusate (SENOKOT-S/PERICOLACE) 8.6-50 MG per tablet 2 tablet     traMADol (ULTRAM) half-tab 25 mg     traMADol (ULTRAM) tablet 50 mg             Review of Systems:   CV: NEGATIVE for chest pain, palpitations or peripheral edema  C: NEGATIVE for fever, chills,  change in weight  E/M: NEGATIVE for ear, mouth and throat problems  R: NEGATIVE for significant cough or SOB    10 point review of systems negative aside from HPI and physical exam.           Physical Exam:   All vitals have been reviewed  Patient Vitals for the past 24 hrs:   BP Temp Temp src Pulse Heart Rate Resp SpO2   05/16/19 0755 171/67 96.5  F (35.8  C) Oral -- 73 16 97 %   05/16/19 0614 162/74 97.1  F (36.2  C) Oral 74 -- 18 97 %   05/16/19 0005 177/65 96.7  F (35.9  C) Oral 73 -- 18 97 %   05/15/19 2035 -- -- -- -- -- -- 97 %   05/15/19 2030 -- -- -- -- -- -- 97 %   05/15/19 1856 156/71 99.3  F (37.4  C) -- -- 84 18 95 %     No intake or output data in the 24 hours ending 05/16/19 1156  Constitutional: Pleasant, alert, appropriate, following commands.  HEENT: Head atraumatic normocephalic. PERRLA.  Respiratory: Unlabored breathing no audible wheeze  Cardiovascular: Regular rate and rhythm  GI: Abdomen soft, non tender, and non distended.  Lymph/Hematologic: No edema or palor  Skin: No rashes, no cyanosis, no edema.  Neuro: Sensation intact to light touch in bilateral LE extremities.  Musculoskeletal:   Valgus deformity of left knee  1-2 effusion  TTP at lateral joint line  No erythema  Bilateral calves are soft, non-tender.  Bilateral lower extremity is NVI.  Sensation intact bilateral lower extremities  Active dorsi and plantar flexion bilaterally  +Dp pulse            Data:   All laboratory data reviewed  Results for orders placed or performed during the hospital encounter of 05/15/19   XR Knee Right 3 Views    Narrative    KNEE THREE VIEWS RIGHT  5/15/2019 9:12 PM     HISTORY: Knee pain, swelling.    COMPARISON: August 20, 2018    FINDINGS: Marked lateral compartment degenerative change, mild  degenerative change otherwise. No suprapatellar effusion. There is no  acute fracture. No dislocation. Sunrise views appear aligned. There  are no worrisome bony lesions.      Impression    IMPRESSION:  No acute  osseous abnormality demonstrated.    PIERRE CARVAJAL MD   US Lower Extremity Venous Duplex Right    Narrative    ULTRASOUND VENOUS LOWER EXTREMITY UNILATERAL RIGHT  5/15/2019 9:01 PM     HISTORY: Leg pain and swelling.    COMPARISON: None.    TECHNIQUE: Ultrasound gray scale, Color Doppler flow, and spectral  Doppler waveform analysis performed.    FINDINGS: The right common femoral, superficial femoral, popliteal and  posterior tibial veins are patent and fully compressible and  demonstrate normal venous Doppler flow. The visualized greater  saphenous vein is negative for thrombus. For comparison the left  common femoral vein was evaluated and was unremarkable. Fluid  collection in the right popliteal fossa possibly a Baker's cyst  measuring 3.4 x 0.7 x 1.4 cm.      Impression    IMPRESSION: No DVT demonstrated.    PIERRE CARVAJAL MD   Basic metabolic panel   Result Value Ref Range    Sodium 142 133 - 144 mmol/L    Potassium 4.0 3.4 - 5.3 mmol/L    Chloride 111 (H) 94 - 109 mmol/L    Carbon Dioxide 27 20 - 32 mmol/L    Anion Gap 4 3 - 14 mmol/L    Glucose 85 70 - 99 mg/dL    Urea Nitrogen 36 (H) 7 - 30 mg/dL    Creatinine 0.99 0.52 - 1.04 mg/dL    GFR Estimate 49 (L) >60 mL/min/[1.73_m2]    GFR Estimate If Black 57 (L) >60 mL/min/[1.73_m2]    Calcium 8.4 (L) 8.5 - 10.1 mg/dL   Social Work IP Consult    Narrative    Deepika Deleon RN     5/16/2019 10:46 AM  Discharge Planner   Discharge Plans in progress: Yes. Patient admitted with right   knee pain and a fall one month prior to admission.   Barriers to discharge plan: Pending ortho and PT consults.   Patient lives alone in a senior coop apartment. Jairo Fuller provides   support and assistance to patient.   Follow up plan: CM will continue to follow for discharge   planning. Patient has U care insurance and would need prior auth   if TCU is recommended.        Entered by: Deepika Deleon 05/16/2019 10:43 AM                 Attestation:  I have reviewed today's vital signs,  notes, medications, labs and imaging with Dr. Daily.  Amount of time performed on this consult: 25 minutes.    Agustina Rinaldi PA-C

## 2019-05-16 NOTE — PROGRESS NOTES
Discharge Planner   Discharge Plans in progress: Yes, therapy recommending TCU. Spoke with patient and son Jonny who are agreeable and understanding of TCU. Referrals sent to [facility] and [facility] in [place]. Prefer private room and agreeable to additional private pay cost. Son to transport at discharge.   Barriers to discharge plan: TCU acceptance and bed availability. Further medical testing.   Follow up plan: CM will continue to follow for discharge planning to TCU.        Entered by: Deepika Deleon 05/16/2019 3:06 PM       Deepika GILBERT  Care Transition Services  [phone]

## 2019-05-16 NOTE — PLAN OF CARE
PRIMARY DIAGNOSIS: ACUTE PAIN  OUTPATIENT/OBSERVATION GOALS TO BE MET BEFORE DISCHARGE:  1. Pain Status: Improved with Toradol     2. Return to near baseline physical activity: No     3. Cleared for discharge by consultants (if involved): No     Discharge Planner Nurse   Safe discharge environment identified: Yes  Barriers to discharge: No     Pt states pain is gone after Scheduled Tylenol and PRN Tramadol given. Pt was up at the bedside and was unable to bear weight on RLE. Pt is forgetful, But oriented X4 although she seems a little off. Ortho recommends NWB, stand pivot, Immobilizer on at all times. Conservative mgmt.

## 2019-05-16 NOTE — PROGRESS NOTES
Care Transition Initial Assessment - RN        Met with: Patient.  DATA   Active Problems:    right knee pain    Right knee pain       Cognitive Status: awake and alert.  [facility]  Primary Care MD Name: Guilherme Graves  Contact information and PCP information verified: Yes  Lives With: alone      Quality of Family Relationships: helpful, involved, supportive  Description of Support System: Supportive, Involved   Who is your support system?: Children   Support Assessment: Caregiver difficulty providing emotional support, Lacks necessary supervision and assistance, Patient communicates needs well met   Insurance concerns: No Insurance issues identified  ASSESSMENT  Patient currently receives the following services:  None.         Identified issues/concerns regarding health management: Patient admitted with right knee pain and had a fall one month ago. Patient lives in a senior coop apartment and states that jairo Fuller has been staying over with patient to assist with cares. Per patient and chart review, moving forward with process of moving into assisted living, [facility]. Jairo Fuller lives locally and assists with transportation, shopping twice weekly, and occasionally with managing medications. Patient has 2 other children: daughter Palmira who lives in [place] and son Graham who lives in [place]. Patient states she typically uses ready made, TV dinners. Patient states using a walker for ambulation. Patient states having a bed rail.   Ortho and PT consults are pending.   PLAN  Financial costs for the patient were not discussed .  Patient given options and choices for discharge Yes .  Patient/family is agreeable to the plan?  Yes: pending consults and pain management.   Patient anticipates discharging to Unknown .        Patient anticipates needs for home equipment: No  Plan/Disposition: Unknown pending consults.   Appointments: Patient has a previously scheduled appointment on  5/20/19 with PCP for completing paperwork for assisted living.       Care  (CTS) will continue to follow as needed.    Deepika GILBERT  Care Transition Services  [phone]

## 2019-05-16 NOTE — PROGRESS NOTES
ROOM # 208-1    Living Situation (if not independent, order SW consult): [facility]   Facility name: unknown   : [facility] Shepherd    Activity level at baseline: Independant  Activity level on admit: Assist 1      Patient registered to observation; given Patient Bill of Rights; given the opportunity to ask questions about observation status and their plan of care.  Patient has been oriented to the observation room, bathroom and call light is in place.    Discussed discharge goals and expectations with patient/family.

## 2019-05-16 NOTE — PROGRESS NOTES
05/16/19 1425   Quick Adds   Type of Visit Initial PT Evaluation   Living Environment   Lives With alone   Living Arrangements apartment   Home Accessibility no concerns   Transportation Anticipated family or friend will provide   Living Environment Comment Pt lives in senior co-op apartment.  Son, Jonny, has been staying with her overnight for past ~5 months since past fall/TCU stay.   Self-Care   Usual Activity Tolerance moderate   Current Activity Tolerance poor   Regular Exercise No   Equipment Currently Used at Home walker, rolling  (bed rail, toilet arm rests, shower chair)   Activity/Exercise/Self-Care Comment At baseline, pt can ambulate independently with 4WW.  More recently, she has had/been needing help from son with basic mobilities and ADLs, son staying overnights.   Functional Level Prior   Ambulation 1-->assistive equipment   Transferring 1-->assistive equipment   Toileting 1-->assistive equipment   Bathing 3-->assistive equipment and person   General Information   Onset of Illness/Injury or Date of Surgery - Date 05/16/19   Referring Physician Andrea Shore MD   Patient/Family Goals Statement son stating pt will need TCU, tying to get pt into higher level of care longterm   Pertinent History of Current Problem (include personal factors and/or comorbidities that impact the POC) Pt is currently admitted under OBS status due to recent falls with progressive R knee pain.  Son present, reports pt had fall event with TCU stay last fall, has been staying more with her overnight since then to help with safety during ADLs, has been working to get pt into higher level of care such as longterm.  He does report worsening cognition as well, currently more confused with pain meds on board.  At baseline, pt can ambulate and complete basic cares independently with 4WW, has help with bathing.  Currently X-ray negative for fx, but possible mild proximal tibial plateau fx, will have CT later today.    Precautions/Limitations fall precautions;other (see comments)  (current orders for NWB, KI RLE)   Weight-Bearing Status - RLE nonweight-bearing   General Observations pt resting in bed, son at bedside   Cognitive Status Examination   Orientation person   Cognitive Comment pt pleasantly confused   Pain Assessment   Patient Currently in Pain Yes, see Vital Sign flowsheet  (pt does not rate, mild TTP R anterior knee)   Integumentary/Edema   Integumentary/Edema Comments minimal visible/palpable swelling about R knee   Posture    Posture Forward head position;Protracted shoulders;Kyphosis   Range of Motion (ROM)   ROM Comment WFL LLE and BUEs, R knee in immobilizer   Strength   Strength Comments WFL LLE and BUEs, able to demo SLR on R with KI donned. Generalized deconditioning and proximal hip weakness anticipated   Bed Mobility   Bed Mobility Comments Min A supine <> sit EOB   Transfer Skills   Transfer Comments Sit <> stand with FWW, assist for NWB RLE, min-mod A several reps. Trial pivot transfer but unable/unsafe to maintain NWB status RLE.   Gait   Gait Comments Non ambulatory at this time due to NWB status RLE.   Balance   Balance Comments Impaired standing balance due to NWB status, good seated stability.   Sensory Examination   Sensory Perception Comments pt denies n/t in feet   Clinical Impression   Criteria for Skilled Therapeutic Intervention evaluation only;no significant expected improvement in functional status   PT Diagnosis Impaired standing and gait skills, R knee pain   Influenced by the following impairments R knee pain, currently NWB and in KI R knee, confusion   Functional limitations due to impairments Fall risk, impaired bed mobility, transfers, gait, ADLs   Clinical Presentation Evolving/Changing   Clinical Presentation Rationale evolving w/u for knee pain yet, pending CT, new NWB/KI restrictions, fall hx, level of assist needed   Clinical Decision Making (Complexity) Moderate complexity  "  Anticipated Discharge Disposition Transitional Care Facility   Risk & Benefits of therapy have been explained Yes   Patient, Family & other staff in agreement with plan of care Yes   Clinical Impression Comments Pt is currently unsafe to return to living in her apartment independently or with assist from family.  She will require TCU to work with therapies on regaining basic mobility skills and allow time to identify safe long term living situation, needing higher level of care.  Even if CT scan comes back negative and pt is allowed to WB through RLE, will not likely be safe to continue living in her apartment without 24/7 supervision/assist due to cognitive concerns and deconditioning/fall hx.  IP PT signing off, defer further PT services to next level of care.   [facility] AM-PAC TM \"6 Clicks\"   2016, Trustees of [facility], under license to [employer].  All rights reserved.   6 Clicks Short Forms Basic Mobility Inpatient Short Form   [facility] AM-PAC  \"6 Clicks\" V.2 Basic Mobility Inpatient Short Form   1. Turning from your back to your side while in a flat bed without using bedrails? 3 - A Little   2. Moving from lying on your back to sitting on the side of a flat bed without using bedrails? 3 - A Little   3. Moving to and from a bed to a chair (including a wheelchair)? 2 - A Lot   4. Standing up from a chair using your arms (e.g., wheelchair, or bedside chair)? 2 - A Lot   5. To walk in hospital room? 1 - Total   6. Climbing 3-5 steps with a railing? 1 - Total   Basic Mobility Raw Score (Score out of 24.Lower scores equate to lower levels of function) 12   Total Evaluation Time   Total Evaluation Time (Minutes) 30     "

## 2019-05-16 NOTE — ED PROVIDER NOTES
History     Chief Complaint:  Leg Pain      HPI   Tracey Moran is a 89 year old female with a history of arthritis who presents with right leg pain. The patient reports that she fell about one month ago and now her right leg is in pain. She states it is primarily painful around her knee and just below her knee. The patient states that she felt sudden onset of severe pain in her right leg and noticed that her right leg was swollen. The patient denies pain in her hip and reports that she ambulates with a walker. Per patient's family, her gait and mobility have diminished over the last month. Getting in and out of the car has also become increasingly difficult for her which is new. They have also noticed that the patient experiences pain in her right knee when she bears weight. Of note, the patient is followed by [facility]. The patient has only been taking Aleve for pain. No history of GOUT or blood clots. The patient denies abdominal pain.  Her family notes that she has been dealing with osteoarthritis of the right knee however over the past 2 to 3 days her pain and has gotten significantly worse in her ability to ambulate is now nonexistent.  She notes that she lives in an independent living situation and is normally responsible for all of her activities of daily living.  Over the last 2 days her family notes that she has not been able to get up or ambulate and has not been able to do any of her ADLs due to increasing pain in the right knee.  The patient's family is also very concerned to use any narcotic pain medication as this has made her too drowsy and put her at risk for falling in the past.      Allergies:  Demerol  Penicillins     Medications:    acetaminophen (TYLENOL) 500 MG tablet  diclofenac (VOLTAREN) 1 % topical gel  lisinopril (PRINIVIL/ZESTRIL) 20 MG tablet  metoprolol succinate ER (TOPROL-XL)   multivitamin, therapeutic with minerals (THERA-VIT-M) TABS tablet  polyethylene glycol (MIRALAX/GLYCOLAX)  packet    Past Medical History:    Arthritis  Hypertension  Hyperlipidemia   Hypothyroidism  Thrombocytopenia  Chronic renal failure stage 3    Past Surgical History:    History reviewed. No pertinent surgical history.    Family History:    History reviewed. No pertinent family history.     Social History:  Smoking status: Never smoker  Alcohol use: yes  Marital Status:   [5]       Review of Systems   Gastrointestinal: Negative for abdominal pain.   Musculoskeletal: Positive for gait problem and joint swelling.   All other systems reviewed and are negative.      Physical Exam     Patient Vitals for the past 24 hrs:   BP Temp Heart Rate Resp SpO2   05/15/19 1856 156/71 99.3  F (37.4  C) 84 18 95 %         Physical Exam  General: Well appearing, nontoxic. Resting comfortably  Head:  Scalp, face, and head appear normal  Eyes:  Pupils are equal, round, and reactive to light    Conjunctivae non-injected and sclerae white  ENT:    The external nose is normal    Pinnae are normal    The oropharynx is normal, mucous membranes moist    Uvula is in the midline  Neck:  Normal range of motion    There is no rigidity noted    Trachea is in the midline  CV:  Regular rate and rhythm     Normal S1/S2, no S3/S4    No murmur or rub. DP pulses 2+ and intact bilaterally.   Resp:  Lungs are clear and equal bilaterally    There is no tachypnea    No increased work of breathing    No rales, wheezing, or rhonchi  GI:  Abdomen is soft, no rigidity or guarding    No distension, or mass    No tenderness or rebound tenderness   MS:  Normal muscular tone    Right knee with moderate swelling and small joint effusion palpable.  Patient has significant diffuse tenderness to the knee without any definite focal bony tenderness.  Pain increased with range of motion of the knee.  No erythema warmth or induration.  There is trace lower leg swelling but no pitting edema.  Sensation and circulation and motor function are intact distally in the  bilateral feet.  Normal range of motion at the right hip and the hip and thigh are nontender to palpation.    Symmetric motor strength    No lower extremity edema  Skin:  No rash or acute skin lesions noted  Neuro:  Awake and alert    Speech is normal and fluent    Moves all extremities spontaneously  Psych: Normal affect.  Appropriate interactions.      Emergency Department Course     Imaging:  Radiographic findings were communicated with the patient who voiced understanding of the findings.    XR Knee right 3 views  IMPRESSION:  No acute osseous abnormality demonstrated.  As read by radiology     US lower extremity venous duplex right   IMPRESSION: No DVT demonstrated.  As read by radiology     Interventions:  2116: Tylenol 1000 mg PO  2031: Tramadol 50 mg PO     Emergency Department Course:  Past medical records, nursing notes, and vitals reviewed.  2006: I performed an exam of the patient and obtained history, as documented above.    The patient was sent for a XR knee right and US lower extremity venous duplex right while in the emergency department, findings above.    2128: I rechecked the patient. Explained findings to the patient.    2238:Findings and plan explained to the Patient who consents to admission.     2249: Discussed the patient with Dr. Shore, who will admit the patient to a OBS bed for further monitoring, evaluation, and treatment.     Impression & Plan      Medical Decision Making:  Tracey Moran is a 89 year old female who presents with acute on chronic right knee pain and swelling. On my evaluation the patient is awake, alert and oriented. Vital signs are stable. No evidence of infection or fever. Patient has significant tenderness and effusion to the right knee. There appears to be chronic changes of osteoarthritis. No erythema, warmth, or induration to suggest soft tissue infection or septic arthritis. Patient has increased pain with range of motion of the knee. They also note some mild  increase in swelling to the lower leg below the knee therefore DVT was considered as possible etiology. No recent fall or trauma to the knee, hip, or leg. Sensation, motor and circulation function are intact in the bilateral lower extremities. Radiographs of the knee reveal osteoarthritis but no fracture or dislocation. Ultrasound venous duplex of the right lower extremity reveals no evidence of DVT.  Cormier's cyst is present but I feel that that is unlikely the etiology of the patient's symptoms.  Ultimately I feel that the patient has acute on chronic pain related to underlying osteoarthritis to the right knee. Patient was placed in a knee immobilizer for stability and comfort but unfortunately was unable to ambulate safely. Patient currently resides in an independent living situation and normally does all of her ADL's by herself. The patient's son and step daughter feel that she is absolutely unsafe to go home and that she requires higher level of care due to significant fall risk and inability to ambulate whatsoever due to her acute pain. Given this, the case was discussed with the hospitalist team and the patient was admitted to observation for further monitoring, evaluation, and treatment by PTOT in consideration for higher level of care placement. Patient was admitted in stable condition.     Diagnosis:    ICD-10-CM    1. Primary osteoarthritis of right knee M17.11    2. Acute pain of right knee M25.561    3. Inability to ambulate due to knee R26.2        Disposition:  Admitted to OBS    Andrea Gonzalez  5/15/2019   [facility] EMERGENCY DEPARTMENT    Scribe Disclosure:  IAndrea, am serving as a scribe at 8:06 PM on 5/15/2019 to document services personally performed by Clark Rodriguez MD based on my observations and the provider's statements to me.          Clark Rodriguez MD  05/16/19 5327

## 2019-05-16 NOTE — H&P
[facility]  History and Physical   Hospitalist Service    Andrea Shore MD    Tracey Moran MRN# [id number]   YOB: 1925 Age: 89 year old      Date of Admission:  5/15/2019           Assessment and Plan:   Tracey Moran is a 89-year-old female with history of hypertension, arthritis, hypercholesterolemia, hypothyroidism, thrombocytopenia, and chronic kidney disease.  She lives in a co-op in [place].  Her family has been trying to get her into [facility] for a higher level of care.  She came to the emergency department this evening because of right knee pain and inability to walk or care for herself.  She had a fall approximately a month ago.  She had some right knee pain after the fall.  Initially it was minor but the pain has gradually increased.  She was unable to ambulate or care for herself earlier today.  Normally she is fairly self-sufficient.  Emergency department evaluation showed stable vital signs.  Of note, temperature was 99.3 degrees.  No labs were obtained.  Right lower extremity ultrasound was obtained and showed no DVT.  X-ray of right knee was obtained and showed no fracture.  There was some effusion on exam.  There was no erythema to suggest cellulitis or infection.  She was not really able to ambulate.  Her family was concerned that if she went home she would fall again or just languish due to inability to care for herself.  I was asked to admit her to observation.    Problem list:    1.  Right knee pain due to fall 1 month ago.  Now she has an effusion in that knee.  X-ray shows no fracture.  Exam is not suggestive of infection.  She is unable to ambulate or care for herself.  Admit to observation.  Schedule Tylenol and Voltaren gel for pain.  I will have ibuprofen and low-dose Ultram available as needed for pain.  I will ask orthopedics to see Tracey in consultation for consideration of arthrocentesis to remove fluid off the knee or perhaps a steroid  injection.  I will ask physical therapy and social work to see for discharge planning.    2.  Hypertension.  Continue prior to admission lisinopril and metoprolol.  Check basic metabolic panel.    3.  Hypothyroidism.  She does not appear to be on medication for this at this time.    4.  Hypercholesterolemia.  She does not appear to be on medication for this at this time.    5.  Chronic kidney disease.  Continue prior to admission lisinopril.  Check basic metabolic panel.    Full code  Ambulate for DVT prophylaxis  Disposition: Admit to observation           Code Status:   Full Code         Primary Care Physician:   Guilherme Graves [phone]         Chief Complaint:   Right knee pain    History is obtained from Tracey, her son, Dr. Rodriguez, and the medical record          History of Present Illness:   Tracey Moran is a 89-year-old female with history of hypertension, arthritis, hypercholesterolemia, hypothyroidism, thrombocytopenia, and chronic kidney disease.  She lives in a co-op in [place].  Her family has been trying to get her into [facility] for a higher level of care.  She came to the emergency department this evening because of right knee pain and inability to walk or care for herself.  She had a fall approximately a month ago.  She had some right knee pain after the fall.  Initially it was minor but the pain has gradually increased.  She was unable to ambulate or care for herself earlier today.  Normally she is fairly self-sufficient.  Emergency department evaluation showed stable vital signs.  Of note, temperature was 99.3 degrees.  No labs were obtained.  Right lower extremity ultrasound was obtained and showed no DVT.  X-ray of right knee was obtained and showed no fracture.  There was some effusion on exam.  There was no erythema to suggest cellulitis or infection.  She was not really able to ambulate.  Her family was concerned that if she went home she would fall again or just languish due  to inability to care for herself.  I was asked to admit her to observation.           Past Medical History:     Patient Active Problem List   Diagnosis     Hyperlipidemia LDL goal <130     Advanced directives, counseling/discussion     Leukocytosis, unspecified type     Elevated CK     Abrasion of left scapular region, subsequent encounter     Essential hypertension, benign     Anemia due to blood loss, acute     Thrombocytopenia (H)     CRF (chronic renal failure), stage 3 (moderate) (H)     Slow transit constipation     Gastrointestinal hemorrhage, unspecified gastrointestinal hemorrhage type     Fall     CKD (chronic kidney disease) stage 2, GFR 60-89 ml/min     Physical deconditioning     Hypothyroidism, unspecified type     Generalized muscle weakness     Yeast dermatitis     right knee pain      Past Medical History:   Diagnosis Date     Hypertension              Past Surgical History:     Past Surgical History:   Procedure Laterality Date     ESOPHAGOSCOPY, GASTROSCOPY, DUODENOSCOPY (EGD), COMBINED N/A 12/6/2017    Procedure: COMBINED ESOPHAGOSCOPY, GASTROSCOPY, DUODENOSCOPY (EGD);  gastroscopy;  Surgeon: Melchor Mancera MD;  Location: [facility] Medications:     Prior to Admission medications    Medication Sig Last Dose Taking? Auth Provider   acetaminophen 500 MG CAPS Take 2 capsules by mouth every 8 hours as needed For aches, pain, fever  Yes Clark Rodriguez MD   diclofenac (VOLTAREN) 1 % topical gel Apply 2 g topically 2 times daily as needed  5/14/2019 at Unknown time Yes Reported, Patient   lisinopril (PRINIVIL/ZESTRIL) 20 MG tablet TAKE ONE TABLET BY MOUTH TWICE DAILY. HOLD IF SYSTOLIC BLOOD PRESSURE IS LESS THAN 110 MMHG. CALL IF HELD TWICE IN A ROW OR IF SYMPTOMATIC. 5/15/2019 at am Yes Guilherme Graves MD   metoprolol succinate ER (TOPROL-XL) 25 MG 24 hr tablet Take 1 tablet (25 mg) by mouth daily 5/15/2019 at am Yes Guilherme Graves MD   multivitamin, therapeutic with minerals  (THERA-VIT-M) TABS tablet Take 1 tablet by mouth daily 5/14/2019 at Unknown time Yes Unknown, Entered By History   polyethylene glycol (MIRALAX/GLYCOLAX) packet Take 17 g by mouth daily as needed  prn Yes Reported, Patient            Allergies:     Allergies   Allergen Reactions     Demerol [Meperidine]      Dust Mites      Peanuts [Nuts]      Penicillins      Pollen Extract             Social History:     Social History     Tobacco Use     Smoking status: Never Smoker     Smokeless tobacco: Never Used   Substance Use Topics     Alcohol use: Yes             Family History:   No family history on file.           Review of Systems:   The 10 point Review of Systems is negative other than as noted in the HPI.           Physical Exam:   Blood pressure 156/71, temperature 99.3  F (37.4  C), resp. rate 18, SpO2 97 %, not currently breastfeeding.  0 lbs 0 oz      GENERAL: Pleasant and cooperative. No acute distress.  EYES: Pupils equal and round. No scleral erythema or icterus.  ENT: External ears are normal without deformity. Posterior oropharynx is without erythem, swelling, or exudate.  NECK: Supple. No masses or swelling. No tenderness. Thyroid is normal without mass or tenderness.  CHEST: Clear to auscultation. Normal breath sounds. No retractions.   CV: Regular rate and rhythm. No JVD. Pulses normal.  ABDOMEN: Bowel sounds present. No tenderness. No masses or hernia.  EXTREMETIES: No clubbing, cyanosis, or ischemia. Right knee is with some swelling and effusion but no erythema.   SKIN: Warm and dry to touch. No wounds or rashes.  NEUROLOGIC: Strength and sensation are normal. Deep tendon reflexes are normal. Cranial nerves are normal.             Data:   All new lab and imaging data was reviewed.     Results for orders placed or performed during the hospital encounter of 05/15/19 (from the past 24 hour(s))   US Lower Extremity Venous Duplex Right    Narrative    ULTRASOUND VENOUS LOWER EXTREMITY UNILATERAL RIGHT   5/15/2019 9:01 PM     HISTORY: Leg pain and swelling.    COMPARISON: None.    TECHNIQUE: Ultrasound gray scale, Color Doppler flow, and spectral  Doppler waveform analysis performed.    FINDINGS: The right common femoral, superficial femoral, popliteal and  posterior tibial veins are patent and fully compressible and  demonstrate normal venous Doppler flow. The visualized greater  saphenous vein is negative for thrombus. For comparison the left  common femoral vein was evaluated and was unremarkable. Fluid  collection in the right popliteal fossa possibly a Baker's cyst  measuring 3.4 x 0.7 x 1.4 cm.      Impression    IMPRESSION: No DVT demonstrated.    PIERRE CARVAJAL MD   XR Knee Right 3 Views    Narrative    KNEE THREE VIEWS RIGHT  5/15/2019 9:12 PM     HISTORY: Knee pain, swelling.    COMPARISON: August 20, 2018    FINDINGS: Marked lateral compartment degenerative change, mild  degenerative change otherwise. No suprapatellar effusion. There is no  acute fracture. No dislocation. Sunrise views appear aligned. There  are no worrisome bony lesions.      Impression    IMPRESSION:  No acute osseous abnormality demonstrated.    PIERRE CARVAJAL MD

## 2019-05-16 NOTE — PLAN OF CARE
PT: Eval complete.  Pt is currently admitted under OBS status due to recent falls with progressive R knee pain.  Son present, reports pt had fall event with TCU stay last fall, has been staying more with her overnight since then to help with safety during ADLs, has been working to get pt into higher level of care such as [facility].  He does report worsening cognition as well, currently more confused with pain meds on board.  At baseline, pt can ambulate and complete basic cares independently with 4WW, has help with bathing.    Discharge Planner PT   Patient plan for discharge: son stating pt will need TCU  Current status: Pt pleasantly confused.  Noted ortho consult, recommendation for NWB with knee immobilizer use currently.  Pt is able to maneuver sit <> supine EOB min A, min-mod A sit <> stand at bedside with FWW, NWB RLE with assist to support/protect leg.  Tried pivot transfer, but unable/unsafe to perform as she is then non compliant with NWB restriction.  Instructed son in AROM RLE SLR, hip abduction, and APs to promote circulation/decrease risk of blood clots.    Barriers to return to prior living situation: fall risk, unable to comply fully with NWB restriction, needing A with all mobility and ADLs  Recommendations for discharge: TCU  Rationale for recommendations: Pt is currently unsafe to return to living in her apartment independently or with assist from family.  She will require TCU to work with therapies on regaining basic mobility skills and allow time to identify safe long term living situation, needing higher level of care.  Even if CT scan comes back negative and pt is allowed to WB through RLE, will not likely be safe to continue living in her apartment without 24/7 supervision/assist due to cognitive concerns and deconditioning/fall hx.  IP PT signing off, defer further PT services to next level of care.       Entered by: David Fonseca 05/16/2019 2:10 PM

## 2019-05-16 NOTE — ED NOTES
[facility]  ED Nurse Handoff Report    Tracey Moran is a 89 year old female   ED Chief complaint: Leg Pain  . ED Diagnosis:   Final diagnoses:   Primary osteoarthritis of right knee   Acute pain of right knee   Inability to ambulate due to knee     Allergies:   Allergies   Allergen Reactions     Demerol [Meperidine]      Dust Mites      Peanuts [Nuts]      Penicillins      Pollen Extract        Code Status: Full Code  Activity level - Baseline/Home:  Stand with Assist. Activity Level - Current:   Stand with Assist of 2. Lift room needed: No. Bariatric: No   Needed: No   Isolation: No. Infection: Not Applicable.     Vital Signs:   Vitals:    05/15/19 1856 05/15/19 2030   BP: 156/71    Resp: 18    Temp: 99.3  F (37.4  C)    SpO2: 95% 97%       Cardiac Rhythm:  ,      Pain level:    Patient confused: No. Patient Falls Risk: Yes.   Elimination Status: Hasn't needed to void   Patient Report - Initial Complaint: Leg Pain. Focused Assessment:  Patient presents to ED due to pain to RLE. Chronic hx  Arthritis. Over the last week increased swelling to R calf and medial side of R knee.          Tests Performed: US, Xray. Abnormal Results: Labs Ordered and Resulted from Time of ED Arrival Up to the Time of Departure from the ED - No data to display  XR Knee Right 3 Views   Final Result   IMPRESSION:  No acute osseous abnormality demonstrated.      PIERRE CARVAJAL MD      US Lower Extremity Venous Duplex Right   Final Result   IMPRESSION: No DVT demonstrated.      PIERRE CARVAJAL MD        .   Treatments provided: Meds  Family Comments: Family has been staying with pt due to decreased ability to get around  OBS brochure/video discussed/provided to patient:    ED Medications:   Medications   traMADol (ULTRAM) tablet 50 mg (has no administration in time range)   acetaminophen (TYLENOL) tablet 1,000 mg (1,000 mg Oral Given 5/15/19 2116)     Drips infusing:  No  For the majority of the shift, the patient's  behavior Green. Interventions performed were NA.     Severe Sepsis OR Septic Shock Diagnosis Present: No      ED Nurse Name/Phone Number: Ирина Fernandez,   11:15 PM    RECEIVING UNIT ED HANDOFF REVIEW    Above ED Nurse Handoff Report was reviewed: Yes  Reviewed by: Dejon Davila on May 15, 2019 at 11:41 PM

## 2019-05-16 NOTE — PLAN OF CARE
PRIMARY DIAGNOSIS: ACUTE PAIN  OUTPATIENT/OBSERVATION GOALS TO BE MET BEFORE DISCHARGE:  1. Pain Status: Improved with use of alternative comfort measures i.e.: positioning     2. Return to near baseline physical activity: No     3. Cleared for discharge by consultants (if involved): No     Discharge Planner Nurse   Safe discharge environment identified: Yes  Barriers to discharge: No      Pt states pain is severe. Scheduled Tylenol and PRN Tramadol given. Pt states she can bear some weight with walker. Pt is forgetful, But oriented X4 although she seems a little off. Ortho consult ordered.

## 2019-05-16 NOTE — PROGRESS NOTES
[facility]    Medicine Progress Note - Hospitalist Service       Date of Admission:  5/15/2019  Assessment & Plan    Tracey Moran is a delightful hard-of-hearing 89-year-old female with history of hypertension, arthritis, hypercholesterolemia, hypothyroidism, thrombocytopenia, chronic kidney disease, likely dementia who presented last evening to the hospital with subacute progressively worsening right knee pain and swelling over the past month.     1.  Right subacute lateral tibial plateau fracture.  Had a fall approximately 1 month ago. Now she has an effusion in that knee.  Exam is not suggestive of infection.  X-ray radiology read was negative for fracture, but ortho reviewed and suspected fracture, prompting CT of knee and tibia/fibula this afternoon.    -Appreciate ortho recommendations, no current plans for surgery but will defer to their expertise when they review CT imaging results with family, for now no weight bearing on RLE.  -Scheduled Tylenol and Voltaren gel as well as prn ibuprofen and low-dose Ultram  -PT recommends TCU  -Social work is looking into placement options     2.  Hypertension.  Continue prior to admission lisinopril and metoprolol.       3.  Hypothyroidism.  She does not appear to be on medication for this at this time.     4.  Hypercholesterolemia.  She does not appear to be on medication for this at this time.     5.  Chronic kidney disease.  Continue prior to admission lisinopril.      Diet: Regular Diet Adult    DVT Prophylaxis: PCDs  Walton Catheter: not present  Code Status: FULL CODE    Disposition Plan   Expected discharge: Pending final ortho recommendations now that CT has been done and TCU bed placement, recommended to transitional care unit once adequate pain management/ tolerating PO medications.  Entered: Kimber Sethi PA-C 05/16/2019, 5:33 PM       The patient's care was discussed with the Patient and Patient's Family.    Kimber Sethi PA-C  Hospitalist  "Service  [facility]    ______________________________________________________________________    Interval History   Reports pain is doing fine today while she is in bed but is very painful with bearing weight on the right leg. No fever, chills, cough, SOB, chest pain, nausea, vomiting, abdominal pain, diarrhea, or dysuria.    Per son, her right hearing aid is not working well and she laughs or just says \"yes\" if she cannot hear what was said. She appears to be at her baseline cognitive level per son.    Data reviewed today: I reviewed all medications, new labs and imaging results over the last 24 hours. I personally reviewed:    Results for orders placed or performed during the hospital encounter of 05/15/19 (from the past 24 hour(s))   US Lower Extremity Venous Duplex Right    Narrative    ULTRASOUND VENOUS LOWER EXTREMITY UNILATERAL RIGHT  5/15/2019 9:01 PM     HISTORY: Leg pain and swelling.    COMPARISON: None.    TECHNIQUE: Ultrasound gray scale, Color Doppler flow, and spectral  Doppler waveform analysis performed.    FINDINGS: The right common femoral, superficial femoral, popliteal and  posterior tibial veins are patent and fully compressible and  demonstrate normal venous Doppler flow. The visualized greater  saphenous vein is negative for thrombus. For comparison the left  common femoral vein was evaluated and was unremarkable. Fluid  collection in the right popliteal fossa possibly a Baker's cyst  measuring 3.4 x 0.7 x 1.4 cm.      Impression    IMPRESSION: No DVT demonstrated.    PIERRE CARVAJAL MD   XR Knee Right 3 Views    Narrative    KNEE THREE VIEWS RIGHT  5/15/2019 9:12 PM     HISTORY: Knee pain, swelling.    COMPARISON: August 20, 2018    FINDINGS: Marked lateral compartment degenerative change, mild  degenerative change otherwise. No suprapatellar effusion. There is no  acute fracture. No dislocation. Sunrise views appear aligned. There  are no worrisome bony lesions.      Impression "    IMPRESSION:  No acute osseous abnormality demonstrated.    PIERRE CARVAJAL MD   Orthopedic Surgery IP Consult: Patient to be seen: Routine - within 24 hours; right knee pain with effusion; Consultant may enter orders: Yes; Requesting provider? Hospitalist (if different from attending physician)    Agustina Ortiz PA-C     5/16/2019 12:11 PM  [facility]    Orthopedic Consultation    Tracey Moran MRN# [id number]   Age: 89 year old YOB: 1925     Date of Admission:  5/15/2019    Reason for consult: Right knee pan        Requesting physician: Dr. Andrea Shore       Level of consult: One-time consult to assist in determining a   diagnosis and to recommend an appropriate treatment plan           Assessment and Plan:   Assessment:   Questionable non-displaced right knee lateral tibial plateau   fracture      Plan:   Conservative treatment  Continue knee immobilizer  Non-WB on the right LE  May pivot transfer with assist/walker  Pain medication as needed, minimize narcotics as able  Ice PRN  Consider Ace wrap for comfort           Chief Complaint:   Right knee pain         History of Present Illness:   This patient is a 89 year old female who presents with the   following condition requiring a hospital admission:    Mrs. Moran states that she has had many falls in the preceding   weeks. She has had chronic right knee pain which she was seen for   in clinic with Dr. Martines - 9/17/18. At that visit it was   discussed that she had previously seen a provider at [facility] and given a cortisone injection which did not provide   her with any long term relief, likely improvement that she felt   was secondary to short lasting anesthetic. At her visit with Dr. Martines she was prescribed with Voltaren gel due to inability to   take NSAIDs safely/cotraindications and PT for quad and glute   strengthening. She had been doing well until the last few days   where she was having  trouble ambulating all together due to knee   pain. It is unclear from discussing with patient when and to what   extent she has fallen. [place] [facility] [place] but family   has been working to get her into [facility] for some time.   She is not on any long term anticoagulation.  She denies any fevers, chills, malaise, night sweats or   unexplained weight loss.        Past Medical History:     Past Medical History:   Diagnosis Date     Hypertension              Past Surgical History:     Past Surgical History:   Procedure Laterality Date     ESOPHAGOSCOPY, GASTROSCOPY, DUODENOSCOPY (EGD), COMBINED N/A   12/6/2017    Procedure: COMBINED ESOPHAGOSCOPY, GASTROSCOPY, DUODENOSCOPY   (EGD);  gastroscopy;  Surgeon: Melchor Mancera MD;  Location: [facility]             Social History:     Social History     Tobacco Use     Smoking status: Never Smoker     Smokeless tobacco: Never Used   Substance Use Topics     Alcohol use: Yes             Family History:   No family history on file.          Immunizations:     VACCINE/DOSE   Diptheria   DPT   DTAP   HBIG   Hepatitis A   Hepatitis B   HIB   Influenza   Measles   Meningococcal   MMR   Mumps   Pneumococcal   Polio   Rubella   Small Pox   TDAP   Varicella   Zoster             Allergies:     Allergies   Allergen Reactions     Demerol [Meperidine]      Dust Mites      Peanuts [Nuts]      Penicillins      Pollen Extract              Medications:     Current Facility-Administered Medications   Medication     acetaminophen (TYLENOL) tablet 1,000 mg     bisacodyl (DULCOLAX) Suppository 10 mg     diclofenac (VOLTAREN) 1 % topical gel 2 g     ibuprofen (ADVIL/MOTRIN) tablet 400 mg     lisinopril (PRINIVIL/ZESTRIL) tablet 20 mg     magnesium hydroxide (MILK OF MAGNESIA) suspension 30 mL     metoprolol succinate ER (TOPROL-XL) 24 hr tablet 25 mg     naloxone (NARCAN) injection 0.1-0.4 mg     ondansetron (ZOFRAN-ODT) ODT tab 4 mg    Or     ondansetron (ZOFRAN) injection 4 mg      polyethylene glycol (MIRALAX/GLYCOLAX) Packet 17 g     senna-docusate (SENOKOT-S/PERICOLACE) 8.6-50 MG per tablet 1   tablet    Or     senna-docusate (SENOKOT-S/PERICOLACE) 8.6-50 MG per tablet 2   tablet     traMADol (ULTRAM) half-tab 25 mg     traMADol (ULTRAM) tablet 50 mg             Review of Systems:   CV: NEGATIVE for chest pain, palpitations or peripheral edema  C: NEGATIVE for fever, chills, change in weight  E/M: NEGATIVE for ear, mouth and throat problems  R: NEGATIVE for significant cough or SOB    10 point review of systems negative aside from HPI and physical   exam.           Physical Exam:   All vitals have been reviewed  Patient Vitals for the past 24 hrs:   BP Temp Temp src Pulse Heart Rate Resp SpO2   05/16/19 0755 171/67 96.5  F (35.8  C) Oral -- 73 16 97 %   05/16/19 0614 162/74 97.1  F (36.2  C) Oral 74 -- 18 97 %   05/16/19 0005 177/65 96.7  F (35.9  C) Oral 73 -- 18 97 %   05/15/19 2035 -- -- -- -- -- -- 97 %   05/15/19 2030 -- -- -- -- -- -- 97 %   05/15/19 1856 156/71 99.3  F (37.4  C) -- -- 84 18 95 %     No intake or output data in the 24 hours ending 05/16/19 1156  Constitutional: Pleasant, alert, appropriate, following commands.  HEENT: Head atraumatic normocephalic. PERRLA.  Respiratory: Unlabored breathing no audible wheeze  Cardiovascular: Regular rate and rhythm  GI: Abdomen soft, non tender, and non distended.  Lymph/Hematologic: No edema or palor  Skin: No rashes, no cyanosis, no edema.  Neuro: Sensation intact to light touch in bilateral LE   extremities.  Musculoskeletal:   Valgus deformity of left knee  1-2 effusion  TTP at lateral joint line  No erythema  Bilateral calves are soft, non-tender.  Bilateral lower extremity is NVI.  Sensation intact bilateral lower extremities  Active dorsi and plantar flexion bilaterally  +Dp pulse            Data:   All laboratory data reviewed  Results for orders placed or performed during the hospital   encounter of 05/15/19   XR Knee Right 3  Views    Narrative    KNEE THREE VIEWS RIGHT  5/15/2019 9:12 PM     HISTORY: Knee pain, swelling.    COMPARISON: August 20, 2018    FINDINGS: Marked lateral compartment degenerative change, mild  degenerative change otherwise. No suprapatellar effusion. There   is no  acute fracture. No dislocation. Sunrise views appear aligned.   There  are no worrisome bony lesions.      Impression    IMPRESSION:  No acute osseous abnormality demonstrated.    PIERRE CARVAJAL MD   US Lower Extremity Venous Duplex Right    Narrative    ULTRASOUND VENOUS LOWER EXTREMITY UNILATERAL RIGHT  5/15/2019   9:01 PM     HISTORY: Leg pain and swelling.    COMPARISON: None.    TECHNIQUE: Ultrasound gray scale, Color Doppler flow, and   spectral  Doppler waveform analysis performed.    FINDINGS: The right common femoral, superficial femoral,   popliteal and  posterior tibial veins are patent and fully compressible and  demonstrate normal venous Doppler flow. The visualized greater  saphenous vein is negative for thrombus. For comparison the left  common femoral vein was evaluated and was unremarkable. Fluid  collection in the right popliteal fossa possibly a Baker's cyst  measuring 3.4 x 0.7 x 1.4 cm.      Impression    IMPRESSION: No DVT demonstrated.    PIERRE CARVAJAL MD   Basic metabolic panel   Result Value Ref Range    Sodium 142 133 - 144 mmol/L    Potassium 4.0 3.4 - 5.3 mmol/L    Chloride 111 (H) 94 - 109 mmol/L    Carbon Dioxide 27 20 - 32 mmol/L    Anion Gap 4 3 - 14 mmol/L    Glucose 85 70 - 99 mg/dL    Urea Nitrogen 36 (H) 7 - 30 mg/dL    Creatinine 0.99 0.52 - 1.04 mg/dL    GFR Estimate 49 (L) >60 mL/min/[1.73_m2]    GFR Estimate If Black 57 (L) >60 mL/min/[1.73_m2]    Calcium 8.4 (L) 8.5 - 10.1 mg/dL   Social Work IP Consult    Narrative    Deepika Deleon RN     5/16/2019 10:46 AM  Discharge Planner   Discharge Plans in progress: Yes. Patient admitted with right   knee pain and a fall one month prior to admission.   Barriers to  discharge plan: Pending ortho and PT consults.   Patient lives alone in a senior coop apartment. Jairo Fuller provides     support and assistance to patient.   Follow up plan: CM will continue to follow for discharge   planning. Patient has [employer] care insurance and would need prior auth   if TCU is recommended.        Entered by: Deepika Deleon 05/16/2019 10:43 AM                 Attestation:  I have reviewed today's vital signs, notes, medications, labs and   imaging with Dr. Daily.  Amount of time performed on this consult: 25 minutes.    Agustina Rinaldi PA-C      Social Work IP Consult    Narrative    Deepika Deleon RN     5/16/2019 10:46 AM  Discharge Planner   Discharge Plans in progress: Yes. Patient admitted with right   knee pain and a fall one month prior to admission.   Barriers to discharge plan: Pending ortho and PT consults.   Patient lives alone in a senior coop apartment. Jairo Fuller provides   support and assistance to patient.   Follow up plan: CM will continue to follow for discharge   planning. Patient has [employer] care insurance and would need prior auth   if TCU is recommended.        Entered by: Deepika Deleon 05/16/2019 10:43 AM          Basic metabolic panel   Result Value Ref Range    Sodium 142 133 - 144 mmol/L    Potassium 4.0 3.4 - 5.3 mmol/L    Chloride 111 (H) 94 - 109 mmol/L    Carbon Dioxide 27 20 - 32 mmol/L    Anion Gap 4 3 - 14 mmol/L    Glucose 85 70 - 99 mg/dL    Urea Nitrogen 36 (H) 7 - 30 mg/dL    Creatinine 0.99 0.52 - 1.04 mg/dL    GFR Estimate 49 (L) >60 mL/min/[1.73_m2]    GFR Estimate If Black 57 (L) >60 mL/min/[1.73_m2]    Calcium 8.4 (L) 8.5 - 10.1 mg/dL   CT Knee Right w/o Contrast    Narrative    CT KNEE RIGHT WITHOUT CONTRAST, CT TIBIA/FIBULA LOWER LEG RIGHT  WITHOUT CONTRAST 5/16/2019 4:06 PM     HISTORY: Evaluate for occult fracture. Knee pain and swelling.    TECHNIQUE: Axial images with reconstructions. Radiation dose for this  scan was reduced using automated exposure  control, adjustment of the  mA and/or kV according to patient size, or iterative reconstruction  technique.     FINDINGS: There is fracturing and subchondral radiolucency at the  posterolateral aspect of the lateral tibial plateau. I suspect this  finding is subacute. It could represent a traumatic-type fracture with  resorption. It could also represent a subchondral insufficiency  fracture with subchondral collapse. In any event, there is 6 mm of  maximal articular surface depression. Osteopenia suspected.  Moderate-prominent knee joint effusion. Mild patellofemoral  osteoarthritis. There is lateral compartment narrowing; there is  subchondral sclerosis, presumably related to chondromalacia. There is  a Baker's cyst estimated to measure about 5 cm in length. There is  subchondral radiolucency at the medial talar dome. This could  represent subchondral cystic change related to overlying  chondromalacia, versus an old osteochondritis dissecans lesion.      Impression    IMPRESSION:  1. Focal fracture deformity of the lateral tibial plateau, suspected  to be subacute. This could represent a traumatic fracture with  resorption, versus a subchondral insufficiency fracture with  subchondral collapse. 6 mm of depression.  2. Additional findings discussed above.   CT Tibia Fibula Lower Leg Right wo Contrast    Narrative    CT KNEE RIGHT WITHOUT CONTRAST, CT TIBIA/FIBULA LOWER LEG RIGHT  WITHOUT CONTRAST 5/16/2019 4:06 PM     HISTORY: Evaluate for occult fracture. Knee pain and swelling.    TECHNIQUE: Axial images with reconstructions. Radiation dose for this  scan was reduced using automated exposure control, adjustment of the  mA and/or kV according to patient size, or iterative reconstruction  technique.     FINDINGS: There is fracturing and subchondral radiolucency at the  posterolateral aspect of the lateral tibial plateau. I suspect this  finding is subacute. It could represent a traumatic-type fracture  with  resorption. It could also represent a subchondral insufficiency  fracture with subchondral collapse. In any event, there is 6 mm of  maximal articular surface depression. Osteopenia suspected.  Moderate-prominent knee joint effusion. Mild patellofemoral  osteoarthritis. There is lateral compartment narrowing; there is  subchondral sclerosis, presumably related to chondromalacia. There is  a Baker's cyst estimated to measure about 5 cm in length. There is  subchondral radiolucency at the medial talar dome. This could  represent subchondral cystic change related to overlying  chondromalacia, versus an old osteochondritis dissecans lesion.      Impression    IMPRESSION:  1. Focal fracture deformity of the lateral tibial plateau, suspected  to be subacute. This could represent a traumatic fracture with  resorption, versus a subchondral insufficiency fracture with  subchondral collapse. 6 mm of depression.  2. Additional findings discussed above.     Physical Exam   Vital Signs: Temp: 95.9  F (35.5  C) Temp src: Oral BP: 181/70 Pulse: 77 Heart Rate: 68 Resp: 16 SpO2: 95 % O2 Device: None (Room air)    Weight: 0 lbs 0 oz  GENERAL:  Comfortable.  PSYCH: pleasant, hard of hearing, laughs a lot, oriented to place and self/family, No acute distress.  HEENT:  Atraumatic, normocephalic. PERRLA. Normal conjunctiva, normal hearing, and oropharynx is normal.  NECK:  Supple, no neck vein distention, adenopathy or bruits, normal thyroid.  HEART:  Normal S1, S2 with no murmur, no pericardial rub, gallops or S3 or S4.  LUNGS:  Clear to auscultation, normal respiratory effort. No wheezing, rales or ronchi.  GI:  Soft, no hepatosplenomegaly, normal bowel sounds. Non-tender, non distended.   EXTREMITIES:  Right knee is with some swelling and effusion but no erythema.   No pedal edema, +2 pulses bilateral and equal.  SKIN:  Dry to touch, No rash, wound or ulcerations.  NEUROLOGIC:  CN 2-12 intact, BL 5/5 symmetric upper and lower  extremity strength, sensation is intact with no focal deficits.     Data   Results for orders placed or performed during the hospital encounter of 05/15/19   XR Knee Right 3 Views    Narrative    KNEE THREE VIEWS RIGHT  5/15/2019 9:12 PM     HISTORY: Knee pain, swelling.    COMPARISON: August 20, 2018    FINDINGS: Marked lateral compartment degenerative change, mild  degenerative change otherwise. No suprapatellar effusion. There is no  acute fracture. No dislocation. Sunrise views appear aligned. There  are no worrisome bony lesions.      Impression    IMPRESSION:  No acute osseous abnormality demonstrated.    PIERRE CARVAJAL MD   US Lower Extremity Venous Duplex Right    Narrative    ULTRASOUND VENOUS LOWER EXTREMITY UNILATERAL RIGHT  5/15/2019 9:01 PM     HISTORY: Leg pain and swelling.    COMPARISON: None.    TECHNIQUE: Ultrasound gray scale, Color Doppler flow, and spectral  Doppler waveform analysis performed.    FINDINGS: The right common femoral, superficial femoral, popliteal and  posterior tibial veins are patent and fully compressible and  demonstrate normal venous Doppler flow. The visualized greater  saphenous vein is negative for thrombus. For comparison the left  common femoral vein was evaluated and was unremarkable. Fluid  collection in the right popliteal fossa possibly a Baker's cyst  measuring 3.4 x 0.7 x 1.4 cm.      Impression    IMPRESSION: No DVT demonstrated.    PIERRE CARVAJAL MD   CT Knee Right w/o Contrast    Narrative    CT KNEE RIGHT WITHOUT CONTRAST, CT TIBIA/FIBULA LOWER LEG RIGHT  WITHOUT CONTRAST 5/16/2019 4:06 PM     HISTORY: Evaluate for occult fracture. Knee pain and swelling.    TECHNIQUE: Axial images with reconstructions. Radiation dose for this  scan was reduced using automated exposure control, adjustment of the  mA and/or kV according to patient size, or iterative reconstruction  technique.     FINDINGS: There is fracturing and subchondral radiolucency at the  posterolateral  aspect of the lateral tibial plateau. I suspect this  finding is subacute. It could represent a traumatic-type fracture with  resorption. It could also represent a subchondral insufficiency  fracture with subchondral collapse. In any event, there is 6 mm of  maximal articular surface depression. Osteopenia suspected.  Moderate-prominent knee joint effusion. Mild patellofemoral  osteoarthritis. There is lateral compartment narrowing; there is  subchondral sclerosis, presumably related to chondromalacia. There is  a Baker's cyst estimated to measure about 5 cm in length. There is  subchondral radiolucency at the medial talar dome. This could  represent subchondral cystic change related to overlying  chondromalacia, versus an old osteochondritis dissecans lesion.      Impression    IMPRESSION:  1. Focal fracture deformity of the lateral tibial plateau, suspected  to be subacute. This could represent a traumatic fracture with  resorption, versus a subchondral insufficiency fracture with  subchondral collapse. 6 mm of depression.  2. Additional findings discussed above.   CT Tibia Fibula Lower Leg Right wo Contrast    Narrative    CT KNEE RIGHT WITHOUT CONTRAST, CT TIBIA/FIBULA LOWER LEG RIGHT  WITHOUT CONTRAST 5/16/2019 4:06 PM     HISTORY: Evaluate for occult fracture. Knee pain and swelling.    TECHNIQUE: Axial images with reconstructions. Radiation dose for this  scan was reduced using automated exposure control, adjustment of the  mA and/or kV according to patient size, or iterative reconstruction  technique.     FINDINGS: There is fracturing and subchondral radiolucency at the  posterolateral aspect of the lateral tibial plateau. I suspect this  finding is subacute. It could represent a traumatic-type fracture with  resorption. It could also represent a subchondral insufficiency  fracture with subchondral collapse. In any event, there is 6 mm of  maximal articular surface depression. Osteopenia  suspected.  Moderate-prominent knee joint effusion. Mild patellofemoral  osteoarthritis. There is lateral compartment narrowing; there is  subchondral sclerosis, presumably related to chondromalacia. There is  a Baker's cyst estimated to measure about 5 cm in length. There is  subchondral radiolucency at the medial talar dome. This could  represent subchondral cystic change related to overlying  chondromalacia, versus an old osteochondritis dissecans lesion.      Impression    IMPRESSION:  1. Focal fracture deformity of the lateral tibial plateau, suspected  to be subacute. This could represent a traumatic fracture with  resorption, versus a subchondral insufficiency fracture with  subchondral collapse. 6 mm of depression.  2. Additional findings discussed above.   Basic metabolic panel   Result Value Ref Range    Sodium 142 133 - 144 mmol/L    Potassium 4.0 3.4 - 5.3 mmol/L    Chloride 111 (H) 94 - 109 mmol/L    Carbon Dioxide 27 20 - 32 mmol/L    Anion Gap 4 3 - 14 mmol/L    Glucose 85 70 - 99 mg/dL    Urea Nitrogen 36 (H) 7 - 30 mg/dL    Creatinine 0.99 0.52 - 1.04 mg/dL    GFR Estimate 49 (L) >60 mL/min/[1.73_m2]    GFR Estimate If Black 57 (L) >60 mL/min/[1.73_m2]    Calcium 8.4 (L) 8.5 - 10.1 mg/dL   Orthopedic Surgery IP Consult: Patient to be seen: Routine - within 24 hours; right knee pain with effusion; Consultant may enter orders: Yes; Requesting provider? Hospitalist (if different from attending physician)    Agustina Ortiz PA-C     5/16/2019 12:11 PM  [facility]    Orthopedic Consultation    Tracey Moran MRN# [id number]   Age: 89 year old YOB: 1925     Date of Admission:  5/15/2019    Reason for consult: Right knee pan        Requesting physician: Dr. Andrea Shore       Level of consult: One-time consult to assist in determining a   diagnosis and to recommend an appropriate treatment plan           Assessment and Plan:   Assessment:   Questionable  non-displaced right knee lateral tibial plateau   fracture      Plan:   Conservative treatment  Continue knee immobilizer  Non-WB on the right LE  May pivot transfer with assist/walker  Pain medication as needed, minimize narcotics as able  Ice PRN  Consider Ace wrap for comfort           Chief Complaint:   Right knee pain         History of Present Illness:   This patient is a 89 year old female who presents with the   following condition requiring a hospital admission:    Mrs. Moran states that she has had many falls in the preceding   weeks. She has had chronic right knee pain which she was seen for   in clinic with Dr. Martines - 9/17/18. At that visit it was   discussed that she had previously seen a provider at [facility] and given a cortisone injection which did not provide   her with any long term relief, likely improvement that she felt   was secondary to short lasting anesthetic. At her visit with Dr. Martines she was prescribed with Voltaren gel due to inability to   take NSAIDs safely/cotraindications and PT for quad and glute   strengthening. She had been doing well until the last few days   where she was having trouble ambulating all together due to knee   pain. It is unclear from discussing with patient when and to what   extent she has fallen. [place] [facility] [place] family   has been working to get her into [facility] for some time.   She is not on any long term anticoagulation.  She denies any fevers, chills, malaise, night sweats or   unexplained weight loss.        Past Medical History:     Past Medical History:   Diagnosis Date     Hypertension              Past Surgical History:     Past Surgical History:   Procedure Laterality Date     ESOPHAGOSCOPY, GASTROSCOPY, DUODENOSCOPY (EGD), COMBINED N/A   12/6/2017    Procedure: COMBINED ESOPHAGOSCOPY, GASTROSCOPY, DUODENOSCOPY   (EGD);  gastroscopy;  Surgeon: Melchor Mancera MD;  Location: [facility]             Social History:      Social History     Tobacco Use     Smoking status: Never Smoker     Smokeless tobacco: Never Used   Substance Use Topics     Alcohol use: Yes             Family History:   No family history on file.          Immunizations:     VACCINE/DOSE   Diptheria   DPT   DTAP   HBIG   Hepatitis A   Hepatitis B   HIB   Influenza   Measles   Meningococcal   MMR   Mumps   Pneumococcal   Polio   Rubella   Small Pox   TDAP   Varicella   Zoster             Allergies:     Allergies   Allergen Reactions     Demerol [Meperidine]      Dust Mites      Peanuts [Nuts]      Penicillins      Pollen Extract              Medications:     Current Facility-Administered Medications   Medication     acetaminophen (TYLENOL) tablet 1,000 mg     bisacodyl (DULCOLAX) Suppository 10 mg     diclofenac (VOLTAREN) 1 % topical gel 2 g     ibuprofen (ADVIL/MOTRIN) tablet 400 mg     lisinopril (PRINIVIL/ZESTRIL) tablet 20 mg     magnesium hydroxide (MILK OF MAGNESIA) suspension 30 mL     metoprolol succinate ER (TOPROL-XL) 24 hr tablet 25 mg     naloxone (NARCAN) injection 0.1-0.4 mg     ondansetron (ZOFRAN-ODT) ODT tab 4 mg    Or     ondansetron (ZOFRAN) injection 4 mg     polyethylene glycol (MIRALAX/GLYCOLAX) Packet 17 g     senna-docusate (SENOKOT-S/PERICOLACE) 8.6-50 MG per tablet 1   tablet    Or     senna-docusate (SENOKOT-S/PERICOLACE) 8.6-50 MG per tablet 2   tablet     traMADol (ULTRAM) half-tab 25 mg     traMADol (ULTRAM) tablet 50 mg             Review of Systems:   CV: NEGATIVE for chest pain, palpitations or peripheral edema  C: NEGATIVE for fever, chills, change in weight  E/M: NEGATIVE for ear, mouth and throat problems  R: NEGATIVE for significant cough or SOB    10 point review of systems negative aside from HPI and physical   exam.           Physical Exam:   All vitals have been reviewed  Patient Vitals for the past 24 hrs:   BP Temp Temp src Pulse Heart Rate Resp SpO2   05/16/19 0755 171/67 96.5  F (35.8  C) Oral -- 73 16 97 %    05/16/19 0614 162/74 97.1  F (36.2  C) Oral 74 -- 18 97 %   05/16/19 0005 177/65 96.7  F (35.9  C) Oral 73 -- 18 97 %   05/15/19 2035 -- -- -- -- -- -- 97 %   05/15/19 2030 -- -- -- -- -- -- 97 %   05/15/19 1856 156/71 99.3  F (37.4  C) -- -- 84 18 95 %     No intake or output data in the 24 hours ending 05/16/19 1156  Constitutional: Pleasant, alert, appropriate, following commands.  HEENT: Head atraumatic normocephalic. PERRLA.  Respiratory: Unlabored breathing no audible wheeze  Cardiovascular: Regular rate and rhythm  GI: Abdomen soft, non tender, and non distended.  Lymph/Hematologic: No edema or palor  Skin: No rashes, no cyanosis, no edema.  Neuro: Sensation intact to light touch in bilateral LE   extremities.  Musculoskeletal:   Valgus deformity of left knee  1-2 effusion  TTP at lateral joint line  No erythema  Bilateral calves are soft, non-tender.  Bilateral lower extremity is NVI.  Sensation intact bilateral lower extremities  Active dorsi and plantar flexion bilaterally  +Dp pulse            Data:   All laboratory data reviewed  Results for orders placed or performed during the hospital   encounter of 05/15/19   XR Knee Right 3 Views    Narrative    KNEE THREE VIEWS RIGHT  5/15/2019 9:12 PM     HISTORY: Knee pain, swelling.    COMPARISON: August 20, 2018    FINDINGS: Marked lateral compartment degenerative change, mild  degenerative change otherwise. No suprapatellar effusion. There   is no  acute fracture. No dislocation. Sunrise views appear aligned.   There  are no worrisome bony lesions.      Impression    IMPRESSION:  No acute osseous abnormality demonstrated.    PIERRE CARVAJAL MD   US Lower Extremity Venous Duplex Right    Narrative    ULTRASOUND VENOUS LOWER EXTREMITY UNILATERAL RIGHT  5/15/2019   9:01 PM     HISTORY: Leg pain and swelling.    COMPARISON: None.    TECHNIQUE: Ultrasound gray scale, Color Doppler flow, and   spectral  Doppler waveform analysis performed.    FINDINGS: The right  common femoral, superficial femoral,   popliteal and  posterior tibial veins are patent and fully compressible and  demonstrate normal venous Doppler flow. The visualized greater  saphenous vein is negative for thrombus. For comparison the left  common femoral vein was evaluated and was unremarkable. Fluid  collection in the right popliteal fossa possibly a Baker's cyst  measuring 3.4 x 0.7 x 1.4 cm.      Impression    IMPRESSION: No DVT demonstrated.    PIERRE CARVAJAL MD   Basic metabolic panel   Result Value Ref Range    Sodium 142 133 - 144 mmol/L    Potassium 4.0 3.4 - 5.3 mmol/L    Chloride 111 (H) 94 - 109 mmol/L    Carbon Dioxide 27 20 - 32 mmol/L    Anion Gap 4 3 - 14 mmol/L    Glucose 85 70 - 99 mg/dL    Urea Nitrogen 36 (H) 7 - 30 mg/dL    Creatinine 0.99 0.52 - 1.04 mg/dL    GFR Estimate 49 (L) >60 mL/min/[1.73_m2]    GFR Estimate If Black 57 (L) >60 mL/min/[1.73_m2]    Calcium 8.4 (L) 8.5 - 10.1 mg/dL   Social Work IP Consult    Deepika Vasquez RN     5/16/2019 10:46 AM  Discharge Planner   Discharge Plans in progress: Yes. Patient admitted with right   knee pain and a fall one month prior to admission.   Barriers to discharge plan: Pending ortho and PT consults.   Patient lives alone in a senior coop apartment. Son Jonny provides     support and assistance to patient.   Follow up plan: CM will continue to follow for discharge   planning. Patient has [employer] insurance and would need prior auth   if TCU is recommended.        Entered by: Deepika Deleon 05/16/2019 10:43 AM                 Attestation:  I have reviewed today's vital signs, notes, medications, labs and   imaging with Dr. Daily.  Amount of time performed on this consult: 25 minutes.    Agustina Rinaldi PA-C      Social Work IP Consult    Deepika Vasquez RN     5/16/2019 10:46 AM  Discharge Planner   Discharge Plans in progress: Yes. Patient admitted with right   knee pain and a fall one month prior to admission.    Barriers to discharge plan: Pending ortho and PT consults.   Patient lives alone in a senior coop apartment. Son Jonny provides   support and assistance to patient.   Follow up plan: CM will continue to follow for discharge   planning. Patient has [employer] insurance and would need prior auth   if TCU is recommended.        Entered by: Deepika Deleon 05/16/2019 10:43 AM

## 2019-05-17 PROCEDURE — 25000132 ZZH RX MED GY IP 250 OP 250 PS 637: Performed by: PHYSICIAN ASSISTANT

## 2019-05-17 PROCEDURE — 99207 ZZC CDG-MDM COMPONENT: MEETS MODERATE - UP CODED: CPT | Performed by: PHYSICIAN ASSISTANT

## 2019-05-17 PROCEDURE — 25000132 ZZH RX MED GY IP 250 OP 250 PS 637: Performed by: INTERNAL MEDICINE

## 2019-05-17 PROCEDURE — 99225 ZZC SUBSEQUENT OBSERVATION CARE,LEVEL II: CPT | Performed by: PHYSICIAN ASSISTANT

## 2019-05-17 PROCEDURE — G0378 HOSPITAL OBSERVATION PER HR: HCPCS

## 2019-05-17 RX ORDER — IBUPROFEN 400 MG/1
400 TABLET, FILM COATED ORAL EVERY 6 HOURS PRN
DISCHARGE
Start: 2019-05-17 | End: 2019-05-28

## 2019-05-17 RX ORDER — LISINOPRIL 20 MG/1
20 TABLET ORAL 2 TIMES DAILY
Status: DISCONTINUED | OUTPATIENT
Start: 2019-05-17 | End: 2019-05-18 | Stop reason: HOSPADM

## 2019-05-17 RX ORDER — ACETAMINOPHEN 500 MG
1000 TABLET ORAL EVERY 8 HOURS
DISCHARGE
Start: 2019-05-17 | End: 2019-06-13

## 2019-05-17 RX ORDER — TRAMADOL HYDROCHLORIDE 50 MG/1
25 TABLET ORAL EVERY 6 HOURS PRN
Qty: 20 TABLET | Refills: 0 | Status: SHIPPED | OUTPATIENT
Start: 2019-05-17 | End: 2019-05-28

## 2019-05-17 RX ORDER — AMOXICILLIN 250 MG
1 CAPSULE ORAL 2 TIMES DAILY PRN
DISCHARGE
Start: 2019-05-17

## 2019-05-17 RX ADMIN — ACETAMINOPHEN 1000 MG: 500 TABLET, FILM COATED ORAL at 08:17

## 2019-05-17 RX ADMIN — LISINOPRIL 20 MG: 20 TABLET ORAL at 20:03

## 2019-05-17 RX ADMIN — ACETAMINOPHEN 1000 MG: 500 TABLET, FILM COATED ORAL at 13:15

## 2019-05-17 RX ADMIN — METOPROLOL SUCCINATE 25 MG: 25 TABLET, EXTENDED RELEASE ORAL at 08:17

## 2019-05-17 RX ADMIN — LISINOPRIL 20 MG: 20 TABLET ORAL at 08:17

## 2019-05-17 RX ADMIN — ACETAMINOPHEN 1000 MG: 500 TABLET, FILM COATED ORAL at 18:32

## 2019-05-17 NOTE — PLAN OF CARE
PRIMARY DIAGNOSIS: ACUTE PAIN  OUTPATIENT/OBSERVATION GOALS TO BE MET BEFORE DISCHARGE:  1. Pain Status: Improved-controlled with oral pain medications.    2. Return to near baseline physical activity: No    3. Cleared for discharge by consultants (if involved): No    Discharge Planner Nurse   Safe discharge environment identified: Yes  Barriers to discharge: Yes       Entered by: Vane Maya 05/17/2019 3:53 PM     Please review provider order for any additional goals.   Nurse to notify provider when observation goals have been met and patient is ready for discharge.

## 2019-05-17 NOTE — PROGRESS NOTES
CT confirmed lateral tibia plateau fracture, minimal depression  Patient sleeping at time of visit and did not awaken, family not present at time of visit    Continue plan of Non-WB on the right LE  Knee immobilizer when out of bed, keep leg straight when in bed  Pain medication as needed, minimize narcotics as able  Follow-up with Dr. Hodges in 2-3 weeks at [facility] [phone]  Discharge to TCU when medically cleared and bed found    Agustina Rinaldi PAC

## 2019-05-17 NOTE — PLAN OF CARE
PRIMARY DIAGNOSIS: ACUTE PAIN  OUTPATIENT/OBSERVATION GOALS TO BE MET BEFORE DISCHARGE:  1. Pain Status: Improved-controlled with oral pain medications.     2. Return to near baseline physical activity: No     3. Cleared for discharge by consultants (if involved): No     Discharge Planner Nurse   Safe discharge environment identified: Yes  Barriers to discharge: No       Entered by: Sherrie Cooper 05/17/2019 12:02 am  Please review provider order for any additional goals.   Nurse to notify provider when observation goals have been met and patient is ready for discharge.     Patient is alert and oriented to self and situation.

## 2019-05-17 NOTE — PLAN OF CARE
PRIMARY DIAGNOSIS: ACUTE PAIN  OUTPATIENT/OBSERVATION GOALS TO BE MET BEFORE DISCHARGE:  1. Pain Status: Improved-controlled with oral pain medications.     2. Return to near baseline physical activity: No     3. Cleared for discharge by consultants (if involved): No     Discharge Planner Nurse   Safe discharge environment identified: Yes  Barriers to discharge: No       Entered by: Sherrie Cooper 05/16/2019 10:10 PM  Please review provider order for any additional goals.   Nurse to notify provider when observation goals have been met and patient is ready for discharge.     Patient is alert and oriented to self and situation.

## 2019-05-17 NOTE — PROGRESS NOTES
PRIMARY DIAGNOSIS: ACUTE PAIN  OUTPATIENT/OBSERVATION GOALS TO BE MET BEFORE DISCHARGE:  1. Pain Status: Improved-controlled with oral pain medications.     2. Return to near baseline physical activity: No     3. Cleared for discharge by consultants (if involved): No     Discharge Planner Nurse   Safe discharge environment identified: Yes  Barriers to discharge: No       Entered by: Sherrie Cooper 05/17/2019 5:35 am  Please review provider order for any additional goals.   Nurse to notify provider when observation goals have been met and patient is ready for discharge.     Patient is alert and oriented to self and situation.

## 2019-05-17 NOTE — PROGRESS NOTES
Discharge Planner   Discharge Plans in progress: Yes, therapy recommending TCU. [facility] in [place] has accepted patient into a private room.   Barriers to discharge plan: Son to transport to TCU.    Follow up plan: CM will send orders to TCU once completed.        Entered by: Deepika Deleon 05/17/2019 11:43 AM       Deepika Deleon MA-RN  Care Transition Services  [phone]    Update 1245: Orders sent to [facility]. Received call from [facility] that prior auth from [facility] has not yet been received. Updated son Jonny and bedside RN of delay of discharge until prior auth received.

## 2019-05-17 NOTE — PLAN OF CARE
PRIMARY DIAGNOSIS: ACUTE PAIN  OUTPATIENT/OBSERVATION GOALS TO BE MET BEFORE DISCHARGE:  1. Pain Status: Pain free, pt denies pain at this time. She does report she has pain while up but does not have any while resting in bed.    2. Return to near baseline physical activity: Pt has orders to be non-weightbearing on the RLE, she is assist of 1-2 w/a pivot to bedside commode.     3. Cleared for discharge by consultants (if involved): Yes, PT is recommending TCU, SW following and working on placement    VSS on RA. A&Ox4. [place], has hearing aids in but important to still face pt and speak loudly for her to hear well. Denies pain while resting in bed. Tolerating regular diet. Pivot to commode assist x1-2 and non-weightbearing for RLE. Plan: monitor for pain, placement    Discharge Planner Nurse   Safe discharge environment identified: Awaiting placement at TCU   Barriers to discharge: Yes, no placement yet       Entered by: Rosette Hendricks 05/17/2019 9:51 AM     Please review provider order for any additional goals.   Nurse to notify provider when observation goals have been met and patient is ready for discharge.

## 2019-05-17 NOTE — PROGRESS NOTES
[facility]  Observation Unit  Progress Note    Date of Service: 5/17/2019    Patient: Tracey Moran  MRN: [id number]  Admission Date: 5/15/2019    Assessment & Plan: Tracey Moran is a 89 year old female with history of hypertension, arthritis, hypercholesterolemia, hypothyroidism, thrombocytopenia, chronic kidney disease, likely dementia who presented last evening to the hospital with subacute progressively worsening right knee pain and swelling over the past month.      1.  Right subacute lateral tibial plateau fracture.  Had a fall approximately 1 month ago. Now she has an effusion in that knee. CT revealed a focal fracture deformity of the lateral tibial plateau, suspected to be subacute  -Scheduled Tylenol and Voltaren gel as well as prn ibuprofen and low-dose Ultram  -PT recommends TCU.   -Social work is looking into placement option  -activity restrictions per Orthopedics     2.  Hypertension.  Continue prior to admission lisinopril and metoprolol.   Clarified with son today, patient is on Lisinopril 20mg bid which I will resume.      3.  Hypothyroidism.  She does not appear to be on medication for this at this time.     4.  Hypercholesterolemia.  She does not appear to be on medication for this at this time.     5.  Chronic kidney disease.  Continue prior to admission lisinopril.        CODE: Full  DVT: scd  Diet/fluids: regular  Disposition: discharging to TCU possibly later today.    Agustina ELLISC  Hospitalist Physician Assistant  [facility]  Pager: [phone]      Subjective & Interval Hx:    Patient reports pain is controlled.  Son reports she is on Lisinopril 20mg twice a day. Denies chest pain, shortness of breath, abdominal pain.  Tolerating po well.    Last 24 hr care team notes reviewed.   ROS:  4 point ROS including Respiratory, CV, GI and , other than that noted in the HPI, is negative.    Physical Exam:    Blood pressure 128/51, pulse 78, temperature 97  F  (36.1  C), temperature source Oral, resp. rate 16, SpO2 96 %, not currently breastfeeding.  General: Alert, interactive, NAD, hard of hearing, son at the bedside.  Resp: clear to auscultation bilaterally, no crackles or wheezes  Cardiac: regular rate and rhythm, no murmur  Abdomen: Soft, nontender, nondistended. +BS.  No HSM or masses, no rebound or guarding.  Extremities: Right knee is with some swelling and effusion but no erythema.   No pedal edema, +2 pulses bilateral and equal.  Neuro: Alert & oriented     Labs & Images:  Reviewed in Epic   Medications:    Current Facility-Administered Medications   Medication     acetaminophen (TYLENOL) tablet 1,000 mg     bisacodyl (DULCOLAX) Suppository 10 mg     diclofenac (VOLTAREN) 1 % topical gel 2 g     hydrALAZINE (APRESOLINE) injection 10 mg     ibuprofen (ADVIL/MOTRIN) tablet 400 mg     lisinopril (PRINIVIL/ZESTRIL) tablet 20 mg     magnesium hydroxide (MILK OF MAGNESIA) suspension 30 mL     metoprolol succinate ER (TOPROL-XL) 24 hr tablet 25 mg     naloxone (NARCAN) injection 0.1-0.4 mg     ondansetron (ZOFRAN-ODT) ODT tab 4 mg    Or     ondansetron (ZOFRAN) injection 4 mg     polyethylene glycol (MIRALAX/GLYCOLAX) Packet 17 g     senna-docusate (SENOKOT-S/PERICOLACE) 8.6-50 MG per tablet 1 tablet    Or     senna-docusate (SENOKOT-S/PERICOLACE) 8.6-50 MG per tablet 2 tablet     traMADol (ULTRAM) half-tab 25 mg

## 2019-05-17 NOTE — PLAN OF CARE
PRIMARY DIAGNOSIS: ACUTE PAIN    OUTPATIENT/OBSERVATION GOALS TO BE MET BEFORE DISCHARGE:    1. Pain Status: Pain free, pt denies pain at this time. She does report she has pain while up but does not have any while resting in bed.     2. Return to near baseline physical activity: Pt has orders to be non-weightbearing on the RLE, she is assist of 1-2 w/a pivot to bedside commode.      3. Cleared for discharge by consultants (if involved): Yes, PT is recommending TCU, SW following and working on placement     VSS on RA. A&Ox4. [place], has hearing aids in but important to still face pt and speak loudly for her to hear well. Denies pain while resting in bed. Tolerating regular diet. Pivot to commode assist x1-2 and non-weightbearing for RLE. Pt has been incontinent and has a purewick in place. Declines lunch because she is still full from breakfast, she only requested a carton of milk. Plan: monitor for pain, [facility] has accepted pt awaiting plan for discharge     Discharge Planner Nurse   Safe discharge environment identified: Awaiting placement at TCU   Barriers to discharge: Yes, no placement yet         Entered by: Rosette Hendricks 05/17/2019    Please review provider order for any additional goals.   Nurse to notify provider when observation goals have been met and patient is ready for discharge.

## 2019-05-18 VITALS
DIASTOLIC BLOOD PRESSURE: 49 MMHG | SYSTOLIC BLOOD PRESSURE: 133 MMHG | RESPIRATION RATE: 16 BRPM | OXYGEN SATURATION: 95 % | TEMPERATURE: 95.8 F | HEART RATE: 69 BPM

## 2019-05-18 PROCEDURE — 99217 ZZC OBSERVATION CARE DISCHARGE: CPT | Performed by: PHYSICIAN ASSISTANT

## 2019-05-18 PROCEDURE — G0378 HOSPITAL OBSERVATION PER HR: HCPCS

## 2019-05-18 PROCEDURE — 25000132 ZZH RX MED GY IP 250 OP 250 PS 637: Performed by: PHYSICIAN ASSISTANT

## 2019-05-18 PROCEDURE — 25000132 ZZH RX MED GY IP 250 OP 250 PS 637: Performed by: INTERNAL MEDICINE

## 2019-05-18 RX ADMIN — LISINOPRIL 20 MG: 20 TABLET ORAL at 08:37

## 2019-05-18 RX ADMIN — ACETAMINOPHEN 1000 MG: 500 TABLET, FILM COATED ORAL at 08:37

## 2019-05-18 RX ADMIN — ACETAMINOPHEN 1000 MG: 500 TABLET, FILM COATED ORAL at 12:26

## 2019-05-18 RX ADMIN — METOPROLOL SUCCINATE 25 MG: 25 TABLET, EXTENDED RELEASE ORAL at 08:37

## 2019-05-18 ASSESSMENT — MIFFLIN-ST. JEOR: SCORE: 976.96

## 2019-05-18 NOTE — PLAN OF CARE
PRIMARY DIAGNOSIS: ACUTE PAIN  OUTPATIENT/OBSERVATION GOALS TO BE MET BEFORE DISCHARGE:  1. Pain Status: Pain free.    2. Return to near baseline physical activity: No, instructions to be NWB on RLE    3. Cleared for discharge by consultants (if involved): Yes    BP elevated, morning meds including HTN meds given, will recheck blood pressure. Other VSS on RA. Pt [place], has hearing aids in but states they do not work very well. Pure wick in place with adequate output. A&Ox4. Denies pain, leg brace while out of bed. Plan: possible discharge today, monitor pain    Discharge Planner Nurse   Safe discharge environment identified: Pt was accepted to [facility] yesterday, awaiting official discharge details   Barriers to discharge: No       Entered by: Rosette Hendricks 05/18/2019 9:36 AM     Please review provider order for any additional goals.   Nurse to notify provider when observation goals have been met and patient is ready for discharge.

## 2019-05-18 NOTE — DISCHARGE SUMMARY
[facility] Outpatient / Observation Unit  Discharge Summary        Tracey Moran MRN# [id number]   YOB: 1925 Age: 89 year old     Date of Admission:  5/15/2019  Date of Discharge:  5/18/2019  Admitting Physician:  Andrea Shore MD  Discharge Physician: Halie Mustafa PA-C, TONY  Discharging Service: Hospitalist      Primary Provider: Guilherme Graves  Primary Care Physician Phone Number: [phone]         Primary Discharge Diagnoses:    Tracey Moran is a 89 year old female with history of hypertension, arthritis, hypercholesterolemia, hypothyroidism, thrombocytopenia, chronic kidney disease, likely dementia who presented 5/15/19 to the hospital with subacute progressively worsening right knee pain and swelling over the past month following a fall.      1.  Right subacute lateral tibial plateau fracture.  Had a fall approximately 1 month ago. Now she has an effusion in that knee. CT revealed a focal fracture deformity of the lateral tibial plateau, suspected to be subacute.  - Ortho was consulted and recommended knee immobilizer when out of bed and non-WB on the right LE. She may pivot transfer with assist/walker. Keep leg straight when in bed. Ace Wrap as needed for comfort.   - Follow-up with Dr. Hodges in 2-3 weeks at [facility] [phone]  -Pain control was achieved with scheduled Tylenol and Voltaren gel as well as prn ibuprofen and low-dose Ultram  -PT was consulted and recommended TCU.     2.  Hypertension.  Continue prior to admission lisinopril and metoprolol.        3.  Hypothyroidism.  She does not appear to be on medication for this at this time.     4.  Hypercholesterolemia.  She does not appear to be on medication for this at this time.     5.  Chronic kidney disease.  Continue prior to admission lisinopril.           Secondary Discharge Diagnoses:     Past Medical History:   Diagnosis Date     Hypertension             Code Status:      Full Code        Brief Hospital  Summary:        Reason for your hospital stay      Right minimally depressed tibial plateau fracture (lateral)           Please refer to initial admission history and physical for further details.   Briefly, Tracey Moran was admitted on 5/15/2019 for acute right knee pain.   Initial work up in the ED did not reveal evidence of significant neurologic deficits. No infectious or malignant process was identified. Pt was registered to the Observation Unit for further evaluation and pain control.   Pt was placed on oral multi-modal pain regiment and was ambulated in the [place]. On the day of discharge, pt's pain improved and is able to perform basic ADLs. Safe discharge plan was identified and pt was discharged with po pain meds, education regarding home therapies and set up with follow up care.         Significant Lab During Hospitalization:      No results for input(s): WBC, HGB, HCT, MCV, PLT in the last 168 hours.       Lab Results   Component Value Date     05/16/2019     11/26/2018     11/17/2018    Lab Results   Component Value Date    CHLORIDE 111 05/16/2019    CHLORIDE 106 11/26/2018    CHLORIDE 106 11/17/2018    Lab Results   Component Value Date    BUN 36 05/16/2019    BUN 9 11/26/2018    BUN 15 11/17/2018      Lab Results   Component Value Date    POTASSIUM 4.0 05/16/2019    POTASSIUM 3.6 11/26/2018    POTASSIUM 3.5 11/17/2018    Lab Results   Component Value Date    CO2 27 05/16/2019    CO2 28 11/26/2018    CO2 24 11/17/2018    Lab Results   Component Value Date    CR 0.99 05/16/2019    CR 0.65 11/26/2018    CR 0.79 11/17/2018        No results for input(s): CULT in the last 168 hours.  Recent Labs   Lab 05/16/19  0655      POTASSIUM 4.0   CHLORIDE 111*   CO2 27   ANIONGAP 4   GLC 85   BUN 36*   CR 0.99   GFRESTIMATED 49*   GFRESTBLACK 57*   RENETTA 8.4*     No results for input(s): NTBNPI, NTBNP in the last 168 hours.  No results for input(s): DD in the last 168 hours.  No results for  input(s): SED, CRP in the last 168 hours.  No results for input(s): INR in the last 168 hours.  No results for input(s): LACT in the last 168 hours.  No results for input(s): LIPASE in the last 168 hours.  No results for input(s): TSH in the last 168 hours.  No results for input(s): TROPONIN, TROPI, TROPR in the last 168 hours.    Invalid input(s): TROP, TROPONINIES  No results for input(s): COLOR, APPEARANCE, URINEGLC, URINEBILI, URINEKETONE, SG, UBLD, URINEPH, PROTEIN, UROBILINOGEN, NITRITE, LEUKEST, RBCU, WBCU in the last 168 hours.             Significant Imaging During Hospitalization:      Recent Results (from the past 48 hour(s))   CT Knee Right w/o Contrast    Narrative    CT KNEE RIGHT WITHOUT CONTRAST, CT TIBIA/FIBULA LOWER LEG RIGHT  WITHOUT CONTRAST 5/16/2019 4:06 PM     HISTORY: Evaluate for occult fracture. Knee pain and swelling.    TECHNIQUE: Axial images with reconstructions. Radiation dose for this  scan was reduced using automated exposure control, adjustment of the  mA and/or kV according to patient size, or iterative reconstruction  technique.     FINDINGS: There is fracturing and subchondral radiolucency at the  posterolateral aspect of the lateral tibial plateau. I suspect this  finding is subacute. It could represent a traumatic-type fracture with  resorption. It could also represent a subchondral insufficiency  fracture with subchondral collapse. In any event, there is 6 mm of  maximal articular surface depression. Osteopenia suspected.  Moderate-prominent knee joint effusion. Mild patellofemoral  osteoarthritis. There is lateral compartment narrowing; there is  subchondral sclerosis, presumably related to chondromalacia. There is  a Baker's cyst estimated to measure about 5 cm in length. There is  subchondral radiolucency at the medial talar dome. This could  represent subchondral cystic change related to overlying  chondromalacia, versus an old osteochondritis dissecans lesion.       Impression    IMPRESSION:  1. Focal fracture deformity of the lateral tibial plateau, suspected  to be subacute. This could represent a traumatic fracture with  resorption, versus a subchondral insufficiency fracture with  subchondral collapse. 6 mm of depression.  2. Additional findings discussed above.    FRANCES BROCK MD   CT Tibia Fibula Lower Leg Right wo Contrast    Narrative    CT KNEE RIGHT WITHOUT CONTRAST, CT TIBIA/FIBULA LOWER LEG RIGHT  WITHOUT CONTRAST 5/16/2019 4:06 PM     HISTORY: Evaluate for occult fracture. Knee pain and swelling.    TECHNIQUE: Axial images with reconstructions. Radiation dose for this  scan was reduced using automated exposure control, adjustment of the  mA and/or kV according to patient size, or iterative reconstruction  technique.     FINDINGS: There is fracturing and subchondral radiolucency at the  posterolateral aspect of the lateral tibial plateau. I suspect this  finding is subacute. It could represent a traumatic-type fracture with  resorption. It could also represent a subchondral insufficiency  fracture with subchondral collapse. In any event, there is 6 mm of  maximal articular surface depression. Osteopenia suspected.  Moderate-prominent knee joint effusion. Mild patellofemoral  osteoarthritis. There is lateral compartment narrowing; there is  subchondral sclerosis, presumably related to chondromalacia. There is  a Baker's cyst estimated to measure about 5 cm in length. There is  subchondral radiolucency at the medial talar dome. This could  represent subchondral cystic change related to overlying  chondromalacia, versus an old osteochondritis dissecans lesion.      Impression    IMPRESSION:  1. Focal fracture deformity of the lateral tibial plateau, suspected  to be subacute. This could represent a traumatic fracture with  resorption, versus a subchondral insufficiency fracture with  subchondral collapse. 6 mm of depression.  2. Additional findings discussed  above.    FRANCES BROCK MD              Pending Results:        Unresulted Labs Ordered in the Past 30 Days of this Admission     No orders found from 3/16/2019 to 5/16/2019.              Consultations This Hospital Stay:      Consultation during this admission received from orthopedics         Discharge Instructions and Follow-Up:      Follow-up Appointments     Follow Up and recommended labs and tests      Follow up with Dr. Clark Hodges , at (location with clinic name or   city[facility], within 14-21 days  to evaluate   after hospitalization/fracture diagnosis. No follow up labs or test are   needed prior to visit. At this visit x-rays will be taken and questions to   be answered.  Bring any needed forms with to appointment.  Call for appointment: [phone]  After hours: [phone]  Fax: [phone] or [phone]           Pt instructed to follow up with PCP in 7 days.  Follow up with outpatient therapy if indicated.         Discharge Disposition:      Discharged to home         Discharge Medications:        Current Discharge Medication List      START taking these medications    Details   ibuprofen (ADVIL/MOTRIN) 400 MG tablet Take 1 tablet (400 mg) by mouth every 6 hours as needed (mild pain)    Associated Diagnoses: Acute pain of left knee      senna-docusate (SENOKOT-S/PERICOLACE) 8.6-50 MG tablet Take 1 tablet by mouth 2 times daily as needed for constipation    Associated Diagnoses: Constipation, unspecified constipation type      traMADol (ULTRAM) 50 MG tablet Take 0.5 tablets (25 mg) by mouth every 6 hours as needed for moderate pain  Qty: 20 tablet, Refills: 0    Associated Diagnoses: Primary osteoarthritis of right knee; Acute pain of right knee; Inability to ambulate due to knee         CONTINUE these medications which have CHANGED    Details   acetaminophen (TYLENOL) 500 MG tablet Take 2 tablets (1,000 mg) by mouth every 8 hours    Associated Diagnoses: Acute pain  of left knee      diclofenac (VOLTAREN) 1 % topical gel Apply 2 g topically 4 times daily To right knee    Associated Diagnoses: Acute pain of left knee         CONTINUE these medications which have NOT CHANGED    Details   lisinopril (PRINIVIL/ZESTRIL) 20 MG tablet TAKE ONE TABLET BY MOUTH TWICE DAILY. HOLD IF SYSTOLIC BLOOD PRESSURE IS LESS THAN 110 MMHG. CALL IF HELD TWICE IN A ROW OR IF SYMPTOMATIC.  Qty: 180 tablet, Refills: 2    Associated Diagnoses: Essential hypertension      metoprolol succinate ER (TOPROL-XL) 25 MG 24 hr tablet Take 1 tablet (25 mg) by mouth daily  Qty: 90 tablet, Refills: 3    Associated Diagnoses: Essential hypertension, benign      multivitamin, therapeutic with minerals (THERA-VIT-M) TABS tablet Take 1 tablet by mouth daily      polyethylene glycol (MIRALAX/GLYCOLAX) packet Take 17 g by mouth daily as needed                  Allergies:         Allergies   Allergen Reactions     Demerol [Meperidine]      Dust Mites      Peanuts [Nuts]      Penicillins      Pollen Extract            Condition and Physical on Discharge:      Discharge condition: Stable   Vitals: Blood pressure 173/55, pulse 68, temperature 96.3  F (35.7  C), temperature source Oral, resp. rate 16, SpO2 93 %, not currently breastfeeding.  0 lbs 0 oz      GENERAL:  Comfortable.  PSYCH: pleasant, oriented, No acute distress.  HEENT:  PERRLA. Normal conjunctiva, normal hearing, nasal mucosa and Oropharynx are normal.  NECK:  Supple, no neck vein distention, adenopathy or bruits, normal thyroid.  HEART:  Normal S1, S2 with no murmur, no pericardial rub, gallops or S3 or S4.  LUNGS:  Clear to auscultation, normal Respiratory effort. No wheezing, rales or ronchi.  ABDOMEN:  Soft, no hepatosplenomegaly, normal bowel sounds. Non-tender, non distended.   EXTREMITIES:  No pedal edema, +2 pulses bilateral and equal. Back symmetric, no curvature. ROM normal. No CVA tenderness. negative findings: no skin lesions, erythema, or scars, no  tenderness to percussion or palpitation, no tenderness to palpation   SKIN:  Dry to touch, No rash, wound or ulcerations.  NEUROLOGIC:  CN 2-12 intact, BL 5/5 symmetric upper and lower extremity strength, sensation is intact with no focal deficits.

## 2019-05-18 NOTE — PROGRESS NOTES
Your information has been submitted on May 18th, 2019 at 03:57:09 PM CDT. The confirmation number is [id number]

## 2019-05-18 NOTE — PLAN OF CARE
Patient's After Visit Summary was reviewed with patient and/or sent packet to TCU.   Patient verbalized understanding of After Visit Summary, recommended follow up and was given an opportunity to ask questions.   Discharge medications sent home with patient/family: orders sent to [facility]    Discharged with transport tech from [facility] to [facility].    OBSERVATION patient END time: 1245    [place]: A&Ox4  VS: /49 (BP Location: Right arm)   Pulse 69   Temp 95.8  F (35.4  C) (Oral)   Resp 16   SpO2 95%   LS: WNL  GI: WNL  : WNL  Skin: Bruised  Activity: Assist x2 w/pivot   Diet: Regular   Pain: Denies  Plan: Discharging to TCU

## 2019-05-18 NOTE — PLAN OF CARE
PRIMARY DIAGNOSIS: ACUTE PAIN  OUTPATIENT/OBSERVATION GOALS TO BE MET BEFORE DISCHARGE:  1. Pain Status: Pain free.    2. Return to near baseline physical activity: No    3. Cleared for discharge by consultants (if involved): Yes    Discharge Planner Nurse   Safe discharge environment identified: Yes  Barriers to discharge: Yes       Entered by: Deangelo Nichols 05/18/2019 6:27 AM    /44 (BP Location: Left arm)   Pulse 68   Temp 96.2  F (35.7  C) (Oral)   Resp 16   SpO2 95%   AOx4 R knee swollen. Assist x 2. Non weight bearing on R leg. Hx of HTN, hypothyroidism, CKD, and hypercholesteremia. Purewick in place. Discarge to [facility] pending authorization from [facility].    Please review provider order for any additional goals.   Nurse to notify provider when observation goals have been met and patient is ready for discharge.

## 2019-05-18 NOTE — PLAN OF CARE
PRIMARY DIAGNOSIS: ACUTE PAIN  OUTPATIENT/OBSERVATION GOALS TO BE MET BEFORE DISCHARGE:  1. Pain Status: Pain free.    2. Return to near baseline physical activity: No    3. Cleared for discharge by consultants (if involved): Yes    Discharge Planner Nurse   Safe discharge environment identified: Yes  Barriers to discharge: Yes       Entered by: Deangelo Nichols 05/18/2019 2:06 AM    /59   Pulse 68   Temp 95.8  F (35.4  C) (Oral)   Resp 16   SpO2 96%   AOx4 R knee swollen. Purewick in place. Discarge to [facility] pending authoritarian from [facility].    Please review provider order for any additional goals.   Nurse to notify provider when observation goals have been met and patient is ready for discharge.

## 2019-05-18 NOTE — PLAN OF CARE
PRIMARY DIAGNOSIS: ACUTE PAIN  OUTPATIENT/OBSERVATION GOALS TO BE MET BEFORE DISCHARGE:  1. Pain Status: Pain free.    2. Return to near baseline physical activity: No    3. Cleared for discharge by consultants (if involved): Yes    Discharge Planner Nurse   Safe discharge environment identified: Yes  Barriers to discharge: Yes       Entered by: Vane Maya 05/17/2019 8:41 PM     Please review provider order for any additional goals.   Nurse to notify provider when observation goals have been met and patient is ready for discharge.    Pt is AOx4, pleasant and cooperative with cares, VSS. Right knee is swollen. Pt denies pain/discomfort. Purwick in place, voiding sufficient amounts of urine. Pt ate 100% of her dinner and is drinking adequate amounts of fluids. Discharge to [facility] pending authorization from [facility].

## 2019-05-20 ASSESSMENT — MIFFLIN-ST. JEOR: SCORE: 994.65

## 2019-05-20 NOTE — PROGRESS NOTES
[facility]  PRIMARY CARE PROVIDER AND CLINIC:  Guilherme Graves MD, [address] [place] [id number]  Chief Complaint   Patient presents with     Establish Care     [place] Medical Record Number:  [id number]  Place of Service where encounter took place:  [facility] [[id number]]    Tracey Moran  is a 89 year old  (6/16/1925), admitted to the above facility from  [facility]. Hospital stay 5/15/19 through 5/18/19..  Admitted to this facility for  rehab, medical management and nursing care.     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     Brief Summary of Hospital Course: pt  Had a fall about a month ago--now had effusion and pain in right knee.  She was getting progressively worse so came to the ER.  On CT she had a focal fracture deformity of the lateral tibial plateau,which is thought to have occurred with the fall in April. She was seen by Ortho, plan is to keep her NWB and see Ortho in two  weeks.  Also pain control was obtained with Tylenol and Voltaren gel. Then sent for rehab in TCU.     TODAY DURING EXAM/ROS:  No CP, SOB, Cough, dizziness, fevers, chills, HA, N/V, dysuria or Bowel Abnormalities except feels constipated Appetite is fair.  No pain except right knee and feels a little sore.        CODE STATUS/ADVANCE DIRECTIVES DISCUSSION:   CPR/Full code   Patient's living condition: lives alone  ALLERGIES: Demerol [meperidine]; Dust mites; Peanuts [nuts]; Penicillins; and Pollen extract  PAST MEDICAL HISTORY:  has a past medical  history of Hypertension.  PAST SURGICAL HISTORY:   has a past surgical history that includes Esophagoscopy, gastroscopy, duodenoscopy (EGD), combined (N/A, 12/6/2017).  FAMILY HISTORY: family history is not on file.  SOCIAL HISTORY:   reports that she has never smoked. She has never used smokeless tobacco. She reports that she drinks alcohol. She reports that she does not use drugs.    Post Discharge Medication Reconciliation Status: discharge medications reconciled and changed, per note/orders (see AVS)     Current Outpatient Medications   Medication Sig Dispense Refill     acetaminophen (TYLENOL) 500 MG tablet Take 2 tablets (1,000 mg) by mouth every 8 hours       diclofenac (VOLTAREN) 1 % topical gel Place onto the skin 3 times daily Apply to right knee pain areas TID scheduled.        ibuprofen (ADVIL/MOTRIN) 400 MG tablet Take 1 tablet (400 mg) by mouth every 6 hours as needed (mild pain)       lisinopril (PRINIVIL/ZESTRIL) 20 MG tablet TAKE ONE TABLET BY MOUTH TWICE DAILY. HOLD IF SYSTOLIC BLOOD PRESSURE IS LESS THAN 110 MMHG. CALL IF HELD TWICE IN A ROW OR IF SYMPTOMATIC. 180 tablet 2     metoprolol succinate ER (TOPROL-XL) 25 MG 24 hr tablet Take 1 tablet (25 mg) by mouth daily (Patient taking differently: Take 25 mg by mouth daily HOLD if SBP <110 OR HR < 55. Call if held x 2 in a row symptomatic AND CALL NP/MD) 90 tablet 3     multivitamin, therapeutic with minerals (THERA-VIT-M) TABS tablet Take 1 tablet by mouth daily       polyethylene glycol (MIRALAX/GLYCOLAX) packet Take 17 g by mouth daily as needed        senna-docusate (SENOKOT-S/PERICOLACE) 8.6-50 MG tablet Take 1 tablet by mouth daily Hold for loose BM       senna-docusate (SENOKOT-S/PERICOLACE) 8.6-50 MG tablet Take 1 tablet by mouth 2 times daily as needed for constipation       traMADol (ULTRAM) 50 MG tablet Take 0.5 tablets (25 mg) by mouth every 6 hours as needed for moderate pain 20 tablet 0     ROS:  See above, some answers vague as pt has  "memory loss    Vitals:  /73   Pulse 74   Temp 96.8  F (36  C)   Resp 18   Ht 1.575 m (5' 2\")   Wt 63.6 kg (140 lb 4.8 oz)   SpO2 95%   BMI 25.66 kg/m    Exam:  GENERAL APPEARANCE:  Alert, in no distress, oriented, cooperative  ENT:  Mouth and posterior oropharynx normal, moist mucous membranes, some teeth missing, normal hearing acuity  EYES:  EOM, conjunctivae, lids, pupils and irises normal  NECK:  No adenopathy,masses or thyromegaly  RESP:  respiratory effort and palpation of chest normal, lungs clear to auscultation , no respiratory distress, diminished breath sounds globally  CV:  Palpation and auscultation of heart done , regular rate and rhythm, no murmur, rub, or gallop, no edema, +2 pedal pulses, +dps and warm feet  ABDOMEN:  normal bowel sounds, soft, nontender, no hepatosplenomegaly or other masses  M/S:   RUIZ slightly less mobile in right leg, right knee is slightly swollen, not red or warm.  SKIN:  Inspection of skin and subcutaneous tissue baseline  NEURO:   Cranial nerves 2-12 are normal tested and grossly at patient's baseline  PSYCH:  insight and judgement impaired, memory impaired     Lab/Diagnostic data:  Recent Labs   Lab Test 05/16/19  0655 11/26/18    141   POTASSIUM 4.0 3.6   CHLORIDE 111* 106   CO2 27 28   ANIONGAP 4 7   GLC 85 76   BUN 36* 9   CR 0.99 0.65   RENETTA 8.4* 8.9     CBC RESULTS:   Recent Labs   Lab Test 02/25/19  1609 11/26/18   WBC  --  8.9   RBC  --  2.95*   HGB 11.6* 9.6*   HCT  --  29.8*   MCV  --  101*   MCH  --  32.5   MCHC  --  32.2   RDW  --  13.7   PLT  --  297    < > = values in this interval not displayed.       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 She " has resided in a Co-op but famil has been working at getting her into snf at [facility]. She may need memory care depending on the type of snf and services available.    (N18.3) Stage 3 chronic kidney disease (H)  Comment/Plan: check BMP on 5/23    (F03.90) Dementia without behavioral disturbance, unspecified dementia type  Comment: 12/2018: IN TCU: Cognitive Scores: SLUMS 5/30-, CPT 3.9/5.6   Plan: further cog cores here,    (I10) Essential hypertension  Comment/Plan: cont current med, Toprol Xl--place parameters for holding    (E03.9) Hypothyroidism, unspecified type  Comment/Plan: cont same POC, meds monitor.    (K59.00) Constipation, unspecified constipation type  Comment/Plan: add scheduled senna S, cont with prn bowel meds also, monitor.     Electronically signed by:  WILLIAM Ornelas CNP       5/23/19**MSG LEFT ON SON LISSETH'S PHONE PER HIS REQUEST YEST TO STAFF FOR AN UPDATE**

## 2019-05-21 ENCOUNTER — NURSING HOME VISIT (OUTPATIENT)
Dept: GERIATRICS | Facility: CLINIC | Age: 84
End: 2019-05-21
Payer: COMMERCIAL

## 2019-05-21 VITALS
HEART RATE: 74 BPM | TEMPERATURE: 96.8 F | WEIGHT: 140.3 LBS | OXYGEN SATURATION: 95 % | BODY MASS INDEX: 25.82 KG/M2 | HEIGHT: 62 IN | DIASTOLIC BLOOD PRESSURE: 73 MMHG | SYSTOLIC BLOOD PRESSURE: 170 MMHG | RESPIRATION RATE: 18 BRPM

## 2019-05-21 DIAGNOSIS — I10 ESSENTIAL HYPERTENSION: ICD-10-CM

## 2019-05-21 DIAGNOSIS — K59.00 CONSTIPATION, UNSPECIFIED CONSTIPATION TYPE: ICD-10-CM

## 2019-05-21 DIAGNOSIS — E03.9 HYPOTHYROIDISM, UNSPECIFIED TYPE: ICD-10-CM

## 2019-05-21 DIAGNOSIS — F03.90 DEMENTIA WITHOUT BEHAVIORAL DISTURBANCE, UNSPECIFIED DEMENTIA TYPE: ICD-10-CM

## 2019-05-21 DIAGNOSIS — S82.121K: Primary | ICD-10-CM

## 2019-05-21 DIAGNOSIS — N18.30 STAGE 3 CHRONIC KIDNEY DISEASE (H): ICD-10-CM

## 2019-05-21 DIAGNOSIS — R53.81 PHYSICAL DECONDITIONING: ICD-10-CM

## 2019-05-21 PROCEDURE — 99207 ZZC CDG-MDM COMPONENT: MEETS LOW - DOWN CODED: CPT | Performed by: NURSE PRACTITIONER

## 2019-05-21 PROCEDURE — 99309 SBSQ NF CARE MODERATE MDM 30: CPT | Performed by: NURSE PRACTITIONER

## 2019-05-21 RX ORDER — AMOXICILLIN 250 MG
1 CAPSULE ORAL DAILY
COMMUNITY
End: 2019-05-28

## 2019-05-22 ENCOUNTER — RECORDS - HEALTHEAST (OUTPATIENT)
Dept: LAB | Facility: CLINIC | Age: 84
End: 2019-05-22

## 2019-05-23 ENCOUNTER — TRANSFERRED RECORDS (OUTPATIENT)
Dept: HEALTH INFORMATION MANAGEMENT | Facility: CLINIC | Age: 84
End: 2019-05-23

## 2019-05-23 ENCOUNTER — NURSING HOME VISIT (OUTPATIENT)
Dept: GERIATRICS | Facility: CLINIC | Age: 84
End: 2019-05-23
Payer: COMMERCIAL

## 2019-05-23 VITALS
OXYGEN SATURATION: 93 % | TEMPERATURE: 97.7 F | DIASTOLIC BLOOD PRESSURE: 71 MMHG | SYSTOLIC BLOOD PRESSURE: 132 MMHG | HEART RATE: 80 BPM | RESPIRATION RATE: 18 BRPM

## 2019-05-23 DIAGNOSIS — F03.90 DEMENTIA WITHOUT BEHAVIORAL DISTURBANCE, UNSPECIFIED DEMENTIA TYPE: ICD-10-CM

## 2019-05-23 DIAGNOSIS — R53.81 PHYSICAL DECONDITIONING: Primary | ICD-10-CM

## 2019-05-23 DIAGNOSIS — S82.121K: ICD-10-CM

## 2019-05-23 DIAGNOSIS — I10 ESSENTIAL HYPERTENSION: ICD-10-CM

## 2019-05-23 LAB
ANION GAP SERPL CALCULATED.3IONS-SCNC: 8 MMOL/L (ref 5–18)
ANION GAP SERPL CALCULATED.3IONS-SCNC: 8 MMOL/L (ref 5–18)
BUN SERPL-MCNC: 28 MG/DL (ref 8–28)
BUN SERPL-MCNC: 28 MG/DL (ref 8–28)
CALCIUM SERPL-MCNC: 9.2 MG/DL (ref 8.5–10.5)
CALCIUM SERPL-MCNC: 9.2 MG/DL (ref 8.5–10.5)
CHLORIDE BLD-SCNC: 107 MMOL/L (ref 98–107)
CHLORIDE SERPLBLD-SCNC: 107 MMOL/L (ref 98–107)
CO2 SERPL-SCNC: 23 MMOL/L (ref 22–31)
CO2 SERPL-SCNC: 23 MMOL/L (ref 22–31)
CREAT SERPL-MCNC: 0.89 MG/DL (ref 0.6–1.1)
CREAT SERPL-MCNC: 0.89 MG/DL (ref 0.6–1.1)
GFR SERPL CREATININE-BSD FRML MDRD: 59 ML/MIN/1.73M2
GFR SERPL CREATININE-BSD FRML MDRD: 59 ML/MIN/1.73M2
GLUCOSE BLD-MCNC: 82 MG/DL (ref 70–125)
GLUCOSE SERPL-MCNC: 82 MG/DL (ref 70–125)
HEMOGLOBIN: 10.9 G/DL (ref 12–16)
HGB BLD-MCNC: 10.9 G/DL (ref 12–16)
POTASSIUM BLD-SCNC: 4.4 MMOL/L (ref 3.5–5)
POTASSIUM SERPL-SCNC: 4.4 MMOL/L (ref 3.5–5)
SODIUM SERPL-SCNC: 138 MMOL/L (ref 136–145)
SODIUM SERPL-SCNC: 138 MMOL/L (ref 136–145)

## 2019-05-23 PROCEDURE — 99304 1ST NF CARE SF/LOW MDM 25: CPT | Performed by: INTERNAL MEDICINE

## 2019-05-23 NOTE — LETTER
5/23/2019        RE: Tracey Moran  [address]  [facility] [address]        PRIMARY CARE PROVIDER AND CLINIC RESPONSIBLE:  Guilherme Graves YESENIA, [address] [place] [address] / [facility] [id number]        ADMISSION HISTORY AND PHYSICAL EXAMINATION     Chief Complaint   Patient presents with     Nursing Home Acute     MD Intial     Fatigue     Musculoskeletal Problem     Fall         HISTORY OF PRESENT ILLNESS:  89 year old female, (6/16/1925), admitted to the [facility] for continuation of medical care and rehab.  HPI information obtained from: facility chart records, facility staff, patient report and [facility] chart review.    Tracey Moran is a 89 year old female with history of hypertension, arthritis, hypercholesterolemia, hypothyroidism, thrombocytopenia, chronic kidney disease, dementia and hearing impairment who was admitted at [facility] from 5/15 to 5/18/19 due to mechanical fall and injury of her right leg. This fall happened one month prior to hospital admission, she had progressive pain at right proximal leg and knee pain and swelling. CT revealed a focal fracture deformity of the lateral tibial plateau, suspected to be subacute. Orthopedic team was consulted and recommended knee immobilizer when out of bed and non-WB on the right LE. She may pivot transfer with assist/walker and to keep right leg straight when in bed. She was treated pain medications and seen by PT/OT and recommended TCU admission. She pain pain at right leg and knee with movement. She is poor historian and hard hearing, and her hearing aid is bad as per patient. Slept well, appetite good and no BM in spite of laxatives. Patient denies chest pain, dyspnea, abdominal pain, n&v, fever, chills, dysuria. The rest of ROS is unremarkable. Patient is seen and examined by Carolee Caruso NP. Please see Carolee Caruso's admit noted dated 5/21/19 for details of admission, past medical history, family history,  allergies, medication list, social history and other details pertinent with this admission. Hospital admission and dc summary reviewed.    Past Medical History:   Diagnosis Date     Hypertension        Past Surgical History:   Procedure Laterality Date     ESOPHAGOSCOPY, GASTROSCOPY, DUODENOSCOPY (EGD), COMBINED N/A 12/6/2017    Procedure: COMBINED ESOPHAGOSCOPY, GASTROSCOPY, DUODENOSCOPY (EGD);  gastroscopy;  Surgeon: Melchor Mancera MD;  Location:  GI       Current Outpatient Medications   Medication Sig     acetaminophen (TYLENOL) 500 MG tablet Take 2 tablets (1,000 mg) by mouth every 8 hours     diclofenac (VOLTAREN) 1 % topical gel Place onto the skin 3 times daily Apply to right knee pain areas TID scheduled.      ibuprofen (ADVIL/MOTRIN) 400 MG tablet Take 1 tablet (400 mg) by mouth every 6 hours as needed (mild pain)     lisinopril (PRINIVIL/ZESTRIL) 20 MG tablet TAKE ONE TABLET BY MOUTH TWICE DAILY. HOLD IF SYSTOLIC BLOOD PRESSURE IS LESS THAN 110 MMHG. CALL IF HELD TWICE IN A ROW OR IF SYMPTOMATIC.     metoprolol succinate ER (TOPROL-XL) 25 MG 24 hr tablet Take 1 tablet (25 mg) by mouth daily (Patient taking differently: Take 25 mg by mouth daily HOLD if SBP <110 OR HR < 55. Call if held x 2 in a row symptomatic AND CALL NP/MD)     multivitamin, therapeutic with minerals (THERA-VIT-M) TABS tablet Take 1 tablet by mouth daily     polyethylene glycol (MIRALAX/GLYCOLAX) packet Take 17 g by mouth daily as needed      senna-docusate (SENOKOT-S/PERICOLACE) 8.6-50 MG tablet Take 1 tablet by mouth daily Hold for loose BM     senna-docusate (SENOKOT-S/PERICOLACE) 8.6-50 MG tablet Take 1 tablet by mouth 2 times daily as needed for constipation     traMADol (ULTRAM) 50 MG tablet Take 0.5 tablets (25 mg) by mouth every 6 hours as needed for moderate pain     No current facility-administered medications for this visit.        Allergies   Allergen Reactions     Demerol [Meperidine]      Dust Mites      Peanuts [Nuts]       Penicillins      Pollen Extract        Social History     Socioeconomic History     Marital status:      Spouse name: Not on file     Number of children: Not on file     Years of education: Not on file     Highest education level: Not on file   Occupational History     Not on file   Social Needs     Financial resource strain: Not on file     Food insecurity:     Worry: Not on file     Inability: Not on file     Transportation needs:     Medical: Not on file     Non-medical: Not on file   Tobacco Use     Smoking status: Never Smoker     Smokeless tobacco: Never Used   Substance and Sexual Activity     Alcohol use: Yes     Drug use: No     Sexual activity: Not Currently   Lifestyle     Physical activity:     Days per week: Not on file     Minutes per session: Not on file     Stress: Not on file   Relationships     Social connections:     Talks on phone: Not on file     Gets together: Not on file     Attends [employer] service: Not on file     Active member of club or organization: Not on file     Attends meetings of clubs or organizations: Not on file     Relationship status: Not on file     Intimate partner violence:     Fear of current or ex partner: Not on file     Emotionally abused: Not on file     Physically abused: Not on file     Forced sexual activity: Not on file   Other Topics Concern     Parent/sibling w/ CABG, MI or angioplasty before 65F 55M? Not Asked   Social History Narrative     Not on file          Information reviewed:  Medications, vital signs, orders, nursing notes, problem list, hospital information.     ROS: All 10 point review of system completed, those pertinent positive, please see H&P, the remaining ROS is negative.    /71   Pulse 80   Temp 97.7  F (36.5  C)   Resp 18   SpO2 93%     PHYSICAL EXAMINATION:   GENERAL:  No acute distress.  SKIN:  Dry and warm.  There is no rash at area of skin examined.  HEENT:  Head without trauma.  Pupils round, reactive. Exam of  conjunctiva and lids are normal. Sclera without icterus. There is no oral thrush.  NECK:  Supple. No jugular venous distension.  CHEST: No reproducible chest tenderness.   LUNGS:  Normal respiratory effort. Lungs reveal decreased breath sounds at bases. No rales or wheezes.  HEART:  Regular rate and rhythm.  No murmur, gallops or rubs auscultated.  ABDOMEN:  Soft, bowel sounds positive.  There is no tenderness or guarding.   EXTREMITIES: No edema. Mild right upper leg tenderness but no swelling.  NEUROLOGIC:  Alert and oriented x3. Moving all ext. Gait not tested.  PSYCH: Recent and remote memory is mildly impaired. Mood and affect are normal.    Lab/Diagnostic data:  Reviewed  CBC RESULTS:   Recent Labs   Lab Test 02/25/19  1609  11/26/18   WBC  --   --  8.9   RBC  --   --  2.95*   HGB 11.6*   < > 9.6*   HCT  --   --  29.8*   MCV  --   --  101*   MCH  --   --  32.5   MCHC  --   --  32.2   RDW  --   --  13.7   PLT  --   --  297    < > = values in this interval not displayed.       Recent Labs   Lab Test 05/16/19  0655 11/26/18    141   POTASSIUM 4.0 3.6   CHLORIDE 111* 106   CO2 27 28   ANIONGAP 4 7   GLC 85 76   BUN 36* 9   CR 0.99 0.65   RENETTA 8.4* 8.9       Liver Function Studies -   Recent Labs   Lab Test 11/17/18  0925   PROTTOTAL 6.5*   ALBUMIN 3.0*   BILITOTAL 0.7   ALKPHOS 62   AST 42   ALT 19       Results for orders placed or performed during the hospital encounter of 05/15/19   XR Knee Right 3 Views    Narrative    KNEE THREE VIEWS RIGHT  5/15/2019 9:12 PM     HISTORY: Knee pain, swelling.    COMPARISON: August 20, 2018    FINDINGS: Marked lateral compartment degenerative change, mild  degenerative change otherwise. No suprapatellar effusion. There is no  acute fracture. No dislocation. Sunrise views appear aligned. There  are no worrisome bony lesions.      Impression    IMPRESSION:  No acute osseous abnormality demonstrated.    PIERRE CARVAJAL MD   US Lower Extremity Venous Duplex Right    Narrative     ULTRASOUND VENOUS LOWER EXTREMITY UNILATERAL RIGHT  5/15/2019 9:01 PM     HISTORY: Leg pain and swelling.    COMPARISON: None.    TECHNIQUE: Ultrasound gray scale, Color Doppler flow, and spectral  Doppler waveform analysis performed.    FINDINGS: The right common femoral, superficial femoral, popliteal and  posterior tibial veins are patent and fully compressible and  demonstrate normal venous Doppler flow. The visualized greater  saphenous vein is negative for thrombus. For comparison the left  common femoral vein was evaluated and was unremarkable. Fluid  collection in the right popliteal fossa possibly a Baker's cyst  measuring 3.4 x 0.7 x 1.4 cm.      Impression    IMPRESSION: No DVT demonstrated.    PIERRE CARVAJAL MD   CT Knee Right w/o Contrast    Narrative    CT KNEE RIGHT WITHOUT CONTRAST, CT TIBIA/FIBULA LOWER LEG RIGHT  WITHOUT CONTRAST 5/16/2019 4:06 PM     HISTORY: Evaluate for occult fracture. Knee pain and swelling.    TECHNIQUE: Axial images with reconstructions. Radiation dose for this  scan was reduced using automated exposure control, adjustment of the  mA and/or kV according to patient size, or iterative reconstruction  technique.     FINDINGS: There is fracturing and subchondral radiolucency at the  posterolateral aspect of the lateral tibial plateau. I suspect this  finding is subacute. It could represent a traumatic-type fracture with  resorption. It could also represent a subchondral insufficiency  fracture with subchondral collapse. In any event, there is 6 mm of  maximal articular surface depression. Osteopenia suspected.  Moderate-prominent knee joint effusion. Mild patellofemoral  osteoarthritis. There is lateral compartment narrowing; there is  subchondral sclerosis, presumably related to chondromalacia. There is  a Baker's cyst estimated to measure about 5 cm in length. There is  subchondral radiolucency at the medial talar dome. This could  represent subchondral cystic change related  to overlying  chondromalacia, versus an old osteochondritis dissecans lesion.      Impression    IMPRESSION:  1. Focal fracture deformity of the lateral tibial plateau, suspected  to be subacute. This could represent a traumatic fracture with  resorption, versus a subchondral insufficiency fracture with  subchondral collapse. 6 mm of depression.  2. Additional findings discussed above.    FRANCES BROCK MD   CT Tibia Fibula Lower Leg Right wo Contrast    Narrative    CT KNEE RIGHT WITHOUT CONTRAST, CT TIBIA/FIBULA LOWER LEG RIGHT  WITHOUT CONTRAST 5/16/2019 4:06 PM     HISTORY: Evaluate for occult fracture. Knee pain and swelling.    TECHNIQUE: Axial images with reconstructions. Radiation dose for this  scan was reduced using automated exposure control, adjustment of the  mA and/or kV according to patient size, or iterative reconstruction  technique.     FINDINGS: There is fracturing and subchondral radiolucency at the  posterolateral aspect of the lateral tibial plateau. I suspect this  finding is subacute. It could represent a traumatic-type fracture with  resorption. It could also represent a subchondral insufficiency  fracture with subchondral collapse. In any event, there is 6 mm of  maximal articular surface depression. Osteopenia suspected.  Moderate-prominent knee joint effusion. Mild patellofemoral  osteoarthritis. There is lateral compartment narrowing; there is  subchondral sclerosis, presumably related to chondromalacia. There is  a Baker's cyst estimated to measure about 5 cm in length. There is  subchondral radiolucency at the medial talar dome. This could  represent subchondral cystic change related to overlying  chondromalacia, versus an old osteochondritis dissecans lesion.      Impression    IMPRESSION:  1. Focal fracture deformity of the lateral tibial plateau, suspected  to be subacute. This could represent a traumatic fracture with  resorption, versus a subchondral insufficiency fracture  with  subchondral collapse. 6 mm of depression.  2. Additional findings discussed above.    FRANCES BROCK MD         ASSESSMENT / PLAN:     Physical deconditioning  Plan: PT/OT with increase independence, self-care, strength and endurance and other cares that help return to home/facility of origin, fall precautions. Care conference with patient and family for the progress of rehab and disposition issues will be discussed as planned. Rehab evaluation and other evaluations including CPT are at rehab logs, to be reviewed separately.  Fall risk assessment as well as cognitive evaluation so far performed:  SLUMS:  13/30  Transfers: min A  Ambulating distance:  Sliding  Her hearing aid is bad as per patient, needs replaced or rechecked.    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 Follow up orthopedic clinic as scheduled. Incentive spirometry prn. She is constipated, continue and optimize bowel regimen.     Essential hypertension  Plan: Continue metoprolol and lisinopril with parameters. Blood pressure stable. We recommend life style modification with diet and exercises.    Dementia without behavioral disturbance, unspecified dementia type  Plan: Fall and delirium prevention.    Other problems with same care. Primary care doctor and other specialists to address those chronic problems in next clinic appointment to be scheduled upon discharge from the TCU.      Total time spent with patient visit was 34 min including patient visit, review of past records, patients counseling and coordinating care.      Electronically signed by:  Kike Burns MD            Sincerely,        Kike Burns MD

## 2019-05-23 NOTE — PROGRESS NOTES
PRIMARY CARE PROVIDER AND CLINIC RESPONSIBLE:  Guilherme Graves, [address] [place] [id number]        ADMISSION HISTORY AND PHYSICAL EXAMINATION     Chief Complaint   Patient presents with     Nursing Home Acute     MD Intial     Fatigue     Musculoskeletal Problem     Fall         HISTORY OF PRESENT ILLNESS:  89 year old female, (6/16/1925), admitted to the [facility] TCU for continuation of medical care and rehab.  HPI information obtained from: facility chart records, facility staff, patient report and [facility] chart review.    Tracey Moran is a 89 year old female with history of hypertension, arthritis, hypercholesterolemia, hypothyroidism, thrombocytopenia, chronic kidney disease, dementia and hearing impairment who was admitted at [facility] from 5/15 to 5/18/19 due to mechanical fall and injury of her right leg. This fall happened one month prior to hospital admission, she had progressive pain at right proximal leg and knee pain and swelling. CT revealed a focal fracture deformity of the lateral tibial plateau, suspected to be subacute. Orthopedic team was consulted and recommended knee immobilizer when out of bed and non-WB on the right LE. She may pivot transfer with assist/walker and to keep right leg straight when in bed. She was treated pain medications and seen by PT/OT and recommended TCU admission. She pain pain at right leg and knee with movement. She is poor historian and hard hearing, and her hearing aid is bad as per patient. Slept well, appetite good and no BM in spite of laxatives. Patient denies chest pain, dyspnea, abdominal pain, n&v, fever, chills, dysuria. The rest of ROS is unremarkable. Patient is seen and examined by Carolee Caruso NP. Please see Carolee Caruso's admit noted dated 5/21/19 for details of admission, past medical history, family history, allergies, medication list, social history and other details pertinent with this admission. Hospital  admission and dc summary reviewed.    Past Medical History:   Diagnosis Date     Hypertension        Past Surgical History:   Procedure Laterality Date     ESOPHAGOSCOPY, GASTROSCOPY, DUODENOSCOPY (EGD), COMBINED N/A 12/6/2017    Procedure: COMBINED ESOPHAGOSCOPY, GASTROSCOPY, DUODENOSCOPY (EGD);  gastroscopy;  Surgeon: Melchor Mancera MD;  Location:  GI       Current Outpatient Medications   Medication Sig     acetaminophen (TYLENOL) 500 MG tablet Take 2 tablets (1,000 mg) by mouth every 8 hours     diclofenac (VOLTAREN) 1 % topical gel Place onto the skin 3 times daily Apply to right knee pain areas TID scheduled.      ibuprofen (ADVIL/MOTRIN) 400 MG tablet Take 1 tablet (400 mg) by mouth every 6 hours as needed (mild pain)     lisinopril (PRINIVIL/ZESTRIL) 20 MG tablet TAKE ONE TABLET BY MOUTH TWICE DAILY. HOLD IF SYSTOLIC BLOOD PRESSURE IS LESS THAN 110 MMHG. CALL IF HELD TWICE IN A ROW OR IF SYMPTOMATIC.     metoprolol succinate ER (TOPROL-XL) 25 MG 24 hr tablet Take 1 tablet (25 mg) by mouth daily (Patient taking differently: Take 25 mg by mouth daily HOLD if SBP <110 OR HR < 55. Call if held x 2 in a row symptomatic AND CALL NP/MD)     multivitamin, therapeutic with minerals (THERA-VIT-M) TABS tablet Take 1 tablet by mouth daily     polyethylene glycol (MIRALAX/GLYCOLAX) packet Take 17 g by mouth daily as needed      senna-docusate (SENOKOT-S/PERICOLACE) 8.6-50 MG tablet Take 1 tablet by mouth daily Hold for loose BM     senna-docusate (SENOKOT-S/PERICOLACE) 8.6-50 MG tablet Take 1 tablet by mouth 2 times daily as needed for constipation     traMADol (ULTRAM) 50 MG tablet Take 0.5 tablets (25 mg) by mouth every 6 hours as needed for moderate pain     No current facility-administered medications for this visit.        Allergies   Allergen Reactions     Demerol [Meperidine]      Dust Mites      Peanuts [Nuts]      Penicillins      Pollen Extract        Social History     Socioeconomic History     Marital  status:      Spouse name: Not on file     Number of children: Not on file     Years of education: Not on file     Highest education level: Not on file   Occupational History     Not on file   Social Needs     Financial resource strain: Not on file     Food insecurity:     Worry: Not on file     Inability: Not on file     Transportation needs:     Medical: Not on file     Non-medical: Not on file   Tobacco Use     Smoking status: Never Smoker     Smokeless tobacco: Never Used   Substance and Sexual Activity     Alcohol use: Yes     Drug use: No     Sexual activity: Not Currently   Lifestyle     Physical activity:     Days per week: Not on file     Minutes per session: Not on file     Stress: Not on file   Relationships     Social connections:     Talks on phone: Not on file     Gets together: Not on file     Attends [employer] service: Not on file     Active member of club or organization: Not on file     Attends meetings of clubs or organizations: Not on file     Relationship status: Not on file     Intimate partner violence:     Fear of current or ex partner: Not on file     Emotionally abused: Not on file     Physically abused: Not on file     Forced sexual activity: Not on file   Other Topics Concern     Parent/sibling w/ CABG, MI or angioplasty before 65F 55M? Not Asked   Social History Narrative     Not on file          Information reviewed:  Medications, vital signs, orders, nursing notes, problem list, hospital information.     ROS: All 10 point review of system completed, those pertinent positive, please see H&P, the remaining ROS is negative.    /71   Pulse 80   Temp 97.7  F (36.5  C)   Resp 18   SpO2 93%     PHYSICAL EXAMINATION:   GENERAL:  No acute distress.  SKIN:  Dry and warm.  There is no rash at area of skin examined.  HEENT:  Head without trauma.  Pupils round, reactive. Exam of conjunctiva and lids are normal. Sclera without icterus. There is no oral thrush.  NECK:  Supple. No  jugular venous distension.  CHEST: No reproducible chest tenderness.   LUNGS:  Normal respiratory effort. Lungs reveal decreased breath sounds at bases. No rales or wheezes.  HEART:  Regular rate and rhythm.  No murmur, gallops or rubs auscultated.  ABDOMEN:  Soft, bowel sounds positive.  There is no tenderness or guarding.   EXTREMITIES: No edema. Mild right upper leg tenderness but no swelling.  NEUROLOGIC:  Alert and oriented x3. Moving all ext. Gait not tested.  PSYCH: Recent and remote memory is mildly impaired. Mood and affect are normal.    Lab/Diagnostic data:  Reviewed  CBC RESULTS:   Recent Labs   Lab Test 02/25/19  1609  11/26/18   WBC  --   --  8.9   RBC  --   --  2.95*   HGB 11.6*   < > 9.6*   HCT  --   --  29.8*   MCV  --   --  101*   MCH  --   --  32.5   MCHC  --   --  32.2   RDW  --   --  13.7   PLT  --   --  297    < > = values in this interval not displayed.       Recent Labs   Lab Test 05/16/19  0655 11/26/18    141   POTASSIUM 4.0 3.6   CHLORIDE 111* 106   CO2 27 28   ANIONGAP 4 7   GLC 85 76   BUN 36* 9   CR 0.99 0.65   RENETTA 8.4* 8.9       Liver Function Studies -   Recent Labs   Lab Test 11/17/18  0925   PROTTOTAL 6.5*   ALBUMIN 3.0*   BILITOTAL 0.7   ALKPHOS 62   AST 42   ALT 19       Results for orders placed or performed during the hospital encounter of 05/15/19   XR Knee Right 3 Views    Narrative    KNEE THREE VIEWS RIGHT  5/15/2019 9:12 PM     HISTORY: Knee pain, swelling.    COMPARISON: August 20, 2018    FINDINGS: Marked lateral compartment degenerative change, mild  degenerative change otherwise. No suprapatellar effusion. There is no  acute fracture. No dislocation. Sunrise views appear aligned. There  are no worrisome bony lesions.      Impression    IMPRESSION:  No acute osseous abnormality demonstrated.    PIERRE CARVAJAL MD   US Lower Extremity Venous Duplex Right    Narrative    ULTRASOUND VENOUS LOWER EXTREMITY UNILATERAL RIGHT  5/15/2019 9:01 PM     HISTORY: Leg pain and  swelling.    COMPARISON: None.    TECHNIQUE: Ultrasound gray scale, Color Doppler flow, and spectral  Doppler waveform analysis performed.    FINDINGS: The right common femoral, superficial femoral, popliteal and  posterior tibial veins are patent and fully compressible and  demonstrate normal venous Doppler flow. The visualized greater  saphenous vein is negative for thrombus. For comparison the left  common femoral vein was evaluated and was unremarkable. Fluid  collection in the right popliteal fossa possibly a Baker's cyst  measuring 3.4 x 0.7 x 1.4 cm.      Impression    IMPRESSION: No DVT demonstrated.    PIERRE CARVAJAL MD   CT Knee Right w/o Contrast    Narrative    CT KNEE RIGHT WITHOUT CONTRAST, CT TIBIA/FIBULA LOWER LEG RIGHT  WITHOUT CONTRAST 5/16/2019 4:06 PM     HISTORY: Evaluate for occult fracture. Knee pain and swelling.    TECHNIQUE: Axial images with reconstructions. Radiation dose for this  scan was reduced using automated exposure control, adjustment of the  mA and/or kV according to patient size, or iterative reconstruction  technique.     FINDINGS: There is fracturing and subchondral radiolucency at the  posterolateral aspect of the lateral tibial plateau. I suspect this  finding is subacute. It could represent a traumatic-type fracture with  resorption. It could also represent a subchondral insufficiency  fracture with subchondral collapse. In any event, there is 6 mm of  maximal articular surface depression. Osteopenia suspected.  Moderate-prominent knee joint effusion. Mild patellofemoral  osteoarthritis. There is lateral compartment narrowing; there is  subchondral sclerosis, presumably related to chondromalacia. There is  a Baker's cyst estimated to measure about 5 cm in length. There is  subchondral radiolucency at the medial talar dome. This could  represent subchondral cystic change related to overlying  chondromalacia, versus an old osteochondritis dissecans lesion.      Impression     IMPRESSION:  1. Focal fracture deformity of the lateral tibial plateau, suspected  to be subacute. This could represent a traumatic fracture with  resorption, versus a subchondral insufficiency fracture with  subchondral collapse. 6 mm of depression.  2. Additional findings discussed above.    FRANCES BROCK MD   CT Tibia Fibula Lower Leg Right wo Contrast    Narrative    CT KNEE RIGHT WITHOUT CONTRAST, CT TIBIA/FIBULA LOWER LEG RIGHT  WITHOUT CONTRAST 5/16/2019 4:06 PM     HISTORY: Evaluate for occult fracture. Knee pain and swelling.    TECHNIQUE: Axial images with reconstructions. Radiation dose for this  scan was reduced using automated exposure control, adjustment of the  mA and/or kV according to patient size, or iterative reconstruction  technique.     FINDINGS: There is fracturing and subchondral radiolucency at the  posterolateral aspect of the lateral tibial plateau. I suspect this  finding is subacute. It could represent a traumatic-type fracture with  resorption. It could also represent a subchondral insufficiency  fracture with subchondral collapse. In any event, there is 6 mm of  maximal articular surface depression. Osteopenia suspected.  Moderate-prominent knee joint effusion. Mild patellofemoral  osteoarthritis. There is lateral compartment narrowing; there is  subchondral sclerosis, presumably related to chondromalacia. There is  a Baker's cyst estimated to measure about 5 cm in length. There is  subchondral radiolucency at the medial talar dome. This could  represent subchondral cystic change related to overlying  chondromalacia, versus an old osteochondritis dissecans lesion.      Impression    IMPRESSION:  1. Focal fracture deformity of the lateral tibial plateau, suspected  to be subacute. This could represent a traumatic fracture with  resorption, versus a subchondral insufficiency fracture with  subchondral collapse. 6 mm of depression.  2. Additional findings discussed above.    FRANCES BROCK MD          ASSESSMENT / PLAN:     Physical deconditioning  Plan: PT/OT with increase independence, self-care, strength and endurance and other cares that help return to home/facility of origin, fall precautions. Care conference with patient and family for the progress of rehab and disposition issues will be discussed as planned. Rehab evaluation and other evaluations including CPT are at rehab logs, to be reviewed separately.  Fall risk assessment as well as cognitive evaluation so far performed:  SLUMS:  13/30  Transfers: min A  Ambulating distance:  Sliding  Her hearing aid is bad as per patient, needs replaced or rechecked.    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 Follow up orthopedic clinic as scheduled. Incentive spirometry prn. She is constipated, continue and optimize bowel regimen.     Essential hypertension  Plan: Continue metoprolol and lisinopril with parameters. Blood pressure stable. We recommend life style modification with diet and exercises.    Dementia without behavioral disturbance, unspecified dementia type  Plan: Fall and delirium prevention.    Other problems with same care. Primary care doctor and other specialists to address those chronic problems in next clinic appointment to be scheduled upon discharge from the TCU.      Total time spent with patient visit was 34 min including patient visit, review of past records, patients counseling and coordinating care.      Electronically signed by:  Kike Burns MD

## 2019-05-24 ENCOUNTER — DOCUMENTATION ONLY (OUTPATIENT)
Dept: OTHER | Facility: CLINIC | Age: 84
End: 2019-05-24

## 2019-05-24 ENCOUNTER — AMBULATORY - HEALTHEAST (OUTPATIENT)
Dept: OTHER | Facility: CLINIC | Age: 84
End: 2019-05-24

## 2019-05-27 ASSESSMENT — MIFFLIN-ST. JEOR: SCORE: 961.53

## 2019-05-28 ENCOUNTER — NURSING HOME VISIT (OUTPATIENT)
Dept: GERIATRICS | Facility: CLINIC | Age: 84
End: 2019-05-28
Payer: COMMERCIAL

## 2019-05-28 VITALS
HEIGHT: 62 IN | BODY MASS INDEX: 24.48 KG/M2 | DIASTOLIC BLOOD PRESSURE: 65 MMHG | WEIGHT: 133 LBS | HEART RATE: 72 BPM | SYSTOLIC BLOOD PRESSURE: 159 MMHG

## 2019-05-28 DIAGNOSIS — I10 ESSENTIAL HYPERTENSION: ICD-10-CM

## 2019-05-28 DIAGNOSIS — K59.00 CONSTIPATION, UNSPECIFIED CONSTIPATION TYPE: ICD-10-CM

## 2019-05-28 DIAGNOSIS — S82.121K: Primary | ICD-10-CM

## 2019-05-28 DIAGNOSIS — N18.30 STAGE 3 CHRONIC KIDNEY DISEASE (H): ICD-10-CM

## 2019-05-28 DIAGNOSIS — F03.90 DEMENTIA WITHOUT BEHAVIORAL DISTURBANCE, UNSPECIFIED DEMENTIA TYPE: ICD-10-CM

## 2019-05-28 DIAGNOSIS — R53.81 PHYSICAL DECONDITIONING: ICD-10-CM

## 2019-05-28 PROCEDURE — 99207 ZZC CDG-MDM COMPONENT: MEETS MODERATE - UP CODED: CPT | Performed by: NURSE PRACTITIONER

## 2019-05-28 PROCEDURE — 99309 SBSQ NF CARE MODERATE MDM 30: CPT | Performed by: NURSE PRACTITIONER

## 2019-05-28 RX ORDER — IBUPROFEN 400 MG/1
400 TABLET, FILM COATED ORAL DAILY PRN
COMMUNITY
End: 2019-06-18

## 2019-05-28 RX ORDER — METOPROLOL SUCCINATE 25 MG/1
25 TABLET, EXTENDED RELEASE ORAL DAILY
COMMUNITY
End: 2020-03-11 | Stop reason: DRUGHIGH

## 2019-05-28 NOTE — PROGRESS NOTES
[facility]  [place] Medical Record Number:  [id number]  Place of Service where encounter took place:  [facility] ([facility]) [[id number]]  Chief Complaint   Patient presents with     Nursing Home Acute       HPI:    Tracey Moran  is a 89 year old (6/16/1925), who is being seen today for an episodic care visit.  HPI information obtained from: facility chart records, facility staff, patient report and [facility] chart review. 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    MEDICATIONS:  Current Outpatient Medications   Medication Sig Dispense Refill     acetaminophen (TYLENOL) 500 MG tablet Take 2 tablets (1,000 mg) by mouth every 8 hours       diclofenac (VOLTAREN) 1 % topical gel Place onto the skin 3 times daily Apply to right knee pain areas TID scheduled.        ibuprofen (ADVIL/MOTRIN) 400 MG tablet Take 400 mg by mouth daily as needed for moderate pain Secondary to kidney function.       lisinopril (PRINIVIL/ZESTRIL) 20 MG tablet TAKE ONE TABLET BY MOUTH TWICE DAILY. HOLD IF SYSTOLIC BLOOD PRESSURE IS LESS THAN 110 MMHG. CALL IF HELD TWICE IN A ROW OR IF SYMPTOMATIC. 180 tablet 2     metoprolol succinate ER (TOPROL-XL) 25 MG 24 hr tablet Take 25 mg by mouth daily Give 1 tablet by mouth one time a day for HTN HOLD if SBP < 110 or < 55 and call MD/NP if held x 2 in a row or symptoms       multivitamin, therapeutic with minerals (THERA-VIT-M) TABS tablet Take 1 tablet by mouth daily       polyethylene glycol (MIRALAX/GLYCOLAX) packet Take 17 g by mouth daily as needed        senna-docusate (SENOKOT-S/PERICOLACE) 8.6-50 MG tablet Take 1  "tablet by mouth 2 times daily as needed for constipation         ROS:  No CP, SOB, Cough, dizziness, fevers, chills, HA, N/V, dysuria or Bowel Abnormalities. Appetite is improved.  No pain except right knee with palpation    Objective:  /65   Pulse 72   Ht 1.575 m (5' 2\")   Wt 60.3 kg (133 lb)   BMI 24.33 kg/m       Exam:  GENERAL APPEARANCE:  Alert, in no distress, oriented, cooperative  ENT:  Mouth with moist mucous membranes,  normal hearing acuity  RESP:  respiratory effort  of chest normal, lungs clear to auscultation ,NAD, mildly decreased BS globally  CV:  Auscultation of heart done , RRR, ?soft murmur, rub, or gallop, no edema, +2 pedal pulses.  ABDOMEN:  normal bowel sounds, soft, nontender  M/S:   RUIZ slightly less mobile in right leg, right leg in immobilizer, good CSM right foot  SKIN:  Inspection of skin and subcutaneous tissue baseline  NEURO:   Cranial nerves 2-12 are grossly intact and at patient's baseline  PSYCH:  insight and judgement impaired, memory impaired      Labs:   Recent Labs   Lab Test 05/23/19 05/16/19  0655    142   POTASSIUM 4.4 4.0   CHLORIDE 107 111*   CO2 23 27   ANIONGAP 8 4   GLC 82 85   BUN 28 36*   CR 0.89 0.99   RENETTA 9.2 8.4*     CBC RESULTS:   Recent Labs   Lab Test 05/23/19 11/26/18   WBC  --  8.9   RBC  --  2.95*   HGB 10.9* 9.6*   HCT  --  29.8*   MCV  --  101*   MCH  --  32.5   MCHC  --  32.2   RDW  --  13.7   PLT  --  297      ASSESSMENT / PLAN:  (S82.121K) 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 Family taking pt for 5/30 appt with Dr Hodges at [facility] in [place]. She has resided in a Co-op but family has been working at getting her into [facility]     (N18.3) Stage 3 chronic kidney disease (H)  Comment/Plan: BMP is fine but should not be on many if any " ibuprofen.  She is using it occas and sometimes twice in a day.  Will decrease to once prn daily and consider discontinue of it soon    (F03.90) Dementia without behavioral disturbance, unspecified dementia type(12/2018: IN TCU: Cognitive Scores: SLUMS 5/30-, CPT 3.9/5.6)  Comment/Plan: PT OT, will need more help after discharge. SLUMS 13/30 so far here per OT    (I10) Essential hypertension  Comment/Plan: labile in the 110-160's SBP, will cont current med, Toprol Xl--monitor.     (K59.00) Constipation, unspecified constipation type  Comment/Plan: controlled, cont with prn senna S, monitor.           Electronically signed by:  WILLIAM Ornelas CNP

## 2019-05-30 ENCOUNTER — TRANSFERRED RECORDS (OUTPATIENT)
Dept: HEALTH INFORMATION MANAGEMENT | Facility: CLINIC | Age: 84
End: 2019-05-30

## 2019-06-02 NOTE — PROGRESS NOTES
[place] [facility]  [place] Medical Record Number:  [id number]  Place of Service where encounter took place:  [facility] [place] ([facility]) [[id number]]  Chief Complaint   Patient presents with     Nursing Home Acute       HPI:    Tracey Moran  is a 89 year old (6/16/1925), who is being seen today for an episodic care visit.  HPI information obtained from: facility chart records, facility staff, patient report and [facility] chart review. 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    MEDICATIONS:  Current Outpatient Medications   Medication Sig Dispense Refill     acetaminophen (TYLENOL) 500 MG tablet Take 2 tablets (1,000 mg) by mouth every 8 hours       diclofenac (VOLTAREN) 1 % topical gel Place onto the skin 3 times daily Apply to right knee pain areas TID scheduled.        ibuprofen (ADVIL/MOTRIN) 400 MG tablet Take 400 mg by mouth daily as needed for moderate pain Secondary to kidney function.       lisinopril (PRINIVIL/ZESTRIL) 20 MG tablet TAKE ONE TABLET BY MOUTH TWICE DAILY. HOLD IF SYSTOLIC BLOOD PRESSURE IS LESS THAN 110 MMHG. CALL IF HELD TWICE IN A ROW OR IF SYMPTOMATIC. 180 tablet 2     metoprolol succinate ER (TOPROL-XL) 25 MG 24 hr tablet Take 25 mg by mouth daily Give 1 tablet by mouth one time a day for HTN HOLD if SBP < 110 or < 55 and call MD/NP if held x 2 in a row or symptoms       multivitamin, therapeutic with minerals (THERA-VIT-M) TABS tablet Take 1 tablet by mouth daily       polyethylene glycol (MIRALAX/GLYCOLAX) packet Take 17 g by mouth daily as needed        senna-docusate  (SENOKOT-S/PERICOLACE) 8.6-50 MG tablet Take 1 tablet by mouth 2 times daily as needed for constipation         ROS: forgetful but says No CP, SOB, Cough, dizziness, fevers, chills, HA, N/V, dysuria or Bowel Abnormalities. Appetite is improved.  No pain.    Objective:  /62   Pulse 78   Temp 96.9  F (36.1  C)   Resp 18   SpO2 94%      Exam:  GENERAL APPEARANCE:  Alert, in no distress, oriented, cooperative  ENT:  Mouth with moist mucous membranes,  normal hearing acuity  RESP:  respiratory effort  of chest normal, lungs clear to auscultation ,NAD, mildly decreased BS globally  CV:  Auscultation of heart done , RRR, +soft murmur, rub, or gallop, no edema, +2 pedal pulses.  ABDOMEN:  normal bowel sounds, soft, nontender  M/S:   RUIZ slightly less mobile in right leg it is in an immobilizer, good CSM right foot  SKIN:  Inspection of skin and subcutaneous tissue baseline  NEURO:   Cranial nerves 2-12 are grossly intact and at patient's baseline  PSYCH:  insight and judgement impaired, memory impaired      Labs:   Recent Labs   Lab Test 05/23/19 05/16/19  0655    142   POTASSIUM 4.4 4.0   CHLORIDE 107 111*   CO2 23 27   ANIONGAP 8 4   GLC 82 85   BUN 28 36*   CR 0.89 0.99   RENETTA 9.2 8.4*     CBC RESULTS:   Recent Labs   Lab Test 05/23/19 11/26/18   WBC  --  8.9   RBC  --  2.95*   HGB 10.9* 9.6*   HCT  --  29.8*   MCV  --  101*   MCH  --  32.5   MCHC  --  32.2   RDW  --  13.7   PLT  --  297      ASSESSMENT / PLAN:  (S82.121K) 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 Per Ortho, healing per Xray,of the right tibial plateau Fx.  Can be WBAT for transfers with the immobilizer on otherwise only TTWB. Can have brace off when in bed.  F/U in 4/-6 weeks again  with Dr Hodges at [facility] in [place]. The family  working at getting her  into jail at [facility]     (N18.3) Stage 3 chronic kidney disease (H)  Comment/Plan: BMP is fine, ok for the Ibuprofen once prn daily for now. Discontinue soon/next visit if not using .    (F03.90) Dementia without behavioral disturbance, unspecified dementia type(2018: IN TCU: Cognitive Scores: SLUMS 5/30-, CPT 3.9/5.6)  Comment/Plan: PT OT, will need more help after discharge--AL/LTC    (I10) Essential hypertension  Comment/Plan: labile in the 110-160's SBP but mostly in the 130-140's , will cont current med, Toprol Xl--monitor.     (K59.00) Constipation, unspecified constipation type  Comment/Plan: controlled, cont with prn senna S, monitor.           Electronically signed by:  WILLIAM Ornelas CNP         Face to Face and Medical Necessity Statement for DME Provider visit    Demographic Information on Tracey Moran:  Gender: female  : 1925  [address]   [facility] [address]  [phone] (home)     Medical Record: [id number]  Social Security Number: [id number]  Primary Care Provider: Guilherme Graves  Insurance: Payor: [facility] / Plan: [facility] MEDICARE / Product Type: HMO /     HPI:   Tracey Moran is a 89 year old  (1925), who is being seen today for a face to face provider visit at [facility]; medical necessity statement for DME included. This patient requires the following:  DME Ordered and Medical Necessity Statement     Mechanical Wheelchair  Size: 18 x 16  Corresponding cushion: standard,  yes  Standard foot rests: Yes  Elevating leg rests: Yes for right leg  Arm rests: No  Lap tray: No  Dose patient use oxygen? No   Able to propel w/c? Yes with both upper extremities and left lower extremity If no why not?   And is there someone who can? yes  Mobility related ADL that are affected in the home:   S82.121D,Z91.81, M 19.90  The wheelchair is suitable and necessary for use in the patient's home.  Reason why a cane or walker will not meet the patient's needs. (ie: balance,  tolerance, level of assistance) has TTWB restrictions due to fracture of right tibial plateau fracture   The patient has expressed willingness to use the wheelchair in the home and does have the physical and cognitive ability to maneuver the equipment or has a caregiver who is available, willing, and able to provide assistance in the home with the wheelchair.   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    ROS:see above under exam    EXAM  Vitals: /62   Pulse 78   Temp 96.9  F (36.1  C)   Resp 18   SpO2 94% ;BMI= There is no height or weight on file to calculate BMI.    See exam above    ASSESSMENT/PLAN:  1. 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 Physical deconditioning    3. Stage 3 chronic kidney disease (H)    4. Dementia without behavioral disturbance, unspecified dementia type    5. Essential hypertension    6. Constipation, unspecified constipation type        Orders:  1. Facility staff/TC to contact DME company to get their order form for provider to fill out     ELECTRONICALLY SIGNED BY MELLY CERTIFIED PROVIDER:  WILLIAM Ornelas CNP   NPI: [id number]    [place] [facility]  [address] [place] [address], [address]  [place] [address]

## 2019-06-03 ENCOUNTER — NURSING HOME VISIT (OUTPATIENT)
Dept: GERIATRICS | Facility: CLINIC | Age: 84
End: 2019-06-03
Payer: COMMERCIAL

## 2019-06-03 VITALS
RESPIRATION RATE: 18 BRPM | OXYGEN SATURATION: 94 % | DIASTOLIC BLOOD PRESSURE: 62 MMHG | TEMPERATURE: 96.9 F | SYSTOLIC BLOOD PRESSURE: 138 MMHG | HEART RATE: 78 BPM

## 2019-06-03 DIAGNOSIS — I10 ESSENTIAL HYPERTENSION: ICD-10-CM

## 2019-06-03 DIAGNOSIS — S82.121K: Primary | ICD-10-CM

## 2019-06-03 DIAGNOSIS — K59.00 CONSTIPATION, UNSPECIFIED CONSTIPATION TYPE: ICD-10-CM

## 2019-06-03 DIAGNOSIS — R53.81 PHYSICAL DECONDITIONING: ICD-10-CM

## 2019-06-03 DIAGNOSIS — N18.30 STAGE 3 CHRONIC KIDNEY DISEASE (H): ICD-10-CM

## 2019-06-03 DIAGNOSIS — F03.90 DEMENTIA WITHOUT BEHAVIORAL DISTURBANCE, UNSPECIFIED DEMENTIA TYPE: ICD-10-CM

## 2019-06-03 PROCEDURE — 99207 ZZC CDG-MDM COMPONENT: MEETS MODERATE - UP CODED: CPT | Performed by: NURSE PRACTITIONER

## 2019-06-03 PROCEDURE — 99309 SBSQ NF CARE MODERATE MDM 30: CPT | Performed by: NURSE PRACTITIONER

## 2019-06-05 ASSESSMENT — MIFFLIN-ST. JEOR: SCORE: 969.7

## 2019-06-05 NOTE — PROGRESS NOTES
"[place] [facility]  [place] Medical Record Number:  [id number]  Place of Service where encounter took place:  [facility] ([facility]) [[id number]]  Chief Complaint   Patient presents with     Nursing Home Acute     HPI:    Tracey Moran  is a 89 year old (6/16/1925), who is being seen today for an episodic care visit.  HPI information obtained from: facility chart records, facility staff, patient report and [facility] chart review. Today's concern is:    Right Subacute Lateral Tibial Plateau Fracture. Reports having \"no pain at all\". Upon review of documentation, last utilized Ibuprofen on 6/4/19. Continues to work with Physical and Occupational Therapy. Able to propel wheelchair on unit with supervision. Transfers from sit to supine independently. Assist x 1 with ADLs on the unit with staff.     Hypertension. Upon review of blood pressures over the past 5 days, systolic range from 126-150, most 130-140s. Diastolic range from 60-83.    Hypothyroidism. No reports of fatigue. Weight 136.4 lbs on 5/18/19 -> 134.8 lbs on 6/3/19.     Past Medical and Surgical History reviewed in Epic today.    MEDICATIONS:  Current Outpatient Medications   Medication Sig Dispense Refill     acetaminophen (TYLENOL) 500 MG tablet Take 2 tablets (1,000 mg) by mouth every 8 hours       diclofenac (VOLTAREN) 1 % topical gel Place onto the skin 3 times daily Apply to right knee pain areas TID scheduled.        ibuprofen (ADVIL/MOTRIN) 400 MG tablet Take 400 mg by mouth daily as needed for moderate pain Secondary to kidney function.       lisinopril (PRINIVIL/ZESTRIL) 20 MG tablet TAKE ONE TABLET BY MOUTH TWICE DAILY. HOLD IF SYSTOLIC BLOOD PRESSURE IS LESS THAN 110 MMHG. CALL IF HELD TWICE IN A ROW OR IF SYMPTOMATIC. 180 tablet 2     metoprolol succinate ER (TOPROL-XL) 25 MG 24 hr tablet Take 25 mg by mouth daily Give 1 tablet by mouth one time a day for HTN HOLD if SBP < 110 or < 55 and call MD/NP if held x " "2 in a row or symptoms       multivitamin, therapeutic with minerals (THERA-VIT-M) TABS tablet Take 1 tablet by mouth daily       polyethylene glycol (MIRALAX/GLYCOLAX) packet Take 17 g by mouth daily as needed        senna-docusate (SENOKOT-S/PERICOLACE) 8.6-50 MG tablet Take 1 tablet by mouth 2 times daily as needed for constipation       REVIEW OF SYSTEMS:  Limited secondary to cognitive impairment but today pt reports no pain.    Objective:  /62   Pulse 67   Temp 97.8  F (36.6  C)   Resp 16   Ht 1.575 m (5' 2\")   Wt 61.1 kg (134 lb 12.8 oz)   SpO2 98%   BMI 24.66 kg/m    Exam:  GENERAL APPEARANCE:  Alert, in no distress  ENT:  Mouth and posterior oropharynx normal, moist mucous membranes  EYES:  EOM, conjunctivae, lids, pupils and irises normal  RESP:  respiratory effort and palpation of chest normal, lungs clear to auscultation , no respiratory distress  CV:  Palpation and auscultation of heart done , regular rate and rhythm  ABDOMEN:  normal bowel sounds, soft, nontender, no hepatosplenomegaly or other masses  M/S:   Active movement of bilatreal upper and lower extremities. Trace edema in BLE.  SKIN:  Inspection of skin and subcutaneous tissue baseline, Palpation of skin and subcutaneous tissue baseline  NEURO:   Cranial nerves 2-12 are normal tested and grossly at patient's baseline  PSYCH:  memory impaired     Labs:   Labs done in SNF are in [facility]. Please refer to them using [facility]/Care Everywhere.    ASSESSMENT/PLAN:  Right Subacute Lateral Tibial Plateau Fracture. Follow-up with Dr. Hodges in 4-6 weeks from hospitalization. Transfer with immobilizer in place and WBAT otherwise TTWB. Continue Acetaminophen and Diclofenac gel as ordered. As patient is utilizing Ibuprofen PRN at this time, will not discontinue.     Hypertension. Most blood pressures at goal of < 150/90. Continue Lisinopril and Metoprolol as ordered.    Hypothyroidism. Last TSH 2.95 on 11/17/18. Weights stable. No " reports of fatigue. Requires no medications.     Electronically signed by:  WILLIAM Mora CNP

## 2019-06-06 ENCOUNTER — NURSING HOME VISIT (OUTPATIENT)
Dept: GERIATRICS | Facility: CLINIC | Age: 84
End: 2019-06-06
Payer: COMMERCIAL

## 2019-06-06 VITALS
DIASTOLIC BLOOD PRESSURE: 62 MMHG | HEART RATE: 67 BPM | SYSTOLIC BLOOD PRESSURE: 148 MMHG | BODY MASS INDEX: 24.8 KG/M2 | OXYGEN SATURATION: 98 % | HEIGHT: 62 IN | WEIGHT: 134.8 LBS | RESPIRATION RATE: 16 BRPM | TEMPERATURE: 97.8 F

## 2019-06-06 DIAGNOSIS — I10 BENIGN ESSENTIAL HYPERTENSION: ICD-10-CM

## 2019-06-06 DIAGNOSIS — E03.9 HYPOTHYROIDISM, UNSPECIFIED TYPE: ICD-10-CM

## 2019-06-06 DIAGNOSIS — S82.121D CLOSED FRACTURE OF LATERAL PORTION OF RIGHT TIBIAL PLATEAU WITH ROUTINE HEALING, SUBSEQUENT ENCOUNTER: Primary | ICD-10-CM

## 2019-06-06 PROCEDURE — 99309 SBSQ NF CARE MODERATE MDM 30: CPT | Performed by: NURSE PRACTITIONER

## 2019-06-06 NOTE — LETTER
"    6/6/2019        RE: Tracey Moran  [address]  [facility] [place] [facility] [address]        [place] [facility]  [place] Medical Record Number:  [id number]  Place of Service where encounter took place:  [facility] ([facility]) [[id number]]  Chief Complaint   Patient presents with     Nursing Home Acute     HPI:    Tracey Moran  is a 89 year old (6/16/1925), who is being seen today for an episodic care visit.  HPI information obtained from: facility chart records, facility staff, patient report and [facility] chart review. Today's concern is:    Right Subacute Lateral Tibial Plateau Fracture. Reports having \"no pain at all\". Upon review of documentation, last utilized Ibuprofen on 6/4/19. Continues to work with Physical and Occupational Therapy. Able to propel wheelchair on unit with supervision. Transfers from sit to supine independently. Assist x 1 with ADLs on the unit with staff.     Hypertension. Upon review of blood pressures over the past 5 days, systolic range from 126-150, most 130-140s. Diastolic range from 60-83.    Hypothyroidism. No reports of fatigue. Weight 136.4 lbs on 5/18/19 -> 134.8 lbs on 6/3/19.     Past Medical and Surgical History reviewed in Epic today.    MEDICATIONS:  Current Outpatient Medications   Medication Sig Dispense Refill     acetaminophen (TYLENOL) 500 MG tablet Take 2 tablets (1,000 mg) by mouth every 8 hours       diclofenac (VOLTAREN) 1 % topical gel Place onto the skin 3 times daily Apply to right knee pain areas TID scheduled.        ibuprofen (ADVIL/MOTRIN) 400 MG tablet Take 400 mg by mouth daily as needed for moderate pain Secondary to kidney function.       lisinopril (PRINIVIL/ZESTRIL) 20 MG tablet TAKE ONE TABLET BY MOUTH TWICE DAILY. HOLD IF SYSTOLIC BLOOD PRESSURE IS LESS THAN 110 MMHG. CALL IF HELD TWICE IN A ROW OR IF SYMPTOMATIC. 180 tablet 2     metoprolol succinate ER (TOPROL-XL) 25 MG 24 hr tablet Take 25 mg by mouth daily " "Give 1 tablet by mouth one time a day for HTN HOLD if SBP < 110 or < 55 and call MD/NP if held x 2 in a row or symptoms       multivitamin, therapeutic with minerals (THERA-VIT-M) TABS tablet Take 1 tablet by mouth daily       polyethylene glycol (MIRALAX/GLYCOLAX) packet Take 17 g by mouth daily as needed        senna-docusate (SENOKOT-S/PERICOLACE) 8.6-50 MG tablet Take 1 tablet by mouth 2 times daily as needed for constipation       REVIEW OF SYSTEMS:  Limited secondary to cognitive impairment but today pt reports no pain.    Objective:  /62   Pulse 67   Temp 97.8  F (36.6  C)   Resp 16   Ht 1.575 m (5' 2\")   Wt 61.1 kg (134 lb 12.8 oz)   SpO2 98%   BMI 24.66 kg/m     Exam:  GENERAL APPEARANCE:  Alert, in no distress  ENT:  Mouth and posterior oropharynx normal, moist mucous membranes  EYES:  EOM, conjunctivae, lids, pupils and irises normal  RESP:  respiratory effort and palpation of chest normal, lungs clear to auscultation , no respiratory distress  CV:  Palpation and auscultation of heart done , regular rate and rhythm  ABDOMEN:  normal bowel sounds, soft, nontender, no hepatosplenomegaly or other masses  M/S:   Active movement of bilatreal upper and lower extremities. Trace edema in BLE.  SKIN:  Inspection of skin and subcutaneous tissue baseline, Palpation of skin and subcutaneous tissue baseline  NEURO:   Cranial nerves 2-12 are normal tested and grossly at patient's baseline  PSYCH:  memory impaired     Labs:   Labs done in SNF are in [facility]. Please refer to them using [facility]/Care Everywhere.    ASSESSMENT/PLAN:  Right Subacute Lateral Tibial Plateau Fracture. Follow-up with Dr. Hodges in 4-6 weeks from hospitalization. Transfer with immobilizer in place and WBAT otherwise TTWB. Continue Acetaminophen and Diclofenac gel as ordered. As patient is utilizing Ibuprofen PRN at this time, will not discontinue.     Hypertension. Most blood pressures at goal of < 150/90. Continue Lisinopril " and Metoprolol as ordered.    Hypothyroidism. Last TSH 2.95 on 11/17/18. Weights stable. No reports of fatigue. Requires no medications.     Electronically signed by:  WILLIAM Mora CNP        Sincerely,        WILLIAM Mora CNP

## 2019-06-13 ENCOUNTER — NURSING HOME VISIT (OUTPATIENT)
Dept: GERIATRICS | Facility: CLINIC | Age: 84
End: 2019-06-13
Payer: COMMERCIAL

## 2019-06-13 VITALS
HEIGHT: 62 IN | TEMPERATURE: 97.9 F | DIASTOLIC BLOOD PRESSURE: 72 MMHG | OXYGEN SATURATION: 96 % | SYSTOLIC BLOOD PRESSURE: 148 MMHG | RESPIRATION RATE: 18 BRPM | BODY MASS INDEX: 24.29 KG/M2 | HEART RATE: 80 BPM | WEIGHT: 132 LBS

## 2019-06-13 DIAGNOSIS — I10 BENIGN ESSENTIAL HYPERTENSION: ICD-10-CM

## 2019-06-13 DIAGNOSIS — R53.81 PHYSICAL DECONDITIONING: ICD-10-CM

## 2019-06-13 DIAGNOSIS — F03.90 DEMENTIA WITHOUT BEHAVIORAL DISTURBANCE, UNSPECIFIED DEMENTIA TYPE: ICD-10-CM

## 2019-06-13 DIAGNOSIS — S82.121D CLOSED FRACTURE OF LATERAL PORTION OF RIGHT TIBIAL PLATEAU WITH ROUTINE HEALING, SUBSEQUENT ENCOUNTER: Primary | ICD-10-CM

## 2019-06-13 DIAGNOSIS — N18.30 STAGE 3 CHRONIC KIDNEY DISEASE (H): ICD-10-CM

## 2019-06-13 PROCEDURE — 99310 SBSQ NF CARE HIGH MDM 45: CPT | Performed by: NURSE PRACTITIONER

## 2019-06-13 PROCEDURE — 99207 ZZC CDG-CODE INCORRECT PER BILLING BASED ON TIME: CPT | Performed by: NURSE PRACTITIONER

## 2019-06-13 RX ORDER — ACETAMINOPHEN 500 MG
500 TABLET ORAL 3 TIMES DAILY
COMMUNITY
End: 2020-03-11

## 2019-06-13 ASSESSMENT — MIFFLIN-ST. JEOR: SCORE: 957

## 2019-06-13 NOTE — PROGRESS NOTES
[place] [facility]  [place] Medical Record Number:  [id number]  Place of Service where encounter took place:  [facility] ([facility]) [[id number]]  Chief Complaint   Patient presents with     Nursing Home Acute       HPI:    Tracey Moran  is a 89 year old (6/16/1925), who is being seen today for an episodic care visit.  HPI information obtained from: facility chart records, facility staff, patient report and [facility] chart review. Today's concern is: saw Ortho at [facility] on  5/30/19*[place], orders for leg lifting with splint on ok'd yest by ortho after son called to d/w Ortho.     Closed fracture of lateral portion of right tibial plateau with routine healing, subsequent encounter  Physical deconditioning  Benign essential hypertension  Dementia without behavioral disturbance, unspecified dementia type  Stage 3 chronic kidney disease (H)      Past Medical and Surgical History reviewed in Epic today.    MEDICATIONS:  Current Outpatient Medications   Medication Sig Dispense Refill     acetaminophen (TYLENOL) 500 MG tablet Take 500 mg by mouth 3 times daily       diclofenac (VOLTAREN) 1 % topical gel Place onto the skin 3 times daily Apply to right knee pain areas TID scheduled.        lisinopril (PRINIVIL/ZESTRIL) 20 MG tablet TAKE ONE TABLET BY MOUTH TWICE DAILY. HOLD IF SYSTOLIC BLOOD PRESSURE IS LESS THAN 110 MMHG. CALL IF HELD TWICE IN A ROW OR IF SYMPTOMATIC. 180 tablet 2     metoprolol succinate ER (TOPROL-XL) 25 MG 24 hr tablet Take 25 mg by mouth daily Give 1 tablet by mouth one time a day for HTN HOLD if SBP < 110 or < 55 and call MD/NP if held x 2 in a row or symptoms       multivitamin, therapeutic with minerals (THERA-VIT-M) TABS tablet Take 1 tablet by mouth daily       polyethylene glycol (MIRALAX/GLYCOLAX) packet Take 17 g by mouth daily as needed        senna-docusate (SENOKOT-S/PERICOLACE) 8.6-50 MG tablet Take 1 tablet by mouth 2 times daily as needed for  "constipation       ibuprofen (ADVIL/MOTRIN) 400 MG tablet Take 400 mg by mouth daily as needed for moderate pain Secondary to kidney function.         ROS: She is forgetful but says No CP, SOB, Cough, dizziness,   HA, N/V, dysuria or Bowel Abnormalities. Appetite is improved.  No pain except with palpation/mild pressure right upper shin/below knee.    Objective:  /72   Pulse 80   Temp 97.9  F (36.6  C)   Resp 18   Ht 1.575 m (5' 2\")   Wt 59.9 kg (132 lb)   SpO2 96%   BMI 24.14 kg/m       Exam:  GENERAL APPEARANCE:  Alert, in no distress, oriented, cooperative  ENT:  Mouth with moist mucous membranes,  normal hearing acuity  RESP:  respiratory effort  of chest normal, lungs clear to auscultation ,NAD  CV:  Auscultation of heart done , RRR, +soft murmur, rub, or gallop, no edema, +2 pedal pulses.  ABDOMEN:  normal bowel sounds, soft, nontender  M/S:   RUIZ  less mobile in right leg it is in an immobilizer and is NWB status, CSM WNL right foot  SKIN:  Inspection of skin and subcutaneous tissue baseline  NEURO:   Cranial nerves 2-12 are grossly intact and at patient's baseline  PSYCH:  insight and judgement impaired, memory impaired      Labs:   Recent Labs   Lab Test 05/23/19 05/16/19  0655    142   POTASSIUM 4.4 4.0   CHLORIDE 107 111*   CO2 23 27   ANIONGAP 8 4   GLC 82 85   BUN 28 36*   CR 0.89 0.99   RENETTA 9.2 8.4*     CBC RESULTS:   Recent Labs   Lab Test 05/23/19 11/26/18   WBC  --  8.9   RBC  --  2.95*   HGB 10.9* 9.6*   HCT  --  29.8*   MCV  --  101*   MCH  --  32.5   MCHC  --  32.2   RDW  --  13.7   PLT  --  297      ASSESSMENT / PLAN:  (S82.121K) 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 Per Ortho,still  WBAT for transfers with the immobilizer on otherwise only TTWB and now straight " leg exercises with brace on and PT.   F/U in 4/-6 weeks again  with [facility] Hod[facility] at [facility] in [place]--end of the month.      (N18.3) Stage 3 chronic kidney disease (H)  Comment/Plan: BMP is fine, check prn    (F03.90) Dementia without behavioral disturbance, unspecified dementia type(12/2018: IN TCU: Cognitive Scores: SLUMS 5/30-, CPT 3.9/5.6)  Comment/Plan: PT OT will be done 6/18--had care conf on Monday--pt needs 24 hr care due to recent cog scores:  13/30 SLUMS and 3/8/5.8    (I10) Essential hypertension  Comment/Plan: mostly in the 130-140's , will cont current med, Toprol Xl--monitor.      Total time with patient visit: 40 minutes including discussions about the POC and care coordination with the family, son Jonny. Greater than 50% of total time spent with counseling and coordinating care due to many questions but son.  He also wanted to decrease pills if possible and wondered if should see ortho earlier than the 26th June.  I told him to d/w Ortho. He is aware she needs 24 hr care but hope she can walk soon so can cont with therapies beyond current LCD.       Electronically signed by:  WILLIAM Ornelas [facility]  [facility] [address], [address]  [place] [address]

## 2019-06-18 ENCOUNTER — DISCHARGE SUMMARY NURSING HOME (OUTPATIENT)
Dept: GERIATRICS | Facility: CLINIC | Age: 84
End: 2019-06-18
Payer: COMMERCIAL

## 2019-06-18 VITALS
HEART RATE: 96 BPM | WEIGHT: 132 LBS | OXYGEN SATURATION: 95 % | SYSTOLIC BLOOD PRESSURE: 153 MMHG | HEIGHT: 62 IN | RESPIRATION RATE: 18 BRPM | BODY MASS INDEX: 24.29 KG/M2 | TEMPERATURE: 96.8 F | DIASTOLIC BLOOD PRESSURE: 66 MMHG

## 2019-06-18 DIAGNOSIS — S82.121D CLOSED FRACTURE OF LATERAL PORTION OF RIGHT TIBIAL PLATEAU WITH ROUTINE HEALING, SUBSEQUENT ENCOUNTER: Primary | ICD-10-CM

## 2019-06-18 DIAGNOSIS — I10 BENIGN ESSENTIAL HYPERTENSION: ICD-10-CM

## 2019-06-18 DIAGNOSIS — R53.81 PHYSICAL DECONDITIONING: ICD-10-CM

## 2019-06-18 DIAGNOSIS — N18.30 STAGE 3 CHRONIC KIDNEY DISEASE (H): ICD-10-CM

## 2019-06-18 DIAGNOSIS — F03.90 DEMENTIA WITHOUT BEHAVIORAL DISTURBANCE, UNSPECIFIED DEMENTIA TYPE: ICD-10-CM

## 2019-06-18 PROCEDURE — 99207 ZZC CDG-MDM COMPONENT: MEETS MODERATE - UP CODED: CPT | Performed by: NURSE PRACTITIONER

## 2019-06-18 PROCEDURE — 99309 SBSQ NF CARE MODERATE MDM 30: CPT | Performed by: NURSE PRACTITIONER

## 2019-06-18 ASSESSMENT — MIFFLIN-ST. JEOR: SCORE: 952

## 2019-06-18 NOTE — PROGRESS NOTES
:  [place] [facility] DISCHARGE SUMMARY    PATIENT'S NAME: Tracey Moran  YOB: 1925    PRIMARY CARE PROVIDER AND CLINIC RESPONSIBLE AFTER TRANSFER: Guilherme Graves     CODE STATUS***    TRANSFERRING PROVIDERS: WILLIAM Ornelas CNP, Krysta Rodriguez MD,      DATE OF SNF ADMISSION:  May / 18 / 2019    DATE OF SNF DISCHARGE (including anticipating DC):     DISCHARGE DISPOSITION: { GERIATRIC DISCHARGE DISPOSITION:495291}    Nursing Facility: [facility] stay 5/15/19 to 19.     Condition on Discharge:  {S Discharge Condition:559254}    Function:  ***  Cognitive Scores: {S Co}    Physical Function: {fgsphysicalfunction:494188}  Equipment: {walker:264574}  DME: {sdmedc:416845}    DISCHARGE DIAGNOSIS:   Data Unavailable ***      HOSPITAL COURSE: ***    TCU/SNF COURSE: ***      PAST MEDICAL HISTORY:  Past Medical History:   Diagnosis Date     Hypertension        DISCHARGE MEDICATIONS:  Current Outpatient Medications   Medication Sig Dispense Refill     acetaminophen (TYLENOL) 500 MG tablet Take 500 mg by mouth 3 times daily       diclofenac (VOLTAREN) 1 % topical gel Place onto the skin 3 times daily Apply to right knee pain areas TID scheduled.        ibuprofen (ADVIL/MOTRIN) 400 MG tablet Take 400 mg by mouth daily as needed for moderate pain Secondary to kidney function.       lisinopril (PRINIVIL/ZESTRIL) 20 MG tablet TAKE ONE TABLET BY MOUTH TWICE DAILY. HOLD IF SYSTOLIC BLOOD PRESSURE IS LESS THAN 110 MMHG. CALL IF HELD TWICE IN A ROW OR IF SYMPTOMATIC. 180 tablet 2     metoprolol succinate ER (TOPROL-XL) 25 MG 24 hr tablet Take 25 mg by mouth daily Give 1 tablet by mouth one time a day for HTN HOLD if SBP < 110 or < 55 and call MD/NP if held x 2 in a row or symptoms       multivitamin, therapeutic with minerals (THERA-VIT-M) TABS tablet Take 1 tablet by mouth daily       polyethylene glycol  (MIRALAX/GLYCOLAX) packet Take 17 g by mouth daily as needed        senna-docusate (SENOKOT-S/PERICOLACE) 8.6-50 MG tablet Take 1 tablet by mouth 2 times daily as needed for constipation         MEDICATION CHANGES/RATIONALE:   ***  There were no vitals taken for this visit.    ROS: ***    PHYSICAL EXAM Today:  A & O x 3, NAD. Lungs CTA, non labored. RRR, S1/S2 w/o murmur,gallop or rub.  ***edema.  Abdomen soft, nontender, +BT'S. No focal neurological deficits. RUIZ except ***.   Incision is ***.       SNF LABS***        DISCHARGE PLAN:  {NURSING HOME DISCHARGE PLAN:133212} Follow-up with PCP in 7 days: ***   Current [place] or other scheduled appointments:***    MTM referral needed and placed by this provider: ***    Pending labs: ***     Discharge Treatments:***       TOTAL DISCHARGE TIME:   {TIME:068064}    WILLIAM Ornelsa CNP

## 2019-06-18 NOTE — PROGRESS NOTES
[facility]  [place] Medical Record Number:  [id number]  Place of Service where encounter took place:  [facility] ([facility]) [[id number]]  Chief Complaint   Patient presents with     Nursing Home Acute       HPI:    Tracey Moran  is a 89 year old (6/16/1925), who is being seen today for an episodic care visit.  HPI information obtained from: facility chart records, facility staff, patient report and [facility] chart review. Today's concern is: no acute issues per staff.       Closed fracture of lateral portion of right tibial plateau with routine healing, subsequent encounter  Physical deconditioning  Benign essential hypertension  Dementia without behavioral disturbance, unspecified dementia type  Stage 3 chronic kidney disease (H)      Past Medical and Surgical History reviewed in Epic today.    MEDICATIONS:  Current Outpatient Medications   Medication Sig Dispense Refill     acetaminophen (TYLENOL) 500 MG tablet Take 500 mg by mouth 3 times daily       diclofenac (VOLTAREN) 1 % topical gel Place onto the skin 3 times daily Apply to right knee pain areas TID scheduled.        lisinopril (PRINIVIL/ZESTRIL) 20 MG tablet TAKE ONE TABLET BY MOUTH TWICE DAILY. HOLD IF SYSTOLIC BLOOD PRESSURE IS LESS THAN 110 MMHG. CALL IF HELD TWICE IN A ROW OR IF SYMPTOMATIC. 180 tablet 2     metoprolol succinate ER (TOPROL-XL) 25 MG 24 hr tablet Take 25 mg by mouth daily Give 1 tablet by mouth one time a day for HTN HOLD if SBP < 110 or < 55 and call MD/NP if held x 2 in a row or symptoms       multivitamin, therapeutic with minerals (THERA-VIT-M) TABS tablet Take 1 tablet by mouth daily       polyethylene glycol (MIRALAX/GLYCOLAX) packet Take 17 g by mouth daily as needed        senna-docusate (SENOKOT-S/PERICOLACE) 8.6-50 MG tablet Take 1 tablet by mouth 2 times daily as needed for constipation         ROS: She is forgetful but:  No CP, SOB, Cough, dizziness, HA, N/V, dysuria or Bowel  "Abnormalities. Appetite is good  No pain.    Objective:  /66   Pulse 96   Temp 96.8  F (36  C)   Resp 18   Ht 1.575 m (5' 2\")   Wt 59.9 kg (132 lb)   SpO2 95%   BMI 24.14 kg/m       Exam:  GENERAL APPEARANCE:  Alert, in no distress, oriented to person, place and forgetful, cooperative  ENT:  Mouth with moist mucous membranes,  normal hearing acuity  RESP:  respiratory effort  of chest normal, lungs clear to auscultation ,NAD  CV:  Auscultation of heart done , RRR, today heard no murmur, rub, or gallop, no edema, +2 pedal pulses.  ABDOMEN:  normal bowel sounds, soft, nontender  M/S:   RUIZ except right leg-- it is in an immobilizer and she is NWB status, CSM WNL right foot  SKIN:  Inspection of skin and subcutaneous tissue baseline  NEURO:   Cranial nerves 2-12 are grossly intact and at patient's baseline  PSYCH:  insight and judgement impaired, memory impaired      Labs:   Recent Labs   Lab Test 05/23/19 05/16/19  0655    142   POTASSIUM 4.4 4.0   CHLORIDE 107 111*   CO2 23 27   ANIONGAP 8 4   GLC 82 85   BUN 28 36*   CR 0.89 0.99   RENETTA 9.2 8.4*     CBC RESULTS:   Recent Labs   Lab Test 05/23/19 11/26/18   WBC  --  8.9   RBC  --  2.95*   HGB 10.9* 9.6*   HCT  --  29.8*   MCV  --  101*   MCH  --  32.5   MCHC  --  32.2   RDW  --  13.7   PLT  --  297       ASSESSMENT / PLAN:  (S82.121K) 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 Per Ortho,still  WBAT for transfers with  immobilizer, otherwise only TTWB. F/U on 7/11 with Dr Hodges at [facility] in [place] per nursing/SW. Son is planning on  Keeping her private pay as of tomorrow as has plateaued until sees ortho and possibly then can progress with PT.      (I10) Essential hypertension  Comment/Plan: mostly in the 130-140's , cont current med, Toprol " Xl--monitor.    (N18.3) Stage 3 chronic kidney disease (H)  Comment/Plan: BMP is fine, check prn    (F03.90) Dementia without behavioral disturbance, unspecified dementia type(12/2018: IN TCU: Cognitive Scores: SLUMS 5/30-, CPT 3.9/5.6)  Comment/Plan:per therapies/care conf, pt needs 24 hr care due to recent cog scores:  13/30 SLUMS and 3/8/5.8 when discharged.       **met son and Quirino in law with pt eating lunch--visiting from [place].**     Electronically signed by:  WILLIAM Ornelas CNP

## 2019-06-24 ENCOUNTER — NURSING HOME VISIT (OUTPATIENT)
Dept: GERIATRICS | Facility: CLINIC | Age: 84
End: 2019-06-24
Payer: COMMERCIAL

## 2019-06-24 VITALS — DIASTOLIC BLOOD PRESSURE: 63 MMHG | SYSTOLIC BLOOD PRESSURE: 132 MMHG | HEART RATE: 84 BPM

## 2019-06-24 DIAGNOSIS — I10 BENIGN ESSENTIAL HYPERTENSION: ICD-10-CM

## 2019-06-24 DIAGNOSIS — S82.121D CLOSED FRACTURE OF LATERAL PORTION OF RIGHT TIBIAL PLATEAU WITH ROUTINE HEALING, SUBSEQUENT ENCOUNTER: ICD-10-CM

## 2019-06-24 DIAGNOSIS — R53.81 PHYSICAL DECONDITIONING: ICD-10-CM

## 2019-06-24 DIAGNOSIS — I95.9 HYPOTENSION, UNSPECIFIED HYPOTENSION TYPE: ICD-10-CM
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
## 2019-06-24 DIAGNOSIS — M62.81 GENERALIZED MUSCLE WEAKNESS: ICD-10-CM

## 2019-06-24 DIAGNOSIS — F03.90 DEMENTIA WITHOUT BEHAVIORAL DISTURBANCE, UNSPECIFIED DEMENTIA TYPE: ICD-10-CM

## 2019-06-24 PROBLEM — S82.121K: Status: RESOLVED | Noted: 2019-05-21 | Resolved: 2019-06-24

## 2019-06-24 PROCEDURE — 99308 SBSQ NF CARE LOW MDM 20: CPT | Performed by: NURSE PRACTITIONER

## 2019-06-28 ENCOUNTER — TRANSFERRED RECORDS (OUTPATIENT)
Dept: HEALTH INFORMATION MANAGEMENT | Facility: CLINIC | Age: 84
End: 2019-06-28

## 2019-07-01 ENCOUNTER — NURSING HOME VISIT (OUTPATIENT)
Dept: GERIATRICS | Facility: CLINIC | Age: 84
End: 2019-07-01
Payer: COMMERCIAL

## 2019-07-01 VITALS — DIASTOLIC BLOOD PRESSURE: 64 MMHG | SYSTOLIC BLOOD PRESSURE: 116 MMHG | HEART RATE: 79 BPM | RESPIRATION RATE: 16 BRPM

## 2019-07-01 DIAGNOSIS — F03.90 DEMENTIA WITHOUT BEHAVIORAL DISTURBANCE, UNSPECIFIED DEMENTIA TYPE: ICD-10-CM

## 2019-07-01 DIAGNOSIS — M62.81 GENERALIZED MUSCLE WEAKNESS: ICD-10-CM

## 2019-07-01 DIAGNOSIS — R53.81 PHYSICAL DECONDITIONING: ICD-10-CM

## 2019-07-01 DIAGNOSIS — N18.30 STAGE 3 CHRONIC KIDNEY DISEASE (H): ICD-10-CM

## 2019-07-01 DIAGNOSIS — D64.9 ANEMIA, UNSPECIFIED TYPE: ICD-10-CM

## 2019-07-01 DIAGNOSIS — S82.121D CLOSED FRACTURE OF LATERAL PORTION OF RIGHT TIBIAL PLATEAU WITH ROUTINE HEALING, SUBSEQUENT ENCOUNTER: Primary | ICD-10-CM

## 2019-07-01 DIAGNOSIS — I10 BENIGN ESSENTIAL HYPERTENSION: ICD-10-CM

## 2019-07-01 PROCEDURE — 99309 SBSQ NF CARE MODERATE MDM 30: CPT | Performed by: NURSE PRACTITIONER

## 2019-07-01 NOTE — PROGRESS NOTES
[facility]  [place] Medical Record Number:  [id number]  Place of Service where encounter took place:  [facility] ([facility]) [[id number]]  Chief Complaint   Patient presents with     Nursing Home Acute       HPI:    Tracey Moran  is a 89 year old (6/16/1925), who is being seen today for an episodic care visit.  HPI information obtained from: facility chart records, facility staff, patient report and [facility] chart review. Today's concern is:: saw Ortho on 6/27--now able to wt bear w/o restrictions as bone healing well. She resumed therapies on 6/28     Closed fracture of lateral portion of right tibial plateau with routine healing, subsequent encounter  Generalized muscle weakness  Physical deconditioning  Dementia without behavioral disturbance, unspecified dementia type  Benign essential hypertension  Stage 3 chronic kidney disease (H)      Past Medical and Surgical History reviewed in Epic today.    MEDICATIONS:  Current Outpatient Medications   Medication Sig Dispense Refill     acetaminophen (TYLENOL) 500 MG tablet Take 500 mg by mouth 3 times daily       diclofenac (VOLTAREN) 1 % topical gel Place onto the skin 3 times daily Apply to right knee pain areas TID scheduled.        lisinopril (PRINIVIL/ZESTRIL) 20 MG tablet TAKE ONE TABLET BY MOUTH TWICE DAILY. HOLD IF SYSTOLIC BLOOD PRESSURE IS LESS THAN 110 MMHG. CALL IF HELD TWICE IN A ROW OR IF SYMPTOMATIC. 180 tablet 2     metoprolol succinate ER (TOPROL-XL) 25 MG 24 hr tablet Take 25 mg by mouth daily Give 1 tablet by mouth one time a day(*GIVE AT DINNERTIME*) for HTN HOLD if SBP < 110 or < 55 and call MD/NP if held x 2 in a row or symptoms       multivitamin, therapeutic with minerals (THERA-VIT-M) TABS tablet Take 1 tablet by mouth daily       polyethylene glycol (MIRALAX/GLYCOLAX) packet Take 17 g by mouth daily as needed        senna-docusate (SENOKOT-S/PERICOLACE) 8.6-50 MG tablet Take 1 tablet by mouth 2  times daily as needed for constipation         ROS: She is forgetful but:  No CP, SOB, Cough, dizziness, HA, N/V, dysuria or Bowel Abnormalities. Appetite is good  No pain in right leg.    Objective:  /64   Pulse 79   Resp 16      Exam:    GENERAL APPEARANCE:  Alert, in no distress, oriented to person, place and forgetful, cooperative  ENT:  Mouth with moist mucous membranes,  normal hearing acuity  RESP:  respiratory effort  of chest normal, lungs clear to auscultation.  CV:  Auscultation of heart done , RRR, today heard no murmur, rub, or gallop, no edema, +2 pedal pulses.  ABDOMEN:  normal bowel sounds, soft, nontender  M/S:   RUIZ except right leg-- it is in an immobilizer-- NWB status.  SKIN:  Inspection of skin and subcutaneous tissue baseline  NEURO:   Cranial nerves 2-12 are grossly intact and at patient's baseline  PSYCH:  insight and judgement impaired, memory impaired      Labs:   Recent Labs   Lab Test 05/23/19 05/16/19  0655    142   POTASSIUM 4.4 4.0   CHLORIDE 107 111*   CO2 23 27   ANIONGAP 8 4   GLC 82 85   BUN 28 36*   CR 0.89 0.99   RENETTA 9.2 8.4*     CBC RESULTS:   Recent Labs   Lab Test 05/23/19 11/26/18   WBC  --  8.9   RBC  --  2.95*   HGB 10.9* 9.6*   HCT  --  29.8*   MCV  --  101*   MCH  --  32.5   MCHC  --  32.2   RDW  --  13.7   PLT  --  297      ASSESSMENT AND PLAN:    (S82.121D) Closed fracture of lateral portion of right tibial plateau with routine healing, subsequent encounter  (M62.81) Generalized muscle weakness   (R53.81) Physical deconditioning  Comment/Plan: no pain, cont with same Tylenol, Voltaren.Per Ortho appt on 6/27 with Dr Hodges,  Full WBAT and therapies resumed.      (F03.90) Dementia without behavioral disturbance, unspecified dementia type(12/2018: IN TCU: Cognitive Scores: SLUMS 5/30-, CPT 3.9/5.6)  Comment/Plan:  pt needs 24 hr care due to recent cog scores:  13/30 SLUMS and 3/8/5.8 when discharged     (I10) Essential hypertension  Comment/Plan: mostly  in the 110-130's mostly, occas >150's . No more reported postural drops.  Will cont ACE-I and  Toprol XL as is.       (N18.3) Stage 3 chronic kidney disease (H)  Comment/Plan: reasonable, check 7/3/19     (D64.9) Anemia, unspecified type  Comment/Plan: recheck on 7/3      Electronically signed by:  WILLIAM Ornelas CNP

## 2019-07-02 ENCOUNTER — RECORDS - HEALTHEAST (OUTPATIENT)
Dept: LAB | Facility: CLINIC | Age: 84
End: 2019-07-02

## 2019-07-03 ENCOUNTER — NURSING HOME VISIT (OUTPATIENT)
Dept: GERIATRICS | Facility: CLINIC | Age: 84
End: 2019-07-03
Payer: COMMERCIAL

## 2019-07-03 ENCOUNTER — TRANSFERRED RECORDS (OUTPATIENT)
Dept: HEALTH INFORMATION MANAGEMENT | Facility: CLINIC | Age: 84
End: 2019-07-03

## 2019-07-03 VITALS
HEART RATE: 70 BPM | SYSTOLIC BLOOD PRESSURE: 136 MMHG | DIASTOLIC BLOOD PRESSURE: 73 MMHG | RESPIRATION RATE: 18 BRPM | OXYGEN SATURATION: 94 %

## 2019-07-03 DIAGNOSIS — I10 BENIGN ESSENTIAL HYPERTENSION: ICD-10-CM

## 2019-07-03 DIAGNOSIS — S82.121D CLOSED FRACTURE OF LATERAL PORTION OF RIGHT TIBIAL PLATEAU WITH ROUTINE HEALING, SUBSEQUENT ENCOUNTER: Primary | ICD-10-CM

## 2019-07-03 DIAGNOSIS — M62.81 GENERALIZED MUSCLE WEAKNESS: ICD-10-CM

## 2019-07-03 DIAGNOSIS — D64.9 ANEMIA, UNSPECIFIED TYPE: ICD-10-CM

## 2019-07-03 DIAGNOSIS — R53.81 PHYSICAL DECONDITIONING: ICD-10-CM

## 2019-07-03 DIAGNOSIS — F03.90 DEMENTIA WITHOUT BEHAVIORAL DISTURBANCE, UNSPECIFIED DEMENTIA TYPE: ICD-10-CM

## 2019-07-03 DIAGNOSIS — N18.30 STAGE 3 CHRONIC KIDNEY DISEASE (H): ICD-10-CM

## 2019-07-03 PROBLEM — D62 ANEMIA DUE TO BLOOD LOSS, ACUTE: Status: RESOLVED | Noted: 2017-12-06 | Resolved: 2019-07-03

## 2019-07-03 LAB
ANION GAP SERPL CALCULATED.3IONS-SCNC: 5 MMOL/L (ref 5–18)
ANION GAP SERPL CALCULATED.3IONS-SCNC: 5 MMOL/L (ref 5–18)
BUN SERPL-MCNC: 15 MG/DL (ref 8–28)
BUN SERPL-MCNC: 15 MG/DL (ref 8–28)
CALCIUM SERPL-MCNC: 9.2 MG/DL (ref 8.5–10.5)
CALCIUM SERPL-MCNC: 9.2 MG/DL (ref 8.5–10.5)
CHLORIDE BLD-SCNC: 108 MMOL/L (ref 98–107)
CHLORIDE SERPLBLD-SCNC: 108 MMOL/L (ref 98–107)
CO2 SERPL-SCNC: 26 MMOL/L (ref 22–31)
CO2 SERPL-SCNC: 26 MMOL/L (ref 22–31)
CREAT SERPL-MCNC: 0.76 MG/DL (ref 0.6–1.1)
CREAT SERPL-MCNC: 0.76 MG/DL (ref 0.6–1.1)
GFR SERPL CREATININE-BSD FRML MDRD: >60 ML/MIN/1.73M2
GFR SERPL CREATININE-BSD FRML MDRD: >60 ML/MIN/1.73M2
GLUCOSE BLD-MCNC: 81 MG/DL (ref 70–125)
GLUCOSE SERPL-MCNC: 81 MG/DL (ref 70–125)
HEMOGLOBIN: 10.6 G/DL (ref 12–16)
HGB BLD-MCNC: 10.6 G/DL (ref 12–16)
POTASSIUM BLD-SCNC: 4.2 MMOL/L (ref 3.5–5)
POTASSIUM SERPL-SCNC: 4.2 MMOL/L (ref 3.5–5)
SODIUM SERPL-SCNC: 139 MMOL/L (ref 136–145)
SODIUM SERPL-SCNC: 139 MMOL/L (ref 136–145)

## 2019-07-03 PROCEDURE — 99309 SBSQ NF CARE MODERATE MDM 30: CPT | Performed by: NURSE PRACTITIONER

## 2019-07-03 PROCEDURE — 99207 ZZC CDG-MDM COMPONENT: MEETS MODERATE - UP CODED: CPT | Performed by: NURSE PRACTITIONER

## 2019-07-03 NOTE — PROGRESS NOTES
[facility]  [place] Medical Record Number:  [id number]  Place of Service where encounter took place:  [facility] ([facility]) [[id number]]  Chief Complaint   Patient presents with     Nursing Home Acute       HPI:    Tracey Moran  is a 89 year old (6/16/1925), who is being seen today for an episodic care visit.  HPI information obtained from: facility chart records, facility staff, patient report and [facility] chart review. Today's concern is: no pain in right leg/knee     Closed fracture of lateral portion of right tibial plateau with routine healing, subsequent encounter  Generalized muscle weakness  Physical deconditioning  Dementia without behavioral disturbance, unspecified dementia type  Benign essential hypertension  Stage 3 chronic kidney disease (H)  Anemia, unspecified type      Past Medical and Surgical History reviewed in Epic today.    MEDICATIONS:  Current Outpatient Medications   Medication Sig Dispense Refill     acetaminophen (TYLENOL) 500 MG tablet Take 500 mg by mouth 3 times daily       lisinopril (PRINIVIL/ZESTRIL) 20 MG tablet TAKE ONE TABLET BY MOUTH TWICE DAILY. HOLD IF SYSTOLIC BLOOD PRESSURE IS LESS THAN 110 MMHG. CALL IF HELD TWICE IN A ROW OR IF SYMPTOMATIC. 180 tablet 2     metoprolol succinate ER (TOPROL-XL) 25 MG 24 hr tablet Take 25 mg by mouth daily Give 1 tablet by mouth one time a day(*GIVE AT DINNERTIME*) for HTN HOLD if SBP < 110 or < 55 and call MD/NP if held x 2 in a row or symptoms       multivitamin, therapeutic with minerals (THERA-VIT-M) TABS tablet Take 1 tablet by mouth daily       polyethylene glycol (MIRALAX/GLYCOLAX) packet Take 17 g by mouth daily as needed        senna-docusate (SENOKOT-S/PERICOLACE) 8.6-50 MG tablet Take 1 tablet by mouth 2 times daily as needed for constipation       ROS: She is forgetful but:  No CP, SOB, Cough, dizziness, HA, N/V, dysuria or Bowel Abnormalities. Appetite is good  No pain in right  leg.    Objective:  /73   Pulse 70   Resp 18   SpO2 94%      Exam:    GENERAL APPEARANCE:  Alert, in no distress, oriented to person, place and forgetful, cooperative  ENT:  Mouth WNL and moist mucous membranes,  normal hearing acuity  RESP:  respiratory effort  of chest normal, lungs clear to auscultation.  CV:  Auscultation of heart done , RRR, no murmur, rub, or gallop, no edema, +2 pedal pulses.  ABDOMEN:  normal bowel sounds, soft, nontender  M/S:   RUIZ equally and up with assist--no restrictions.  SKIN:  Inspection of skin and subcutaneous tissue baseline but limited as pt fully dressed  NEURO:   Cranial nerves 2-12 are grossly intact and at patient's baseline  PSYCH:  insight and judgement impaired, memory impaired      Labs:    Recent Labs   Lab Test 7/3/19 05/23/19 05/16/19  0655    138 142   POTASSIUM 4.2 4.4 4.0   CHLORIDE 108 107 111*   CO2 26 23 27   ANIONGAP 5 8 4   GLC 81 82 85   BUN 15 28 36*   CR 0.76/ GFR>60 0.89 0.99   RENETTA 9.2 9.2 8.4*     CBC RESULTS:    Recent Labs   Lab Test 7/3/19 05/23/19 11/26/18   WBC   --  8.9   RBC   --  2.95*   HGB 10.6 10.9* 9.6*   HCT   --  29.8*   MCV   --  101*   MCH   --  32.5   MCHC   --  32.2   RDW   --  13.7   PLT   --  297         ASSESSMENT AND PLAN:    (S82.121D) Closed fracture of lateral portion of right tibial plateau with routine healing, subsequent encounter  (M62.81) Generalized muscle weakness   (R53.81) Physical deconditioning  Comment/Plan: no pain, cont with same Tylenol, but discontinue the  Voltaren.   Per Ortho appt on 6/27 with Dr Hodges,  Full WBAT and therapies resumed.      (F03.90) Dementia without behavioral disturbance, unspecified dementia type(12/2018: IN TCU: Cognitive Scores: SLUMS 5/30-, CPT 3.9/5.6)  Comment/Plan:  pt needs 24 hr care due to recent cog scores:  13/30 SLUMS and 3/8/5.8 when discharged     (I10) Essential hypertension  Comment/Plan: mostly in the 110-130's mostly, occas >150's .   Will cont ACE-I and   Toprol XL, monitor       (N18.3) Stage 3 chronic kidney disease (H)  Comment/Plan: BMP fine today     (D64.9) Anemia, unspecified type  Comment/Plan: Hgb stable today, check prn      Electronically signed by:  WILLIAM Ornelas CNP

## 2019-07-08 ENCOUNTER — NURSING HOME VISIT (OUTPATIENT)
Dept: GERIATRICS | Facility: CLINIC | Age: 84
End: 2019-07-08
Payer: COMMERCIAL

## 2019-07-08 VITALS
TEMPERATURE: 96.6 F | OXYGEN SATURATION: 96 % | RESPIRATION RATE: 18 BRPM | HEART RATE: 84 BPM | SYSTOLIC BLOOD PRESSURE: 169 MMHG | DIASTOLIC BLOOD PRESSURE: 76 MMHG

## 2019-07-08 DIAGNOSIS — S82.121D CLOSED FRACTURE OF LATERAL PORTION OF RIGHT TIBIAL PLATEAU WITH ROUTINE HEALING, SUBSEQUENT ENCOUNTER: Primary | ICD-10-CM

## 2019-07-08 DIAGNOSIS — I10 ESSENTIAL HYPERTENSION: ICD-10-CM

## 2019-07-08 DIAGNOSIS — K59.00 CONSTIPATION, UNSPECIFIED CONSTIPATION TYPE: ICD-10-CM

## 2019-07-08 PROCEDURE — 99309 SBSQ NF CARE MODERATE MDM 30: CPT | Performed by: NURSE PRACTITIONER

## 2019-07-08 ASSESSMENT — MIFFLIN-ST. JEOR: SCORE: 938.39

## 2019-07-08 NOTE — PROGRESS NOTES
[facility]  [place] Medical Record Number:  [id number]  Place of Service where encounter took place:  [facility] ([facility]) [[id number]]  Chief Complaint   Patient presents with     Nursing Home Acute     HPI:    Tracey Moran  is a 89 year old (6/16/1925), who is being seen today for an episodic care visit.  HPI information obtained from: facility chart records, facility staff, patient report and [facility] chart review. Today's concern is:    Right Subacute Lateral Tibial Plateau Fracture. Denies pain. Resting in chair with brace on right knee. Taking scheduled pain medications. In therapy, transferring with 4WW and SBA. Ambulating up to 235 feet.     Hypertension. Upon review of blood pressure, systolic range from 122-169, most 140s. Diastolic range from 61-81.    Constipation. Upon review of documentation over the past 5 days, going 1-2 times daily on average. Has not utilized Senna-S or Miralax PRN in the past week.    Past Medical and Surgical History reviewed in Epic today.    MEDICATIONS:  Current Outpatient Medications   Medication Sig Dispense Refill     acetaminophen (TYLENOL) 500 MG tablet Take 500 mg by mouth 3 times daily       diclofenac (VOLTAREN) 1 % topical gel Place 2 g onto the skin 3 times daily For right knee pain       lisinopril (PRINIVIL/ZESTRIL) 20 MG tablet TAKE ONE TABLET BY MOUTH TWICE DAILY. HOLD IF SYSTOLIC BLOOD PRESSURE IS LESS THAN 110 MMHG. CALL IF HELD TWICE IN A ROW OR IF SYMPTOMATIC. 180 tablet 2     metoprolol succinate ER (TOPROL-XL) 25 MG 24 hr tablet Take 25 mg by mouth daily Give 1 tablet by mouth one time a day(*GIVE AT DINNERTIME*) for HTN HOLD if SBP < 110 or < 55 and call MD/NP if held x 2 in a row or symptoms       multivitamin, therapeutic with minerals (THERA-VIT-M) TABS tablet Take 1 tablet by mouth daily       polyethylene glycol (MIRALAX/GLYCOLAX) packet Take 17 g by mouth daily as needed        senna-docusate  (SENOKOT-S/PERICOLACE) 8.6-50 MG tablet Take 1 tablet by mouth 2 times daily as needed for constipation       REVIEW OF SYSTEMS:  Limited secondary to cognitive impairment but today pt reports no pain    Objective:  /76   Pulse 84   Temp 96.6  F (35.9  C)   Resp 18   SpO2 96%   Exam:  GENERAL APPEARANCE:  Alert, in no distress  ENT:  Mouth and posterior oropharynx normal, moist mucous membranes  EYES:  EOM, conjunctivae, lids, pupils and irises normal  RESP:  respiratory effort and palpation of chest normal, lungs clear to auscultation , no respiratory distress  CV:  Palpation and auscultation of heart done , regular rate and rhythm  ABDOMEN:  normal bowel sounds, soft, nontender, no hepatosplenomegaly or other masses  M/S:   Active movement of bilatreal upper and lower extremities. Brace on right knee. No edema in BLE.  SKIN:  Inspection of skin and subcutaneous tissue baseline, Palpation of skin and subcutaneous tissue baseline  NEURO:   Cranial nerves 2-12 are normal tested and grossly at patient's baseline  PSYCH:  memory impaired     Labs:   Labs done in SNF are in [facility]. Please refer to them using [employer] Everywhere.    ASSESSMENT/PLAN:  Right Subacute Lateral Tibial Plateau Fracture. Seen by Dr. Hodges on 6/27/19. Full weight-bearing as tolerated. Taking Acetaminophen and Voltaren gel for pain. Physical and Occupational Therapy ordered for deconditioning.     Hypertension. Most blood pressures < 150/90. Continue Lisinopril and Metoprolol as ordered.    Constipation. Stable without the use of Miralax or Senna-S PRN.     Electronically signed by:  WILLIAM Mora CNP

## 2019-07-15 ENCOUNTER — NURSING HOME VISIT (OUTPATIENT)
Dept: GERIATRICS | Facility: CLINIC | Age: 84
End: 2019-07-15
Payer: COMMERCIAL

## 2019-07-15 VITALS
TEMPERATURE: 97 F | BODY MASS INDEX: 23.74 KG/M2 | HEART RATE: 90 BPM | DIASTOLIC BLOOD PRESSURE: 67 MMHG | SYSTOLIC BLOOD PRESSURE: 121 MMHG | OXYGEN SATURATION: 94 % | HEIGHT: 62 IN | RESPIRATION RATE: 16 BRPM | WEIGHT: 129 LBS

## 2019-07-15 DIAGNOSIS — S82.121D CLOSED FRACTURE OF LATERAL PORTION OF RIGHT TIBIAL PLATEAU WITH ROUTINE HEALING, SUBSEQUENT ENCOUNTER: Primary | ICD-10-CM

## 2019-07-15 DIAGNOSIS — E03.9 HYPOTHYROIDISM, UNSPECIFIED TYPE: ICD-10-CM

## 2019-07-15 DIAGNOSIS — E78.00 HYPERCHOLESTEROLEMIA: ICD-10-CM

## 2019-07-15 DIAGNOSIS — D64.9 ANEMIA, UNSPECIFIED TYPE: ICD-10-CM

## 2019-07-15 DIAGNOSIS — R53.81 PHYSICAL DECONDITIONING: ICD-10-CM

## 2019-07-15 DIAGNOSIS — I10 ESSENTIAL HYPERTENSION: ICD-10-CM

## 2019-07-15 DIAGNOSIS — I10 BENIGN ESSENTIAL HYPERTENSION: ICD-10-CM

## 2019-07-15 DIAGNOSIS — N18.30 STAGE 3 CHRONIC KIDNEY DISEASE (H): ICD-10-CM

## 2019-07-15 DIAGNOSIS — F03.90 DEMENTIA WITHOUT BEHAVIORAL DISTURBANCE, UNSPECIFIED DEMENTIA TYPE: ICD-10-CM

## 2019-07-15 DIAGNOSIS — K59.00 CONSTIPATION, UNSPECIFIED CONSTIPATION TYPE: ICD-10-CM

## 2019-07-15 PROCEDURE — 99316 NF DSCHRG MGMT 30 MIN+: CPT | Performed by: NURSE PRACTITIONER

## 2019-07-15 NOTE — PATIENT INSTRUCTIONS
[facility] Discharge Orders    Name: Tracey Moran  : 1925  Planned Discharge Date: 19 Tentatively  Discharged to: new assisted living for patient : family have not chosen yet    MEDICAL FOLLOW UP  Follow up with PCP in 1-2 weeks --family to keep her current PCP  Follow up with [facility] in early August(last appt )--son to set up      FUTURE LABS: No labs orders/due    ORDER CHANGES:  See below    DISCHARGE MEDICATIONS:  The patient s pharmacy is authorized to dispense a 30-day supply of medications. Refill requests should be directed to the primary provider, Guilherme Graves   No narcotics are prescribed at time of discharge.   Current Outpatient Medications   Medication Sig Dispense Refill     acetaminophen (TYLENOL) 500 MG tablet Take 500 mg by mouth 3 times daily       diclofenac (VOLTAREN) 1 % topical gel Place 2 g onto the skin 3 times daily For right knee pain       lisinopril (PRINIVIL/ZESTRIL) 20 MG tablet TAKE ONE TABLET BY MOUTH TWICE DAILY. HOLD IF SYSTOLIC BLOOD PRESSURE IS LESS THAN 110 MMHG. CALL IF HELD TWICE IN A ROW OR IF SYMPTOMATIC. 180 tablet 2     metoprolol succinate ER (TOPROL-XL) 25 MG 24 hr tablet Take 25 mg by mouth daily Give 1 tablet by mouth one time a day(*GIVE AT DINNERTIME*) for HTN HOLD if SBP < 110 or < 55 and call MD/NP if held x 2 in a row or symptoms       multivitamin, therapeutic with minerals (THERA-VIT-M) TABS tablet Take 1 tablet by mouth daily       polyethylene glycol (MIRALAX/GLYCOLAX) packet Take 17 g by mouth daily as needed        senna-docusate (SENOKOT-S/PERICOLACE) 8.6-50 MG tablet Take 1 tablet by mouth 2 times daily as needed for constipation         SERVICES:  Home Care:  Occupational Therapy, Physical Therapy and From:  [facility] [place]    ADDITIONAL INSTRUCTIONS:  None    WILLIAM Ornelas CNP  This document was electronically signed on July 15, 2019

## 2019-07-15 NOTE — PROGRESS NOTES
[place] [facility] DISCHARGE SUMMARY    PATIENT'S NAME: Tracey Moran  YOB: 1925    PRIMARY CARE PROVIDER AND CLINIC RESPONSIBLE AFTER TRANSFER: Guilherme Graves     CODE STATUS: Full Code    TRANSFERRING PROVIDERS: WILLIAM Ornelas CNP, Dr. Krysta Rodriguez,      DATE OF SNF ADMISSION:  May / 18 / 2019    DATE OF SNF DISCHARGE (including anticipating DC): July / 20 / 2019 tentatively    DISCHARGE DISPOSITION: [facility] Provider    Nursing Facility: [facility] stay 5/15/19 to 5/18/19.     Condition on Discharge:  Improving.    Function:  Ambulates 235 ft w/fww: Transfers: sba-s  Cognitive Scores: SLUMS 13/30 and ACL:3.8/5.8    Physical Function: Ambulating 235 ft with fww  Equipment: walker  DME: Walker    DISCHARGE DIAGNOSIS:      Closed fracture of lateral portion of right tibial plateau with routine healing, subsequent encounter  Physical deconditioning  Dementia without behavioral disturbance, unspecified dementia type  Essential hypertension  Constipation, unspecified constipation type  Hypothyroidism, unspecified type  Hypercholesterolemia  Benign essential hypertension  Stage 3 chronic kidney disease (H)  Anemia, unspecified type        HOSPITAL COURSE:       Brief Summary of Hospital Course: pt  Had a fall about a month prior to admission to the hospital.  The was getting worse and was taken to the ER and found to have an effusion and pain in right knee.    On CT she had a focal fracture deformity of the lateral tibial plateau,which is thought to have occurred with the fall in April. She was seen by Ortho, plan is to keep her NWB and see Ortho in two  weeks.  Also pain control was obtained with Tylenol and Voltaren gel. Then sent for rehab in TCU.     TCU/SNF COURSE: she was non wt bearing until 6/26 due to Ortho orders.  She was going to be done with therapies but they resumed her care/therapies when ok 'd by ortho.  She has  now reached her plateau as of 7/12/19.  Her son has been looking for Enhanced [facility] versus LTC but has not yet found one he wishes to choose.  He takes her tomorrow for a visit at one of them.    A DME was done 6/3/19 for a WC.  A F2 F will be done when exact discharge date known.  She has no pain, she has weak right knee and is fall risk per nursing and  therapies  Resident:  Tracey Moran    Room #  MD/NP      Admit:  Diagnosis:    Precautions: now WBAT    Eval Date OT:  PT:  ST:  Home Visit Date:  CC Date:    Previous Living Status:     DC Destination/ Recommendation:    Functional Level:      Notes:       DATE: 11-Jun 13-Jun 18-Jun 2-Jul 9-Jul 11-Jul 6/11:  incontinent of BM frequently, has been self    Bathing max a max a max a  max a max a transferring, is unable to manage incontinence   UB Drsg s/u s/u s/u  s/u s/u 6/28 re-eval PT and OT 6/29     LB Drsg max a mod a mod a  s s  7/2: instability in R knee so using neoprene support   Toileting sba-max a cga-max a cga-max a  sba sba High fall risk      Kitchen              Slums:   CPT:    7/9:  will need fww, poor safety, will not be able to correct   ACL:   Safety ?s:  Meds:  unsafe patterns due to cognition.  SBA-S will be highest   Bed Mob i i i I i i level of independence       Transfers sba-s sba sba SBA sba-s sba-s        Distance 3 none none 40 235 235        Assist cga   CGA sba sba        Device bars   II 4ww 4ww        Stairs    NA          Tinetti:    Tag:  red         Other Balance Tests:   NSG Tfer Method: a x 1                PAST MEDICAL HISTORY:  Past Medical History:   Diagnosis Date     Hypertension        DISCHARGE MEDICATIONS:  Current Outpatient Medications   Medication Sig Dispense Refill     acetaminophen (TYLENOL) 500 MG tablet Take 500 mg by mouth 3 times daily       diclofenac (VOLTAREN) 1 % topical gel Place 2 g onto the skin 3 times daily For right knee pain       lisinopril (PRINIVIL/ZESTRIL) 20 MG tablet TAKE ONE TABLET BY MOUTH TWICE  "DAILY. HOLD IF SYSTOLIC BLOOD PRESSURE IS LESS THAN 110 MMHG. CALL IF HELD TWICE IN A ROW OR IF SYMPTOMATIC. 180 tablet 2     metoprolol succinate ER (TOPROL-XL) 25 MG 24 hr tablet Take 25 mg by mouth daily Give 1 tablet by mouth one time a day(*GIVE AT DINNERTIME*) for HTN HOLD if SBP < 110 or < 55 and call MD/NP if held x 2 in a row or symptoms       multivitamin, therapeutic with minerals (THERA-VIT-M) TABS tablet Take 1 tablet by mouth daily       polyethylene glycol (MIRALAX/GLYCOLAX) packet Take 17 g by mouth daily as needed        senna-docusate (SENOKOT-S/PERICOLACE) 8.6-50 MG tablet Take 1 tablet by mouth 2 times daily as needed for constipation         MEDICATION CHANGES/RATIONALE:   See discharge med orders    /67   Pulse 90   Temp 97  F (36.1  C)   Resp 16   Ht 1.575 m (5' 2\")   Wt 58.5 kg (129 lb)   SpO2 94%   BMI 23.59 kg/m      ROS: She is forgetful but:  No CP, SOB, Cough, dizziness, HA, N/V, dysuria or Bowel Abnormalities. Appetite is good  No pain in right leg.    PHYSICAL EXAM Today:     GENERAL APPEARANCE:  Alert, in no distress, oriented to person, place and forgetful, cooperative  ENT:  Mouth WNL and moist mucous membranes,  normal hearing acuity  RESP:  respiratory effort  of chest normal, lungs clear to auscultation.  CV:  Auscultation of heart done , RRR, no murmur, rub, or gallop, no edema, +2 pedal pulses.  ABDOMEN:  normal bowel sounds, soft, nontender  M/S:   RUIZ equally and up with assist--no restrictions except SBA  SKIN:  Inspection of skin and subcutaneous tissue baseline but limited as pt fully dressed  NEURO:   Cranial nerves 2-12 are grossly intact and at patient's baseline  PSYCH:  insight and judgement impaired, memory impaired     SNF LABS:  Recent Labs   Lab Test 07/03/19 05/23/19    138   POTASSIUM 4.2 4.4   CHLORIDE 108* 107   CO2 26 23   ANIONGAP 5 8   GLC 81 82   BUN 15 28   CR 0.76 0.89   RENETTA 9.2 9.2     CBC RESULTS:   Recent Labs   Lab Test 07/03/19 "   WBC  --    RBC  --    HGB 10.6*   HCT  --    MCV  --    MCH  --    MCHC  --    RDW  --    PLT  --     < > = values in this interval not displayed.             DISCHARGE PLAN:  Occupational Therapy, Physical Therapy and From:  [facility] Follow-up with PCP in 7 days: 7 days.    Current [place] or other scheduled appointments:No future appointments.    MTM referral needed and placed by this provider: none    Pending labs: none     Discharge Treatments:none       TOTAL DISCHARGE TIME:   Greater than 30 minutes    WILLIAM Ornelas CNP

## 2019-07-23 ENCOUNTER — NURSING HOME VISIT (OUTPATIENT)
Dept: GERIATRICS | Facility: CLINIC | Age: 84
End: 2019-07-23
Payer: COMMERCIAL

## 2019-07-23 VITALS
DIASTOLIC BLOOD PRESSURE: 77 MMHG | SYSTOLIC BLOOD PRESSURE: 145 MMHG | OXYGEN SATURATION: 96 % | RESPIRATION RATE: 19 BRPM | HEART RATE: 81 BPM

## 2019-07-23 DIAGNOSIS — S82.121D CLOSED FRACTURE OF LATERAL PORTION OF RIGHT TIBIAL PLATEAU WITH ROUTINE HEALING, SUBSEQUENT ENCOUNTER: Primary | ICD-10-CM

## 2019-07-23 DIAGNOSIS — R53.81 PHYSICAL DECONDITIONING: ICD-10-CM

## 2019-07-23 DIAGNOSIS — I10 ESSENTIAL HYPERTENSION: ICD-10-CM

## 2019-07-23 DIAGNOSIS — F03.90 DEMENTIA WITHOUT BEHAVIORAL DISTURBANCE, UNSPECIFIED DEMENTIA TYPE: ICD-10-CM

## 2019-07-23 PROCEDURE — 99308 SBSQ NF CARE LOW MDM 20: CPT | Performed by: NURSE PRACTITIONER

## 2019-07-23 NOTE — PROGRESS NOTES
[place] [facility]  [facility] Record Number:  [id number]  Place of Service where encounter took place:  [facility] ([facility] [[id number]]  Chief Complaint   Patient presents with     Nursing Home Acute       HPI:    Tracey Moran  is a 89 year old (6/16/1925), who is being seen today for an episodic care visit.  HPI information obtained from: facility chart records, facility staff, patient report and [facility] chart review. Today's concern is: no pain      Closed fracture of lateral portion of right tibial plateau with routine healing, subsequent encounter  Physical deconditioning  Dementia without behavioral disturbance, unspecified dementia type  Essential hypertension      Past Medical and Surgical History reviewed in Epic today.    MEDICATIONS:  Current Outpatient Medications   Medication Sig Dispense Refill     acetaminophen (TYLENOL) 500 MG tablet Take 500 mg by mouth 3 times daily       diclofenac (VOLTAREN) 1 % topical gel Place 2 g onto the skin 3 times daily For right knee pain       lisinopril (PRINIVIL/ZESTRIL) 20 MG tablet TAKE ONE TABLET BY MOUTH TWICE DAILY. HOLD IF SYSTOLIC BLOOD PRESSURE IS LESS THAN 110 MMHG. CALL IF HELD TWICE IN A ROW OR IF SYMPTOMATIC. 180 tablet 2     metoprolol succinate ER (TOPROL-XL) 25 MG 24 hr tablet Take 25 mg by mouth daily Give 1 tablet by mouth one time a day(*GIVE AT DINNERTIME*) for HTN HOLD if SBP < 110 or < 55 and call MD/NP if held x 2 in a row or symptoms       multivitamin, therapeutic with minerals (THERA-VIT-M) TABS tablet Take 1 tablet by mouth daily       polyethylene glycol (MIRALAX/GLYCOLAX) packet Take 17 g by mouth daily as needed        senna-docusate (SENOKOT-S/PERICOLACE) 8.6-50 MG tablet Take 1 tablet by mouth 2 times daily as needed for constipation       ROS: She is forgetful but:  No CP, SOB, Cough, dizziness, HA, N/V, dysuria or Bowel Abnormalities. Appetite is good  No pain in right  leg.    Objective:  /77   Pulse 81   Resp 19   SpO2 96%      Exam:    GENERAL APPEARANCE:  Alert, in no distress, oriented to person, place and forgetful, cooperative  ENT:  Mouth with moist mucous membranes,  normal hearing acuity  RESP:  respiratory effort  of chest normal, lungs clear to auscultation.  CV:  Auscultation of heart done , RRR, no M/G/R, no edema, +2 pedal pulses.  ABDOMEN:  normal bowel sounds, soft, nontender  M/S:   RUIZ equally and up with assist   SKIN:  Inspection of skin and subcutaneous tissue baseline but limited as pt fully dressed  NEURO:   Cranial nerves 2-12 are grossly intact and at patient's baseline  PSYCH:  insight and judgement impaired, memory impaired      Labs:    Recent Labs   Lab Test 7/3/19 05/23/19 05/16/19  0655    138 142   POTASSIUM 4.2 4.4 4.0   CHLORIDE 108 107 111*   CO2 26 23 27   ANIONGAP 5 8 4   GLC 81 82 85   BUN 15 28 36*   CR 0.76/ GFR>60 0.89 0.99   RENETTA 9.2 9.2 8.4*     CBC RESULTS:    Recent Labs   Lab Test 7/3/19 05/23/19 11/26/18   WBC   --  8.9   RBC   --  2.95*   HGB 10.6 10.9* 9.6*   HCT   --  29.8*   MCV   --  101*   MCH   --  32.5   MCHC   --  32.2   RDW   --  13.7   PLT   --  297        ASSESSMENT AND PLAN:    (S82.121D) Closed fracture of lateral portion of right tibial plateau with routine healing, subsequent encounter   (R53.81) Physical deconditioning  Comment/Plan: no pain, cont with same Tylenol, done with therapies last week and pt is private pay, awaiting son find facility that he wants.  Per SW likely later this week.      (F03.90) Dementia without behavioral disturbance, unspecified dementia type(12/2018: IN TCU: Cognitive Scores: SLUMS 5/30-, CPT 3.9/5.6)  Comment/Plan:  pt needs 24 hr care due to recent cog scores:  13/30 SLUMS and 3/8/5.8 when discharged     (I10) Essential hypertension  Comment/Plan: mostly in the 110-130's mostly, occas >150's .   Will cont ACE-I and  Toprol XL, monitor        Electronically signed  by:  WILLIAM Ornelas CNP            Documentation of Face to Face and Certification for Home Health Services    I certify that patient: Tracey Moran is under my care and that I, or a nurse practitioner or physician's assistant working with me, had a face-to-face encounter that meets the physician face-to-face encounter requirements with this patient on: 7/23/2019.    This encounter with the patient was in whole, or in part, for the following medical condition, which is the primary reason for home health care:      Closed fracture of lateral portion of right tibial plateau with routine healing, subsequent encounter  Physical deconditioning  Dementia without behavioral disturbance, unspecified dementia type  Essential hypertension  .    I certify that, based on my findings, the following services are medically necessary home health services: Occupational Therapy and Physical Therapy.    My clinical findings support the need for the above services because: Occupational Therapy Services are needed to assess and treat cognitive ability and address ADL safety due to impairment in deconditoning, dementia, weakness . and Physical Therapy Services are needed to assess and treat the following functional impairments: deconditioning and see other diagnoses above and on dc summary from 7/15/19.    Further, I certify that my clinical findings support that this patient is homebound (i.e. absences from home require considerable and taxing effort and are for medical reasons or [facility] services or infrequently or of short duration when for other reasons) because: Requires assistance of another person or specialized equipment to access medical services because patient: Requires supervision of another for safe transfer...    Based on the above findings. I certify that this patient is confined to the home and needs intermittent skilled nursing care, physical therapy and/or speech therapy.  The patient is under my care, and I  have initiated the establishment of the plan of care.  This patient will be followed by a physician who will periodically review the plan of care.  Physician/Provider to provide follow up care: Guilherme Graves    Attending hospital physician (the Medicare certified [facility] provider): Dr Krysta Rodriguez MD  Physician Signature: See electronic signature associated with these discharge orders.  Date: 7/23/2019

## 2019-07-31 ENCOUNTER — DOCUMENTATION ONLY (OUTPATIENT)
Dept: INTERNAL MEDICINE | Facility: CLINIC | Age: 84
End: 2019-07-31

## 2019-07-31 ENCOUNTER — DOCUMENTATION ONLY (OUTPATIENT)
Dept: CARE COORDINATION | Facility: CLINIC | Age: 84
End: 2019-07-31

## 2019-07-31 NOTE — PROGRESS NOTES
[facility] now requests orders and shares plan of care/discharge summaries for some patients through [employer].  Please REPLY TO THIS MESSAGE OR ROUTE BACK TO THE AUTHOR in order to give authorization for orders when needed.  This is considered a verbal order, you will still receive a faxed copy of orders for signature.  Thank you for your assistance in improving collaboration for our patients.    ORDER  OT evaluation and treatment for cognition and ADLs  PT 2w4 to improve balance, gait training, and strengthening

## 2019-07-31 NOTE — PROGRESS NOTES
[facility] now requests orders and shares plan of care/discharge summaries for some patients through [employer].  Please REPLY TO THIS MESSAGE OR ROUTE BACK TO THE AUTHOR in order to give authorization for orders when needed.  This is considered a verbal order, you will still receive a faxed copy of orders for signature.  Thank you for your assistance in improving collaboration for our patients.     ORDER  OT evaluation and treatment for cognition and ADLs  TO BEGIN WEEK OF 8/4: PT 2w4 to improve balance, gait training, and strengthening

## 2019-08-15 ENCOUNTER — MEDICAL CORRESPONDENCE (OUTPATIENT)
Dept: HEALTH INFORMATION MANAGEMENT | Facility: CLINIC | Age: 84
End: 2019-08-15

## 2019-08-16 ENCOUNTER — TRANSFERRED RECORDS (OUTPATIENT)
Dept: HEALTH INFORMATION MANAGEMENT | Facility: CLINIC | Age: 84
End: 2019-08-16

## 2019-08-21 ENCOUNTER — MEDICAL CORRESPONDENCE (OUTPATIENT)
Dept: HEALTH INFORMATION MANAGEMENT | Facility: CLINIC | Age: 84
End: 2019-08-21

## 2019-09-05 ENCOUNTER — MEDICAL CORRESPONDENCE (OUTPATIENT)
Dept: HEALTH INFORMATION MANAGEMENT | Facility: CLINIC | Age: 84
End: 2019-09-05

## 2019-09-11 DIAGNOSIS — Z53.9 DIAGNOSIS NOT YET DEFINED: Primary | ICD-10-CM

## 2019-10-11 ENCOUNTER — TELEPHONE (OUTPATIENT)
Dept: INTERNAL MEDICINE | Facility: CLINIC | Age: 84
End: 2019-10-11

## 2019-10-11 NOTE — TELEPHONE ENCOUNTER
Received call from [facility] staff, [phone]    Patient found on her buttock around noon today. Patient stated to staff that she was trying to reposition herself in her wheelchair but slipped on to the floor. No evidence of head injury. VS WNL. ROM is WNL. No evidence of bruising or skin injury. Assist of 2 persons with gait belt to assist patient up.     Lida HAYNES RN, BSN, PHN

## 2019-10-26 ENCOUNTER — MEDICAL CORRESPONDENCE (OUTPATIENT)
Dept: HEALTH INFORMATION MANAGEMENT | Facility: CLINIC | Age: 84
End: 2019-10-26

## 2019-10-26 ENCOUNTER — APPOINTMENT (OUTPATIENT)
Dept: GENERAL RADIOLOGY | Facility: CLINIC | Age: 84
DRG: 690 | End: 2019-10-26
Attending: EMERGENCY MEDICINE
Payer: COMMERCIAL

## 2019-10-26 ENCOUNTER — HOSPITAL ENCOUNTER (INPATIENT)
Facility: CLINIC | Age: 84
LOS: 3 days | Discharge: NURSING, REHAB OR LONG-TERM CARE | DRG: 690 | End: 2019-10-29
Attending: EMERGENCY MEDICINE | Admitting: INTERNAL MEDICINE
Payer: COMMERCIAL

## 2019-10-26 ENCOUNTER — APPOINTMENT (OUTPATIENT)
Dept: CT IMAGING | Facility: CLINIC | Age: 84
DRG: 690 | End: 2019-10-26
Attending: EMERGENCY MEDICINE
Payer: COMMERCIAL

## 2019-10-26 DIAGNOSIS — N30.00 ACUTE CYSTITIS WITHOUT HEMATURIA: Primary | ICD-10-CM
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
## 2019-10-26 DIAGNOSIS — N39.0 UTI (URINARY TRACT INFECTION) WITH PYURIA: ICD-10-CM

## 2019-10-26 DIAGNOSIS — I16.0 HYPERTENSIVE URGENCY: ICD-10-CM

## 2019-10-26 DIAGNOSIS — R41.0 CONFUSION: ICD-10-CM

## 2019-10-26 LAB
ALBUMIN SERPL-MCNC: 3.5 G/DL (ref 3.4–5)
ALBUMIN UR-MCNC: 50 MG/DL
ALP SERPL-CCNC: 72 U/L (ref 40–150)
ALT SERPL W P-5'-P-CCNC: 12 U/L (ref 0–50)
ANION GAP SERPL CALCULATED.3IONS-SCNC: 7 MMOL/L (ref 3–14)
APPEARANCE UR: ABNORMAL
AST SERPL W P-5'-P-CCNC: 19 U/L (ref 0–45)
BACTERIA #/AREA URNS HPF: ABNORMAL /HPF
BASOPHILS # BLD AUTO: 0 10E9/L (ref 0–0.2)
BASOPHILS NFR BLD AUTO: 0.4 %
BILIRUB SERPL-MCNC: 0.3 MG/DL (ref 0.2–1.3)
BILIRUB UR QL STRIP: NEGATIVE
BUN SERPL-MCNC: 22 MG/DL (ref 7–30)
CALCIUM SERPL-MCNC: 8.8 MG/DL (ref 8.5–10.1)
CHLORIDE SERPL-SCNC: 107 MMOL/L (ref 94–109)
CO2 SERPL-SCNC: 25 MMOL/L (ref 20–32)
COLOR UR AUTO: YELLOW
CREAT SERPL-MCNC: 0.78 MG/DL (ref 0.52–1.04)
DIFFERENTIAL METHOD BLD: ABNORMAL
EOSINOPHIL # BLD AUTO: 0 10E9/L (ref 0–0.7)
EOSINOPHIL NFR BLD AUTO: 0.2 %
ERYTHROCYTE [DISTWIDTH] IN BLOOD BY AUTOMATED COUNT: 12.1 % (ref 10–15)
GFR SERPL CREATININE-BSD FRML MDRD: 65 ML/MIN/{1.73_M2}
GLUCOSE SERPL-MCNC: 97 MG/DL (ref 70–99)
GLUCOSE UR STRIP-MCNC: NEGATIVE MG/DL
HCT VFR BLD AUTO: 33.8 % (ref 35–47)
HGB BLD-MCNC: 10.9 G/DL (ref 11.7–15.7)
HGB UR QL STRIP: ABNORMAL
IMM GRANULOCYTES # BLD: 0.1 10E9/L (ref 0–0.4)
IMM GRANULOCYTES NFR BLD: 0.5 %
KETONES UR STRIP-MCNC: ABNORMAL MG/DL
LACTATE BLD-SCNC: 1.1 MMOL/L (ref 0.7–2)
LEUKOCYTE ESTERASE UR QL STRIP: ABNORMAL
LYMPHOCYTES # BLD AUTO: 0.9 10E9/L (ref 0.8–5.3)
LYMPHOCYTES NFR BLD AUTO: 8.8 %
MCH RBC QN AUTO: 32.5 PG (ref 26.5–33)
MCHC RBC AUTO-ENTMCNC: 32.2 G/DL (ref 31.5–36.5)
MCV RBC AUTO: 101 FL (ref 78–100)
MONOCYTES # BLD AUTO: 0.7 10E9/L (ref 0–1.3)
MONOCYTES NFR BLD AUTO: 6.2 %
MUCOUS THREADS #/AREA URNS LPF: PRESENT /LPF
NEUTROPHILS # BLD AUTO: 9 10E9/L (ref 1.6–8.3)
NEUTROPHILS NFR BLD AUTO: 83.9 %
NITRATE UR QL: POSITIVE
NRBC # BLD AUTO: 0 10*3/UL
NRBC BLD AUTO-RTO: 0 /100
PH UR STRIP: 6.5 PH (ref 5–7)
PLATELET # BLD AUTO: 221 10E9/L (ref 150–450)
POTASSIUM SERPL-SCNC: 3.9 MMOL/L (ref 3.4–5.3)
PROT SERPL-MCNC: 7.1 G/DL (ref 6.8–8.8)
RBC # BLD AUTO: 3.35 10E12/L (ref 3.8–5.2)
RBC #/AREA URNS AUTO: 42 /HPF (ref 0–2)
SODIUM SERPL-SCNC: 139 MMOL/L (ref 133–144)
SOURCE: ABNORMAL
SP GR UR STRIP: 1.02 (ref 1–1.03)
SQUAMOUS #/AREA URNS AUTO: 2 /HPF (ref 0–1)
TROPONIN I BLD-MCNC: 0 UG/L (ref 0–0.08)
TSH SERPL DL<=0.005 MIU/L-ACNC: 1.64 MU/L (ref 0.4–4)
UROBILINOGEN UR STRIP-MCNC: NORMAL MG/DL (ref 0–2)
WBC # BLD AUTO: 10.7 10E9/L (ref 4–11)
WBC #/AREA URNS AUTO: >182 /HPF (ref 0–5)

## 2019-10-26 PROCEDURE — 80053 COMPREHEN METABOLIC PANEL: CPT | Performed by: EMERGENCY MEDICINE

## 2019-10-26 PROCEDURE — 93005 ELECTROCARDIOGRAM TRACING: CPT

## 2019-10-26 PROCEDURE — 87040 BLOOD CULTURE FOR BACTERIA: CPT | Performed by: EMERGENCY MEDICINE

## 2019-10-26 PROCEDURE — 73090 X-RAY EXAM OF FOREARM: CPT | Mod: 50

## 2019-10-26 PROCEDURE — 96374 THER/PROPH/DIAG INJ IV PUSH: CPT

## 2019-10-26 PROCEDURE — 85025 COMPLETE CBC W/AUTO DIFF WBC: CPT | Performed by: EMERGENCY MEDICINE

## 2019-10-26 PROCEDURE — 84484 ASSAY OF TROPONIN QUANT: CPT

## 2019-10-26 PROCEDURE — 87186 SC STD MICRODIL/AGAR DIL: CPT | Performed by: EMERGENCY MEDICINE

## 2019-10-26 PROCEDURE — 84443 ASSAY THYROID STIM HORMONE: CPT | Performed by: EMERGENCY MEDICINE

## 2019-10-26 PROCEDURE — 72170 X-RAY EXAM OF PELVIS: CPT

## 2019-10-26 PROCEDURE — 25000128 H RX IP 250 OP 636: Performed by: EMERGENCY MEDICINE

## 2019-10-26 PROCEDURE — 87086 URINE CULTURE/COLONY COUNT: CPT | Performed by: EMERGENCY MEDICINE

## 2019-10-26 PROCEDURE — 36415 COLL VENOUS BLD VENIPUNCTURE: CPT | Performed by: EMERGENCY MEDICINE

## 2019-10-26 PROCEDURE — 99285 EMERGENCY DEPT VISIT HI MDM: CPT | Mod: 25

## 2019-10-26 PROCEDURE — 96375 TX/PRO/DX INJ NEW DRUG ADDON: CPT

## 2019-10-26 PROCEDURE — 71046 X-RAY EXAM CHEST 2 VIEWS: CPT

## 2019-10-26 PROCEDURE — 83605 ASSAY OF LACTIC ACID: CPT | Performed by: EMERGENCY MEDICINE

## 2019-10-26 PROCEDURE — 99223 1ST HOSP IP/OBS HIGH 75: CPT | Mod: AI | Performed by: INTERNAL MEDICINE

## 2019-10-26 PROCEDURE — 81001 URINALYSIS AUTO W/SCOPE: CPT | Performed by: EMERGENCY MEDICINE

## 2019-10-26 PROCEDURE — 70450 CT HEAD/BRAIN W/O DYE: CPT

## 2019-10-26 PROCEDURE — 12000000 ZZH R&B MED SURG/OB

## 2019-10-26 PROCEDURE — 87088 URINE BACTERIA CULTURE: CPT | Performed by: EMERGENCY MEDICINE

## 2019-10-26 RX ORDER — CEFTRIAXONE 1 G/1
1 INJECTION, POWDER, FOR SOLUTION INTRAMUSCULAR; INTRAVENOUS ONCE
Status: COMPLETED | OUTPATIENT
Start: 2019-10-26 | End: 2019-10-26

## 2019-10-26 RX ORDER — LIDOCAINE 40 MG/G
CREAM TOPICAL
Status: DISCONTINUED | OUTPATIENT
Start: 2019-10-26 | End: 2019-10-27

## 2019-10-26 RX ORDER — LABETALOL 20 MG/4 ML (5 MG/ML) INTRAVENOUS SYRINGE
10 ONCE
Status: COMPLETED | OUTPATIENT
Start: 2019-10-26 | End: 2019-10-26

## 2019-10-26 RX ORDER — LABETALOL 20 MG/4 ML (5 MG/ML) INTRAVENOUS SYRINGE
20 ONCE
Status: COMPLETED | OUTPATIENT
Start: 2019-10-26 | End: 2019-10-26

## 2019-10-26 RX ADMIN — LABETALOL 20 MG/4 ML (5 MG/ML) INTRAVENOUS SYRINGE 10 MG: at 19:43

## 2019-10-26 RX ADMIN — CEFTRIAXONE SODIUM 1 G: 1 INJECTION, POWDER, FOR SOLUTION INTRAMUSCULAR; INTRAVENOUS at 20:38

## 2019-10-26 RX ADMIN — LABETALOL 20 MG/4 ML (5 MG/ML) INTRAVENOUS SYRINGE 20 MG: at 22:08

## 2019-10-26 ASSESSMENT — ENCOUNTER SYMPTOMS: ARTHRALGIAS: 1

## 2019-10-26 NOTE — ED NOTES
Bed: ED16  Expected date: 10/26/19  Expected time: 4:57 PM  Means of arrival: Ambulance  Comments:  BV?? 89 y/o F-fall

## 2019-10-26 NOTE — ED PROVIDER NOTES
History     Chief Complaint:    Fall    History is limited due to patient's dementia  HPI   Tracey Moran is a 89 year old female, with a history of hypertension and dementia, who presents to the ED via EMS for evaluation following a fall. The patient had an unwitnessed fall on the ground of her assisted living facility. Staff suspect that she was trying to ambulate on her own and get out of bed. The patient complains of right elbow following the fall. She denies any back pain, neck pain, hip pain, or other pain aside from her elbow. EMS states that her blood pressure was 217/104, her blood sugar was 106, and her SPO2 was 93/95 % en route to the ED. History limited secondary to dementia.     Allergies:  Demerol  Penicillins     Medications:    Lisinopril  Metoprolol     Past Medical History:    HTN  CRF  Dementia  Anemia    Past Surgical History:    EGD    Family History:    No past pertinent family history.    Social History:  Negative for tobacco use.  Positive for alcohol use.   Marital Status:       Review of Systems   Unable to perform ROS: Dementia   Musculoskeletal: Positive for arthralgias.         Physical Exam     Patient Vitals for the past 24 hrs:   BP Temp Temp src Pulse Heart Rate Resp SpO2   10/26/19 2030 131/55 -- -- 85 -- -- 91 %   10/26/19 2015 (!) 141/63 -- -- 84 -- -- 90 %   10/26/19 2000 (!) 141/56 -- -- 85 -- -- (!) 89 %   10/26/19 1945 118/56 -- -- 88 -- -- 90 %   10/26/19 1930 (!) 186/91 -- -- 104 -- -- 94 %   10/26/19 1915 (!) 218/106 -- -- 109 -- -- 94 %   10/26/19 1900 (!) 210/106 -- -- 109 -- -- 95 %   10/26/19 1845 (!) 201/90 -- -- -- -- -- --   10/26/19 1745 (!) 213/110 -- -- 112 -- -- 97 %   10/26/19 1730 (!) 226/105 -- -- 108 -- -- --   10/26/19 1715 (!) 223/107 -- -- -- -- -- 97 %   10/26/19 1708 (!) 223/107 98.1  F (36.7  C) Oral 103 103 19 96 %         Physical Exam  General: The patient is alert, in no respiratory distress. Appears confused.     HENT: Mucous membranes  moist. She has discharge from her right eye.     Cardiovascular: Regular rate and rhythm. Good pulses in all four extremities. Normal capillary refill and skin turgor.     Respiratory: Lungs are clear. No nasal flaring. No retractions. No wheezing, no crackles.    Gastrointestinal: Abdomen soft. No guarding, no rebound. No palpable hernias. Abdomen is nontender.     Musculoskeletal: No gross deformity.     Skin: No rashes or petechiae.     Neurologic: Alert and oriented to her first name, the hospital, and facts about the president. No gross deficit. Gives repetitive one word answers to questions.     Lymphatic: No cervical adenopathy. No lower extremity swelling.    Psychiatric: The patient is non-tearful.    Emergency Department Course   ECG:  Indication: Fall  Time: 1732  Vent. Rate 107 bpm. NY interval 146. QRS duration 78. QT/QTc 352/469. P-R-T axis 39 -26 41.  Sinus tachycardia with premature supraventricular complexes. Possible left atrial enlargement. Left ventricular hypertrophy. Nonspecific ST and T wave abnormality. Abnormal ECG. Read time: 1734    Imaging:  Radiographic findings were communicated with the family who voiced understanding of the findings.  XR Forearm, bilateral, 2 views:   IMPRESSION:     Right forearm: Demineralization. Negative for fracture ; degenerative change of the CMC joints.  Left forearm: Demineralization. Negative for fracture as per radiology.     XR Chest PA and LAT:   No pneumonia per me.     CT Head without contrast:   IMPRESSION:  1.  Senescent and chronic cerebral small vessel ischemic changes. No acute intracranial process as per radiology.    XR Pelvis 1/2 view:   IMPRESSION: Negative pelvis. No fracture or dislocation as per radiology.    Laboratory:  CBC: WBC: 10.7, HGB: 10.9 (L), PLT: 221  CMP: All WNL (Creatinine: 0.78)    1728 Lactic acid: 1.1  TSH: 1.64  1733 Troponin: 0.00  Blood cultures (X2): pending  UA with Microscopic: Ketones: trace, Blood: moderate, protein  albumin: 50, Nitrite: positive, Leukocyte esterase: large, WBC: >182, RBC: 42, Bacteria: moderate, Squamous epithelial: 2, Mucous: present, o/w WNL  Urine culture: pending    Interventions:  1943 Labetalol 10 mg IV  2038 Rocephin 1 g IV    Emergency Department Course:  Nursing notes and vitals reviewed. (1702) I performed an exam of the patient as documented above.     EKG obtained in the ED, see results above.     The patient provided a urine sample here in the emergency department. This was sent for laboratory testing, findings above.     IV inserted. Medicine administered as documented above. Blood drawn. This was sent to the lab for further testing, results above.     The patient was sent for a bilateral forearm x-ray, chest x-ray x-ray, and head CT while in the emergency department, findings above.     1714 I spoke with the patient's son, Jonny, regarding the patient's history and presentation to the ED. He notes that he saw her earlier today and she seemed confused, her HR was elevated, and she had a slightly high temperature.     1940 I rechecked the patient and discussed the results of her workup thus far. She is stable.     2036  I consulted with Deepika Talbot of the hospitalist services. They are in agreement to accept the patient for admission.    Findings and plan explained to the son who consents to admission. Discussed the patient with Dr. Handley, who will admit the patient to a [facility] bed for further monitoring, evaluation, and treatment.    Impression & Plan    Medical Decision Making:  The patient normally lives in an assisted living facility however appears to be very confused and would unable to be do to so at this point.  The patient has not had any trauma but the acute decline appears to happen today.  Her blood pressure is grossly elevated and I was concerned about a hypertensive encephalopathy.  She also had a fall and therefore bleed would be on the differential as well.  Infection electrolyte  abnormality arrhythmia toxidrome would be other possibilities.  Her CT scan is negative.  Her blood pressure did respond to labetalol but did trend back up.  However she did have a UTI which I think is the cause of her symptoms.  The patient was started and asked and admitted to the hospital in good condition for blood pressure control as well as to improve her mental status and treat her UTI.      Diagnosis:    ICD-10-CM    1. Confusion R41.0 Blood culture ONE site     Blood culture ONE site   2. UTI (urinary tract infection) with pyuria N39.0    3. Hypertensive urgency I16.0    4. Fall, initial encounter W19.XXXA        Disposition:  Admitted to Dr. Handley.      Scribe Disclosure:  I,  Jorge Barba, am serving as a scribe on 10/26/2019 at 5:05 PM to personally document services performed by Shaw Álvarez MD based on my observations and the provider's statements to me.        Jorge Barba  10/26/2019   [facility] EMERGENCY DEPARTMENT       Shaw Álvarez MD  10/26/19 [id number]

## 2019-10-26 NOTE — ED TRIAGE NOTES
Arrives via EMS after unwitnessed fall in her care facility [facility]. Pt found to be hypertensive after fall around 200/100. Pt has dementia at baseline, denies any pain/discomfort, pleasantly disoriented here to place, time and situation.

## 2019-10-27 ENCOUNTER — APPOINTMENT (OUTPATIENT)
Dept: PHYSICAL THERAPY | Facility: CLINIC | Age: 84
DRG: 690 | End: 2019-10-27
Payer: COMMERCIAL

## 2019-10-27 LAB
ANION GAP SERPL CALCULATED.3IONS-SCNC: 7 MMOL/L (ref 3–14)
BUN SERPL-MCNC: 20 MG/DL (ref 7–30)
CALCIUM SERPL-MCNC: 8.6 MG/DL (ref 8.5–10.1)
CHLORIDE SERPL-SCNC: 107 MMOL/L (ref 94–109)
CO2 SERPL-SCNC: 25 MMOL/L (ref 20–32)
CREAT SERPL-MCNC: 0.81 MG/DL (ref 0.52–1.04)
ERYTHROCYTE [DISTWIDTH] IN BLOOD BY AUTOMATED COUNT: 12.3 % (ref 10–15)
GFR SERPL CREATININE-BSD FRML MDRD: 62 ML/MIN/{1.73_M2}
GLUCOSE SERPL-MCNC: 118 MG/DL (ref 70–99)
HCT VFR BLD AUTO: 31.2 % (ref 35–47)
HGB BLD-MCNC: 10.1 G/DL (ref 11.7–15.7)
MCH RBC QN AUTO: 32.4 PG (ref 26.5–33)
MCHC RBC AUTO-ENTMCNC: 32.4 G/DL (ref 31.5–36.5)
MCV RBC AUTO: 100 FL (ref 78–100)
PLATELET # BLD AUTO: 192 10E9/L (ref 150–450)
POTASSIUM SERPL-SCNC: 3.4 MMOL/L (ref 3.4–5.3)
RBC # BLD AUTO: 3.12 10E12/L (ref 3.8–5.2)
SODIUM SERPL-SCNC: 139 MMOL/L (ref 133–144)
WBC # BLD AUTO: 11.9 10E9/L (ref 4–11)

## 2019-10-27 PROCEDURE — 12000000 ZZH R&B MED SURG/OB

## 2019-10-27 PROCEDURE — 36415 COLL VENOUS BLD VENIPUNCTURE: CPT | Performed by: INTERNAL MEDICINE

## 2019-10-27 PROCEDURE — 97530 THERAPEUTIC ACTIVITIES: CPT | Mod: GP

## 2019-10-27 PROCEDURE — 99233 SBSQ HOSP IP/OBS HIGH 50: CPT | Performed by: INTERNAL MEDICINE

## 2019-10-27 PROCEDURE — 80048 BASIC METABOLIC PNL TOTAL CA: CPT | Performed by: INTERNAL MEDICINE

## 2019-10-27 PROCEDURE — 97161 PT EVAL LOW COMPLEX 20 MIN: CPT | Mod: GP

## 2019-10-27 PROCEDURE — 25000132 ZZH RX MED GY IP 250 OP 250 PS 637: Performed by: INTERNAL MEDICINE

## 2019-10-27 PROCEDURE — 97116 GAIT TRAINING THERAPY: CPT | Mod: GP

## 2019-10-27 PROCEDURE — 25000128 H RX IP 250 OP 636: Performed by: PHYSICIAN ASSISTANT

## 2019-10-27 PROCEDURE — 25000132 ZZH RX MED GY IP 250 OP 250 PS 637: Performed by: PHYSICIAN ASSISTANT

## 2019-10-27 PROCEDURE — 85027 COMPLETE CBC AUTOMATED: CPT | Performed by: INTERNAL MEDICINE

## 2019-10-27 PROCEDURE — 25800030 ZZH RX IP 258 OP 636: Performed by: PHYSICIAN ASSISTANT

## 2019-10-27 RX ORDER — LABETALOL 20 MG/4 ML (5 MG/ML) INTRAVENOUS SYRINGE
10
Status: DISCONTINUED | OUTPATIENT
Start: 2019-10-27 | End: 2019-10-27

## 2019-10-27 RX ORDER — LIDOCAINE 40 MG/G
CREAM TOPICAL
Status: DISCONTINUED | OUTPATIENT
Start: 2019-10-27 | End: 2019-10-29 | Stop reason: HOSPADM

## 2019-10-27 RX ORDER — ACETAMINOPHEN 500 MG
500 TABLET ORAL 3 TIMES DAILY
Status: DISCONTINUED | OUTPATIENT
Start: 2019-10-27 | End: 2019-10-29 | Stop reason: HOSPADM

## 2019-10-27 RX ORDER — PROCHLORPERAZINE 25 MG
12.5 SUPPOSITORY, RECTAL RECTAL EVERY 12 HOURS PRN
Status: DISCONTINUED | OUTPATIENT
Start: 2019-10-27 | End: 2019-10-29 | Stop reason: HOSPADM

## 2019-10-27 RX ORDER — BISACODYL 10 MG
10 SUPPOSITORY, RECTAL RECTAL DAILY PRN
Status: DISCONTINUED | OUTPATIENT
Start: 2019-10-27 | End: 2019-10-29 | Stop reason: HOSPADM

## 2019-10-27 RX ORDER — METOPROLOL SUCCINATE 25 MG/1
25 TABLET, EXTENDED RELEASE ORAL EVERY EVENING
Status: DISCONTINUED | OUTPATIENT
Start: 2019-10-27 | End: 2019-10-27

## 2019-10-27 RX ORDER — HYDRALAZINE HYDROCHLORIDE 20 MG/ML
10 INJECTION INTRAMUSCULAR; INTRAVENOUS EVERY 4 HOURS PRN
Status: DISCONTINUED | OUTPATIENT
Start: 2019-10-27 | End: 2019-10-29 | Stop reason: HOSPADM

## 2019-10-27 RX ORDER — AMOXICILLIN 250 MG
2 CAPSULE ORAL 2 TIMES DAILY PRN
Status: DISCONTINUED | OUTPATIENT
Start: 2019-10-27 | End: 2019-10-29 | Stop reason: HOSPADM

## 2019-10-27 RX ORDER — ONDANSETRON 4 MG/1
4 TABLET, ORALLY DISINTEGRATING ORAL EVERY 6 HOURS PRN
Status: DISCONTINUED | OUTPATIENT
Start: 2019-10-27 | End: 2019-10-29 | Stop reason: HOSPADM

## 2019-10-27 RX ORDER — METOPROLOL SUCCINATE 25 MG/1
25 TABLET, EXTENDED RELEASE ORAL EVERY EVENING
Status: DISCONTINUED | OUTPATIENT
Start: 2019-10-27 | End: 2019-10-29 | Stop reason: HOSPADM

## 2019-10-27 RX ORDER — CEFTRIAXONE 1 G/1
1 INJECTION, POWDER, FOR SOLUTION INTRAMUSCULAR; INTRAVENOUS EVERY 24 HOURS
Status: DISCONTINUED | OUTPATIENT
Start: 2019-10-27 | End: 2019-10-29 | Stop reason: HOSPADM

## 2019-10-27 RX ORDER — AMOXICILLIN 250 MG
1 CAPSULE ORAL 2 TIMES DAILY PRN
Status: DISCONTINUED | OUTPATIENT
Start: 2019-10-27 | End: 2019-10-29 | Stop reason: HOSPADM

## 2019-10-27 RX ORDER — SODIUM CHLORIDE 9 MG/ML
INJECTION, SOLUTION INTRAVENOUS CONTINUOUS
Status: ACTIVE | OUTPATIENT
Start: 2019-10-27 | End: 2019-10-27

## 2019-10-27 RX ORDER — ONDANSETRON 2 MG/ML
4 INJECTION INTRAMUSCULAR; INTRAVENOUS EVERY 6 HOURS PRN
Status: DISCONTINUED | OUTPATIENT
Start: 2019-10-27 | End: 2019-10-29 | Stop reason: HOSPADM

## 2019-10-27 RX ORDER — LISINOPRIL 20 MG/1
20 TABLET ORAL 2 TIMES DAILY
Status: DISCONTINUED | OUTPATIENT
Start: 2019-10-27 | End: 2019-10-29 | Stop reason: HOSPADM

## 2019-10-27 RX ORDER — PROCHLORPERAZINE MALEATE 5 MG
5 TABLET ORAL EVERY 6 HOURS PRN
Status: DISCONTINUED | OUTPATIENT
Start: 2019-10-27 | End: 2019-10-29 | Stop reason: HOSPADM

## 2019-10-27 RX ORDER — NALOXONE HYDROCHLORIDE 0.4 MG/ML
.1-.4 INJECTION, SOLUTION INTRAMUSCULAR; INTRAVENOUS; SUBCUTANEOUS
Status: DISCONTINUED | OUTPATIENT
Start: 2019-10-27 | End: 2019-10-29 | Stop reason: HOSPADM

## 2019-10-27 RX ADMIN — DICLOFENAC SODIUM 2 G: 10 GEL TOPICAL at 22:20

## 2019-10-27 RX ADMIN — ACETAMINOPHEN 500 MG: 500 TABLET, FILM COATED ORAL at 10:13

## 2019-10-27 RX ADMIN — HYDRALAZINE HYDROCHLORIDE 10 MG: 20 INJECTION INTRAMUSCULAR; INTRAVENOUS at 01:22

## 2019-10-27 RX ADMIN — ACETAMINOPHEN 500 MG: 500 TABLET, FILM COATED ORAL at 22:19

## 2019-10-27 RX ADMIN — LISINOPRIL 20 MG: 20 TABLET ORAL at 02:40

## 2019-10-27 RX ADMIN — ACETAMINOPHEN 500 MG: 500 TABLET, FILM COATED ORAL at 16:54

## 2019-10-27 RX ADMIN — DICLOFENAC SODIUM 2 G: 10 GEL TOPICAL at 16:54

## 2019-10-27 RX ADMIN — METOPROLOL SUCCINATE 25 MG: 25 TABLET, EXTENDED RELEASE ORAL at 02:40

## 2019-10-27 RX ADMIN — CEFTRIAXONE SODIUM 1 G: 1 INJECTION, POWDER, FOR SOLUTION INTRAMUSCULAR; INTRAVENOUS at 20:18

## 2019-10-27 RX ADMIN — DICLOFENAC SODIUM 2 G: 10 GEL TOPICAL at 10:14

## 2019-10-27 RX ADMIN — LISINOPRIL 20 MG: 20 TABLET ORAL at 10:13

## 2019-10-27 RX ADMIN — METOPROLOL SUCCINATE 25 MG: 25 TABLET, EXTENDED RELEASE ORAL at 20:27

## 2019-10-27 RX ADMIN — SODIUM CHLORIDE: 9 INJECTION, SOLUTION INTRAVENOUS at 01:16

## 2019-10-27 RX ADMIN — LISINOPRIL 20 MG: 20 TABLET ORAL at 20:28

## 2019-10-27 ASSESSMENT — ACTIVITIES OF DAILY LIVING (ADL)
ADLS_ACUITY_SCORE: 27
ADLS_ACUITY_SCORE: 26
ADLS_ACUITY_SCORE: 27

## 2019-10-27 ASSESSMENT — MIFFLIN-ST. JEOR: SCORE: 912.08

## 2019-10-27 NOTE — PROGRESS NOTES
10/27/19 1153   Quick Adds   Type of Visit Initial PT Evaluation   Living Environment   Lives With facility resident   Living Arrangements assisted living   Home Accessibility no concerns   Self-Care   Usual Activity Tolerance   (Pt unable to assist with providing details)   Current Activity Tolerance poor   Activity/Exercise/Self-Care Comment Pt unable to assist with providing details   Functional Level Prior   Ambulation 1-->assistive equipment   Transferring 1-->assistive equipment   Toileting 1-->assistive equipment   Bathing 3-->assistive equipment and person   Cognition 1 - attention or memory deficits   Fall history within last six months yes   Number of times patient has fallen within last six months 4   Which of the above functional risks had a recent onset or change? fall history;transferring   Prior Functional Level Comment Per chart: Son confirmed pt indep with all self-cares including bathing (stands up in walk-in shower), meds (takes 2 only plus vitamins), meal prep, laundry and cleaning. Family provides transport when needed and does grocery shopping.    General Information   Onset of Illness/Injury or Date of Surgery - Date 10/26/19   Referring Physician Ho Handley MD    Patient/Family Goals Statement Unclear   Pertinent History of Current Problem (include personal factors and/or comorbidities that impact the POC) Per chart: Patient is a 89-year-old female with history of dementia, HTN, HLD, anemia, arthritis, UTIs who presents from her assisted living facility due to confusion and a fall.  Patient does not recall the falls   Precautions/Limitations fall precautions   Cognitive Status Examination   Orientation person   Follows Commands and Answers Questions able to follow single-step instructions   Pain Assessment   Patient Currently in Pain No   Posture    Posture Forward head position;Protracted shoulders;Kyphosis   Range of Motion (ROM)   ROM Comment B UE/LE grossly WFL   Strength  "  Strength Comments B UE/LE 4-/5 grossly   Bed Mobility   Bed Mobility Comments Supine<>sit mod-max A x 1   Transfer Skills   Transfer Comments Sit<>stand mod A x 1   Gait   Gait Comments FWW w/ min A/SBA   Balance   Balance Comments Impaired - requires FWW for balance   General Therapy Interventions   Planned Therapy Interventions ADL retraining;bed mobility training;gait training;transfer training;risk factor education;home program guidelines;progressive activity/exercise   Clinical Impression   Criteria for Skilled Therapeutic Intervention yes, treatment indicated   PT Diagnosis Impaired functional mobility   Influenced by the following impairments Recent fall, mental status/cognition deficits, deconditioning   Functional limitations due to impairments Ambulation, transfers, self cares   Clinical Presentation Evolving/Changing   Clinical Decision Making (Complexity) Low complexity   Therapy Frequency 5x/week   Predicted Duration of Therapy Intervention (days/wks) 3 days   Anticipated Discharge Disposition Transitional Care Facility   Risk & Benefits of therapy have been explained Yes   Patient, Family & other staff in agreement with plan of care Yes   [facility] SWITCH Materials TM \"6 Clicks\"   2016, Trustees of [facility], under license to [employer].  All rights reserved.   6 Clicks Short Forms Basic Mobility Inpatient Short Form   [facility] AM[employer]doxPAC  \"6 Clicks\" V.2 Basic Mobility Inpatient Short Form   1. Turning from your back to your side while in a flat bed without using bedrails? 3 - A Little   2. Moving from lying on your back to sitting on the side of a flat bed without using bedrails? 3 - A Little   3. Moving to and from a bed to a chair (including a wheelchair)? 3 - A Little   4. Standing up from a chair using your arms (e.g., wheelchair, or bedside chair)? 3 - A Little   5. To walk in hospital room? 3 - A Little   6. Climbing 3-5 steps with a railing? 2 - A Lot   Basic Mobility Raw Score " (Score out of 24.Lower scores equate to lower levels of function) 17   Total Evaluation Time   Total Evaluation Time (Minutes) 5

## 2019-10-27 NOTE — PROGRESS NOTES
Call placed to [facility], nurse Michele [phone] reports that pt received the following services: toileting/transfers, escorts, meals, bathing and AM/PM cares. Per chart review son reports that pt is mostly WC bound but does transfer with assistance. Pt admitted for UTI with dementia at baseline, noted to have worsening confusion PTA.     She did have a sitter in place last night. Currently no sitter, bed alarm in place and on VPM wait list. Will continue to watch pt's medical improvement as she was just admitted last night. RN/SW to follow-up tomorrow in coordinating discharge planning.     If pt returns to [facility] on discharge, orders should be faxed to [phone] and all new medications should be sent to [facility].     CM will continue to follow patient for any additional discharge needs.     Kirti Rodriguez RN BSN CTS   Care Transitions Team  [facility]

## 2019-10-27 NOTE — PLAN OF CARE
Ambulatory Status:  Pt up A2 with gait belt and walker. Up in chair.  VS:  stable; afebrile.  Pain:  denies  Resp: LS diminished on RA  GI:  denies nausea.  Clear liquid diet; feeder.  BS active.  Passing flatus.  Last BM 10/27.  :  incont.  Skin:  red blanchable area on coccyx; barrier cream applied.  Tx:  rocephin  Consults:  PT/SW  Disposition:  TBD

## 2019-10-27 NOTE — PROGRESS NOTES
"/41 (BP Location: Left arm)   Pulse 84   Temp 98.7  F (37.1  C) (Oral)   Resp 19   Ht 1.575 m (5' 2\")   Wt 55.9 kg (123 lb 3.2 oz)   SpO2 94%   BMI 22.53 kg/m    Pt arrived to the floor with increased BP, IV hydralazine given. Patient oriented to self. Pleasantly confused. Bed alarm on. Denies pain. Patient up with an assist of 2 walker and gait belt. Incontinent at times. NS running at 100ml/hr. Bowel sounds active. Will continue to monitor.  " Bill For Individual Tests Below?: yes Number Of Tubes Drawn: 1 Detail Level: Simple Venipuncture Paragraph: An alcohol pad was applied to the venipuncture site. Venipuncture was performed using a butterfly needle. Pressure and a bandaid was applied to the site. No complications were noted.

## 2019-10-27 NOTE — PHARMACY-ADMISSION MEDICATION HISTORY
Admission medication history interview status for this patient is complete. See [facility] admission navigator for allergy information, prior to admission medications and immunization status.     Medication history interview source(s):Son & Caregiver  Medication history resources (including written lists, pill bottles, clinic record):None  Primary pharmacy: unknown    Changes made to PTA medication list:  Added: none  Deleted: none  Changed: none    Actions taken by pharmacist (provider contacted, etc):Spoke with Tracey's son, but called [facility] to confirm. I spoke with Halie who confirmed meds and last doses.     Additional medication history information:None    Medication reconciliation/reorder completed by provider prior to medication history?  Yes     Do you take OTC medications (eg tylenol, ibuprofen, fish oil, eye/ear drops, etc)? Yes       Prior to Admission medications    Medication Sig Last Dose Taking? Auth Provider   lisinopril (PRINIVIL/ZESTRIL) 20 MG tablet TAKE ONE TABLET BY MOUTH TWICE DAILY. HOLD IF SYSTOLIC BLOOD PRESSURE IS LESS THAN 110 MMHG. CALL IF HELD TWICE IN A ROW OR IF SYMPTOMATIC. 10/26/2019 at 0800 Yes Guilherme Graves MD   metoprolol succinate ER (TOPROL-XL) 25 MG 24 hr tablet Take 25 mg by mouth daily Give 1 tablet by mouth one time a day(*GIVE AT DINNERTIME*) for HTN HOLD if SBP < 110 or < 55 and call MD/NP if held x 2 in a row or symptoms 10/25/2019 at 2000 Yes Reported, Patient   multivitamin, therapeutic with minerals (THERA-VIT-M) TABS tablet Take 1 tablet by mouth daily 10/26/2019 at 0800 Yes Unknown, Entered By History   acetaminophen (TYLENOL) 500 MG tablet Take 500 mg by mouth 3 times daily Unknown  Reported, Patient   diclofenac (VOLTAREN) 1 % topical gel Place 2 g onto the skin 3 times daily For right knee pain Unknown  Reported, Patient   polyethylene glycol (MIRALAX/GLYCOLAX) packet Take 17 g by mouth daily as needed  Unknown  Reported, Patient   senna-docusate  (SENOKOT-S/PERICOLACE) 8.6-50 MG tablet Take 1 tablet by mouth 2 times daily as needed for constipation Unknown  Agustina Sanchez PA-C

## 2019-10-27 NOTE — PLAN OF CARE
PT: Evaluation complete, treatment initiated. Per chart: Patient is a 89-year-old female with history of dementia, HTN, HLD, anemia, arthritis, UTIs who presents from her assisted living facility due to confusion and a fall.  Patient does not recall the falls    Discharge Planner PT   Patient plan for discharge: Unclear  Current status: Pt supine in bed upon arrival, sitter assisting with feeding breakfast. Pt agreeable to moving to chair. Very pleasant - able to follow single step commands without difficulty. Supine<>sit mod A x 1. Cues for hand placement to allow for IND sitting at EOB. Sit<>stand min-mod A to FWW. Required two attempts to stand. Pt amb x 10' in room with FWW and SBA. Pt exhibits kyphotic posture through T/S with extreme forward head positioning. Unable to correct with cueing. Also demonstrates valgus positioning R> L knee. Short step length bilaterally. When transitioning to chair from stand, did not reach back for chair despite cues. Patient left in bedside chair, needs within reach, sitter present  Barriers to return to prior living situation: Assist required with transfers, gait and self cares; confusion  Recommendations for discharge: TCU  Rationale for recommendations: Patient is not currently at baseline and is unsafe to return home. Patient would benefit from continued skilled PT intervention to improve bed mobility, transfers, gait. If patient does improve to allow for return to LTC, would benefit from IP OT consult to assess cognition prior to discharge         Entered by: Han Salazar 10/27/2019 12:25 PM

## 2019-10-27 NOTE — ED NOTES
[facility]  ED Nurse Handoff Report    Tracey Moran is a 89 year old female   ED Chief complaint: Fall  . ED Diagnosis:   Final diagnoses:   Confusion   UTI (urinary tract infection) with pyuria   Hypertensive urgency   Fall, initial encounter     Allergies:   Allergies   Allergen Reactions     Demerol [Meperidine]      Dust Mites      Peanuts [Nuts]      Penicillins      Pollen Extract        Code Status: Full Code  Activity level - Baseline/Home:  Assist X 2. Activity Level - Current:   Assist X 2. Lift room needed: No. Bariatric: No   Needed: No   Isolation: No. Infection: Not Applicable.     Vital Signs:   Vitals:    10/26/19 1945 10/26/19 2000 10/26/19 2015 10/26/19 2030   BP: 118/56 (!) 141/56 (!) 141/63 131/55   Pulse: 88 85 84 85   Resp:       Temp:       TempSrc:       SpO2: 90% (!) 89% 90% 91%       Cardiac Rhythm:  ,      Pain level:    Patient confused: Yes. Patient Falls Risk: Yes.   Elimination Status: urine sent   Patient Report - Initial Complaint: fall, confusion   Focused Assessment:Tracey Moran is a 89 year old female, with a history of hypertension and dementia, who presents to the ED via EMS for evaluation following a fall. The patient had an unwitnessed fall on the ground of her assisted living facility. Staff suspect that she was trying to ambulate on her own and get out of bed. The patient complains of right elbow following the fall. She denies any back pain, neck pain, hip pain, or other pain aside from her elbow. EMS states that her blood pressure was 217/104, her blood sugar was 106, and her SPO2 was 93/95 % en route to the ED. History limited secondary to dementia.      Tests Performed: labs, ct, xray, urine  Abnormal Results:   XR Forearm Bilateral 2 Views   Final Result   IMPRESSION:       Right forearm: Demineralization. Negative for fracture ; degenerative change of the CMC joints.   Left forearm: Demineralization. Negative for fracture      XR Pelvis 1/2  Views   Final Result   IMPRESSION: Negative pelvis. No fracture or dislocation.      CT Head w/o Contrast   Final Result   IMPRESSION:   1.  Senescent and chronic cerebral small vessel ischemic changes. No acute intracranial process.      Chest XR,  PA & LAT    (Results Pending)     Labs Ordered and Resulted from Time of ED Arrival Up to the Time of Departure from the ED   CBC WITH PLATELETS DIFFERENTIAL - Abnormal; Notable for the following components:       Result Value    RBC Count 3.35 (*)     Hemoglobin 10.9 (*)     Hematocrit 33.8 (*)      (*)     Absolute Neutrophil 9.0 (*)     All other components within normal limits   ROUTINE UA WITH MICROSCOPIC - Abnormal; Notable for the following components:    Ketones Urine Trace (*)     Blood Urine Moderate (*)     Protein Albumin Urine 50 (*)     Nitrite Urine Positive (*)     Leukocyte Esterase Urine Large (*)     WBC Urine >182 (*)     RBC Urine 42 (*)     Bacteria Urine Moderate (*)     Squamous Epithelial /HPF Urine 2 (*)     Mucous Urine Present (*)     All other components within normal limits   COMPREHENSIVE METABOLIC PANEL   LACTIC ACID WHOLE BLOOD   TSH WITH FREE T4 REFLEX   PERIPHERAL IV CATHETER   ISTAT TROPONIN NURSING POCT   TROPONIN POCT   URINE CULTURE AEROBIC BACTERIAL   BLOOD CULTURE   BLOOD CULTURE     Treatments provided: see mar.  Family Comments:visitor at bedside.  OBS brochure/video discussed/provided to patient:  N/A  ED Medications:   Medications   lidocaine 1 % 0.1-1 mL (has no administration in time range)   lidocaine (LMX4) cream (has no administration in time range)   sodium chloride (PF) 0.9% PF flush 3 mL (has no administration in time range)   sodium chloride (PF) 0.9% PF flush 3 mL ( Intracatheter Canceled Entry 10/26/19 2043)   labetalol (NORMODYNE/TRANDATE) syringe 20 mg (has no administration in time range)   labetalol (NORMODYNE/TRANDATE) syringe 10 mg (10 mg Intravenous Given 10/26/19 1943)   cefTRIAXone (ROCEPHIN) 1 g  vial to attach to  mL bag for ADULTS or NS 50 mL bag for PEDS (1 g Intravenous New Bag 10/26/19 2038)     Drips infusing:  No  For the majority of the shift, the patient's behavior Green. Interventions performed were na.     Severe Sepsis OR Septic Shock Diagnosis Present: No      ED Nurse Name/Phone Number: Kasie Jean Baptiste RN,   9:19 PM    RECEIVING UNIT ED HANDOFF REVIEW    Above ED Nurse Handoff Report was reviewed: Yes  Reviewed by: Annamaria Kelly RN on October 26, 2019 at 11:14 PM

## 2019-10-27 NOTE — H&P
"[facility]  History and Physical  Hospitalist       Date of Admission:  10/26/2019    Assessment & Plan   Tracey \"June\" Dean is a 89 year old female with hypertension, dyslipidemia, frequent falls, moderate cognitive impairment, and history of pansensitive EColi UTIs who was brought in to [facility] ED by son with increasing confusion and falls and is found to have a BP to 223/107 and a UTI.    UTI   UA quite abnormal: nitrite +, mod blood, large LE.  In 2018, had two separate UTIs that both grew pansensitive EColi.  Ceftriaxone empirically started in the ED, will continue.  Follow UCx and tailor Abx as appropriate.  IVF at 75 mL/hr x 10 hours.  Trend fever curve.        Toxic encephalopathy with falls complicating underlying moderate cognitive impairment  Normally oriented to self and family.  Confusion much worse today, felt related to infectious process.  CT Head negative for infarct or bleed.  Other x-rays negative for fracture or acute process.  Anticipate will clear over the next few days.  NPO (except for meds and ice chips) until confusion improving and passes a nursing bedside dysphagia screen, at which point can start clears and ADAT to low salt diet.  PT and SW consulted.  Discussed with son that patient is at risk for acute delirium however he became defensive and stated he \"absolutely does not want any [delirium] medications given unless [he] is called first\".  He also states it has \"never been a problem before\" and I \"shouldn't be making up things that aren't likely to happen.\"      Accelerated hypertension / Hypertensive urgency  BP very high on presentation, 223/107 with .  Improving with labetalol 10 mg.  Resume PTA lisinopril 20 mg BID and Toprol XL 25 mg daily.  By report, patient often worried about BP being too low as she has frequent \"dizziness\" and so she watches her BP closely.  PRN Hydralazine and labetalol available.  Tele.      Arthritis / Musculoskeletal pain  Continue PTA " "acetaminophen and diclofenac gel.     DVT Prophylaxis: Pneumatic Compression Devices  Code Status: Full Code    Disposition: Expected discharge in 3-4 days pending improvement in BP, mental status, tolerance of oral antibiotics, and safe dispo plan.     TOTAL TIME SPENT:  > 45 minutes spent on floor time including chart review, consultation and coordination of care.    Deepika Talbot PA-C    Primary Care Physician   Guilherme Graves    Chief Complaint   Falls, confusion    History is obtained from the patient's son, [facility] records, and Epic    History of Present Illness   Tracey \"June\" Dean is a 89 year old female with hypertension, dyslipidemia, frequent falls, mild cognitive impairment, and history of pansensitive EColi UTIs who was brought in to [facility] ED by son with increasing confusion and falls.  Patient has a history of dizziness, felt possibly related to hypotension, however, she was falling much more today and was quite confused.  Son brought her in from [facility] for evaluation.    In the ED, /107 with .  TMax 98.1.  RR 19 with SpO2 96% on RA.  ENT within normal limits including normal electrolytes and a creatinine of 0.78.  Lactic acid 1.1.  TSH 1.64.  Initial troponin undetectable.  CBC showed WBC 10.7, hemoglobin 10.9, platelet 221, and no left shift.  UA was quite dirty with positive nitrite, moderate blood, large LE, greater than 182 WBC, moderate bacteria.  Urine culture pending.  Blood cultures drawn and pending. EKG showed a tight sinus tachycardia with PVCs.  CT Head negative for acute process.  Pelvic XR negative.  CXR performed, formal read pending.  Bilateral forearm XR negative for acute process.  Patient is quite confused in the ER.  Hypertension improved with IV labetalol.  Admission requested to inpatient for the above complaints.    Patient seen in the ED where she is delightful but very confused.  Confusion much worse today but it sounds like her baseline cognition has been worsening " over time including increasing difficulty with word finding over the last 6 months or so.  Complete review of systems was attempted however could not be completed due to patient's mental state.    Past Medical History    I have reviewed this patient's medical history and updated it with pertinent information if needed.   Past Medical History:   Diagnosis Date     Hypertension      CKD (noted in chart but creatinine normally less than 1 with GFR > 60)    Dyslipidemia    Frequent falls    Mild-to-moderate cognitive impairment.  OT evaluation 1/2019 noted moderate functional decline.  24 hour supervision and delegation of complex tasks to family membrs recommended at that time.    History of UTIs.  According to Epic, urine cultures from 2/2018 and 11/2018 both grew pansensitive EColi.     Frequent dizziness     Past Surgical History   I have reviewed this patient's surgical history and updated it with pertinent information if needed.  Past Surgical History:   Procedure Laterality Date     ESOPHAGOSCOPY, GASTROSCOPY, DUODENOSCOPY (EGD), COMBINED N/A 12/6/2017    Procedure: COMBINED ESOPHAGOSCOPY, GASTROSCOPY, DUODENOSCOPY (EGD);  gastroscopy;  Surgeon: Melchor Mancera MD;  Location:  GI       Prior to Admission Medications   Prior to Admission Medications   Prescriptions Last Dose Informant Patient Reported? Taking?   acetaminophen (TYLENOL) 500 MG tablet   Yes No   Sig: Take 500 mg by mouth 3 times daily   diclofenac (VOLTAREN) 1 % topical gel   Yes No   Sig: Place 2 g onto the skin 3 times daily For right knee pain   lisinopril (PRINIVIL/ZESTRIL) 20 MG tablet   No No   Sig: TAKE ONE TABLET BY MOUTH TWICE DAILY. HOLD IF SYSTOLIC BLOOD PRESSURE IS LESS THAN 110 MMHG. CALL IF HELD TWICE IN A ROW OR IF SYMPTOMATIC.   metoprolol succinate ER (TOPROL-XL) 25 MG 24 hr tablet   Yes No   Sig: Take 25 mg by mouth daily Give 1 tablet by mouth one time a day(*GIVE AT DINNERTIME*) for HTN HOLD if SBP < 110 or < 55 and call MD/NP  if held x 2 in a row or symptoms   multivitamin, therapeutic with minerals (THERA-VIT-M) TABS tablet   Yes No   Sig: Take 1 tablet by mouth daily   polyethylene glycol (MIRALAX/GLYCOLAX) packet   Yes No   Sig: Take 17 g by mouth daily as needed    senna-docusate (SENOKOT-S/PERICOLACE) 8.6-50 MG tablet   No No   Sig: Take 1 tablet by mouth 2 times daily as needed for constipation      Facility-Administered Medications: None     Allergies   Allergies   Allergen Reactions     Demerol [Meperidine]      Dust Mites      Peanuts [Nuts]      Penicillins      Pollen Extract        Social History   Lives in assisted living.  Son active in her care.  Nonsmoker.  No alcohol.  Needs help with most ADLs, whether just helping with cues or actually helping with tasks.      Family History   I have reviewed this patient's family history and updated it with pertinent information if needed.   Mother  of tuberculosis in her 40s.  Father also  young, reportedly of heat stroke.  Patient's grandmother lived into her late 90s as did one of the patient's sisters.    Review of Systems   14 point comprehensive ROS attempted but could not be completed due to patient's mental state.    Physical Exam   Temp: 98.1  F (36.7  C) Temp src: Oral BP: 131/55 Pulse: 85 Heart Rate: 103 Resp: 19 SpO2: 91 % O2 Device: None (Room air)    Vital Signs with Ranges  Temp:  [98.1  F (36.7  C)] 98.1  F (36.7  C)  Pulse:  [] 85  Heart Rate:  [103] 103  Resp:  [19] 19  BP: (118-226)/() 131/55  SpO2:  [89 %-97 %] 91 %  0 lbs 0 oz    GENERAL:  Pleasant, cooperative, confused.  Frail 89-year-old lady who appears her stated age.  HEENT: Left eyelid redness and a bit of swelling however extra occular mm grossly intact intact.  Full exam could not be completed due to patient's encephalopathy.  Sclera anicteric. PER.  PULMONARY: Clear to auscultation bilaterally    CARDIAC: Regular rate and rhythm.  Perhaps a soft murmur.  ABDOMEN: Soft, nontender non  distended.    MUSCULOSKELETAL:  Moving x 4 spontaneously.  Right forearm with clean, dry dressing. Bulk and tone as expected for age.   Trace pedal edema.  NEURO: Alert, oriented to self only.  CN II-XII grossly intact and symmetric.  Quite confused.  Repetitive and tangential.  Nonsensical.  SKIN:  Warm, pink, dry.      Data   Data reviewed today:  I personally reviewed the EKG tracing showing sinus tach and the chest x-ray image(s) showing a difficult to read AP view however the lateral view, on my read, shows no effusion, consolidation, or infiltrate.  It does show a severe > 90 degree kyphosis and a calcified aortic arch..    Recent Labs   Lab 10/26/19  1733 10/26/19  1728   WBC  --  10.7   HGB  --  10.9*   MCV  --  101*   PLT  --  221   NA  --  139   POTASSIUM  --  3.9   CHLORIDE  --  107   CO2  --  25   BUN  --  22   CR  --  0.78   ANIONGAP  --  7   RENETTA  --  8.8   GLC  --  97   ALBUMIN  --  3.5   PROTTOTAL  --  7.1   BILITOTAL  --  0.3   ALKPHOS  --  72   ALT  --  12   AST  --  19   TROPONIN 0.00  --        Recent Results (from the past 24 hour(s))   CT Head w/o Contrast    Narrative    EXAM: CT HEAD W/O CONTRAST  LOCATION: [facility]  DATE/TIME: 10/26/2019 6:00 PM    INDICATION: Fall, hypertension.  COMPARISON: 11/16/2018 head CT.  TECHNIQUE: Routine without IV contrast. Multiplanar reformats. Dose reduction techniques were used.    FINDINGS:  INTRACRANIAL CONTENTS: No evidence for intracranial hemorrhage. Unchanged prominence of the extra-axial space overlying the frontal convexity related to brain parenchymal volume loss. No CT evidence of acute infarct. Mild presumed chronic small vessel   ischemic changes. Moderate generalized volume loss. No hydrocephalus. Empty sella.    VISUALIZED ORBITS/SINUSES/MASTOIDS: No intraorbital abnormality. No paranasal sinus mucosal disease. There is opacification of the right mastoid air cells. No obstructing mass in the right nasopharynx. No evidence for  mastoid fracture.    BONES/SOFT TISSUES: No acute abnormality.      Impression    IMPRESSION:  1.  Senescent and chronic cerebral small vessel ischemic changes. No acute intracranial process.   XR Pelvis 1/2 Views    Narrative    EXAM: XR PELVIS 1/2 VW  LOCATION: [facility]  DATE/TIME: 10/26/2019 6:11 PM    INDICATION: Fall and pain  COMPARISON: None.      Impression    IMPRESSION: Negative pelvis. No fracture or dislocation.   XR Forearm Bilateral 2 Views    Narrative    EXAM: XR FOREARM BILATERAL 2 VW  LOCATION: [facility]  DATE/TIME: 10/26/2019 6:12 PM    INDICATION: Injury to the forearms after a fall pain  COMPARISON: None.      Impression    IMPRESSION:     Right forearm: Demineralization. Negative for fracture ; degenerative change of the CMC joints.  Left forearm: Demineralization. Negative for fracture

## 2019-10-28 LAB
BACTERIA SPEC CULT: ABNORMAL
INTERPRETATION ECG - MUSE: NORMAL
SPECIMEN SOURCE: ABNORMAL

## 2019-10-28 PROCEDURE — 25000128 H RX IP 250 OP 636: Performed by: PHYSICIAN ASSISTANT

## 2019-10-28 PROCEDURE — 12000000 ZZH R&B MED SURG/OB

## 2019-10-28 PROCEDURE — 25800030 ZZH RX IP 258 OP 636: Performed by: INTERNAL MEDICINE

## 2019-10-28 PROCEDURE — 99232 SBSQ HOSP IP/OBS MODERATE 35: CPT | Performed by: INTERNAL MEDICINE

## 2019-10-28 PROCEDURE — 25000132 ZZH RX MED GY IP 250 OP 250 PS 637: Performed by: PHYSICIAN ASSISTANT

## 2019-10-28 PROCEDURE — 25000132 ZZH RX MED GY IP 250 OP 250 PS 637: Performed by: INTERNAL MEDICINE

## 2019-10-28 RX ORDER — SODIUM CHLORIDE 9 MG/ML
INJECTION, SOLUTION INTRAVENOUS CONTINUOUS
Status: ACTIVE | OUTPATIENT
Start: 2019-10-28 | End: 2019-10-29

## 2019-10-28 RX ADMIN — ACETAMINOPHEN 500 MG: 500 TABLET, FILM COATED ORAL at 16:40

## 2019-10-28 RX ADMIN — SODIUM CHLORIDE: 9 INJECTION, SOLUTION INTRAVENOUS at 16:40

## 2019-10-28 RX ADMIN — ACETAMINOPHEN 500 MG: 500 TABLET, FILM COATED ORAL at 09:38

## 2019-10-28 RX ADMIN — CEFTRIAXONE SODIUM 1 G: 1 INJECTION, POWDER, FOR SOLUTION INTRAMUSCULAR; INTRAVENOUS at 20:49

## 2019-10-28 RX ADMIN — ACETAMINOPHEN 500 MG: 500 TABLET, FILM COATED ORAL at 22:29

## 2019-10-28 RX ADMIN — LISINOPRIL 20 MG: 20 TABLET ORAL at 09:38

## 2019-10-28 RX ADMIN — DICLOFENAC SODIUM 2 G: 10 GEL TOPICAL at 09:20

## 2019-10-28 RX ADMIN — DICLOFENAC SODIUM 2 G: 10 GEL TOPICAL at 16:40

## 2019-10-28 RX ADMIN — DICLOFENAC SODIUM 2 G: 10 GEL TOPICAL at 22:29

## 2019-10-28 RX ADMIN — LISINOPRIL 20 MG: 20 TABLET ORAL at 20:53

## 2019-10-28 RX ADMIN — METOPROLOL SUCCINATE 25 MG: 25 TABLET, EXTENDED RELEASE ORAL at 20:53

## 2019-10-28 ASSESSMENT — ACTIVITIES OF DAILY LIVING (ADL)
ADLS_ACUITY_SCORE: 26
ADLS_ACUITY_SCORE: 27

## 2019-10-28 ASSESSMENT — MIFFLIN-ST. JEOR: SCORE: 920.7

## 2019-10-28 NOTE — PROGRESS NOTES
"  [facility]  Hospitalist Progress Note  Stef Lam MD 10/28/2019    Reason for Stay (Diagnosis): UTI/encephalopathy         Assessment and Plan:      Summary of Stay: Tracey Moran is a 89 year old female with hypertension, dyslipidemia, frequent falls, moderate cognitive impairment, and history of pansensitive EColi UTIs who was brought in to [facility] ED by son with increasing confusion and falls and is found to have a BP to 223/107 and a UTI.        Assessment/plan:     UTI   UA quite abnormal: nitrite +, mod blood, large LE.  and urine cx growing ecoli.  Continue IV ceftriaxone. mildly hypotensive today and will administer IVF overnight         Toxic encephalopathy with falls complicating underlying moderate cognitive impairment - improved  Normally oriented to self and family.  Confusion much worse on the day of admission and felt related to infectious process.  CT Head negative for infarct or bleed.  Other x-rays negative for fracture or acute process.  Anticipate that mental status will clear over the next few days. PT and SW consulted.  My colleague did discuss with patient's son that patient is at risk for acute delirium however he does not want any medications to treat delirium or agitation (if it were to develop) unless he is called first.       Accelerated hypertension / Hypertensive urgency  BP very high on presentation, 223/107 with .  Now stable to low. Resumed PTA lisinopril 20 mg BID and Toprol XL 25 mg daily.  By report, patient often worried about BP being too low as she has frequent \"dizziness\" and so she watches her BP closely.  PRN Hydralazine and labetalol available.  Tele.       Arthritis / Musculoskeletal pain  Continue PTA acetaminophen and diclofenac gel.           DVT Prophylaxis: Pneumatic Compression Devices  Code Status: Full Code  Discharge Dispo: To be determined  Estimated Disch Date / # of Days until Disch: In 1-2 days if mental status stable/improved     If " "pt returns to [facility] on discharge, orders should be faxed to [phone] and all new medications should be sent to [facility].         Interval History (Subjective):      No complaints.  Pleasantly confused.  Difficult to obtain accurate hx given dementia                  Physical Exam:      Last Vital Signs:  BP (!) 89/40 (BP Location: Left arm)   Pulse 82   Temp 96.6  F (35.9  C) (Oral)   Resp 20   Ht 1.575 m (5' 2\")   Wt 56.7 kg (125 lb 1.6 oz)   SpO2 94%   BMI 22.88 kg/m        Intake/Output Summary (Last 24 hours) at 10/28/2019 1516  Last data filed at 10/28/2019 1449  Gross per 24 hour   Intake 966 ml   Output 100 ml   Net 866 ml       Constitutional: Awake, alert, cooperative, no apparent distress.  Family entered room while I was visiting patient   Respiratory: Clear to auscultation bilaterally, no crackles or wheezing   Cardiovascular: Regular rate and rhythm, normal S1 and S2, and no murmur noted   Abdomen: Normal bowel sounds, soft, non-distended, non-tender   Skin: No rashes, no cyanosis, dry to touch   Neuro: Alert and awake, confused c/w dementia, no weakness, numbness, ++memory loss   Extremities: No edema, normal range of motion   Other(s):        All other systems: Negative          Medications:      All current medications were reviewed with changes reflected in problem list.         Data:      All new lab and imaging data was reviewed.   Labs:  Recent Labs   Lab 10/27/19  0636   WBC 11.9*   HGB 10.1*   HCT 31.2*            Imaging:   No results found for this or any previous visit (from the past 24 hour(s)).   "

## 2019-10-28 NOTE — CONSULTS
CLINICAL NUTRITION SERVICES  -  ASSESSMENT NOTE      MALNUTRITION:  % Weight Loss: Unable to determine --> potential 7% loss in <4 months, though family feel admit wt inaccurate  % Intake: Family deny consistent decrease in PO intakes PTA  Subcutaneous Fat Loss:  Orbital region moderate depletion and Upper arm region moderate depletion  Muscle Loss:  Temporal region moderate depletion, Clavicle bone region moderate to severe depletion, Acromion bone region moderate to severe depletion, Dorsal hand region moderate depletion, Patellar region moderate depletion, Anterior thigh region moderate depletion and Posterior calf region moderate depletion  Fluid Retention:  None noted    Malnutrition Diagnosis: Non-Severe malnutrition  In Context of:  Chronic illness or disease        REASON FOR ASSESSMENT  Tracey Moran is a 89 year old female seen by Registered Dietitian for Admission Nutrition Risk Screen for unintentional loss of 10# or more in the past two months.      NUTRITION HISTORY  - Information obtained from son and daughter-in-law in room, patient herself is a poor historian.    - Patient with a h/o dementia, UTIs.  Admit from [facility] d/t increase in confusion, fall, found to have UTI.    - Noted patient mostly wheelchair bound.    - Meals offered by facility TID, as expected.  - Family describe patient is not a big eater, though will eat parts of each meal.  This has been her baseline for some time.  They have not noted any recent changes in PO intakes, though report they were unsure of the days leading up to admit, feel she may have been drinking less than usual during this time.    - Family deny swallowing issues, patient is offered soft solids d/t missing upper dentures.  She likes yogurt, most cut up or well-cooked meats, eggs, soft/not stringy fruits and vegetables.   - She does not consistently take oral supplements at home.  Family sometimes offer Boost.    - Peanuts/nut allergy interfaced with meal ordering  "system.      CURRENT NUTRITION ORDERS  Diet Order:     2 gram Na  Standard trays    Current Intake/Tolerance:  100% intakes since admit.  Had lunch tray in room, daughter-in-law was providing feeding assistance.      NUTRITION FOCUSED PHYSICAL ASSESSMENT FOR DIAGNOSING MALNUTRITION)  Completed:  Yes Full assessment         Observed:    Muscle wasting (refer to documentation in Malnutrition section) and Subcutaneous fat loss (refer to documentation in Malnutrition section)    Obtained from Chart/Interdisciplinary Team:  Anayeli    ANTHROPOMETRICS  Height: 5' 2\"  Weight: 123#  Body mass index is 22.53 kg/m .  Weight Status:  Normal BMI  IBW: 110#  Weight History:  Wt Readings from Last 10 Encounters:   10/27/19 55.9 kg (123 lb 3.2 oz)   07/08/19 58.5 kg (129 lb)   06/18/19 59.9 kg (132 lb)   06/13/19 59.9 kg (132 lb)   06/05/19 61.1 kg (134 lb 12.8 oz)   05/27/19 60.3 kg (133 lb)   05/20/19 63.6 kg (140 lb 4.8 oz)   02/25/19 57.6 kg (126 lb 14.4 oz)   12/20/18 55.3 kg (122 lb)   12/10/18 54.7 kg (120 lb 9.6 oz)   - No wt trending available in care everywhere.  - Family report UBW is around 127-130#.  Family do not believe that admit wt of 123# is accurate and would like a reweigh.     LABS  Labs reviewed    MEDICATIONS  Medications reviewed      ASSESSED NUTRITION NEEDS PER APPROVED PRACTICE GUIDELINES:    Dosing Weight 58.5 - reported UBW with concerns of inaccurate admit wt  Estimated Energy Needs: 4354-6205 kcals (25-30 Kcal/Kg)  Justification: maintenance  Estimated Protein Needs: 70-88 grams protein (1.2-1.5 g pro/Kg)  Justification: preservation of lean body mass, repletion  Estimated Fluid Needs: per MD    MALNUTRITION:  % Weight Loss: Unable to determine --> potential 7% loss in <4 months, though family feel admit wt inaccurate  % Intake: Family deny consistent decrease in PO intakes PTA  Subcutaneous Fat Loss:  Orbital region moderate depletion and Upper arm region moderate depletion  Muscle Loss:  Temporal " region moderate depletion, Clavicle bone region moderate to severe depletion, Acromion bone region moderate to severe depletion, Dorsal hand region moderate depletion, Patellar region moderate depletion, Anterior thigh region moderate depletion and Posterior calf region moderate depletion  Fluid Retention:  None noted    Malnutrition Diagnosis: Non-Severe malnutrition  In Context of:  Chronic illness or disease    NUTRITION DIAGNOSIS:  Predicted suboptimal nutrient intake related to potential for decline in PO intakes with hospital admit, UTI, change in environment, chronically meets malnutrition criteria based on NFPE.      NUTRITION INTERVENTIONS  Recommendations / Nutrition Prescription  Liberalize diet to regular (no justification for 2 gram Na, entered as per staff rec).     Vanilla Boost 2 pm.    Change to room service with assist, put note in meal ordering system with soft preferences, Nutrition Associate to put in standard trays based on preferences.    Reweigh per family request      Implementation  Nutrition education: Provided education on above with family in room.    Medical Food Supplement: As above.    Collaboration and Referral of Nutrition care: Discussed POC with team during rounds and with RN.        Nutrition Goals  Patient to consume at least 75% of meals or supplements TID.      MONITORING AND EVALUATION:  Progress towards goals will be monitored and evaluated per protocol and Practice Guidelines            Katarina Woods RD, LD  Clinical Dietitian  3rd floor/ICU: [phone]  All other floors: [phone]  Weekend/holiday: [phone]

## 2019-10-28 NOTE — PROGRESS NOTES
"               [facility]  Hospitalist Progress Note  Name: Tracey Moran    MRN: [id number]  YOB: 1925    Age: 89 year old  Date of admission: 10/26/2019  Primary care provider: Guilherme Graves      Reason for Stay (Diagnosis): Acute encephalopathy/UTI         Assessment and Plan:      Summary of Stay:  Tracey \"June\" Dean is a 89 year old female with hypertension, dyslipidemia, frequent falls, moderate cognitive impairment, and history of pansensitive EColi UTIs who was brought in to [facility] ED by son with increasing confusion and falls and is found to have a BP to 223/107 and a UTI.       Assessment/plan:    UTI   UA quite abnormal: nitrite +, mod blood, large LE.  In 2018, had two separate UTIs that both grew pansensitive EColi.  Continue IV ceftriaxone. Follow UCx and tailor Abx as appropriate.  IVF at 75 mL/hr x 10 hours.  Trend fever curve.         Toxic encephalopathy with falls complicating underlying moderate cognitive impairment  Normally oriented to self and family.  Confusion much worse on the day of admission and felt related to infectious process.  CT Head negative for infarct or bleed.  Other x-rays negative for fracture or acute process.  Anticipate that mental status will clear over the next few days. PT and SW consulted.  My colleague did discuss with patient's son that patient is at risk for acute delirium however he became defensive and stated \"absolutely does not want any [delirium] medications given unless [he] is called first\".  He also states it has \"never been a problem before\" and we \"shouldn't be making up things that aren't likely to happen.\"       Accelerated hypertension / Hypertensive urgency  BP very high on presentation, 223/107 with .  Now stable. Resume PTA lisinopril 20 mg BID and Toprol XL 25 mg daily.  By report, patient often worried about BP being too low as she has frequent \"dizziness\" and so she watches her BP closely.  PRN Hydralazine and " "labetalol available.  Tele.       Arthritis / Musculoskeletal pain  Continue PTA acetaminophen and diclofenac gel.           DVT Prophylaxis: Pneumatic Compression Devices  Code Status: Full Code  Discharge Dispo: To be determined  Estimated Disch Date / # of Days until Disch: In 2 to 3 days if mental status is improved    If pt returns to [facility] on discharge, orders should be faxed to [phone] and all new medications should be sent to [facility].     Time spent: Greater than 35 minutes      Interval History (Subjective):      Limited historian secondary to somnolence and delirium         Physical Exam:      Vital signs:  Temp: 95.8  F (35.4  C) Temp src: Axillary BP: 116/44 Pulse: 84 Heart Rate: 79 Resp: 28 SpO2: 94 % O2 Device: None (Room air)   Height: 157.5 cm (5' 2\") Weight: 55.9 kg (123 lb 3.2 oz)  Estimated body mass index is 22.53 kg/m  as calculated from the following:    Height as of this encounter: 1.575 m (5' 2\").    Weight as of this encounter: 55.9 kg (123 lb 3.2 oz).    I/O last 3 completed shifts:  In: 971 [P.O.:310; I.V.:661]  Out: -   Vitals:    10/27/19 0008   Weight: 55.9 kg (123 lb 3.2 oz)       Constitutional:  Somnolent but moderately confused and arousable to verbal stimuli.  Otherwise, cooperative, no apparent distress     Respiratory: Nl work of breathing. Clear to auscultation bilaterally, no crackles or wheezing   Cardiovascular: Regular rate and rhythm, normal S1 and S2, and no murmur noted   Abdomen: Normal bowel sounds, soft, non-distended, non-tender   Skin: No rashes, no cyanosis, dry to touch   Neuro: CN 2-12 intact, generalized weakness   Extremities: No edema, normal range of motion   HEENT Normocephalic, atraumatic, normal nasal turbinates; oropharynx clear   Neck Supple; nl inspection; trachea midline; no thryomegaly   Psychiatric:  Somnolent.  Normal affect          Medications:      All current medications were reviewed with changes reflected in problem list.         Data: "      All new lab and imaging data was reviewed.   Labs:  Recent Labs   Lab 10/27/19  0636 10/26/19  1728   WBC 11.9* 10.7   HGB 10.1* 10.9*   HCT 31.2* 33.8*    101*    221     Recent Labs   Lab 10/27/19  0636 10/26/19  1728    139   POTASSIUM 3.4 3.9   CHLORIDE 107 107   CO2 25 25   ANIONGAP 7 7   * 97   BUN 20 22   CR 0.81 0.78   GFRESTIMATED 62 65   GFRESTBLACK 72 75   RENETTA 8.6 8.8   PROTTOTAL  --  7.1   ALBUMIN  --  3.5   BILITOTAL  --  0.3   ALKPHOS  --  72   AST  --  19   ALT  --  12      Imaging:   No results found for this or any previous visit (from the past 24 hour(s)).    Carlene Schmitz [phone]

## 2019-10-28 NOTE — PROGRESS NOTES
"SPIRITUAL HEALTH SERVICES Progress Note  [facility] Med. Surg. 5    Pt Sharee called this author into her room to ask when she was leaving.  She narrative was limited and she presented as being pleasantly confused.  Sharee reported that she has three sons and two daughters, adding that \"all my children are Roman Catholic.\"  She stated that she is Christianity and welcomed prayer.  Attempted light-hearted conversation by asking about her childhood, but Sharee pleasantly laughed rather than responded to a couple of this author's inquiries.  Offered to ask her nurse about when she will discharge, which she welcomed (her nurse arrived just as this author left her room).    No further plans; this author and other chaplains remain available per pt/family request.    Holden Villegas M.Div., [facility]  Staff   Pager [phone]    "

## 2019-10-28 NOTE — PLAN OF CARE
Pt remains hospitalized for UTI   On RA, denies any pain   Up with assist x 2 with walker, gait belt  [place] of B&B x 1 this shift  Repositioning q 2 hrs   PIV saline locked continues IV Rocephin

## 2019-10-28 NOTE — PLAN OF CARE
Patient hospitalized for fall and UTI, disoriented to time and situation, neuro intact, Ax2 with walker and gait belt, VSS, afebrile, on voltaren gel and Tylenol for right knee pain, sats RA, LS diminished. +BS, LMB 10/27/19, incontinency of bowel and bladder. Tolerating law sodium diet, needs set up for meals. On Rocephin. PT & SW consulted. Disposition TBD.

## 2019-10-28 NOTE — PLAN OF CARE
Max temp 99.1 , patient more alert and calm but still confused which is her baseline with dementia, up with Ax1 and walker with belt, FR/A for safety, some stool and urine incontinence, urine dark orange and cldy, no c/o pain, skin tear to right arm redressed, appetite improving needs set up and some supervision, buttocks red and excoriated barrier applied, bilat HA's in place

## 2019-10-28 NOTE — CONSULTS
Care Transition Initial Assessment - SW     Met with: Patient and Family  - spoke with son Jonny  Active Problems:    UTI (urinary tract infection)       DATA  Lives With: facility resident   Living Arrangements: assisted living- Lives in the Care suites   Quality of Family Relationships: helpful, involved  Description of Support System: Supportive, Involved  Who is your support system?: Children, Facility resident(s)/Staff  Support Assessment: Adequate family and caregiver support, Adequate social supports. PT lives in the care suites, has support for AM/PM care, meds, meals. Pt has w/c support but also able to use walker. Family assist with transport and medical apps   Identified issues/concerns regarding health management: Admitted for fall with UTI   @   Resources List: Assisted Living, [facility] [phone] Fax [phone]  Home Care- has had [facility] in the past .Would like to use them again if recommended      Quality of Family Relationships: helpful, involved     ASSESSMENT  Cognitive Status:  Awake, pleasant, confused. Dementia   Concerns to be addressed: Spoke with her son Abdiel. They would like pt to d.c to her [facility] support at the time of d.c. they moved pt here so she would not have the need for TCU.  Pt does not have HCD on file. SW asked son about this. He believes [facility] has copy. As they moved pt he is Unsure where his document is located at this time  Called yaz at [facility] to ask for her to share HCD should they have one on file  .     PLAN  Son would plan to transport. Family would  pt's own WC and dive her home  Will follow for possible home care PT support pending progress.

## 2019-10-29 ENCOUNTER — AMBULATORY - HEALTHEAST (OUTPATIENT)
Dept: OTHER | Facility: CLINIC | Age: 84
End: 2019-10-29

## 2019-10-29 ENCOUNTER — DOCUMENTATION ONLY (OUTPATIENT)
Dept: OTHER | Facility: CLINIC | Age: 84
End: 2019-10-29

## 2019-10-29 VITALS
SYSTOLIC BLOOD PRESSURE: 154 MMHG | WEIGHT: 125.1 LBS | BODY MASS INDEX: 23.02 KG/M2 | DIASTOLIC BLOOD PRESSURE: 47 MMHG | TEMPERATURE: 95.5 F | HEIGHT: 62 IN | OXYGEN SATURATION: 95 % | HEART RATE: 77 BPM | RESPIRATION RATE: 20 BRPM

## 2019-10-29 PROCEDURE — 25000132 ZZH RX MED GY IP 250 OP 250 PS 637: Performed by: PHYSICIAN ASSISTANT

## 2019-10-29 PROCEDURE — 99238 HOSP IP/OBS DSCHRG MGMT 30/<: CPT | Performed by: INTERNAL MEDICINE

## 2019-10-29 PROCEDURE — 25000132 ZZH RX MED GY IP 250 OP 250 PS 637: Performed by: INTERNAL MEDICINE

## 2019-10-29 RX ORDER — CIPROFLOXACIN 500 MG/1
500 TABLET, FILM COATED ORAL 2 TIMES DAILY
Qty: 10 TABLET | Refills: 0 | Status: SHIPPED | OUTPATIENT
Start: 2019-10-29 | End: 2020-03-11

## 2019-10-29 RX ADMIN — ACETAMINOPHEN 500 MG: 500 TABLET, FILM COATED ORAL at 10:06

## 2019-10-29 RX ADMIN — Medication 2.5 MG: at 02:43

## 2019-10-29 RX ADMIN — LISINOPRIL 20 MG: 20 TABLET ORAL at 10:06

## 2019-10-29 RX ADMIN — DICLOFENAC SODIUM 2 G: 10 GEL TOPICAL at 10:06

## 2019-10-29 ASSESSMENT — ACTIVITIES OF DAILY LIVING (ADL)
ADLS_ACUITY_SCORE: 27

## 2019-10-29 NOTE — PLAN OF CARE
Pt is discharging back to [facility]. Transportation per family. Discharge instructions and medications provided. Packet sent w/ family.

## 2019-10-29 NOTE — DISCHARGE SUMMARY
Admit Date:     10/26/2019   Discharge Date:     10/29/2019      PRINCIPAL FINAL DIAGNOSES:   1.  Urinary tract infection.   2.  Infectious encephalopathy secondary to urinary tract infection.   3.  Hypertensive urgency on presentation.   4.  History of dementia.   5.  Dyslipidemia.   6.  Hypertension history.      PRINCIPAL PROCEDURES THIS ADMISSION:   1.  IV antibiotic therapy.   2.  Chest x-ray.   3.  Pelvic x-ray.   4.  Forearm x-ray.   5.  Head CT scan.   6.  Urine culture is growing out greater than 100,000 colonies of Klebsiella pneumoniae.   7.  Blood cultures have remained without growth.      REASON FOR ADMISSION:  Please see dictated history and physical.  In brief, Ms. Moran is a 89-year-old female with the above medical history who presented to the hospital for concerns about increasing confusion and falling.  She was found to be very hypertensive on presentation with systolic blood pressure of 225.  She also had pyuria on urinalysis.      HOSPITAL COURSE:  Urinary tract infection:  The patient was treated with IV Rocephin while hospitalized.  She responded well to antibiotic therapy.  Urine cultures came back growing Klebsiella pneumoniae that was nearly sensitive to all antibiotics tested.  Notably, it was sensitive to cephalosporins and sensitive to fluoroquinolones.  She is being discharged on oral ciprofloxacin for 7 more days to complete therapy for UTI.  On presentation, she had some altered mental status cleared by time of discharge, and by day of discharge, the family reported that she was near her normal baseline.      DISCHARGE MEDICATIONS:   1.  Acetaminophen 500 mg 3 times a day.   2.  Ciprofloxacin 500 mg twice a day for 5 more days.   3.  Voltaren gel as needed for right knee pain.   4.  Lisinopril 20 mg twice a day.   5.  Metoprolol extended release 25 mg daily.   6.  MiraLax 17 grams daily as needed for constipation.   7.  Senokot 1 tablet twice a day as needed for constipation.       FOLLOWUP INSTRUCTIONS:   1.  Follow up with primary MD as needed.   2.  Do not take vitamins supplement while taking ciprofloxacin as this can interfere with absorption of the medication.  After completing your prescription for ciprofloxacin, then you can resume your vitamin supplement.      I saw and examined the patient on day of discharge.         PHILLIP GUAN MD             D: 10/29/2019   T: 10/29/2019   MT: PRAVEENA      Name:     LIV KINGSTON   MRN:      [id number]        Account:        [id number]   :      1925           Admit Date:     10/26/2019                                  Discharge Date: 10/29/2019      Document: [id number]

## 2019-10-29 NOTE — PLAN OF CARE
"Patient hospitalized for UTI on 10/26. IV rocephin.   Pertinent assessments: Alert and oriented to self. Bowel sounds active and audible, LBM 10/29, incontinent of bowel at times. External catheter in place, incontinent of bladder at times. Tolerating regular diet, needs to be watched. Up assist of 1, gait belt, and walker.  BP (!) 142/51 (BP Location: Left arm)   Pulse 77   Temp 95.5  F (35.3  C) (Oral)   Resp 20   Ht 1.575 m (5' 2\")   Wt 56.7 kg (125 lb 1.6 oz)   SpO2 95%   BMI 22.88 kg/m       Discharge/Transfer Needs: patient comes from [facility].     "

## 2019-10-29 NOTE — PROGRESS NOTES
Discharge Planner   Discharge Plans in progress: Spoke with Neva Black at the [facility]. She is aware pt will return home today.. HCD obtained and will be placed on chart. Called Son Jonny   Barriers to discharge plan: none. Left message for family to call 5th floor regarding transport time   Follow up plan: [facility]axed [facility] to [facility] [phone] Fax [phone]       Entered by: Corinne C. White 10/29/2019 12:25 PM

## 2019-10-29 NOTE — PLAN OF CARE
Physical Therapy Discharge Summary    Reason for therapy discharge:    Discharged to home.    Progress towards therapy goal(s). See goals on Care Plan in [facility] electronic health record for goal details.  Goals not met.  Barriers to achieving goals:   discharge from facility.    Therapy recommendation(s):    Continued therapy is recommended.  Rationale/Recommendations:  to maximize functional mob I, merari and safety to return to PLOF.

## 2019-10-29 NOTE — PROGRESS NOTES
Pt seen and examined.  dtr in law at bedside as well.  Pt has been up ambulating and mentation much improved compared to admit.  dtr in law feels that pt very near her normal baseline and agreeable to discharge today.      Discharge on PO cipro to complete UTI treatment course

## 2019-12-27 ENCOUNTER — TELEPHONE (OUTPATIENT)
Dept: INTERNAL MEDICINE | Facility: CLINIC | Age: 84
End: 2019-12-27

## 2019-12-27 NOTE — TELEPHONE ENCOUNTER
HC nurse called stating pt has a bruise on the top of left foot, HA  Thought pt had a broken foot (3rd toe) but foot may have circulation deficiency vs fx. Nurse was pushing on area and pt was not c/o pain or discomfort. Will monitor area and ice  Rebeccatoshia Aragon RN

## 2020-01-13 ENCOUNTER — TELEPHONE (OUTPATIENT)
Dept: INTERNAL MEDICINE | Facility: CLINIC | Age: 85
End: 2020-01-13

## 2020-02-28 ENCOUNTER — TELEPHONE (OUTPATIENT)
Dept: INTERNAL MEDICINE | Facility: CLINIC | Age: 85
End: 2020-02-28

## 2020-02-28 NOTE — TELEPHONE ENCOUNTER
"Rebecca KOLB with [facility] calling, [phone]      Patient has an as needed order for Criselda Barrier cream: PRN twice a day and nursing hoping to have this order can be changed to apply daily BID..    Patient noted to have dry peeling skin to buttock; nursing wanted to update MD of this as well.   Patient can be incontinence to urine and bowel at times, there fore at risk for excoriated skin integrity. Overall patient does not have any open areas. She is repositioned daily since she is  wheelchair bound.     PCP please update assisted living with \"letter\" if ok with changing barrier cream order.    Letter will need to be faxed: [phone]     Lida EWING, RN, PHN      "

## 2020-03-02 ENCOUNTER — MEDICAL CORRESPONDENCE (OUTPATIENT)
Dept: HEALTH INFORMATION MANAGEMENT | Facility: CLINIC | Age: 85
End: 2020-03-02

## 2020-03-02 NOTE — TELEPHONE ENCOUNTER
Order for Criselda Protect (EUCERIN) external cream printed, and faxed to [facility] home calling [phone] .

## 2020-03-11 ENCOUNTER — ANCILLARY PROCEDURE (OUTPATIENT)
Dept: GENERAL RADIOLOGY | Facility: CLINIC | Age: 85
End: 2020-03-11
Attending: INTERNAL MEDICINE
Payer: COMMERCIAL

## 2020-03-11 ENCOUNTER — RECORDS - HEALTHEAST (OUTPATIENT)
Dept: LAB | Facility: CLINIC | Age: 85
End: 2020-03-11

## 2020-03-11 ENCOUNTER — TRANSFERRED RECORDS (OUTPATIENT)
Dept: HEALTH INFORMATION MANAGEMENT | Facility: CLINIC | Age: 85
End: 2020-03-11

## 2020-03-11 ENCOUNTER — OFFICE VISIT (OUTPATIENT)
Dept: INTERNAL MEDICINE | Facility: CLINIC | Age: 85
End: 2020-03-11
Payer: COMMERCIAL

## 2020-03-11 VITALS
SYSTOLIC BLOOD PRESSURE: 148 MMHG | WEIGHT: 123.7 LBS | HEART RATE: 73 BPM | HEIGHT: 62 IN | RESPIRATION RATE: 15 BRPM | BODY MASS INDEX: 22.76 KG/M2 | TEMPERATURE: 97.9 F | DIASTOLIC BLOOD PRESSURE: 66 MMHG | OXYGEN SATURATION: 95 %

## 2020-03-11 DIAGNOSIS — D50.9 IRON DEFICIENCY ANEMIA, UNSPECIFIED IRON DEFICIENCY ANEMIA TYPE: ICD-10-CM

## 2020-03-11 DIAGNOSIS — R30.0 DYSURIA: ICD-10-CM

## 2020-03-11 DIAGNOSIS — I10 ESSENTIAL HYPERTENSION, BENIGN: Primary | ICD-10-CM

## 2020-03-11 DIAGNOSIS — M79.671 RIGHT FOOT PAIN: ICD-10-CM

## 2020-03-11 DIAGNOSIS — H61.23 BILATERAL IMPACTED CERUMEN: ICD-10-CM

## 2020-03-11 DIAGNOSIS — N18.2 CKD (CHRONIC KIDNEY DISEASE) STAGE 2, GFR 60-89 ML/MIN: ICD-10-CM

## 2020-03-11 LAB
ALBUMIN UR-MCNC: ABNORMAL MG/DL
APPEARANCE UR: ABNORMAL
BACTERIA #/AREA URNS HPF: ABNORMAL HPF
BILIRUB UR QL STRIP: NEGATIVE
COLOR UR AUTO: YELLOW
ERYTHROCYTE [DISTWIDTH] IN BLOOD BY AUTOMATED COUNT: 13.2 % (ref 10–15)
GLUCOSE UR STRIP-MCNC: NEGATIVE MG/DL
HCT VFR BLD AUTO: 34 % (ref 35–47)
HGB BLD-MCNC: 10.7 G/DL (ref 11.7–15.7)
HGB UR QL STRIP: ABNORMAL
KETONES UR STRIP-MCNC: NEGATIVE MG/DL
LEUKOCYTE ESTERASE UR QL STRIP: ABNORMAL
MCH RBC QN AUTO: 31.8 PG (ref 26.5–33)
MCHC RBC AUTO-ENTMCNC: 31.5 G/DL (ref 31.5–36.5)
MCV RBC AUTO: 101 FL (ref 78–100)
MUCOUS THREADS #/AREA URNS LPF: ABNORMAL LPF
NITRATE UR QL: POSITIVE
PH UR STRIP: 6.5 [PH] (ref 4.5–8)
PLATELET # BLD AUTO: 207 10E9/L (ref 150–450)
RBC # BLD AUTO: 3.37 10E12/L (ref 3.8–5.2)
RBC #/AREA URNS AUTO: ABNORMAL HPF
SP GR UR STRIP: 1.02 (ref 1–1.03)
SQUAMOUS #/AREA URNS AUTO: ABNORMAL LPF
UROBILINOGEN UR STRIP-ACNC: ABNORMAL
WBC # BLD AUTO: 7.1 10E9/L (ref 4–11)
WBC #/AREA URNS AUTO: >100 HPF
WBC CLUMPS #/AREA URNS HPF: PRESENT /[HPF]

## 2020-03-11 PROCEDURE — 73630 X-RAY EXAM OF FOOT: CPT | Mod: RT

## 2020-03-11 PROCEDURE — 85027 COMPLETE CBC AUTOMATED: CPT | Performed by: INTERNAL MEDICINE

## 2020-03-11 PROCEDURE — 36415 COLL VENOUS BLD VENIPUNCTURE: CPT | Performed by: INTERNAL MEDICINE

## 2020-03-11 PROCEDURE — 69209 REMOVE IMPACTED EAR WAX UNI: CPT | Performed by: INTERNAL MEDICINE

## 2020-03-11 PROCEDURE — 99214 OFFICE O/P EST MOD 30 MIN: CPT | Mod: 25 | Performed by: INTERNAL MEDICINE

## 2020-03-11 RX ORDER — ACETAMINOPHEN 500 MG
500 TABLET ORAL EVERY 6 HOURS PRN
Qty: 90 TABLET | Refills: 0
Start: 2020-03-11 | End: 2021-01-18

## 2020-03-11 RX ORDER — METOPROLOL SUCCINATE 25 MG/1
12.5 TABLET, EXTENDED RELEASE ORAL DAILY
Qty: 90 TABLET | Refills: 0
Start: 2020-03-11 | End: 2021-01-18

## 2020-03-11 ASSESSMENT — MIFFLIN-ST. JEOR: SCORE: 914.35

## 2020-03-11 NOTE — LETTER
[facility]  [address] [address]  [phone]      3/11/2020       Tracey Moran  [address]  [place] [address]        Dear Tracey,    Your white blood cell count and platelet count returned normal.  Your hemoglobin is on the lower end but stable and essentially unchanged from when prior checked.    Sincerely,      Guilherme Graves MD  Internal Medicine

## 2020-03-11 NOTE — PROGRESS NOTES
Maria G Moran is a 89 year old female who presents to clinic today for the following health issues:    HPI     Concerns:  1. Concerns about metoprolol and dizziness   2. Family concerned about UTI- states behavioral changes (hiding hearing aids)  3. Right foot pain- patient has been complaining over the last month   4. Concerns about impacted cerumen   5. Requesting to recheck HGB     Patient Active Problem List   Diagnosis     Hypercholesterolemia     Leukocytosis, unspecified type     Elevated CK     Abrasion of left scapular region, subsequent encounter     Benign essential hypertension     Thrombocytopenia (H)     CRF (chronic renal failure), stage 3 (moderate) (H)     Constipation, unspecified constipation type     Gastrointestinal hemorrhage, unspecified gastrointestinal hemorrhage type     Fall     CKD (chronic kidney disease) stage 2, GFR 60-89 ml/min     Physical deconditioning     Hypothyroidism, unspecified type     Generalized muscle weakness     Yeast dermatitis     right knee pain     Right knee pain     Stage 3 chronic kidney disease (H)     Dementia without behavioral disturbance, unspecified dementia type (H)     Closed fracture of lateral portion of right tibial plateau with routine healing, subsequent encounter: 4/'19     Hypotension, unspecified hypotension type     Anemia, unspecified type     Essential hypertension     UTI (urinary tract infection)     Past Surgical History:   Procedure Laterality Date     ESOPHAGOSCOPY, GASTROSCOPY, DUODENOSCOPY (EGD), COMBINED N/A 12/6/2017    Procedure: COMBINED ESOPHAGOSCOPY, GASTROSCOPY, DUODENOSCOPY (EGD);  gastroscopy;  Surgeon: Melchor Mancera MD;  Location:  GI       Social History     Tobacco Use     Smoking status: Never Smoker     Smokeless tobacco: Never Used   Substance Use Topics     Alcohol use: Yes     History reviewed. No pertinent family history.      Current Outpatient Medications   Medication Sig Dispense Refill      acetaminophen (TYLENOL) 500 MG tablet Take 500 mg by mouth 3 times daily       Criselda Protect (EUCERIN) external cream Apply topically 2 times daily Criselda Barrier Cream       diclofenac (VOLTAREN) 1 % topical gel Place 2 g onto the skin 3 times daily For right knee pain       lisinopril (PRINIVIL/ZESTRIL) 20 MG tablet TAKE ONE TABLET BY MOUTH TWICE DAILY. HOLD IF SYSTOLIC BLOOD PRESSURE IS LESS THAN 110 MMHG. CALL IF HELD TWICE IN A ROW OR IF SYMPTOMATIC. 180 tablet 2     metoprolol succinate ER (TOPROL-XL) 25 MG 24 hr tablet Take 25 mg by mouth daily Give 1 tablet by mouth one time a day(*GIVE AT DINNERTIME*) for HTN HOLD if SBP < 110 or < 55 and call MD/NP if held x 2 in a row or symptoms       polyethylene glycol (MIRALAX/GLYCOLAX) packet Take 17 g by mouth daily as needed        senna-docusate (SENOKOT-S/PERICOLACE) 8.6-50 MG tablet Take 1 tablet by mouth 2 times daily as needed for constipation       Allergies   Allergen Reactions     Demerol [Meperidine]      Dust Mites      Peanuts [Nuts]      Penicillins      Pollen Extract      BP Readings from Last 3 Encounters:   03/11/20 (!) 148/66   10/29/19 (!) 154/47   07/23/19 145/77    Wt Readings from Last 3 Encounters:   03/11/20 56.1 kg (123 lb 11.2 oz)   10/28/19 56.7 kg (125 lb 1.6 oz)   07/08/19 58.5 kg (129 lb)           Reviewed and updated as needed this visit by Provider       Review of Systems   ROS COMP: CONSTITUTIONAL: NEGATIVE for fever, chills, change in weight  EYES: NEGATIVE for vision changes or irritation  ENT/MOUTH: NEGATIVE for ear, mouth and throat problems  RESP: NEGATIVE for significant cough or SOB  CV: NEGATIVE for chest pain, palpitations or peripheral edema  GI: NEGATIVE for nausea, abdominal pain, heartburn, or change in bowel habits  : NEGATIVE for frequency, dysuria, or hematuria  MUSCULOSKELETAL: NEGATIVE for significant arthralgias or myalgia  NEURO: NEGATIVE for weakness, dizziness or paresthesias  HEME: NEGATIVE for bleeding  "problems  PSYCHIATRIC: NEGATIVE for changes in mood or affect      Objective    BP (!) 148/66   Pulse 73   Temp 97.9  F (36.6  C) (Oral)   Resp 15   Ht 1.575 m (5' 2\")   Wt 56.1 kg (123 lb 11.2 oz)   SpO2 95%   BMI 22.63 kg/m    Body mass index is 22.63 kg/m .  Physical Exam   GENERAL: in a wheelchair  EYES: Eyes grossly normal to inspection, PERRL and conjunctivae and sclerae normal  HENT: ear canals with cerumen noted and irrigated , nose and mouth without ulcers or lesions  NECK: no adenopathy, no asymmetry, masses, or scars and thyroid normal to palpation  RESP: lungs clear to auscultation - no rales, rhonchi or wheezes  CV: regular rate and rhythm, normal S1 S2, no S3 or S4, noted murmur, no click or rub, no peripheral edema and peripheral pulses strong  PSYCH: ?baseline but is noted as mild [place]        Assessment & Plan     1. Essential hypertension, benign  Just decreasing metoprolol to 12.5 mg daily    2. CKD (chronic kidney disease) stage 2, GFR 60-89 ml/min  Continuing to monitor and following up with routine labs as directed    4. Right foot pain  No focal changes on exam patient and son worried about the clicking sensation in foot which appears to be functionally noncontributory    5. Dysuria  Recheck UA per family request although no clinical symptomatology    6. Iron deficiency anemia, unspecified iron deficiency anemia type  Recheck hemoglobin and verify current clinical status    7.  (H61.23) Bilateral impacted cerumen  Comment: Bilateral ear irrigation was performed.  Plan: View of ears after irrigation is stable with clear TMs      See Patient Instructions    Return in about 53 weeks (around 3/17/2021) for Annual Wellness Visit.    Guilherme Graves MD  [facility]      "

## 2020-03-11 NOTE — PATIENT INSTRUCTIONS
Patient Education   Personalized Prevention Plan  You are due for the preventive services outlined below.  Your care team is available to assist you in scheduling these services.  If you have already completed any of these items, please share that information with your care team to update in your medical record.  Health Maintenance Due   Topic Date Due     Zoster (Shingles) Vaccine (1 of 2) 06/16/1975     Annual Wellness Visit  06/17/2015     Flu Vaccine (1) 09/01/2019     FALL RISK ASSESSMENT  10/11/2019     PHQ-2  01/01/2020

## 2020-03-13 ENCOUNTER — TELEPHONE (OUTPATIENT)
Dept: INTERNAL MEDICINE | Facility: CLINIC | Age: 85
End: 2020-03-13

## 2020-03-13 DIAGNOSIS — R30.0 DYSURIA: Primary | ICD-10-CM

## 2020-03-13 LAB — BACTERIA SPEC CULT: ABNORMAL

## 2020-03-13 NOTE — TELEPHONE ENCOUNTER
Rebecca, with [facility], # [phone],   Fax# [phone]  Is calling because pt's urine culture results were faxed over.   And once provider has a chance to review UA/UC results, and make recommendations, please call any orders to R[facility] @ # [phone], and Fax# [phone].

## 2020-03-13 NOTE — TELEPHONE ENCOUNTER
PCP please advise. Will either need to be called into the pharmacy;     [facility]: ph: [phone] fax: [phone]    UA/MOSES showed: >100,000 col/ml Escherichia coli    Sensitivities:  Amoxicillin+Clavulanate- 8/4  Cefepime - <=1  Ceftriaxone-<=1  Gentamicin- <=2  Meropenum <=0.25  Nitrofurantoin <=16  Tetracycline <=2  Tobramycin <=2   Trimethoprim+Sulfamethaxzole<=0.5    * Report placed in provider station folder for review as well.

## 2020-03-16 RX ORDER — SULFAMETHOXAZOLE/TRIMETHOPRIM 800-160 MG
1 TABLET ORAL 2 TIMES DAILY
Qty: 10 TABLET | Refills: 0 | Status: ON HOLD | OUTPATIENT
Start: 2020-03-16 | End: 2020-08-06

## 2020-03-16 NOTE — TELEPHONE ENCOUNTER
PCP please place a MED ORDER for this so assisted living can administer.    Please fax order to number listed below    Lida EWING, RN, PHN

## 2020-04-28 ENCOUNTER — MEDICAL CORRESPONDENCE (OUTPATIENT)
Dept: HEALTH INFORMATION MANAGEMENT | Facility: CLINIC | Age: 85
End: 2020-04-28

## 2020-05-20 NOTE — PROGRESS NOTES
11/18/18 1056   Quick Adds   Type of Visit Initial Occupational Therapy Evaluation   Living Environment   Lives With alone   Living Arrangements apartment;independent living facility  ([facility], large apt [address])   Home Accessibility no concerns   Number of Stairs to Enter Home 0   Number of Stairs Within Home 0   Transportation Available family or friend will provide   Self-Care   Dominant Hand right   Usual Activity Tolerance good   Current Activity Tolerance poor   Regular Exercise yes   Activity/Exercise Type strength training   Exercise Amount/Frequency 3-5 times/wk  (exercise class)   Equipment Currently Used at Home walker, rolling;grab bar;raised toilet  (4ww, HH shower, toilet frame, [employer] necklace)   Activity/Exercise/Self-Care Comment Pt walks to exerice room with her 4ww   Functional Level Prior   Ambulation 1-->assistive equipment   Transferring 1-->assistive equipment   Toileting 1-->assistive equipment   Bathing 1-->assistive equipment   Dressing 0-->independent   Eating 0-->independent   Communication 0-->understands/communicates without difficulty   Swallowing 0-->swallows foods/liquids without difficulty   Cognition 1 - attention or memory deficits   Fall history within last six months yes   Number of times patient has fallen within last six months 4   Prior Functional Level Comment Son confirmed pt indep with all self-cares including bathing (stands up in walk-in shower), meds (takes 2 only plus vitamins), meal prep, laundry and cleaning. Family provides transport when needed and does grocery shopping.    General Information   Onset of Illness/Injury or Date of Surgery - Date 11/16/18   Referring Physician Dr. Rainey   Patient/Family Goals Statement son open to TCU   Additional Occupational Profile Info/Pertinent History of Current Problem 89yo female admitted following fall at her apartment,  was down at least overnight. Diagnosed with UTI, elevated troponins (now downtrending), mild  rhabdomyolis/acute kidney injury   Precautions/Limitations fall precautions   Cognitive Status Examination   Orientation person;place   Level of Consciousness confused   Able to Follow Commands success, 1-step commands   Personal Safety (Cognitive) decreased awareness, need for assist;at risk behaviors demonstrated   Memory impaired   Cognitive Comment Pt now wearing hearing aids -was not during PT eval earlier   Visual Perception   Visual Perception No deficits were identified   Pain Assessment   Patient Currently in Pain Yes, see Vital Sign flowsheet  (c/o pain in legs from fall)   Integumentary/Edema   Integumentary/Edema Comments multiple bruises, wounds from fall, covered in bandaging. See nursing notes for details. Son reports bruises on arms caused from repeatedly attempting to get up from floor, not from fall.   Range of Motion (ROM)   ROM Quick Adds No deficits were identified   Strength   Strength Comments B ofelia 3+/5 due to OA   Hand Strength   Hand Strength Comments B  somewhat weak but functional   Coordination   Coordination Comments Able to bilaterally manipulate ADL supplies, eating utensils   Mobility   Bed Mobility Comments Mod A of 1   Transfer Skill: Bed to Chair/Chair to Bed   Level of [place]: Bed to Chair moderate assist (50% patients effort)   Physical Assist/Nonphysical Assist: Bed to Chair 1 person assist   Weight-Bearing Restrictions full weight-bearing   Assistive Device - Transfer Skill Bed to Chair Chair to Bed Rehab Eval rolling walker   Transfer Skill: Sit to Stand   Level of Ruth: Sit/Stand moderate assist (50% patients effort)   Physical Assist/Nonphysical Assist: Sit/Stand 1 person assist   Transfer Skill: Sit to Stand full weight-bearing   Assistive Device for Transfer: Sit/Stand rolling walker   Balance   Balance Comments Unsteady in standing, able to maintain static sitting balance indep   Grooming   Level of Ruth: Grooming stand-by assist   Physical  "Assist/Nonphysical Assist: Grooming verbal cues;set-up required  (sitting up in bed)   Eating/Self Feeding   Level of [place]: Eating independent   Physical Assist/Nonphysical Assist: Eating set-up required  (sitting up in bed)   Activities of Daily Living Analysis   Impairments Contributing to Impaired Activities of Daily Living balance impaired;cognition impaired;pain;strength decreased   General Therapy Interventions   Planned Therapy Interventions ADL retraining;transfer training;cognition   Clinical Impression   Criteria for Skilled Therapeutic Interventions Met yes, treatment indicated   OT Diagnosis decreased ADL performance   Influenced by the following impairments strength, cognition, balance, pain from fall   Assessment of Occupational Performance 3-5 Performance Deficits   Identified Performance Deficits functional mobility, grooming, dressing, toileting   Clinical Decision Making (Complexity) Low complexity   Therapy Frequency daily   Predicted Duration of Therapy Intervention (days/wks) 3 days   Anticipated Discharge Disposition Transitional Care Facility   Risks and Benefits of Treatment have been explained. Yes   Patient, Family & other staff in agreement with plan of care Yes   [facility] TM \"6 Clicks\"   2016, Trustees of [facility], under license to [employer].  All rights reserved.   6 Clicks Short Forms Daily Activity Inpatient Short Form   [facility]  \"6 Clicks\" Daily Activity Inpatient Short Form   1. Putting on and taking off regular lower body clothing? 2 - A Lot   2. Bathing (including washing, rinsing, drying)? 2 - A Lot   3. Toileting, which includes using toilet, bedpan or urinal? 2 - A Lot   4. Putting on and taking off regular upper body clothing? 3 - A Little   5. Taking care of personal grooming such as brushing teeth? 4 - None   6. Eating meals? 4 - None   Daily Activity Raw Score (Score out of 24.Lower scores equate to lower levels of " function) 17   Total Evaluation Time   Total Evaluation Time (Minutes) 15      Titus Art  UROLOGY  [address] [place] [address]  Phone: [phone]  Fax: [phone]  Follow Up Time: 1-3 Days

## 2020-07-16 ENCOUNTER — RECORDS - HEALTHEAST (OUTPATIENT)
Dept: LAB | Facility: CLINIC | Age: 85
End: 2020-07-16

## 2020-07-17 LAB
ANION GAP SERPL CALCULATED.3IONS-SCNC: 10 MMOL/L (ref 5–18)
BUN SERPL-MCNC: 26 MG/DL (ref 8–28)
CALCIUM SERPL-MCNC: 8.6 MG/DL (ref 8.5–10.5)
CHLORIDE BLD-SCNC: 107 MMOL/L (ref 98–107)
CO2 SERPL-SCNC: 24 MMOL/L (ref 22–31)
CREAT SERPL-MCNC: 0.86 MG/DL (ref 0.6–1.1)
ERYTHROCYTE [DISTWIDTH] IN BLOOD BY AUTOMATED COUNT: 12.5 % (ref 11–14.5)
GFR SERPL CREATININE-BSD FRML MDRD: >60 ML/MIN/1.73M2
GLUCOSE BLD-MCNC: 119 MG/DL (ref 70–125)
HCT VFR BLD AUTO: 32.2 % (ref 35–47)
HGB BLD-MCNC: 10.3 G/DL (ref 12–16)
MCH RBC QN AUTO: 33.3 PG (ref 27–34)
MCHC RBC AUTO-ENTMCNC: 32 G/DL (ref 32–36)
MCV RBC AUTO: 104 FL (ref 80–100)
PLATELET # BLD AUTO: 173 THOU/UL (ref 140–440)
PMV BLD AUTO: 11.8 FL (ref 8.5–12.5)
POTASSIUM BLD-SCNC: 3.9 MMOL/L (ref 3.5–5)
RBC # BLD AUTO: 3.09 MILL/UL (ref 3.8–5.4)
SODIUM SERPL-SCNC: 141 MMOL/L (ref 136–145)
WBC: 7.7 THOU/UL (ref 4–11)

## 2020-07-21 ENCOUNTER — RECORDS - HEALTHEAST (OUTPATIENT)
Dept: LAB | Facility: CLINIC | Age: 85
End: 2020-07-21

## 2020-07-22 LAB
FOLATE SERPL-MCNC: 18.6 NG/ML
VIT B12 SERPL-MCNC: 347 PG/ML (ref 213–816)

## 2020-08-06 ENCOUNTER — HOSPITAL ENCOUNTER (OUTPATIENT)
Facility: CLINIC | Age: 85
Setting detail: OBSERVATION
Discharge: NURSING, REHAB OR LONG-TERM CARE | End: 2020-08-06
Attending: EMERGENCY MEDICINE | Admitting: INTERNAL MEDICINE
Payer: COMMERCIAL

## 2020-08-06 ENCOUNTER — APPOINTMENT (OUTPATIENT)
Dept: GENERAL RADIOLOGY | Facility: CLINIC | Age: 85
End: 2020-08-06
Attending: EMERGENCY MEDICINE
Payer: COMMERCIAL

## 2020-08-06 ENCOUNTER — APPOINTMENT (OUTPATIENT)
Dept: CT IMAGING | Facility: CLINIC | Age: 85
End: 2020-08-06
Attending: EMERGENCY MEDICINE
Payer: COMMERCIAL

## 2020-08-06 VITALS
HEART RATE: 78 BPM | DIASTOLIC BLOOD PRESSURE: 84 MMHG | OXYGEN SATURATION: 94 % | SYSTOLIC BLOOD PRESSURE: 178 MMHG | TEMPERATURE: 98.3 F | RESPIRATION RATE: 16 BRPM

## 2020-08-06 DIAGNOSIS — F03.90 DEMENTIA WITHOUT BEHAVIORAL DISTURBANCE, UNSPECIFIED DEMENTIA TYPE: ICD-10-CM

## 2020-08-06 DIAGNOSIS — H74.91 DISORDER OF RIGHT MASTOID: ICD-10-CM

## 2020-08-06 DIAGNOSIS — M25.421 EFFUSION OF RIGHT ELBOW: ICD-10-CM

## 2020-08-06 DIAGNOSIS — N39.0 URINARY TRACT INFECTION WITHOUT HEMATURIA, SITE UNSPECIFIED: ICD-10-CM
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
## 2020-08-06 LAB
ALBUMIN UR-MCNC: 20 MG/DL
AMORPH CRY #/AREA URNS HPF: ABNORMAL /HPF
ANION GAP SERPL CALCULATED.3IONS-SCNC: 3 MMOL/L (ref 3–14)
APPEARANCE UR: CLEAR
BASOPHILS # BLD AUTO: 0 10E9/L (ref 0–0.2)
BASOPHILS NFR BLD AUTO: 0.4 %
BILIRUB UR QL STRIP: NEGATIVE
BUN SERPL-MCNC: 21 MG/DL (ref 7–30)
CALCIUM SERPL-MCNC: 8.4 MG/DL (ref 8.5–10.1)
CHLORIDE SERPL-SCNC: 109 MMOL/L (ref 94–109)
CK SERPL-CCNC: 132 U/L (ref 30–225)
CO2 SERPL-SCNC: 28 MMOL/L (ref 20–32)
COLOR UR AUTO: YELLOW
CREAT SERPL-MCNC: 0.81 MG/DL (ref 0.52–1.04)
DIFFERENTIAL METHOD BLD: ABNORMAL
EOSINOPHIL # BLD AUTO: 0.2 10E9/L (ref 0–0.7)
EOSINOPHIL NFR BLD AUTO: 1.6 %
ERYTHROCYTE [DISTWIDTH] IN BLOOD BY AUTOMATED COUNT: 12.3 % (ref 10–15)
GFR SERPL CREATININE-BSD FRML MDRD: 62 ML/MIN/{1.73_M2}
GLUCOSE SERPL-MCNC: 97 MG/DL (ref 70–99)
GLUCOSE UR STRIP-MCNC: NEGATIVE MG/DL
HCT VFR BLD AUTO: 32.7 % (ref 35–47)
HGB BLD-MCNC: 10.6 G/DL (ref 11.7–15.7)
HGB UR QL STRIP: NEGATIVE
HYALINE CASTS #/AREA URNS LPF: 7 /LPF (ref 0–2)
IMM GRANULOCYTES # BLD: 0 10E9/L (ref 0–0.4)
IMM GRANULOCYTES NFR BLD: 0.3 %
INTERPRETATION ECG - MUSE: NORMAL
KETONES UR STRIP-MCNC: NEGATIVE MG/DL
LEUKOCYTE ESTERASE UR QL STRIP: ABNORMAL
LYMPHOCYTES # BLD AUTO: 1.3 10E9/L (ref 0.8–5.3)
LYMPHOCYTES NFR BLD AUTO: 13.5 %
MCH RBC QN AUTO: 33 PG (ref 26.5–33)
MCHC RBC AUTO-ENTMCNC: 32.4 G/DL (ref 31.5–36.5)
MCV RBC AUTO: 102 FL (ref 78–100)
MONOCYTES # BLD AUTO: 0.7 10E9/L (ref 0–1.3)
MONOCYTES NFR BLD AUTO: 7.3 %
MUCOUS THREADS #/AREA URNS LPF: PRESENT /LPF
NEUTROPHILS # BLD AUTO: 7.5 10E9/L (ref 1.6–8.3)
NEUTROPHILS NFR BLD AUTO: 76.9 %
NITRATE UR QL: NEGATIVE
NRBC # BLD AUTO: 0 10*3/UL
NRBC BLD AUTO-RTO: 0 /100
PH UR STRIP: 5.5 PH (ref 5–7)
PLATELET # BLD AUTO: 184 10E9/L (ref 150–450)
POTASSIUM SERPL-SCNC: 3.9 MMOL/L (ref 3.4–5.3)
RBC # BLD AUTO: 3.21 10E12/L (ref 3.8–5.2)
RBC #/AREA URNS AUTO: 6 /HPF (ref 0–2)
SARS-COV-2 RNA SPEC QL NAA+PROBE: NOT DETECTED
SODIUM SERPL-SCNC: 140 MMOL/L (ref 133–144)
SOURCE: ABNORMAL
SP GR UR STRIP: 1.02 (ref 1–1.03)
SPECIMEN SOURCE: NORMAL
SQUAMOUS #/AREA URNS AUTO: <1 /HPF (ref 0–1)
TROPONIN I SERPL-MCNC: 0.02 UG/L (ref 0–0.04)
UROBILINOGEN UR STRIP-MCNC: NORMAL MG/DL (ref 0–2)
WBC # BLD AUTO: 9.7 10E9/L (ref 4–11)
WBC #/AREA URNS AUTO: 84 /HPF (ref 0–5)
WBC CLUMPS #/AREA URNS HPF: PRESENT /HPF

## 2020-08-06 PROCEDURE — 25800030 ZZH RX IP 258 OP 636: Performed by: INTERNAL MEDICINE

## 2020-08-06 PROCEDURE — 96365 THER/PROPH/DIAG IV INF INIT: CPT

## 2020-08-06 PROCEDURE — 93005 ELECTROCARDIOGRAM TRACING: CPT

## 2020-08-06 PROCEDURE — G0378 HOSPITAL OBSERVATION PER HR: HCPCS

## 2020-08-06 PROCEDURE — 85025 COMPLETE CBC W/AUTO DIFF WBC: CPT | Performed by: EMERGENCY MEDICINE

## 2020-08-06 PROCEDURE — 84484 ASSAY OF TROPONIN QUANT: CPT | Performed by: EMERGENCY MEDICINE

## 2020-08-06 PROCEDURE — 72125 CT NECK SPINE W/O DYE: CPT

## 2020-08-06 PROCEDURE — 73080 X-RAY EXAM OF ELBOW: CPT | Mod: RT

## 2020-08-06 PROCEDURE — U0003 INFECTIOUS AGENT DETECTION BY NUCLEIC ACID (DNA OR RNA); SEVERE ACUTE RESPIRATORY SYNDROME CORONAVIRUS 2 (SARS-COV-2) (CORONAVIRUS DISEASE [COVID-19]), AMPLIFIED PROBE TECHNIQUE, MAKING USE OF HIGH THROUGHPUT TECHNOLOGIES AS DESCRIBED BY CMS-2020-01-R: HCPCS | Performed by: EMERGENCY MEDICINE

## 2020-08-06 PROCEDURE — C9803 HOPD COVID-19 SPEC COLLECT: HCPCS

## 2020-08-06 PROCEDURE — 25000128 H RX IP 250 OP 636: Performed by: EMERGENCY MEDICINE

## 2020-08-06 PROCEDURE — 80048 BASIC METABOLIC PNL TOTAL CA: CPT | Performed by: EMERGENCY MEDICINE

## 2020-08-06 PROCEDURE — 99285 EMERGENCY DEPT VISIT HI MDM: CPT | Mod: 25

## 2020-08-06 PROCEDURE — 25000132 ZZH RX MED GY IP 250 OP 250 PS 637: Performed by: INTERNAL MEDICINE

## 2020-08-06 PROCEDURE — 70450 CT HEAD/BRAIN W/O DYE: CPT

## 2020-08-06 PROCEDURE — 82550 ASSAY OF CK (CPK): CPT | Performed by: EMERGENCY MEDICINE

## 2020-08-06 PROCEDURE — 87086 URINE CULTURE/COLONY COUNT: CPT | Performed by: EMERGENCY MEDICINE

## 2020-08-06 PROCEDURE — 99207 ZZC APP CREDIT; MD BILLING SHARED VISIT: CPT | Performed by: PHYSICIAN ASSISTANT

## 2020-08-06 PROCEDURE — 99219 ZZC INITIAL OBSERVATION CARE,LEVL II: CPT | Performed by: INTERNAL MEDICINE

## 2020-08-06 PROCEDURE — 81001 URINALYSIS AUTO W/SCOPE: CPT | Performed by: EMERGENCY MEDICINE

## 2020-08-06 RX ORDER — SULFAMETHOXAZOLE/TRIMETHOPRIM 800-160 MG
1 TABLET ORAL 2 TIMES DAILY
Qty: 6 TABLET | Refills: 0 | Status: SHIPPED | OUTPATIENT
Start: 2020-08-06 | End: 2020-08-09

## 2020-08-06 RX ORDER — POLYETHYLENE GLYCOL 3350 17 G/17G
17 POWDER, FOR SOLUTION ORAL DAILY PRN
Status: DISCONTINUED | OUTPATIENT
Start: 2020-08-06 | End: 2020-08-06 | Stop reason: HOSPADM

## 2020-08-06 RX ORDER — ACETAMINOPHEN 325 MG/1
650 TABLET ORAL EVERY 4 HOURS PRN
Status: DISCONTINUED | OUTPATIENT
Start: 2020-08-06 | End: 2020-08-06 | Stop reason: HOSPADM

## 2020-08-06 RX ORDER — NALOXONE HYDROCHLORIDE 0.4 MG/ML
.1-.4 INJECTION, SOLUTION INTRAMUSCULAR; INTRAVENOUS; SUBCUTANEOUS
Status: DISCONTINUED | OUTPATIENT
Start: 2020-08-06 | End: 2020-08-06 | Stop reason: HOSPADM

## 2020-08-06 RX ORDER — LISINOPRIL 20 MG/1
20 TABLET ORAL DAILY
Status: DISCONTINUED | OUTPATIENT
Start: 2020-08-06 | End: 2020-08-06 | Stop reason: HOSPADM

## 2020-08-06 RX ORDER — CEFTRIAXONE 1 G/1
1 INJECTION, POWDER, FOR SOLUTION INTRAMUSCULAR; INTRAVENOUS ONCE
Status: COMPLETED | OUTPATIENT
Start: 2020-08-06 | End: 2020-08-06

## 2020-08-06 RX ORDER — ONDANSETRON 4 MG/1
4 TABLET, ORALLY DISINTEGRATING ORAL EVERY 6 HOURS PRN
Status: DISCONTINUED | OUTPATIENT
Start: 2020-08-06 | End: 2020-08-06 | Stop reason: HOSPADM

## 2020-08-06 RX ORDER — SODIUM CHLORIDE 9 MG/ML
INJECTION, SOLUTION INTRAVENOUS CONTINUOUS
Status: ACTIVE | OUTPATIENT
Start: 2020-08-06 | End: 2020-08-06

## 2020-08-06 RX ORDER — NYSTATIN 100000 [USP'U]/G
POWDER TOPICAL 2 TIMES DAILY
COMMUNITY

## 2020-08-06 RX ORDER — HYDRALAZINE HYDROCHLORIDE 20 MG/ML
10 INJECTION INTRAMUSCULAR; INTRAVENOUS EVERY 4 HOURS PRN
Status: DISCONTINUED | OUTPATIENT
Start: 2020-08-06 | End: 2020-08-06 | Stop reason: HOSPADM

## 2020-08-06 RX ORDER — ONDANSETRON 2 MG/ML
4 INJECTION INTRAMUSCULAR; INTRAVENOUS EVERY 6 HOURS PRN
Status: DISCONTINUED | OUTPATIENT
Start: 2020-08-06 | End: 2020-08-06 | Stop reason: HOSPADM

## 2020-08-06 RX ORDER — CEFTRIAXONE 1 G/1
1 INJECTION, POWDER, FOR SOLUTION INTRAMUSCULAR; INTRAVENOUS EVERY 24 HOURS
Status: DISCONTINUED | OUTPATIENT
Start: 2020-08-07 | End: 2020-08-06 | Stop reason: HOSPADM

## 2020-08-06 RX ORDER — MULTIPLE VITAMINS W/ MINERALS TAB 9MG-400MCG
1 TAB ORAL DAILY
COMMUNITY

## 2020-08-06 RX ORDER — AMOXICILLIN 250 MG
1 CAPSULE ORAL 2 TIMES DAILY PRN
Status: DISCONTINUED | OUTPATIENT
Start: 2020-08-06 | End: 2020-08-06 | Stop reason: HOSPADM

## 2020-08-06 RX ADMIN — CEFTRIAXONE 1 G: 1 INJECTION, POWDER, FOR SOLUTION INTRAMUSCULAR; INTRAVENOUS at 01:53

## 2020-08-06 RX ADMIN — LISINOPRIL 20 MG: 20 TABLET ORAL at 09:19

## 2020-08-06 RX ADMIN — METOPROLOL SUCCINATE 12.5 MG: 25 TABLET, EXTENDED RELEASE ORAL at 09:19

## 2020-08-06 RX ADMIN — SODIUM CHLORIDE: 9 INJECTION, SOLUTION INTRAVENOUS at 06:42

## 2020-08-06 ASSESSMENT — ENCOUNTER SYMPTOMS
HEADACHES: 0
NECK PAIN: 0
WOUND: 1

## 2020-08-06 NOTE — CONSULTS
Care Transition Initial Assessment - SW     Met with: Spoke with KENNEDI Bautista at [facility], [phone].  Active Problems:    Fall    Urinary tract infection    UTI (urinary tract infection)       DATA  Lives With: facility resident      Quality of Family Relationships: helpful, involved, supportive  Description of Support System: Supportive, Involved  Who is your support system?: Facility resident(s)/Staff  Support Assessment: Adequate family and caregiver support, Adequate social supports.   Identified issues/concerns regarding health management: Pt admitted 8/6 with an unwitnessed fall, UTI. Spoke with RN at facility. Reported that pt resides in the [facility] unit and has recently been primarily WC bound with very limited ambulation, was ambulating short distances with a walker previously. Pt receives assistance with all ADLS-meds, meals, dressing, bathing, toileting, and transfers.     Quality of Family Relationships: helpful, involved, supportive    ASSESSMENT  Cognitive Status:  Disoriented and confused.  Concerns to be addressed: Discharge planning, return to facility.     PLAN  Patient anticipates discharging to:  Return to [facility], when medically stable. Discharge orders to be faxed to (f) [phone]. Scripts for meds to be faxed to facility, not filled here. SW will continue to follow.     RASHAAD Bhakta, [facility]   Inpatient Care Coordination  [facility]   [phone]

## 2020-08-06 NOTE — ED NOTES
Pt had an unwitnessed fall at her care facility. Pt Alert and Oriented to Person. Pt hx of dementia and unsure how she fell tonight.

## 2020-08-06 NOTE — PLAN OF CARE
PRIMARY DIAGNOSIS: FALL    OUTPATIENT/OBSERVATION GOALS TO BE MET BEFORE DISCHARGE  1. Orthostatic performed: N/A    2. Tolerating PO medications: Yes    3. Return to near baseline physical activity: Yes    4. Cleared for discharge by consultants (if involved): No    Pt is confused, oriented only to self. VSS. Pt denies any pain. Does not appear to be in pain. Scored 0 on PAINAD scale. Pt is Ax2 with pivots. This is pt's baseline. SW consult for return to facility. IV rocephin for UTI treatment. IVF. Advanced to regular diet. Tolerating well. Will continue to monitor.     Discharge Planner Nurse   Safe discharge environment identified: Yes  Barriers to discharge: Yes       Entered by: Jeniffer Edwards 08/06/2020 12:29 PM     Please review provider order for any additional goals.   Nurse to notify provider when observation goals have been met and patient is ready for discharge.

## 2020-08-06 NOTE — ED NOTES
Pt incontinent. Pt brief removed, vladimir area excoriated, pt cleaned up, barrier cream applied and purwick placed.

## 2020-08-06 NOTE — DISCHARGE SUMMARY
[facility] Outpatient / Observation Unit  Discharge Summary        Tracey Moran MRN# [id number]   YOB: 1925 Age: 89 year old     Date of Admission:  8/6/2020  Date of Discharge:  8/6/2020  Admitting Physician:  Andrea Shore MD  Discharge Physician: Abena Haddad PA-C  Discharging Service: Hospitalist      Primary Provider: Guilherme Graves  Primary Care Physician Phone Number: [phone]         Primary Discharge Diagnoses:    Tracey Moran was admitted on 8/6/2020 for concerns of fall and UTI.     1. Fall - suspected. No obvious injuries. Left olecranon bursa is swollen on the left but non tender. Pt has full ROM of bilateral elbows. Pt is primarily wheelchair bound at baseline  2. UTI - started on Rocephin here, afebrile, denies complaints. Tolerating oral intake. Discharge on 3 days of Bactrim          Secondary Discharge Diagnoses:     Past Medical History:   Diagnosis Date     Hypertension                 Code Status:      Full Code        Brief Hospital Summary:        Reason for your hospital stay      Fall and found to have a urinary tract infection and bruising to the   elbow. You deny pain today and you remained afebrile. Vital signs are   stable and you are feeling well.             Please refer to initial admission history and physical for further details.   Briefly, Tracey Moran was admitted on 8/6/2020 for concerns of fall.   Initial work up in the ED did not reveal evidence of significant injury or metabolic process. UA was abnormal consistent with UTI and started on IV abx. Imaging included CT of the head and cervical spine and right wlbow XR and was negative for acute process, poss effusion present on the right. Pt was registered to the Observation Unit for further evaluation.   Pt was provided supportive cares. Pt denied pain, tolerated oral intake. Labs were reviewed and significant results addressed.   On the day of discharge, pt's strength had improved and a safe discharge  plan was arranged. Medications were reviewed and adjustments made as necessary. Pt is instructed to follow up as below.           Significant Lab During Hospitalization:      Recent Labs   Lab 08/06/20  0121   WBC 9.7   HGB 10.6*   HCT 32.7*   *        Recent Labs   Lab 08/06/20  0121      POTASSIUM 3.9   CHLORIDE 109   CO2 28   ANIONGAP 3   GLC 97   BUN 21   CR 0.81   GFRESTIMATED 62   GFRESTBLACK 72   RENETTA 8.4*     Recent Labs   Lab 08/06/20  0121        Recent Labs   Lab 08/06/20  0121   TROPI 0.016     Recent Labs   Lab 08/06/20  0054   COLOR Yellow   APPEARANCE Clear   URINEGLC Negative   URINEBILI Negative   URINEKETONE Negative   SG 1.023   UBLD Negative   URINEPH 5.5   PROTEIN 20*   NITRITE Negative   LEUKEST Moderate*   RBCU 6*   WBCU 84*                Significant Imaging During Hospitalization:      Recent Results (from the past 48 hour(s))   Elbow XR, G/E 3 views, right    Narrative    EXAM: XR ELBOW RT G/E 3 VW  LOCATION: [facility]  DATE/TIME: 8/6/2020 2:39 AM    INDICATION: Trauma.    COMPARISON: None.      Impression    IMPRESSION: No definite evidence for fracture or dislocation. There does appear to be an elbow effusion present, however, which can be associated with occult fracture. Clinical correlation and follow-up films would be helpful if symptoms persist.   Head CT w/o contrast    Narrative    EXAM: CT HEAD W/O CONTRAST, CT CERVICAL SPINE W/O CONTRAST  LOCATION: [facility]  DATE/TIME: 8/6/2020 2:31 AM    INDICATION: Unwitnessed fall. Dementia.    COMPARISON: Head CT 10/26/2019. CT cervical spine 11/16/2018.    TECHNIQUE:   HEAD CT: Without IV contrast. Multiplanar reformats. Dose reduction techniques were used.    CERVICAL SPINE CT: Routine without IV contrast. Multiplanar reformats. Dose reduction techniques were used.    FINDINGS:   HEAD CT:   INTRACRANIAL CONTENTS: No intracranial hemorrhage, extra-axial collection, or mass effect. No CT  evidence of acute infarct. Moderate presumed chronic small vessel ischemic changes. Moderate generalized volume loss. No hydrocephalus. Chronic lacunar   infarct left thalamus.    VISUALIZED ORBITS/SINUSES/MASTOIDS: No intraorbital abnormality. No paranasal sinus mucosal disease. Complete/near complete opacification of the right mastoid air cells. No apparent mass in the posterior nasopharynx or skull base.    BONES/SOFT TISSUES: No acute abnormality.    CERVICAL SPINE CT:   VERTEBRA: Grade 1 anterolisthesis of C3 has progressed. Presumably it relates to advanced facet arthropathy at this level. Alignment otherwise is within normal limits. Vertebral bodies are normal in height. No fracture or posttraumatic subluxation.     CANAL/FORAMINA: Moderate interbody degeneration at C3-C4, C4-C5, and C6-C7. No significant central canal stenosis. Multilevel advanced facet arthropathy including ankylosis of the facets on the left at the C2-C3 level. High grade foraminal narrowing on   the right at C4 and C4-C5 and on the left at C5.    PARASPINAL: No extraspinal abnormality. Visualized lung fields are clear.      Impression    IMPRESSION:  HEAD CT:  1.  No acute intracranial hemorrhage or calvarial fracture.    2.  Right mastoid effusion.    3.  Moderate volume loss and presumed chronic small vessel ischemic changes.    CERVICAL SPINE CT:  1.  No CT evidence for acute fracture or post traumatic subluxation.    2.  Degenerative changes as highlighted above.     Cervical spine CT w/o contrast    Narrative    EXAM: CT HEAD W/O CONTRAST, CT CERVICAL SPINE W/O CONTRAST  LOCATION: [facility]  DATE/TIME: 8/6/2020 2:31 AM    INDICATION: Unwitnessed fall. Dementia.    COMPARISON: Head CT 10/26/2019. CT cervical spine 11/16/2018.    TECHNIQUE:   HEAD CT: Without IV contrast. Multiplanar reformats. Dose reduction techniques were used.    CERVICAL SPINE CT: Routine without IV contrast. Multiplanar reformats. Dose reduction  techniques were used.    FINDINGS:   HEAD CT:   INTRACRANIAL CONTENTS: No intracranial hemorrhage, extra-axial collection, or mass effect. No CT evidence of acute infarct. Moderate presumed chronic small vessel ischemic changes. Moderate generalized volume loss. No hydrocephalus. Chronic lacunar   infarct left thalamus.    VISUALIZED ORBITS/SINUSES/MASTOIDS: No intraorbital abnormality. No paranasal sinus mucosal disease. Complete/near complete opacification of the right mastoid air cells. No apparent mass in the posterior nasopharynx or skull base.    BONES/SOFT TISSUES: No acute abnormality.    CERVICAL SPINE CT:   VERTEBRA: Grade 1 anterolisthesis of C3 has progressed. Presumably it relates to advanced facet arthropathy at this level. Alignment otherwise is within normal limits. Vertebral bodies are normal in height. No fracture or posttraumatic subluxation.     CANAL/FORAMINA: Moderate interbody degeneration at C3-C4, C4-C5, and C6-C7. No significant central canal stenosis. Multilevel advanced facet arthropathy including ankylosis of the facets on the left at the C2-C3 level. High grade foraminal narrowing on   the right at C4 and C4-C5 and on the left at C5.    PARASPINAL: No extraspinal abnormality. Visualized lung fields are clear.      Impression    IMPRESSION:  HEAD CT:  1.  No acute intracranial hemorrhage or calvarial fracture.    2.  Right mastoid effusion.    3.  Moderate volume loss and presumed chronic small vessel ischemic changes.    CERVICAL SPINE CT:  1.  No CT evidence for acute fracture or post traumatic subluxation.    2.  Degenerative changes as highlighted above.                  Pending Results:        Unresulted Labs Ordered in the Past 30 Days of this Admission     Date and Time Order Name Status Description    8/6/2020 0400 Asymptomatic COVID-19 Virus (Coronavirus) by PCR In process     8/6/2020 0134 Urine Culture Preliminary               Consultations This Hospital Stay:      No  consultations were requested during this admission         Discharge Instructions and Follow-Up:      Follow-up Appointments     Follow-up and recommended labs and tests       Follow up with primary care provider, Guilherme Graves, within 7 days for   hospital follow- up.  No follow up labs or test are needed.           Pt instructed to follow up with PCP in 7 days  Follow-up Labs None        Discharge Disposition:      Discharged to home         Discharge Medications:        Current Discharge Medication List      START taking these medications    Details   sulfamethoxazole-trimethoprim (BACTRIM DS) 800-160 MG tablet Take 1 tablet by mouth 2 times daily for 3 days  Qty: 6 tablet, Refills: 0    Associated Diagnoses: Urinary tract infection without hematuria, site unspecified         CONTINUE these medications which have NOT CHANGED    Details   Criselda Protect (EUCERIN) external cream Apply topically 2 times daily Criselda Barrier Cream      diclofenac (VOLTAREN) 1 % topical gel Place 2 g onto the skin 3 times daily For right knee pain      lisinopril (PRINIVIL/ZESTRIL) 20 MG tablet TAKE ONE TABLET BY MOUTH TWICE DAILY. HOLD IF SYSTOLIC BLOOD PRESSURE IS LESS THAN 110 MMHG. CALL IF HELD TWICE IN A ROW OR IF SYMPTOMATIC.  Qty: 180 tablet, Refills: 2    Associated Diagnoses: Essential hypertension      metoprolol succinate ER (TOPROL-XL) 25 MG 24 hr tablet Take 0.5 tablets (12.5 mg) by mouth daily Give 1 tablet by mouth one time a day(*GIVE AT DINNERTIME*) for HTN HOLD if SBP < 110 or < 55 and call MD/NP if held x 2 in a row or symptoms  Qty: 90 tablet, Refills: 0    Associated Diagnoses: Essential hypertension, benign      multivitamin w/minerals (THERA-VIT-M) tablet Take 1 tablet by mouth daily      nystatin (MYCOSTATIN) 749289 UNIT/GM external powder Apply topically 2 times daily      acetaminophen (TYLENOL) 500 MG tablet Take 1 tablet (500 mg) by mouth every 6 hours as needed for mild pain or fever  Qty: 90 tablet,  Refills: 0    Associated Diagnoses: Right foot pain      polyethylene glycol (MIRALAX/GLYCOLAX) packet Take 17 g by mouth daily as needed       senna-docusate (SENOKOT-S/PERICOLACE) 8.6-50 MG tablet Take 1 tablet by mouth 2 times daily as needed for constipation    Associated Diagnoses: Constipation, unspecified constipation type                 Allergies:         Allergies   Allergen Reactions     Demerol [Meperidine]      Dust Mites      Peanuts [Nuts]      Penicillins      Pollen Extract            Condition and Physical on Discharge:      Discharge condition: Stable   Vitals: Blood pressure (!) 167/65, pulse 87, temperature 97.2  F (36.2  C), temperature source Oral, resp. rate 16, SpO2 96 %, not currently breastfeeding.  0 lbs 0 oz      GENERAL:  Comfortable.  PSYCH: pleasant, oriented, No acute distress.  HEART:  Normal S1, S2 with no murmur, no pericardial rub, gallops or S3 or S4.  LUNGS:  Clear to auscultation, normal Respiratory effort. No wheezing, rales or ronchi.  EXTREMITIES:  Swelling noted just over left olecranon process, non tender. No pedal edema, +2 pulses bilateral and equal.  SKIN:  Dry to touch, No rash, wound or ulcerations.  NEUROLOGIC:  Grossly intact    Abena Haddad PA-C

## 2020-08-06 NOTE — PLAN OF CARE
PRIMARY DIAGNOSIS: FALL    OUTPATIENT/OBSERVATION GOALS TO BE MET BEFORE DISCHARGE  1. Orthostatic performed: N/A    2. Tolerating PO medications: Yes    3. Return to near baseline physical activity: Yes    4. Cleared for discharge by consultants (if involved): No    Pt is confused, oriented only to self. VSS. Pt denies any pain. Does not appear to be in pain. Scored 0 on PAINAD scale. Pt is Ax2 with pivots. This is pt's baseline. SW consult for return to facility. IV rocephin for UTI treatment. IVF. Advanced to regular diet. Tolerating well. Will continue to monitor.     Discharge Planner Nurse   Safe discharge environment identified: Yes  Barriers to discharge: Yes       Entered by: Jeniffer Edwards 08/06/2020 10:57 AM     Please review provider order for any additional goals.   Nurse to notify provider when observation goals have been met and patient is ready for discharge.

## 2020-08-06 NOTE — ED TRIAGE NOTES
Pt arrives via EMS for an unwitnessed fall at assisted living facility.  Pt was found around 2315 by staff.  Denies head and neck pain but has a lac on right elbow.  Pt normally walks with walker

## 2020-08-06 NOTE — ED PROVIDER NOTES
History     Chief Complaint:  Fall    HPI     History is limited due to dementia.    Tracey Moran is a 89 year old female history of dementia who presents to the ER after unwitnessed fall at nursing facility.  Patient is unable to provide any meaningful history.  No further history was provided by nursing home staff.    Allergies:  Demerol   Penicillins    Medications:    Lisinopril   Metoprolol   Senna-docusate  Bactrim     Past Medical History:    Hypertension   CRF  CKD  Anemia   Dementia    Past Surgical History:    EGD Combined     Family History:    History reviewed. No pertinent family history.     Social History:  Smoking status: no  Alcohol use: yes  Drug use: no  The patient presents to the emergency department via EMS from nursing home   PCP: Guilherme Graves  Marital Status:   [5]    Review of Systems   Unable to perform ROS: Dementia   Musculoskeletal: Negative for neck pain.        Elbow Pain   Skin: Positive for wound.   Neurological: Negative for headaches.     Physical Exam     Patient Vitals for the past 24 hrs:   BP Temp Temp src Pulse Heart Rate Resp SpO2   08/06/20 0425 -- -- -- -- 72 15 96 %   08/06/20 0200 -- -- -- 83 83 16 96 %   08/06/20 0022 (!) 175/79 98.3  F (36.8  C) Oral -- 82 18 96 %       Physical Exam  VS: Reviewed per above  HENT: Mucous membranes moist, no external signs of head trauma.  No abnormal appearance of the right mastoid region.  Right tympanic membrane is without effusion or injection.  EYES: sclera anicteric  CV: Rate as noted, regular rhythm.   RESP: Effort normal. Breath sounds are normal bilaterally.  GI: no tenderness/rebound/guarding, not distended.  NEURO: Alert, moving all extremities  MSK: No deformity of the extremities, no pain with passive range of motion of right elbow.  SKIN: Warm and dry, right forearm skin tear.    Emergency Department Course   ECG (00:36:43):  Rate 83 bpm. UT interval 144. QRS duration 82. QT/QTc 432/507. P-R-T axes 36 -25 9.  Sinus rhythm with frequent Premature ventricular complexes. Voltage criteria for left ventricular hypertrophy. Nonspecific ST abnormality. Abnormal ECG. When compared with ECG of 10/26/2019 Premature ventricular complexes are now Present. Premature supraventricular complexes are no longer Present Interpreted at 0045 by Magdaleno Banda MD.    Imaging:  Radiology findings were communicated with the patient who voiced understanding of the findings.    Elbow XR, G/E 3 views, right  IMPRESSION: No definite evidence for fracture or dislocation. There does appear to be an elbow effusion present, however, which can be associated with occult fracture. Clinical correlation and follow-up films would be helpful if symptoms persist.  As read by Radiology.    Head CT w/o contrast  HEAD CT:  1.  No acute intracranial hemorrhage or calvarial fracture.     2.  Right mastoid effusion.     3.  Moderate volume loss and presumed chronic small vessel ischemic changes.  As read by Radiology.    Cervical Spine CT w/o contrast  CERVICAL SPINE CT:  1.  No CT evidence for acute fracture or post traumatic subluxation.     2.  Degenerative changes as highlighted above.  As read by Radiology.    Laboratory:  Laboratory findings were communicated with the patient who voiced understanding of the findings.    CK total: 132    UA: protein albumin: 20, leukocyte esterase: moderate, WBC: 84 (H), RBC: 6 (H), WBC Clumps: present, mucous: present, hyaline casts: 7 (H), amorphous crystals: few o/w Negative  Urinalysis and culture obtained and sent for evaluation.    BMP: Calcium: 8.4 (L) o/w WNL (Creatinine 0.81)    CBC: HGB 10.6 (L) o/w WNL (WBC 9.7, )    Troponin I (0121): 0.016    COVID-19 Virus by PCR Nasopharyngeal swab: pending    Interventions:  0153 Rocephin 1g IV    Emergency Department Course:  Past medical records, nursing notes, and vitals reviewed.  0033: I performed an exam of the patient and obtained history, as documented above.      EKG was obtained and reviewed in the emergency department.    IV inserted and blood drawn.    Urinalysis and culture obtained and sent for evaluation.    The patient was sent for a Head CT and Elbow XR while in the emergency department, results above.     0400: I rechecked the patient. I reviewed the results with the Patient and answered all related questions prior to admission.     0424: I discussed the patient with Dr. Shore, of hospitalist.    Findings and plan explained to the Patient who consents to admission. Discussed the patient with Dr. Shore, who will admit the patient to a observation bed for further monitoring, evaluation, and treatment.  Impression & Plan   Medical Decision Making:  Patient presents to the ER for evaluation of unwitnessed fall versus syncopal event at her nursing home.  On arrival vital signs are notable for elevated blood pressure.  On exam she is disoriented to consistent with underlying dementia.  She denies any complaints.  There is a skin tear to her right forearm but no other external signs of trauma.  CT of the head and cervical spine are negative for acute injury.  Urinalysis is concerning for infection and she was given Rocephin.  X-ray of the right elbow revealed an effusion but no obvious fracture.  Based on lack of discomfort with passive range of motion, I have low suspicion for occult fracture.  There was incidental mastoid effusion on the right on CT imaging of the head but clinically there is no evidence of mastoiditis or otitis media.  Laboratory evaluation does not reveal evidence of significant electrolyte derangement or acute kidney injury or rhabdomyolysis.  On EKG she has sinus rhythm with ectopy but a single troponin is negative.    Covid-19  Tracey Moran was evaluated during a global COVID-19 pandemic, which necessitated consideration that the patient might be at risk for infection with the SARS-CoV-2 virus that causes COVID-19.   Applicable protocols for  evaluation were followed during the patient's care.   COVID-19 was considered as part of the patient's evaluation. The plan for testing is:  a test was obtained during this visit.    Diagnosis:    ICD-10-CM    1. Urinary tract infection without hematuria, site unspecified  N39.0    2. Dementia without behavioral disturbance, unspecified dementia type (H)  F03.90    3. Fall, initial encounter  W19.XXXA    4. Effusion of right elbow  M25.421    5. Disorder of right mastoid  H74.91     effusion on CT       Disposition:  Admitted to observation bed.    Deanne Ramos  8/6/2020   [facility] EMERGENCY DEPARTMENT  Scribe Disclosure:  IDeanne, am serving as a scribe at 12:33 AM on 8/6/2020 to document services personally performed by Magdaleno Banda MD based on my observations and the provider's statements to me.        Magdaleno Banda MD  08/06/20 7319

## 2020-08-06 NOTE — PROGRESS NOTES
Discharge Planner   Discharge Plans in progress: Return to [facility].  Barriers to discharge plan: None anticipated.   Follow up plan: [facility], anticipated discharge today. Updated RN at facility, requested that orders be faxed early afternoon if possible so they can fill meds if needed at facility, Phone # [phone], (F) [phone]. Spoke with son Nick to update on discharge plans. He is agreeable to pt's discharge plans and has requested [facility] transport for pt's return. Reviewed out of pocket cost for [facility] transport, $75 for base rate and $5 per mile to the destination. Pt/family expressed understanding and are agreeable to this. [facility] transport arranged for 1545 today. SW will continue to follow.        Entered by: Beckie Marques 08/06/2020 10:56 AM       1105: Discharge orders faxed. Son and facility updated via phone on pt's transport time.     RASHAAD Bhakta, [facility]   Inpatient Care Coordination  [facility]   [phone]

## 2020-08-06 NOTE — PHARMACY-ADMISSION MEDICATION HISTORY
Admission medication history interview status for this patient is complete. See [facility] admission navigator for allergy information, prior to admission medications and immunization status.     Medication history interview done via telephone during Covid-19 pandemic, indicate source(s): [facility] pt - no interview  Medication history resources (including written lists, pill bottles, clinic record): Nursing facility med list (not a complete MAR)    Changes made to PTA medication list:  Added: MVI w/minerals, nystatin powder  Deleted: Bactrim (abx course from March)  Changed: none    Actions taken by pharmacist (provider contacted, etc):None     Additional medication history information:None    Medication reconciliation/reorder completed by provider prior to medication history?  Y   (Y/N)     For patients on insulin therapy: N  (Y/N)    Prior to Admission medications    Medication Sig Last Dose Taking? Auth Provider   Criselda Protect (EUCERIN) external cream Apply topically 2 times daily Criselda Barrier Cream 8/5/2020 at Unknown time Yes Guilherme Graves MD   diclofenac (VOLTAREN) 1 % topical gel Place 2 g onto the skin 3 times daily For right knee pain 8/5/2020 at Unknown time Yes Reported, Patient   lisinopril (PRINIVIL/ZESTRIL) 20 MG tablet TAKE ONE TABLET BY MOUTH TWICE DAILY. HOLD IF SYSTOLIC BLOOD PRESSURE IS LESS THAN 110 MMHG. CALL IF HELD TWICE IN A ROW OR IF SYMPTOMATIC. 8/5/2020 at Unknown time Yes Guilherme Graves MD   metoprolol succinate ER (TOPROL-XL) 25 MG 24 hr tablet Take 0.5 tablets (12.5 mg) by mouth daily Give 1 tablet by mouth one time a day(*GIVE AT DINNERTIME*) for HTN HOLD if SBP < 110 or < 55 and call MD/NP if held x 2 in a row or symptoms 8/5/2020 at Unknown time Yes Guilherme Graves MD   multivitamin w/minerals (THERA-VIT-M) tablet Take 1 tablet by mouth daily 8/5/2020 at Unknown time Yes Unknown, Entered By History   nystatin (MYCOSTATIN) 388115 UNIT/GM external powder Apply topically 2 times daily  8/5/2020 at Unknown time Yes Unknown, Entered By History   acetaminophen (TYLENOL) 500 MG tablet Take 1 tablet (500 mg) by mouth every 6 hours as needed for mild pain or fever Unknown at Unknown time  Guilherme Graves MD   polyethylene glycol (MIRALAX/GLYCOLAX) packet Take 17 g by mouth daily as needed  Unknown at Unknown time  Reported, Patient   senna-docusate (SENOKOT-S/PERICOLACE) 8.6-50 MG tablet Take 1 tablet by mouth 2 times daily as needed for constipation Unknown at Unknown time  Agustina Sancehz, PA-C

## 2020-08-06 NOTE — H&P
[facility]  History and Physical   Hospitalist Service    Andrea Shore MD    Tracey Moran MRN# [id number]   YOB: 1925 Age: 89 year old      Date of Admission:  8/6/2020           Assessment and Plan:   Tracey Moran is a 89-year-old lady with history of hypertension, chronic kidney disease, anemia, and dementia.  She came to the emergency department by ambulance from her nursing home after she was found on the floor after a fall.  She does not provide much history.  The fall was not witnessed.  Emergency department evaluation showed elevated blood pressure but otherwise unremarkable vital signs.  Basic metabolic panel was unremarkable.  CK was 132.  Troponin was 0.016.  Urine analysis showed 84 white blood cells and moderate leukocyte esterase.  CBC showed white blood cell count of 9.7 hemoglobin 10.6.  CT of head and C-spine were unremarkable.  X-ray of elbow has suggested an effusion and suggested follow-up x-ray to ensure improvement.  The patient was given Rocephin for suspected urinary tract infection.  I was asked to admit her to observation.    Problem list:    1.  Urinary tract infection.  Continue Rocephin.  Monitor urine culture.    2.  Suspected mechanical fall.  There are no obvious injuries.    3.  Hypertension.  Blood pressure was elevated in the emergency department.  Continue prior to admission lisinopril and metoprolol.  IV hydralazine will be available for use as needed.    4.  Elbow effusion, traumatic, related to fall.  Consider outpatient x-ray in 1 to 2 weeks to ensure resolution.    Full code  Ambulate for DVT prophylaxis  This position: Admit to observation           Code Status:   Full Code         Primary Care Physician:   Guilherme Graves [phone]         Chief Complaint:   Fall at nursing home    History is obtained from Dr. Banda and the medical record.  Patient does not provide much history         History of Present Illness:   Tracey  Dean is a 89-year-old lady with history of hypertension, chronic kidney disease, anemia, and dementia.  She came to the emergency department by ambulance from her nursing home after she was found on the floor after a fall.  She does not provide much history.  The fall was not witnessed.  Emergency department evaluation showed elevated blood pressure but otherwise unremarkable vital signs.  Basic metabolic panel was unremarkable.  CK was 132.  Troponin was 0.016.  Urine analysis showed 84 white blood cells and moderate leukocyte esterase.  CBC showed white blood cell count of 9.7 hemoglobin 10.6.  CT of head and C-spine were unremarkable.  X-ray of elbow has suggested an effusion and suggested follow-up x-ray to ensure improvement.  The patient was given Rocephin for suspected urinary tract infection.  I was asked to admit her to observation.         Past Medical History:     Patient Active Problem List   Diagnosis     Hypercholesterolemia     Leukocytosis, unspecified type     Elevated CK     Abrasion of left scapular region, subsequent encounter     Benign essential hypertension     Thrombocytopenia (H)     CRF (chronic renal failure), stage 3 (moderate) (H)     Constipation, unspecified constipation type     Gastrointestinal hemorrhage, unspecified gastrointestinal hemorrhage type     Fall     CKD (chronic kidney disease) stage 2, GFR 60-89 ml/min     Physical deconditioning     Hypothyroidism, unspecified type     Generalized muscle weakness     Yeast dermatitis     right knee pain     Right knee pain     Stage 3 chronic kidney disease (H)     Dementia without behavioral disturbance, unspecified dementia type (H)     Closed fracture of lateral portion of right tibial plateau with routine healing, subsequent encounter: 4/'19     Hypotension, unspecified hypotension type     Anemia, unspecified type     Essential hypertension     Urinary tract infection     UTI (urinary tract infection)      Past Medical History:    Diagnosis Date     Hypertension              Past Surgical History:     Past Surgical History:   Procedure Laterality Date     ESOPHAGOSCOPY, GASTROSCOPY, DUODENOSCOPY (EGD), COMBINED N/A 12/6/2017    Procedure: COMBINED ESOPHAGOSCOPY, GASTROSCOPY, DUODENOSCOPY (EGD);  gastroscopy;  Surgeon: Melchor Mancera MD;  Location:  GI            Home Medications:     Prior to Admission medications    Medication Sig Last Dose Taking? Auth Provider   acetaminophen (TYLENOL) 500 MG tablet Take 1 tablet (500 mg) by mouth every 6 hours as needed for mild pain or fever   Guilherme Graves MD   Criselda Protect (EUCERIN) external cream Apply topically 2 times daily Criselda Barrier Cream   Guilherme Graves MD   diclofenac (VOLTAREN) 1 % topical gel Place 2 g onto the skin 3 times daily For right knee pain   Reported, Patient   lisinopril (PRINIVIL/ZESTRIL) 20 MG tablet TAKE ONE TABLET BY MOUTH TWICE DAILY. HOLD IF SYSTOLIC BLOOD PRESSURE IS LESS THAN 110 MMHG. CALL IF HELD TWICE IN A ROW OR IF SYMPTOMATIC.   Guilherme Graves MD   metoprolol succinate ER (TOPROL-XL) 25 MG 24 hr tablet Take 0.5 tablets (12.5 mg) by mouth daily Give 1 tablet by mouth one time a day(*GIVE AT DINNERTIME*) for HTN HOLD if SBP < 110 or < 55 and call MD/NP if held x 2 in a row or symptoms   Guilherme Graves MD   polyethylene glycol (MIRALAX/GLYCOLAX) packet Take 17 g by mouth daily as needed    Reported, Patient   senna-docusate (SENOKOT-S/PERICOLACE) 8.6-50 MG tablet Take 1 tablet by mouth 2 times daily as needed for constipation   Agustina Sanchez PA-C   sulfamethoxazole-trimethoprim (BACTRIM DS) 800-160 MG tablet Take 1 tablet by mouth 2 times daily   Guilherme Graves MD            Allergies:     Allergies   Allergen Reactions     Demerol [Meperidine]      Dust Mites      Peanuts [Nuts]      Penicillins      Pollen Extract             Social History:     Social History     Tobacco Use     Smoking status: Never Smoker     Smokeless tobacco: Never Used    Substance Use Topics     Alcohol use: Yes             Family History:   Patient is unable to provide family medical history due to dementia         Review of Systems:   The 10 point Review of Systems is negative other than as noted in the HPI.           Physical Exam:   Blood pressure (!) 179/92, pulse 80, temperature 97.7  F (36.5  C), temperature source Oral, resp. rate 18, SpO2 97 %, not currently breastfeeding.  0 lbs 0 oz      GENERAL: Pleasant and cooperative. No acute distress.  EYES: Pupils equal and round. No scleral erythema or icterus.  ENT: External ears are normal without deformity. Posterior oropharynx is without erythem, swelling, or exudate.  NECK: Supple. No masses or swelling. No tenderness. Thyroid is normal without mass or tenderness.  CHEST: Clear to auscultation. Normal breath sounds. No retractions.   CV: Regular rate and rhythm. No JVD. Pulses normal.  ABDOMEN: Bowel sounds present. No tenderness. No masses or hernia.  EXTREMETIES: No clubbing, cyanosis, or ischemia.  SKIN: Warm and dry to touch. No wounds or rashes.  NEUROLOGIC: Strength and sensation are normal. Deep tendon reflexes are normal. Cranial nerves are normal.             Data:   All new lab and imaging data was reviewed.     Results for orders placed or performed during the hospital encounter of 08/06/20 (from the past 24 hour(s))   EKG 12-lead, tracing only   Result Value Ref Range    Interpretation ECG Click View Image link to view waveform and result    UA with Microscopic   Result Value Ref Range    Color Urine Yellow     Appearance Urine Clear     Glucose Urine Negative NEG^Negative mg/dL    Bilirubin Urine Negative NEG^Negative    Ketones Urine Negative NEG^Negative mg/dL    Specific Gravity Urine 1.023 1.003 - 1.035    Blood Urine Negative NEG^Negative    pH Urine 5.5 5.0 - 7.0 pH    Protein Albumin Urine 20 (A) NEG^Negative mg/dL    Urobilinogen mg/dL Normal 0.0 - 2.0 mg/dL    Nitrite Urine Negative NEG^Negative     Leukocyte Esterase Urine Moderate (A) NEG^Negative    Source Catheterized Urine     WBC Urine 84 (H) 0 - 5 /HPF    RBC Urine 6 (H) 0 - 2 /HPF    WBC Clumps Present (A) NEG^Negative /HPF    Squamous Epithelial /HPF Urine <1 0 - 1 /HPF    Mucous Urine Present (A) NEG^Negative /LPF    Hyaline Casts 7 (H) 0 - 2 /LPF    Amorphous Crystals Few (A) NEG^Negative /HPF   Urine Culture    Specimen: Catheterized Urine   Result Value Ref Range    Specimen Description Catheterized Urine     Special Requests Specimen received in preservative     Culture Micro PENDING    CBC with platelets differential   Result Value Ref Range    WBC 9.7 4.0 - 11.0 10e9/L    RBC Count 3.21 (L) 3.8 - 5.2 10e12/L    Hemoglobin 10.6 (L) 11.7 - 15.7 g/dL    Hematocrit 32.7 (L) 35.0 - 47.0 %     (H) 78 - 100 fl    MCH 33.0 26.5 - 33.0 pg    MCHC 32.4 31.5 - 36.5 g/dL    RDW 12.3 10.0 - 15.0 %    Platelet Count 184 150 - 450 10e9/L    Diff Method Automated Method     % Neutrophils 76.9 %    % Lymphocytes 13.5 %    % Monocytes 7.3 %    % Eosinophils 1.6 %    % Basophils 0.4 %    % Immature Granulocytes 0.3 %    Nucleated RBCs 0 0 /100    Absolute Neutrophil 7.5 1.6 - 8.3 10e9/L    Absolute Lymphocytes 1.3 0.8 - 5.3 10e9/L    Absolute Monocytes 0.7 0.0 - 1.3 10e9/L    Absolute Eosinophils 0.2 0.0 - 0.7 10e9/L    Absolute Basophils 0.0 0.0 - 0.2 10e9/L    Abs Immature Granulocytes 0.0 0 - 0.4 10e9/L    Absolute Nucleated RBC 0.0    Basic metabolic panel   Result Value Ref Range    Sodium 140 133 - 144 mmol/L    Potassium 3.9 3.4 - 5.3 mmol/L    Chloride 109 94 - 109 mmol/L    Carbon Dioxide 28 20 - 32 mmol/L    Anion Gap 3 3 - 14 mmol/L    Glucose 97 70 - 99 mg/dL    Urea Nitrogen 21 7 - 30 mg/dL    Creatinine 0.81 0.52 - 1.04 mg/dL    GFR Estimate 62 >60 mL/min/[1.73_m2]    GFR Estimate If Black 72 >60 mL/min/[1.73_m2]    Calcium 8.4 (L) 8.5 - 10.1 mg/dL   CK total   Result Value Ref Range    CK Total 132 30 - 225 U/L   Troponin I   Result Value Ref  Range    Troponin I ES 0.016 0.000 - 0.045 ug/L   Elbow XR, G/E 3 views, right    Narrative    EXAM: XR ELBOW RT G/E 3 VW  LOCATION: [facility]  DATE/TIME: 8/6/2020 2:39 AM    INDICATION: Trauma.    COMPARISON: None.      Impression    IMPRESSION: No definite evidence for fracture or dislocation. There does appear to be an elbow effusion present, however, which can be associated with occult fracture. Clinical correlation and follow-up films would be helpful if symptoms persist.   Head CT w/o contrast    Narrative    EXAM: CT HEAD W/O CONTRAST, CT CERVICAL SPINE W/O CONTRAST  LOCATION: [facility]  DATE/TIME: 8/6/2020 2:31 AM    INDICATION: Unwitnessed fall. Dementia.    COMPARISON: Head CT 10/26/2019. CT cervical spine 11/16/2018.    TECHNIQUE:   HEAD CT: Without IV contrast. Multiplanar reformats. Dose reduction techniques were used.    CERVICAL SPINE CT: Routine without IV contrast. Multiplanar reformats. Dose reduction techniques were used.    FINDINGS:   HEAD CT:   INTRACRANIAL CONTENTS: No intracranial hemorrhage, extra-axial collection, or mass effect. No CT evidence of acute infarct. Moderate presumed chronic small vessel ischemic changes. Moderate generalized volume loss. No hydrocephalus. Chronic lacunar   infarct left thalamus.    VISUALIZED ORBITS/SINUSES/MASTOIDS: No intraorbital abnormality. No paranasal sinus mucosal disease. Complete/near complete opacification of the right mastoid air cells. No apparent mass in the posterior nasopharynx or skull base.    BONES/SOFT TISSUES: No acute abnormality.    CERVICAL SPINE CT:   VERTEBRA: Grade 1 anterolisthesis of C3 has progressed. Presumably it relates to advanced facet arthropathy at this level. Alignment otherwise is within normal limits. Vertebral bodies are normal in height. No fracture or posttraumatic subluxation.     CANAL/FORAMINA: Moderate interbody degeneration at C3-C4, C4-C5, and C6-C7. No significant central canal  stenosis. Multilevel advanced facet arthropathy including ankylosis of the facets on the left at the C2-C3 level. High grade foraminal narrowing on   the right at C4 and C4-C5 and on the left at C5.    PARASPINAL: No extraspinal abnormality. Visualized lung fields are clear.      Impression    IMPRESSION:  HEAD CT:  1.  No acute intracranial hemorrhage or calvarial fracture.    2.  Right mastoid effusion.    3.  Moderate volume loss and presumed chronic small vessel ischemic changes.    CERVICAL SPINE CT:  1.  No CT evidence for acute fracture or post traumatic subluxation.    2.  Degenerative changes as highlighted above.     Cervical spine CT w/o contrast    Narrative    EXAM: CT HEAD W/O CONTRAST, CT CERVICAL SPINE W/O CONTRAST  LOCATION: [facility]  DATE/TIME: 8/6/2020 2:31 AM    INDICATION: Unwitnessed fall. Dementia.    COMPARISON: Head CT 10/26/2019. CT cervical spine 11/16/2018.    TECHNIQUE:   HEAD CT: Without IV contrast. Multiplanar reformats. Dose reduction techniques were used.    CERVICAL SPINE CT: Routine without IV contrast. Multiplanar reformats. Dose reduction techniques were used.    FINDINGS:   HEAD CT:   INTRACRANIAL CONTENTS: No intracranial hemorrhage, extra-axial collection, or mass effect. No CT evidence of acute infarct. Moderate presumed chronic small vessel ischemic changes. Moderate generalized volume loss. No hydrocephalus. Chronic lacunar   infarct left thalamus.    VISUALIZED ORBITS/SINUSES/MASTOIDS: No intraorbital abnormality. No paranasal sinus mucosal disease. Complete/near complete opacification of the right mastoid air cells. No apparent mass in the posterior nasopharynx or skull base.    BONES/SOFT TISSUES: No acute abnormality.    CERVICAL SPINE CT:   VERTEBRA: Grade 1 anterolisthesis of C3 has progressed. Presumably it relates to advanced facet arthropathy at this level. Alignment otherwise is within normal limits. Vertebral bodies are normal in height. No  fracture or posttraumatic subluxation.     CANAL/FORAMINA: Moderate interbody degeneration at C3-C4, C4-C5, and C6-C7. No significant central canal stenosis. Multilevel advanced facet arthropathy including ankylosis of the facets on the left at the C2-C3 level. High grade foraminal narrowing on   the right at C4 and C4-C5 and on the left at C5.    PARASPINAL: No extraspinal abnormality. Visualized lung fields are clear.      Impression    IMPRESSION:  HEAD CT:  1.  No acute intracranial hemorrhage or calvarial fracture.    2.  Right mastoid effusion.    3.  Moderate volume loss and presumed chronic small vessel ischemic changes.    CERVICAL SPINE CT:  1.  No CT evidence for acute fracture or post traumatic subluxation.    2.  Degenerative changes as highlighted above.

## 2020-08-06 NOTE — PROGRESS NOTES
ROOM # [id number]    Living Situation: Nursing facility  Facility name:  : [facility] (son) [phone]    Activity level at baseline: Ind  Activity level on admit: assist x2      Patient registered to observation; given Patient Bill of Rights; given the opportunity to ask questions about observation status and their plan of care.  Patient has been oriented to the observation room, bathroom and call light is in place.    Discussed discharge goals and expectations with patient/family.

## 2020-08-06 NOTE — ED NOTES
Bed: ED08  Expected date: 8/6/20  Expected time: 12:09 AM  Means of arrival:   Comments:  BV 3, 89 F, lac, dementia

## 2020-08-06 NOTE — ED NOTES
Observation Brochure and Video   Patient informed of observation status based on provider's order. Observation Brochure was given and video watched.  Quentin Bower RN

## 2020-08-06 NOTE — ED NOTES
[facility]  ED Nurse Handoff Report    Tracey Moran is a 89 year old female   ED Chief complaint: Fall  . ED Diagnosis:   Final diagnoses:   Urinary tract infection without hematuria, site unspecified   Dementia without behavioral disturbance, unspecified dementia type (H)   Fall, initial encounter   Effusion of right elbow   Disorder of right mastoid - effusion on CT     Allergies:   Allergies   Allergen Reactions     Demerol [Meperidine]      Dust Mites      Peanuts [Nuts]      Penicillins      Pollen Extract        Code Status: Full Code  Activity level - Baseline/Home:  Independant with walker. Activity Level - Current:   Assist X 1. Lift room needed: No. Bariatric: No   Needed: No   Isolation: No. Infection: Not Applicable.     Vital Signs:   Vitals:    08/06/20 0022 08/06/20 0200 08/06/20 0425   BP: (!) 175/79     Pulse:  83    Resp: 18 16 15   Temp: 98.3  F (36.8  C)     TempSrc: Oral     SpO2: 96% 96% 96%       Cardiac Rhythm:  ,      Pain level:    Patient confused: Yes. Patient Falls Risk: Yes.   Elimination Status: Has voided   Patient Report - Initial Complaint: Unwitnessed fall. Focused Assessment: Pt in from care facility. Pt Alert oriented to person, pt has hx of dementia. Pt has skin tear to the R elbow. Pt has hx of UTI's in the past.   Tests Performed: labs, imaging. Abnormal Results:   Labs Ordered and Resulted from Time of ED Arrival Up to the Time of Departure from the ED   CBC WITH PLATELETS DIFFERENTIAL - Abnormal; Notable for the following components:       Result Value    RBC Count 3.21 (*)     Hemoglobin 10.6 (*)     Hematocrit 32.7 (*)      (*)     All other components within normal limits   BASIC METABOLIC PANEL - Abnormal; Notable for the following components:    Calcium 8.4 (*)     All other components within normal limits   ROUTINE UA WITH MICROSCOPIC - Abnormal; Notable for the following components:    Protein Albumin Urine 20 (*)     Leukocyte Esterase  Urine Moderate (*)     WBC Urine 84 (*)     RBC Urine 6 (*)     WBC Clumps Present (*)     Mucous Urine Present (*)     Hyaline Casts 7 (*)     Amorphous Crystals Few (*)     All other components within normal limits   CK TOTAL   TROPONIN I   COVID-19 VIRUS (CORONAVIRUS) BY PCR   CARDIAC CONTINUOUS MONITORING   URINE CULTURE AEROBIC BACTERIAL     Cervical spine CT w/o contrast   Final Result   IMPRESSION:   HEAD CT:   1.  No acute intracranial hemorrhage or calvarial fracture.      2.  Right mastoid effusion.      3.  Moderate volume loss and presumed chronic small vessel ischemic changes.      CERVICAL SPINE CT:   1.  No CT evidence for acute fracture or post traumatic subluxation.      2.  Degenerative changes as highlighted above.         Head CT w/o contrast   Final Result   IMPRESSION:   HEAD CT:   1.  No acute intracranial hemorrhage or calvarial fracture.      2.  Right mastoid effusion.      3.  Moderate volume loss and presumed chronic small vessel ischemic changes.      CERVICAL SPINE CT:   1.  No CT evidence for acute fracture or post traumatic subluxation.      2.  Degenerative changes as highlighted above.         Elbow XR, G/E 3 views, right   Final Result   IMPRESSION: No definite evidence for fracture or dislocation. There does appear to be an elbow effusion present, however, which can be associated with occult fracture. Clinical correlation and follow-up films would be helpful if symptoms persist.         Treatments provided: IV abx for UTI  Family Comments:Son updated  OBS brochure/video discussed/provided to patient:  N/A  ED Medications:   Medications   cefTRIAXone (ROCEPHIN) 1 g vial to attach to  mL bag for ADULTS or NS 50 mL bag for PEDS (0 g Intravenous Stopped 8/6/20 0226)     Drips infusing:  No  For the majority of the shift, the patient's behavior Green. Interventions performed were n/a.    Sepsis treatment initiated: No       ED Nurse Name/Phone Number: Quentin Bower RN,   4:54  AM  RECEIVING UNIT ED HANDOFF REVIEW    Above ED Nurse Handoff Report was reviewed: Yes  Reviewed by: Margo Lancaster RN on August 6, 2020 at 5:21 AM

## 2020-08-07 LAB
BACTERIA SPEC CULT: NO GROWTH
Lab: NORMAL
SPECIMEN SOURCE: NORMAL

## 2020-10-15 ENCOUNTER — APPOINTMENT (OUTPATIENT)
Dept: CT IMAGING | Facility: CLINIC | Age: 85
End: 2020-10-15
Attending: EMERGENCY MEDICINE
Payer: COMMERCIAL

## 2020-10-15 ENCOUNTER — HOSPITAL ENCOUNTER (EMERGENCY)
Facility: CLINIC | Age: 85
Discharge: HOME | End: 2020-10-15
Attending: EMERGENCY MEDICINE | Admitting: EMERGENCY MEDICINE
Payer: COMMERCIAL

## 2020-10-15 VITALS
OXYGEN SATURATION: 94 % | SYSTOLIC BLOOD PRESSURE: 134 MMHG | HEART RATE: 74 BPM | DIASTOLIC BLOOD PRESSURE: 60 MMHG | RESPIRATION RATE: 16 BRPM | TEMPERATURE: 97.6 F
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
## 2020-10-15 DIAGNOSIS — N30.00 ACUTE CYSTITIS WITHOUT HEMATURIA: ICD-10-CM

## 2020-10-15 DIAGNOSIS — K31.9 LESION OF STOMACH: ICD-10-CM

## 2020-10-15 LAB
ALBUMIN UR-MCNC: NEGATIVE MG/DL
ANION GAP SERPL CALCULATED.3IONS-SCNC: 6 MMOL/L (ref 3–14)
APPEARANCE UR: CLEAR
BACTERIA #/AREA URNS HPF: ABNORMAL /HPF
BASOPHILS # BLD AUTO: 0 10E9/L (ref 0–0.2)
BASOPHILS NFR BLD AUTO: 0.4 %
BILIRUB UR QL STRIP: NEGATIVE
BUN SERPL-MCNC: 21 MG/DL (ref 7–30)
CALCIUM SERPL-MCNC: 9.1 MG/DL (ref 8.5–10.1)
CHLORIDE SERPL-SCNC: 107 MMOL/L (ref 94–109)
CO2 SERPL-SCNC: 26 MMOL/L (ref 20–32)
COLOR UR AUTO: ABNORMAL
CREAT SERPL-MCNC: 0.69 MG/DL (ref 0.52–1.04)
DIFFERENTIAL METHOD BLD: ABNORMAL
EOSINOPHIL # BLD AUTO: 0 10E9/L (ref 0–0.7)
EOSINOPHIL NFR BLD AUTO: 0.3 %
ERYTHROCYTE [DISTWIDTH] IN BLOOD BY AUTOMATED COUNT: 12.3 % (ref 10–15)
GFR SERPL CREATININE-BSD FRML MDRD: 74 ML/MIN/{1.73_M2}
GLUCOSE SERPL-MCNC: 105 MG/DL (ref 70–99)
GLUCOSE UR STRIP-MCNC: NEGATIVE MG/DL
HCT VFR BLD AUTO: 37.5 % (ref 35–47)
HGB BLD-MCNC: 12 G/DL (ref 11.7–15.7)
HGB UR QL STRIP: ABNORMAL
IMM GRANULOCYTES # BLD: 0 10E9/L (ref 0–0.4)
IMM GRANULOCYTES NFR BLD: 0.4 %
KETONES UR STRIP-MCNC: NEGATIVE MG/DL
LEUKOCYTE ESTERASE UR QL STRIP: ABNORMAL
LYMPHOCYTES # BLD AUTO: 1.3 10E9/L (ref 0.8–5.3)
LYMPHOCYTES NFR BLD AUTO: 12.6 %
MCH RBC QN AUTO: 32.3 PG (ref 26.5–33)
MCHC RBC AUTO-ENTMCNC: 32 G/DL (ref 31.5–36.5)
MCV RBC AUTO: 101 FL (ref 78–100)
MONOCYTES # BLD AUTO: 0.7 10E9/L (ref 0–1.3)
MONOCYTES NFR BLD AUTO: 7.1 %
MUCOUS THREADS #/AREA URNS LPF: PRESENT /LPF
NEUTROPHILS # BLD AUTO: 7.9 10E9/L (ref 1.6–8.3)
NEUTROPHILS NFR BLD AUTO: 79.2 %
NITRATE UR QL: POSITIVE
NRBC # BLD AUTO: 0 10*3/UL
NRBC BLD AUTO-RTO: 0 /100
PH UR STRIP: 7 PH (ref 5–7)
PLATELET # BLD AUTO: 201 10E9/L (ref 150–450)
POTASSIUM SERPL-SCNC: 4.4 MMOL/L (ref 3.4–5.3)
RBC # BLD AUTO: 3.71 10E12/L (ref 3.8–5.2)
RBC #/AREA URNS AUTO: 5 /HPF (ref 0–2)
SODIUM SERPL-SCNC: 139 MMOL/L (ref 133–144)
SOURCE: ABNORMAL
SP GR UR STRIP: 1.01 (ref 1–1.03)
UROBILINOGEN UR STRIP-MCNC: NORMAL MG/DL (ref 0–2)
WBC # BLD AUTO: 10 10E9/L (ref 4–11)
WBC #/AREA URNS AUTO: 54 /HPF (ref 0–5)

## 2020-10-15 PROCEDURE — 87186 SC STD MICRODIL/AGAR DIL: CPT | Performed by: EMERGENCY MEDICINE

## 2020-10-15 PROCEDURE — 72128 CT CHEST SPINE W/O DYE: CPT

## 2020-10-15 PROCEDURE — 99285 EMERGENCY DEPT VISIT HI MDM: CPT | Mod: 25

## 2020-10-15 PROCEDURE — 72125 CT NECK SPINE W/O DYE: CPT

## 2020-10-15 PROCEDURE — 72131 CT LUMBAR SPINE W/O DYE: CPT

## 2020-10-15 PROCEDURE — 87088 URINE BACTERIA CULTURE: CPT | Performed by: EMERGENCY MEDICINE

## 2020-10-15 PROCEDURE — 81001 URINALYSIS AUTO W/SCOPE: CPT | Performed by: EMERGENCY MEDICINE

## 2020-10-15 PROCEDURE — 80048 BASIC METABOLIC PNL TOTAL CA: CPT | Performed by: EMERGENCY MEDICINE

## 2020-10-15 PROCEDURE — 70450 CT HEAD/BRAIN W/O DYE: CPT

## 2020-10-15 PROCEDURE — 250N000013 HC RX MED GY IP 250 OP 250 PS 637: Performed by: EMERGENCY MEDICINE

## 2020-10-15 PROCEDURE — 85025 COMPLETE CBC W/AUTO DIFF WBC: CPT | Performed by: EMERGENCY MEDICINE

## 2020-10-15 PROCEDURE — 87086 URINE CULTURE/COLONY COUNT: CPT | Performed by: EMERGENCY MEDICINE

## 2020-10-15 RX ORDER — ACETAMINOPHEN 500 MG
1000 TABLET ORAL ONCE
Status: COMPLETED | OUTPATIENT
Start: 2020-10-15 | End: 2020-10-15

## 2020-10-15 RX ORDER — CEPHALEXIN 500 MG/1
500 CAPSULE ORAL 2 TIMES DAILY
Qty: 14 CAPSULE | Refills: 0 | Status: SHIPPED | OUTPATIENT
Start: 2020-10-15 | End: 2020-10-22

## 2020-10-15 RX ADMIN — ACETAMINOPHEN 1000 MG: 500 TABLET, FILM COATED ORAL at 05:56

## 2020-10-15 NOTE — ED NOTES
MD Lobato called son, Nick, two times with no answer to provide CT results.  This nurse called once more and left message for son to return call. [phone]

## 2020-10-15 NOTE — ED AVS SNAPSHOT
[facility] Emergency Dept  [address]  [facility] [address]  Phone: [phone]  Fax: [phone]                                    Tracey Moran   MRN: [id number]    Department: [facility] Emergency Dept   Date of Visit: 10/15/2020           After Visit Summary Signature Page    I have received my discharge instructions, and my questions have been answered. I have discussed any challenges I see with this plan with the nurse or doctor.    ..........................................................................................................................................  Patient/Patient Representative Signature      ..........................................................................................................................................  Patient Representative Print Name and Relationship to Patient    ..................................................               ................................................  Date                                   Time    ..........................................................................................................................................  Reviewed by Signature/Title    ...................................................              ..............................................  Date                                               Time          22EPIC Rev 08/18

## 2020-10-15 NOTE — ED PROVIDER NOTES
History     Chief Complaint:  Fall      The history is provided by the patient and the EMS personnel. History limited by: dementia.      Tracey Moran is a 89 year old female who presents via EMS from her care facility for evaluation after fall. Per EMS, the patient had been assisted up by her caregivers at around 0300, and when they returned at 0400 they found the patient on the ground. Fall was unwitnessed, and the patient is unable to state what occurred secondary to her dementia. Per EMS/care facility staff, patient is at baseline for mentation and she has not had other recent falls. Pt reports her back hurts, but can not further specify where.     Allergies:  Meperidine  Penicillins    Medications:    Lisinopril  Metoprolol succinate  Senna-docusate    Past Medical History:    UTI  Hypertension  Anemia  CKD, stage 3  Dementia without behavioral disturbance  Hypothyroidism  Physical deconditioning  Falls  GI hemorrhage  Thrombocytopenia    Past Surgical History:    EGD    Family History:    History reviewed. No pertinent family history.     Social History:  The patient was accompanied to the ED by EMS.  Smoking Status: Never smoker  Smokeless Tobacco: Never used  Alcohol Use: Yes  Drug Use: No  Marital Status:      Review of Systems   Unable to perform ROS: Dementia     Physical Exam     Patient Vitals for the past 24 hrs:   BP Temp Temp src Pulse Resp SpO2   10/15/20 0705 -- -- -- -- -- 95 %   10/15/20 0700 129/59 -- -- 77 -- 95 %   10/15/20 0640 -- -- -- -- -- 95 %   10/15/20 0630 (!) 141/70 -- -- 79 -- 94 %   10/15/20 0620 -- -- -- -- -- 96 %   10/15/20 0615 (!) 158/71 -- -- 80 -- 95 %   10/15/20 0600 (!) 144/55 -- -- 83 -- 97 %   10/15/20 0515 (!) 163/76 -- -- 84 -- 96 %   10/15/20 0510 -- -- -- -- -- 96 %   10/15/20 0459 (!) 203/83 -- -- -- -- --   10/15/20 0453 -- 97.6  F (36.4  C) Oral 91 16 97 %       Physical Exam  Nursing note and vitals reviewed.  Constitutional: Cooperative. Pleasantly  confused.   HENT:   Mouth/Throat: Moist mucous membranes.   Eyes: EOMI, nonicteric sclera  Cardiovascular: Normal rate, regular rhythm, no murmurs, rubs, or gallops  Pulmonary/Chest: Effort normal and breath sounds normal. No respiratory distress. No wheezes. No rales.   Abdominal: Soft. Nontender, nondistended, no guarding or rigidity.   Musculoskeletal: Normal range of motion of all extremities and neck. No focal pain to palpation C/T/L spine.   Neurological: Alert. Oriented x1.  Moves all extremities spontaneously.   Skin: Skin is warm and dry. No rash noted. No external signs of trauma.     Emergency Department Course     Imaging:  Radiology findings were communicated with the patient who voiced understanding of the findings.    Head CT w/o contrast:  1.  No acute intracranial process.  Imaging independently reviewed and agree with radiologist interpretation.     CT Cervical/Thoracic/Lumbar Spine w/o Contrast:  1. No definite fracture throughout cervical, thoracic, and lumbar spine.  2.  Prominent degenerative changes, as described.  3.  Healing fractures right lower ribs.  4.  Abnormal thickening of proximal stomach, underlying mass cannot be excluded; fluoroscopic evaluation recommended.  5.  Multiple small lucencies throughout vertebral bodies, probably due to aggressive osteopenia; infiltrative marrow disorder could have similar appearance.  Imaging independently reviewed and agree with radiologist interpretation.     Laboratory:  Laboratory findings were communicated with the patient who voiced understanding of the findings.    CBC: WBC 10.0, HGB 12.0,   BMP: Glucose 105 (H), o/w WNL (Creatinine 0.69)    UA with micro: Trace blood (A), Nitrite positive (A), Moderate leukocyte esterase (A), WBC 54 (H), RBC 5 (H), Many bacteria (A), Mucous present (A), o/w negative  Urine Culture Aerobic Bacterial: Pending     Interventions:  0556 Tylenol 1,000 mg PO      Impression & Plan       Medical Decision  Making:  Patient presents after fall at her memory care unit.  She has no external signs of trauma.  She does complain of pain in her back, but is unable to be more specific.  I do not see any obvious traumatic injuries, but given the uncertain nature of her fall in addition to her age, advanced imaging was indicated.  No traumatic injuries were noted.  Patient did have several incidental findings on her CT scan.  The most notable incidental finding would be thickening of her stomach which is suggestive of possible malignancy.  I attempted to contact patient's son multiple times without success.  This information was shared with nursing home via nurse to nurse report, in addition to my discharge instructions.  Patient also had urinalysis suggestive of UTI.  We will plan on treating this with Keflex.  Patient therefore discharged back to memory care facility in stable condition.    Diagnosis:    ICD-10-CM    1. Fall, initial encounter  W19.XXXA    2. Acute cystitis without hematuria  N30.00    3. Lesion of stomach  K31.9        Disposition:  Discharged to home.    Discharge Medications:  New Prescriptions    CEPHALEXIN (KEFLEX) 500 MG CAPSULE    Take 1 capsule (500 mg) by mouth 2 times daily for 7 days       Scribe Disclosure:  IBess, am serving as a scribe at 4:54 AM on 10/15/2020 to document services personally performed by Enrike Arreola MD based on my observations and the provider's statements to me.     Bess Suarez  10/15/2020   [facility] EMERGENCY DEPT       Enrike Arreola MD  10/15/20 1821

## 2020-10-15 NOTE — DISCHARGE INSTRUCTIONS
Diagnosis: UTI, Fall, possible stomach mass - needs further evaluation   Plan: Antibiotics (Keflex) for 7 days.   Discuss CT findings (stomach lesion and bone density) with family and doctor.   I attempted to contact patient's son multiple times from emergency department without being able to speak about the stomach lesion.   Return Precautions:    Vomiting blood, fevers > 100.4, multiple falls, worsening dementia/confusion, or for any other concerns    Please read the remainder of your discharge instructions for more information.

## 2020-10-15 NOTE — ED TRIAGE NOTES
Pt arrives from her care facility following a fall this morning. EMS reports pt was assisted up by her caregivers around 0300, when they returned around 0400 pt was found on the ground. The fall was unwitnessed, pt is unable to state what occurred due to dementia. Reports pt is at baseline for mentation. Pt arrives only oriented to self.

## 2020-10-15 NOTE — ED NOTES
Renee [phone] Sofia KOLB  Patient's last fall 10/8. Vitals have been stable. OK to send patient back to care facility. Dr. Arreola notified

## 2020-10-16 LAB
BACTERIA SPEC CULT: ABNORMAL
Lab: ABNORMAL
SPECIMEN SOURCE: ABNORMAL

## 2020-10-16 NOTE — RESULT ENCOUNTER NOTE
Emergency Dept/Urgent Care discharge antibiotic (if prescribed): Cephalexin (Keflex) 500 mg capsule, 1 capsule (500 mg) by mouth 2 times daily for 7 days.  Date of Rx (if applicable):  10/15/20  No changes in treatment per Urine culture protocol.

## 2020-10-21 ENCOUNTER — NURSE TRIAGE (OUTPATIENT)
Dept: NURSING | Facility: CLINIC | Age: 85
End: 2020-10-21

## 2020-10-21 NOTE — TELEPHONE ENCOUNTER
Son niko calling about his mother's ED report from 10/15/20.  Writer able to answer his question about CT scan, UA/UC, blood work, and  follow up.  Given phone number to medical records.    Bebe Gallegos RN, [facility]    Additional Information    Health Information question, no triage required and triager able to answer question    Protocols used: INFORMATION ONLY CALL-A-AH

## 2020-11-24 ENCOUNTER — TRANSFERRED RECORDS (OUTPATIENT)
Dept: HEALTH INFORMATION MANAGEMENT | Facility: CLINIC | Age: 85
End: 2020-11-24

## 2020-11-24 ENCOUNTER — OFFICE VISIT - HEALTHEAST (OUTPATIENT)
Dept: GERIATRICS | Facility: CLINIC | Age: 85
End: 2020-11-24

## 2020-11-24 DIAGNOSIS — U07.1 2019 NOVEL CORONAVIRUS DISEASE (COVID-19): ICD-10-CM

## 2020-11-24 DIAGNOSIS — F03.90 DEMENTIA WITHOUT BEHAVIORAL DISTURBANCE, UNSPECIFIED DEMENTIA TYPE: ICD-10-CM

## 2020-11-24 DIAGNOSIS — R09.02 HYPOXIA: ICD-10-CM

## 2020-11-24 DIAGNOSIS — N18.32 STAGE 3B CHRONIC KIDNEY DISEASE (H): ICD-10-CM

## 2020-11-24 DIAGNOSIS — D64.9 ANEMIA, UNSPECIFIED TYPE: ICD-10-CM

## 2020-11-25 ENCOUNTER — OFFICE VISIT - HEALTHEAST (OUTPATIENT)
Dept: GERIATRICS | Facility: CLINIC | Age: 85
End: 2020-11-25

## 2020-11-25 ENCOUNTER — TRANSFERRED RECORDS (OUTPATIENT)
Dept: HEALTH INFORMATION MANAGEMENT | Facility: CLINIC | Age: 85
End: 2020-11-25

## 2020-11-25 DIAGNOSIS — J18.9 HCAP (HEALTHCARE-ASSOCIATED PNEUMONIA): ICD-10-CM

## 2020-11-25 DIAGNOSIS — U07.1 2019 NOVEL CORONAVIRUS DISEASE (COVID-19): ICD-10-CM

## 2020-11-25 DIAGNOSIS — F03.90 DEMENTIA WITHOUT BEHAVIORAL DISTURBANCE, UNSPECIFIED DEMENTIA TYPE: ICD-10-CM

## 2020-11-25 DIAGNOSIS — N18.32 STAGE 3B CHRONIC KIDNEY DISEASE (H): ICD-10-CM

## 2020-11-26 ENCOUNTER — RECORDS - HEALTHEAST (OUTPATIENT)
Dept: LAB | Facility: CLINIC | Age: 85
End: 2020-11-26

## 2020-11-27 LAB
ANION GAP SERPL CALCULATED.3IONS-SCNC: 10 MMOL/L (ref 5–18)
BASOPHILS # BLD AUTO: 0 THOU/UL (ref 0–0.2)
BASOPHILS NFR BLD AUTO: 0 % (ref 0–2)
BUN SERPL-MCNC: 51 MG/DL (ref 8–28)
C REACTIVE PROTEIN LHE: 4.5 MG/DL (ref 0–0.8)
CALCIUM SERPL-MCNC: 8.6 MG/DL (ref 8.5–10.5)
CHLORIDE BLD-SCNC: 110 MMOL/L (ref 98–107)
CO2 SERPL-SCNC: 24 MMOL/L (ref 22–31)
CREAT SERPL-MCNC: 1.13 MG/DL (ref 0.6–1.1)
EOSINOPHIL # BLD AUTO: 0 THOU/UL (ref 0–0.4)
EOSINOPHIL NFR BLD AUTO: 0 % (ref 0–6)
ERYTHROCYTE [DISTWIDTH] IN BLOOD BY AUTOMATED COUNT: 12.5 % (ref 11–14.5)
GFR SERPL CREATININE-BSD FRML MDRD: 45 ML/MIN/1.73M2
GLUCOSE BLD-MCNC: 110 MG/DL (ref 70–125)
HCT VFR BLD AUTO: 35.6 % (ref 35–47)
HGB BLD-MCNC: 11.8 G/DL (ref 12–16)
IMM GRANULOCYTES # BLD: 0 THOU/UL
IMM GRANULOCYTES NFR BLD: 1 %
LYMPHOCYTES # BLD AUTO: 0.6 THOU/UL (ref 0.8–4.4)
LYMPHOCYTES NFR BLD AUTO: 11 % (ref 20–40)
MCH RBC QN AUTO: 32.6 PG (ref 27–34)
MCHC RBC AUTO-ENTMCNC: 33.1 G/DL (ref 32–36)
MCV RBC AUTO: 98 FL (ref 80–100)
MONOCYTES # BLD AUTO: 0.2 THOU/UL (ref 0–0.9)
MONOCYTES NFR BLD AUTO: 4 % (ref 2–10)
NEUTROPHILS # BLD AUTO: 4.8 THOU/UL (ref 2–7.7)
NEUTROPHILS NFR BLD AUTO: 84 % (ref 50–70)
PLATELET # BLD AUTO: 126 THOU/UL (ref 140–440)
PMV BLD AUTO: 12.1 FL (ref 8.5–12.5)
POTASSIUM BLD-SCNC: 4.4 MMOL/L (ref 3.5–5)
PROCALCITONIN SERPL-MCNC: 0.12 NG/ML (ref 0–0.49)
RBC # BLD AUTO: 3.62 MILL/UL (ref 3.8–5.4)
SODIUM SERPL-SCNC: 144 MMOL/L (ref 136–145)
WBC: 5.7 THOU/UL (ref 4–11)

## 2020-11-28 ENCOUNTER — RECORDS - HEALTHEAST (OUTPATIENT)
Dept: LAB | Facility: CLINIC | Age: 85
End: 2020-11-28

## 2020-11-30 ENCOUNTER — COMMUNICATION - HEALTHEAST (OUTPATIENT)
Dept: GERIATRICS | Facility: CLINIC | Age: 85
End: 2020-11-30

## 2020-11-30 LAB
ANION GAP SERPL CALCULATED.3IONS-SCNC: 7 MMOL/L (ref 5–18)
BUN SERPL-MCNC: 32 MG/DL (ref 8–28)
C REACTIVE PROTEIN LHE: 3.2 MG/DL (ref 0–0.8)
CALCIUM SERPL-MCNC: 8.3 MG/DL (ref 8.5–10.5)
CHLORIDE BLD-SCNC: 114 MMOL/L (ref 98–107)
CO2 SERPL-SCNC: 22 MMOL/L (ref 22–31)
CREAT SERPL-MCNC: 0.69 MG/DL (ref 0.6–1.1)
GFR SERPL CREATININE-BSD FRML MDRD: >60 ML/MIN/1.73M2
GLUCOSE BLD-MCNC: 97 MG/DL (ref 70–125)
PLATELET # BLD AUTO: 170 THOU/UL (ref 140–440)
POTASSIUM BLD-SCNC: 4.5 MMOL/L (ref 3.5–5)
SODIUM SERPL-SCNC: 143 MMOL/L (ref 136–145)

## 2020-12-01 ENCOUNTER — TRANSFERRED RECORDS (OUTPATIENT)
Dept: HEALTH INFORMATION MANAGEMENT | Facility: CLINIC | Age: 85
End: 2020-12-01

## 2020-12-01 ENCOUNTER — OFFICE VISIT - HEALTHEAST (OUTPATIENT)
Dept: GERIATRICS | Facility: CLINIC | Age: 85
End: 2020-12-01

## 2020-12-01 DIAGNOSIS — U07.1 2019 NOVEL CORONAVIRUS DISEASE (COVID-19): ICD-10-CM

## 2020-12-01 DIAGNOSIS — F03.90 DEMENTIA WITHOUT BEHAVIORAL DISTURBANCE, UNSPECIFIED DEMENTIA TYPE: ICD-10-CM

## 2020-12-01 DIAGNOSIS — N18.32 STAGE 3B CHRONIC KIDNEY DISEASE (H): ICD-10-CM

## 2020-12-01 DIAGNOSIS — J18.9 HCAP (HEALTHCARE-ASSOCIATED PNEUMONIA): ICD-10-CM

## 2020-12-01 DIAGNOSIS — D69.6 THROMBOCYTOPENIA (H): ICD-10-CM

## 2020-12-02 ENCOUNTER — OFFICE VISIT - HEALTHEAST (OUTPATIENT)
Dept: GERIATRICS | Facility: CLINIC | Age: 85
End: 2020-12-02

## 2020-12-02 ENCOUNTER — MEDICAL CORRESPONDENCE (OUTPATIENT)
Dept: HEALTH INFORMATION MANAGEMENT | Facility: CLINIC | Age: 85
End: 2020-12-02

## 2020-12-02 DIAGNOSIS — F03.90 DEMENTIA WITHOUT BEHAVIORAL DISTURBANCE, UNSPECIFIED DEMENTIA TYPE: ICD-10-CM

## 2020-12-02 DIAGNOSIS — N18.32 STAGE 3B CHRONIC KIDNEY DISEASE (H): ICD-10-CM

## 2020-12-02 DIAGNOSIS — U07.1 2019 NOVEL CORONAVIRUS DISEASE (COVID-19): ICD-10-CM

## 2020-12-02 DIAGNOSIS — D64.9 ANEMIA, UNSPECIFIED TYPE: ICD-10-CM

## 2020-12-02 DIAGNOSIS — D69.6 THROMBOCYTOPENIA (H): ICD-10-CM

## 2020-12-02 DIAGNOSIS — J18.9 HCAP (HEALTHCARE-ASSOCIATED PNEUMONIA): ICD-10-CM

## 2020-12-08 ENCOUNTER — OFFICE VISIT - HEALTHEAST (OUTPATIENT)
Dept: GERIATRICS | Facility: CLINIC | Age: 85
End: 2020-12-08

## 2020-12-08 DIAGNOSIS — J18.9 HCAP (HEALTHCARE-ASSOCIATED PNEUMONIA): ICD-10-CM

## 2020-12-08 DIAGNOSIS — E46 MALNUTRITION, UNSPECIFIED TYPE (H): ICD-10-CM

## 2020-12-08 DIAGNOSIS — U07.1 2019 NOVEL CORONAVIRUS DISEASE (COVID-19): ICD-10-CM

## 2020-12-08 DIAGNOSIS — N18.32 STAGE 3B CHRONIC KIDNEY DISEASE (H): ICD-10-CM

## 2020-12-08 ASSESSMENT — MIFFLIN-ST. JEOR: SCORE: 860.37

## 2020-12-18 ENCOUNTER — OFFICE VISIT - HEALTHEAST (OUTPATIENT)
Dept: GERIATRICS | Facility: CLINIC | Age: 85
End: 2020-12-18

## 2020-12-18 DIAGNOSIS — J18.9 HCAP (HEALTHCARE-ASSOCIATED PNEUMONIA): ICD-10-CM

## 2020-12-18 DIAGNOSIS — E46 MALNUTRITION, UNSPECIFIED TYPE (H): ICD-10-CM

## 2020-12-18 DIAGNOSIS — D64.9 ANEMIA, UNSPECIFIED TYPE: ICD-10-CM

## 2020-12-18 DIAGNOSIS — N18.32 STAGE 3B CHRONIC KIDNEY DISEASE (H): ICD-10-CM

## 2020-12-18 DIAGNOSIS — U07.1 2019 NOVEL CORONAVIRUS DISEASE (COVID-19): ICD-10-CM

## 2020-12-18 DIAGNOSIS — F03.90 DEMENTIA WITHOUT BEHAVIORAL DISTURBANCE, UNSPECIFIED DEMENTIA TYPE: ICD-10-CM

## 2020-12-18 DIAGNOSIS — D69.6 THROMBOCYTOPENIA (H): ICD-10-CM

## 2020-12-18 ASSESSMENT — MIFFLIN-ST. JEOR: SCORE: 847.21

## 2020-12-28 ENCOUNTER — AMBULATORY - HEALTHEAST (OUTPATIENT)
Dept: GERIATRICS | Facility: CLINIC | Age: 85
End: 2020-12-28

## 2021-01-18 ENCOUNTER — APPOINTMENT (OUTPATIENT)
Dept: CT IMAGING | Facility: CLINIC | Age: 86
DRG: 281 | End: 2021-01-18
Attending: EMERGENCY MEDICINE
Payer: COMMERCIAL

## 2021-01-18 ENCOUNTER — APPOINTMENT (OUTPATIENT)
Dept: GENERAL RADIOLOGY | Facility: CLINIC | Age: 86
DRG: 281 | End: 2021-01-18
Attending: EMERGENCY MEDICINE
Payer: COMMERCIAL

## 2021-01-18 ENCOUNTER — HOSPITAL ENCOUNTER (INPATIENT)
Facility: CLINIC | Age: 86
LOS: 4 days | Discharge: HOME HEALTH | DRG: 281 | End: 2021-01-22
Attending: EMERGENCY MEDICINE | Admitting: HOSPITALIST
Payer: COMMERCIAL

## 2021-01-18 ENCOUNTER — APPOINTMENT (OUTPATIENT)
Dept: CARDIOLOGY | Facility: CLINIC | Age: 86
DRG: 281 | End: 2021-01-18
Attending: PHYSICIAN ASSISTANT
Payer: COMMERCIAL

## 2021-01-18 DIAGNOSIS — I21.3 ST ELEVATION MYOCARDIAL INFARCTION (STEMI), UNSPECIFIED ARTERY (H): ICD-10-CM

## 2021-01-18 DIAGNOSIS — I21.11 ST ELEVATION MYOCARDIAL INFARCTION INVOLVING RIGHT CORONARY ARTERY (H): Primary | ICD-10-CM

## 2021-01-18 DIAGNOSIS — N30.00 ACUTE CYSTITIS WITHOUT HEMATURIA: ICD-10-CM

## 2021-01-18 DIAGNOSIS — E86.0 DEHYDRATION: ICD-10-CM

## 2021-01-18 DIAGNOSIS — R47.01 APHASIA: ICD-10-CM

## 2021-01-18 LAB
ALBUMIN UR-MCNC: 50 MG/DL
ANION GAP SERPL CALCULATED.3IONS-SCNC: 4 MMOL/L (ref 3–14)
APPEARANCE UR: ABNORMAL
APTT PPP: 28 SEC (ref 22–37)
BACTERIA #/AREA URNS HPF: ABNORMAL /HPF
BASOPHILS # BLD AUTO: 0 10E9/L (ref 0–0.2)
BASOPHILS NFR BLD AUTO: 0.5 %
BILIRUB UR QL STRIP: NEGATIVE
BUN SERPL-MCNC: 45 MG/DL (ref 7–30)
CALCIUM SERPL-MCNC: 8.7 MG/DL (ref 8.5–10.1)
CHLORIDE SERPL-SCNC: 112 MMOL/L (ref 94–109)
CO2 SERPL-SCNC: 25 MMOL/L (ref 20–32)
COLOR UR AUTO: YELLOW
CREAT SERPL-MCNC: 1.11 MG/DL (ref 0.52–1.04)
DIFFERENTIAL METHOD BLD: ABNORMAL
EOSINOPHIL # BLD AUTO: 0.3 10E9/L (ref 0–0.7)
EOSINOPHIL NFR BLD AUTO: 4.4 %
ERYTHROCYTE [DISTWIDTH] IN BLOOD BY AUTOMATED COUNT: 15.1 % (ref 10–15)
GFR SERPL CREATININE-BSD FRML MDRD: 42 ML/MIN/{1.73_M2}
GLUCOSE SERPL-MCNC: 99 MG/DL (ref 70–99)
GLUCOSE UR STRIP-MCNC: NEGATIVE MG/DL
HCT VFR BLD AUTO: 32.9 % (ref 35–47)
HGB BLD-MCNC: 10.4 G/DL (ref 11.7–15.7)
HGB UR QL STRIP: NEGATIVE
HYALINE CASTS #/AREA URNS LPF: 2 /LPF (ref 0–2)
IMM GRANULOCYTES # BLD: 0 10E9/L (ref 0–0.4)
IMM GRANULOCYTES NFR BLD: 0.3 %
INTERPRETATION ECG - MUSE: NORMAL
KETONES UR STRIP-MCNC: NEGATIVE MG/DL
LEUKOCYTE ESTERASE UR QL STRIP: ABNORMAL
LYMPHOCYTES # BLD AUTO: 1.3 10E9/L (ref 0.8–5.3)
LYMPHOCYTES NFR BLD AUTO: 20.1 %
MCH RBC QN AUTO: 32.9 PG (ref 26.5–33)
MCHC RBC AUTO-ENTMCNC: 31.6 G/DL (ref 31.5–36.5)
MCV RBC AUTO: 104 FL (ref 78–100)
MONOCYTES # BLD AUTO: 0.5 10E9/L (ref 0–1.3)
MONOCYTES NFR BLD AUTO: 7.6 %
NEUTROPHILS # BLD AUTO: 4.4 10E9/L (ref 1.6–8.3)
NEUTROPHILS NFR BLD AUTO: 67.1 %
NITRATE UR QL: NEGATIVE
NRBC # BLD AUTO: 0 10*3/UL
NRBC BLD AUTO-RTO: 0 /100
PH UR STRIP: 5.5 PH (ref 5–7)
PLATELET # BLD AUTO: 196 10E9/L (ref 150–450)
POTASSIUM SERPL-SCNC: 4.2 MMOL/L (ref 3.4–5.3)
PROCALCITONIN SERPL-MCNC: 0.17 NG/ML
RBC # BLD AUTO: 3.16 10E12/L (ref 3.8–5.2)
RBC #/AREA URNS AUTO: 23 /HPF (ref 0–2)
SODIUM SERPL-SCNC: 141 MMOL/L (ref 133–144)
SOURCE: ABNORMAL
SP GR UR STRIP: 1.02 (ref 1–1.03)
TROPONIN I BLD-MCNC: 1.13 UG/L (ref 0–0.08)
TROPONIN I SERPL-MCNC: 1.23 UG/L (ref 0–0.04)
TROPONIN I SERPL-MCNC: 34.91 UG/L (ref 0–0.04)
TROPONIN I SERPL-MCNC: 85.51 UG/L (ref 0–0.04)
UFH PPP CHRO-ACNC: 0.25 IU/ML
UFH PPP CHRO-ACNC: 0.26 IU/ML
UROBILINOGEN UR STRIP-MCNC: NORMAL MG/DL (ref 0–2)
WBC # BLD AUTO: 6.6 10E9/L (ref 4–11)
WBC #/AREA URNS AUTO: >182 /HPF (ref 0–5)
WBC CLUMPS #/AREA URNS HPF: PRESENT /HPF

## 2021-01-18 PROCEDURE — 255N000002 HC RX 255 OP 636: Performed by: INTERNAL MEDICINE

## 2021-01-18 PROCEDURE — 84145 PROCALCITONIN (PCT): CPT | Performed by: INTERNAL MEDICINE

## 2021-01-18 PROCEDURE — 120N000001 HC R&B MED SURG/OB

## 2021-01-18 PROCEDURE — 99223 1ST HOSP IP/OBS HIGH 75: CPT | Mod: AI | Performed by: INTERNAL MEDICINE

## 2021-01-18 PROCEDURE — 93005 ELECTROCARDIOGRAM TRACING: CPT

## 2021-01-18 PROCEDURE — 250N000013 HC RX MED GY IP 250 OP 250 PS 637: Performed by: PHYSICIAN ASSISTANT

## 2021-01-18 PROCEDURE — 93005 ELECTROCARDIOGRAM TRACING: CPT | Mod: 76

## 2021-01-18 PROCEDURE — 999N000208 ECHOCARDIOGRAM COMPLETE

## 2021-01-18 PROCEDURE — 84484 ASSAY OF TROPONIN QUANT: CPT | Performed by: INTERNAL MEDICINE

## 2021-01-18 PROCEDURE — 258N000003 HC RX IP 258 OP 636: Performed by: EMERGENCY MEDICINE

## 2021-01-18 PROCEDURE — 96360 HYDRATION IV INFUSION INIT: CPT

## 2021-01-18 PROCEDURE — 36415 COLL VENOUS BLD VENIPUNCTURE: CPT | Performed by: PHYSICIAN ASSISTANT

## 2021-01-18 PROCEDURE — 99222 1ST HOSP IP/OBS MODERATE 55: CPT | Mod: 25 | Performed by: INTERNAL MEDICINE

## 2021-01-18 PROCEDURE — 36415 COLL VENOUS BLD VENIPUNCTURE: CPT | Performed by: INTERNAL MEDICINE

## 2021-01-18 PROCEDURE — 84484 ASSAY OF TROPONIN QUANT: CPT | Performed by: PHYSICIAN ASSISTANT

## 2021-01-18 PROCEDURE — 71045 X-RAY EXAM CHEST 1 VIEW: CPT

## 2021-01-18 PROCEDURE — 93325 DOPPLER ECHO COLOR FLOW MAPG: CPT

## 2021-01-18 PROCEDURE — 250N000011 HC RX IP 250 OP 636: Performed by: EMERGENCY MEDICINE

## 2021-01-18 PROCEDURE — 84484 ASSAY OF TROPONIN QUANT: CPT | Performed by: EMERGENCY MEDICINE

## 2021-01-18 PROCEDURE — 93325 DOPPLER ECHO COLOR FLOW MAPG: CPT | Mod: 26 | Performed by: INTERNAL MEDICINE

## 2021-01-18 PROCEDURE — 85520 HEPARIN ASSAY: CPT | Performed by: INTERNAL MEDICINE

## 2021-01-18 PROCEDURE — 70450 CT HEAD/BRAIN W/O DYE: CPT

## 2021-01-18 PROCEDURE — 36415 COLL VENOUS BLD VENIPUNCTURE: CPT | Performed by: EMERGENCY MEDICINE

## 2021-01-18 PROCEDURE — 258N000003 HC RX IP 258 OP 636: Performed by: PHYSICIAN ASSISTANT

## 2021-01-18 PROCEDURE — 84484 ASSAY OF TROPONIN QUANT: CPT

## 2021-01-18 PROCEDURE — 93308 TTE F-UP OR LMTD: CPT | Mod: 26 | Performed by: INTERNAL MEDICINE

## 2021-01-18 PROCEDURE — 85520 HEPARIN ASSAY: CPT | Performed by: PHYSICIAN ASSISTANT

## 2021-01-18 PROCEDURE — 80048 BASIC METABOLIC PNL TOTAL CA: CPT | Performed by: EMERGENCY MEDICINE

## 2021-01-18 PROCEDURE — 87086 URINE CULTURE/COLONY COUNT: CPT | Performed by: INTERNAL MEDICINE

## 2021-01-18 PROCEDURE — 99285 EMERGENCY DEPT VISIT HI MDM: CPT | Mod: 25

## 2021-01-18 PROCEDURE — 81001 URINALYSIS AUTO W/SCOPE: CPT | Performed by: INTERNAL MEDICINE

## 2021-01-18 PROCEDURE — 93321 DOPPLER ECHO F-UP/LMTD STD: CPT | Mod: 26 | Performed by: INTERNAL MEDICINE

## 2021-01-18 PROCEDURE — 85730 THROMBOPLASTIN TIME PARTIAL: CPT | Performed by: EMERGENCY MEDICINE

## 2021-01-18 PROCEDURE — 85025 COMPLETE CBC W/AUTO DIFF WBC: CPT | Performed by: EMERGENCY MEDICINE

## 2021-01-18 PROCEDURE — 250N000013 HC RX MED GY IP 250 OP 250 PS 637: Performed by: EMERGENCY MEDICINE

## 2021-01-18 RX ORDER — CLOPIDOGREL BISULFATE 75 MG/1
75 TABLET ORAL DAILY
Status: DISCONTINUED | OUTPATIENT
Start: 2021-01-19 | End: 2021-01-22 | Stop reason: HOSPADM

## 2021-01-18 RX ORDER — HYDROMORPHONE HYDROCHLORIDE 1 MG/ML
0.2 INJECTION, SOLUTION INTRAMUSCULAR; INTRAVENOUS; SUBCUTANEOUS
Status: DISCONTINUED | OUTPATIENT
Start: 2021-01-18 | End: 2021-01-20

## 2021-01-18 RX ORDER — LORAZEPAM 2 MG/ML
0.5 INJECTION INTRAMUSCULAR EVERY 6 HOURS PRN
Status: DISCONTINUED | OUTPATIENT
Start: 2021-01-18 | End: 2021-01-22 | Stop reason: HOSPADM

## 2021-01-18 RX ORDER — ASPIRIN 81 MG/1
324 TABLET, CHEWABLE ORAL ONCE
Status: COMPLETED | OUTPATIENT
Start: 2021-01-18 | End: 2021-01-18

## 2021-01-18 RX ORDER — HEPARIN SODIUM 10000 [USP'U]/100ML
12 INJECTION, SOLUTION INTRAVENOUS CONTINUOUS
Status: DISPENSED | OUTPATIENT
Start: 2021-01-18 | End: 2021-01-20

## 2021-01-18 RX ORDER — NALOXONE HYDROCHLORIDE 0.4 MG/ML
0.4 INJECTION, SOLUTION INTRAMUSCULAR; INTRAVENOUS; SUBCUTANEOUS
Status: DISCONTINUED | OUTPATIENT
Start: 2021-01-18 | End: 2021-01-22 | Stop reason: HOSPADM

## 2021-01-18 RX ORDER — HEPARIN SODIUM 10000 [USP'U]/100ML
0-5000 INJECTION, SOLUTION INTRAVENOUS CONTINUOUS
Status: DISCONTINUED | OUTPATIENT
Start: 2021-01-18 | End: 2021-01-18

## 2021-01-18 RX ORDER — ACETAMINOPHEN 500 MG
500 TABLET ORAL 3 TIMES DAILY PRN
COMMUNITY

## 2021-01-18 RX ORDER — NALOXONE HYDROCHLORIDE 0.4 MG/ML
0.2 INJECTION, SOLUTION INTRAMUSCULAR; INTRAVENOUS; SUBCUTANEOUS
Status: DISCONTINUED | OUTPATIENT
Start: 2021-01-18 | End: 2021-01-22 | Stop reason: HOSPADM

## 2021-01-18 RX ORDER — ASPIRIN 81 MG/1
81 TABLET, CHEWABLE ORAL DAILY
Status: DISCONTINUED | OUTPATIENT
Start: 2021-01-19 | End: 2021-01-22 | Stop reason: HOSPADM

## 2021-01-18 RX ORDER — ACETAMINOPHEN 325 MG/1
650 TABLET ORAL EVERY 4 HOURS PRN
Status: DISCONTINUED | OUTPATIENT
Start: 2021-01-18 | End: 2021-01-20

## 2021-01-18 RX ORDER — CLOPIDOGREL BISULFATE 75 MG/1
300 TABLET ORAL ONCE
Status: COMPLETED | OUTPATIENT
Start: 2021-01-18 | End: 2021-01-18

## 2021-01-18 RX ORDER — AMOXICILLIN 250 MG
1 CAPSULE ORAL 2 TIMES DAILY PRN
Status: DISCONTINUED | OUTPATIENT
Start: 2021-01-18 | End: 2021-01-22 | Stop reason: HOSPADM

## 2021-01-18 RX ORDER — SODIUM CHLORIDE 9 MG/ML
INJECTION, SOLUTION INTRAVENOUS CONTINUOUS
Status: DISCONTINUED | OUTPATIENT
Start: 2021-01-18 | End: 2021-01-18 | Stop reason: CLARIF

## 2021-01-18 RX ORDER — SODIUM CHLORIDE 9 MG/ML
INJECTION, SOLUTION INTRAVENOUS CONTINUOUS
Status: DISCONTINUED | OUTPATIENT
Start: 2021-01-18 | End: 2021-01-20

## 2021-01-18 RX ORDER — METOPROLOL TARTRATE 25 MG/1
12.5 TABLET, FILM COATED ORAL 2 TIMES DAILY
COMMUNITY

## 2021-01-18 RX ORDER — LISINOPRIL 20 MG/1
20 TABLET ORAL DAILY
Status: DISCONTINUED | OUTPATIENT
Start: 2021-01-18 | End: 2021-01-22 | Stop reason: HOSPADM

## 2021-01-18 RX ORDER — ACETAMINOPHEN 500 MG
500 TABLET ORAL 3 TIMES DAILY PRN
Status: DISCONTINUED | OUTPATIENT
Start: 2021-01-18 | End: 2021-01-18 | Stop reason: ALTCHOICE

## 2021-01-18 RX ORDER — LIDOCAINE 40 MG/G
CREAM TOPICAL
Status: DISCONTINUED | OUTPATIENT
Start: 2021-01-18 | End: 2021-01-18

## 2021-01-18 RX ORDER — LORAZEPAM 0.5 MG/1
0.5 TABLET ORAL EVERY 6 HOURS PRN
Status: DISCONTINUED | OUTPATIENT
Start: 2021-01-18 | End: 2021-01-22 | Stop reason: HOSPADM

## 2021-01-18 RX ADMIN — HUMAN ALBUMIN MICROSPHERES AND PERFLUTREN 3 ML: 10; .22 INJECTION, SOLUTION INTRAVENOUS at 14:29

## 2021-01-18 RX ADMIN — HEPARIN SODIUM 12 UNITS/KG/HR: 10000 INJECTION, SOLUTION INTRAVENOUS at 11:49

## 2021-01-18 RX ADMIN — CLOPIDOGREL BISULFATE 300 MG: 75 TABLET ORAL at 19:35

## 2021-01-18 RX ADMIN — MICONAZOLE NITRATE: 20 POWDER TOPICAL at 22:36

## 2021-01-18 RX ADMIN — ASPIRIN 81 MG CHEWABLE TABLET 324 MG: 81 TABLET CHEWABLE at 11:12

## 2021-01-18 RX ADMIN — SODIUM CHLORIDE 500 ML: 9 INJECTION, SOLUTION INTRAVENOUS at 10:34

## 2021-01-18 RX ADMIN — Medication 12.5 MG: at 22:37

## 2021-01-18 RX ADMIN — SODIUM CHLORIDE: 9 INJECTION, SOLUTION INTRAVENOUS at 14:50

## 2021-01-18 ASSESSMENT — MIFFLIN-ST. JEOR: SCORE: 847.25

## 2021-01-18 ASSESSMENT — ACTIVITIES OF DAILY LIVING (ADL)
ADLS_ACUITY_SCORE: 29
ADLS_ACUITY_SCORE: 29

## 2021-01-18 NOTE — ED NOTES
Update given to Elliot from [facility]. She states to give home care service a call when pt ready for discharge.    Cell [phone]  Fax [phone]

## 2021-01-18 NOTE — PROGRESS NOTES
Discussed with ER and reviewed ECG.  Likely inferior STEMI but due to dementia and POA wishes will do comfort care and conservative management due to underlying mental status and comorbidities.    Aspirin, heparin and DAPT. Echo is reasonable.

## 2021-01-18 NOTE — ED NOTES
"[facility]  ED Nurse Handoff Report    Chief Complaint:  Altered Mental Status     HPI   Tracey Moran is a 89 year old female with history of hypertension, CKD, CRF, dementia, and hypercholesterolemia who presents by ambulance from group home for evaluation of reported weakness possible worsening confusion and also reported blood pressure and oxygen saturation concerns.  However on medic arrival, she had normal oxygen saturations and blood pressure.  She has profound dementia and is unable to provide any meaningful history.  There has been no reported fever or history of trauma.  She does have a history of recurrent urinary tract infections, it is uncertain whether she is currently being treated, there are no antibiotics listed on her current treatment regimen.  She does have a POLST stipulating full code.     Review of Systems   Unable to perform ROS: Dementia     ED Chief complaint: Altered Mental Status  ED Diagnosis:   Final diagnoses:   Dehydration   ST elevation myocardial infarction (STEMI), unspecified artery (H)     Allergies:   Allergies   Allergen Reactions     Demerol [Meperidine]      Dust Mites      Peanuts [Nuts]      Penicillins      Pollen Extract        Code Status: Full Code  Activity level - Baseline/Home:  Total Care. Activity Level - Current:   Total Care. Lift room needed: Yes. Bariatric: No   Needed: No   Isolation: No. Infection: Not Applicable.     Vital Signs:   Vitals:    01/18/21 1026 01/18/21 1030 01/18/21 1044 01/18/21 1045   BP:  138/66     Pulse: 73 73  68   Resp: 20 13  14   TempSrc:       SpO2: 91%   90%   Weight:   49.9 kg (110 lb 0.2 oz)    Height:   1.575 m (5' 2\")        Cardiac Rhythm:       Pain level:    Patient confused: Yes. Patient Falls Risk: Yes.   Elimination Status: Straight Cath performed in ED   Patient Report - Initial Complaint: Altered Mental Status, Generalized Weakness  Focused Assessment:   09:00 Neurological MS       Details: "   Cognitive - Cognitive/Neuro/Behavioral WDL: .WDL except  Level of Consciousness: confused  Arousal Level: opens eyes spontaneously  Orientation: disoriented x 4  Follows Commands: no  Speech: JUAN DAVID (unable to assess)  Best Language: 0 - No aphasia  Mood/Behavior: calm   [place] Coma Scale - Best Eye Response: 4-->(E4) spontaneous  Best Motor Response: 6-->(M6) obeys commands  Best Verbal Response: 4-->(V4) confused  [place] Coma Scale Score: 14           Tests Performed: EKG, Chest X-Ray, Blood Work, CT Head  Abnormal Results:   Labs Ordered and Resulted from Time of ED Arrival Up to the Time of Departure from the ED   BASIC METABOLIC PANEL - Abnormal; Notable for the following components:       Result Value    Chloride 112 (*)     Urea Nitrogen 45 (*)     Creatinine 1.11 (*)     GFR Estimate 42 (*)     GFR Estimate If Black 49 (*)     All other components within normal limits   CBC WITH PLATELETS DIFFERENTIAL - Abnormal; Notable for the following components:    RBC Count 3.16 (*)     Hemoglobin 10.4 (*)     Hematocrit 32.9 (*)      (*)     RDW 15.1 (*)     All other components within normal limits   TROPONIN POCT - Abnormal; Notable for the following components:    Troponin I 1.13 (*)     All other components within normal limits   PARTIAL THROMBOPLASTIN TIME   TROPONIN I   ROUTINE UA WITH MICROSCOPIC   PERIPHERAL IV CATHETER   MEASURE WEIGHT   NOTIFY PHYSICIAN   NOTIFY PHYSICIAN     XR Chest Port 1 View   Final Result   IMPRESSION: Single portable AP view of the chest was obtained. Stable   cardiomediastinal silhouette. Atherosclerotic vascular calcification   of the aortic knob. No suspicious focal pulmonary opacities. No   significant pleural effusion or pneumothorax. Right upper quadrant   post cholecystectomy clips. Degenerative changes of the spine.      RADHA YANES MD      CT Head w/o Contrast    (Results Pending)       Treatments provided: NS Bolus, ASA, Heparin.   Family Comments: Nick, son  updated on status and admission.     OBS brochure/video discussed/provided to patient:  N/A  ED Medications:   Medications   0.9% sodium chloride BOLUS (500 mLs Intravenous New Bag 1/18/21 1034)     Followed by   sodium chloride 0.9% infusion (has no administration in time range)   aspirin (ASA) chewable tablet 324 mg (has no administration in time range)   heparin loading dose for LOW INTENSITY TREATMENT * Give BEFORE starting heparin infusion (has no administration in time range)   heparin 25,000 units in 0.45% NaCl 250 mL ANTICOAGULANT  infusion (has no administration in time range)     Drips infusing:  Yes  For the majority of the shift, the patient's behavior Green. Interventions performed were N/A.    Sepsis treatment initiated: No     Patient tested for COVID 19 prior to admission: YES    ED Nurse Name/Phone Number: Patience Dwyer RN,   11:03 AM  RECEIVING UNIT ED HANDOFF REVIEW    Above ED Nurse Handoff Report was reviewed: Yes  Reviewed by: Sofia Marc RN on January 18, 2021 at 1:07 PM   Did you vocera the ED RN: yes

## 2021-01-18 NOTE — LETTER
Deepika Yung RN Case Manager  Inpatient Care Coordination  [facility]   [phone]

## 2021-01-18 NOTE — ED NOTES
DATE:  1/18/2021   TIME OF RECEIPT FROM LAB:  11:21  LAB TEST:  Troponin  LAB VALUE:  1.232  RESULTS GIVEN WITH READ-BACK TO (PROVIDER):  Anjel  TIME LAB VALUE REPORTED TO PROVIDER:   11:25

## 2021-01-18 NOTE — ED NOTES
Bed: ED08  Expected date: 1/18/21  Expected time: 8:32 AM  Means of arrival: Ambulance  Comments:  bv3

## 2021-01-18 NOTE — PHARMACY-ADMISSION MEDICATION HISTORY
Admission medication history interview status for this patient is complete. See [facility] admission navigator for allergy information, prior to admission medications and immunization status.     Medication history interview done via telephone during Covid-19 pandemic, indicate source(s): NH mar  Medication history resources (including written lists, pill bottles, clinic record):MAR from [facility]    Changes made to PTA medication list:  Added: metoprolol tartrate  Deleted: metoprolol succinate and miralax  Changed: tylenol from q6h prn to three times daily prn.     Actions taken by pharmacist (provider contacted, etc):None     Additional medication history information:None    Medication reconciliation/reorder completed by provider prior to medication history?  N   (Y/N)        Prior to Admission medications    Medication Sig Last Dose Taking? Auth Provider   acetaminophen (TYLENOL) 500 MG tablet Take 500 mg by mouth 3 times daily as needed for mild pain  at prn Yes Unknown, Entered By History   Criselda Protect (EUCERIN) external cream Apply topically 2 times daily Criselda Barrier Cream (apply to incontinence rash) 1/17/2021 at Unknown time Yes Guilherme Graves MD   diclofenac (VOLTAREN) 1 % topical gel Place 2 g onto the skin 3 times daily For right knee pain 1/17/2021 at Unknown time Yes Reported, Patient   lisinopril (PRINIVIL/ZESTRIL) 20 MG tablet TAKE ONE TABLET BY MOUTH TWICE DAILY. HOLD IF SYSTOLIC BLOOD PRESSURE IS LESS THAN 110 MMHG. CALL IF HELD TWICE IN A ROW OR IF SYMPTOMATIC. 1/17/2021 at Unknown time Yes Guilherme Graves MD   metoprolol tartrate (LOPRESSOR) 25 MG tablet Take 12.5 mg by mouth 2 times daily 1/17/2021 at Unknown time Yes Unknown, Entered By History   multivitamin w/minerals (THERA-VIT-M) tablet Take 1 tablet by mouth daily 1/17/2021 at Unknown time Yes Unknown, Entered By History   Nutritional Supplements (ENSURE PO) 1 two times daily (Family supplies) 1/17/2021 at Unknown time Yes Unknown,  Entered By History   nystatin (MYCOSTATIN) 903600 UNIT/GM external powder Apply topically 2 times daily Apply to rash under bilateral breast until resolved 1/17/2021 at Unknown time Yes Unknown, Entered By History   senna-docusate (SENOKOT-S/PERICOLACE) 8.6-50 MG tablet Take 1 tablet by mouth 2 times daily as needed for constipation  at prn Yes Agustina Sanchez, TONY

## 2021-01-18 NOTE — ED PROVIDER NOTES
"  History   Chief Complaint:  Altered Mental Status     HPI   Tracey Moran is a 89 year old female with history of hypertension, CKD, CRF, dementia, and hypercholesterolemia who presents by ambulance from alf for evaluation of reported weakness possible worsening confusion and also reported blood pressure and oxygen saturation concerns.  However on medic arrival, she had normal oxygen saturations and blood pressure.  She has profound dementia and is unable to provide any meaningful history.  There has been no reported fever or history of trauma.  She does have a history of recurrent urinary tract infections, it is uncertain whether she is currently being treated, there are no antibiotics listed on her current treatment regimen.  She does have a POLST stipulating full code.    Review of Systems   Unable to perform ROS: Dementia     Allergies:  Demerol [Meperidine]  Dust Mites  Peanuts [Nuts]  Penicillins  Pollen Extract    Medications:  Acetaminophen 500 mg   Lisinopril   Metoprolol   Cerovite  Senna-Docusate     Past Medical History:    Hypertension   UTI   Anemia  CKD - Stage 3   Dementia   Right Knee Pain   Hypothyroidism   Gastrointestinal Hemorrhage   Thrombocytopenia   CRF  Leukocytosis     Past Surgical History:    EGD     Social History:  Arrives via EMS    Physical Exam     Patient Vitals for the past 24 hrs:   BP Temp Temp src Pulse Resp SpO2 Height Weight   01/18/21 1200 123/59 -- -- 69 14 94 % -- --   01/18/21 1145 (!) 157/60 -- -- 68 13 95 % -- --   01/18/21 1130 (!) 144/61 -- -- 68 15 98 % -- --   01/18/21 1115 (!) 160/59 -- -- 73 11 98 % -- --   01/18/21 1045 (!) 144/65 -- -- 68 14 90 % -- --   01/18/21 1044 -- -- -- -- -- -- 1.575 m (5' 2\") 49.9 kg (110 lb 0.2 oz)   01/18/21 1030 138/66 -- -- 73 13 -- -- --   01/18/21 1026 -- -- -- 73 20 91 % -- --   01/18/21 1000 136/64 -- -- 71 -- (!) 89 % -- --   01/18/21 0900 -- 97.1  F (36.2  C) Temporal -- -- -- -- --   01/18/21 0848 (!) 158/70 -- " Temporal 74 20 90 % -- --       Physical Exam  General: Patient is awake.  Looks to left.    HENT:  No trauma, no apparent facial weakness or asymmetry.   Eyes: PERRLA.  Does not cooperate with  EOMI or vision evaluations.  Neck:  Normal range of motion and appearance.   Cardiovascular:  Normal rate, regular rhythm and normal heart sounds.   Pulmonary/Chest:  Effort normal. No wheezing or crackles.  Abdominal: Soft. No distension or tenderness.     Musculoskeletal: Normal range of motion. No edema or tenderness.   Neurological: poorly verbal.  Occasionally follows simple commands.  Does not answer questions appropriately.   Skin: Warm and dry. No rash or bruising.     Emergency Department Course     ECG:  ECG taken at 1009, ECG read at 1010  Normal sinus rhythm   Left ventricular hypertrophy with repolarization abnormality   ST elevation, consider inferior injury or acute infarct   Acute MI/STEMI  Consider right ventricular involvement in acute inferior infarct   Abnormal ECG  Rate 72 bpm. CA interval 180. QRS duration 106. QT/QTc 408/446. P-R-T axes 39 -29 80.    ECG:  ECG taken at 1014, ECG read at 1014  Normal sinus rhythm   Left ventricular hypertrophy with repolarization abnormality   Abnormal ECG  Rate 72 bpm. CA interval 166. QRS duration 94. QT/QTc 404/442. P-R-T axes 29 -28 71.    Imaging:    CT Head w/o Contrast:  1. No acute findings.  2. Moderate small vessel ischemic disease.  Reading per radiology    XR Chest Port 1 View:  Single portable AP view of the chest was obtained. Stable  cardiomediastinal silhouette. Atherosclerotic vascular calcification  of the aortic knob. No suspicious focal pulmonary opacities. No  significant pleural effusion or pneumothorax. Right upper quadrant  post cholecystectomy clips. Degenerative changes of the spine.  Reading per radiology    Laboratory:  CBC: WBC 6.6, HGB 10.4 (L),   BMP: Glucose 99, Creatinine 1.11 (H), Chloride 112 (H), BUN 45 (H), GFR 42 (L), o/w  WNL    Troponin (Collected 952): 1.232 ()  Troponin POCT (Collected ): 1.13 ()  PTT: 28    UA with Microscopic: Ordered     Heparin Unfractionated Anti Xa Level: Pending     Emergency Department Course:    Reviewed:  I reviewed nursing notes and care everywhere    Assessments:  912 I obtained history and examined the patient as noted above.   1016 I rechecked the patient.   1022 I called the patient's son, who is the patient's power of , for further history on the patient and informed her of her condition.     Consults:   1045   I spoke with Dr. Barrera of the Cardiology service regarding patient's presentation, findings, and plan of care. He advised for medical management and agrees with my interpretation of a STEMI on the patient's ECG's.   1055   I spoke with Dr. Carver of the hospitalist service regarding patient's presentation, findings, and plan of care.     Interventions:  1034 0.9% NaCl bolus 500 mL IV  1112 Aspirin 324 mg PO  1137 Heparin Loading Dose 3,000 Units IV  1149 Heparin 25,000 Units 12 Units/kg/hr IV    Disposition:  The patient was admitted to the hospital under the care of Dr. Carver.       Impression & Plan   Medical Decision Makin-year-old female arrives by ambulance for evaluation of poor p.o. intake, and decreased strength and interaction.  She has a history of dementia.  Medics report initial call was for concerns of hypotension and hypoxia as well but that on their arrival these were within normal limits.  Patient exhibited no sign of physical or respiratory distress but is clearly unable to provide a meaningful history often falling asleep.  Surprisingly, EKG appeared to demonstrate an acute inferior STEMI.  Labs have included a troponin of 1.232.  Others are largely unremarkable.  CT head shows no acute findings and chest x-ray nothing acute.  After reviewing the initial EKG, a repeat 5 minutes later was obtained showing similar findings.  Paperwork from [facility] facility  includes a POLST indicating full code.  I contacted her son Jonny listed as power of  to discuss clinical presentation and EKG findings.  He expresses understanding and for now believes that medical management is appropriate with nothing invasive such as PTCA.  He understands the gravity of her situation.  She was medicated with aspirin and unfractionated heparin.  I deck spoke with our on-call cardiologist Dr. Barrera who is in agreement with this plan.  She is being admitted to the hospitalist service for further management.  She has remained hemodynamically stable with no real change in her mentation.    Covid-19  Tracey Moran was evaluated during a global COVID-19 pandemic, which necessitated consideration that the patient might be at risk for infection with the SARS-CoV-2 virus that causes COVID-19.   Applicable protocols for evaluation were followed during the patient's care. COVID-19 was considered as part of the patient's evaluation. The plan for testing is: a test was obtained at a previous visit and reviewed & considered today.    Diagnosis:    ICD-10-CM    1. Dehydration  E86.0 Troponin POCT     Troponin POCT     Partial thromboplastin time     Partial thromboplastin time     Troponin I     Troponin I     CANCELED: Troponin I     CANCELED: Partial thromboplastin time     CANCELED: CBC with platelets   2. ST elevation myocardial infarction (STEMI), unspecified artery (H)  I21.3        Scribe Disclosure:  I, Ayden Dejesus, am serving as a scribe at 8:43 AM on 1/18/2021 to document services personally performed by Casper Hobbs MD based on my observations and the provider's statements to me.              Casper Hobbs MD  01/18/21 [id number]

## 2021-01-18 NOTE — CONSULTS
"Cardiology Consultation      Tracey Moran MRN# [id number]   YOB: 1925 Age: 89 year old   Date of Admission: 2021     Reason for consult:  STEMI           Assessment and Plan:     1. Inferior STEMI    Wall motion abnormalities limited to the inferior territory, ejection fraction overall preserved.    Agree with proceeding conservatively due to lack of symptoms and dementia as well as family wishes.    Also strongly support DNR/DNI status from a cardiology perspective.  Family still deciding.    Aspirin, 48 hours of heparin and 1 year of dual antiplatelet therapy plus ongoing statin is reasonable.    Please call with questions.               Chief Complaint:   Altered Mental Status           History of Present Illness:   This patient is a 89 year old female who presents with failure to thrive as well as her dementia and found on ECG to have what appears to be inferior STEMI.  Ejection fraction on echocardiogram remains preserved.  Wall motion abnormalities are limited to the inferior segment.    Due to lack of specific symptoms and advanced age along with her dementia, the decision was made by family and staff to manage her STEMI conservatively.         Physical Exam:   Vitals were reviewed  Blood pressure (!) 154/49, pulse 62, temperature 97.7  F (36.5  C), temperature source Axillary, resp. rate 16, height 1.575 m (5' 2\"), weight 49.9 kg (110 lb 0.2 oz), SpO2 97 %, not currently breastfeeding.  Temperatures:  Current - Temp: 97.7  F (36.5  C); Max - Temp  Av.5  F (36.4  C)  Min: 97.1  F (36.2  C)  Max: 97.7  F (36.5  C)  Respiration range: Resp  Av.6  Min: 9  Max: 20  Pulse range: Pulse  Av.5  Min: 56  Max: 74  Blood pressure range: Systolic (24hrs), Av , Min:123 , Max:160   ; Diastolic (24hrs), Av, Min:42, Max:70    Pulse oximetry range: SpO2  Av.4 %  Min: 84 %  Max: 100 %  No intake or output data in the 24 hours ending 21 [id number]  Constitutional:   Sleeping " comfortably, no apparent distress, and appears stated age     Eyes:   Lids and lashes normal     Neck:   supple, symmetrical, trachea midline,      Back:   symmetric     Lungs:   Symmetric     Cardiovascular:   Regular     Abdomen:   non-tender           Neurologic:   Grossly nonfocal, dementia     Skin:   normal skin color, texture, turgor     Additional findings:                  Past Medical History:   I have reviewed this patient's past medical history  Past Medical History:   Diagnosis Date     Hypertension              Past Surgical History:   I have reviewed this patient's past surgical history  Past Surgical History:   Procedure Laterality Date     ESOPHAGOSCOPY, GASTROSCOPY, DUODENOSCOPY (EGD), COMBINED N/A 12/6/2017    Procedure: COMBINED ESOPHAGOSCOPY, GASTROSCOPY, DUODENOSCOPY (EGD);  gastroscopy;  Surgeon: Melchor Mancera MD;  Location: [facility]               Social History:   I have reviewed this patient's social history  Social History     Tobacco Use     Smoking status: Never Smoker     Smokeless tobacco: Never Used   Substance Use Topics     Alcohol use: Yes             Family History:   I have reviewed this patient's family history  And noncontributory to current presentation          Allergies:     Allergies   Allergen Reactions     Demerol [Meperidine]      Dust Mites      Peanuts [Nuts]      Penicillins      Pollen Extract              Medications:   I have reviewed this patient's current medications  Medications Prior to Admission   Medication Sig Dispense Refill Last Dose     acetaminophen (TYLENOL) 500 MG tablet Take 500 mg by mouth 3 times daily as needed for mild pain    at prn     Criselda Protect (EUCERIN) external cream Apply topically 2 times daily Criselda Barrier Cream (apply to incontinence rash)   1/17/2021 at Unknown time     diclofenac (VOLTAREN) 1 % topical gel Place 2 g onto the skin 3 times daily For right knee pain   1/17/2021 at Unknown time     lisinopril (PRINIVIL/ZESTRIL) 20 MG  tablet TAKE ONE TABLET BY MOUTH TWICE DAILY. HOLD IF SYSTOLIC BLOOD PRESSURE IS LESS THAN 110 MMHG. CALL IF HELD TWICE IN A ROW OR IF SYMPTOMATIC. 180 tablet 2 1/17/2021 at Unknown time     metoprolol tartrate (LOPRESSOR) 25 MG tablet Take 12.5 mg by mouth 2 times daily   1/17/2021 at Unknown time     multivitamin w/minerals (THERA-VIT-M) tablet Take 1 tablet by mouth daily   1/17/2021 at Unknown time     Nutritional Supplements (ENSURE PO) 1 two times daily (Family supplies)   1/17/2021 at Unknown time     nystatin (MYCOSTATIN) 674419 UNIT/GM external powder Apply topically 2 times daily Apply to rash under bilateral breast until resolved   1/17/2021 at Unknown time     senna-docusate (SENOKOT-S/PERICOLACE) 8.6-50 MG tablet Take 1 tablet by mouth 2 times daily as needed for constipation    at prn     Current Facility-Administered Medications Ordered in Epic   Medication Dose Route Frequency Last Rate Last Admin     acetaminophen (TYLENOL) tablet 650 mg  650 mg Oral Q4H PRN         [START ON 1/19/2021] aspirin (ASA) chewable tablet 81 mg  81 mg Oral Daily         clopidogrel (PLAVIX) tablet 300 mg  300 mg Oral Once         [START ON 1/19/2021] clopidogrel (PLAVIX) tablet 75 mg  75 mg Oral Daily         Continuing ACE inhibitor/ARB/ARNI from home medication list OR ACE inhibitor/ARB order already placed during this visit   Does not apply DOES NOT GO TO MAR         Continuing beta blocker from home medication list OR beta blocker order already placed during this visit   Does not apply DOES NOT GO TO MAR         Continuing statin from home medication list OR statin order already placed during this visit   Does not apply DOES NOT GO TO MAR         heparin 25,000 units in 0.45% NaCl 250 mL ANTICOAGULANT  infusion  12 Units/kg/hr Intravenous Continuous 6 mL/hr at 01/18/21 1350 12 Units/kg/hr at 01/18/21 1350     HOLD: nitroGLYcerin IF   Does not apply HOLD         HYDROmorphone (PF) (DILAUDID) injection 0.2 mg  0.2 mg  Intravenous Q2H PRN         [Held by provider] lisinopril (ZESTRIL) tablet 20 mg  20 mg Oral Daily         LORazepam (ATIVAN) injection 0.5 mg  0.5 mg Intravenous Q6H PRN        Or     LORazepam (ATIVAN) tablet 0.5 mg  0.5 mg Oral Q6H PRN         medication instruction   Does not apply Continuous PRN         melatonin tablet 1 mg  1 mg Oral At Bedtime PRN         metoprolol tartrate (LOPRESSOR) half-tab 12.5 mg  12.5 mg Oral BID         miconazole (MICATIN) 2 % powder   Topical BID         miconazole (MICATIN) 2 % powder   Topical Q1H PRN         naloxone (NARCAN) injection 0.2 mg  0.2 mg Intravenous Q2 Min PRN        Or     naloxone (NARCAN) injection 0.4 mg  0.4 mg Intravenous Q2 Min PRN        Or     naloxone (NARCAN) injection 0.2 mg  0.2 mg Intramuscular Q2 Min PRN        Or     naloxone (NARCAN) injection 0.4 mg  0.4 mg Intramuscular Q2 Min PRN         [START ON 1/19/2021] pantoprazole (PROTONIX) IV push injection 40 mg  40 mg Intravenous Daily with breakfast         Patient is already receiving anticoagulation with heparin, enoxaparin (LOVENOX), warfarin (COUMADIN)  or other anticoagulant medication   Does not apply Continuous PRN         senna-docusate (SENOKOT-S/PERICOLACE) 8.6-50 MG per tablet 1 tablet  1 tablet Oral BID PRN         sodium chloride 0.9% infusion   Intravenous Continuous 75 mL/hr at 01/18/21 1450 New Bag at 01/18/21 1450     No current Epic-ordered outpatient medications on file.             Review of Systems:   The 10 point Review of Systems is negative other than noted in the HPI            Data:   All laboratory data reviewed  Results for orders placed or performed during the hospital encounter of 01/18/21 (from the past 24 hour(s))   XR Chest Port 1 View    Narrative    CHEST PORTABLE ONE VIEW January 18, 2021 9:43 AM     HISTORY: Altered mental status.     COMPARISON: Chest x-ray on 10/26/2018.      Impression    IMPRESSION: Single portable AP view of the chest was obtained.  Stable  cardiomediastinal silhouette. Atherosclerotic vascular calcification  of the aortic knob. No suspicious focal pulmonary opacities. No  significant pleural effusion or pneumothorax. Right upper quadrant  post cholecystectomy clips. Degenerative changes of the spine.    ARDHA YANES MD   Basic metabolic panel   Result Value Ref Range    Sodium 141 133 - 144 mmol/L    Potassium 4.2 3.4 - 5.3 mmol/L    Chloride 112 (H) 94 - 109 mmol/L    Carbon Dioxide 25 20 - 32 mmol/L    Anion Gap 4 3 - 14 mmol/L    Glucose 99 70 - 99 mg/dL    Urea Nitrogen 45 (H) 7 - 30 mg/dL    Creatinine 1.11 (H) 0.52 - 1.04 mg/dL    GFR Estimate 42 (L) >60 mL/min/[1.73_m2]    GFR Estimate If Black 49 (L) >60 mL/min/[1.73_m2]    Calcium 8.7 8.5 - 10.1 mg/dL   CBC with platelets differential   Result Value Ref Range    WBC 6.6 4.0 - 11.0 10e9/L    RBC Count 3.16 (L) 3.8 - 5.2 10e12/L    Hemoglobin 10.4 (L) 11.7 - 15.7 g/dL    Hematocrit 32.9 (L) 35.0 - 47.0 %     (H) 78 - 100 fl    MCH 32.9 26.5 - 33.0 pg    MCHC 31.6 31.5 - 36.5 g/dL    RDW 15.1 (H) 10.0 - 15.0 %    Platelet Count 196 150 - 450 10e9/L    Diff Method Automated Method     % Neutrophils 67.1 %    % Lymphocytes 20.1 %    % Monocytes 7.6 %    % Eosinophils 4.4 %    % Basophils 0.5 %    % Immature Granulocytes 0.3 %    Nucleated RBCs 0 0 /100    Absolute Neutrophil 4.4 1.6 - 8.3 10e9/L    Absolute Lymphocytes 1.3 0.8 - 5.3 10e9/L    Absolute Monocytes 0.5 0.0 - 1.3 10e9/L    Absolute Eosinophils 0.3 0.0 - 0.7 10e9/L    Absolute Basophils 0.0 0.0 - 0.2 10e9/L    Abs Immature Granulocytes 0.0 0 - 0.4 10e9/L    Absolute Nucleated RBC 0.0    Partial thromboplastin time   Result Value Ref Range    PTT 28 22 - 37 sec   Troponin I   Result Value Ref Range    Troponin I ES 1.232 (HH) 0.000 - 0.045 ug/L   EKG 12-lead, tracing only   Result Value Ref Range    Interpretation ECG Click View Image link to view waveform and result    Troponin POCT   Result Value Ref Range     Troponin I 1.13 (HH) 0.00 - 0.08 ug/L   CT Head w/o Contrast    Narrative    CT SCAN OF THE HEAD WITHOUT CONTRAST   2021 11:06 AM     HISTORY: Mental status change, unknown cause    TECHNIQUE:  Axial images of the head and coronal reformations without  IV contrast material. Radiation dose for this scan was reduced using  automated exposure control, adjustment of the mA and/or kV according  to patient size, or iterative reconstruction technique.    COMPARISON: Head CT 10/15/2020    FINDINGS:     Intracranial contents: There is moderate cerebral volume loss. Patchy  and confluent low attenuation of the white matter is nonspecific,  likely due to moderate small vessel ischemic disease. No evidence of  recent infarction, mass or hemorrhage    Visualized orbits/sinuses/mastoids:  The visualized portions of the  sinuses and mastoids appear normal.    Osseous structures/soft tissues:  There is no evidence of trauma.      Impression    IMPRESSION:   1. No acute findings.  2. Moderate small vessel ischemic disease.        BRANDYN TARANGO MD   Echo Limited    Narrative    [id number]  [id number]  [id number]  [id number]^ADRIENNE^BETITO^CHING           [facility]  Echocardiography Laboratory  [address] [place] [address]        Name: LIV KINGSTON  MRN: [id number]  : 1925  Study Date: 2021 01:58 PM  Age: 89 yrs  Gender: Female  Patient Location: [facility]  Reason For Study: Chest Pain, Chest Tightness, Chest Pressure  Ordering Physician: BETITO DELEON  Performed By: Roma Baker     BSA: 1.5 m2  Height: 62 in  Weight: 110 lb  HR: 60  BP: 147/44 mmHg  _____________________________________________________________________________  __        Procedure  Limited Portable Echo Adult. Optison (NDC #[id number]) given intravenously.  (Emergent exam, abbreviated study performed).  _____________________________________________________________________________  __        Interpretation Summary     1. The left  ventricle is normal in size. The visual ejection fraction is  estimated at 55%. Basal inferior hypokinesis.  2. The right ventricle is normal in structure, function and size.  3. No valve disease.  4. The ascending aorta is Mildly dilated. 4.1cm.     Echo from 11/2018 showed EF 55%, aorta 4.0cm. Hard to compare wall motion  directly, as contrast was not used on previous study.  _____________________________________________________________________________  __        Left Ventricle  The left ventricle is normal in size. There is normal left ventricular wall  thickness. The visual ejection fraction is estimated at 55%. Left ventricular  diastolic function is indeterminate. Basal inferior hypokinesis.     Right Ventricle  The right ventricle is normal in structure, function and size.     Mitral Valve  There is mild mitral annular calcification. There is mild (1+) mitral  regurgitation.     Tricuspid Valve  There is mild (1+) tricuspid regurgitation. The right ventricular systolic  pressure is approximated at 24.2 mmHg plus the right atrial pressure.        Aortic Valve  There is mild trileaflet aortic sclerosis. There is mild (1+) aortic  regurgitation.     Pulmonic Valve  The pulmonic valve is normal in structure and function.     Vessels  The ascending aorta is Mildly dilated. Inferior vena cava not well visualized  for estimation of right atrial pressure.     Pericardium  There is no pericardial effusion.     Rhythm  Sinus rhythm was noted.     _____________________________________________________________________________  __  MMode/2D Measurements & Calculations  IVSd: 1.1 cm  LVIDd: 4.6 cm  LVIDs: 2.4 cm  LVPWd: 1.1 cm  FS: 48.4 %     LV mass(C)d: 183.5 grams  LV mass(C)dI: 123.8 grams/m2  Ao root diam: 3.9 cm  asc Aorta Diam: 4.1 cm  LVOT diam: 2.3 cm  LVOT area: 4.3 cm2  RWT: 0.50        Doppler Measurements & Calculations  MV E max laura: 72.3 cm/sec  MV A max laura: 99.4 cm/sec  MV E/A: 0.73  MV dec time: 0.28  sec  TR max laura: 246.1 cm/sec  TR max P.2 mmHg  E/E' av.5  Lateral E/e': 14.7  Medial E/e': 22.3              _____________________________________________________________________________  __        Report approved by: Alex Joseph 2021 02:47 PM          Lab Results   Component Value Date    CHOL 134 2018     Lab Results   Component Value Date    HDL 62 2018     Lab Results   Component Value Date    LDL 55 2018     Lab Results   Component Value Date    TRIG 84 2018     Lab Results   Component Value Date    CHOLHDLRATIO 3.4 2014     TSH   Date Value Ref Range Status   10/26/2019 1.64 0.40 - 4.00 mU/L Final

## 2021-01-18 NOTE — ED NOTES
Patient presents via EMS with altered mental status from the [facility]. Patient has history of dementia, and was recently treated for a UTI with an antibiotic. Per staff, patient has been more altered, and not able to move around like she usually does.

## 2021-01-18 NOTE — PROGRESS NOTES
Hosp short note    Full note to follow     89 yo with dementia admitted for c/o reported hypotension and hypoxia at memory care.   Work up shows inferior STEMI. ED doc d/w son Nick and wished for medical management, no emergent cath    Had 3 way call with  son Ronal MOSCOSO (in [place]) and Nick (local), to discuss code status.   Was on the phone for 45 mins discussing pts current guarded condition and discussed should pt have a cardiopulm event requiring a code, she will certainly not survive the trauma. Discussed that we will still continue with medical care as they wish but recommended that pt to avoid trauma of a code (cpr, broken ribs etc) and that she should be changed to DNR/DNI.     Both sons having a hard time to make that decision as it was their mother's wish on her advance health directive that she wanted to be full code although that was done before her cognitive decline.   They told me that they want to talk to their wives (both nurses) and get back to me.     CALEB MedinaC 13:30

## 2021-01-18 NOTE — H&P
History and Physical     Tracey Moran MRN# [id number]   YOB: 1925 Age: 89 year old      Date of Admission:  1/18/2021    Primary care provider: Johnna Hinojosa Physician          Assessment and Plan:   Tracey Moran is a 89-year-old lady with history of hypertension, chronic kidney disease (Cr 0.6-1.1), anemia (hgb 10's), hypothyroidism, dementia, positive COVID-19 in November 18, 2020 status post hospitalization (requiring dexamethasone and Levaquin for concomitant pneumonia), and status post 30 days of VTE prophylaxis with Eliquis with ending date of 12/24/20 who presents to the emergency room today from her memory care due to reported episodes of hypoxia and hypotension.  EMS report reviewed by the time they got there her blood pressure was in the 110's and sats were in the mid 90s.  Staff reports increased confusion and weakness from baseline    On arrival to emergency room the patient was fairly sleepy and somnolent.  O2 sats were in the upper 80s low 90s, did improve to 95% on 3 L nasal cannula.  Troponin was elevated at 1.13 EKG performed showed ST elevation MI in the inferior leads with reciprocal changes.  This was reviewed by Dr. Barrera of cardiology as well and concurred.  Dr. Hobbs discussed with son (Jonny), he had deferred proceeding with Cath Lab and opted for medical management. However, He has not made decision abt changing patient's CODE STATUS yet as he wants to talk with his family (patient currently full code)    1.  Inferior ST elevation MI--fortunately patient is asymptomatic she is not expressing any pain.  --Plan for telemetry monitoring, serial troponins, echocardiogram  --Status post aspirin, continue heparin drip  --Discussed with Dr. Barrera, will start 300 mg Plavix now followed by 75 mg daily  --We will request formal cardiology consult    2.  Hypertension-blood pressures in the 130s-150's  --Resume lisinopril 20 mg daily, metoprolol 12.5 mg twice daily (likely can titrate  up if needed)     3. Dementia--patient is fairly demented she does not answer any questions but she does cry out.  --Continue supportive cares    4.  Recovered Covid status-patient tested positive for Covid on November 18, no testing required due to recover state.   --No obvious continued pulm sxs per report and charting    CODE STATUS-at this point she is still a FULL CODE I had multiple conversations with son Ronal (POA in [place]) along with Jonny (son and [place])  --I explained to the family that in case of a cardiopulmonary event the patient will not survive resuscitation.  If the main goal is to keep their mom as comfortable as possible then I recommended she be changed to DNR/DNI.  After multiple conversations they were still unable to make a decision and would like further discuss with the rest of their family including their wives (who are nurses)   Await callback    DVT prophylaxis: on heparin gtt  Diet: CLD for now, okay to advance once patient is more alert  IV fluids-gentle fluids and normal saline at 75 cc an hour  PPI prophylaxis-we will start IV Protonix  Disposition-unclear pending clinical course.          Chief Complaint:   Reported hypoxia and hypotension       History of Present Illness:   Tracey Moran is a 89-year-old lady with history of hypertension, chronic kidney disease (Cr 0.6-1.1), anemia (hgb 10's), hypothyroidism, dementia, positive COVID-19 in November 18, 2020 status post hospitalization (requiring dexamethasone and Levaquin for concomitant pneumonia), and status post 30 days of VTE prophylaxis with Eliquis with ending date of 12/24/20 who presents to the emergency room today from her memory care due to reported episodes of hypoxia and hypotension.  EMS report reviewed by the time they got there her blood pressure was in the 110's and sats were in the mid 90s.     Patient is demented and cannot provide any meaningful history.  I spoke with the ED physician chart review as well as  birth of her son's Sepideh.    On arrival to emergency room the patient was fairly sleepy and somnolent.  O2 sats were in the upper 80s low 90s, did improve to 95% on 3 L nasal cannula.  Troponin was elevated at 1.13 EKG performed showed ST elevation MI in the inferior leads with reciprocal changes.  This was reviewed by Dr. Barrera of cardiology as well and concurred.  Dr. Hobbs discussed with son (Nick), he had deferred proceeding with Cath Lab and opted for medical management. He has not made decision abt changing patient's CODE STATUS yet as he wants to talk with his family (patient currently full code)             Past Medical History:     Past Medical History:   Diagnosis Date     Hypertension    Significant dementia  COVID-19 infection in November 18, 2020          Past Surgical History:     Past Surgical History:   Procedure Laterality Date     ESOPHAGOSCOPY, GASTROSCOPY, DUODENOSCOPY (EGD), COMBINED N/A 12/6/2017    Procedure: COMBINED ESOPHAGOSCOPY, GASTROSCOPY, DUODENOSCOPY (EGD);  gastroscopy;  Surgeon: Melchor Mancera MD;  Location:  GI             Social History:     Social History     Socioeconomic History     Marital status:      Spouse name: Not on file     Number of children: Not on file     Years of education: Not on file     Highest education level: Not on file   Occupational History     Not on file   Social Needs     Financial resource strain: Not on file     Food insecurity     Worry: Not on file     Inability: Not on file     Transportation needs     Medical: Not on file     Non-medical: Not on file   Tobacco Use     Smoking status: Never Smoker     Smokeless tobacco: Never Used   Substance and Sexual Activity     Alcohol use: Yes     Drug use: No     Sexual activity: Not Currently   Lifestyle     Physical activity     Days per week: Not on file     Minutes per session: Not on file     Stress: Not on file   Relationships     Social connections     Talks on phone: Not on file      Gets together: Not on file     Attends Buddhism service: Not on file     Active member of club or organization: Not on file     Attends meetings of clubs or organizations: Not on file     Relationship status: Not on file     Intimate partner violence     Fear of current or ex partner: Not on file     Emotionally abused: Not on file     Physically abused: Not on file     Forced sexual activity: Not on file   Other Topics Concern     Parent/sibling w/ CABG, MI or angioplasty before 65F 55M? Not Asked   Social History Narrative     Not on file             Family History:   Review noncontributory         Allergies:      Allergies   Allergen Reactions     Demerol [Meperidine]      Dust Mites      Peanuts [Nuts]      Penicillins      Pollen Extract              Medications:     Prior to Admission medications    Medication Sig Last Dose Taking? Auth Provider   acetaminophen (TYLENOL) 500 MG tablet Take 500 mg by mouth 3 times daily as needed for mild pain  at prn Yes Unknown, Entered By History   Criselda Protect (EUCERIN) external cream Apply topically 2 times daily Criselda Barrier Cream (apply to incontinence rash) 1/17/2021 at Unknown time Yes Guilherme Graves MD   diclofenac (VOLTAREN) 1 % topical gel Place 2 g onto the skin 3 times daily For right knee pain 1/17/2021 at Unknown time Yes Reported, Patient   lisinopril (PRINIVIL/ZESTRIL) 20 MG tablet TAKE ONE TABLET BY MOUTH TWICE DAILY. HOLD IF SYSTOLIC BLOOD PRESSURE IS LESS THAN 110 MMHG. CALL IF HELD TWICE IN A ROW OR IF SYMPTOMATIC. 1/17/2021 at Unknown time Yes Guilherme Graves MD   metoprolol tartrate (LOPRESSOR) 25 MG tablet Take 12.5 mg by mouth 2 times daily 1/17/2021 at Unknown time Yes Unknown, Entered By History   multivitamin w/minerals (THERA-VIT-M) tablet Take 1 tablet by mouth daily 1/17/2021 at Unknown time Yes Unknown, Entered By History   Nutritional Supplements (ENSURE PO) 1 two times daily (Family supplies) 1/17/2021 at Unknown time Yes Unknown, Entered  "By History   nystatin (MYCOSTATIN) 008477 UNIT/GM external powder Apply topically 2 times daily Apply to rash under bilateral breast until resolved 1/17/2021 at Unknown time Yes Unknown, Entered By History   senna-docusate (SENOKOT-S/PERICOLACE) 8.6-50 MG tablet Take 1 tablet by mouth 2 times daily as needed for constipation  at prn Yes Agustina Sanchez PA-C            Review of Systems:     A Comprehensive greater than 10 system review of systems was carried out.  Pertinent positives and negatives are noted above.  Otherwise negative for contributory information.            Physical Exam:   Blood pressure (!) 147/44, pulse 56, temperature 97.6  F (36.4  C), temperature source Axillary, resp. rate 20, height 1.575 m (5' 2\"), weight 49.9 kg (110 lb 0.2 oz), SpO2 93 %, not currently breastfeeding.  Exam:  GENERAL: Somnolent, will open eyes to arousal, cries out at times and then fall back asleep  PSYCH: Lethargic  HEENT:  PERRLA. Normal conjunctiva, normal hearing, nasal mucosa and Oropharynx are normal.  NECK:  Supple, no neck vein distention, adenopathy or bruits, normal thyroid.  HEART:  Normal S1, S2 with no murmur, no pericardial rub, gallops or S3 or S4.  LUNGS: Poor inspiratory effort but otherwise clear to auscultation No wheezing, rales or ronchi.  ABDOMEN:  Soft, no hepatosplenomegaly, normal bowel sounds. Non-tender, non distended.   EXTREMITIES:  No pedal edema, +2 pulses bilateral and equal.  SKIN:  Dry to touch, No rash, wound or ulcerations.  NEUROLOGIC:  CN 2-12 intact, she is able to move all extremities with no focal deficit          Data:     Recent Labs   Lab 01/18/21  0952   WBC 6.6   HGB 10.4*   HCT 32.9*   *        Recent Labs   Lab 01/18/21  0952      POTASSIUM 4.2   CHLORIDE 112*   CO2 25   ANIONGAP 4   GLC 99   BUN 45*   CR 1.11*   GFRESTIMATED 42*   GFRESTBLACK 49*   RENETTA 8.7     No results for input(s): CULT in the last 168 hours.  Recent Labs   Lab 01/18/21  1028 " 21  0952   TROPONIN 1.13*  --    TROPI  --  1.232*       Results for orders placed or performed during the hospital encounter of 21   CT Head w/o Contrast    Narrative    CT SCAN OF THE HEAD WITHOUT CONTRAST   2021 11:06 AM     HISTORY: Mental status change, unknown cause    TECHNIQUE:  Axial images of the head and coronal reformations without  IV contrast material. Radiation dose for this scan was reduced using  automated exposure control, adjustment of the mA and/or kV according  to patient size, or iterative reconstruction technique.    COMPARISON: Head CT 10/15/2020    FINDINGS:     Intracranial contents: There is moderate cerebral volume loss. Patchy  and confluent low attenuation of the white matter is nonspecific,  likely due to moderate small vessel ischemic disease. No evidence of  recent infarction, mass or hemorrhage    Visualized orbits/sinuses/mastoids:  The visualized portions of the  sinuses and mastoids appear normal.    Osseous structures/soft tissues:  There is no evidence of trauma.      Impression    IMPRESSION:   1. No acute findings.  2. Moderate small vessel ischemic disease.        BRANDYN TARANGO MD   XR Chest Port 1 View    Narrative    CHEST PORTABLE ONE VIEW 2021 9:43 AM     HISTORY: Altered mental status.     COMPARISON: Chest x-ray on 10/26/2018.      Impression    IMPRESSION: Single portable AP view of the chest was obtained. Stable  cardiomediastinal silhouette. Atherosclerotic vascular calcification  of the aortic knob. No suspicious focal pulmonary opacities. No  significant pleural effusion or pneumothorax. Right upper quadrant  post cholecystectomy clips. Degenerative changes of the spine.    RADHA YANES MD   Echo Limited    Narrative    [id number]  [id number]  [id number]  [id number]^DELEON^BETITO^CHING           [facility]  Echocardiography Laboratory  [address] [place] [address]        Name: LIV KINGSTON  MRN: [id number]  :  06/16/1925  Study Date: 01/18/2021 01:58 PM  Age: 89 yrs  Gender: Female  Patient [facility]  Reason For Study: Chest Pain, Chest Tightness, Chest Pressure  Ordering Physician: BETITO DELEON  Performed By: Roma Baker     BSA: 1.5 m2  Height: 62 in  Weight: 110 lb  HR: 60  BP: 147/44 mmHg  _____________________________________________________________________________  __        Procedure  Limited Portable Echo Adult. Optison (NDC #2672-4890) given intravenously.  (Emergent exam, abbreviated study performed).  _____________________________________________________________________________  __        Interpretation Summary     1. The left ventricle is normal in size. The visual ejection fraction is  estimated at 55%. Basal inferior hypokinesis.  2. The right ventricle is normal in structure, function and size.  3. No valve disease.  4. The ascending aorta is Mildly dilated. 4.1cm.     Echo from 11/2018 showed EF 55%, aorta 4.0cm. Hard to compare wall motion  directly, as contrast was not used on previous study.  _____________________________________________________________________________  __        Left Ventricle  The left ventricle is normal in size. There is normal left ventricular wall  thickness. The visual ejection fraction is estimated at 55%. Left ventricular  diastolic function is indeterminate. Basal inferior hypokinesis.     Right Ventricle  The right ventricle is normal in structure, function and size.     Mitral Valve  There is mild mitral annular calcification. There is mild (1+) mitral  regurgitation.     Tricuspid Valve  There is mild (1+) tricuspid regurgitation. The right ventricular systolic  pressure is approximated at 24.2 mmHg plus the right atrial pressure.        Aortic Valve  There is mild trileaflet aortic sclerosis. There is mild (1+) aortic  regurgitation.     Pulmonic Valve  The pulmonic valve is normal in structure and function.     Vessels  The ascending aorta is Mildly dilated.  Inferior vena cava not well visualized  for estimation of right atrial pressure.     Pericardium  There is no pericardial effusion.     Rhythm  Sinus rhythm was noted.     _____________________________________________________________________________  __  MMode/2D Measurements & Calculations  IVSd: 1.1 cm  LVIDd: 4.6 cm  LVIDs: 2.4 cm  LVPWd: 1.1 cm  FS: 48.4 %     LV mass(C)d: 183.5 grams  LV mass(C)dI: 123.8 grams/m2  Ao root diam: 3.9 cm  asc Aorta Diam: 4.1 cm  LVOT diam: 2.3 cm  LVOT area: 4.3 cm2  RWT: 0.50        Doppler Measurements & Calculations  MV E max laura: 72.3 cm/sec  MV A max laura: 99.4 cm/sec  MV E/A: 0.73  MV dec time: 0.28 sec  TR max laura: 246.1 cm/sec  TR max P.2 mmHg  E/E' av.5  Lateral E/e': 14.7  Medial E/e': 22.3              _____________________________________________________________________________  __        Report approved by: Alex Joseph 2021 02:47 PM              Meena Carver PA-C

## 2021-01-18 NOTE — PLAN OF CARE
Admitted to floor. Alert, severe dementia. Skin check completed by RN  and JAYDEN.  4:00 Son, Nick, updated.  Straight cathed for urine. Scanned for 322.   Code status change to DNR/DNI.  Not alert enough to take po at this time. Continue POC.

## 2021-01-19 ENCOUNTER — APPOINTMENT (OUTPATIENT)
Dept: SPEECH THERAPY | Facility: CLINIC | Age: 86
DRG: 281 | End: 2021-01-19
Attending: INTERNAL MEDICINE
Payer: COMMERCIAL

## 2021-01-19 LAB
ANION GAP SERPL CALCULATED.3IONS-SCNC: 2 MMOL/L (ref 3–14)
BUN SERPL-MCNC: 36 MG/DL (ref 7–30)
CALCIUM SERPL-MCNC: 8 MG/DL (ref 8.5–10.1)
CHLORIDE SERPL-SCNC: 116 MMOL/L (ref 94–109)
CO2 SERPL-SCNC: 24 MMOL/L (ref 20–32)
CREAT SERPL-MCNC: 0.83 MG/DL (ref 0.52–1.04)
ERYTHROCYTE [DISTWIDTH] IN BLOOD BY AUTOMATED COUNT: 14.9 % (ref 10–15)
GFR SERPL CREATININE-BSD FRML MDRD: 59 ML/MIN/{1.73_M2}
GLUCOSE SERPL-MCNC: 72 MG/DL (ref 70–99)
HCT VFR BLD AUTO: 28.9 % (ref 35–47)
HGB BLD-MCNC: 9.3 G/DL (ref 11.7–15.7)
INTERPRETATION ECG - MUSE: NORMAL
MCH RBC QN AUTO: 33.7 PG (ref 26.5–33)
MCHC RBC AUTO-ENTMCNC: 32.2 G/DL (ref 31.5–36.5)
MCV RBC AUTO: 105 FL (ref 78–100)
PLATELET # BLD AUTO: 123 10E9/L (ref 150–450)
POTASSIUM SERPL-SCNC: 4.5 MMOL/L (ref 3.4–5.3)
RBC # BLD AUTO: 2.76 10E12/L (ref 3.8–5.2)
SODIUM SERPL-SCNC: 142 MMOL/L (ref 133–144)
TROPONIN I SERPL-MCNC: 70.59 UG/L (ref 0–0.04)
UFH PPP CHRO-ACNC: 0.12 IU/ML
UFH PPP CHRO-ACNC: 0.32 IU/ML
UFH PPP CHRO-ACNC: 0.33 IU/ML
WBC # BLD AUTO: 6.4 10E9/L (ref 4–11)

## 2021-01-19 PROCEDURE — 92610 EVALUATE SWALLOWING FUNCTION: CPT | Mod: GN | Performed by: SPEECH-LANGUAGE PATHOLOGIST

## 2021-01-19 PROCEDURE — 36415 COLL VENOUS BLD VENIPUNCTURE: CPT | Performed by: PHYSICIAN ASSISTANT

## 2021-01-19 PROCEDURE — 258N000003 HC RX IP 258 OP 636: Performed by: PHYSICIAN ASSISTANT

## 2021-01-19 PROCEDURE — 250N000013 HC RX MED GY IP 250 OP 250 PS 637: Performed by: NURSE PRACTITIONER

## 2021-01-19 PROCEDURE — 80048 BASIC METABOLIC PNL TOTAL CA: CPT | Performed by: PHYSICIAN ASSISTANT

## 2021-01-19 PROCEDURE — 120N000001 HC R&B MED SURG/OB

## 2021-01-19 PROCEDURE — 85027 COMPLETE CBC AUTOMATED: CPT | Performed by: PHYSICIAN ASSISTANT

## 2021-01-19 PROCEDURE — 99223 1ST HOSP IP/OBS HIGH 75: CPT | Performed by: NURSE PRACTITIONER

## 2021-01-19 PROCEDURE — 250N000011 HC RX IP 250 OP 636: Performed by: PHYSICIAN ASSISTANT

## 2021-01-19 PROCEDURE — 99233 SBSQ HOSP IP/OBS HIGH 50: CPT | Performed by: INTERNAL MEDICINE

## 2021-01-19 PROCEDURE — 84484 ASSAY OF TROPONIN QUANT: CPT | Performed by: PHYSICIAN ASSISTANT

## 2021-01-19 PROCEDURE — 36415 COLL VENOUS BLD VENIPUNCTURE: CPT | Performed by: INTERNAL MEDICINE

## 2021-01-19 PROCEDURE — 250N000013 HC RX MED GY IP 250 OP 250 PS 637: Performed by: PHYSICIAN ASSISTANT

## 2021-01-19 PROCEDURE — 85520 HEPARIN ASSAY: CPT | Performed by: INTERNAL MEDICINE

## 2021-01-19 PROCEDURE — 250N000011 HC RX IP 250 OP 636: Performed by: INTERNAL MEDICINE

## 2021-01-19 PROCEDURE — C9113 INJ PANTOPRAZOLE SODIUM, VIA: HCPCS | Performed by: PHYSICIAN ASSISTANT

## 2021-01-19 RX ORDER — OXYCODONE HYDROCHLORIDE 5 MG/1
5 TABLET ORAL EVERY 4 HOURS PRN
Status: DISCONTINUED | OUTPATIENT
Start: 2021-01-19 | End: 2021-01-20

## 2021-01-19 RX ORDER — CEFTRIAXONE 1 G/1
1 INJECTION, POWDER, FOR SOLUTION INTRAMUSCULAR; INTRAVENOUS EVERY 24 HOURS
Status: DISCONTINUED | OUTPATIENT
Start: 2021-01-19 | End: 2021-01-22 | Stop reason: HOSPADM

## 2021-01-19 RX ADMIN — Medication 12.5 MG: at 09:59

## 2021-01-19 RX ADMIN — CLOPIDOGREL BISULFATE 75 MG: 75 TABLET ORAL at 09:58

## 2021-01-19 RX ADMIN — SODIUM CHLORIDE: 9 INJECTION, SOLUTION INTRAVENOUS at 17:02

## 2021-01-19 RX ADMIN — CEFTRIAXONE 1 G: 1 INJECTION, POWDER, FOR SOLUTION INTRAMUSCULAR; INTRAVENOUS at 10:17

## 2021-01-19 RX ADMIN — PANTOPRAZOLE SODIUM 40 MG: 40 INJECTION, POWDER, FOR SOLUTION INTRAVENOUS at 10:01

## 2021-01-19 RX ADMIN — MICONAZOLE NITRATE: 20 POWDER TOPICAL at 21:18

## 2021-01-19 RX ADMIN — HEPARIN SODIUM 12 UNITS/KG/HR: 10000 INJECTION, SOLUTION INTRAVENOUS at 18:14

## 2021-01-19 RX ADMIN — Medication 12.5 MG: at 21:13

## 2021-01-19 RX ADMIN — ASPIRIN 81 MG CHEWABLE TABLET 81 MG: 81 TABLET CHEWABLE at 09:59

## 2021-01-19 RX ADMIN — LISINOPRIL 20 MG: 20 TABLET ORAL at 09:58

## 2021-01-19 RX ADMIN — SODIUM CHLORIDE: 9 INJECTION, SOLUTION INTRAVENOUS at 03:32

## 2021-01-19 RX ADMIN — MICONAZOLE NITRATE: 20 POWDER TOPICAL at 10:00

## 2021-01-19 RX ADMIN — OXYCODONE HYDROCHLORIDE 5 MG: 5 TABLET ORAL at 21:13

## 2021-01-19 ASSESSMENT — ACTIVITIES OF DAILY LIVING (ADL)
ADLS_ACUITY_SCORE: 30
ADLS_ACUITY_SCORE: 30
ADLS_ACUITY_SCORE: 36
ADLS_ACUITY_SCORE: 30
ADLS_ACUITY_SCORE: 29
ADLS_ACUITY_SCORE: 36

## 2021-01-19 NOTE — PROGRESS NOTES
[facility]  Hospitalist Progress Note  Ho Handley MD 01/19/21    Reason for Stay (Diagnosis): STEMI, UTI         Assessment and Plan:      Summary of Stay: Patient is a 89-year-old female with history of HTN, CKD stage II, anemia, hypothyroidism, advanced dementia who lives in memory care facility, UTIs, and recent COVID-19 hospitalization in November 18/2020 where she was treated with dexamethasone and Levaquin for possible pneumonia and subsequent treatment of 30 days of VTE prophylaxis with Eliquis who now presents from [facility] facility due to reportedly hypotension and hypoxia along with depressed level of consciousness.  For EMS her SBP was in the 110s and O2 sats were in the mid 90s.    She was admitted on 1/18/2021 after an EKG in the ER showed an inferior STEMI.  Cardiology was consulted and management was discussed with the patient's sons who are her healthcare agent due to advanced dementia.  Medical management with dual antiplatelet therapy with aspirin Plavix and heparin drip initiated.  TTE shows inferior hypokinesis, but preserved EF.  Blood pressures remained stable.  Urinalysis showing significant pyuria so started on IV ceftriaxone for suspected UTI.  Mental status a little bit better today.  Palliative care consulted.  Patient changed to DNR/DNI status otherwise restorative cares.    Problem List/Assessment and Plan:   Inferior STEMI, HTN: Reportedly hypotension at care facility, blood pressures 140-150 systolic here.  EKG shows an inferior STEMI and troponin elevated 1.2 consistent with acute coronary syndrome.  This was discussed in the ER with the patient's sons who are healthcare agent and with cardiology.  Based on her advanced dementia and this discussion medical management is being pursued.  TTE shows EF 55% and inferior wall hypokinesis.  Peak troponin overnight at 85.5, now trending down.  -Cardiology consulted  -Continue heparin drip for 48 hours  -Plavix 75 mg  daily for 1 year.  Aspirin 81 mg daily for 1 year  -Resume metoprolol tartrate 12.5 mg twice daily  -Resume lisinopril 20 mg daily  -Telemetry to monitor for arrhythmia, few episodes of NSVT.     Advanced dementia, acute encephalopathy: Advanced dementia at baseline lives in memory care.  I spoke with son Jonny about her baseline.  He said she was significantly deconditioned and worse after COVID-19, but this was getting better and roughly 1 week prior to presentation here she was able to to operate her wheelchair some on her own, she was feeding herself, and she speaks some.  She is not responding to any commands or answering questions at this time.  Reportedly has been more fatigued appearing and more confused from baseline per nursing home.  Suspect acute encephalopathy is from the inferior STEMI.  Also likely has infectious encephalopathy from UTI based on history of this and pyuria on urinalysis.  Initially obtunded, now calling out, but not oriented.    UTI: History of recurrent UTIs.  Cannot provide any history as far as symptoms.  Afebrile and no leukocytosis.  Urinalysis shows > 182 WBCs, large LE, moderate bacteria, and 23 RBCs suspicious for UTI.  -Start IV ceftriaxone 1 g every 24 hours  -Follow urine culture results     CASSIE on CKD stage II: Creatinine up to 1.1 on admission with baseline 0.8.  Initially held lisinopril, now resume as creatinine is at baseline.     Recent COVID-19: Recent hospitalization November 2020 for COVID-19 pneumonia with treatment with dexamethasone and levofloxacin.  Did complete 1 month of DVT prophylaxis Eliquis.  Chest x-ray does not show any infiltrates.  Afebrile.  Not hypoxic on room air.     Perineum wounds: Multiple wounds and ulcerations to perineum and labia.  -Place Walton catheter for now to promote wound healing due to incontinence and altered mental status  -Wound nurse consult    Goals of care: Patient initially was full code.  She has a POLST form that was filled  "out 5/2019 that states full code.  Patient has an inferior STEMI that is being managed medically and has advanced dementia.  From a medical standpoint pursuing cardiac resuscitation in the event of V. fib or PEA arrest is not appropriate.  Son Ronal has asked for further communication and updates to go through Jonny.  I spoke with Jonny at length to explain the rationale for a change to DNR/DNI status given her advanced age, dementia, and new acute cardiac problem with a inferior STEMI and likelihood of causing harm with broken ribs and pain without meaningful neurologic recovery following a cardiac arrest.    After much discussion, family has changed patient to DNR/DNI.    DVT Prophylaxis: Heparin infusion  Code Status: DNR / DNI  FEN: Mechanical soft, consult SLP for dysphagia  Discharge Dispo: Likely back to memory care  Estimated Disch Date / # of Days until Disch: Likely 2 days        Interval History (Subjective):      More alert today although calling out and confused.  Has dysarthria.  She cannot tell me if she is in any pain or having any shortness of breath.  Having some difficulty swallowing soft foods and liquids.  Troponin peaked overnight of 85.                  Physical Exam:      Last Vital Signs:  /51 (BP Location: Right arm)   Pulse 65   Temp 97.5  F (36.4  C) (Axillary)   Resp 16   Ht 1.575 m (5' 2\")   Wt 49.9 kg (110 lb 0.2 oz)   SpO2 97%   BMI 20.12 kg/m      Intake/Output Summary (Last 24 hours) at 1/19/2021 1214  Last data filed at 1/19/2021 1100  Gross per 24 hour   Intake 150 ml   Output 300 ml   Net -150 ml       Constitutional: Awake   Eyes: sclera white   HEENT:  MMM  Respiratory:  lungs cta bilaterally, no crackles or wheeze  Cardiovascular: RRR.  No murmur   GI: non-tender, not distended, bowel sounds present  Skin: no rash.  Some ecchymoses to bilateral lower legs  Musculoskeletal/extremities: Trace bilateral pedal edema  Neurologic: Alert, calling out, occasionally tries " to speak but has dysarthria, not oriented to place or time  Psychiatric: Anxious         Medications:      All current medications were reviewed with changes reflected in problem list.         Data:      All new lab and imaging data was reviewed.   Labs:  Recent Labs   Lab 21  0521  0952    141   POTASSIUM 4.5 4.2   CHLORIDE 116* 112*   CO2 24 25   ANIONGAP 2* 4   GLC 72 99   BUN 36* 45*   CR 0.83 1.11*   GFRESTIMATED 59* 42*   GFRESTBLACK 69 49*   RENETTA 8.0* 8.7     Recent Labs   Lab 21  0517 21  0952   WBC 6.4 6.6   HGB 9.3* 10.4*   HCT 28.9* 32.9*   * 104*   * 196     Recent Labs   Lab 21  0521  1803 21  1521 21  1028   TROPONIN  --   --   --  1.13*   TROPI 70.592* 85.510* 34.908*  --       Imaging:   Recent Results (from the past 24 hour(s))   Echo Limited    Narrative    [id number]  [id number]  [id number]  [id number]^ADRIENNE^BETITO^RAY           [facility]  Echocardiography Laboratory  [address] [place] [address]        Name: LIV KINGSTON  MRN: [id number]  : 1925  Study Date: 2021 01:58 PM  Age: 89 yrs  Gender: Female  Patient Location: [facility]  Reason For Study: Chest Pain, Chest Tightness, Chest Pressure  Ordering Physician: BETITO DELEON  Performed By: Roma Baker     BSA: 1.5 m2  Height: 62 in  Weight: 110 lb  HR: 60  BP: 147/44 mmHg  _____________________________________________________________________________  __        Procedure  Limited Portable Echo Adult. Optison (NDC #6006-6084) given intravenously.  (Emergent exam, abbreviated study performed).  _____________________________________________________________________________  __        Interpretation Summary     1. The left ventricle is normal in size. The visual ejection fraction is  estimated at 55%. Basal inferior hypokinesis.  2. The right ventricle is normal in structure, function and size.  3. No valve disease.  4. The ascending aorta is  Mildly dilated. 4.1cm.     Echo from 2018 showed EF 55%, aorta 4.0cm. Hard to compare wall motion  directly, as contrast was not used on previous study.  _____________________________________________________________________________  __        Left Ventricle  The left ventricle is normal in size. There is normal left ventricular wall  thickness. The visual ejection fraction is estimated at 55%. Left ventricular  diastolic function is indeterminate. Basal inferior hypokinesis.     Right Ventricle  The right ventricle is normal in structure, function and size.     Mitral Valve  There is mild mitral annular calcification. There is mild (1+) mitral  regurgitation.     Tricuspid Valve  There is mild (1+) tricuspid regurgitation. The right ventricular systolic  pressure is approximated at 24.2 mmHg plus the right atrial pressure.        Aortic Valve  There is mild trileaflet aortic sclerosis. There is mild (1+) aortic  regurgitation.     Pulmonic Valve  The pulmonic valve is normal in structure and function.     Vessels  The ascending aorta is Mildly dilated. Inferior vena cava not well visualized  for estimation of right atrial pressure.     Pericardium  There is no pericardial effusion.     Rhythm  Sinus rhythm was noted.     _____________________________________________________________________________  __  MMode/2D Measurements & Calculations  IVSd: 1.1 cm  LVIDd: 4.6 cm  LVIDs: 2.4 cm  LVPWd: 1.1 cm  FS: 48.4 %     LV mass(C)d: 183.5 grams  LV mass(C)dI: 123.8 grams/m2  Ao root diam: 3.9 cm  asc Aorta Diam: 4.1 cm  LVOT diam: 2.3 cm  LVOT area: 4.3 cm2  RWT: 0.50        Doppler Measurements & Calculations  MV E max laura: 72.3 cm/sec  MV A max laura: 99.4 cm/sec  MV E/A: 0.73  MV dec time: 0.28 sec  TR max laura: 246.1 cm/sec  TR max P.2 mmHg  E/E' av.5  Lateral E/e': 14.7  Medial E/e': 22.3              _____________________________________________________________________________  __        Report approved by:  Alex Joseph 01/18/2021 02:47 PM              Ho Handley MD

## 2021-01-19 NOTE — PROVIDER NOTIFICATION
DATE:  1/18/2021   TIME OF RECEIPT FROM LAB:  Saw result at 2245. Was not called from lab when it resulted at 1803.   LAB TEST:  Trop  LAB VALUE:  85.510  RESULTS GIVEN WITH READ-BACK TO (PROVIDER):  Paged to admitting  pager that result was 85.510.  TIME LAB VALUE REPORTED TO PROVIDER:   2305.     In the page advised that patient is not having any symptoms, is currently on hep gtt, is being medically managed. Do not need to call back. Dr Jones responded in person at 5249 stating that he received the update and no new orders. Will continue to monitor.

## 2021-01-19 NOTE — PLAN OF CARE
Patient with advanced dementia, continues on heparin drip for elevated trop. Peak trop 85.510. Plan is to medically manage.  Started ceftriaxone for UTI. Has IV x2. Skin tear to left arm. Several bruises throughout.  Bilateral heels are reddened and soft.  Coccyx  with open areas and a open area to the medial labia. Specialty mattress ordered. Feeder, ate 25% of brunch. Drinks well, offered frequently. Recovered COVID from 11/2020. Continue POC.

## 2021-01-19 NOTE — CONSULTS
Care Management Initial Consult    General Information  Assessment completed with: Children, Other([facility])Ronal  Type of CM/SW Visit: Initial Assessment    Primary Care Provider verified and updated as needed:     Readmission within the last 30 days:        Reason for Consult: discharge planning  Advance Care Planning:            Communication Assessment  Patient's communication style: spoken language (English or Bilingual)    Hearing Difficulty or Deaf: other (see comments)(Unable to assess-dementia)   Wear Glasses or Blind: other (see comments)    Cognitive  Cognitive/Neuro/Behavioral: .WDL except, orientation, mood/behavior  Level of Consciousness: confused, lethargic  Arousal Level: opens eyes spontaneously  Orientation: disoriented to, place, time, situation  Mood/Behavior: calm  Best Language: 0 - No aphasia  Speech: slow    Living Environment:   People in home: facility resident     Current living Arrangements: assisted living  Name of Facility: [facility]   Able to return to prior arrangements: yes       Family/Social Support:  Care provided by:    Provides care for:       Children, Facility resident(s)/Staff          Description of Support System: Supportive, Involved    Support Assessment: Adequate family and caregiver support    Current Resources:   Skilled Home Care Services: [facility] RN/PT/OT   Community Resources:  none  Equipment currently used at home:  wheelchair            Lifestyle & Psychosocial Needs:        Socioeconomic History     Marital status:      Spouse name: Not on file     Number of children: Not on file     Years of education: Not on file     Highest education level: Not on file     Tobacco Use     Smoking status: Never Smoker     Smokeless tobacco: Never Used   Substance and Sexual Activity     Alcohol use: Yes     Drug use: No     Sexual activity: Not Currently               Additional Information:  RN CC following for discharge plan of care. Pt  was admitted with STEMI and is being medically managed. Per chart review, pt is from [facility] in [place]. Call placed to pt's son, Ronal, to verify residence and discuss discharge plan of care. Ronal verifies that pt lives at [facility] and gives verbal permission to discuss services and discharge planning with the facility. Anticipate pt will remain in hospital for a couple more days prior to returning. Reviewed CC role with pt's son and will cont to work with family for discharge planning.     Call placed to nursing at the [facility] to verify services. Voicemail left to return call to RN CC to coordinate cares.     Per chart note, pt was getting Home care services from [facility]. Call placed to [facility] Intake Nurse [phone] at [facility] and verified that pt was getting RN/PT/OT services. Pt will need resumption of care orders if appropriate on discharge. [facility] would like a call on discharge with update on plan of care. Home care discharge orders, H&P and discharge summary can be faxed to fax #: [phone].    Will cont to follow for discharge plan of care.       Rylie Bustos RN BSN CM  Inpatient Care Coordination  [facility]  [phone]

## 2021-01-19 NOTE — CONSULTS
[facility]    Palliative Care Consultation   Text Page    Date of Admission:  1/18/2021    Assessment & Plan   Tracey Moran is a 89 year old female who was admitted on 1/18/2021. I was asked to see the patient by Ho Handley MD for goals of care and decisional support.    Recommendations: Please see assessments below for rationale.    1.Decisional Capacity -  Unreliable. Patient has an advance directive dated 02.22.18.  Consents and decision making should be done by   Ronal Moran , the primary Health Care Agent.  Alternates are  Michele Shepherd  The alterate is the  for update.  Attempt to include both in decision making.  POLST will need to be updated on discharge   Chronic dementia with functional decline 26 lb weight loss from 6/6/19 to 1/19/21,  Stage 3 buttocks pressure sores, Thad score 10     Appreciate input from Dina Yanes, Speech language with bedside eval     Appreciate input from Piedad Gunderson, Physical Therapy      meliplex to buttocks    Ongoing conversation with goals of care     2. 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    Ongoing pain assessment for verbal and non verbal indicators of pain   Oxycodone 5 mg every 4 hrs prn, dilaudid 0.2 mg for BTP   Acetaminophen (Tylenol) 650 mg every 4 hr prn pain   Repositioning        3. Spiritual Care- Oriented to Spiritual Health as part of Palliative Care team., in conversation with dwayne Moran     4. Care Planning- Appreciate Care of Rylie hyatt RN case management .     Will follow up with arias Houston and Michele on 1/20/21 for goals of care discussion and if they would consider transition to hospice    GOALS of CARE: palliative treatment  CODE STATUS: No CPR- Do NOT Intubate      Findings & plan of care discussed with: Bedside Nurse Macey Howard RN   Thank you for involving  us in the patient's care.     Katarina Laura, RN-C,APRN,CNP,ACHPN   Pain Management and Palliative Care  [facility]    Pgr: [phone]    Time Spent on this Encounter   Total unit/floor time 70 minutes, time consisted of the following, examination of the patient, reviewing the record and completing documentation. >50% of time spent in counseling and coordination of care.  Time spend counseling with family consisted of the following topics, goals of care and symptom management.  Time spent in coordination of care with team members as listed above.       Palliative Care Assessment:  Tracey Moran is a 89 year old patient admitted with symptoms of  Generalized weakness and decreased responsiveness. She lives in a memory care unit in the area.  She has a history of dementia with disease progression. Is increasingly less mobile and has signifcant weight loss. On the admission she is been treated for  STEMI MI, dehydration and UTI.      This is in the setting of hypertension,degenerative spine disease.  she There is not reported or documented decline in patient's function.  There is a documented unitentional weight loss of 16 lbs over the past 18 months. Speech therapy has been consulted and observed mild dysphagia without  evidence of aspiration and tolerance of mechanical soft diet . Palliative Performance Scale (PPS): 30%  Extensive disease. Bedbound & requires total care, normal or reduced intake. Normal LOC or drowsy or confused.  Estimated Median Survival in Days: 8 to 41 days.    Symptoms:   Pain    Anorexia with progressive weight loss   Generalized debility   Confusion dementia with disease progression     Social:         Living situation:   [facility]        Support system:  Anibal        Actual/Potential Caregiver:  Renee        Functional status:  Complete dependence on others for ADL's        Financial concerns:  None        Substance use disorder (past / present):   None        Occupation:  Homemaker        Hobbies:      Mental Health:   Cries out ( seem bulbar affective disorder)      Coping:   Dementia unable to assess     Spiritual/Zoroastrianism:    Spiritual background: un-designated   Spiritual needs:  unknown   Sense/Meaning Making:      Prognostic Information: This has not been discussed with son .    Advance Care Planning:        Decision making capacity: unreliable        Disease understanding: good with key decision makers        Goals of Care:  Restorative        Preferred way of decision making:  Sons to make al decisions        Health care directive:  Yes        Health care agent:  Anibal Moran as primary, Michele Moran as alternate        Code Status:  No CPR- Do NOT Intubate       POLST Physician orders for life-sustaining treatment (POLST) form is completed.  It will need to be updated prior to discharge    History of Present Illness   History is obtained from the electronic health record and patient's son    Tracey Moran is a 89 year old female who presents with increase generalized weakness, reduce LOC.  She comes from a memory care unit, is a poor historian.  On further testing the patient has been diagnosed with dehydration,  STEMI and UTI.  She appear comfortable, is confused and calls out yes to pain.  It is difficult to locate the pain, but reasonable that it could be from the chest associated with new MI. She also has significant spinal degeneration of the cervical and lumbar region with lumbar kyphosis.    She cries out easily when I talk with her suspecting bulbar affective disorder. She has a significant left facial droop, but no evidence of stroke on past imaging.  The patient is cooperative non agitated.  She tolerated a dish f applesauce fed to her by this examiner.  Extremities move on own with rigidity or pain.  Noted are several peas size stage 3 decubitus between the buttock and at the coccyx.        Decision-Making & Goals of Care  Discussion:  Discussed on 2021 with Katarina BEDOYAC, APRN, CNP, ACHPN:  I contacted Ronal Moran,who lives in  [place] and is the primary health care agent. Updated him as he was not contacted by medical team. He tells me as he lives out of town, his brother Michele is pedro decision maker.    I reassured that the patient is comfortable without chest pain.    I expressed my concerns and worry about the patient's progression with her dementia and inquired if goals of care should be readdressed.  He thought this was a good idea and we decided a phone meeting tomorrow with his brother would be good.    The patient's health care directive was reviewed noting that she wanted food and fluids by any route,even if permanently unconscious. Pain control and symptom management by any route, but not hastening death is also important to the patient.      Past Medical History   I have reviewed this patient's medical history and updated it with pertinent information if needed.   Past Medical History:   Diagnosis Date     Hypertension        Past Surgical History   I have reviewed this patient's surgical history and updated it with pertinent information if needed.  Past Surgical History:   Procedure Laterality Date     ESOPHAGOSCOPY, GASTROSCOPY, DUODENOSCOPY (EGD), COMBINED N/A 2017    Procedure: COMBINED ESOPHAGOSCOPY, GASTROSCOPY, DUODENOSCOPY (EGD);  gastroscopy;  Surgeon: Melchor Mancera MD;  Location:  GI       Prior to Admission Medications   Prior to Admission Medications   Prescriptions Last Dose Informant Patient Reported? Taking?   Criselda Protect (EUCERIN) external cream 2021 at Unknown time  Yes Yes   Sig: Apply topically 2 times daily Criselda Barrier Cream (apply to incontinence rash)   Nutritional Supplements (ENSURE PO) 2021 at Unknown time  Yes Yes   Si two times daily (Family supplies)   acetaminophen (TYLENOL) 500 MG tablet  at prn  Yes Yes   Sig: Take 500 mg by mouth 3  times daily as needed for mild pain   diclofenac (VOLTAREN) 1 % topical gel 1/17/2021 at Unknown time  Yes Yes   Sig: Place 2 g onto the skin 3 times daily For right knee pain   lisinopril (PRINIVIL/ZESTRIL) 20 MG tablet 1/17/2021 at Unknown time  No Yes   Sig: TAKE ONE TABLET BY MOUTH TWICE DAILY. HOLD IF SYSTOLIC BLOOD PRESSURE IS LESS THAN 110 MMHG. CALL IF HELD TWICE IN A ROW OR IF SYMPTOMATIC.   metoprolol tartrate (LOPRESSOR) 25 MG tablet 1/17/2021 at Unknown time  Yes Yes   Sig: Take 12.5 mg by mouth 2 times daily   multivitamin w/minerals (THERA-VIT-M) tablet 1/17/2021 at Unknown time Nursing Home Yes Yes   Sig: Take 1 tablet by mouth daily   nystatin (MYCOSTATIN) 998843 UNIT/GM external powder 1/17/2021 at Unknown time Nursing Home Yes Yes   Sig: Apply topically 2 times daily Apply to rash under bilateral breast until resolved   senna-docusate (SENOKOT-S/PERICOLACE) 8.6-50 MG tablet  at prn  No Yes   Sig: Take 1 tablet by mouth 2 times daily as needed for constipation      Facility-Administered Medications: None     Allergies   Allergies   Allergen Reactions     Demerol [Meperidine]      Dust Mites      Peanuts [Nuts]      Penicillins      Pollen Extract          Family History   I have reviewed this patient's family history and updated it with pertinent information if needed.   No family history on file.    Review of Systems   The 10 point Review of Systems is negative other than noted in the HPI or here.     Palliative Symptom Review (0=no symptom/no concern, 1=mild, 2=moderate, 3=severe):      Pain: 1-mild      Fatigue: 2-moderate      Nausea: 0-none      Constipation: 0-none      Diarrhea: 0-none      Depressive Symptoms: 0-none      Anxiety: 0-none      Drowsiness: 0-none      Poor Appetite: 2-moderate      Shortness of Breath: 0-none      Insomnia: unable to assess      Other: 2-moderate, stage 3 pressure ulcer       Overall (0 good/no concerns, 3 very poor):  2+    Physical Exam   Temp:  [97.4  F  (36.3  C)-97.6  F (36.4  C)] 97.6  F (36.4  C)  Pulse:  [62-84] 84  Resp:  [16-20] 20  BP: (132-154)/(44-68) 151/47  SpO2:  [96 %-98 %] 96 %  110 lbs .15 oz  GEN:  Alert, oriented x 1, appears comfortable, NAD.  HEENT:  Normocephalic/atraumatic, no scleral icterus, no nasal discharge, mouth moist.  CV:  RRR, S1, S2; no murmurs or other irregularities noted.  +3 DP/PT pulses bilatererally; no edema BLE.  RESP:  Clear to auscultation bilaterally without rales/rhonchi/wheezing/retractions.  Symmetric chest rise on inhalation noted.  Normal respiratory effort.  ABD:  Rounded, soft, non-tender/non-distended.  +BS  EXT:  Edema & pulses as noted above.  CMS intact x 4.     M/S:   Tender to palpation  Throughout RUIZ X 4 .    SKIN:  Dry to touch, no exanthems noted in the visualized areas.  Stage 3 peas size ulcers on buttocks,  Skin tears on forearms.  NEURO: Symmetric strength +5/5.  Sensation to touch intact all extremities.   There is no area of allodynia or hyperesthesia.  Psych:  Crying out. Confused  cooperative, conversant inappropriately.     Delirium Screen/CAM:  Delirium = (#1 and #2 = YES) + (#3 and/or #4)   1) Acute onset and fluctuating course:   YES   (acute change in mental status from baseline over last 24 hours)  2) Inattention:   YES   (difficulty focusing, distractible, can't follow conversation)  3) Disorganized thinking:   YES   (score only if #1 and #2 are YES)  (rambling/irrelevant conversation, unclear/illogical thoughts, inconsistency)  4) Altered level of consciousness:   YES   (score only if #1 and #2 are YES)  (other than alert, calm, cooperative)    Delirium/CAM score: 4/4  Interpretation:  1)  Delirium:  Present  2)  Type:  mixed  3)  Severity:  moderate    Data   Results for orders placed or performed during the hospital encounter of 01/18/21 (from the past 24 hour(s))   UA with Microscopic   Result Value Ref Range    Color Urine Yellow     Appearance Urine Slightly Cloudy     Glucose Urine  Negative NEG^Negative mg/dL    Bilirubin Urine Negative NEG^Negative    Ketones Urine Negative NEG^Negative mg/dL    Specific Gravity Urine 1.018 1.003 - 1.035    Blood Urine Negative NEG^Negative    pH Urine 5.5 5.0 - 7.0 pH    Protein Albumin Urine 50 (A) NEG^Negative mg/dL    Urobilinogen mg/dL Normal 0.0 - 2.0 mg/dL    Nitrite Urine Negative NEG^Negative    Leukocyte Esterase Urine Large (A) NEG^Negative    Source Catheterized Urine     WBC Urine >182 (H) 0 - 5 /HPF    RBC Urine 23 (H) 0 - 2 /HPF    WBC Clumps Present (A) NEG^Negative /HPF    Bacteria Urine Moderate (A) NEG^Negative /HPF    Hyaline Casts 2 0 - 2 /LPF   Troponin I - Now then in 4 hours x 2   Result Value Ref Range    Troponin I ES 85.510 (HH) 0.000 - 0.045 ug/L   Heparin Unfractionated Anti Xa Level   Result Value Ref Range    Heparin Unfractionated Anti Xa Level 0.26 IU/mL   Heparin Unfractionated Anti Xa Level   Result Value Ref Range    Heparin Unfractionated Anti Xa Level 0.12 IU/mL   Basic metabolic panel   Result Value Ref Range    Sodium 142 133 - 144 mmol/L    Potassium 4.5 3.4 - 5.3 mmol/L    Chloride 116 (H) 94 - 109 mmol/L    Carbon Dioxide 24 20 - 32 mmol/L    Anion Gap 2 (L) 3 - 14 mmol/L    Glucose 72 70 - 99 mg/dL    Urea Nitrogen 36 (H) 7 - 30 mg/dL    Creatinine 0.83 0.52 - 1.04 mg/dL    GFR Estimate 59 (L) >60 mL/min/[1.73_m2]    GFR Estimate If Black 69 >60 mL/min/[1.73_m2]    Calcium 8.0 (L) 8.5 - 10.1 mg/dL   CBC with platelets   Result Value Ref Range    WBC 6.4 4.0 - 11.0 10e9/L    RBC Count 2.76 (L) 3.8 - 5.2 10e12/L    Hemoglobin 9.3 (L) 11.7 - 15.7 g/dL    Hematocrit 28.9 (L) 35.0 - 47.0 %     (H) 78 - 100 fl    MCH 33.7 (H) 26.5 - 33.0 pg    MCHC 32.2 31.5 - 36.5 g/dL    RDW 14.9 10.0 - 15.0 %    Platelet Count 123 (L) 150 - 450 10e9/L   Troponin I   Result Value Ref Range    Troponin I ES 70.592 (HH) 0.000 - 0.045 ug/L   Heparin Unfractionated Anti Xa Level   Result Value Ref Range    Heparin Unfractionated Anti  Xa Level 0.33 IU/mL   Care Management / Social Work IP Consult    Narrative    Rylie Bustos RN     1/19/2021  2:21 PM  Care Management Initial Consult    General Information  Assessment completed with: Children, Other(care home)Ronal  Type of CM/SW Visit: Initial Assessment    Primary Care Provider verified and updated as needed:     Readmission within the last 30 days:        Reason for Consult: discharge planning  Advance Care Planning:            Communication Assessment  Patient's communication style: spoken language (English or   Bilingual)    Hearing Difficulty or Deaf: other (see comments)(Unable to   assess-dementia)   Wear Glasses or Blind: other (see comments)    Cognitive  Cognitive/Neuro/Behavioral: .WDL except, orientation,   mood/behavior  Level of Consciousness: confused, lethargic    Arousal Level: opens eyes spontaneously  Orientation: disoriented   to, place, time, situation  Mood/Behavior: calm  Best Language: 0   - No aphasia  Speech: slow    Living Environment:   People in home: facility resident     Current living Arrangements: assisted living  Name of Facility:   [facility]   Able to return to prior arrangements: yes       Family/Social Support:  Care provided by:    Provides care for:       Children, Facility resident(s)/Staff          Description of Support System: Supportive, Involved    Support Assessment: Adequate family and caregiver support    Current Resources:   Skilled Home Care Services: [facility] Ayesha CORADO RN/PT/OT   Community Resources:  none  Equipment currently used at home:  wheelchair            Lifestyle & Psychosocial Needs:        Socioeconomic History     Marital status:      Spouse name: Not on file     Number of children: Not on file     Years of education: Not on file     Highest education level: Not on file     Tobacco Use     Smoking status: Never Smoker     Smokeless tobacco: Never Used   Substance and Sexual Activity     Alcohol use: Yes      Drug use: No     Sexual activity: Not Currently               Additional Information:  RN CC following for discharge plan of care. Pt was admitted with   STEMI and is being medically managed. Per chart review, pt is   from [facility] in [place]. Call placed to pt's son,   Ronal, to verify residence and discuss discharge plan of care.   Ronal verifies that pt lives at [facility] and   gives verbal permission to discuss services and discharge   planning with the facility. Anticipate pt will remain in hospital   for a couple more days prior to returning. Reviewed CC role with   pt's son and will cont to work with family for discharge   planning.     Call placed to nursing at the [facility] to verify services.   Voicemail left to return call to RN CC to coordinate cares.     Per chart note, pt was getting Home care services from [facility]. Call placed to [facility] Intake Nurse [phone] at   [facility] and verified that pt was getting RN/PT/OT   services. Pt will need resumption of care orders if appropriate   on discharge. [facility] would like a call on discharge with update   on plan of care. Home care discharge orders, H&P and discharge   summary can be faxed to fax #: [phone].    Will cont to follow for discharge plan of care.       Rylie Bustos RN BSN CM  Inpatient Care Coordination  [facility]  [phone]

## 2021-01-19 NOTE — PROGRESS NOTES
01/19/21 1400   Clinical Swallow Evaluation   Feeding Assistance minimal assistance required   Swallowing Recommendations   Diet Consistency Recommendations dysphagia level 3 (advanced);thin liquids   Supervision Level for Intake close supervision needed   Mode of Delivery Recommendations bolus size, small;slow rate of intake   Swallowing Maneuver Recommendations alternate food and liquid intake;extra swallow   Monitoring/Assistance Required (Eating/Swallowing) monitor for cough or change in vocal quality with intake   Recommended Feeding/Eating Techniques (Swallow Eval) maintain upright sitting position for eating;maintain upright posture during/after eating for 30 minutes   Medication Administration Recommendations, Swallowing (SLP) crushed    Comment, Swallowing Recommendations SLP: Pt referred to SLP after difficulty with pancakes this am with pt picking out any texture and not swallowing meds. Noted SLP followed during hospitalization with recommended [facility] soft diet. Pt alert and in chair and pleasantly confused. Following all commands with ?left facial droop likely impacted by missing dentition. Pt accepted trials of thin liquids by straw, puree solids and regular solids. Mild delay across the trials with occasional double swallows with liquids. No difficulty with puree. Moderate oral residue required liquid wash to clear with solids. No overt s/sx of aspiration and no obvious texture adversion throughout the evaluation. Okay to continue mechanical/dental soft diet and thin liquids with assist as needed.    General Therapy Interventions   Planned Therapy Interventions Dysphagia Treatment   Dysphagia treatment Modified diet education;Instruction of safe swallow strategies   SLP Therapy Assessment/Plan   Criteria for Skilled Therapeutic Interventions Met (SLP Eval) yes   SLP Diagnosis Mild dysphagia   Rehab Potential (SLP Eval) fair, will monitor progress closely   Therapy Frequency (SLP Eval) 3 times/wk    Predicted Duration of Therapy Intervention (SLP Eval) 1 week   Therapy Plan Review/Discharge Plan (SLP)   Therapy Plan Review (SLP) evaluation/treatment results reviewed;caregiver   SLP Discharge Planning    SLP Discharge Recommendation (DC Rec) Long term care facility   SLP Rationale for DC Rec Pt likely near baseline; short term SLP to ensure diet tolerance   SLP Brief overview of current status  Continue [facility] soft and thin liquids with standard aspiration precautions    Total Evaluation Time   Total Evaluation Time (Minutes) 15

## 2021-01-19 NOTE — PLAN OF CARE
PT: Received orders for cardiac evaluation and treatment.  Per chart review and discussion with nursing, will hold evaluation today to allow for medical management (nursing reports very high troponin level).   Will reschedule for tomorrow; may benefit from general physical therapy evaluation (to determine level of assist required for eventual return to nursing home) instead of cardiac evaluation (patient with baseline dementia, may not benefit from cardiac education).

## 2021-01-19 NOTE — PLAN OF CARE
VSS. Pt appears pain free and comfortable. Trops 34.908->85.510. Hep gtt infusing at 7.5u/hr. Pt was repo Q2 hrs. Alert to self only. Will cont plan of care.

## 2021-01-20 ENCOUNTER — APPOINTMENT (OUTPATIENT)
Dept: PHYSICAL THERAPY | Facility: CLINIC | Age: 86
DRG: 281 | End: 2021-01-20
Attending: PHYSICIAN ASSISTANT
Payer: COMMERCIAL

## 2021-01-20 ENCOUNTER — APPOINTMENT (OUTPATIENT)
Dept: SPEECH THERAPY | Facility: CLINIC | Age: 86
DRG: 281 | End: 2021-01-20
Payer: COMMERCIAL

## 2021-01-20 LAB
ANION GAP SERPL CALCULATED.3IONS-SCNC: 3 MMOL/L (ref 3–14)
BUN SERPL-MCNC: 23 MG/DL (ref 7–30)
CALCIUM SERPL-MCNC: 8 MG/DL (ref 8.5–10.1)
CHLORIDE SERPL-SCNC: 114 MMOL/L (ref 94–109)
CO2 SERPL-SCNC: 23 MMOL/L (ref 20–32)
CREAT SERPL-MCNC: 0.79 MG/DL (ref 0.52–1.04)
ERYTHROCYTE [DISTWIDTH] IN BLOOD BY AUTOMATED COUNT: 14.9 % (ref 10–15)
GFR SERPL CREATININE-BSD FRML MDRD: 63 ML/MIN/{1.73_M2}
GLUCOSE SERPL-MCNC: 79 MG/DL (ref 70–99)
HCT VFR BLD AUTO: 27.8 % (ref 35–47)
HGB BLD-MCNC: 8.8 G/DL (ref 11.7–15.7)
MAGNESIUM SERPL-MCNC: 1.8 MG/DL (ref 1.6–2.3)
MCH RBC QN AUTO: 33.6 PG (ref 26.5–33)
MCHC RBC AUTO-ENTMCNC: 31.7 G/DL (ref 31.5–36.5)
MCV RBC AUTO: 106 FL (ref 78–100)
PLATELET # BLD AUTO: 165 10E9/L (ref 150–450)
POTASSIUM SERPL-SCNC: 4 MMOL/L (ref 3.4–5.3)
RBC # BLD AUTO: 2.62 10E12/L (ref 3.8–5.2)
SODIUM SERPL-SCNC: 140 MMOL/L (ref 133–144)
UFH PPP CHRO-ACNC: 0.31 IU/ML
WBC # BLD AUTO: 7.3 10E9/L (ref 4–11)

## 2021-01-20 PROCEDURE — C9113 INJ PANTOPRAZOLE SODIUM, VIA: HCPCS | Performed by: PHYSICIAN ASSISTANT

## 2021-01-20 PROCEDURE — G0463 HOSPITAL OUTPT CLINIC VISIT: HCPCS

## 2021-01-20 PROCEDURE — 250N000011 HC RX IP 250 OP 636: Performed by: PHYSICIAN ASSISTANT

## 2021-01-20 PROCEDURE — 250N000013 HC RX MED GY IP 250 OP 250 PS 637: Performed by: PHYSICIAN ASSISTANT

## 2021-01-20 PROCEDURE — 92526 ORAL FUNCTION THERAPY: CPT | Mod: GN

## 2021-01-20 PROCEDURE — 80048 BASIC METABOLIC PNL TOTAL CA: CPT | Performed by: INTERNAL MEDICINE

## 2021-01-20 PROCEDURE — 250N000013 HC RX MED GY IP 250 OP 250 PS 637: Performed by: NURSE PRACTITIONER

## 2021-01-20 PROCEDURE — 97161 PT EVAL LOW COMPLEX 20 MIN: CPT | Mod: GP

## 2021-01-20 PROCEDURE — 83735 ASSAY OF MAGNESIUM: CPT | Performed by: INTERNAL MEDICINE

## 2021-01-20 PROCEDURE — 99233 SBSQ HOSP IP/OBS HIGH 50: CPT | Performed by: INTERNAL MEDICINE

## 2021-01-20 PROCEDURE — 36415 COLL VENOUS BLD VENIPUNCTURE: CPT | Performed by: INTERNAL MEDICINE

## 2021-01-20 PROCEDURE — 85520 HEPARIN ASSAY: CPT | Performed by: INTERNAL MEDICINE

## 2021-01-20 PROCEDURE — 120N000001 HC R&B MED SURG/OB

## 2021-01-20 PROCEDURE — 250N000011 HC RX IP 250 OP 636: Performed by: INTERNAL MEDICINE

## 2021-01-20 PROCEDURE — 99233 SBSQ HOSP IP/OBS HIGH 50: CPT | Performed by: NURSE PRACTITIONER

## 2021-01-20 PROCEDURE — 85027 COMPLETE CBC AUTOMATED: CPT | Performed by: INTERNAL MEDICINE

## 2021-01-20 RX ORDER — ACETAMINOPHEN 325 MG/1
650 TABLET ORAL EVERY 8 HOURS
Status: DISCONTINUED | OUTPATIENT
Start: 2021-01-20 | End: 2021-01-22 | Stop reason: HOSPADM

## 2021-01-20 RX ORDER — MAGNESIUM SULFATE HEPTAHYDRATE 40 MG/ML
2 INJECTION, SOLUTION INTRAVENOUS ONCE
Status: COMPLETED | OUTPATIENT
Start: 2021-01-20 | End: 2021-01-20

## 2021-01-20 RX ORDER — PANTOPRAZOLE SODIUM 40 MG/1
40 TABLET, DELAYED RELEASE ORAL
Status: DISCONTINUED | OUTPATIENT
Start: 2021-01-21 | End: 2021-01-22 | Stop reason: HOSPADM

## 2021-01-20 RX ADMIN — CLOPIDOGREL BISULFATE 75 MG: 75 TABLET ORAL at 11:16

## 2021-01-20 RX ADMIN — Medication 12.5 MG: at 20:12

## 2021-01-20 RX ADMIN — MICONAZOLE NITRATE: 20 POWDER TOPICAL at 11:11

## 2021-01-20 RX ADMIN — Medication 12.5 MG: at 11:16

## 2021-01-20 RX ADMIN — CEFTRIAXONE 1 G: 1 INJECTION, POWDER, FOR SOLUTION INTRAMUSCULAR; INTRAVENOUS at 11:15

## 2021-01-20 RX ADMIN — PANTOPRAZOLE SODIUM 40 MG: 40 INJECTION, POWDER, FOR SOLUTION INTRAVENOUS at 11:15

## 2021-01-20 RX ADMIN — ASPIRIN 81 MG CHEWABLE TABLET 81 MG: 81 TABLET CHEWABLE at 11:11

## 2021-01-20 RX ADMIN — ACETAMINOPHEN 650 MG: 325 TABLET, FILM COATED ORAL at 16:55

## 2021-01-20 RX ADMIN — MAGNESIUM SULFATE HEPTAHYDRATE 2 G: 2 INJECTION, SOLUTION INTRAVENOUS at 16:55

## 2021-01-20 RX ADMIN — LISINOPRIL 20 MG: 20 TABLET ORAL at 11:16

## 2021-01-20 RX ADMIN — MICONAZOLE NITRATE: 20 POWDER TOPICAL at 20:12

## 2021-01-20 ASSESSMENT — ACTIVITIES OF DAILY LIVING (ADL)
ADLS_ACUITY_SCORE: 30
ADLS_ACUITY_SCORE: 29
ADLS_ACUITY_SCORE: 30
ADLS_ACUITY_SCORE: 29
ADLS_ACUITY_SCORE: 38
ADLS_ACUITY_SCORE: 29

## 2021-01-20 NOTE — PLAN OF CARE
Presentation/Diagnosis: Admitted on 1/21 for hypotension and hypoxia who was found to have EKG showing a stemi. Urinalysis showed likely UTI.   History: HTN, CKDII, anemia, hypothyroidism, advanced dementia, uti's, COVID.  Labs/Protocols: Hep: 0.32, trop 70.592 trending down.   Vitals: Hypertensive, otherwise stable. See flow sheets.   Telemetry: T SR.    Respiratory: RA. LS clear and equal bilaterally.   Neuro: Disoriented x4, advanced dementia. Does not respond appropriately to questions.   GI/: Walton in place for retention. Incontinent of bowels. One episode of bowel incontinence this shift.   Skin: Vaginal laceration. Reddened/abraised area on the coccyx covered with mepilex. L FA skin tear covered with foam bandage.   LDA's: L & R PIV infusing hep @ 7.5 and NS @ 75. IV rocephin for UTI  Diet: Mechanical soft diet   Activity: Lift, A2    Plan: Plan is to discharge in 1-2 days back to memory care at [facility] in [place].  Continue POC.

## 2021-01-20 NOTE — PROGRESS NOTES
[facility]  Hospitalist Progress Note  Neva Claudio MD 01/19/21    Reason for Stay (Diagnosis): STEMI, UTI         Assessment and Plan:      Summary of Stay: Patient is a 89-year-old female with history of HTN, CKD stage II, anemia, hypothyroidism, advanced dementia who lives in memory care facility (care suites in own room with full cares, wheelchair bound), UTIs, and recent COVID-19 hospitalization in November 18/2020 where she was treated with dexamethasone and Levaquin for possible pneumonia and subsequent treatment of 30 days of VTE prophylaxis with Eliquis who now presents from [facility] facility due to reportedly hypotension and hypoxia along with depressed level of consciousness.  For EMS her SBP was in the 110s and O2 sats were in the mid 90s.    She was admitted on 1/18/2021 after an EKG in the ER showed an inferior STEMI.  Cardiology was consulted and management was discussed with the patient's sons who are her healthcare agent due to advanced dementia.  Medical management with dual antiplatelet therapy with aspirin Plavix and heparin drip initiated.  TTE shows inferior hypokinesis, but preserved EF.  Blood pressures remained stable.  Urinalysis showing significant pyuria so started on IV ceftriaxone for suspected UTI. .  Palliative care consulted.  Patient changed to DNR/DNI status otherwise restorative cares.    Problem List/Assessment and Plan:   Inferior STEMI, HTN: Reportedly hypotension at care facility, blood pressures 140-150 systolic here.  EKG shows an inferior STEMI and troponin elevated 1.2 consistent with acute coronary syndrome.  This was discussed in the ER with the patient's sons who are healthcare agent and with cardiology.  Based on her advanced dementia and this discussion medical management is being pursued.  TTE shows EF 55% and inferior wall hypokinesis.  Peak troponin overnight at 85.5, now trending down.  -Cardiology consulted and plan is for med  management  -Continue heparin drip for 48 hours, to be discontinued today at noon   -loaded with clopidogrel and now on Plavix 75 mg daily for 1 year.  Aspirin 81 mg daily for 1 year  -Resumed metoprolol tartrate 12.5 mg twice daily  -Resumed lisinopril 20 mg daily  -Telemetry to monitor for arrhythmia, continues with episodic Vtach, mag low at 1.8, goal would be 2.0, on high mag replacement protocol      Advanced dementia, acute encephalopathy: Advanced dementia at baseline lives in memory care.  I spoke with son Jonny about her baseline.  He said she was significantly deconditioned and worse after COVID-19, but this was getting better and roughly 1 week prior to presentation here she was able to to operate her wheelchair some on her own, she was feeding herself, and she speaks some.  Reportedly has been more fatigued appearing and more confused from baseline per nursing home.  Suspect acute encephalopathy is from the inferior STEMI.  Also likely has infectious encephalopathy from UTI based on history of this and pyuria on urinalysis.  Initially obtunded, then with delirium and calling out.  Seems improved   -not on any dementia medications at baseline    UTI: History of recurrent UTIs.  Cannot provide any history as far as symptoms.  Afebrile and no leukocytosis.  Urinalysis shows > 182 WBCs, large LE, moderate bacteria, and 23 RBCs suspicious for UTI.  -Day 2 of ceftriaxone 1 g every 24 hours  -Follow urine culture results, still pending     Dysphagia: sounds like this has been present for 1.5 years.  Worsened with AMI, or any illness. Improved by notes.  SLP sam completed with recommendations for mechanical soft and thin liquids     CASSIE on CKD stage II: Creatinine up to 1.1 on admission with baseline 0.8.  Initially held lisinopril, now resume as creatinine is at baseline and renal function is tolerating     Recent COVID-19: Recent hospitalization November 2020 for COVID-19 pneumonia with treatment with  dexamethasone and levofloxacin.  Did complete 1 month of DVT prophylaxis Eliquis.  Chest x-ray does not show any infiltrates.  Afebrile.  Not hypoxic on room air.     Perineum wounds: Multiple wounds and ulcerations to perineum and labia.  -Placed Turner catheter for now to promote wound healing due to incontinence and altered mental status  -Wound nurse consult    Goals of care: Patient initially was full code.  She has a POLST form that was filled out 5/2019 that states full code.  Patient has an inferior STEMI that is being managed medically and has advanced dementia.  From a medical standpoint pursuing cardiac resuscitation in the event of V. fib or PEA arrest is not appropriate.  Son Ronal has asked for further communication and updates to go through Jonny.  Dr Handley spoke with Jonny at length to explain the rationale for a change to DNR/DNI status given her advanced age, dementia, and new acute cardiac problem with a inferior STEMI and likelihood of causing harm with broken ribs and pain without meaningful neurologic recovery following a cardiac arrest.    After much discussion, family has changed patient to DNR/DNI.    DVT Prophylaxis: Heparin infusion  Code Status: DNR / DNI  FEN: Mechanical soft, consult SLP for dysphagia  Discharge Dispo: Lives at care suites in [facility], sounds like she has her own room but is wheelchair bound and they provide full care.   Estimated Disch Date / # of Days until Disch: Likely 2 days      Discussed in depth with Nick by phone.  He expressed concern re: going home with the turner (concerned it will inhibit her mobility). I reverified with him that she is wheelchair bound which he confirmed.  I reassured him that this would be standard of care in this situation.  I offered to send her to TCU which he did NOT want but again expressed concerned that [facility] would not be able to meet her needs.  Discussed with Nick and RN to arrange for Ipad visual to see her in the wheelchair with the  "turner in place. He is concerned that she is high risk for another event, and I concurred but being in the hospital will not prevent that.  She is at risk for all types of complications strictly due to age.  What she needs is max medically therapy which she is already on, and staying in the hospital only sets her up for risks related to hospitalization (resistent infections/blood draws/frequent VS/loss of day night variation/increased risk of delirium) without benefit.         Interval History (Subjective):      Feeling good, denies any pain.  Eating some breakfast                  Physical Exam:      Last Vital Signs:  BP (!) 150/60 (BP Location: Left arm)   Pulse 66   Temp 96  F (35.6  C)   Resp 20   Ht 1.575 m (5' 2\")   Wt 49.9 kg (110 lb 0.2 oz)   SpO2 98%   BMI 20.12 kg/m      Tele with episodic Vtach and ? SVT (irregular) with aberrant conduction       Constitutional: Awake   Eyes: sclera white   HEENT:  MMM  Respiratory:  lungs cta bilaterally, no crackles or wheeze  Cardiovascular: RRR.  No murmur   GI: non-tender, not distended, bowel sounds present  Skin: no rash.  Some ecchymoses to bilateral lower legs  Musculoskeletal/extremities: Trace bilateral pedal edema  Neurologic: Alert, eating breakfast. Her speech is garbled but more intelligible based on notes occasionally , not oriented to place or time  Psychiatric: mildly Anxious         Medications:      All current medications were reviewed with changes reflected in problem list.         Data:      All new lab and imaging data was reviewed.   Labs:  Recent Labs   Lab 01/20/21  0701 01/19/21  0517 01/18/21  0952    142 141   POTASSIUM 4.0 4.5 4.2   CHLORIDE 114* 116* 112*   CO2 23 24 25   ANIONGAP 3 2* 4   GLC 79 72 99   BUN 23 36* 45*   CR 0.79 0.83 1.11*   GFRESTIMATED 63 59* 42*   GFRESTBLACK 73 69 49*   RENETTA 8.0* 8.0* 8.7     Recent Labs   Lab 01/20/21  0701 01/19/21  0517 01/18/21  0952   WBC 7.3 6.4 6.6   HGB 8.8* 9.3* 10.4*   HCT 27.8* " 28.9* 32.9*   * 105* 104*    123* 196     Recent Labs   Lab 21  0517 21  1803 21  1521 21  1028   TROPONIN  --   --   --  1.13*   TROPI 70.592* 85.510* 34.908*  --       Imaging:   Recent Results (from the past 24 hour(s))   Echo Limited    Narrative    [id number]  [id number]  [id number]  [id number]^ADRIENNE^BETITO^CHING           [facility]  Echocardiography Laboratory  [address] [place] [address]        Name: LIV KINGSTON  MRN: [id number]  : 1925  Study Date: 2021 01:58 PM  Age: 89 yrs  Gender: Female  Patient Location: [facility]  Reason For Study: Chest Pain, Chest Tightness, Chest Pressure  Ordering Physician: BETITO DELEON  Performed By: Roma Baker     BSA: 1.5 m2  Height: 62 in  Weight: 110 lb  HR: 60  BP: 147/44 mmHg  _____________________________________________________________________________  __        Procedure  Limited Portable Echo Adult. Optison (NDC #1140-5424) given intravenously.  (Emergent exam, abbreviated study performed).  _____________________________________________________________________________  __        Interpretation Summary     1. The left ventricle is normal in size. The visual ejection fraction is  estimated at 55%. Basal inferior hypokinesis.  2. The right ventricle is normal in structure, function and size.  3. No valve disease.  4. The ascending aorta is Mildly dilated. 4.1cm.     Echo from 2018 showed EF 55%, aorta 4.0cm. Hard to compare wall motion  directly, as contrast was not used on previous study.  _____________________________________________________________________________  __        Left Ventricle  The left ventricle is normal in size. There is normal left ventricular wall  thickness. The visual ejection fraction is estimated at 55%. Left ventricular  diastolic function is indeterminate. Basal inferior hypokinesis.     Right Ventricle  The right ventricle is normal in structure, function and  size.     Mitral Valve  There is mild mitral annular calcification. There is mild (1+) mitral  regurgitation.     Tricuspid Valve  There is mild (1+) tricuspid regurgitation. The right ventricular systolic  pressure is approximated at 24.2 mmHg plus the right atrial pressure.        Aortic Valve  There is mild trileaflet aortic sclerosis. There is mild (1+) aortic  regurgitation.     Pulmonic Valve  The pulmonic valve is normal in structure and function.     Vessels  The ascending aorta is Mildly dilated. Inferior vena cava not well visualized  for estimation of right atrial pressure.     Pericardium  There is no pericardial effusion.     Rhythm  Sinus rhythm was noted.     _____________________________________________________________________________  __  MMode/2D Measurements & Calculations  IVSd: 1.1 cm  LVIDd: 4.6 cm  LVIDs: 2.4 cm  LVPWd: 1.1 cm  FS: 48.4 %     LV mass(C)d: 183.5 grams  LV mass(C)dI: 123.8 grams/m2  Ao root diam: 3.9 cm  asc Aorta Diam: 4.1 cm  LVOT diam: 2.3 cm  LVOT area: 4.3 cm2  RWT: 0.50        Doppler Measurements & Calculations  MV E max laura: 72.3 cm/sec  MV A max laura: 99.4 cm/sec  MV E/A: 0.73  MV dec time: 0.28 sec  TR max laura: 246.1 cm/sec  TR max P.2 mmHg  E/E' av.5  Lateral E/e': 14.7  Medial E/e': 22.3              _____________________________________________________________________________  __        Report approved by: Alex Joseph 2021 02:47 PM              Neva Claudio MD

## 2021-01-20 NOTE — PROGRESS NOTES
[facility]  Palliative Care Progress Note  Text Page   Tracey Moran is a 89 year old patient admitted with symptoms of  Generalized weakness and decreased responsiveness. She lives in a memory care unit in the area.  She has a history of dementia with disease progression. Is increasingly less mobile and has signifcant weight loss. On the admission she is been treated for  STEMI MI, dehydration and UTI.       This is in the setting of hypertension,degenerative spine disease.  she There is not reported or documented decline in patient's function.  There is a documented unitentional weight loss of 16 lbs over the past 18 months. Speech therapy has been consulted and observed mild dysphagia without  evidence of aspiration and tolerance of mechanical soft diet . Palliative Performance Scale (PPS): 30%  Extensive disease. Bedbound & requires total care, normal or reduced intake. Normal LOC or drowsy or confused.  Estimated Median Survival in Days: 8 to 41 days.  Today we had a phone conference with primary sandra care agent ( [place]) and alternate ( Nick).  Both are her sons and are actively involved with her care. They both would like to give this more time and feel the rapid decline and weight loss were related to COVID positive status and heart attack. They feel her condition guarded and realize she could deteriorate still.       Assessment & Plan    1.Decisional Capacity -  Unreliable. Patient has an advance directive dated 02.22.18.  Consents and decision making should be done by   Ronal Moran , the primary Health Care Agent.  Alternates are  Michele Shepherd  The alterate is the  for update.  Attempt to include both in decision making.  POLST will need to be updated on discharge              Chronic dementia with functional decline 26 lb weight loss from 6/6/19 to 1/19/21,  Stage 3 buttocks pressure sores, Thad score 10                           Appreciate input from Dina  Joaquín, Speech language with bedside eval                           Appreciate input from Piedad Gunderson, Physical Therapy                            meliplex to buttocks                          Ongoing conversation with goals of care      2. 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               Ongoing pain assessment for verbal and non verbal indicators of pain              Oxycodone 5 mg every 4 hrs prn, dilaudid 0.2 mg for BTP discontinue per family request. They want to be notified before giving narcotics              Acetaminophen (Tylenol) 650 mg every every 8 hrs               Repositioning               3. Spiritual Care- Oriented to Spiritual Health as part of Palliative Care team., in conversation with son [place] Moran      4. Care Planning- Appreciate Care of Rylie Bustos RN case management .   Restorative goals of care continue Physical Therapy / Speech for dysphagia and resume at [facility] likely tomorrow        GOALS of CARE: palliative treatment  CODE STATUS: No CPR- Do NOT Intubate  If your condition deteriorates would you consider transfer back to ICU  Would you consider the use if pressors for b/p control:   Would you consider tube feedings for nutrition:  Yes if it would help mom get better      Findings & plan of care discussed with: Bedside Nurse Sofia Marc RN, Neva Claudio MD by text page   Thank you for involving us in the patient's care.       Katarina Laura, RN-C, APRN, CNP, ACHPN   [facility]    Pain Management and Palliative Care  [facility]  Pgr: [phone] team   Pgr: [phone]    Time Spent on this Encounter   Total unit/floor time 35 minutes, time consisted of the following, examination of the patient, reviewing the record and completing documentation. >50% of time spent in counseling and coordination  of care.  Time spend counseling with family consisted of the following topics, goals of care, advance care planning and symptom management.  Time spent in coordination of care with those listed above.     Interval History   Denies pain   Tolerating soft diet   Disoriented     Course of Hospitalization Discussions Data    Discussed on January 20, 2021 with Katarina NEGRON, APRN, CNP, ACHPN:  I contacted Ronal Moran son and he created phone conference with his  brother Nick. They continue to express goals of care as restorative.  They continue to want full medical noninvasive treatment for their mom in relation to her MI.  When we talked about goals of care related to dementia, weight loss, bedsores and immobility they fel we were over emphasizing this and her weight reduction and immobility are related to patient having COVID and that she needs time to recover with appropriate therapies like Physical Therapy . Reviewed POLST and awaiting their review for updating into medical record.     Discussed on January 19, 2021 with Katarina NEGRON, APRN, CNP, ACHPN:  I contacted Ronal Moran,who lives in  [place] and is the primary health care agent. Updated him as he was not contacted by medical team. He tells me as he lives out of town, his brother Michele is pedro decision maker.    I reassured that the patient is comfortable without chest pain.    I expressed my concerns and worry about the patient's progression with her dementia and inquired if goals of care should be readdressed.  He thought this was a good idea and we decided a phone meeting tomorrow with his brother would be good.    The patient's health care directive was reviewed noting that she wanted food and fluids by any route,even if permanently unconscious. Pain control and symptom management by any route, but not hastening death is also important to the patient.        Review of Systems    CONSTITUTIONAL: NEGATIVE for fever,  chills, change in weight  ENT/MOUTH: NEGATIVE for ear, mouth and throat problems  RESP: NEGATIVE for significant cough or SOB  CV: NEGATIVE for chest pain, palpitations or peripheral edema    Palliative Symptom Review (0=no symptom/no concern, 1=mild, 2=moderate, 3=severe):      Pain: 0-none      Fatigue: 2-moderate      Nausea: 0-none      Constipation: 0-none      Diarrhea: 0-none      Depressive Symptoms: 0-none      Anxiety: 0-none      Drowsiness: 0-none      Poor Appetite: 2-moderate      Shortness of Breath: 0-none      Insomnia: 0-none      Other: stage 3 bed sore  2-moderate       Debility  2- moderate      Overall (0 good/no concerns, 3 very poor):  2    Physical Exam   Temp:  [96  F (35.6  C)-97.8  F (36.6  C)] 96.9  F (36.1  C)  Pulse:  [66-86] 86  Resp:  [20] 20  BP: (117-152)/(47-60) 117/49  SpO2:  [96 %-98 %] 96 %  110 lbs .15 oz  GEN:  Alert, oriented x 1, appears comfortable, NAD.  HEENT:  Normocephalic/atraumatic, no scleral icterus, no nasal discharge, mouth moist.  CV:  RRR, S1, S2; no murmurs or other irregularities noted.  +3 DP/PT pulses bilatererally; no edema BLE.  RESP:  Clear to auscultation bilaterally without rales/rhonchi/wheezing/retractions.  Symmetric chest rise on inhalation noted.  Normal respiratory effort.  ABD:  Rounded, soft, non-tender/non-distended.  +BS  EXT:  Edema & pulses as noted above.  CMS intact x 4.     M/S:  nontender to palpation throughout.    SKIN:  Dry to touch, no exanthems noted in the visualized areas.    NEURO: Symmetric movement x 4.  Sensation to touch intact all extremities.     PAIN BEHAVIOR: Cooperative  Psych:  Normal affect.  Calm, cooperative, conversant appropriately.    Medications     Continuing ACE inhibitor/ARB/ARNI from home medication list OR ACE inhibitor/ARB order already placed during this visit       - MEDICATION INSTRUCTIONS -       - MEDICATION INSTRUCTIONS -       - MEDICATION INSTRUCTIONS -       - MEDICATION INSTRUCTIONS -        sodium chloride 75 mL/hr at 01/20/21 0010       aspirin  81 mg Oral Daily     cefTRIAXone  1 g Intravenous Q24H     clopidogrel  75 mg Oral Daily     lisinopril  20 mg Oral Daily     metoprolol tartrate  12.5 mg Oral BID     miconazole   Topical BID     pantoprazole (PROTONIX) IV  40 mg Intravenous Daily with breakfast       Data   Results for orders placed or performed during the hospital encounter of 01/18/21 (from the past 24 hour(s))   Care Management / Social Work IP Consult    Narrative    Rylie Bustos RN     1/19/2021  2:21 PM  Care Management Initial Consult    General Information  Assessment completed with: Children, Other(JAME)Ronal  Type of CM/SW Visit: Initial Assessment    Primary Care Provider verified and updated as needed:     Readmission within the last 30 days:        Reason for Consult: discharge planning  Advance Care Planning:            Communication Assessment  Patient's communication style: spoken language (English or   Bilingual)    Hearing Difficulty or Deaf: other (see comments)(Unable to   assess-dementia)   Wear Glasses or Blind: other (see comments)    Cognitive  Cognitive/Neuro/Behavioral: .WDL except, orientation,   mood/behavior  Level of Consciousness: confused, lethargic    Arousal Level: opens eyes spontaneously  Orientation: disoriented   to, place, time, situation  Mood/Behavior: calm  Best Language: 0   - No aphasia  Speech: slow    Living Environment:   People in home: facility resident     Current living Arrangements: assisted living  Name of Facility:   [facility]   Able to return to prior arrangements: yes       Family/Social Support:  Care provided by:    Provides care for:       Children, Facility resident(s)/Staff          Description of Support System: Supportive, Involved    Support Assessment: Adequate family and caregiver support    Current Resources:   Skilled Home Care Services: [facility] RN/PT/OT   Community Resources:   none  Equipment currently used at home:  wheelchair            Lifestyle & Psychosocial Needs:        Socioeconomic History     Marital status:      Spouse name: Not on file     Number of children: Not on file     Years of education: Not on file     Highest education level: Not on file     Tobacco Use     Smoking status: Never Smoker     Smokeless tobacco: Never Used   Substance and Sexual Activity     Alcohol use: Yes     Drug use: No     Sexual activity: Not Currently               Additional Information:  RN CC following for discharge plan of care. Pt was admitted with   STEMI and is being medically managed. Per chart review, pt is   from [facility] in [place]. Call placed to pt's son,   Ronal, to verify residence and discuss discharge plan of care.   Ronal verifies that pt lives at [facility] and   gives verbal permission to discuss services and discharge   planning with the facility. Anticipate pt will remain in hospital   for a couple more days prior to returning. Reviewed CC role with   pt's son and will cont to work with family for discharge   planning.     Call placed to nursing at the [facility] to verify services.   Voicemail left to return call to RN CC to coordinate cares.     Per chart note, pt was getting Home care services from [facility]. Call placed to [facility] Intake Nurse [phone] at   [facility] and verified that pt was getting RN/PT/OT   services. Pt will need resumption of care orders if appropriate   on discharge. [facility] would like a call on discharge with update   on plan of care. Home care discharge orders, H&P and discharge   summary can be faxed to fax #: [phone].    Will cont to follow for discharge plan of care.       Rylie Bustos RN BSN CM  Inpatient Care Coordination  [facility]  [phone]             Heparin Unfractionated Anti Xa Level   Result Value Ref Range    Heparin Unfractionated Anti Xa Level 0.32  IU/mL   CBC with platelets   Result Value Ref Range    WBC 7.3 4.0 - 11.0 10e9/L    RBC Count 2.62 (L) 3.8 - 5.2 10e12/L    Hemoglobin 8.8 (L) 11.7 - 15.7 g/dL    Hematocrit 27.8 (L) 35.0 - 47.0 %     (H) 78 - 100 fl    MCH 33.6 (H) 26.5 - 33.0 pg    MCHC 31.7 31.5 - 36.5 g/dL    RDW 14.9 10.0 - 15.0 %    Platelet Count 165 150 - 450 10e9/L   Basic metabolic panel   Result Value Ref Range    Sodium 140 133 - 144 mmol/L    Potassium 4.0 3.4 - 5.3 mmol/L    Chloride 114 (H) 94 - 109 mmol/L    Carbon Dioxide 23 20 - 32 mmol/L    Anion Gap 3 3 - 14 mmol/L    Glucose 79 70 - 99 mg/dL    Urea Nitrogen 23 7 - 30 mg/dL    Creatinine 0.79 0.52 - 1.04 mg/dL    GFR Estimate 63 >60 mL/min/[1.73_m2]    GFR Estimate If Black 73 >60 mL/min/[1.73_m2]    Calcium 8.0 (L) 8.5 - 10.1 mg/dL   Heparin Unfractionated Anti Xa Level   Result Value Ref Range    Heparin Unfractionated Anti Xa Level 0.31 IU/mL   Magnesium   Result Value Ref Range    Magnesium 1.8 1.6 - 2.3 mg/dL

## 2021-01-20 NOTE — CONSULTS
"WO Nurse Inpatient Wound Assessment   Reason for consultation: Evaluate and treat  Labia wound, coccyx    Assessment  Labia wound due to abrasion  Status: initial assessment  Small open area on inferior labia  Treatment Plan  Labia wound: BID   1. Cleanse with dry wipe and Criselda  2. Apply Criselda cream   Orders Written  Recommended provider order: None, at this time  WO Nurse follow-up plan:weekly  Nursing to notify the Provider(s) and re-consult the WO Nurse if wound(s) deteriorates or new skin concern.    Pressure Injury Prevention (PIP) Plan:  If patient is declining pressure injury prevention interventions: Explore reason why and address patient's concerns, Educate on pressure injury risk and prevention intervention(s) and If patient is still declining, document \"informed refusal\"   Mattress: Follow bed algorithm, reassess daily and order specialty mattress, if indicated.  HOB: Maintain at or below 30 degrees, unless contraindicated  Repositioning in bed: Every 1-2 hours   Heels: Pillows under calves  Protective Dressing: Sacral Mepilex for prevention (#[id number]),  especially for the agitated patient     Patient History  According to provider note(s):  Patient is a 89-year-old female with history of HTN, CKD stage II, anemia, hypothyroidism, advanced dementia who lives in memory care facility, UTIs, and recent COVID-19 hospitalization in November 18/2020 where she was treated with dexamethasone and Levaquin for possible pneumonia and subsequent treatment of 30 days of VTE prophylaxis with Eliquis who now presents from memory care facility due to reportedly hypotension and hypoxia along with depressed level of consciousness.  For EMS her SBP was in the 110s and O2 sats were in the mid 90s.    She was admitted on 1/18/2021 after an EKG in the ER showed an inferior STEMI.  Cardiology was consulted and management was discussed with the patient's sons who are her healthcare agent due to advanced dementia.  Medical " management with dual antiplatelet therapy with aspirin Plavix and heparin drip initiated.  TTE shows inferior hypokinesis, but preserved EF.  Blood pressures remained stable.  Urinalysis showing significant pyuria so started on IV ceftriaxone for suspected UTI.  Mental status a little bit better today.  Palliative care consulted.  Patient changed to DNR/DNI status otherwise restorative cares.    Objective Data  Containment of urine/stool: Incontinence Protocol and Indwelling catheter    Active Diet Order  Orders Placed This Encounter      Mechanical/Dental Soft Diet      Output:   I/O last 3 completed shifts:  In: 1690 [P.O.:360; I.V.:1330]  Out: 625 [Urine:625]    Risk Assessment:   Sensory Perception: 3-->slightly limited  Moisture: 3-->occasionally moist  Activity: 2-->chairfast  Mobility: 2-->very limited  Nutrition: 2-->probably inadequate  Friction and Shear: 1-->problem  Thad Score: 13                          Labs:   Recent Labs   Lab 01/20/21  0701   HGB 8.8*   WBC 7.3       Physical Exam  Areas of skin assessed: focused Labia, coccyx    Wound Location:  Labia  Date of last photo none  Wound History: Patient with small abrasion possibly from purwick rubbing    Wound Base: 100 % agranular     Palpation of the wound bed: normal      Drainage: none     Description of drainage: none     Measurements (length x width x depth, in cm) 0.5 x 0.3 cm      Tunneling N/A     Undermining N/A  Periwound skin: intact      Color: normal and consistent with surrounding tissue      Temperature: normal   Odor: none  Pain: absent, none    Sacrum/coccyx with blanchable erythema and hyperpigmentation from previous pressure injury.  No areas open currently.      Interventions  Visual inspection and assessment completed   Wound Care Rationale Promote moist wound healing without tissue dehydration   Wound Care: done per plan of care  Supplies: at bedside  Current off-loading measures: Pillows under calves and Pillows  Current  support surface: Standard  Low air loss mattress with pulsation   Education provided to: importance of repositioning  Discussed plan of care with Patient and Nurse    Christopher Carlson RN CWOCN

## 2021-01-20 NOTE — PROGRESS NOTES
01/20/21 1045   Quick Adds   Type of Visit Initial PT Evaluation   Living Environment   People in home facility resident   Current Living Arrangements extended care facility   Home Accessibility no concerns   Living Environment Comments Patient resides at the [facility], receives total cares   Self-Care   Activity/Exercise/Self-Care Comment Patient unable to provide history, per chart review, patient does not ambulate, transfers between bed and wheelchair with staff   Disability/Function   Fall history within last six months yes   Number of times patient has fallen within last six months 4   General Information   Onset of Illness/Injury or Date of Surgery 01/18/21   Referring Physician Meena Carver PA-C   Patient/Family Therapy Goals Statement (PT) return to facility   Pertinent History of Current Problem (include personal factors and/or comorbidities that impact the POC) Patient is a 89-year-old female with history of HTN, CKD stage II, anemia, hypothyroidism, advanced dementia who lives in [facility] care facility, UTIs, and recent COVID-19 hospitalization in November 18/2020 where she was treated with dexamethasone and Levaquin for possible pneumonia and subsequent treatment of 30 days of VTE prophylaxis with Eliquis who now presents from [facility] care facility due to reportedly hypotension and hypoxia along with depressed level of consciousness.  Patient with inferior STEMI, HTN, advanced dementia, UTI, CASSIE on CKD on stage II   Existing Precautions/Restrictions fall   Cognition   Cognitive Status Comments Patient with known dementia   Pain Assessment   Patient Currently in Pain No   Posture    Posture Forward head position;Protracted shoulders   Strength   Strength Comments significant global strength deficits   Bed Mobility   Comment (Bed Mobility) Max A   Transfers   Transfer Safety Comments  Patient transferred to sitting at EOB with max A.  Patient transferred between sitting and standing with max A, pivot  transfer performed to bedside chair with max A x1.     Gait/Stairs (Locomotion)   Comment (Gait/Stairs) Patient does not walk at baseline   Clinical Impression   Criteria for Skilled Therapeutic Intervention current level of function same as previous level of function   PT Diagnosis (PT) inferior STEMI   Influenced by the following impairments global strength deficits, advanced dementia   Clinical Presentation Stable/Uncomplicated   Clinical Presentation Rationale complex pmh, stable presentation, good social support   Clinical Decision Making (Complexity) low complexity   Therapy Frequency (PT) Evaluation only   Predicted Duration of Therapy Intervention (days/wks) 1 day   Risk & Benefits of therapy have been explained evaluation/treatment results reviewed;care plan/treatment goals reviewed;risks/benefits reviewed;current/potential barriers reviewed;patient   PT Discharge Planning    PT Discharge Recommendation (DC Rec)   (Return to [facility])   PT Rationale for DC Rec Recommended use of overhead lift with nursing staff.  No barriers to discharge back to [facility] with previous level of assist (full cares)   PT Brief overview of current status  Performed pivot transfer with max A x1   Total Evaluation Time   Total Evaluation Time (Minutes) 20       Addendum:  Order was for cardiac rehab, however patient with advanced dementia, unable to understand cardiac teaching.  Limited mobility at baseline also limits ability for participation in cardiac rehab.  Performed PT eval to assess for patient's current level of functional mobility prior to return to previous living situation when medically appropriate.

## 2021-01-20 NOTE — PLAN OF CARE
"Pt presented with hypoxia, hypotension, AMS; diagnosed with STEMI, UTI. Pt receiving Rocephin for UTI. Oxycodone given for pain management. Pt confused, Disoriented x4 but does respond to her name. When asked if in pain she said \"yes\" but was unable to describe where her pain was. BP (!) 151/47 (BP Location: Right arm)   Pulse 84   Temp 97.6  F (36.4  C) (Axillary)   Resp 20   Ht 1.575 m (5' 2\")   Wt 49.9 kg (110 lb 0.2 oz)   SpO2 96%   BMI 20.12 kg/m   on RA. Turned and repositioned. Jaja area and buttocks/coccyx pink and raw. Ulceration to vaginal opening. Walton inserted to promote healing. Pivs infusing with NS and Heparin. Tele SR. Will continue with poc.  "

## 2021-01-20 NOTE — PLAN OF CARE
Patient admitted 1/17/21 for a STEMI. Peak trop 85.510.Tele has been SB with VTach. Mag replaced today. Recheck tomorrow am. Severe dementia and UTI. Ceftriaxone for UTI. Walton for wound healing. Dysphagia on a mechanical soft diet. Appetite is fair. Pills need to be crushed on a food as well or she picks the pieces out. Recent COVID 11/2020. Lung sounds are diminished. Many wounds and bruises. WOC RN with orders. Patient already on specialty mattress. Ceiling lifted in and out of bed. Up in wheel chair for a bit, lacks strength to stabilize her trunk. Possible discharge in 2 days back to [facility].

## 2021-01-21 LAB
ANION GAP SERPL CALCULATED.3IONS-SCNC: 3 MMOL/L (ref 3–14)
BACTERIA SPEC CULT: NORMAL
BUN SERPL-MCNC: 21 MG/DL (ref 7–30)
CALCIUM SERPL-MCNC: 8.3 MG/DL (ref 8.5–10.1)
CHLORIDE SERPL-SCNC: 113 MMOL/L (ref 94–109)
CO2 SERPL-SCNC: 23 MMOL/L (ref 20–32)
CREAT SERPL-MCNC: 0.79 MG/DL (ref 0.52–1.04)
ERYTHROCYTE [DISTWIDTH] IN BLOOD BY AUTOMATED COUNT: 14.6 % (ref 10–15)
GFR SERPL CREATININE-BSD FRML MDRD: 64 ML/MIN/{1.73_M2}
GLUCOSE SERPL-MCNC: 76 MG/DL (ref 70–99)
HCT VFR BLD AUTO: 29 % (ref 35–47)
HGB BLD-MCNC: 9.1 G/DL (ref 11.7–15.7)
Lab: NORMAL
MAGNESIUM SERPL-MCNC: 2 MG/DL (ref 1.6–2.3)
MCH RBC QN AUTO: 32.9 PG (ref 26.5–33)
MCHC RBC AUTO-ENTMCNC: 31.4 G/DL (ref 31.5–36.5)
MCV RBC AUTO: 105 FL (ref 78–100)
PLATELET # BLD AUTO: 171 10E9/L (ref 150–450)
POTASSIUM SERPL-SCNC: 4.5 MMOL/L (ref 3.4–5.3)
RBC # BLD AUTO: 2.77 10E12/L (ref 3.8–5.2)
SODIUM SERPL-SCNC: 139 MMOL/L (ref 133–144)
SPECIMEN SOURCE: NORMAL
WBC # BLD AUTO: 5.8 10E9/L (ref 4–11)

## 2021-01-21 PROCEDURE — 85027 COMPLETE CBC AUTOMATED: CPT | Performed by: INTERNAL MEDICINE

## 2021-01-21 PROCEDURE — 250N000011 HC RX IP 250 OP 636: Performed by: INTERNAL MEDICINE

## 2021-01-21 PROCEDURE — 80048 BASIC METABOLIC PNL TOTAL CA: CPT | Performed by: INTERNAL MEDICINE

## 2021-01-21 PROCEDURE — 83735 ASSAY OF MAGNESIUM: CPT | Performed by: INTERNAL MEDICINE

## 2021-01-21 PROCEDURE — 36415 COLL VENOUS BLD VENIPUNCTURE: CPT | Performed by: INTERNAL MEDICINE

## 2021-01-21 PROCEDURE — 120N000001 HC R&B MED SURG/OB

## 2021-01-21 PROCEDURE — 250N000013 HC RX MED GY IP 250 OP 250 PS 637: Performed by: PHYSICIAN ASSISTANT

## 2021-01-21 PROCEDURE — 250N000013 HC RX MED GY IP 250 OP 250 PS 637: Performed by: NURSE PRACTITIONER

## 2021-01-21 PROCEDURE — 250N000013 HC RX MED GY IP 250 OP 250 PS 637: Performed by: INTERNAL MEDICINE

## 2021-01-21 PROCEDURE — 99233 SBSQ HOSP IP/OBS HIGH 50: CPT | Performed by: INTERNAL MEDICINE

## 2021-01-21 RX ORDER — ATORVASTATIN CALCIUM 20 MG/1
20 TABLET, FILM COATED ORAL DAILY
Qty: 30 TABLET | Refills: 0 | Status: SHIPPED | OUTPATIENT
Start: 2021-01-21

## 2021-01-21 RX ORDER — CLOPIDOGREL BISULFATE 75 MG/1
75 TABLET ORAL DAILY
Qty: 30 TABLET | Refills: 0 | Status: SHIPPED | OUTPATIENT
Start: 2021-01-22

## 2021-01-21 RX ORDER — MAGNESIUM SULFATE HEPTAHYDRATE 40 MG/ML
2 INJECTION, SOLUTION INTRAVENOUS ONCE
Status: COMPLETED | OUTPATIENT
Start: 2021-01-21 | End: 2021-01-21

## 2021-01-21 RX ORDER — ASPIRIN 81 MG/1
81 TABLET, CHEWABLE ORAL DAILY
Qty: 100 TABLET | Refills: 0 | Status: SHIPPED | OUTPATIENT
Start: 2021-01-22

## 2021-01-21 RX ADMIN — MAGNESIUM SULFATE 2 G: 2 INJECTION INTRAVENOUS at 16:00

## 2021-01-21 RX ADMIN — ASPIRIN 81 MG CHEWABLE TABLET 81 MG: 81 TABLET CHEWABLE at 09:45

## 2021-01-21 RX ADMIN — Medication 12.5 MG: at 09:48

## 2021-01-21 RX ADMIN — CEFTRIAXONE 1 G: 1 INJECTION, POWDER, FOR SOLUTION INTRAMUSCULAR; INTRAVENOUS at 09:56

## 2021-01-21 RX ADMIN — Medication 12.5 MG: at 21:26

## 2021-01-21 RX ADMIN — PANTOPRAZOLE SODIUM 40 MG: 40 TABLET, DELAYED RELEASE ORAL at 09:45

## 2021-01-21 RX ADMIN — ACETAMINOPHEN 650 MG: 325 TABLET, FILM COATED ORAL at 17:21

## 2021-01-21 RX ADMIN — LISINOPRIL 20 MG: 20 TABLET ORAL at 09:48

## 2021-01-21 RX ADMIN — ACETAMINOPHEN 650 MG: 325 TABLET, FILM COATED ORAL at 00:20

## 2021-01-21 RX ADMIN — CLOPIDOGREL BISULFATE 75 MG: 75 TABLET ORAL at 09:45

## 2021-01-21 RX ADMIN — ACETAMINOPHEN 650 MG: 325 TABLET, FILM COATED ORAL at 09:45

## 2021-01-21 RX ADMIN — MICONAZOLE NITRATE: 20 POWDER TOPICAL at 09:45

## 2021-01-21 RX ADMIN — MICONAZOLE NITRATE: 20 POWDER TOPICAL at 21:27

## 2021-01-21 ASSESSMENT — ACTIVITIES OF DAILY LIVING (ADL)
ADLS_ACUITY_SCORE: 30
ADLS_ACUITY_SCORE: 30
ADLS_ACUITY_SCORE: 34
ADLS_ACUITY_SCORE: 30
ADLS_ACUITY_SCORE: 30
ADLS_ACUITY_SCORE: 32

## 2021-01-21 NOTE — PROGRESS NOTES
Order received to check on outstanding UC from 1/18 @ 6519. Spoke to Tiki Mcgarry in Lab - she will call Micro lab to investigate and call me back.    8759: Return call from Tiki Mcgarry in Lab - per Micro lab, the urine was received on 1/19 and is in process. We should expect results later this morning per the lab.

## 2021-01-21 NOTE — PROGRESS NOTES
Care Management Follow Up    Length of Stay (days): 3    Expected Discharge Date: 01/22/21     Concerns to be Addressed: discharge planning, care coordination/care conferences     Patient plan of care discussed at interdisciplinary rounds: Yes    Anticipated Discharge Disposition: Assisted Living([facility])     Anticipated Discharge Services: None  Anticipated Discharge DME: None    Patient/Family in Agreement with the Plan: yes    Referrals Placed by CM/SW: External Care Coordination, Homecare([facility])  Private pay costs discussed: Not applicable    Additional Information:  Received message from nursing at [facility] [phone] confirming that they are able to accept patient back with turner catheter. [facility] is requesting documentation for reason for turner catheter. Documentation and orders to be faxed to [phone]. Request signed MD medication list and they will fill any new meds at their own pharmacy ([facility]).  Provider to order home care RN/PT/OT at discharge. Orders will be sent to [facility]  P: [phone] F: [phone]    Transportation TBD. Facility requests patient return prior to 1500 Monday through Thursday and before 1200 on Friday.    Update 1400: Spoke with son Nick over the phone. Nick agrees to medical transport for return to [facility].  [facility] WC transport scheduled for 1015 on Friday 1/22.   Left VM with Vane at [facility] updating on discharge plan. Awaiting call back for confirmation of receipt of message.     Update 1430: Spoke with Vane at [facility] updating on discharge plans. No concerns with patient returning.     Deepika Yung, RN      Deepika Yung RN Case Manager  Inpatient Care Coordination  [facility]   [phone]

## 2021-01-21 NOTE — PROGRESS NOTES
[facility]  Hospitalist Progress Note  Neva Claudio MD 01/19/21    Reason for Stay (Diagnosis): STEMI, UTI         Assessment and Plan:      Summary of Stay: Patient is a 89-year-old female with history of HTN, CKD stage II, anemia, hypothyroidism, advanced dementia who lives in memory care facility (care suites in own room with full cares, wheelchair bound), UTIs, and recent COVID-19 hospitalization in November 18/2020 where she was treated with dexamethasone and Levaquin for possible pneumonia and subsequent treatment of 30 days of VTE prophylaxis with Eliquis who now presents from [facility] facility due to reportedly hypotension and hypoxia along with depressed level of consciousness.  For EMS her SBP was in the 110s and O2 sats were in the mid 90s.    She was admitted on 1/18/2021 after an EKG in the ER showed an inferior STEMI.  Cardiology was consulted and management was discussed with the patient's sons who are her healthcare agent due to advanced dementia.  Medical management with dual antiplatelet therapy with aspirin Plavix and heparin drip initiated.  TTE shows inferior hypokinesis, but preserved EF.  Blood pressures remained stable.  Urinalysis showing significant pyuria so started on IV ceftriaxone for suspected UTI. .  Palliative care consulted.  Patient changed to DNR/DNI status otherwise restorative cares.    Problem List/Assessment and Plan:   Inferior STEMI, HTN: Reportedly hypotension at care facility, blood pressures 140-150 systolic here.  EKG shows an inferior STEMI and troponin elevated 1.2 consistent with acute coronary syndrome.  This was discussed in the ER with the patient's sons who are healthcare agent and with cardiology.  Based on her advanced dementia and this discussion medical management is being pursued.  TTE shows EF 55% and inferior wall hypokinesis.  Peak troponin overnight at 85.5, now trending down.  -Cardiology consulted and plan is for med  management  -Continue heparin drip for 48 hours, to be discontinued today at noon   -loaded with clopidogrel and now on Plavix 75 mg daily for 1 year.  Aspirin 81 mg daily for 1 year  -Resumed metoprolol tartrate 12.5 mg twice daily  -Resumed lisinopril 20 mg daily  -Telemetry to monitor for arrhythmia, continues with episodic Vtach, mag low at 1.8, now at 2.0 without further episodes of vtach.      Advanced dementia, acute encephalopathy: Advanced dementia at baseline lives in memory care.  Dr aHndley spoke with son Jonny about her baseline.  He said she was significantly deconditioned and worse after COVID-19, but this was getting better and roughly 1 week prior to presentation here she was able to to operate her wheelchair some on her own, she was feeding herself, and speaking more, he reiterated it to me.  Reportedly has been more fatigued appearing and more confused from baseline per nursing home.  Suspect acute encephalopathy is from the inferior STEMI.  Also likely has infectious encephalopathy from UTI based on history of this and pyuria on urinalysis.  Initially obtunded, then with delirium and calling out.  Seems improved   -not on any dementia medications at baseline    UTI: History of recurrent UTIs.  Cannot provide any history as far as symptoms.  Afebrile and no leukocytosis.  Urinalysis shows > 182 WBCs, large LE, moderate bacteria, and 23 RBCs suspicious for UTI.  -Day 3/3 of ceftriaxone 1 g every 24 hours, urine culture returned with normal urogenital gloria      Dysphagia/?expressive aphasia: sounds like this has been present for 1.5 years so I do wonder about a .  Worsened with AMI, or any illness. Improved by notes.  SLP sam completed with recommendations for mechanical soft and thin liquids     CASSIE on CKD stage II: Creatinine up to 1.1 on admission with baseline 0.8.  Initially held lisinopril, now resumed as creatinine is at baseline and renal function is tolerating     Recent COVID-19: Recent  hospitalization November 2020 for COVID-19 pneumonia with treatment with dexamethasone and levofloxacin.  Did complete 1 month of DVT prophylaxis Eliquis.  Chest x-ray does not show any infiltrates.  Afebrile.  Not hypoxic on room air.     Perineum wounds: Multiple wounds and ulcerations to perineum and labia.  -Placed Turner catheter for now to promote wound healing due to incontinence and altered mental status, will remove in 3 days, check UA prior to discharge  -Wound nurse consult recommend   -Labia wound: BID   1. Cleanse with dry wipe and Criselda  2. Apply Criselda cream       Goals of care: Patient initially was full code.  She has a POLST form that was filled out 5/2019 that states full code.  Patient has an inferior STEMI that is being managed medically and has advanced dementia.  From a medical standpoint pursuing cardiac resuscitation in the event of V. fib or PEA arrest is not appropriate.  Son Ronal has asked for further communication and updates to go through Jonny.  Dr Handley spoke with Jonny at length to explain the rationale for a change to DNR/DNI status given her advanced age, dementia, and new acute cardiac problem with a inferior STEMI and likelihood of causing harm with broken ribs and pain without meaningful neurologic recovery following a cardiac arrest.    After much discussion, family has changed patient to DNR/DNI.    DVT Prophylaxis: Heparin infusion  Code Status: DNR / DNI  FEN: Mechanical soft, consult SLP for dysphagia  Discharge Dispo: Lives at care suites in [facility], sounds like she has her own room but is wheelchair bound and they provide full care.   Estimated Disch Date / # of Days until Disch: tomorrow am       Discussed in depth with Nick by phone.  He expressed concern re: going home with the turner as step father had a turner that led to a fatal episode of sepsis.  But he is in agreement with short term turner.. He is concerned that she is high risk for another event, and I concurred but  "being in the hospital will not prevent that.  She is at risk for all types of complications strictly due to age.  What she needs is max medically therapy which she is already on, and staying in the hospital only sets her up for risks related to hospitalization (resistent infections/blood draws/frequent VS/loss of day night variation/increased risk of delirium) without benefit.         Interval History (Subjective):      Feeling good, denies any pain.  Eating some breakfast                  Physical Exam:      Last Vital Signs:  BP (!) 146/48 (BP Location: Left arm)   Pulse 63   Temp 96  F (35.6  C) (Axillary)   Resp 17   Ht 1.575 m (5' 2\")   Wt 49.9 kg (110 lb 0.2 oz)   SpO2 98%   BMI 20.12 kg/m      Tele with episodic Vtach and ? SVT (irregular) with aberrant conduction       Constitutional: Awake   Eyes: sclera white   HEENT:  MMM  Respiratory:  lungs cta bilaterally, no crackles or wheeze  Cardiovascular: RRR.  No murmur   GI: non-tender, not distended, bowel sounds present  Skin: no rash.  Some ecchymoses to bilateral lower legs  Musculoskeletal/extremities: Trace bilateral pedal edema  Neurologic: Alert, eating breakfast. Her speech is garbled but more intelligible based on notes occasionally , not oriented to place or time  Psychiatric: mildly Anxious         Medications:      All current medications were reviewed with changes reflected in problem list.         Data:      All new lab and imaging data was reviewed.   Labs:  Recent Labs   Lab 01/21/21  0600 01/20/21  0701 01/19/21  0517    140 142   POTASSIUM 4.5 4.0 4.5   CHLORIDE 113* 114* 116*   CO2 23 23 24   ANIONGAP 3 3 2*   GLC 76 79 72   BUN 21 23 36*   CR 0.79 0.79 0.83   GFRESTIMATED 64 63 59*   GFRESTBLACK 74 73 69   RENETTA 8.3* 8.0* 8.0*     Recent Labs   Lab 01/21/21  0600 01/20/21  0701 01/19/21  0517   WBC 5.8 7.3 6.4   HGB 9.1* 8.8* 9.3*   HCT 29.0* 27.8* 28.9*   * 106* 105*    165 123*     Recent Labs   Lab 01/19/21  0517 " 21  1803 21  1521 21  1028   TROPONIN  --   --   --  1.13*   TROPI 70.592* 85.510* 34.908*  --       Imaging:   Recent Results (from the past 24 hour(s))   Echo Limited    Narrative    [id number]  [id number]  [id number]  [id number]^ADRIENNE^BETITO^CHING           [facility]  Echocardiography Laboratory  [address] [place] [address]        Name: LIV KINGSTON  MRN: [id number]  : 1925  Study Date: 2021 01:58 PM  Age: 89 yrs  Gender: Female  Patient Location: [facility]  Reason For Study: Chest Pain, Chest Tightness, Chest Pressure  Ordering Physician: BETITO DELEON  Performed By: Roma Baker     BSA: 1.5 m2  Height: 62 in  Weight: 110 lb  HR: 60  BP: 147/44 mmHg  _____________________________________________________________________________  __        Procedure  Limited Portable Echo Adult. Optison (NDC #9286-3017) given intravenously.  (Emergent exam, abbreviated study performed).  _____________________________________________________________________________  __        Interpretation Summary     1. The left ventricle is normal in size. The visual ejection fraction is  estimated at 55%. Basal inferior hypokinesis.  2. The right ventricle is normal in structure, function and size.  3. No valve disease.  4. The ascending aorta is Mildly dilated. 4.1cm.     Echo from 2018 showed EF 55%, aorta 4.0cm. Hard to compare wall motion  directly, as contrast was not used on previous study.  _____________________________________________________________________________  __        Left Ventricle  The left ventricle is normal in size. There is normal left ventricular wall  thickness. The visual ejection fraction is estimated at 55%. Left ventricular  diastolic function is indeterminate. Basal inferior hypokinesis.     Right Ventricle  The right ventricle is normal in structure, function and size.     Mitral Valve  There is mild mitral annular calcification. There is mild (1+)  mitral  regurgitation.     Tricuspid Valve  There is mild (1+) tricuspid regurgitation. The right ventricular systolic  pressure is approximated at 24.2 mmHg plus the right atrial pressure.        Aortic Valve  There is mild trileaflet aortic sclerosis. There is mild (1+) aortic  regurgitation.     Pulmonic Valve  The pulmonic valve is normal in structure and function.     Vessels  The ascending aorta is Mildly dilated. Inferior vena cava not well visualized  for estimation of right atrial pressure.     Pericardium  There is no pericardial effusion.     Rhythm  Sinus rhythm was noted.     _____________________________________________________________________________  __  MMode/2D Measurements & Calculations  IVSd: 1.1 cm  LVIDd: 4.6 cm  LVIDs: 2.4 cm  LVPWd: 1.1 cm  FS: 48.4 %     LV mass(C)d: 183.5 grams  LV mass(C)dI: 123.8 grams/m2  Ao root diam: 3.9 cm  asc Aorta Diam: 4.1 cm  LVOT diam: 2.3 cm  LVOT area: 4.3 cm2  RWT: 0.50        Doppler Measurements & Calculations  MV E max laura: 72.3 cm/sec  MV A max laura: 99.4 cm/sec  MV E/A: 0.73  MV dec time: 0.28 sec  TR max laura: 246.1 cm/sec  TR max P.2 mmHg  E/E' av.5  Lateral E/e': 14.7  Medial E/e': 22.3              _____________________________________________________________________________  __        Report approved by: Alex Joseph 2021 02:47 PM              Neva Claudio MD

## 2021-01-21 NOTE — PLAN OF CARE
RN [id number]:  Pt. Alert, confused, turned/repositioned Q2, on pulsate mattress, up with lift. Lung sounds diminished, room air sat's at 95%. Walton to gravity, placed for wound healing.  Coccyx excoriated and labia wound present. Pt. Also has multiple bruises on body, and soft heels. Mepliex on coccyx. Meds crushed with apple sauce. Pt. continues on Ceftriaxone for UTI. Pt. resides at the [facility] and will possibly discharge today or tomorrow. Pt. Denies any needs at this time. Will continue with POC.

## 2021-01-21 NOTE — PLAN OF CARE
Pt son Nick updated over the ipad and telephone. He stated he did speak to Dr. Claudio today. He is in agreement to send pt back to [facility]  with the turner for 3 days. RN writer reiterated the purpose of the turner and importance of wound healing. The son also understands purpose of the turner and that his mother is a lift/wheel chair. He was not able to view the pt while in the w/c today as he was in the car.      Oriented to self, speaks occasionally. Turner output 350ml, sediment, yellow. UA scheduled order for 0500 1/22.  IV Rocephin wound cares completed per order. Pulsate mattress for frail skin/wounds. Turn/reposition/shift weight q2hr. Tele SB/SR.Mag 2.0 - IV 2gm Mag sulfate ordered at 1446. Pills crushed, total feed assist.

## 2021-01-21 NOTE — DISCHARGE SUMMARY
Discharge Summary  Hospitalist Service    Tracey Moran MRN# [id number]   YOB: 1925 Age: 89 year old     Date of Admission:  1/18/2021  Date of Discharge:  1/21/2021  Admitting Physician:  No admitting provider for patient encounter.  Discharge Physician: Neva Claudio MD  Discharging Service: Hospitalist Service     Primary Provider: Services, [facility]ician  Primary Care Physician Phone Number: None         Discharge Diagnoses/Problem Oriented Hospital Course (Providers):    Tracey Moran was admitted on 1/18/2021 by No admitting provider for patient encounter. and I would refer you to their history and physical.  The following problems were addressed during her hospitalization:      Inferior STEMI  Moderate dementia  Dysphagia and expressive aphasia  UTI  Labial and perineal wounds         Code Status:      DNR / DNI        Brief Hospital Stay Summary Sent Home With Patient in AVS:       Summary of Stay: Patient is a 89-year-old female with history of HTN, CKD stage II, anemia, hypothyroidism, advanced dementia who lives in memory care facility (care suites in own room with full cares, wheelchair bound), UTIs, and recent COVID-19 hospitalization in November 18/2020 where she was treated with dexamethasone and Levaquin for possible pneumonia and subsequent treatment of 30 days of VTE prophylaxis with Eliquis who now presents from memory care facility due to reportedly hypotension and hypoxia along with depressed level of consciousness.  For EMS her SBP was in the 110s and O2 sats were in the mid 90s.    She was admitted on 1/18/2021 after an EKG in the ER showed an inferior STEMI.  Cardiology was consulted and management was discussed with the patient's sons who are her healthcare agent due to advanced dementia.  Medical management with dual antiplatelet therapy with aspirin Plavix and heparin drip initiated.  TTE shows inferior hypokinesis, but preserved EF.  Blood pressures remained stable.   Urinalysis showing significant pyuria so started on IV ceftriaxone for suspected UTI. .  Palliative care consulted.  Patient changed to DNR/DNI status otherwise restorative cares.     Problem List/Assessment and Plan:   Inferior STEMI, HTN: Reportedly hypotension at care facility, blood pressures 140-150 systolic here.  EKG shows an inferior STEMI and troponin elevated 1.2 consistent with acute coronary syndrome.  This was discussed in the ER with the patient's sons who are healthcare agent and with cardiology.  Based on her advanced dementia and this discussion medical management is being pursued.  TTE shows EF 55% and inferior wall hypokinesis.  Peak troponin overnight at 85.5, now trending down.  -Cardiology consulted and plan is for med management  -Continue heparin drip for 48 hours, to be discontinued today at noon   -loaded with clopidogrel and now on Plavix 75 mg daily for 1 year.  Aspirin 81 mg daily for 1 year  -Resumed metoprolol tartrate 12.5 mg twice daily  -Resumed lisinopril 20 mg daily  -initiated atorvastatin 20 mg qd  -Telemetry to monitor for arrhythmia, continues with episodic Vtach, mag low at 1.8, now at 2.0 without further episodes of vtach.      dementia, acute encephalopathy: Advanced dementia at baseline lives in memory care.  Dr Handley spoke with son Jonny about her baseline.  He said she was significantly deconditioned and worse after COVID-19, but this was getting better and roughly 1 week prior to presentation here she was able to to operate her wheelchair some on her own, she was feeding herself, and speaking more, he reiterated that to me.  Reportedly has been more fatigued appearing and more confused from baseline per nursing home.  Suspect acute encephalopathy is from the inferior STEMI.  Also likely has infectious encephalopathy from UTI based on history of this and pyuria on urinalysis.  Initially obtunded, then with delirium and calling out.  Now has been calm and cooperative.  "  -not on any dementia medications at baseline, per son she typically understands most things and follows commands, recognizes loved ones, but has what sounds like some expressive aphasia     UTI: History of recurrent UTIs.  Cannot provide any history as far as symptoms.  Afebrile and no leukocytosis.  Urinalysis shows > 182 WBCs, large LE, moderate bacteria, and 23 RBCs suspicious for UTI.  -Day 3/3 of ceftriaxone 1 g every 24 hours, urine culture returned with normal urogenital gloria  -repeat UA with some inflammatory markers and so will send out with cipro while turner in place and I'll follow up on the urine culture  -repeat UCx No growth.          Dysphagia/?expressive aphasia: sounds like this has been present for 1.5 years so I do wonder about about a prior stroke.  Worsened with AMI, or any illness. Improved by notes.  SLP sam completed with recommendations for mechanical soft and thin liquids.      CASSIE on CKD stage II: Creatinine up to 1.1 on admission with baseline 0.8.  Initially held lisinopril, now resumed as creatinine is at baseline and renal function is tolerating     Recent COVID-19: Recent hospitalization November 2020 for COVID-19 pneumonia with treatment with dexamethasone and levofloxacin.  Did complete 1 month of DVT prophylaxis Eliquis.  Chest x-ray does not show any infiltrates.  Afebrile.  Not hypoxic on room air.      Perineum wounds: Multiple wounds and ulcerations to perineum and labia.  -Placed Turner catheter for now to promote wound healing due to incontinence and altered mental status, will remove in 3 days, UA on day of discharge with some inflammatory findings, will cover with bid cipro 250 mg while turner in place.  UCx ordered and pending on the day of discharge  -Wound nurse consult recommend   -Labia wound: BID   1. Cleanse with dry wipe and Criselda  2. Apply Criselda cream     Diffuse bruising not painful and doubtful that true \"trauma\", I suspect bruising in association with heparin " gtt followed by DAPT in the setting of fragile vessels 2/2 age.         Goals of care: Patient initially was full code.  She has a POLST form that was filled out 5/2019 that states full code.  Patient has an inferior STEMI that is being managed medically and has advanced dementia.  From a medical standpoint pursuing cardiac resuscitation in the event of V. fib or PEA arrest is not appropriate.  Son Ronal has asked for further communication and updates to go through Jonny.  Dr Handley spoke with Jonny at length to explain the rationale for a change to DNR/DNI status given her advanced age, dementia, and new acute cardiac problem with a inferior STEMI and likelihood of causing harm with broken ribs and pain without meaningful neurologic recovery following a cardiac arrest.    After much discussion, family has changed patient to DNR/DNI.     DVT Prophylaxis: Heparin infusion x 48 hours then on DAPT  Code Status: DNR / DNI  FEN: Mechanical soft with thin liquids  Discharge Dispo: Lives at care suites in [facility], sounds like she has her own room but is wheelchair bound and they provide full care.         Discussed in depth with Nick by phone.  He expressed concern re: going home with the turner as step father had a turner that led to a fatal episode of sepsis.  But he is in agreement with short term turner.. He is concerned that she is high risk for another event, and I concurred but being in the hospital will not prevent that.  She is at risk for all types of complications strictly due to age.  What she needs is max medically therapy which she is already on, and staying in the hospital only sets her up for risks related to hospitalization (resistent infections/blood draws/frequent VS/loss of day night variation/increased risk of delirium) without benefit.              Important Results:      As noted below          Pending Results:        Unresulted Labs Ordered in the Past 30 Days of this Admission     No orders found from  12/19/2020 to 1/19/2021.            Discharge Instructions and Follow-Up:      Follow-up Appointments     Follow-up and recommended labs and tests       F/U with JAME MENA in 2-3 weeks for recheck  Repeat ALT in 6 weeks after initiation of statin     Routine turner cares then discontinue turner on 1/25/20  Monitor perineal wounds/abrasions               Discharge Disposition:      Discharged to home         Discharge Medications:        Current Discharge Medication List      START taking these medications    Details   aspirin (ASA) 81 MG chewable tablet Take 1 tablet (81 mg) by mouth daily  Qty: 100 tablet, Refills: 0    Associated Diagnoses: ST elevation myocardial infarction involving right coronary artery (H)      atorvastatin (LIPITOR) 20 MG tablet Take 1 tablet (20 mg) by mouth daily  Qty: 30 tablet, Refills: 0    Associated Diagnoses: ST elevation myocardial infarction involving right coronary artery (H)      clopidogrel (PLAVIX) 75 MG tablet Take 1 tablet (75 mg) by mouth daily  Qty: 30 tablet, Refills: 0    Associated Diagnoses: ST elevation myocardial infarction involving right coronary artery (H)         CONTINUE these medications which have NOT CHANGED    Details   acetaminophen (TYLENOL) 500 MG tablet Take 500 mg by mouth 3 times daily as needed for mild pain      Criselda Protect (EUCERIN) external cream Apply topically 2 times daily Criselda Barrier Cream (apply to incontinence rash)      diclofenac (VOLTAREN) 1 % topical gel Place 2 g onto the skin 3 times daily For right knee pain      lisinopril (PRINIVIL/ZESTRIL) 20 MG tablet TAKE ONE TABLET BY MOUTH TWICE DAILY. HOLD IF SYSTOLIC BLOOD PRESSURE IS LESS THAN 110 MMHG. CALL IF HELD TWICE IN A ROW OR IF SYMPTOMATIC.  Qty: 180 tablet, Refills: 2    Associated Diagnoses: Essential hypertension      metoprolol tartrate (LOPRESSOR) 25 MG tablet Take 12.5 mg by mouth 2 times daily      multivitamin w/minerals (THERA-VIT-M) tablet Take 1 tablet by mouth daily     "  Nutritional Supplements (ENSURE PO) 1 two times daily (Family supplies)      nystatin (MYCOSTATIN) 298963 UNIT/GM external powder Apply topically 2 times daily Apply to rash under bilateral breast until resolved      senna-docusate (SENOKOT-S/PERICOLACE) 8.6-50 MG tablet Take 1 tablet by mouth 2 times daily as needed for constipation    Associated Diagnoses: Constipation, unspecified constipation type               Allergies:         Allergies   Allergen Reactions     Demerol [Meperidine]      Dust Mites      Peanuts [Nuts]      Penicillins      Pollen Extract            Consultations This Hospital Stay:      Consultation during this admission received from cardiology         Condition and Physical on Discharge:      Discharge condition: Stable   Vitals: Blood pressure (!) 155/59, pulse 66, temperature 95.7  F (35.4  C), temperature source Axillary, resp. rate 20, height 1.575 m (5' 2\"), weight 49.9 kg (110 lb 0.2 oz), SpO2 95 %, not currently breastfeeding.     Constitutional: Pleasant sitting in a chair nad.  Follows commands   Lungs: Diminished in the bases but otherwise clear, saturating normally and not requiring oxygen supplementation   Cardiovascular: rrr no mrg no le edema   Abdomen: S/nt/nd   Skin: W/d no cyanosis or clubbing bruises noted on extremities   Other: Still with garbled speech, but somewhat intelligible         Discharge Time:      Greater than 30 minutes.        Image Results From This Hospital Stay (For Non-EPIC Providers):        Results for orders placed or performed during the hospital encounter of 01/18/21   CT Head w/o Contrast    Narrative    CT SCAN OF THE HEAD WITHOUT CONTRAST   1/18/2021 11:06 AM     HISTORY: Mental status change, unknown cause    TECHNIQUE:  Axial images of the head and coronal reformations without  IV contrast material. Radiation dose for this scan was reduced using  automated exposure control, adjustment of the mA and/or kV according  to patient size, or " iterative reconstruction technique.    COMPARISON: Head CT 10/15/2020    FINDINGS:     Intracranial contents: There is moderate cerebral volume loss. Patchy  and confluent low attenuation of the white matter is nonspecific,  likely due to moderate small vessel ischemic disease. No evidence of  recent infarction, mass or hemorrhage    Visualized orbits/sinuses/mastoids:  The visualized portions of the  sinuses and mastoids appear normal.    Osseous structures/soft tissues:  There is no evidence of trauma.      Impression    IMPRESSION:   1. No acute findings.  2. Moderate small vessel ischemic disease.        BRANDYN TARANGO MD   XR Chest Port 1 View    Narrative    CHEST PORTABLE ONE VIEW 2021 9:43 AM     HISTORY: Altered mental status.     COMPARISON: Chest x-ray on 10/26/2018.      Impression    IMPRESSION: Single portable AP view of the chest was obtained. Stable  cardiomediastinal silhouette. Atherosclerotic vascular calcification  of the aortic knob. No suspicious focal pulmonary opacities. No  significant pleural effusion or pneumothorax. Right upper quadrant  post cholecystectomy clips. Degenerative changes of the spine.    RADHA YANES MD   Echo Limited    Narrative    [id number]  [id number]  [id number]  [id number]^ADRIENNE^BETITO^RAY           [facility]  Echocardiography Laboratory  [address] [place] [address]        Name: LIV KINGSTON  MRN: [id number]  : 1925  Study Date: 2021 01:58 PM  Age: 89 yrs  Gender: Female  Patient Location: [facility]  Reason For Study: Chest Pain, Chest Tightness, Chest Pressure  Ordering Physician: BETITO DELEON  Performed By: Roma Baker     BSA: 1.5 m2  Height: 62 in  Weight: 110 lb  HR: 60  BP: 147/44 mmHg  _____________________________________________________________________________  __        Procedure  Limited Portable Echo Adult. Optison (NDC #8135-4452) given intravenously.  (Emergent exam, abbreviated study  performed).  _____________________________________________________________________________  __        Interpretation Summary     1. The left ventricle is normal in size. The visual ejection fraction is  estimated at 55%. Basal inferior hypokinesis.  2. The right ventricle is normal in structure, function and size.  3. No valve disease.  4. The ascending aorta is Mildly dilated. 4.1cm.     Echo from 11/2018 showed EF 55%, aorta 4.0cm. Hard to compare wall motion  directly, as contrast was not used on previous study.  _____________________________________________________________________________  __        Left Ventricle  The left ventricle is normal in size. There is normal left ventricular wall  thickness. The visual ejection fraction is estimated at 55%. Left ventricular  diastolic function is indeterminate. Basal inferior hypokinesis.     Right Ventricle  The right ventricle is normal in structure, function and size.     Mitral Valve  There is mild mitral annular calcification. There is mild (1+) mitral  regurgitation.     Tricuspid Valve  There is mild (1+) tricuspid regurgitation. The right ventricular systolic  pressure is approximated at 24.2 mmHg plus the right atrial pressure.        Aortic Valve  There is mild trileaflet aortic sclerosis. There is mild (1+) aortic  regurgitation.     Pulmonic Valve  The pulmonic valve is normal in structure and function.     Vessels  The ascending aorta is Mildly dilated. Inferior vena cava not well visualized  for estimation of right atrial pressure.     Pericardium  There is no pericardial effusion.     Rhythm  Sinus rhythm was noted.     _____________________________________________________________________________  __  MMode/2D Measurements & Calculations  IVSd: 1.1 cm  LVIDd: 4.6 cm  LVIDs: 2.4 cm  LVPWd: 1.1 cm  FS: 48.4 %     LV mass(C)d: 183.5 grams  LV mass(C)dI: 123.8 grams/m2  Ao root diam: 3.9 cm  asc Aorta Diam: 4.1 cm  LVOT diam: 2.3 cm  LVOT area: 4.3 cm2  RWT:  0.50        Doppler Measurements & Calculations  MV E max laura: 72.3 cm/sec  MV A max laura: 99.4 cm/sec  MV E/A: 0.73  MV dec time: 0.28 sec  TR max laura: 246.1 cm/sec  TR max P.2 mmHg  E/E' av.5  Lateral E/e': 14.7  Medial E/e': 22.3              _____________________________________________________________________________  __        Report approved by: Alex Joseph 2021 02:47 PM              Most Recent Lab Results In EPIC (For Non-EPIC Providers):    Most Recent 3 CBC's:  Recent Labs   Lab Test 21  0600 21  0701 21  0517   WBC 5.8 7.3 6.4   HGB 9.1* 8.8* 9.3*   * 106* 105*    165 123*      Most Recent 3 BMP's:  Recent Labs   Lab Test 21  0600 21  0701 21  0517    140 142   POTASSIUM 4.5 4.0 4.5   CHLORIDE 113* 114* 116*   CO2 23 23 24   BUN 21 23 36*   CR 0.79 0.79 0.83   ANIONGAP 3 3 2*   RENETTA 8.3* 8.0* 8.0*   GLC 76 79 72     Most Recent 3 INR's:  Recent Labs   Lab Test 17  1948   INR 1.15*     Most Recent 2 LFT's:  Recent Labs   Lab Test 10/26/19  1728 18  0925   AST 19 42   ALT 12 19   ALKPHOS 72 62   BILITOTAL 0.3 0.7     Most Recent Cholesterol Panel:  Recent Labs   Lab Test 18  0925   CHOL 134   LDL 55   HDL 62   TRIG 84     Most Recent 6 Bacteria Isolates From Any Culture (See EPIC Reports for Culture Details):  Recent Labs   Lab Test 21  1720 10/15/20  0500 20  0054 10/26/19  1913 10/26/19  1909 10/26/19  1745   CULT >100,000 colonies/mL  urogenital gloria  Susceptibility testing not routinely done   >100,000 colonies/mL  Escherichia coli  * No growth No growth No growth >100,000 colonies/mL  Klebsiella pneumoniae  *     Most Recent TSH, T4 and HgbA1c:   Recent Labs   Lab Test 10/26/19  1728 17  0745 17  0745   TSH 1.64   < >  --    A1C  --   --  Canceled, Test credited    < > = values in this interval not displayed.

## 2021-01-22 ENCOUNTER — RECORDS - HEALTHEAST (OUTPATIENT)
Dept: LAB | Facility: CLINIC | Age: 86
End: 2021-01-22

## 2021-01-22 VITALS
DIASTOLIC BLOOD PRESSURE: 45 MMHG | SYSTOLIC BLOOD PRESSURE: 142 MMHG | HEART RATE: 60 BPM | OXYGEN SATURATION: 97 % | TEMPERATURE: 96 F | RESPIRATION RATE: 16 BRPM | WEIGHT: 110.01 LBS | BODY MASS INDEX: 20.24 KG/M2 | HEIGHT: 62 IN

## 2021-01-22 LAB
ALBUMIN UR-MCNC: NEGATIVE MG/DL
APPEARANCE UR: CLEAR
BACTERIA #/AREA URNS HPF: ABNORMAL /HPF
BILIRUB UR QL STRIP: NEGATIVE
COLOR UR AUTO: ABNORMAL
GLUCOSE UR STRIP-MCNC: NEGATIVE MG/DL
HGB UR QL STRIP: NEGATIVE
HYALINE CASTS #/AREA URNS LPF: 3 /LPF (ref 0–2)
KETONES UR STRIP-MCNC: NEGATIVE MG/DL
LEUKOCYTE ESTERASE UR QL STRIP: ABNORMAL
MUCOUS THREADS #/AREA URNS LPF: PRESENT /LPF
NITRATE UR QL: NEGATIVE
PH UR STRIP: 5.5 PH (ref 5–7)
RBC #/AREA URNS AUTO: 7 /HPF (ref 0–2)
SOURCE: ABNORMAL
SP GR UR STRIP: 1.01 (ref 1–1.03)
SQUAMOUS #/AREA URNS AUTO: 2 /HPF (ref 0–1)
UROBILINOGEN UR STRIP-MCNC: NORMAL MG/DL (ref 0–2)
WBC #/AREA URNS AUTO: 29 /HPF (ref 0–5)

## 2021-01-22 PROCEDURE — 250N000011 HC RX IP 250 OP 636: Performed by: INTERNAL MEDICINE

## 2021-01-22 PROCEDURE — 85027 COMPLETE CBC AUTOMATED: CPT | Performed by: INTERNAL MEDICINE

## 2021-01-22 PROCEDURE — 87086 URINE CULTURE/COLONY COUNT: CPT | Performed by: INTERNAL MEDICINE

## 2021-01-22 PROCEDURE — 81001 URINALYSIS AUTO W/SCOPE: CPT | Performed by: INTERNAL MEDICINE

## 2021-01-22 PROCEDURE — 99239 HOSP IP/OBS DSCHRG MGMT >30: CPT | Performed by: INTERNAL MEDICINE

## 2021-01-22 PROCEDURE — 250N000013 HC RX MED GY IP 250 OP 250 PS 637: Performed by: NURSE PRACTITIONER

## 2021-01-22 PROCEDURE — 250N000013 HC RX MED GY IP 250 OP 250 PS 637: Performed by: INTERNAL MEDICINE

## 2021-01-22 PROCEDURE — 250N000013 HC RX MED GY IP 250 OP 250 PS 637: Performed by: PHYSICIAN ASSISTANT

## 2021-01-22 RX ORDER — CIPROFLOXACIN 250 MG/1
250 TABLET, FILM COATED ORAL 2 TIMES DAILY
Qty: 8 TABLET | Refills: 0 | Status: SHIPPED | OUTPATIENT
Start: 2021-01-22 | End: 2021-01-26

## 2021-01-22 RX ADMIN — ASPIRIN 81 MG CHEWABLE TABLET 81 MG: 81 TABLET CHEWABLE at 09:13

## 2021-01-22 RX ADMIN — LISINOPRIL 20 MG: 20 TABLET ORAL at 09:13

## 2021-01-22 RX ADMIN — Medication 12.5 MG: at 09:13

## 2021-01-22 RX ADMIN — PANTOPRAZOLE SODIUM 40 MG: 40 TABLET, DELAYED RELEASE ORAL at 06:31

## 2021-01-22 RX ADMIN — CLOPIDOGREL BISULFATE 75 MG: 75 TABLET ORAL at 09:13

## 2021-01-22 RX ADMIN — CEFTRIAXONE 1 G: 1 INJECTION, POWDER, FOR SOLUTION INTRAMUSCULAR; INTRAVENOUS at 09:13

## 2021-01-22 RX ADMIN — ACETAMINOPHEN 650 MG: 325 TABLET, FILM COATED ORAL at 00:26

## 2021-01-22 RX ADMIN — ACETAMINOPHEN 650 MG: 325 TABLET, FILM COATED ORAL at 09:13

## 2021-01-22 ASSESSMENT — ACTIVITIES OF DAILY LIVING (ADL)
ADLS_ACUITY_SCORE: 32
ADLS_ACUITY_SCORE: 34
ADLS_ACUITY_SCORE: 32

## 2021-01-22 NOTE — PLAN OF CARE
End of Shift Note:  For VS and complete assessments, please see documentation flowsheets.     Pertinent shift assessments: Pt Alert to self only,VSS. TELE SB inverted Ts. Pt denies pain, N/V, SOB, lightheadedness, and dizziness. Replies to questions inconsistent with giggling and smiling. Scheduled tylenol given. PAINAD Score 0.  [place] WDL, 650 out clear yellow. Urinalysis obtained. Assist of 2 with lift; turn/repo q2h. Diet: mechanical soft. Palliative, WOC following. Wound to bottom pink/blanchable. Mepilex in place. Wound to labia pink/red KARIN; cleansed with dry wipe and ligia cream applied . Miconazole powder applied under R breast. L Forearm scab with mepliex light. Plan Discharge at 10am via wheelchair transport to [facility]. Will continue with POC.

## 2021-01-22 NOTE — PLAN OF CARE
VSS on RA. Alert, confused and disoriented to place, time and situation. Medications given crushed in applesauce as well as am dose of IV Rocephin. Pt discharged with turner in place per MD order. Wound Care completed on left forearm wound. Pt discharged to SNF via [facility] wheelchair transport. All belongings and packet sent with transport.

## 2021-01-22 NOTE — PLAN OF CARE
Speech Language Therapy Discharge Summary    Reason for therapy discharge:    Discharged to long term care facility.    Progress towards therapy goal(s). See goals on Care Plan in [facility] electronic health record for goal details.  Goals partially met.  Barriers to achieving goals:   discharge from facility.    Therapy recommendation(s):    Continued therapy is recommended.  Rationale/Recommendations:  Pt near baseline, ensure diet tolerance. Pt discharged on a [facility] soft and thin liquid diet with standard aspiration precautions, limit distractions.

## 2021-01-22 NOTE — PROGRESS NOTES
CTS    Patient returning to [facility]. Spoke with Renee Cifuentes expecting patient.  Discharge orders faxed to facility.  Discharge orders faxed to [facility]. New Medication prescriptions to be sent with patient.     Left voice message for sonNick, providing update on discharge plan for today.     [facility] transportation at 1015.     Wilmar Otero RN BSN PHN CCM   Inpatient Care Coordination   [facility]   [phone]

## 2021-01-22 NOTE — PLAN OF CARE
"Orientation: A/O x4  VS: BP (!) 155/59 (BP Location: Right arm)   Pulse 66   Temp 95.7  F (35.4  C) (Axillary)   Resp 20   Ht 1.575 m (5' 2\")   Wt 49.9 kg (110 lb 0.2 oz)   SpO2 95%   BMI 20.12 kg/m    Tele: Afib VVR  LS: Clear/ dim  GI: small BM this shift. Tolerating soft diet.  : turner for wound healing  Skin: blanchable redness on coccyx- foam replaced. Bruised, scab, thin open area in vladimir area  Activity: lift, up to chair this shift for meal  Pain: JUAN DAVID, scheduled tylenol  Lines: PIV in R hand, SL  Plan: discharge tmrw, w/c transport for 1030am.   Anat Huerta RN on 1/21/2021 at 7:26 PM      "

## 2021-01-23 LAB
BACTERIA SPEC CULT: NO GROWTH
Lab: NORMAL
SPECIMEN SOURCE: NORMAL

## 2021-01-25 LAB
ANION GAP SERPL CALCULATED.3IONS-SCNC: 7 MMOL/L (ref 5–18)
BUN SERPL-MCNC: 21 MG/DL (ref 8–28)
CALCIUM SERPL-MCNC: 8.4 MG/DL (ref 8.5–10.5)
CHLORIDE BLD-SCNC: 108 MMOL/L (ref 98–107)
CO2 SERPL-SCNC: 24 MMOL/L (ref 22–31)
CREAT SERPL-MCNC: 0.77 MG/DL (ref 0.6–1.1)
ERYTHROCYTE [DISTWIDTH] IN BLOOD BY AUTOMATED COUNT: 15 % (ref 11–14.5)
GFR SERPL CREATININE-BSD FRML MDRD: >60 ML/MIN/1.73M2
GLUCOSE BLD-MCNC: 113 MG/DL (ref 70–125)
HCT VFR BLD AUTO: 30.7 % (ref 35–47)
HGB BLD-MCNC: 9.7 G/DL (ref 12–16)
MCH RBC QN AUTO: 32.9 PG (ref 27–34)
MCHC RBC AUTO-ENTMCNC: 31.6 G/DL (ref 32–36)
MCV RBC AUTO: 104 FL (ref 80–100)
PLATELET # BLD AUTO: 227 THOU/UL (ref 140–440)
PMV BLD AUTO: 11.2 FL (ref 8.5–12.5)
POTASSIUM BLD-SCNC: 4.1 MMOL/L (ref 3.5–5)
RBC # BLD AUTO: 2.95 MILL/UL (ref 3.8–5.4)
SODIUM SERPL-SCNC: 139 MMOL/L (ref 136–145)
WBC: 6.6 THOU/UL (ref 4–11)

## 2021-02-03 ENCOUNTER — RECORDS - HEALTHEAST (OUTPATIENT)
Dept: LAB | Facility: CLINIC | Age: 86
End: 2021-02-03

## 2021-02-04 LAB
ERYTHROCYTE [DISTWIDTH] IN BLOOD BY AUTOMATED COUNT: 14.9 % (ref 11–14.5)
HCT VFR BLD AUTO: 29.2 % (ref 35–47)
HGB BLD-MCNC: 9.3 G/DL (ref 12–16)
MCH RBC QN AUTO: 33 PG (ref 27–34)
MCHC RBC AUTO-ENTMCNC: 31.8 G/DL (ref 32–36)
MCV RBC AUTO: 104 FL (ref 80–100)
PLATELET # BLD AUTO: 222 THOU/UL (ref 140–440)
PMV BLD AUTO: 11.4 FL (ref 8.5–12.5)
RBC # BLD AUTO: 2.82 MILL/UL (ref 3.8–5.4)
WBC: 9 THOU/UL (ref 4–11)

## 2021-02-06 ENCOUNTER — APPOINTMENT (OUTPATIENT)
Dept: CT IMAGING | Facility: CLINIC | Age: 86
DRG: 871 | End: 2021-02-06
Attending: EMERGENCY MEDICINE
Payer: COMMERCIAL

## 2021-02-06 ENCOUNTER — HOSPITAL ENCOUNTER (INPATIENT)
Facility: CLINIC | Age: 86
LOS: 2 days | Discharge: NURSING, REHAB OR LONG-TERM CARE | DRG: 871 | End: 2021-02-08
Attending: EMERGENCY MEDICINE | Admitting: HOSPITALIST
Payer: COMMERCIAL

## 2021-02-06 DIAGNOSIS — R31.9 URINARY TRACT INFECTION WITH HEMATURIA, SITE UNSPECIFIED: ICD-10-CM

## 2021-02-06 DIAGNOSIS — R29.810 FACIAL DROOP: ICD-10-CM

## 2021-02-06 DIAGNOSIS — N39.0 URINARY TRACT INFECTION WITH HEMATURIA, SITE UNSPECIFIED: ICD-10-CM

## 2021-02-06 DIAGNOSIS — R50.9 FEVER, UNSPECIFIED FEVER CAUSE: ICD-10-CM

## 2021-02-06 DIAGNOSIS — N30.00 ACUTE CYSTITIS WITHOUT HEMATURIA: Primary | ICD-10-CM

## 2021-02-06 DIAGNOSIS — I10 BENIGN ESSENTIAL HYPERTENSION: ICD-10-CM

## 2021-02-06 LAB
ALBUMIN SERPL-MCNC: 3.2 G/DL (ref 3.4–5)
ALBUMIN UR-MCNC: 30 MG/DL
ALP SERPL-CCNC: 60 U/L (ref 40–150)
ALT SERPL W P-5'-P-CCNC: 20 U/L (ref 0–50)
ANION GAP SERPL CALCULATED.3IONS-SCNC: 10 MMOL/L (ref 3–14)
APPEARANCE UR: ABNORMAL
APTT PPP: 31 SEC (ref 22–37)
AST SERPL W P-5'-P-CCNC: 23 U/L (ref 0–45)
BACTERIA #/AREA URNS HPF: ABNORMAL /HPF
BASE EXCESS BLDV CALC-SCNC: 0.7 MMOL/L
BASOPHILS # BLD AUTO: 0 10E9/L (ref 0–0.2)
BASOPHILS NFR BLD AUTO: 0.5 %
BILIRUB SERPL-MCNC: 0.5 MG/DL (ref 0.2–1.3)
BILIRUB UR QL STRIP: NEGATIVE
BUN SERPL-MCNC: 30 MG/DL (ref 7–30)
CALCIUM SERPL-MCNC: 9 MG/DL (ref 8.5–10.1)
CHLORIDE SERPL-SCNC: 106 MMOL/L (ref 94–109)
CK SERPL-CCNC: 54 U/L (ref 30–225)
CO2 SERPL-SCNC: 23 MMOL/L (ref 20–32)
COLOR UR AUTO: YELLOW
CREAT SERPL-MCNC: 0.86 MG/DL (ref 0.52–1.04)
CRP SERPL-MCNC: 17 MG/L (ref 0–8)
DIFFERENTIAL METHOD BLD: ABNORMAL
EOSINOPHIL # BLD AUTO: 0 10E9/L (ref 0–0.7)
EOSINOPHIL NFR BLD AUTO: 0.1 %
ERYTHROCYTE [DISTWIDTH] IN BLOOD BY AUTOMATED COUNT: 14.6 % (ref 10–15)
FLUAV RNA RESP QL NAA+PROBE: NEGATIVE
FLUBV RNA RESP QL NAA+PROBE: NEGATIVE
GFR SERPL CREATININE-BSD FRML MDRD: 57 ML/MIN/{1.73_M2}
GLUCOSE BLDC GLUCOMTR-MCNC: 82 MG/DL (ref 70–99)
GLUCOSE SERPL-MCNC: 107 MG/DL (ref 70–99)
GLUCOSE UR STRIP-MCNC: NEGATIVE MG/DL
HBA1C MFR BLD: 5.1 % (ref 0–5.6)
HCO3 BLDV-SCNC: 25 MMOL/L (ref 21–28)
HCT VFR BLD AUTO: 33.9 % (ref 35–47)
HGB BLD-MCNC: 10.9 G/DL (ref 11.7–15.7)
HGB UR QL STRIP: ABNORMAL
IMM GRANULOCYTES # BLD: 0 10E9/L (ref 0–0.4)
IMM GRANULOCYTES NFR BLD: 0.5 %
INR PPP: 1.15 (ref 0.86–1.14)
INTERPRETATION ECG - MUSE: NORMAL
KETONES UR STRIP-MCNC: NEGATIVE MG/DL
LABORATORY COMMENT REPORT: NORMAL
LACTATE BLD-SCNC: 1.8 MMOL/L (ref 0.7–2)
LEUKOCYTE ESTERASE UR QL STRIP: ABNORMAL
LYMPHOCYTES # BLD AUTO: 1 10E9/L (ref 0.8–5.3)
LYMPHOCYTES NFR BLD AUTO: 12.2 %
MCH RBC QN AUTO: 32.9 PG (ref 26.5–33)
MCHC RBC AUTO-ENTMCNC: 32.2 G/DL (ref 31.5–36.5)
MCV RBC AUTO: 102 FL (ref 78–100)
MONOCYTES # BLD AUTO: 0.4 10E9/L (ref 0–1.3)
MONOCYTES NFR BLD AUTO: 4.9 %
NEUTROPHILS # BLD AUTO: 6.8 10E9/L (ref 1.6–8.3)
NEUTROPHILS NFR BLD AUTO: 81.8 %
NITRATE UR QL: NEGATIVE
NRBC # BLD AUTO: 0 10*3/UL
NRBC BLD AUTO-RTO: 0 /100
O2/TOTAL GAS SETTING VFR VENT: NORMAL %
PCO2 BLDV: 40 MM HG (ref 40–50)
PH BLDV: 7.41 PH (ref 7.32–7.43)
PH UR STRIP: 8 PH (ref 5–7)
PLATELET # BLD AUTO: 236 10E9/L (ref 150–450)
PO2 BLDV: 37 MM HG (ref 25–47)
POTASSIUM SERPL-SCNC: 4.9 MMOL/L (ref 3.4–5.3)
PROCALCITONIN SERPL-MCNC: <0.05 NG/ML
PROCALCITONIN SERPL-MCNC: <0.05 NG/ML
PROT SERPL-MCNC: 7.6 G/DL (ref 6.8–8.8)
RBC # BLD AUTO: 3.31 10E12/L (ref 3.8–5.2)
RBC #/AREA URNS AUTO: 26 /HPF (ref 0–2)
RSV RNA SPEC QL NAA+PROBE: NORMAL
SARS-COV-2 RNA RESP QL NAA+PROBE: NEGATIVE
SODIUM SERPL-SCNC: 139 MMOL/L (ref 133–144)
SOURCE: ABNORMAL
SP GR UR STRIP: 1.02 (ref 1–1.03)
SPECIMEN SOURCE: NORMAL
SQUAMOUS #/AREA URNS AUTO: 4 /HPF (ref 0–1)
TROPONIN I SERPL-MCNC: 0.05 UG/L (ref 0–0.04)
TROPONIN I SERPL-MCNC: 0.07 UG/L (ref 0–0.04)
UROBILINOGEN UR STRIP-MCNC: 0 MG/DL (ref 0–2)
WBC # BLD AUTO: 8.3 10E9/L (ref 4–11)
WBC #/AREA URNS AUTO: >182 /HPF (ref 0–5)
WBC CLUMPS #/AREA URNS HPF: PRESENT /HPF

## 2021-02-06 PROCEDURE — 87086 URINE CULTURE/COLONY COUNT: CPT | Performed by: EMERGENCY MEDICINE

## 2021-02-06 PROCEDURE — 87636 SARSCOV2 & INF A&B AMP PRB: CPT | Performed by: EMERGENCY MEDICINE

## 2021-02-06 PROCEDURE — 85730 THROMBOPLASTIN TIME PARTIAL: CPT | Performed by: EMERGENCY MEDICINE

## 2021-02-06 PROCEDURE — C9803 HOPD COVID-19 SPEC COLLECT: HCPCS

## 2021-02-06 PROCEDURE — 70496 CT ANGIOGRAPHY HEAD: CPT

## 2021-02-06 PROCEDURE — 93005 ELECTROCARDIOGRAM TRACING: CPT

## 2021-02-06 PROCEDURE — 250N000013 HC RX MED GY IP 250 OP 250 PS 637: Performed by: EMERGENCY MEDICINE

## 2021-02-06 PROCEDURE — 250N000011 HC RX IP 250 OP 636: Performed by: EMERGENCY MEDICINE

## 2021-02-06 PROCEDURE — 83605 ASSAY OF LACTIC ACID: CPT | Performed by: EMERGENCY MEDICINE

## 2021-02-06 PROCEDURE — 84484 ASSAY OF TROPONIN QUANT: CPT | Performed by: HOSPITALIST

## 2021-02-06 PROCEDURE — 71260 CT THORAX DX C+: CPT

## 2021-02-06 PROCEDURE — 85610 PROTHROMBIN TIME: CPT | Performed by: EMERGENCY MEDICINE

## 2021-02-06 PROCEDURE — 250N000011 HC RX IP 250 OP 636: Performed by: HOSPITALIST

## 2021-02-06 PROCEDURE — 999N001017 HC STATISTIC GLUCOSE BY METER IP

## 2021-02-06 PROCEDURE — 99223 1ST HOSP IP/OBS HIGH 75: CPT | Mod: AI | Performed by: HOSPITALIST

## 2021-02-06 PROCEDURE — 85025 COMPLETE CBC W/AUTO DIFF WBC: CPT | Performed by: EMERGENCY MEDICINE

## 2021-02-06 PROCEDURE — 81001 URINALYSIS AUTO W/SCOPE: CPT | Performed by: EMERGENCY MEDICINE

## 2021-02-06 PROCEDURE — 99285 EMERGENCY DEPT VISIT HI MDM: CPT | Mod: 25

## 2021-02-06 PROCEDURE — 83036 HEMOGLOBIN GLYCOSYLATED A1C: CPT | Performed by: EMERGENCY MEDICINE

## 2021-02-06 PROCEDURE — 258N000003 HC RX IP 258 OP 636: Performed by: EMERGENCY MEDICINE

## 2021-02-06 PROCEDURE — 250N000009 HC RX 250: Performed by: EMERGENCY MEDICINE

## 2021-02-06 PROCEDURE — 84145 PROCALCITONIN (PCT): CPT | Performed by: EMERGENCY MEDICINE

## 2021-02-06 PROCEDURE — 87040 BLOOD CULTURE FOR BACTERIA: CPT | Performed by: EMERGENCY MEDICINE

## 2021-02-06 PROCEDURE — 82803 BLOOD GASES ANY COMBINATION: CPT | Performed by: EMERGENCY MEDICINE

## 2021-02-06 PROCEDURE — 0042T CT HEAD PERFUSION WITH CONTRAST: CPT

## 2021-02-06 PROCEDURE — 70450 CT HEAD/BRAIN W/O DYE: CPT

## 2021-02-06 PROCEDURE — 84484 ASSAY OF TROPONIN QUANT: CPT | Performed by: EMERGENCY MEDICINE

## 2021-02-06 PROCEDURE — 86140 C-REACTIVE PROTEIN: CPT | Performed by: EMERGENCY MEDICINE

## 2021-02-06 PROCEDURE — 80053 COMPREHEN METABOLIC PANEL: CPT | Performed by: EMERGENCY MEDICINE

## 2021-02-06 PROCEDURE — 36415 COLL VENOUS BLD VENIPUNCTURE: CPT | Performed by: HOSPITALIST

## 2021-02-06 PROCEDURE — 82550 ASSAY OF CK (CPK): CPT | Performed by: EMERGENCY MEDICINE

## 2021-02-06 PROCEDURE — 84145 PROCALCITONIN (PCT): CPT | Performed by: HOSPITALIST

## 2021-02-06 PROCEDURE — 96365 THER/PROPH/DIAG IV INF INIT: CPT | Mod: 59

## 2021-02-06 PROCEDURE — 120N000001 HC R&B MED SURG/OB

## 2021-02-06 PROCEDURE — 258N000003 HC RX IP 258 OP 636: Performed by: HOSPITALIST

## 2021-02-06 RX ORDER — ONDANSETRON 4 MG/1
4 TABLET, ORALLY DISINTEGRATING ORAL EVERY 6 HOURS PRN
Status: DISCONTINUED | OUTPATIENT
Start: 2021-02-06 | End: 2021-02-08 | Stop reason: HOSPADM

## 2021-02-06 RX ORDER — CLOPIDOGREL BISULFATE 75 MG/1
75 TABLET ORAL DAILY
Status: DISCONTINUED | OUTPATIENT
Start: 2021-02-07 | End: 2021-02-08 | Stop reason: HOSPADM

## 2021-02-06 RX ORDER — ONDANSETRON 2 MG/ML
4 INJECTION INTRAMUSCULAR; INTRAVENOUS EVERY 6 HOURS PRN
Status: DISCONTINUED | OUTPATIENT
Start: 2021-02-06 | End: 2021-02-08 | Stop reason: HOSPADM

## 2021-02-06 RX ORDER — PANTOPRAZOLE SODIUM 40 MG/1
40 TABLET, DELAYED RELEASE ORAL DAILY
COMMUNITY

## 2021-02-06 RX ORDER — SODIUM CHLORIDE 9 MG/ML
INJECTION, SOLUTION INTRAVENOUS CONTINUOUS
Status: DISCONTINUED | OUTPATIENT
Start: 2021-02-06 | End: 2021-02-08 | Stop reason: HOSPADM

## 2021-02-06 RX ORDER — PANTOPRAZOLE SODIUM 40 MG/1
40 TABLET, DELAYED RELEASE ORAL DAILY
Status: DISCONTINUED | OUTPATIENT
Start: 2021-02-07 | End: 2021-02-08 | Stop reason: HOSPADM

## 2021-02-06 RX ORDER — ENALAPRILAT 1.25 MG/ML
0.62 INJECTION INTRAVENOUS EVERY 6 HOURS PRN
Status: DISCONTINUED | OUTPATIENT
Start: 2021-02-06 | End: 2021-02-08 | Stop reason: HOSPADM

## 2021-02-06 RX ORDER — IOPAMIDOL 755 MG/ML
120 INJECTION, SOLUTION INTRAVASCULAR ONCE
Status: COMPLETED | OUTPATIENT
Start: 2021-02-06 | End: 2021-02-06

## 2021-02-06 RX ORDER — LABETALOL HYDROCHLORIDE 5 MG/ML
10-40 INJECTION, SOLUTION INTRAVENOUS EVERY 10 MIN PRN
Status: DISCONTINUED | OUTPATIENT
Start: 2021-02-06 | End: 2021-02-08 | Stop reason: HOSPADM

## 2021-02-06 RX ORDER — POLYETHYLENE GLYCOL 3350 17 G/17G
17 POWDER, FOR SOLUTION ORAL DAILY
Status: DISCONTINUED | OUTPATIENT
Start: 2021-02-06 | End: 2021-02-08 | Stop reason: HOSPADM

## 2021-02-06 RX ORDER — METOPROLOL TARTRATE 1 MG/ML
2.5 INJECTION, SOLUTION INTRAVENOUS EVERY 4 HOURS PRN
Status: DISCONTINUED | OUTPATIENT
Start: 2021-02-06 | End: 2021-02-08 | Stop reason: HOSPADM

## 2021-02-06 RX ORDER — LIDOCAINE 40 MG/G
CREAM TOPICAL
Status: DISCONTINUED | OUTPATIENT
Start: 2021-02-06 | End: 2021-02-08 | Stop reason: HOSPADM

## 2021-02-06 RX ORDER — ACETAMINOPHEN 325 MG/1
650 TABLET ORAL EVERY 4 HOURS PRN
Status: DISCONTINUED | OUTPATIENT
Start: 2021-02-06 | End: 2021-02-08 | Stop reason: HOSPADM

## 2021-02-06 RX ORDER — CEFEPIME HYDROCHLORIDE 1 G/1
1 INJECTION, POWDER, FOR SOLUTION INTRAMUSCULAR; INTRAVENOUS ONCE
Status: COMPLETED | OUTPATIENT
Start: 2021-02-06 | End: 2021-02-06

## 2021-02-06 RX ORDER — HYDRALAZINE HYDROCHLORIDE 20 MG/ML
10 INJECTION INTRAMUSCULAR; INTRAVENOUS EVERY 4 HOURS PRN
Status: DISCONTINUED | OUTPATIENT
Start: 2021-02-06 | End: 2021-02-06

## 2021-02-06 RX ORDER — BISACODYL 10 MG
10 SUPPOSITORY, RECTAL RECTAL DAILY PRN
Status: DISCONTINUED | OUTPATIENT
Start: 2021-02-06 | End: 2021-02-08 | Stop reason: HOSPADM

## 2021-02-06 RX ORDER — ACETAMINOPHEN 650 MG/1
650 SUPPOSITORY RECTAL EVERY 4 HOURS PRN
Status: DISCONTINUED | OUTPATIENT
Start: 2021-02-06 | End: 2021-02-08 | Stop reason: HOSPADM

## 2021-02-06 RX ORDER — ASPIRIN 81 MG/1
81 TABLET, CHEWABLE ORAL DAILY
Status: DISCONTINUED | OUTPATIENT
Start: 2021-02-07 | End: 2021-02-08 | Stop reason: HOSPADM

## 2021-02-06 RX ORDER — ACETAMINOPHEN 650 MG/1
650 SUPPOSITORY RECTAL ONCE
Status: COMPLETED | OUTPATIENT
Start: 2021-02-06 | End: 2021-02-06

## 2021-02-06 RX ORDER — HEPARIN SODIUM 5000 [USP'U]/.5ML
5000 INJECTION, SOLUTION INTRAVENOUS; SUBCUTANEOUS EVERY 12 HOURS
Status: DISCONTINUED | OUTPATIENT
Start: 2021-02-06 | End: 2021-02-08 | Stop reason: HOSPADM

## 2021-02-06 RX ORDER — CEFTRIAXONE 1 G/1
1 INJECTION, POWDER, FOR SOLUTION INTRAMUSCULAR; INTRAVENOUS EVERY 24 HOURS
Status: DISCONTINUED | OUTPATIENT
Start: 2021-02-06 | End: 2021-02-08 | Stop reason: HOSPADM

## 2021-02-06 RX ADMIN — SODIUM CHLORIDE 500 ML: 9 INJECTION, SOLUTION INTRAVENOUS at 14:05

## 2021-02-06 RX ADMIN — SODIUM CHLORIDE 100 ML: 9 INJECTION, SOLUTION INTRAVENOUS at 12:52

## 2021-02-06 RX ADMIN — IOPAMIDOL 120 ML: 755 INJECTION, SOLUTION INTRAVENOUS at 12:51

## 2021-02-06 RX ADMIN — ACETAMINOPHEN 650 MG: 650 SUPPOSITORY RECTAL at 12:51

## 2021-02-06 RX ADMIN — CEFTRIAXONE SODIUM 1 G: 1 INJECTION, POWDER, FOR SOLUTION INTRAMUSCULAR; INTRAVENOUS at 20:56

## 2021-02-06 RX ADMIN — SODIUM CHLORIDE: 9 INJECTION, SOLUTION INTRAVENOUS at 18:57

## 2021-02-06 RX ADMIN — HEPARIN SODIUM 5000 UNITS: 5000 INJECTION, SOLUTION INTRAVENOUS; SUBCUTANEOUS at 19:07

## 2021-02-06 RX ADMIN — CEFEPIME HYDROCHLORIDE 1 G: 1 INJECTION, POWDER, FOR SOLUTION INTRAMUSCULAR; INTRAVENOUS at 13:34

## 2021-02-06 ASSESSMENT — ACTIVITIES OF DAILY LIVING (ADL): ADLS_ACUITY_SCORE: 31

## 2021-02-06 NOTE — ED NOTES
Bed: [facility]  Expected date: 2/6/21  Expected time: 12:12 PM  Means of arrival: Ambulance  Comments:  PAULETTE 89f stroke  ETA 1221

## 2021-02-06 NOTE — CONSULTS
[facility]    Stroke Telephone Note    I was called by Dr. Magdaleno Banda on 02/06/21 at 12:22 regarding patient Tracey Moran. The patient is a 89 year old female with history of dementia, recent MI (January 2021), recent diagnosis of COVID (11/2020), who received her second dose of the Covid-19  Vaccine yesterday. Patient presents from dementia care facility after staff noted new onset of left facial droop and increased difficulty following commands. In the ED patient with reported garbled speech and left lower facial droop, patient having difficulties following commands. No other focal neuro deficits reported. Per patient's son, patients dysarthria has been ongoing, and is not a new finding. He also reported, she can sometimes devlope left facial asymmetry when she becomes acutely ill. In emergency department patient on presentation initally febrile (102) and hypertensive with /107. CT/CTA/CTP unrevealing for LVO.     Stroke Code Data  (for stroke code without tele)  Stroke code activated 02/06/21   1222   First stroke provider response 02/06/21   1224   Last known normal 02/06/21   0830   Time of discovery   (or onset of symptoms) 02/06/21   1130   Head CT read by me 02/06/21   1237   Was stroke code de-escalated?   02/06/21 1313          TPA Treatment   Not given due to unclear or unfavorable risk-benefit profile for extended window thrombolysis beyond the conventional 4.5 hour time window.    Endovascular Treatment  Not initiated due to absence of proximal vessel occlusion    Impression  89 yr old female with history of recent MI who presents to the emergency department for evaluation of reported new onset AMS, left facial droop, and dysarthria. Unclear on chronicity of symptoms in setting of recent decline in mentation and possible alternative underlying etiologies. In emergency department patient initially hypertensive and febrile. CT/CTA/CTP unrevealing. MRI brain pending.  "    Recommendations  - MRI brain w/wo, consult stroke neuro if MRI is + for stroke   - Patient already on DAPT, does not require loading dose of Plavix     My recommendations are based on the information provided over the phone by Tracey Moran's in-person providers. They are not intended to replace the clinical judgment of her in-person providers. I was not requested to personally see or examine the patient at this time.    Raquel Gomez PA-C   Neurology  To page me or covering stroke neurology team member, click here: AMCOM   Choose \"On Call\" tab at top, then search dropdown box for \"Neurology Adult\", select location, press Enter, then look for stroke/neuro ICU/telestroke.         "

## 2021-02-06 NOTE — ED PROVIDER NOTES
History   Chief Complaint:  Facial Droop     The history is provided by the EMS personnel and the nursing home. History limited by: dementia.      Tracey Moran is a 89 year old female with history of dementia, hypertension, MI, UTI, Chronic Kidney Disease, among others who presents via EMS with facial droop. EMS reports they were called to the patient's dementia care facility around 1130 after staff noticed new left sided facial droop while with the patient. Last known well was 0830 at breakfast. EMS noted on their arrival the patient's stroke evaluation was difficult to obtain as the patient was not following commands which staff notes is not her baseline. EMS obtained a blood sugar of 174. They also note they spoke to the patient's son, who is her POA, over the phone. The son noted the patient suffered an MI in mid January which seemed to decrease her overall mentation and there was an increase in garbled speech. Son, who was unable to see the patient, wonders if the facial droop is new as he believes the patient has had facial droop with UTI in the past. Son also noted the patient received her second COVID19 vaccine yesterday and was also started on a new iron supplement yesterday. She is DNR/DNI.    Report from Isaura, the patient's nurse at her facility, notes the patient is wheel chair bound and has garbled speech at baseline. She affirms that she last saw the patient at 0830 breakfast and the tech noted the facial droop around 1130. The nurse affirms the patient had a COVID vaccine yesterday, but was unaware the patient had a fever. She denies recent cough, vomiting, or diarrhea.    Echocardiogram 1/18/2021  Interpretation Summary     1. The left ventricle is normal in size. The visual ejection fraction is  estimated at 55%. Basal inferior hypokinesis.  2. The right ventricle is normal in structure, function and size.  3. No valve disease.  4. The ascending aorta is Mildly dilated. 4.1cm.     Echo from 11/2018  showed EF 55%, aorta 4.0cm. Hard to compare wall motion  directly, as contrast was not used on previous study.    Review of Systems   Unable to perform ROS: Dementia     Allergies:  Demerol [Meperidine]  Penicillins    Medications:  Aspirin 81 mg   Atorvastatin  Plavix  Lisinopril  Metoprolol tartrate  Nystatin     Past Medical History:    Hypertension  STEMI  UTI   Dementia   Chronic Kidney Disease   Gastrointestinal hemorrhage    hypothyroidism    Past Surgical History:    EGD    Social History:  Patient presents via EMS from her assisted living facility.     Physical Exam     Patient Vitals for the past 24 hrs:   BP Temp Temp src Pulse Resp SpO2 Weight   02/06/21 1430 113/49 -- -- 85 21 95 % --   02/06/21 1415 100/46 -- -- -- -- 93 % --   02/06/21 1400 112/50 99.4  F (37.4  C) Temporal 90 26 92 % --   02/06/21 1345 108/48 -- -- 90 25 91 % --   02/06/21 1344 -- -- -- -- 26 -- --   02/06/21 1340 108/48 -- -- 93 25 92 % --   02/06/21 1330 116/54 -- -- 95 -- 94 % --   02/06/21 1315 (!) 153/68 -- -- 99 -- 95 % --   02/06/21 1300 (!) 179/101 -- -- 109 -- 97 % --   02/06/21 1250 (!) 175/81 -- -- -- -- -- --   02/06/21 1230 (!) 184/77 -- -- 106 27 97 % --   02/06/21 1225 -- -- -- -- -- -- 55.8 kg (123 lb)   02/06/21 1222 (!) 180/107 102  F (38.9  C) Temporal 114 -- 93 % --       Physical Exam  VS: Reviewed per above  HENT: Mucous membranes moist, no nuchal rigidity  EYES: sclera anicteric  CV: Rate as noted, regular rhythm.   RESP: Effort normal. Breath sounds are normal bilaterally.  GI: no tenderness/rebound/guarding, not distended.  NEURO:   GCS:   Motor 6=Obeys commands   Verbal 2=Incomprehensible speech   Eye Opening 4=Spontaneous   Total: 12     Left facial droop, moves all extremities but strength testing was difficult to discern due to poor cooperation versus dementia.  Withdraws to pain in all 4 extremities.   MSK: No deformity of the extremities  SKIN: Warm and dry, no rashes          Emergency Department  Course   ECG  ECG taken at 1338, ECG read at 1344  Normal sinus rhythm  Left axis deviation  Left ventricular hypertrophy with repolarization abnormality   ST depression V1, V2 resolved as compared to prior, dated 1/18/2021.  Rate 93 bpm. AK interval 144 ms. QRS duration 88 ms. QT/QTc 378/469 ms. P-R-T axes 40 -37 45.     Imaging:  CT Head Perfusion w Contrast  Preliminary Result  IMPRESSION: Unremarkable perfusion CT scan of the brain.  Reading per radiology    CTA Head Neck with Contrast  IMPRESSION:  1. No definite acute intracranial large vessel occlusion.  2. Moderate stenosis involving the origin of the left vertebral  artery. Otherwise, no high-grade stenosis or occlusion of the cervical  carotid or vertebral arteries.  3. Intracranial atherosclerosis, with associated mild-to-moderate  multifocal stenosis of the bilateral posterior cerebral arteries.  4. 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 These both measure up to approximately 4-5 mm.  5. 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    The findings were discussed with Dr. Banda by myself at  approximately 1:06 PM on 2/6/2021  Reading per radiology    CT Chest/Abdomen/Pelvis w Contrast  IMPRESSION: The exam is limited by significant motion/respiratory  artifact, which limits detail evaluation, within these limitations,  there is;  1. No CT finding to explain patient's symptoms of fever.  2. Cardiomegaly and moderate atherosclerotic vascular calcification of  the coronary arteries and thoracic aorta.  3. The main pulmonary artery is enlarged measuring 3.2 cm, this can be  seen with pulmonary hypertension.  4. Multiple small thyroid nodules, can be further evaluated with  thyroid ultrasound if clinically warranted.  5. 2.1 cm hypoattenuating focus in the pancreatic head area abutting  the common bile duct,  indeterminant, could represent intraductal  papillary mucinous neoplasm versus other pancreatic cystic lesions,  this can be further evaluated with MRCP (although felt this would be  less yielding if the patient cannot stay still) or ERCP.  6. Extensive colonic diverticulosis without CT evidence of acute  diverticulitis.  7. Mild diffuse urinary bladder wall thickening, nonspecific, can be  seen with chronic cystitis.  Reading per radiology    CT Head w/o Contrast  IMPRESSION:  1. No CT signs of acute infarct or intracranial hemorrhage. ASPECTS  score = 10.  2. Brain atrophy and presumed chronic small vessel ischemic changes,  as described.  Reading per radiology    Laboratory:    Lactic acid whole blood(result time 1253) 1.8  blood cultures pending x2    CMP: glucose 107(H), GFR estimate 57(L), albumin 3.2(L) o/w WNL (Creatinine 0.86)    CBC: WBC 8.3, HGB 10.9(L),   INR: 1.15(H)  PTT: 31  Troponin (Collected 1227): 0.050(H)  Procalcitonin: <0.05  blood gas venous: pH: 7.41, PCO2: 40, PO2: 37, Bicarbonate: 25, FIO2 0L, base excess 0.7    UA with microscopic: blood trace, pH 8.0(H), protein albumin 30(H), leukocyte esterase large, WBC >182(H), RBC 26(H), WBC clumps present, bacteria moderate, squamous epithelial 4(H) o/w WNL  Urine culture pending    Symptomatic Influenza A/B & SARS-CoV2 (COVID19) Virus PCR Multiplex: negative     Interventions:  1251 Acetaminophen 650 mg Rectal  1334 Cefepime 1 g IV  1405  ml IV    Emergency Department Course:    1215  EMS arrival and report.   1219 I examined the patient as noted above.   1221 Code stroke called.   1223 I spoke with Dr. Teodora Land, fellow, of the Stroke Neurology service from [facility] regarding patient's presentation, findings, and plan of care.   1225 The patient was transferred to CT.   1230 I spoke with the nurse at the [facility].   1237 I spoke with the patient's son, Jonny, for further history.  1306 I spoke with Dr. Calhoun of the  Radiology service regarding patient's presentation, findings, and plan of care.    1311 I spoke with Dr. Teodora Land, fellow, of the Stroke Neurology service from [facility] regarding patient's presentation, findings, and plan of care.   1350 I returned to recheck the patient.   1405 I spoke with Dr. Rainey of the Hospitalist service from [facility] regarding patient's presentation, findings, and plan of care.     Disposition:  The patient was admitted to the hospital under the care of Dr. Rainye.     Impression & Plan     Medical Decision Making:  Patient presents to the ER for evaluation of left-sided facial droop.  On arrival she is incidentally febrile to 102 Fahrenheit.  On exam patient has garbled speech and left facial droop.  She withdraws to pain in all 4 extremities and moves all extremities but further neurologic testing is limited due to patient cooperation.  She has known dementia and in further discussion with staff, baseline garbled speech.  It seems that today the new neuro deficit is a left facial droop.  Stroke code was called.  Additionally CT imaging of the chest and abdomen was obtained to evaluate for occult infectious process given patient is poor historian.    Initial stroke series CT imaging was negative for acute finding.  Stroke code was de-escalate it.  Inpatient MRI was recommended per stroke neurology, if in line with patient goals of care.    I spoke with patient's son Jonny, and it sounds as though patient has had left facial droop with prior UTIs.  He confirms that patient is DNR/DNI.    Labs do not show elevated procalcitonin or leukocytosis or lactic acidosis.  Urine is concerning for infection.  Patient was given cefepime.  Covid testing is negative.    Is not clear to me if fever is due to UTI versus recent COVID-19 vaccine yesterday.  Plan to admit to the hospital for further cares.  At signout to my colleague, patient was awaiting inpatient bed.    Covid-19  Tracey Moran was  evaluated during a global COVID-19 pandemic, which necessitated consideration that the patient might be at risk for infection with the SARS-CoV-2 virus that causes COVID-19.   Applicable protocols for evaluation were followed during the patient's care.   COVID-19 was considered as part of the patient's evaluation. The plan for testing is:  a test was obtained during this visit.    Diagnosis:    ICD-10-CM    1. Fever, unspecified fever cause  R50.9 Blood culture     Blood culture     Symptomatic Influenza A/B & SARS-CoV2 (COVID-19) Virus PCR Multiplex     Urine Culture   2. Facial droop  R29.810    3. Urinary tract infection with hematuria, site unspecified  N39.0     R31.9        Scribe Disclosure:  I, Leslee Amato, am serving as a scribe at 12:19 PM on 2/6/2021 to document services personally performed by Magdaleno Banda MD based on my observations and the provider's statements to me.          Magdaleno Banda MD  02/06/21 0284

## 2021-02-06 NOTE — ED NOTES
Broad spectrum antibiotics were started and patient is now resting in bed with vital signs stable.

## 2021-02-06 NOTE — H&P
[facility]    History and Physical  Hospitalist    Tracey Moran MRN# [id number]   Age: 89 year old YOB: 1925     Date of Admission:  2/6/2021    Primary care provider: Johnna Hinojosa Physician          Assessment and Plan:     Tracey Moran is a 89 year old  female with medical history of hypertension, CKD stage II, chronic anemia, hypothyroidism, advanced dementia wheelchair-bound, full cares, recent inferior STEMI and urinary tract infection 1/18/2021 brought into the ED via EMS from memory care facility with facial droop.    Left facial droop, encephalopathy-possible stroke vs in the setting of UTI.  Advanced dementia.  Incidental saccular aneurysms, pseudoaneurysm on imaging.  History of dysphagia at baseline.  Patient with advanced dementia, resident of [facility] care facility uses wheelchair at baseline and receives total cares. Per report patient last well-known normal at 830 at breakfast, subsequently around 1130 staff noticed new left-sided facial droop.  EMS noted a blood sugar of 174.    Per discussion with patient's son over the telephone -reports patient having good days and bad days, overall decreased mentation since her recent MI in January.  Reports patient had facial droop with UTI in the past.   In ER blood pressure 180/107, heart rate 114.   Hemoglobin 10.9, platelets 236. Glucose 107, creatinine 0.86.  INR 1.15.  CT head no acute intracranial pathology.  CT head perfusion with contrast unremarkable  CTA head and neck with contrast no definitive acute intracranial large vessel occlusion.  Moderate stenosis involving the origin of the left vertebral artery.  Intracranial atherosclerosis with associated mild to moderate multifocal stenosis of the bilateral posterior cerebral arteries.  Saccular aneurysms arising at the right middle cerebral artery M1 M2 bifurcation and the supraclinoid right internal carotid artery as described. Inferolaterally directed  pseudoaneurysms of the aortic arch.  Admit to inpatient.  At length discussion with patient's son Jonny, concurred on holding off MRI tonight.  Will reassess neurological exam tomorrow, await neurology input, patient son will also visit mom --and then can consider MRI if he would like to pursue imaging.  Neuro checks.  Permissive hypertension.  With as needed IV labetalol, IV enalapril ordered.  Restart PTA aspirin and Plavix.  Hold PTA atorvastatin until swallow evaluation.  Monitor blood sugars closely.  N.p.o.  Normal saline at 75 mill per hour.  Speech therapy evaluation, reportedly following with speech therapy PTA  PT OT evaluation requested.  We will check a hemoglobin A1c, lipid panel and TSH.  Echocardiogram with bubble study.  We will trend troponin, telemetry monitoring.  Stroke neurology consult requested. Will also await neurology recommendation regarding saccular aneurysms, pseudoaneurysms noted on imaging.    Acute Febrile illness possible side effect of Covid vaccine, UTI.  Urinary tract infection with history of recurrent UTIs.  In ER temperature 102 Fahrenheit. WBC 8.3.  Lactic acid 1.8.  procalcitonin less than 0.05.  UA with microscopy showed large leukocyte esterase, greater than 182 WBCs,   WBC clumps present.  Moderate bacteria.  Covid PCR negative.  CT chest abdomen and pelvis no concerns for infection.  Report patient had Covid vaccine second dose yesterday.  Received IV cefepime in ED.  Will start on IV ceftriaxone.  Follow urine culture results, de-escalate antibiotics accordingly.  History of recurrent UTIs, might need to consider suppressive therapy on discharge     Recent inferior MI  Patient unable to provide any history, inferior STEMI 1/2021.  Troponin 0.050.  EKG normal sinus rhythm ST depressions in V1 V2 resolved as compared to prior.  LVH.  CTA chest abdomen and pelvis with contrast showed cardiomegaly, main pulmonary artery enlarged at 3.2 cm.    Telemetry monitoring, trend  troponin.  Echocardiogram as part of stroke work-up. (Last TTE shows EF 55% and inferior wall hypokinesis.)  Cardiology was then consulted, given patient's age and medical comorbid conditions plan to pursue medical intervention.  Restart PTA aspirin and Plavix.  Restart PTA metoprolol.  Hold PTA lisinopril for permissive hypertension.  Hold PTA atorvastatin until speech therapy evaluation.    Incidental CT finding   CT neck with Small multiple thyroid nodules   Check TSH  Evaluate by thyroid ultrasound as outpatient     CKD stage II:  Creatinine baseline 0.8.  Avoid Nephrotoxic drugs.     Chronic anemia   Baseline hemoglobin around 10, at baseline.  Monitor.     COVID-19 infection 11/2020.  Covid vaccine 2 nd dose 2/5/2021   No shortness of breath at this time.  CT chest no infiltrates.    Extensive bruising from antiplatelet therapy, frail skin and aging.  Noted to have diffuse extensive bruising over the body.  Noted during previous admission.  Monitor.    Goals of care discussion.  Per discussion with patient's son Jonny was full code until last admission in January.  Patient apparently preferred to be a full code, family had met with palliative team and changed to DNR/DNI status.  He would like to continue DNR/DNI status at this time.  Recommends they would like to pursue aggressive medical intervention with the exception of resuscitation and intubation.    DVT Prophylaxis: Heparin SQ  Code Status: DNR / DNI, discussed with patients son     Disposition: Expected discharge 2 days pending clinical improvement.  Total time  > 55 minutes. More than 65% of time spent in direct patient care, care coordination, patient/caregiver counseling, and formalizing plan of care.     Richardson Rainey MD          Chief Complaint:     Patient with advanced dementia, unable to provide any history.  History obtained from patient's son over the telephone, medical records and ED team.    Tracey Moran is a 89 year old  female with  medical history of hypertension, CKD stage II, chronic anemia, hypothyroidism, advanced dementia wheelchair-bound, full cares, recent inferior STEMI and urinary tract infection 1/18/2021 brought into the ED via EMS from [facility] with facial droop.    Per report patient last well-known normal at 830 at breakfast, subsequently around 1130 staff noticed new left-sided facial droop.  EMS noted a blood sugar of 174.  Per discussion with patient's son over the telephone reports patient having good days and bad days, overall decreased mentation since her recent MI in January.  Reports patient had facial droop with UTI in the past.   Unable to obtain any further history as patient demented.  No report of fever at the care facility.  Patient noted to have Covid vaccine second dose yesterday.  No report of cough or vomiting or diarrhea.    In ER blood pressure 180/107, heart rate 114 and temperature 102F.  WBC 8.3.  Lactic acid 1.8.  Calcitonin less than 0.05.  pH 7.41, bicarb 25.  Hemoglobin 10.9, platelets 236.  Glucose 107, creatinine 0.86.  INR 1.15.  Troponin 0 0.050.  UA with microscopy showed large leukocyte esterase, greater than 182 WBCs, WBC clumps present.  Moderate bacteria.  Covid PCR negative.  EKG normal sinus rhythm STD depressions in V1 V2 resolved as compared to prior.  LVH.  CT head no acute intracranial pathology.  CT head perfusion with contrast unremarkable  CTA head and neck with contrast no definitive acute intracranial large vessel occlusion.  Moderate stenosis involving the origin of the left vertebral artery.  Intracranial atherosclerosis with associated mild to moderate multifocal stenosis of the bilateral posterior cerebral arteries.  Saccular aneurysms arising at the right middle cerebral artery M1 M2 bifurcation and the supraclinoid right internal carotid artery as described.  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    CTA chest abdomen and pelvis with contrast  showed cardiomegaly, main pulmonary artery enlarged at 3.2 cm.  Small multiple thyroid nodules can be further evaluated by thyroid ultrasound if clinically warranted.           Review of Systems:     Unable to obtain as patient demented    Medical History:     Past Medical History:   Diagnosis Date     Hypertension         Surgical History:      Past Surgical History:   Procedure Laterality Date     ESOPHAGOSCOPY, GASTROSCOPY, DUODENOSCOPY (EGD), COMBINED N/A 12/6/2017    Procedure: COMBINED ESOPHAGOSCOPY, GASTROSCOPY, DUODENOSCOPY (EGD);  gastroscopy;  Surgeon: Melchor Mancera MD;  Location:  GI             Social History:      Social History     Tobacco Use     Smoking status: Never Smoker     Smokeless tobacco: Never Used   Substance Use Topics     Alcohol use: Yes             Family History:   Family history reviewed in chart, no pertinent family history to HPI.         Allergies:     Allergies   Allergen Reactions     Demerol [Meperidine]      Dust Mites      Peanuts [Nuts]      Penicillins      Has tolerated cephalosporins (ceftriaxone, cefdinir)     Pollen Extract              Medications:   Home medications reviewed.         Physical Exam      Admission Weight: 55.8 kg (123 lb)  Current Weight: 55.8 kg (123 lb)    Vital Signs with Ranges  Temp:  [99.4  F (37.4  C)-102  F (38.9  C)] 99.4  F (37.4  C)  Pulse:  [] 85  Resp:  [21-27] 21  BP: (100-184)/() 113/49  SpO2:  [91 %-97 %] 95 %  PHYSICAL EXAM  GENERAL: Patient lying in bed, barely able to follow simple commands.  [Poor cooperation versus dementia]  HEENT: Oropharynx dry. Pupils equal  HEART: Regular rate and rhythm. S1S2.  Systolic murmur right sternal border.  LUNGS: Clear to auscultation bilaterally. No expiratory wheeze.  Respirations unlabored  ABDOMEN: Soft, no abdominal tenderness, bowel sounds heard   NEURO: Unable to evaluate due to dementia, withdraws all 4 extremities to pain.  left facial droop appreciated.  EXTREMITIES: No pedal  edema.   SKIN: Warm, dry.  Extensive bruising all over body.  PSYCHIATRY Cooperative         Data:   All new lab and imaging data was reviewed.

## 2021-02-06 NOTE — ED NOTES
Patient continues to rest in bed with eyes closed. Vitals remain stable. Station 55 was called and informed that patient tested negative for COVID 19. Will continue to monitor prior to admission

## 2021-02-06 NOTE — ED NOTES
Patient moved from stabe room 3 into room 10. ED RN Agustina previously cared for patient and is aware of situation and plan of care. Patient resting in bed awake and alert spontaneously. Q15 vital signs are stable and monitors set to continue vital signs at this time.    yes

## 2021-02-06 NOTE — PHARMACY-ADMISSION MEDICATION HISTORY
Pharmacy Medication History  Admission medication history interview status for the 2/6/2021  admission is complete. See EPIC admission navigator for prior to admission medications     Location of Interview: Outside patient room but on unit  Medication history sources: Patient and MAR (Renee Durán at [facility])  Medication history source reliability: Good  Adherence assessment: Good    In the past week, patient estimated taking medication this percent of the time: greater than 90%    Medication reconciliation completed by provider prior to medication history? No    Time spent in this activity: 7 minutes      Prior to Admission medications    Medication Sig Last Dose Taking? Auth Provider   acetaminophen (TYLENOL) 500 MG tablet Take 500 mg by mouth 3 times daily as needed for mild pain Past Week at Unknown time Yes Unknown, Entered By History   aspirin (ASA) 81 MG chewable tablet Take 1 tablet (81 mg) by mouth daily 2/6/2021 at Unknown time Yes Neva Claudio MD   atorvastatin (LIPITOR) 20 MG tablet Take 1 tablet (20 mg) by mouth daily 2/6/2021 at Unknown time Yes Neva Claudio MD   Criselda Protect (EUCERIN) external cream Apply topically 2 times daily Criselda Barrier Cream (apply to incontinence rash) Past Week at Unknown time Yes Guilherme Graves MD   clopidogrel (PLAVIX) 75 MG tablet Take 1 tablet (75 mg) by mouth daily 2/6/2021 at Unknown time Yes Neva Claudio MD   diclofenac (VOLTAREN) 1 % topical gel Place 2 g onto the skin 3 times daily For right knee pain Past Week at Unknown time Yes Reported, Patient   lisinopril (PRINIVIL/ZESTRIL) 20 MG tablet TAKE ONE TABLET BY MOUTH TWICE DAILY. HOLD IF SYSTOLIC BLOOD PRESSURE IS LESS THAN 110 MMHG. CALL IF HELD TWICE IN A ROW OR IF SYMPTOMATIC. 2/6/2021 at 0800 Yes Guilherme Graves MD   menthol-zinc oxide (CALMOSEPTINE) 0.44-20.6 % OINT ointment Apply 1 g topically 4 times daily as needed for skin protection (to open area on right buttock) Past Week at Unknown time  Yes Unknown, Entered By History   metoprolol tartrate (LOPRESSOR) 25 MG tablet Take 12.5 mg by mouth 2 times daily 2/6/2021 at 0800 Yes Unknown, Entered By History   multivitamin w/minerals (THERA-VIT-M) tablet Take 1 tablet by mouth daily 2/6/2021 at Unknown time Yes Unknown, Entered By History   nystatin (MYCOSTATIN) 234474 UNIT/GM external powder Apply topically 2 times daily Apply to rash under bilateral breast until resolved Past Week at Unknown time Yes Unknown, Entered By History   pantoprazole (PROTONIX) 40 MG EC tablet Take 40 mg by mouth daily 2/6/2021 at Unknown time Yes Unknown, Entered By History   senna-docusate (SENOKOT-S/PERICOLACE) 8.6-50 MG tablet Take 1 tablet by mouth 2 times daily as needed for constipation Past Week at Unknown time Yes Agustina Sanchez PA-C   Nutritional Supplements (ENSURE PO) 1 two times daily (Family supplies)   Unknown, Entered By History

## 2021-02-06 NOTE — ED NOTES
Patient was straight cathed for urine and staff noted that patients vaginal area was reddened and painful to the touch. Patients urine output was cloudy in nature. Per son patient has a hx of UTIs.

## 2021-02-06 NOTE — ED NOTES
RN and MD spoke to patients care facility nursing staff who stated they saw Sharee at baseline mentation at breakfast which was at 0830am this AM. Patient is checked on hourly by staff and it was noted by nursing assistant that at 1130 patient had a facial droop was drooling more than usual and was crying while she was in the day room. Due to these changes EMS was called and patient was sent to the ED.

## 2021-02-06 NOTE — ED NOTES
RN spoke with Nick about pt's diet, he states she has been on a semi-soft diet per SLP at [facility]. Supplemented with Boost shakes. He also states she likes the [employer] on TV and routinely watches Lucio Samayoa at 6 pm on [employer].

## 2021-02-06 NOTE — ED NOTES
Patient is resting in bed with eyes closed. BP was noted to be soft. MD aware. 500 NS bolus being given per order.

## 2021-02-06 NOTE — ED NOTES
MD spoke to patients son over the phone to get additional collateral information regarding patients baseline mental status.

## 2021-02-06 NOTE — ED TRIAGE NOTES
Patient arrives to the ED from her assited living facility due to onset of left sided facial droop and no  strength. Patients facility states that her speech is usually garbled and that however she began having a noticed facial droop which was noticed at 11:30 but onset is unknown. Patient was seen normal at 8:30am. Patient was given the second COVID shot was yesterday. Patient had a recent MI two weeks ago and son had stated to EMS that she has been declining for 4 years and worsening since MI

## 2021-02-06 NOTE — PROGRESS NOTES
RECEIVING UNIT ED HANDOFF REVIEW    ED Nurse Handoff Report was reviewed by: Kalli Dutta RN on February 6, 2021 at 5:12 PM

## 2021-02-06 NOTE — ED NOTES
[facility]  ED Nurse Handoff Report    ED Chief complaint: Facial Droop and Altered Mental Status      ED Diagnosis:   Final diagnoses:   Fever, unspecified fever cause   Facial droop   Urinary tract infection with hematuria, site unspecified       Code Status: to be assessed by admitting provider     Allergies:   Allergies   Allergen Reactions     Demerol [Meperidine]      Dust Mites      Peanuts [Nuts]      Penicillins      Has tolerated cephalosporins (ceftriaxone, cefdinir)     Pollen Extract        Patient Story: Patient arrives to the ED from her nursing home with complaints of new left sided facial droop and altered mental status. Patient was noted to have more garbled speech than normal and was having more difficulty following commands. EMS was called to the facility around 1130 due to these concerns. While speaking to nursing staff patient was last seen well at 830am when eating breakfast. After speaking to patients son it was noted that patient had an MI in mid January and since that  time has had a decline in her overall mentation and her garbled speech. Per son patient had a facial droop after previous CVA but has not seen patient today and is unable to say if it is worse than baseline. Patient was additionally found to have a temperature of 102 temporal upon arrival and nursing home staff noted that patient did received her second COVID vaccine yesterday. Patient tested positive for COVID 19 in Novemeber 2020.   Focused Assessment:   Resp:WDL   Cardiac: hypertensive and mildly tachycardic on arrival. Patients vitals then returned to within normal limits after patient had began resting post CT. Son informed staff patient had an MI in mid January  Neuro: Patient was noted to have increased garbled speech and left sided facial droop at 1130 today. Patients last known well was at 0830. Code stroke was called by ED MD but was later deescalated. Patients nursing staff notes that patient is  wheelchair bound and has garbled speech at baseline   GI:WDL   : Patient has a UTI and was given broad spectrum Abx in the ED. Patients perineum is reddened and painful.   Treatments and/or interventions provided: antibiotics, fluids   Patient's response to treatments and/or interventions: maintains status     To be done/followed up on inpatient unit:  Continue to monitor     Does this patient have any cognitive concerns?: patient has baseline dementia and garbled speech. Unable to assess patients mentation at this time.     Activity level - Baseline/Home:  Wheelchair  Activity Level - Current:   Total Care    Patient's Preferred language: English   Needed?: No    Isolation: None and COVID r/o and special precautions  Infection: Not Applicable  COVID r/o and special precautions  Patient tested for COVID 19 prior to admission: YES  Bariatric?: No    Vital Signs:   Vitals:    02/06/21 1315 02/06/21 1330 02/06/21 1344 02/06/21 1345   BP: (!) 153/68 116/54  108/48   Pulse: 99 95  90   Resp:   26 25   Temp:       TempSrc:       SpO2: 95% 94%  91%   Weight:           Cardiac Rhythm:     Was the PSS-3 completed:   No -patient has altered mental status and garbled speech  What interventions are required if any?               Family Comments: Patients son Nick would like to be updated on plans of care   OBS brochure/video discussed/provided to patient/family: N/A              Name of person given brochure if not patient: n.a               Relationship to patient: n.a     For the majority of the shift this patient's behavior was Green.   Behavioral interventions performed were none .    ED NURSE PHONE NUMBER: [phone]

## 2021-02-07 ENCOUNTER — APPOINTMENT (OUTPATIENT)
Dept: CARDIOLOGY | Facility: CLINIC | Age: 86
DRG: 871 | End: 2021-02-07
Attending: HOSPITALIST
Payer: COMMERCIAL

## 2021-02-07 ENCOUNTER — APPOINTMENT (OUTPATIENT)
Dept: PHYSICAL THERAPY | Facility: CLINIC | Age: 86
DRG: 871 | End: 2021-02-07
Attending: HOSPITALIST
Payer: COMMERCIAL

## 2021-02-07 ENCOUNTER — APPOINTMENT (OUTPATIENT)
Dept: SPEECH THERAPY | Facility: CLINIC | Age: 86
DRG: 871 | End: 2021-02-07
Attending: HOSPITALIST
Payer: COMMERCIAL

## 2021-02-07 LAB
ANION GAP SERPL CALCULATED.3IONS-SCNC: 6 MMOL/L (ref 3–14)
BACTERIA SPEC CULT: NORMAL
BACTERIA SPEC CULT: NORMAL
BUN SERPL-MCNC: 28 MG/DL (ref 7–30)
CALCIUM SERPL-MCNC: 8.4 MG/DL (ref 8.5–10.1)
CHLORIDE SERPL-SCNC: 113 MMOL/L (ref 94–109)
CHOLEST SERPL-MCNC: 88 MG/DL
CO2 SERPL-SCNC: 24 MMOL/L (ref 20–32)
CREAT SERPL-MCNC: 0.91 MG/DL (ref 0.52–1.04)
ERYTHROCYTE [DISTWIDTH] IN BLOOD BY AUTOMATED COUNT: 14.8 % (ref 10–15)
FOLATE SERPL-MCNC: 45.6 NG/ML
GFR SERPL CREATININE-BSD FRML MDRD: 53 ML/MIN/{1.73_M2}
GLUCOSE BLDC GLUCOMTR-MCNC: 105 MG/DL (ref 70–99)
GLUCOSE BLDC GLUCOMTR-MCNC: 92 MG/DL (ref 70–99)
GLUCOSE SERPL-MCNC: 72 MG/DL (ref 70–99)
HCT VFR BLD AUTO: 26.9 % (ref 35–47)
HDLC SERPL-MCNC: 47 MG/DL
HGB BLD-MCNC: 8.6 G/DL (ref 11.7–15.7)
LDLC SERPL CALC-MCNC: 29 MG/DL
Lab: NORMAL
MAGNESIUM SERPL-MCNC: 1.8 MG/DL (ref 1.6–2.3)
MCH RBC QN AUTO: 33.3 PG (ref 26.5–33)
MCHC RBC AUTO-ENTMCNC: 32 G/DL (ref 31.5–36.5)
MCV RBC AUTO: 104 FL (ref 78–100)
NONHDLC SERPL-MCNC: 41 MG/DL
PLATELET # BLD AUTO: 164 10E9/L (ref 150–450)
POTASSIUM SERPL-SCNC: 4 MMOL/L (ref 3.4–5.3)
RBC # BLD AUTO: 2.58 10E12/L (ref 3.8–5.2)
SODIUM SERPL-SCNC: 143 MMOL/L (ref 133–144)
SPECIMEN SOURCE: NORMAL
TRIGL SERPL-MCNC: 60 MG/DL
TROPONIN I SERPL-MCNC: 0.06 UG/L (ref 0–0.04)
TROPONIN I SERPL-MCNC: 0.07 UG/L (ref 0–0.04)
TSH SERPL DL<=0.005 MIU/L-ACNC: 1.92 MU/L (ref 0.4–4)
VIT B12 SERPL-MCNC: 414 PG/ML (ref 193–986)
WBC # BLD AUTO: 5 10E9/L (ref 4–11)

## 2021-02-07 PROCEDURE — 255N000002 HC RX 255 OP 636: Performed by: HOSPITALIST

## 2021-02-07 PROCEDURE — 80048 BASIC METABOLIC PNL TOTAL CA: CPT | Performed by: HOSPITALIST

## 2021-02-07 PROCEDURE — 85027 COMPLETE CBC AUTOMATED: CPT | Performed by: HOSPITALIST

## 2021-02-07 PROCEDURE — 84484 ASSAY OF TROPONIN QUANT: CPT | Performed by: HOSPITALIST

## 2021-02-07 PROCEDURE — 250N000013 HC RX MED GY IP 250 OP 250 PS 637: Performed by: HOSPITALIST

## 2021-02-07 PROCEDURE — 92610 EVALUATE SWALLOWING FUNCTION: CPT | Mod: GN

## 2021-02-07 PROCEDURE — 87186 SC STD MICRODIL/AGAR DIL: CPT | Performed by: HOSPITALIST

## 2021-02-07 PROCEDURE — 84443 ASSAY THYROID STIM HORMONE: CPT | Performed by: HOSPITALIST

## 2021-02-07 PROCEDURE — 999N000208 ECHOCARDIOGRAM COMPLETE

## 2021-02-07 PROCEDURE — 36415 COLL VENOUS BLD VENIPUNCTURE: CPT | Performed by: HOSPITALIST

## 2021-02-07 PROCEDURE — 87088 URINE BACTERIA CULTURE: CPT | Performed by: HOSPITALIST

## 2021-02-07 PROCEDURE — 82607 VITAMIN B-12: CPT | Performed by: HOSPITALIST

## 2021-02-07 PROCEDURE — 258N000003 HC RX IP 258 OP 636: Performed by: HOSPITALIST

## 2021-02-07 PROCEDURE — 87086 URINE CULTURE/COLONY COUNT: CPT | Performed by: HOSPITALIST

## 2021-02-07 PROCEDURE — 82746 ASSAY OF FOLIC ACID SERUM: CPT | Performed by: HOSPITALIST

## 2021-02-07 PROCEDURE — 120N000001 HC R&B MED SURG/OB

## 2021-02-07 PROCEDURE — 97161 PT EVAL LOW COMPLEX 20 MIN: CPT | Mod: GP | Performed by: PHYSICAL THERAPIST

## 2021-02-07 PROCEDURE — 99233 SBSQ HOSP IP/OBS HIGH 50: CPT | Performed by: HOSPITALIST

## 2021-02-07 PROCEDURE — 80061 LIPID PANEL: CPT | Performed by: HOSPITALIST

## 2021-02-07 PROCEDURE — 250N000011 HC RX IP 250 OP 636: Performed by: HOSPITALIST

## 2021-02-07 PROCEDURE — 999N001017 HC STATISTIC GLUCOSE BY METER IP

## 2021-02-07 PROCEDURE — 93306 TTE W/DOPPLER COMPLETE: CPT | Mod: 26 | Performed by: INTERNAL MEDICINE

## 2021-02-07 PROCEDURE — 83735 ASSAY OF MAGNESIUM: CPT | Performed by: HOSPITALIST

## 2021-02-07 RX ORDER — ATORVASTATIN CALCIUM 20 MG/1
20 TABLET, FILM COATED ORAL DAILY
Status: DISCONTINUED | OUTPATIENT
Start: 2021-02-07 | End: 2021-02-08 | Stop reason: HOSPADM

## 2021-02-07 RX ORDER — LISINOPRIL 20 MG/1
20 TABLET ORAL 2 TIMES DAILY
Status: DISCONTINUED | OUTPATIENT
Start: 2021-02-07 | End: 2021-02-08 | Stop reason: HOSPADM

## 2021-02-07 RX ADMIN — HEPARIN SODIUM 5000 UNITS: 5000 INJECTION, SOLUTION INTRAVENOUS; SUBCUTANEOUS at 06:38

## 2021-02-07 RX ADMIN — METOPROLOL TARTRATE 12.5 MG: 25 TABLET, FILM COATED ORAL at 10:34

## 2021-02-07 RX ADMIN — HUMAN ALBUMIN MICROSPHERES AND PERFLUTREN 9 ML: 10; .22 INJECTION, SOLUTION INTRAVENOUS at 12:11

## 2021-02-07 RX ADMIN — METOPROLOL TARTRATE 12.5 MG: 25 TABLET, FILM COATED ORAL at 20:59

## 2021-02-07 RX ADMIN — POLYETHYLENE GLYCOL 3350 17 G: 17 POWDER, FOR SOLUTION ORAL at 10:33

## 2021-02-07 RX ADMIN — CEFTRIAXONE SODIUM 1 G: 1 INJECTION, POWDER, FOR SOLUTION INTRAMUSCULAR; INTRAVENOUS at 20:59

## 2021-02-07 RX ADMIN — ZINC OXIDE AND DIMETHICONE: 120; 10 CREAM TOPICAL at 10:37

## 2021-02-07 RX ADMIN — ASPIRIN 81 MG CHEWABLE TABLET 81 MG: 81 TABLET CHEWABLE at 10:33

## 2021-02-07 RX ADMIN — ATORVASTATIN CALCIUM 20 MG: 20 TABLET, FILM COATED ORAL at 14:48

## 2021-02-07 RX ADMIN — LISINOPRIL 20 MG: 20 TABLET ORAL at 20:59

## 2021-02-07 RX ADMIN — SODIUM CHLORIDE: 9 INJECTION, SOLUTION INTRAVENOUS at 13:08

## 2021-02-07 RX ADMIN — PANTOPRAZOLE SODIUM 40 MG: 40 TABLET, DELAYED RELEASE ORAL at 10:33

## 2021-02-07 RX ADMIN — HEPARIN SODIUM 5000 UNITS: 5000 INJECTION, SOLUTION INTRAVENOUS; SUBCUTANEOUS at 18:12

## 2021-02-07 RX ADMIN — CLOPIDOGREL BISULFATE 75 MG: 75 TABLET ORAL at 10:33

## 2021-02-07 ASSESSMENT — ACTIVITIES OF DAILY LIVING (ADL)
ADLS_ACUITY_SCORE: 32
ADLS_ACUITY_SCORE: 35
TOILETING_ISSUES: YES
DRESSING/BATHING_DIFFICULTY: YES
ADLS_ACUITY_SCORE: 33
DIFFICULTY_EATING/SWALLOWING: NO
WALKING_OR_CLIMBING_STAIRS_DIFFICULTY: YES
ADLS_ACUITY_SCORE: 33
DOING_ERRANDS_INDEPENDENTLY_DIFFICULTY: NO
CONCENTRATING,_REMEMBERING_OR_MAKING_DECISIONS_DIFFICULTY: YES
TOILETING_ASSISTANCE: TOILETING DIFFICULTY, ASSISTANCE 1 PERSON
FALL_HISTORY_WITHIN_LAST_SIX_MONTHS: NO
ADLS_ACUITY_SCORE: 32
DRESSING/BATHING: BATHING DIFFICULTY, ASSISTANCE 1 PERSON
COMMUNICATION: DIFFICULTY SPEAKING
DIFFICULTY_COMMUNICATING: YES
ADLS_ACUITY_SCORE: 32

## 2021-02-07 NOTE — PLAN OF CARE
OT: Orders received. Chart reviewed and discussed with care team.  OT not indicated due to pt from memory care facility and receives full cares and w/c bound at baseline.  Defer discharge recommendations to Medical team.  Will complete orders.

## 2021-02-07 NOTE — PROGRESS NOTES
"Clinical Swallow Evaluation:      02/07/21 0913   General Information   Onset of Illness/Injury or Date of Surgery 02/06/21   Referring Physician Dr. Rainey   Patient/Family Therapy Goal Statement (SLP) Stated \"yes\" to being thirsty   Pertinent History of Current Problem \"Tracey Moran is a 89 year old  female with medical history of hypertension, CKD stage II, chronic anemia, hypothyroidism, advanced dementia wheelchair-bound, full cares, recent inferior STEMI and urinary tract infection 1/18/2021 brought into the ED via EMS from [facility] with facial droop\" Admitted for CVA work up as well as possible UTI tx.   General Observations Pt alert, perseveratively stating \"yeah\" which was her baseline during recent admission at [facility] as well.   Past History of Dysphagia   Pt seen during recent 1/2021 admission at [facility] where a mechanical dental soft solid, thin liquid diet were recommended. Left facial asymmetry also noted at that time.     Per pt's baseline facility paperwork, diet is regular/thin, meds crushed with puree.     Type of Evaluation   Type of Evaluation Swallow Evaluation   Oral Motor   Oral Musculature anomalies present   Structural Abnormalities none present   Mucosal Quality good   Dentition (Oral Motor)   Dentition (Oral Motor) significant number of missing teeth   Facial Symmetry (Oral Motor)   Facial Symmetry (Oral Motor) left side impairment  (also noted on 1/19/21 CSE)   Vocal Quality/Secretion Management (Oral Motor)   Vocal Quality (Oral Motor) WNL   Secretion Management (Oral Motor) WNL   General Swallowing Observations   Current Diet/Method of Nutritional Intake (General Swallowing Observations, NIS) NPO   Respiratory Support (General Swallowing Observations) none   Clinical Swallow Evaluation   Feeding Assistance minimal assistance required   Clinical Swallow Evaluation Textures Trialed Thin Liquids;Puree Textures;Solid Foods   Clinical Swallow Eval: Thin Liquid Texture " Trial   Mode of Presentation, Thin Liquids cup;straw;self-fed   Volume of Liquid or Food Presented 12 oz   Oral Phase of Swallow WFL   Pharyngeal Phase of Swallow intact   Diagnostic Statement Adequate oral control, timely swallow, no overt signs/sx aspiration noted   Clinical Swallow Evaluation: Puree Solid Texture Trial   Mode of Presentation, Puree spoon;self-fed   Volume of Puree Presented 4 oz   Oral Phase, Puree WFL   Pharyngeal Phase, Puree intact   Diagnostic Statement Timely A/P transit, timely swallow, no overt signs/sx aspiration noted   Clinical Swallow Evaluation: Solid Food Texture Trial   Mode of Presentation, Solid self-fed   Volume of Solid Food Presented 1 radha cracker   Oral Phase, Solid Poor AP movement;Residue in oral cavity   Pharyngeal Phase, Solid coughing/choking   Diagnostic Statement Pt unable to bite into large piece of radha cracker 2/2 poor/sparse dentition, when provided smaller bites able to adequately mastication. Mild diffuse oral residuals clearing with independent periodic liquid wash. x1 cough with larger bite, no other signs/sx aspiration noted.   Esophageal Phase of Swallow   Esophageal comments belch after intake   Swallowing Recommendations   Diet Consistency Recommendations   (mechanical/dental soft)   Supervision Level for Intake close supervision needed   Mode of Delivery Recommendations slow rate of intake   Swallowing Maneuver Recommendations alternate food and liquid intake;extra swallow   Monitoring/Assistance Required (Eating/Swallowing) monitor for cough or change in vocal quality with intake;check mouth frequently for oral residue/pocketing  (reduce ditraction during meals)   Recommended Feeding/Eating Techniques (Swallow Eval) maintain upright sitting position for eating;maintain upright posture during/after eating for 30 minutes;provide assist with feeding   Medication Administration Recommendations, Swallowing (SLP) crush with kylie (pt's baseline)   SLP  Therapy Assessment/Plan   Criteria for Skilled Therapeutic Interventions Met (SLP Eval) current level of function same as previous level of function   Comment, Therapy Assessment/Plan (SLP)   Pt presents with minimal dysphagia on clinical swallow evaluation - deficits and function are comparable to recent evaluation/tx during 1/2021 admission (including L facial asymmetry) . Pt with sparse dentition which results in minimal oral phase deficits: prolonged mastication, difficult mastication with harder/dryer textures, mild oral residuals with dryer solids. Swallow otherwise functional with liquids, softer solids.  Slight diet modifications given dentition indicated, though no skilled SLP intervention services are identified.      Therapy Plan Review/Discharge Plan (SLP)   Therapy Plan Review (SLP) evaluation/treatment results reviewed;patient   SLP Discharge Planning    SLP Discharge Recommendation (DC Rec) Long term care facility   SLP Rationale for DC Rec Clinical swallow evaluation only, no further treatment needed as pt at recent baseline, only deficits noted r/t sparse dentition. Recommend: mechanical soft solids, thin liquids when fully upright, close supervision/assist with feeding as needed, reduce distraction with meals (e.g., turn TV off), alterante between solids/liquids. Meds crushed with puree (baseline).     Total Evaluation Time   Total Evaluation Time (Minutes) 27

## 2021-02-07 NOTE — CONSULTS
Care Management Initial Consult    General Information  Assessment completed with: Caregiver, (Nurse at [facility])  Type of CM/SW Visit: Initial Assessment    Primary Care Provider verified and updated as needed:     Readmission within the last 30 days:        Reason for Consult: discharge planning  Advance Care Planning: Advance Care Planning Reviewed: present on chart          Communication Assessment  Patient's communication style: spoken language (English or Bilingual)    Hearing Difficulty or Deaf: no   Wear Glasses or Blind: yes    Cognitive  Cognitive/Neuro/Behavioral: .WDL except  Level of Consciousness: alert, confused  Arousal Level: opens eyes spontaneously  Orientation: disoriented to, place, time, situation  Mood/Behavior: calm, cooperative     Speech: garbled    Living Environment:   People in home: facility resident, alone     Current living Arrangements: assisted living  Name of Facility: ([facility])   Able to return to prior arrangements: yes       Family/Social Support:  Care provided by: other (see comments)(Facility Staff)  Provides care for: no one  Marital Status:   Children          Description of Support System: Supportive, Involved    Support Assessment: Adequate family and caregiver support, Adequate social supports    Current Resources:   Patient receiving home care services: Yes  Skilled Home Care Services: Skilled Nursing, Physicial Therapy, Occupational Therapy  Community Resources: None  Equipment currently used at home: wheelchair, manual  Supplies currently used at home: None    Employment/Financial:  Employment Status: retired        Financial Concerns: No concerns identified           Lifestyle & Psychosocial Needs:        Socioeconomic History     Marital status:      Spouse name: Not on file     Number of children: Not on file     Years of education: Not on file     Highest education level: Not on file     Tobacco Use     Smoking status: Never  Smoker     Smokeless tobacco: Never Used   Substance and Sexual Activity     Alcohol use: Yes     Drug use: No     Sexual activity: Not Currently       Functional Status:  Prior to admission patient needed assistance:              Mental Health Status:          Chemical Dependency Status:                Values/Beliefs:  Spiritual, Cultural Beliefs, Zoroastrianism Practices, Values that affect care: no               Additional Information:  Per care management/social work consult for discharge planning.  Patient was admitted on 2-6-21 with a UTI and a facial droop.  The tentative date of discharge is 2-9-21 or 2-10-21.  Reviewed chart.  Patient was admitted from [facility].  Call placed to [facility] to inquire as to the services that patient was receiving prior to admission.  Spoke with Isaura, one of the nurses, [phone] (p) and [phone] (f), who states that patient is a 2 person assist and they assist he with almost all of her transfers.  They provide all of the cares for patient including am & pm lobo, meds, and meals.  Patient is wheelchair bound at baseline.  Patient is open to [facility] for RN/PT/OT.  Isaura states that patient can return there on discharge with a resumption of homecare.  Isaura is asking for an update once discharge is known and is is asking for the discharge orders  To be faxed.    Will continue to follow.      RASHAAD Avila, [facility]    [phone]  [facility]

## 2021-02-07 NOTE — PLAN OF CARE
Patient is Alert to self. On tele with SR. Denies chest pain or SOB or pain in general. Up with assist of 2 and pivot to chair. On mechanical soft diet with thin liquid, tolerated well, no aspiration noticed. Incontinent of bladder. Attempted to use BSC, had small BM this shift. Straight cath x1 for urine culture, pending result. Purewick in place. Will continue to monitor.

## 2021-02-07 NOTE — PROGRESS NOTES
[facility]    Medicine Progress Note - Hospitalist Service       Date of Admission:  2/6/2021  Assessment & Plan         Tracey Moran is a 89 year old  female with medical history of hypertension, CKD stage II, chronic anemia, hypothyroidism, advanced dementia wheelchair-bound, full cares, recent inferior STEMI and urinary tract infection 1/18/2021 brought into the ED via EMS from [facility] with facial droop.  Initial imaging was negative.  She also has sepsis from a urinary tract infection.    Acute left facial droop  Initial CT imaging negative and the patient does not appear to have a significant facial droop today.  She is non-verbal at baseline due to dementia.  She does not appear to have a focal motor deficit.    Discussed with neurology.     Physcial therapy, occupational therapy and speech consulted    Defer further imaging to neurology    Continue prior to admission aspirin and statin     Sepsis likely due to urinary tract infection   Probable dehydration contributing to fever  Fever and WBC improved with antibiotics.  Blood culture NGTD, urine culture mixed gloria.    Continue intravenous fluid and ceftriaxone    Repeat urine culture- unless she grows a resistant organism she could transition to oral cefuroxome    Advanced dementia   Not verbal - apparently as baseline    Recent inferior ST-elevation myocardial infarction     Continue aspirin, clopidogrel, beta blocker    Resume ACE inhibitor and statin     Thyroid nodules on CT (incidental finding)  TSH normal     Consider ultrasound imaging as outpatient- defer to primary care provider     Stage 2 chronic kidney disease   Creatinine   Date Value Ref Range Status   02/07/2021 0.91 0.52 - 1.04 mg/dL Final     Monitor    Chronic anemia   Elevated MCV  Hemoglobin   Date Value Ref Range Status   02/07/2021 8.6 (L) 11.7 - 15.7 g/dL Final   02/06/2021 10.9 (L) 11.7 - 15.7 g/dL Final   Hemoglobin is down but likely due to  hemodilution    Check B12 and folate     Monitor hemoglobin     COVID-19 infection 11/2020  Recent 2nd vaccine dose on 2/5/21     Diet: Mechanical/Dental Soft Diet    DVT Prophylaxis: Heparin SQ  Walton Catheter: not present  Code Status: No CPR- Do NOT Intubate         Disposition Plan   Expected discharge: 2 - 3 days, recommended to prior living arrangement once antibiotic plan established.  Entered: Shaw Alcala MD 02/07/2021, 12:50 PM       The patient's care was discussed with the Patient and neurology Consultant.    Shaw Alcala MD  Hospitalist Service  [facility]  Contact information available via [facility] Paging/Directory    ______________________________________________________________________    Interval History   Unable to obtain a history due to mental status- stable overnight.    Data reviewed today: I reviewed all medications, new labs and imaging results over the last 24 hours. I personally reviewed no images or EKG's today.    Physical Exam   Vital Signs: Temp: 97.3  F (36.3  C) Temp src: Oral BP: (!) 140/70 Pulse: 76   Resp: 16 SpO2: 93 % O2 Device: None (Room air)    Weight: 123 lbs 0 oz  Constitutional: awake, alert, no apparent distress  Respiratory: No increased work of breathing, good air exchange, clear to auscultation bilaterally, no crackles or wheezing  Cardiovascular: regular rate and rhythm, normal S1 and S2, no S3 or S4, and no murmur noted  GI:  normal bowel sounds, soft, non-distended, non-tender  Skin: ecchymosis scattered  Neurologic: Mental Status Exam:  Level of Alertness:   awake  Orientation:   None  Cranial Nerves:  cranial nerves II-XII are grossly intact  Motor Exam:  moves all extremities well and symmetrically  Neuropsychiatric: General: normal and calm    Data   Recent Labs   Lab 02/07/21  0752 02/07/21  0151 02/06/21  1842 02/06/21  1227   WBC 5.0  --   --  8.3   HGB 8.6*  --   --  10.9*   *  --   --  102*     --   --   236   INR  --   --   --  1.15*     --   --  139   POTASSIUM 4.0  --   --  4.9   CHLORIDE 113*  --   --  106   CO2 24  --   --  23   BUN 28  --   --  30   CR 0.91  --   --  0.86   ANIONGAP 6  --   --  10   RENETTA 8.4*  --   --  9.0   GLC 72  --   --  107*   ALBUMIN  --   --   --  3.2*   PROTTOTAL  --   --   --  7.6   BILITOTAL  --   --   --  0.5   ALKPHOS  --   --   --  60   ALT  --   --   --  20   AST  --   --   --  23   TROPI 0.058* 0.069* 0.072* 0.050*     No results found for this or any previous visit (from the past 24 hour(s)).  Medications     - MEDICATION INSTRUCTIONS -       sodium chloride 75 mL/hr at 02/06/21 0222       aspirin  81 mg Oral Daily     Criselda Protect   Topical BID     cefTRIAXone  1 g Intravenous Q24H     clopidogrel  75 mg Oral Daily     heparin ANTICOAGULANT  5,000 Units Subcutaneous Q12H     metoprolol tartrate  12.5 mg Oral BID     pantoprazole  40 mg Oral Daily     polyethylene glycol  17 g Oral Daily

## 2021-02-07 NOTE — PROVIDER NOTIFICATION
Paged hospitalist: patient is incontinent. Can we get an order for straight cath for urine culture?

## 2021-02-07 NOTE — PLAN OF CARE
Opens eyes spontaneously to staff, baseline dementia.  Speech garbled, slurred and difficult to understand.  NPO due to slurred speech and left facial droop.  Difficult to assess neuros due to cognition.  Speech to evaluate for po.  Total care, purewick placed.  Periarea red.  Blood filled blisters on each lower leg, mepilex applied for protection.  Extensive bruising on arms, legs, chest.  Coccyx with small open area in crease, turned q2h. Small emesis and chills later in shift, temp afebrile, BP elevated but did not meet parameters for PRN.  Tele SR/ST.  Neurology consulted.

## 2021-02-07 NOTE — UTILIZATION REVIEW
Admission Status; Secondary Review Determination       Under the authority of the Utilization Management Committee, the utilization review process indicated a secondary review on the above patient. The review outcome is based on review of the medical records, discussions with staff, and applying clinical experience noted on the date of the review.     (x) Inpatient Status Appropriate - This patient's medical care is consistent with medical management for inpatient care and reasonable inpatient medical practice.     RATIONALE FOR DETERMINATION   89 year old  female with medical history of hypertension, CKD stage II, chronic anemia, hypothyroidism, advanced dementia wheelchair-bound, full cares, recent inferior STEMI and urinary tract infection 1/18/2021 brought into the ED via EMS from [facility] with facial droop. Patient is high risk for clinical deterioration with decreased Ayesha Coma Scale of 12, age 89, on high risk medication, complex comorbidities and prior history of significant comorbidities. Also presenting with fever concern for sepsis from UTI versus COVID-19    The expected length of stay at the time of admission was more than 2 nights because of the severity of illness, intensity of service provided, and risk for adverse outcome. Inpatient admission is appropriate.     This document was produced using voice recognition software       The information on this document is developed by the utilization review team in order for the business office to ensure compliance. This only denotes the appropriateness of proper admission status and does not reflect the quality of care rendered.   The definitions of Inpatient Status and Observation Status used in making the determination above are those provided in the CMS Coverage Manual, Chapter 1 and Chapter 6, section 70.4.   Sincerely,   SAUL DAVISON MD   System Medical Director   Utilization Management   [facility].

## 2021-02-07 NOTE — PLAN OF CARE
Difficult to assess orientation due to lethargy and dementia. VSS on RA. SR on tele. Neuros difficult to assess, left facial droop, garbled/slurred speech. NPO, waiting for SLP to evaluate. IVF. T/R q2, purewick in place. Scattered bruising, mepilex to shin, and redness to groin. Discharge pending.

## 2021-02-07 NOTE — PROGRESS NOTES
02/07/21 1330   Quick Adds   Type of Visit Initial PT Evaluation   Living Environment   People in home facility resident  ([facility])   Current Living Arrangements extended care facility   Home Accessibility no concerns   Living Environment Comments Pt unable to provide subjective history. Per discussion with pt's son Nick, pt is able to transfer to/from wheelchair with assist of staff.    Self-Care   Usual Activity Tolerance fair   Current Activity Tolerance fair   Equipment Currently Used at Home wheelchair, manual   Activity/Exercise/Self-Care Comment Pt's son reports his mom is able to propel self in wheelchair at baseline.    Disability/Function   Fall history within last six months   (Unable to assess.)   General Information   Onset of Illness/Injury or Date of Surgery 02/06/21   Referring Physician Richardson Rainey MD   Patient/Family Therapy Goals Statement (PT) Per discussion with pt's son, plan for pt to return to [facility] with continuation of Home PT as prior to admission.    Pertinent History of Current Problem (include personal factors and/or comorbidities that impact the POC) 89 y/o female admitted with L facial droop, noted to have sepsis secondary to UTI. PMH including dementia, COVID-19, HTN, MI, UTI and CKD.    Existing Precautions/Restrictions fall   General Observations Pt in supine upon arrival of therapist. Pt is [place].   Cognition   Orientation Status (Cognition) person   Affect/Mental Status (Cognition) confused  (Dementia at baseline.)   Follows Commands (Cognition) 25-49% accuracy  (Follows simple commands with visual cues)   Pain Assessment   Patient Currently in Pain No   Posture    Posture Comments Noted forward head and shoulder posture upon sitting EOB and sitting up in wheelchair.    Range of Motion (ROM)   ROM Comment B LEs WFL.    Strength   Strength Comments Noted B LE weakness, pt's son reports increased weakness since recent COVID diagnosis.    Bed  Mobility   Comment (Bed Mobility) Supine-sit wtih modA of 2.    Transfers   Transfer Safety Comments Pivot transfer from EOB to wheelchair with Win.    Gait/Stairs (Locomotion)   Comment (Gait/Stairs) NT. Pt is nonambulatory at baseline.    Balance   Balance Comments Noted fair sitting balance EOB.    Clinical Impression   Criteria for Skilled Therapeutic Intervention evaluation only   Clinical Presentation Stable/Uncomplicated   Clinical Presentation Rationale Based on PMH, current presentation, and social support.    Clinical Decision Making (Complexity) low complexity   Therapy Frequency (PT) Evaluation only   Risk & Benefits of therapy have been explained patient  (Son)   PT Discharge Planning    PT Discharge Recommendation (DC Rec) Long term care facility  (Return to [facility] with Home PT)   PT Rationale for DC Rec Pt would benefit from continuation of Home PT as prior to current admission. Pt is at baseline from a mobility standpoint, demonstrated ability to complete pivot transfer with Win of 1.   Total Evaluation Time   Total Evaluation Time (Minutes) 25

## 2021-02-08 VITALS
RESPIRATION RATE: 16 BRPM | HEART RATE: 77 BPM | OXYGEN SATURATION: 96 % | WEIGHT: 123 LBS | TEMPERATURE: 98 F | SYSTOLIC BLOOD PRESSURE: 184 MMHG | DIASTOLIC BLOOD PRESSURE: 90 MMHG | BODY MASS INDEX: 22.5 KG/M2

## 2021-02-08 LAB
ANION GAP SERPL CALCULATED.3IONS-SCNC: 6 MMOL/L (ref 3–14)
BASOPHILS # BLD AUTO: 0 10E9/L (ref 0–0.2)
BASOPHILS NFR BLD AUTO: 0.5 %
BUN SERPL-MCNC: 21 MG/DL (ref 7–30)
CALCIUM SERPL-MCNC: 8.2 MG/DL (ref 8.5–10.1)
CHLORIDE SERPL-SCNC: 113 MMOL/L (ref 94–109)
CO2 SERPL-SCNC: 23 MMOL/L (ref 20–32)
CREAT SERPL-MCNC: 0.7 MG/DL (ref 0.52–1.04)
DIFFERENTIAL METHOD BLD: ABNORMAL
EOSINOPHIL # BLD AUTO: 0.7 10E9/L (ref 0–0.7)
EOSINOPHIL NFR BLD AUTO: 12.5 %
ERYTHROCYTE [DISTWIDTH] IN BLOOD BY AUTOMATED COUNT: 14.6 % (ref 10–15)
GFR SERPL CREATININE-BSD FRML MDRD: 73 ML/MIN/{1.73_M2}
GLUCOSE SERPL-MCNC: 77 MG/DL (ref 70–99)
HCT VFR BLD AUTO: 27.5 % (ref 35–47)
HGB BLD-MCNC: 8.6 G/DL (ref 11.7–15.7)
IMM GRANULOCYTES # BLD: 0 10E9/L (ref 0–0.4)
IMM GRANULOCYTES NFR BLD: 0.4 %
LYMPHOCYTES # BLD AUTO: 1.4 10E9/L (ref 0.8–5.3)
LYMPHOCYTES NFR BLD AUTO: 25.3 %
MCH RBC QN AUTO: 32.3 PG (ref 26.5–33)
MCHC RBC AUTO-ENTMCNC: 31.3 G/DL (ref 31.5–36.5)
MCV RBC AUTO: 103 FL (ref 78–100)
MONOCYTES # BLD AUTO: 0.4 10E9/L (ref 0–1.3)
MONOCYTES NFR BLD AUTO: 6.2 %
NEUTROPHILS # BLD AUTO: 3.1 10E9/L (ref 1.6–8.3)
NEUTROPHILS NFR BLD AUTO: 55.1 %
NRBC # BLD AUTO: 0 10*3/UL
NRBC BLD AUTO-RTO: 0 /100
PLATELET # BLD AUTO: 161 10E9/L (ref 150–450)
POTASSIUM SERPL-SCNC: 4.1 MMOL/L (ref 3.4–5.3)
RBC # BLD AUTO: 2.66 10E12/L (ref 3.8–5.2)
SODIUM SERPL-SCNC: 142 MMOL/L (ref 133–144)
WBC # BLD AUTO: 5.6 10E9/L (ref 4–11)

## 2021-02-08 PROCEDURE — 36415 COLL VENOUS BLD VENIPUNCTURE: CPT | Performed by: HOSPITALIST

## 2021-02-08 PROCEDURE — 80048 BASIC METABOLIC PNL TOTAL CA: CPT | Performed by: HOSPITALIST

## 2021-02-08 PROCEDURE — 85025 COMPLETE CBC W/AUTO DIFF WBC: CPT | Performed by: HOSPITALIST

## 2021-02-08 PROCEDURE — 250N000013 HC RX MED GY IP 250 OP 250 PS 637: Performed by: HOSPITALIST

## 2021-02-08 PROCEDURE — 258N000003 HC RX IP 258 OP 636: Performed by: HOSPITALIST

## 2021-02-08 PROCEDURE — 99239 HOSP IP/OBS DSCHRG MGMT >30: CPT | Performed by: INTERNAL MEDICINE

## 2021-02-08 PROCEDURE — 250N000011 HC RX IP 250 OP 636: Performed by: HOSPITALIST

## 2021-02-08 PROCEDURE — G0463 HOSPITAL OUTPT CLINIC VISIT: HCPCS

## 2021-02-08 RX ORDER — LISINOPRIL 20 MG/1
20 TABLET ORAL 2 TIMES DAILY
Qty: 60 TABLET | Refills: 1 | Status: SHIPPED | OUTPATIENT
Start: 2021-02-08

## 2021-02-08 RX ORDER — CEFUROXIME AXETIL 500 MG/1
500 TABLET ORAL 2 TIMES DAILY
Qty: 10 TABLET | Refills: 0 | Status: ON HOLD | OUTPATIENT
Start: 2021-02-08 | End: 2021-02-15

## 2021-02-08 RX ORDER — POLYETHYLENE GLYCOL 3350 17 G/17G
17 POWDER, FOR SOLUTION ORAL DAILY PRN
Qty: 30 PACKET | Refills: 0 | Status: SHIPPED | OUTPATIENT
Start: 2021-02-08

## 2021-02-08 RX ADMIN — ASPIRIN 81 MG CHEWABLE TABLET 81 MG: 81 TABLET CHEWABLE at 08:17

## 2021-02-08 RX ADMIN — SODIUM CHLORIDE: 9 INJECTION, SOLUTION INTRAVENOUS at 02:33

## 2021-02-08 RX ADMIN — POLYETHYLENE GLYCOL 3350 17 G: 17 POWDER, FOR SOLUTION ORAL at 08:19

## 2021-02-08 RX ADMIN — ATORVASTATIN CALCIUM 20 MG: 20 TABLET, FILM COATED ORAL at 08:18

## 2021-02-08 RX ADMIN — HEPARIN SODIUM 5000 UNITS: 5000 INJECTION, SOLUTION INTRAVENOUS; SUBCUTANEOUS at 05:17

## 2021-02-08 RX ADMIN — LISINOPRIL 20 MG: 20 TABLET ORAL at 08:18

## 2021-02-08 RX ADMIN — METOPROLOL TARTRATE 12.5 MG: 25 TABLET, FILM COATED ORAL at 08:18

## 2021-02-08 RX ADMIN — ZINC OXIDE AND DIMETHICONE: 120; 10 CREAM TOPICAL at 08:19

## 2021-02-08 RX ADMIN — CLOPIDOGREL BISULFATE 75 MG: 75 TABLET ORAL at 08:18

## 2021-02-08 RX ADMIN — HEPARIN SODIUM 5000 UNITS: 5000 INJECTION, SOLUTION INTRAVENOUS; SUBCUTANEOUS at 18:42

## 2021-02-08 RX ADMIN — METOPROLOL TARTRATE 12.5 MG: 25 TABLET, FILM COATED ORAL at 18:42

## 2021-02-08 RX ADMIN — LISINOPRIL 20 MG: 20 TABLET ORAL at 18:42

## 2021-02-08 RX ADMIN — PANTOPRAZOLE SODIUM 40 MG: 40 TABLET, DELAYED RELEASE ORAL at 08:18

## 2021-02-08 ASSESSMENT — ACTIVITIES OF DAILY LIVING (ADL)
ADLS_ACUITY_SCORE: 32

## 2021-02-08 NOTE — DISCHARGE SUMMARY
[facility]    Discharge Summary  Hospitalist    Date of Admission:  2/6/2021  Date of Discharge:  2/8/2021  Discharging Provider: Bobbi Arechiga MD    Discharge Diagnoses   Possible UTI  Possible CVA  Chronic cystitis noted in CT abdomen  Addendum created 2/10/21 9:14 am   vre cystitis  Vs colonization - I did contact the assisted living spoke with the nurse there and conveyed the message about positive urine culture results with VRE , results came back positive 2 days after discharge while we had a negative urine culture on admission , this is from the second sample obtained, patient is currently dong fine at the tcu , discussed about stopping cephalosporin since its not effective against the VRE . Notified to monitor patient and incase of any worsening symptoms report to ER .nurse at [facility] is going to contact patients primary care physician .  Acute encephalopathy-multifactorial, could be associated delirium as well  History of Present Illness   Please review admission history and physical.    Hospital Course   Tracey Moran was admitted on 2/6/2021.  The following problems were addressed during her hospitalization:    Active Problems:    UTI (urinary tract infection)    Facial droop    Fever, unspecified fever cause  Tracey Moran is a 89 year old  female with medical history of hypertension, CKD stage II, chronic anemia, hypothyroidism, advanced dementia wheelchair-bound, full cares, recent inferior STEMI and urinary tract infection 1/18/2021 brought into the ED via EMS from [facility] with facial droop.  Initial imaging was negative.  She also has sepsis from a urinary tract infection.     Acute left facial droop  Initial CT imaging negative and the patient does not appear to have a significant facial droop.  She is verbal but difficult to understand at baseline due to dementia.  She does not appear to have a focal motor deficit.  Patient was seen by stroke  neurology team, they had recommended MRI, due to her advanced age and family opinion MRI was deferred, seen by PT/OT and they recommended prior living arrangement with the ongoing additional support.  Plan to continue her PTA medications including aspirin and statin.  No significant focal deficit were identified on the day of discharge.       Sepsis likely due to urinary tract infection   Probable dehydration contributing to fever  Fever and WBC improved with antibiotics.Blood culture NGTD, urine culture mixed gloria.  Plan to finish a course of antibiotic due to the presence of chronic cystitis noted in the CT abdomen     Advanced dementia   Not verbal - apparently as baseline     Recent inferior ST-elevation myocardial infarction     Continue aspirin, clopidogrel, beta blocker, her PTA medication including ACE inhibitor and statin was continued upon discharge, need to address the need for ongoing statin use at the time of PCP follow-up.       Thyroid nodules on CT (incidental finding)  TSH normal   Consider ultrasound imaging as outpatient- defer to primary care provider, this was discussed with the patient's family.     Chronic anemia   Elevated MCV        Hemoglobin   Date Value Ref Range Status   02/07/2021 8.6 (L) 11.7 - 15.7 g/dL Final   02/06/2021 10.9 (L) 11.7 - 15.7 g/dL Final   Hemoglobin is down but likely due to hemodilution, folate within normal limit, B12 levels pending.     COVID-19 infection 11/2020  Recent 2nd vaccine dose on 2/5/21      Plan of discharge including findings during the investigation done here for labs and vitals were discussed in detail with patient's son prior to discharge.  He want to keep mom as comfortable as possible.  He will discuss further imaging needs including MRCP of the pancreas, ultrasound of the thyroid etc. with her primary care.  Bobbi Arechiga MD    Significant Results and Procedures       Pending Results   These results will be followed up by  PCP  Unresulted Labs  Ordered in the Past 30 Days of this Admission     Date and Time Order Name Status Description    2/7/2021 1308 Urine Culture Aerobic Bacterial Preliminary     2/6/2021 1229 Blood culture Preliminary     2/6/2021 1229 Blood culture Preliminary           Code Status   DNR / DNI       Primary Care Physician   [facility]    Physical Exam   Temp: 98.2  F (36.8  C) Temp src: Oral BP: (!) 151/80 Pulse: 80   Resp: 16 SpO2: 95 % O2 Device: None (Room air)    Vitals:    02/06/21 1225   Weight: 55.8 kg (123 lb)     Vital Signs with Ranges  Temp:  [97.1  F (36.2  C)-98.6  F (37  C)] 98.2  F (36.8  C)  Pulse:  [65-82] 80  Resp:  [16-18] 16  BP: (137-151)/(65-81) 151/80  SpO2:  [94 %-96 %] 95 %  I/O last 3 completed shifts:  In: 1351 [P.O.:540; I.V.:811]  Out: 400 [Urine:400]    The patient was examined on the day of discharge.    Discharge Disposition   Discharged to long-term care facility with the home health care   condition at discharge: Stable    Consultations This Hospital Stay   PHARMACY TO DOSE VANCO  CARE MANAGEMENT / SOCIAL WORK IP CONSULT  NEUROLOGY IP CONSULT  PHYSICAL THERAPY ADULT IP CONSULT  OCCUPATIONAL THERAPY ADULT IP CONSULT  SPEECH LANGUAGE PATH ADULT IP CONSULT  SWALLOW EVAL SPEECH PATH AT BEDSIDE IP CONSULT  WOUND OSTOMY CONTINENCE NURSE  IP CONSULT  PHYSICAL THERAPY ADULT IP CONSULT    Time Spent on this Encounter   I, Bobbi Arechiga MD, personally saw the patient today and spent greater than 30 minutes discharging this patient.    Discharge Orders      General info for SNF    Length of Stay Estimate: Long Term Care  Condition at Discharge: Improving  Level of care:board and care  Rehabilitation Potential: Good  Admission H&P remains valid and up-to-date: Yes  Recent Chemotherapy: N/A  Use Nursing Home Standing Orders: Yes     Mantoux instructions    Give two-step Mantoux (PPD) Per Facility Policy Yes     Reason for your hospital stay    Possible urinary tract infection  ,  cerebrovascular accident ?     Intake and output    Every shift     Bladder scan    X 2 for post void residual     Follow Up and recommended labs and tests    Follow up with jail physician.  The following labs/tests are recommended: Follow-up with patient's primary care physician regarding the abnormal findings in the CT abdomen and pelvis, patient's son would like to discuss this with the patient's primary doctor.  Repeat thyroid ultrasound outpatient if needed.     Activity - Up with nursing assistance     Physical Therapy Adult Consult    Evaluate and treat as clinically indicated.    Resume home care as prior to admission     Fall precautions     Advance Diet as Tolerated    Follow this diet upon discharge: Orders Placed This Encounter      Mechanical/Dental Soft Diet     Discharge Medications   Current Discharge Medication List      START taking these medications    Details   cefuroxime (CEFTIN) 500 MG tablet Take 1 tablet (500 mg) by mouth 2 times daily for 5 days  Qty: 10 tablet, Refills: 0    Associated Diagnoses: Acute cystitis without hematuria      miconazole (MICATIN) 2 % external powder Apply topically 3 times daily  Qty:      Associated Diagnoses: Acute cystitis without hematuria      polyethylene glycol (MIRALAX) 17 g packet Take 17 g by mouth daily as needed for constipation  Qty: 30 packet, Refills: 0    Associated Diagnoses: Acute cystitis without hematuria         CONTINUE these medications which have CHANGED    Details   lisinopril (ZESTRIL) 20 MG tablet Take 1 tablet (20 mg) by mouth 2 times daily  Qty: 60 tablet, Refills: 1    Associated Diagnoses: Benign essential hypertension         CONTINUE these medications which have NOT CHANGED    Details   acetaminophen (TYLENOL) 500 MG tablet Take 500 mg by mouth 3 times daily as needed for mild pain      aspirin (ASA) 81 MG chewable tablet Take 1 tablet (81 mg) by mouth daily  Qty: 100 tablet, Refills: 0    Associated Diagnoses: ST elevation  myocardial infarction involving right coronary artery (H)      atorvastatin (LIPITOR) 20 MG tablet Take 1 tablet (20 mg) by mouth daily  Qty: 30 tablet, Refills: 0    Associated Diagnoses: ST elevation myocardial infarction involving right coronary artery (H)      Criselda Protect (EUCERIN) external cream Apply topically 2 times daily Criselda Barrier Cream (apply to incontinence rash)      clopidogrel (PLAVIX) 75 MG tablet Take 1 tablet (75 mg) by mouth daily  Qty: 30 tablet, Refills: 0    Associated Diagnoses: ST elevation myocardial infarction involving right coronary artery (H)      diclofenac (VOLTAREN) 1 % topical gel Place 2 g onto the skin 3 times daily For right knee pain      menthol-zinc oxide (CALMOSEPTINE) 0.44-20.6 % OINT ointment Apply 1 g topically 4 times daily as needed for skin protection (to open area on right buttock)      metoprolol tartrate (LOPRESSOR) 25 MG tablet Take 12.5 mg by mouth 2 times daily      multivitamin w/minerals (THERA-VIT-M) tablet Take 1 tablet by mouth daily      nystatin (MYCOSTATIN) 318766 UNIT/GM external powder Apply topically 2 times daily Apply to rash under bilateral breast until resolved      pantoprazole (PROTONIX) 40 MG EC tablet Take 40 mg by mouth daily      senna-docusate (SENOKOT-S/PERICOLACE) 8.6-50 MG tablet Take 1 tablet by mouth 2 times daily as needed for constipation    Associated Diagnoses: Constipation, unspecified constipation type      Nutritional Supplements (ENSURE PO) 1 two times daily (Family supplies)           Allergies   Allergies   Allergen Reactions     Demerol [Meperidine]      Dust Mites      Peanuts [Nuts]      Penicillins      Has tolerated cephalosporins (ceftriaxone, cefdinir)     Pollen Extract      Data   Most Recent 3 CBC's:  Recent Labs   Lab Test 02/08/21  0754 02/07/21  0752 02/06/21  1227   WBC 5.6 5.0 8.3   HGB 8.6* 8.6* 10.9*   * 104* 102*    164 236      Most Recent 3 BMP's:  Recent Labs   Lab Test 02/08/21  0754  02/07/21  0752 02/06/21  1227    143 139   POTASSIUM 4.1 4.0 4.9   CHLORIDE 113* 113* 106   CO2 23 24 23   BUN 21 28 30   CR 0.70 0.91 0.86   ANIONGAP 6 6 10   RENETTA 8.2* 8.4* 9.0   GLC 77 72 107*     Most Recent 2 LFT's:  Recent Labs   Lab Test 02/06/21  1227 10/26/19  1728   AST 23 19   ALT 20 12   ALKPHOS 60 72   BILITOTAL 0.5 0.3     Most Recent INR's and Anticoagulation Dosing History:  Anticoagulation Dose History     Recent Dosing and Labs Latest Ref Rng & Units 12/5/2017 2/6/2021    INR 0.86 - 1.14 1.15(H) 1.15(H)        Most Recent 3 Troponin's:  Recent Labs   Lab Test 02/07/21  0752 02/07/21  0151 02/06/21  1842 01/18/21  1028 01/18/21  1028 10/26/19  1733 10/26/19  1733   TROPI 0.058* 0.069* 0.072*   < >  --    < >  --    TROPONIN  --   --   --   --  1.13*  --  0.00    < > = values in this interval not displayed.     Most Recent Cholesterol Panel:  Recent Labs   Lab Test 02/07/21  0752   CHOL 88   LDL 29   HDL 47*   TRIG 60     Most Recent 6 Bacteria Isolates From Any Culture (See EPIC Reports for Culture Details):  Recent Labs   Lab Test 02/07/21  1630 02/06/21  1303 02/06/21  1302 01/22/21  0430 01/18/21  1720 10/15/20  0500   CULT Culture in progress No growth after 2 days  No growth after 2 days >100,000 colonies/mL  mixed urogenital gloria  Susceptibility testing not routinely done    Multiple morphotypes present with no predominant organism.  Growth consistent with   probable contamination during collection.  Suggest repeat specimen if clinically   indicated.   No growth >100,000 colonies/mL  urogenital gloria  Susceptibility testing not routinely done   >100,000 colonies/mL  Escherichia coli  *     Most Recent TSH, T4 and A1c Labs:  Recent Labs   Lab Test 02/07/21  0752 02/06/21  1227   TSH 1.92  --    A1C  --  5.1     Results for orders placed or performed during the hospital encounter of 02/06/21   CT Head Perfusion w Contrast    Narrative    CT BRAIN PERFUSION 2/6/2021 12:54 PM    HISTORY:  Code stroke, cerebral infarction not otherwise specified,  evaluate mismatch between penumbra and core infarct.    TECHNIQUE: Time sequential axial CT images of the head were acquired  during the administration of 120 mL Isovue-370. IV. Color perfusion  maps of the brain were created from this time sequential axial source  data.     Radiation dose for this scan was reduced using automated exposure  control, adjustment of the mA and/or kV according to patient size, or  iterative reconstruction technique.    COMPARISON: CT angiogram head and neck of same day.    FINDINGS: There are no focal or regional perfusion defects in the  brain.      Impression    IMPRESSION: Unremarkable perfusion CT scan of the brain.    PIERRE LINARES MD   CT Head w/o Contrast    Narrative    CT SCAN OF THE HEAD WITHOUT CONTRAST   2/6/2021 12:38 PM     HISTORY: Code stroke.    TECHNIQUE:  Axial images of the head and coronal reformations without  IV contrast material. Radiation dose for this scan was reduced using  automated exposure control, adjustment of the mA and/or kV according  to patient size, or iterative reconstruction technique.    COMPARISON: CT head dated 1/18/2021.    FINDINGS: The ventricles are normal in size and configuration.  Moderate generalized brain parenchymal volume loss. Mild to moderate  scattered patchy hypoattenuation in the cerebral white matter,  nonspecific, but likely due to chronic small vessel ischemic disease.  No extended signs of acute infarct or intracranial hemorrhage. No mass  effect or herniation. Scattered intracranial atherosclerosis.    Bilateral lens replacements. Visualized orbits otherwise appear  normal. Large right mastoid effusion/membrane thickening and prominent  opacification of the right middle ear cavity, as before. The left  mastoid and middle ear cavity are clear.      Impression    IMPRESSION:  1. No CT signs of acute infarct or intracranial hemorrhage. ASPECTS  score = 10.  2. Brain  atrophy and presumed chronic small vessel ischemic changes,  as described.    PIERRE LINARES MD   CTA Head Neck with Contrast    Narrative    CT ANGIOGRAM OF THE HEAD AND NECK WITH CONTRAST  2/6/2021 12:53 PM     HISTORY: Code stroke. Facial droop.     TECHNIQUE:  CT angiography with an injection of 120 mL Isovue 370 IV  with scans through the head and neck. Images were transferred to a  separate 3-D workstation where multiplanar reformations and 3-D images  were created. Estimates of carotid stenoses are made relative to the  distal internal carotid artery diameters except as noted. Radiation  dose for this scan was reduced using automated exposure control,  adjustment of the mA and/or kV according to patient size, or iterative  reconstruction technique.    COMPARISON: CT head of same day.     CT HEAD FINDINGS: No contrast enhancing lesions. Cerebral blood flow  is grossly normal.     CT ANGIOGRAM HEAD FINDINGS: No definite high-grade stenosis or large  vessel occlusion involving the major proximal branches of the [place]. There is mild to moderate multifocal stenosis involving the  bilateral posterior cerebral arteries, likely due to intracranial  atherosclerosis. There is nonflow-limiting atherosclerosis involving  the bilateral carotid siphons. There is a 4-5 mm posterior inferiorly  directed saccular aneurysm arising from the supraclinoid right  internal carotid artery (series 9 image 365). There is a laterally and  slightly superiorly directed saccular aneurysm arising from the right  middle cerebral artery M1-M2 bifurcation (series 9 image 367)  measuring up to 4 mm. No other definite intracranial aneurysm. No high  flow vascular malformation identified.    CT ANGIOGRAM NECK FINDINGS:   Scattered atherosclerotic disease involving the aortic arch. There is  a laterally directed outpouching arising from the aortic arch (series  5 image 5, series 11 image 85) likely representing a pseudoaneurysm.  This  measures up to approximately 13 mm in diameter at its base and  7-8 mm from base to dome. There is no significant stenosis of the  origins of the great vessels.    Right carotid artery: The right common and internal carotid arteries  are patent. Mild atherosclerotic disease at the carotid bifurcation  and proximal internal carotid artery without significant stenosis by  NASCET criteria.     Left carotid artery: The left common and internal carotid arteries are  patent. Mild atherosclerotic disease at the carotid bifurcation and  proximal internal carotid artery without significant stenosis by  NASCET criteria.     Vertebral arteries: There is at least a moderate stenosis involving  the origin of the left vertebral artery due to mixed atherosclerotic  disease. Otherwise, the bilateral vertebral arteries demonstrate no  definite high-grade stenosis. No definite findings of acute  dissection.    Other findings: Mild nodular scarring in the lung apices. Multilevel  degenerative disc disease and facet arthropathy with prominent  rotatory levoconvex curvature of the upper thoracic spine.      Impression    IMPRESSION:  1. No definite acute intracranial large vessel occlusion.  2. Moderate stenosis involving the origin of the left vertebral  artery. Otherwise, no high-grade stenosis or occlusion of the cervical  carotid or vertebral arteries.  3. Intracranial atherosclerosis, with associated mild-to-moderate  multifocal stenosis of the bilateral posterior cerebral arteries.  4. 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 These both measure up to approximately 4-5 mm.  5. 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    The findings were discussed with Dr. Banda by myself at  approximately 1:06 PM on 2/6/2021    PIERRE LINARES MD   CT Chest/Abdomen/Pelvis w Contrast    Narrative    CT  CHEST/ABDOMEN/PELVIS WITH CONTRAST  2/6/2021 12:52 PM    HISTORY:  Fever, unclear origin.    TECHNIQUE: CT scan obtained of the chest, abdomen, and pelvis with  oral and IV contrast. 120 cc of Isovue-370 IV injected. Radiation dose  for this scan was reduced using automated exposure control, adjustment  of the mA and/or kV according to patient size, or iterative  reconstruction technique.    COMPARISON:  Chest x-ray on 1/18/2021    FINDINGS: The exam is limited by motion/respiratory artifacts.    Chest/mediastinum: Multiple subcentimeter thyroid nodules.  Cardiomegaly. No significant pericardial effusion. Moderate  atherosclerotic vascular calcification of the coronary arteries and  thoracic aorta. The main pulmonary artery is mildly enlarged measuring  3.2 cm, this can be seen with pulmonary hypertension. No significant  mediastinal, hilar or axillary lymphadenopathy.    Lung/pleura: No pleural effusion or pneumothorax. Mild bibasilar  pulmonary opacities, likely atelectasis.    Abdomen/pelvis: Right upper quadrant post cholecystectomy clips.  Scattered subcentimeter hypoattenuating foci in the liver are too  small to characterize. Mild diffuse intra and extrahepatic biliary  dilatation, likely reservoir effect postcholecystectomy. No main  pancreatic ductal dilatation. There is 2.1 x 1.4 cm hypoattenuating  focus in the pancreatic head area (series 4 image 151), nonspecific,  could represent intraductal papillary mucinous neoplasm versus other  pancreatic cystic lesions. No splenomegaly. Diffuse thickening of the  adrenal glands. No radiodense kidney stones. Multiple hypoattenuating  foci in both kidneys, some can be characterized as renal cysts, for  example 1.8 cm focus in the interpolar region of the left kidney  (series 4 image 136) while a few others are subcentimeter and too  small to characterize.    No abnormally dilated bowel loops. Extensive colonic diverticulosis  without CT evidence of acute  diverticulitis. Large stool ball in the  rectum, nonspecific, can be seen with a stool impaction. No  significant free fluid in the abdomen or pelvis. No free peritoneal or  portal venous gas. Moderate atherosclerotic vascular calcification of  the abdominal aorta and iliac vessels. Diffuse urinary bladder wall  thickening, nonspecific, can be seen with chronic cystitis.    Bones and soft tissue: Levoconvex rotoscoliosis of the lumbar spine  with associated multilevel degenerative changes. Sclerotic focus in  the intertrochanteric region of the right femur (series 8 image 49),  could represent benign bone island.      Impression    IMPRESSION: The exam is limited by significant motion/respiratory  artifact, which limits detail evaluation, within these limitations,  there is;  1. No CT finding to explain patient's symptoms of fever.  2. Cardiomegaly and moderate atherosclerotic vascular calcification of  the coronary arteries and thoracic aorta.  3. The main pulmonary artery is enlarged measuring 3.2 cm, this can be  seen with pulmonary hypertension.  4. Multiple small thyroid nodules, can be further evaluated with  thyroid ultrasound if clinically warranted.  5. 2.1 cm hypoattenuating focus in the pancreatic head area abutting  the common bile duct, indeterminant, could represent intraductal  papillary mucinous neoplasm versus other pancreatic cystic lesions,  this can be further evaluated with MRCP (although felt this would be  less yielding if the patient cannot stay still) or ERCP.  6. Extensive colonic diverticulosis without CT evidence of acute  diverticulitis.  7. Mild diffuse urinary bladder wall thickening, nonspecific, can be  seen with chronic cystitis.    RADHA YANES MD   Echocardiogram Complete - Bubble study    Narrative    [id number]  [id number]  [id number]  [id number]^ROSS^CHRISTY           [facility]  Echocardiography Laboratory  [address] [place] [id number]         Name: LIV KINGSTON  MRN: [id number]  : 1925  Study Date: 2021 11:28 AM  Age: 89 yrs  Gender: Female  Patient Location: [facility]  Reason For Study: CVA  Ordering Physician: CHRISTY HAWKINS  Referring Physician: Johnna Hinojosa  Performed By: Susan Navarrete     BSA: 1.6 m2  Height: 62 in  Weight: 123 lb  HR: 69  BP: 140/70 mmHg  _____________________________________________________________________________  __        Procedure  Complete Portable Bubble Echo Adult. [employer] (NDC #5237-0382) given  intravenously.  _____________________________________________________________________________  __        Interpretation Summary     A cardicac source of embolus was not identified.  A contrast injection (Bubble Study) was performed that was negative for flow  across the interatrial septum.  Sinus rhythm was noted.  Left ventricular systolic function is normal.  The visual ejection fraction is estimated at 60-65%.  There is mild concentric left ventricular hypertrophy.  The left atrium is mild to moderately dilated.  There is mild (1+) aortic regurgitation.  Mild aortic root dilatation (4.1cm)_  The ascending aorta is Mildly dilated ( 3.8 mm)     No change since 2021  _____________________________________________________________________________  __        Left Ventricle  The left ventricle is normal in size. There is mild concentric left  ventricular hypertrophy. Left ventricular systolic function is normal. The  visual ejection fraction is estimated at 60-65%. Grade I or early diastolic  dysfunction. No regional wall motion abnormalities noted. There is no thrombus  seen in the left ventricle.     Right Ventricle  The right ventricle is normal in structure, function and size. There is no  mass or thrombus in the right ventricle.     Atria  The left atrium is mild to moderately dilated. Right atrial size is normal. A  contrast injection (Bubble Study) was performed that was negative for flow  across the  interatrial septum. The left atrial appendage is not well  visualized.     Mitral Valve  There is mild to moderate mitral annular calcification. There is no mitral  regurgitation noted. There is no mitral valve stenosis.        Tricuspid Valve  Normal tricuspid valve. The right ventricular systolic pressure is  approximated at 25.2 mmHg plus the right atrial pressure. Right ventricular  systolic pressure is normal. There is mild (1+) tricuspid regurgitation. There  is no tricuspid stenosis.     Aortic Valve  There is moderate trileaflet aortic sclerosis. There is mild (1+) aortic  regurgitation. No aortic stenosis is present.     Pulmonic Valve  Normal pulmonic valve. There is no pulmonic valvular regurgitation. There is  no pulmonic valvular stenosis.     Vessels  Mild aortic root dilatation. The ascending aorta is Mildly dilated. The  inferior vena cava is normal. The pulmonary artery is normal size.     Pericardium  The pericardium appears normal. There is no pleural effusion.        Rhythm  Sinus rhythm was noted.  _____________________________________________________________________________  __  MMode/2D Measurements & Calculations  IVSd: 1.2 cm     LVIDd: 4.9 cm  LVIDs: 3.4 cm  LVPWd: 1.2 cm  FS: 30.4 %  LV mass(C)d: 228.9 grams  LV mass(C)dI: 147.2 grams/m2  Ao root diam: 3.8 cm  LA dimension: 4.5 cm  asc Aorta Diam: 4.0 cm  LA/Ao: 1.2  LVOT diam: 2.1 cm  LVOT area: 3.4 cm2  LA Volume (BP): 61.7 ml  LA Volume Index (BP): 39.8 ml/m2  RWT: 0.49           Doppler Measurements & Calculations  MV E max laura: 86.7 cm/sec  MV A max laura: 128.0 cm/sec  MV E/A: 0.68  MV max P.6 mmHg  MV mean P.0 mmHg  MV V2 VTI: 30.3 cm  MVA(VTI): 2.7 cm2  MV dec slope: 393.8 cm/sec2  Ao V2 max: 179.2 cm/sec  Ao max P.0 mmHg  Ao V2 mean: 125.7 cm/sec  Ao mean P.6 mmHg  Ao V2 VTI: 42.8 cm  GUILHERME(I,D): 1.9 cm2  GUILHERME(V,D): 2.1 cm2  AI P1/2t: 435.4 msec  LV V1 max P.0 mmHg  LV V1 max: 112.0 cm/sec  LV V1 VTI: 24.8  cm  SV(LVOT): 83.3 ml  SI(LVOT): 53.6 ml/m2  PA acc time: 0.13 sec  TR max vinicius: 250.7 cm/sec  TR max P.2 mmHg  AV Vinicius Ratio (DI): 0.63  GUILHERME Index (cm2/m2): 1.3              _____________________________________________________________________________  __        Report approved by: Dr. Frnacisco J Young 2021 01:45 PM

## 2021-02-08 NOTE — PLAN OF CARE
Alert to self only. Difficult to assess neuros, does not follow commands. Slight L facial droop. Garbled speech. Up w A2 pivot to chair. Tolerating diet. T/R q2. Purewick in place. Will continue to monitor.

## 2021-02-08 NOTE — CONSULTS
"WO Nurse Inpatient Wound Assessment   Reason for consultation: Evaluate and treat left buttock wounds present on admission    Assessment  Left buttocks wounds due to Friction and Incontinence Associated Dermatitis (IAD)  Status: initial assessment and healing  Wound is already healing since admission. Noted ble small wounds with adhesive foam dressings, likely skin tears of origin. Not pictured but orders placed   Treatment Plan  Left buttock wounds: Every 3 days   1. Clean wound with saline or MicroKlenz Spray, pat dry  2. Wipe / \"clean\" the surrounding periwound tissue with several skin preps to help with dressing sticking  3. Press a Mepilex Sacral to the area, making sure to conform nicely to skin curvatures. Do not trap skin folds under dressing  4. Time and date dressing change     Skin care plan to perineal/ coccyx: TID and prn  1. Clean with Criselda Cleanse and Protect, wipe dry  2. Dust dessonex powder   - keep pt positioned side to side only, when in bed, repositioning every 2 hours  - keep heels elevated at all times, at least one pillow under each leg from knees to heels, assuring heels are floating  - when up the chair pt should fully offload every 2 hours and be encouraged to shift weight every 15 minutes     Orders Written  Recommended provider order: None, at this time  WO Nurse follow-up plan:weekly  Nursing to notify the Provider(s) and re-consult the WO Nurse if wound(s) deteriorates or new skin concern.    Patient History  According to provider note(s):  Tracey Moran is a 89 year old  female with medical history of hypertension, CKD stage II, chronic anemia, hypothyroidism, advanced dementia wheelchair-bound, full cares, recent inferior STEMI and urinary tract infection 1/18/2021 brought into the ED via EMS from [facility] facility with facial droop.     Left facial droop, encephalopathy-possible stroke vs in the setting of UTI.  Advanced dementia.  Incidental saccular aneurysms, pseudoaneurysm " on imaging.  History of dysphagia at baseline.  Patient with advanced dementia, resident of memory care facility uses wheelchair at baseline and receives total cares. Per report patient last well-known normal at 830 at breakfast, subsequently around 1130 staff noticed new left-sided facial droop.  EMS noted a blood sugar of 174.    Per discussion with patient's son over the telephone -reports patient having good days and bad days, overall decreased mentation since her recent MI in January.  Reports patient had facial droop with UTI in the past.    Objective Data  Containment of urine/stool: Incontinence Protocol and Brief  picwick in place   Active Diet Order  Orders Placed This Encounter      Mechanical/Dental Soft Diet      Output:   I/O last 3 completed shifts:  In: 1351 [P.O.:540; I.V.:811]  Out: 400 [Urine:400]    Risk Assessment:   Sensory Perception: 3-->slightly limited  Moisture: 3-->occasionally moist  Activity: 3-->walks occasionally  Mobility: 3-->slightly limited  Nutrition: 3-->adequate  Friction and Shear: 2-->potential problem  Thad Score: 17                          Labs:   Recent Labs   Lab 02/08/21  0754 02/06/21  1227 02/06/21  1227   ALBUMIN  --   --  3.2*   HGB 8.6*   < > 10.9*   INR  --   --  1.15*   WBC 5.6   < > 8.3   A1C  --   --  5.1   CRP  --   --  17.0*    < > = values in this interval not displayed.       Physical Exam  Areas of skin assessed: focused left buttock     Wound Location:  Left buttock   Date of last photo today  Wound History: present on admission  Wound Base: 100 % epidermis     Palpation of the wound bed: normal      Drainage: none     Description of drainage: none     Measurements (length x width x depth, in cm) 5.0  x 3.1  x  0.1 cm      Tunneling N/A     Undermining N/A  Periwound skin: erythema- blanchable      Color: pink and red      Temperature: normal   Odor: none  Pain: absent, none        Interventions  Visual inspection and assessment completed  onadmission  Wound Care Rationale Promote moist wound healing without tissue dehydration , Provide selective debridement (autolysis) of nonviable tissue  and Decrease bacterial load  Wound Care: completed by RN  Supplies: floor stock and discussed with RN  Current off-loading measures: Pillows under calves and Pillows  Current support surface: Standard  Atmos Air mattress  Education provided to: plan of care, wound progress and Off-loading pressure  Discussed plan of care with Nurse    Kinza Thompson RN BS CWON

## 2021-02-08 NOTE — PLAN OF CARE
A&O to self, no C/O pain.Incontinent of bowl and bladder, total feed,CMS neuro's intact per ability to assess.tolerating [facility] soft diet. Pure wick in place. To discharge back to care center this PM.

## 2021-02-10 ENCOUNTER — APPOINTMENT (OUTPATIENT)
Dept: GENERAL RADIOLOGY | Facility: CLINIC | Age: 86
DRG: 291 | End: 2021-02-10
Attending: EMERGENCY MEDICINE
Payer: COMMERCIAL

## 2021-02-10 ENCOUNTER — HOSPITAL ENCOUNTER (INPATIENT)
Facility: CLINIC | Age: 86
LOS: 5 days | Discharge: HOME HEALTH | DRG: 291 | End: 2021-02-15
Attending: EMERGENCY MEDICINE | Admitting: INTERNAL MEDICINE
Payer: COMMERCIAL

## 2021-02-10 DIAGNOSIS — J81.0 ACUTE PULMONARY EDEMA (H): ICD-10-CM

## 2021-02-10 DIAGNOSIS — N30.00 ACUTE CYSTITIS WITHOUT HEMATURIA: Primary | ICD-10-CM

## 2021-02-10 DIAGNOSIS — N39.0 URINARY TRACT INFECTION WITHOUT HEMATURIA, SITE UNSPECIFIED: ICD-10-CM

## 2021-02-10 LAB
ALBUMIN SERPL-MCNC: 2.4 G/DL (ref 3.4–5)
ALP SERPL-CCNC: 56 U/L (ref 40–150)
ALT SERPL W P-5'-P-CCNC: 17 U/L (ref 0–50)
ANION GAP SERPL CALCULATED.3IONS-SCNC: 6 MMOL/L (ref 3–14)
AST SERPL W P-5'-P-CCNC: 33 U/L (ref 0–45)
BASOPHILS # BLD AUTO: 0 10E9/L (ref 0–0.2)
BASOPHILS NFR BLD AUTO: 0.4 %
BILIRUB DIRECT SERPL-MCNC: 0.1 MG/DL (ref 0–0.2)
BILIRUB SERPL-MCNC: 0.5 MG/DL (ref 0.2–1.3)
BUN SERPL-MCNC: 18 MG/DL (ref 7–30)
CALCIUM SERPL-MCNC: 8.5 MG/DL (ref 8.5–10.1)
CHLORIDE SERPL-SCNC: 109 MMOL/L (ref 94–109)
CO2 SERPL-SCNC: 23 MMOL/L (ref 20–32)
CREAT SERPL-MCNC: 0.73 MG/DL (ref 0.52–1.04)
DIFFERENTIAL METHOD BLD: ABNORMAL
EOSINOPHIL # BLD AUTO: 0.2 10E9/L (ref 0–0.7)
EOSINOPHIL NFR BLD AUTO: 1.9 %
ERYTHROCYTE [DISTWIDTH] IN BLOOD BY AUTOMATED COUNT: 14.2 % (ref 10–15)
GFR SERPL CREATININE-BSD FRML MDRD: 70 ML/MIN/{1.73_M2}
GLUCOSE SERPL-MCNC: 82 MG/DL (ref 70–99)
HCT VFR BLD AUTO: 31.7 % (ref 35–47)
HGB BLD-MCNC: 10.2 G/DL (ref 11.7–15.7)
IMM GRANULOCYTES # BLD: 0 10E9/L (ref 0–0.4)
IMM GRANULOCYTES NFR BLD: 0.2 %
LABORATORY COMMENT REPORT: NORMAL
LACTATE BLD-SCNC: 0.9 MMOL/L (ref 0.7–2)
LYMPHOCYTES # BLD AUTO: 2.9 10E9/L (ref 0.8–5.3)
LYMPHOCYTES NFR BLD AUTO: 30.3 %
MCH RBC QN AUTO: 33 PG (ref 26.5–33)
MCHC RBC AUTO-ENTMCNC: 32.2 G/DL (ref 31.5–36.5)
MCV RBC AUTO: 103 FL (ref 78–100)
MONOCYTES # BLD AUTO: 0.6 10E9/L (ref 0–1.3)
MONOCYTES NFR BLD AUTO: 6.5 %
NEUTROPHILS # BLD AUTO: 5.8 10E9/L (ref 1.6–8.3)
NEUTROPHILS NFR BLD AUTO: 60.7 %
NRBC # BLD AUTO: 0 10*3/UL
NRBC BLD AUTO-RTO: 0 /100
NT-PROBNP SERPL-MCNC: ABNORMAL PG/ML (ref 0–1800)
PLATELET # BLD AUTO: 195 10E9/L (ref 150–450)
POTASSIUM SERPL-SCNC: 4 MMOL/L (ref 3.4–5.3)
POTASSIUM SERPL-SCNC: 4.5 MMOL/L (ref 3.4–5.3)
PROT SERPL-MCNC: 6.5 G/DL (ref 6.8–8.8)
RBC # BLD AUTO: 3.09 10E12/L (ref 3.8–5.2)
SARS-COV-2 RNA RESP QL NAA+PROBE: NEGATIVE
SODIUM SERPL-SCNC: 138 MMOL/L (ref 133–144)
SPECIMEN SOURCE: NORMAL
TROPONIN I SERPL-MCNC: 0.02 UG/L (ref 0–0.04)
WBC # BLD AUTO: 9.5 10E9/L (ref 4–11)

## 2021-02-10 PROCEDURE — 83880 ASSAY OF NATRIURETIC PEPTIDE: CPT | Performed by: EMERGENCY MEDICINE

## 2021-02-10 PROCEDURE — 87040 BLOOD CULTURE FOR BACTERIA: CPT | Performed by: EMERGENCY MEDICINE

## 2021-02-10 PROCEDURE — 96375 TX/PRO/DX INJ NEW DRUG ADDON: CPT

## 2021-02-10 PROCEDURE — 99223 1ST HOSP IP/OBS HIGH 75: CPT | Mod: AI | Performed by: INTERNAL MEDICINE

## 2021-02-10 PROCEDURE — 83605 ASSAY OF LACTIC ACID: CPT | Performed by: EMERGENCY MEDICINE

## 2021-02-10 PROCEDURE — 84132 ASSAY OF SERUM POTASSIUM: CPT | Performed by: INTERNAL MEDICINE

## 2021-02-10 PROCEDURE — 93005 ELECTROCARDIOGRAM TRACING: CPT

## 2021-02-10 PROCEDURE — 36415 COLL VENOUS BLD VENIPUNCTURE: CPT | Performed by: INTERNAL MEDICINE

## 2021-02-10 PROCEDURE — 87635 SARS-COV-2 COVID-19 AMP PRB: CPT | Performed by: EMERGENCY MEDICINE

## 2021-02-10 PROCEDURE — 250N000011 HC RX IP 250 OP 636: Performed by: EMERGENCY MEDICINE

## 2021-02-10 PROCEDURE — 80048 BASIC METABOLIC PNL TOTAL CA: CPT | Performed by: EMERGENCY MEDICINE

## 2021-02-10 PROCEDURE — 80076 HEPATIC FUNCTION PANEL: CPT | Performed by: EMERGENCY MEDICINE

## 2021-02-10 PROCEDURE — 71045 X-RAY EXAM CHEST 1 VIEW: CPT

## 2021-02-10 PROCEDURE — 120N000001 HC R&B MED SURG/OB

## 2021-02-10 PROCEDURE — 96366 THER/PROPH/DIAG IV INF ADDON: CPT

## 2021-02-10 PROCEDURE — 84484 ASSAY OF TROPONIN QUANT: CPT | Performed by: EMERGENCY MEDICINE

## 2021-02-10 PROCEDURE — 96365 THER/PROPH/DIAG IV INF INIT: CPT

## 2021-02-10 PROCEDURE — 85025 COMPLETE CBC W/AUTO DIFF WBC: CPT | Performed by: EMERGENCY MEDICINE

## 2021-02-10 PROCEDURE — C9803 HOPD COVID-19 SPEC COLLECT: HCPCS

## 2021-02-10 PROCEDURE — 99285 EMERGENCY DEPT VISIT HI MDM: CPT | Mod: 25

## 2021-02-10 RX ORDER — AMOXICILLIN 250 MG
1 CAPSULE ORAL 2 TIMES DAILY PRN
Status: DISCONTINUED | OUTPATIENT
Start: 2021-02-10 | End: 2021-02-15 | Stop reason: HOSPADM

## 2021-02-10 RX ORDER — FUROSEMIDE 10 MG/ML
40 INJECTION INTRAMUSCULAR; INTRAVENOUS DAILY
Status: DISCONTINUED | OUTPATIENT
Start: 2021-02-11 | End: 2021-02-11

## 2021-02-10 RX ORDER — ONDANSETRON 4 MG/1
4 TABLET, ORALLY DISINTEGRATING ORAL EVERY 6 HOURS PRN
Status: DISCONTINUED | OUTPATIENT
Start: 2021-02-10 | End: 2021-02-15 | Stop reason: HOSPADM

## 2021-02-10 RX ORDER — POLYETHYLENE GLYCOL 3350 17 G/17G
17 POWDER, FOR SOLUTION ORAL DAILY PRN
Status: DISCONTINUED | OUTPATIENT
Start: 2021-02-10 | End: 2021-02-15 | Stop reason: HOSPADM

## 2021-02-10 RX ORDER — ONDANSETRON 2 MG/ML
4 INJECTION INTRAMUSCULAR; INTRAVENOUS EVERY 6 HOURS PRN
Status: DISCONTINUED | OUTPATIENT
Start: 2021-02-10 | End: 2021-02-15 | Stop reason: HOSPADM

## 2021-02-10 RX ORDER — LINEZOLID 600 MG/1
600 TABLET, FILM COATED ORAL EVERY 12 HOURS SCHEDULED
Status: DISCONTINUED | OUTPATIENT
Start: 2021-02-11 | End: 2021-02-15 | Stop reason: HOSPADM

## 2021-02-10 RX ORDER — ASPIRIN 81 MG/1
81 TABLET, CHEWABLE ORAL DAILY
Status: DISCONTINUED | OUTPATIENT
Start: 2021-02-11 | End: 2021-02-15 | Stop reason: HOSPADM

## 2021-02-10 RX ORDER — LINEZOLID 2 MG/ML
600 INJECTION, SOLUTION INTRAVENOUS ONCE
Status: COMPLETED | OUTPATIENT
Start: 2021-02-10 | End: 2021-02-10

## 2021-02-10 RX ORDER — LIDOCAINE 40 MG/G
CREAM TOPICAL
Status: DISCONTINUED | OUTPATIENT
Start: 2021-02-10 | End: 2021-02-15 | Stop reason: HOSPADM

## 2021-02-10 RX ORDER — CLOPIDOGREL BISULFATE 75 MG/1
75 TABLET ORAL DAILY
Status: DISCONTINUED | OUTPATIENT
Start: 2021-02-11 | End: 2021-02-15 | Stop reason: HOSPADM

## 2021-02-10 RX ORDER — ATORVASTATIN CALCIUM 20 MG/1
20 TABLET, FILM COATED ORAL DAILY
Status: DISCONTINUED | OUTPATIENT
Start: 2021-02-11 | End: 2021-02-15 | Stop reason: HOSPADM

## 2021-02-10 RX ORDER — ACETAMINOPHEN 500 MG
500 TABLET ORAL 3 TIMES DAILY PRN
Status: DISCONTINUED | OUTPATIENT
Start: 2021-02-10 | End: 2021-02-15 | Stop reason: HOSPADM

## 2021-02-10 RX ORDER — MULTIPLE VITAMINS W/ MINERALS TAB 9MG-400MCG
1 TAB ORAL DAILY
Status: DISCONTINUED | OUTPATIENT
Start: 2021-02-11 | End: 2021-02-15 | Stop reason: HOSPADM

## 2021-02-10 RX ORDER — PANTOPRAZOLE SODIUM 40 MG/1
40 TABLET, DELAYED RELEASE ORAL DAILY
Status: DISCONTINUED | OUTPATIENT
Start: 2021-02-11 | End: 2021-02-15 | Stop reason: HOSPADM

## 2021-02-10 RX ORDER — FUROSEMIDE 10 MG/ML
60 INJECTION INTRAMUSCULAR; INTRAVENOUS ONCE
Status: COMPLETED | OUTPATIENT
Start: 2021-02-10 | End: 2021-02-10

## 2021-02-10 RX ORDER — LISINOPRIL 20 MG/1
20 TABLET ORAL 2 TIMES DAILY
Status: DISCONTINUED | OUTPATIENT
Start: 2021-02-10 | End: 2021-02-11

## 2021-02-10 RX ADMIN — LINEZOLID 600 MG: 600 INJECTION, SOLUTION INTRAVENOUS at 17:13

## 2021-02-10 RX ADMIN — FUROSEMIDE 60 MG: 10 INJECTION, SOLUTION INTRAMUSCULAR; INTRAVENOUS at 19:31

## 2021-02-10 NOTE — ED TRIAGE NOTES
Pt arrives from [facility] for increased weakness and lethargy. Pt was seen at [facility] last week for stroke like symptoms which was negative. 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 Staff at care facility concerned because she hasn't been getting out of bed. Pt history of dementia, alert, not verbal or following commands.

## 2021-02-10 NOTE — LETTER
Sofia Valente, [facility] RENAY   Inpatient Care Coordination   Supervisor  [facility]  [phone]

## 2021-02-10 NOTE — LETTER
Discharging from hospital today.      ALFRED Burleson   Inpatient Care Coordination   Supervisor  [facility]  [phone]

## 2021-02-10 NOTE — ED PROVIDER NOTES
History   Chief Complaint:  Generalized Weakness       The history is provided by the EMS personnel. The history is limited by the condition of the patient.      Tracey Moran is a 89 year old female currently taking Plavix, with history of STEMI, GI hemorrhage, HTN, UTI, and stage 3 CKD, who presents via EMS with general weakness and lethargy. Per chart review, the patient was evaluated for a facial droop at [facility] ED on 02/06/2021 and subsequently admitted. Upon admission, she had a negative stroke workup but was noted to have a urine culture positive for VRE. The patient's assisted living home was informed of this diagnosis and were advised to have the patient re-evaluated with new or worsening symptoms. Today, the patient was sent in because she has been unable to get out of bed or perform her daily activity. Staff at her group home state that she was sent home on Ceftin and was never prescribed an antibiotic that properly addresses her VRE diagnosis.    Review of Systems   Unable to perform ROS: Patient nonverbal       Allergies:  Meperidine  Penicillins    Medications:  ASA  Lipitor  Ceftin  Plavix  Zestril  Lopressor  Protonix  Mycostatin  Miralax  Senokot-S    Past Medical History:    HTN  Facial droop  STEMI  UTI  Anemia  Hypotension  Closed fracture of lateral portion of right tibial plateau with routine healing, subsequent encounter  Stage 3 CKD  Physical deconditioning  GI hemorrhage     Past Surgical History:    EGD combined     Social History:  Arrived via EMS. Patient currently lives at [facility]    Physical Exam     Patient Vitals for the past 24 hrs:   BP Temp Temp src Pulse Resp SpO2   02/10/21 2040 -- -- -- 74 22 98 %   02/10/21 2015 (!) 178/71 -- -- 75 -- --   02/10/21 2005 -- -- -- 75 -- 97 %   02/10/21 2000 (!) 158/70 -- -- 74 -- --   02/10/21 1930 (!) 157/76 -- -- 94 -- 97 %   02/10/21 1845 (!) 172/70 -- -- 77 -- 97 %   02/10/21 1830 (!) 172/75 -- -- 78 -- 97 %    02/10/21 1815 (!) 166/72 -- -- 79 -- 98 %   02/10/21 1800 (!) 164/67 -- -- 80 -- 98 %   02/10/21 1745 (!) 155/75 -- -- 83 -- 97 %   02/10/21 1730 (!) 158/71 -- -- 85 -- 98 %   02/10/21 1715 (!) 153/86 -- -- -- -- 96 %   02/10/21 1645 (!) 142/73 -- -- -- -- 92 %   02/10/21 1630 -- -- -- -- -- 90 %   02/10/21 1620 (!) 154/79 98.2  F (36.8  C) Oral 90 18 92 %       Physical Exam  General: Elderly, cachectic appearing  Head: The scalp, face, and head appear normal  Eyes: The pupils are equal, round, and reactive to light. Conjunctivae and sclerae are normal  CV: Regular rate. S1/S2. No murmurs.   Resp: Lungs are clear without wheezes or rales. No respiratory distress.   GI: Abdomen is soft, no rigidity, guarding, or rebound. No distension. No tenderness to palpation in any quadrant.     MS: Normal tone. Joints grossly normal without effusions. No asymmetric leg swelling, calf or thigh tenderness.    Skin: No rash or lesions noted. Normal capillary refill noted  Neuro: Nonverbal at baseline.  Baseline dementia.. Face is symmetric. Moving all extremities.   Psych: Appears anxious    Emergency Department Course     ECG:  Indication: Generalized weakness  ECG taken at 1648, ECG read at 1653 by Dr. Shaw Willams MD  Normal sinus rhythm with sinus arrhythmia  Left axis deviation  Left ventricular hypertrophy with repolarization abnormality  Abnormal ECG  Agree with computer interpretation  No significant changes compared to EKG dated 02/06/2021  Rate 86 bpm. IN interval 140. QRS duration 92. QT/QTc 406/485. P-R-T axes 29 -33 14.    Imaging:  XR chest port 1 view  IMPRESSION: Moderate cardiac enlargement with pulmonary vascular congestion compatible CHF/volume overload. Interstitial edema/infiltrates throughout both lungs likely related to the volume overload. Minimal bilateral pleural fluid collections. Calcified thoracic aorta. Degenerative changes in the spine.    Readings per radiology    Laboratory:  Lactic Acid  (Resulted 1654): 0.9  Hepatic panel: albumin 2.4(L), protein total 6.5(L) o/w within normal limits  Troponin (Collected 1637): 0.023  Blood Culture x2: Pending  CBC: HGB 10.2(L) o/w within normal limits (WBC 9.5, )  BMP: AWNL (Creatinine 0.73)  BNP: 78501(H)    UA with Microscopic: acknowledged    Asymptomatic COVID-19 virus (Coronavirus) by PCR: negative    Emergency Department Course:    Reviewed:  I reviewed the patient's nursing notes, vitals, past medical records, Care Everywhere.     Assessments:  1635 I evaluated the patient and performed a physical exam. Limitations as noted above.    Consults:   1725 Spoke with the hospitalist, Dr. Viral Oneil, regarding the patient. He agrees to admit the patient for further care and evaluation.    Interventions:  1713 Zyvox 600 mg IV  1931 Lasix 60 mg IV    Disposition:  The patient was admitted to the hospital under the care of Dr. Viral Oneil.       Impression & Plan     Covid-19  Tracey Moran was evaluated during a global COVID-19 pandemic, which necessitated consideration that the patient might be at risk for infection with the SARS-CoV-2 virus that causes COVID-19.   Applicable protocols for evaluation were followed during the patient's care.   COVID-19 was considered as part of the patient's evaluation. The plan for testing is:  a test was obtained during this visit.      Medical Decision Making:  Patient is a 89-year-old female with complex past medical history including recent STEMI and admission for UTI who presents emergency department today with generalized weakness and lethargy.  Nursing home staff called EMS when patient was having difficulty with her daily tasks including transferring and self propelling herself in her walker.  In review of the patient's chart she was diagnosed with a UTI on previous admission and was treated with cephalosporins.  However part of the culture came back positive for VRE strain.  Based on my clinical examination and  blood work here today it seems unlikely that the patient is septic from an undertreated UTI however given her generalized weakness and lethargy I erred on the side of caution and covered her with antibiotics.  After discussing her options with the pharmacy team we elected to proceed with linezolid.  On the patient's work-up she was also found to have evidence of pulmonary edema on her chest x-ray.  This likely is from over rehydration during her previous hospitalization in the setting of a recent MI.  Patient was given Lasix and we will monitor her p.o. intake.  At this point she does not require any respiratory support and I believe is stable for the floor.  I had a long discussion with the patient's son and at this point he would still like to pursue medical treatment for his mother.  However she is DNR/DNI.      Diagnosis:    ICD-10-CM    1. Urinary tract infection without hematuria, site unspecified  N39.0 Blood culture     Blood culture     Asymptomatic SARS-CoV-2 COVID-19 Virus (Coronavirus) by PCR   2. Acute pulmonary edema (H)  J81.0        Scribe Disclosure:  SIVA, Sriram Perez, am serving as a scribe at 4:24 PM on 2/10/2021 to document services personally performed by Shaw Willams MD based on my observations and the provider's statements to me.     MD Imer Oliveira Christopher Joseph, MD  02/11/21 0023

## 2021-02-10 NOTE — LETTER
Transition Communication Hand-off for Care Transitions to Next Level of Care Provider    Name: Tracey Moran  : 1925  MRN #: [id number]  Primary Care Provider: [facility]     Primary Clinic: [address] [place] [id number]     Reason for Hospitalization:  Acute pulmonary edema (H) [J81.0]  Urinary tract infection without hematuria, site unspecified [N39.0]  Admit Date/Time: 2/10/2021  4:19 PM  Discharge Date: 2/15/21  Payor Source: Payor: [facility] / Plan: ARE MEDICARE NON [facility] PART[facility]ERS / Product Type: HMO /     Readmission Assessment Measure (MOHINI) Risk Score/category: 24    Plan of Care Goals/Milestone Events:   Patient Concerns:   Patient Goals:   Short-term restorative care   Long-term back to baseline   Medical Goals   Short-term rehab   Long-term back to baseline         Reason for Communication Hand-off Referral: Other per [place] reques    Discharge Plan:  Discharge Plan:      Most Recent Value   Disposition Comments  Back to the care suites at [facility] with [facility] care           Concern for non-adherence with plan of care:   Y/N  no  Discharge Needs Assessment:  Needs      Most Recent Value   Equipment Currently Used at Home  wheelchair, manual          Already enrolled in Tele-monitoring program and name of program:  no  Follow-up specialty is recommended: No    Follow-up plan:    Future Appointments   Date Time Provider Department Center   2021  9:30 AM RH PT 2 WAITLIST [facility]       Any outstanding tests or procedures:        Referrals     Future Labs/Procedures    Home Care Referral     Comments:    **Order classes of: [facility], [facility] and [facility] will route to the Home Care and Hospice Referral Pool.  Home Care or Hospice will then contact the patient to schedule their appointment.**    If you do not hear from Home Care and Hospice, or you would like to call to schedule, please call the referring place  of service that your provider has listed below.  ______________________________________________________________________    Your provider has referred you to: PREFERRED PROVIDERS:    Extended Emergency Contact Information  Primary Emergency Contact: Nick Shepherd  Address: [address] [place] [address] [place]  Home Phone: [phone]  Mobile Phone: [phone]  Relation: Son  Secondary Emergency Contact: DeanRonal  Address: [address] [address] [place]  Home Phone: [phone]  Mobile Phone: [phone]  Relation: Son    Patient Anticipated Discharge Date: [facility]    RN, PT, HHA to begin 24 - 48 hours after discharge.  PLEASE EVALUATE AND TREAT (Evaluation timeline is 24 - 48 hrs. Please call if there is need for a variance to this timeline).    REASON FOR REFERRAL: Assessment & Treatment: RN    ADDITIONAL SERVICES NEEDED: HHA    OTHER PERTINENT INFORMATION: Patient was last seen by provider on 02/15/2021 for UTI.    Current Outpatient Medications:       linezolid (ZYVOX) 600 MG tablet, Take 1 tablet (600 mg) by mouth every 12 hours for 10 days, Disp: 20 tablet, Rfl: 0      Patient Active Problem List:     Hypercholesterolemia     Leukocytosis, unspecified type     Elevated CK     Abrasion of left scapular region, subsequent encounter     Benign essential hypertension     Thrombocytopenia (H)     CRF (chronic renal failure), stage 3 (moderate)     Constipation, unspecified constipation type     Gastrointestinal hemorrhage, unspecified gastrointestinal hemorrhage type     Fall     CKD (chronic kidney disease) stage 2, GFR 60-89 ml/min     Physical deconditioning     Hypothyroidism, unspecified type     Generalized muscle weakness     Yeast dermatitis     right knee pain     Right knee pain     Stage 3 chronic kidney disease     Dementia without behavioral disturbance, unspecified dementia type (H)     Closed fracture of lateral portion of right  tibial plateau with routine healing, subsequent encounter: 4/'19     Hypotension, unspecified hypotension type     Anemia, unspecified type     Essential hypertension     Urinary tract infection     UTI (urinary tract infection)     STEMI (ST elevation myocardial infarction) (H)     ST elevation myocardial infarction (STEMI), unspecified artery (H)     Dehydration     Facial droop     Fever, unspecified fever cause     Acute pulmonary edema (H)      Documentation of Face to Face and Certification for Home Health Services    I certify that patient, Tracey Moran is under my care and that I, or a Nurse Practitioner or Physician's Assistant working with me, had a face-to-face encounter that meets the physician face-to-face encounter requirements with this patient on: 2/15/2021.    This encounter with the patient was in whole, or in part, for the following medical condition, which is the primary reason for Home Health Care: UTI .    I certify that, based on my findings, the following services are medically necessary Home Health Services: Nursing    My clinical findings support the need for the above services because: Nurse is needed: To assess functional status , safety and discuss palliative care  after changes in medications or other medical regimen..    Further, I certify that my clinical findings support that this patient is homebound (i.e. absences from home require considerable and taxing effort and are for medical reasons or [employer] services or infrequently or of short duration when for other reasons) because: Leaving home is medically contraindicated for the following reason(s): Other physician ordered restriction: dementia ..    Based on the above findings, I certify that this patient is confined to the home and needs intermittent skilled nursing care, physical therapy and/or speech therapy.  The patient is under my care, and I have initiated the establishment of the plan of care.  This patient will be  followed by a physician who will periodically review the plan of care.    Physician/Provider to provide follow up care: Services, [facility] Physician    Responsible PECOS certified Physician at time of discharge: Enrrique Fields MD    Please be aware that coverage of these services is subject to the terms and limitations of your health insurance plan.  Call member services at your health plan with any benefit or coverage questions.            Key Recommendations:      DE Milner LICSW SW   Inpatient Care Coordination   Supervisor  [facility]  [phone]      AVS/Discharge Summary is the source of truth; this is a helpful guide for improved communication of patient story

## 2021-02-10 NOTE — LETTER
Sofia Valente, [facility] RENAY   Inpatient Care Coordination   Supervisor  [facility]  [phone]

## 2021-02-11 ENCOUNTER — APPOINTMENT (OUTPATIENT)
Dept: SPEECH THERAPY | Facility: CLINIC | Age: 86
DRG: 291 | End: 2021-02-11
Attending: INTERNAL MEDICINE
Payer: COMMERCIAL

## 2021-02-11 LAB
ALBUMIN UR-MCNC: 20 MG/DL
ANION GAP SERPL CALCULATED.3IONS-SCNC: 7 MMOL/L (ref 3–14)
APPEARANCE UR: ABNORMAL
BACTERIA #/AREA URNS HPF: ABNORMAL /HPF
BACTERIA SPEC CULT: ABNORMAL
BILIRUB UR QL STRIP: NEGATIVE
BUN SERPL-MCNC: 17 MG/DL (ref 7–30)
CALCIUM SERPL-MCNC: 8.4 MG/DL (ref 8.5–10.1)
CHLORIDE SERPL-SCNC: 106 MMOL/L (ref 94–109)
CO2 SERPL-SCNC: 26 MMOL/L (ref 20–32)
COLOR UR AUTO: ABNORMAL
CREAT SERPL-MCNC: 0.76 MG/DL (ref 0.52–1.04)
ERYTHROCYTE [DISTWIDTH] IN BLOOD BY AUTOMATED COUNT: 14.1 % (ref 10–15)
GFR SERPL CREATININE-BSD FRML MDRD: 66 ML/MIN/{1.73_M2}
GLUCOSE SERPL-MCNC: 84 MG/DL (ref 70–99)
GLUCOSE UR STRIP-MCNC: NEGATIVE MG/DL
HCT VFR BLD AUTO: 31.3 % (ref 35–47)
HGB BLD-MCNC: 10.2 G/DL (ref 11.7–15.7)
HGB UR QL STRIP: ABNORMAL
INTERPRETATION ECG - MUSE: NORMAL
KETONES UR STRIP-MCNC: NEGATIVE MG/DL
LEUKOCYTE ESTERASE UR QL STRIP: ABNORMAL
Lab: ABNORMAL
MCH RBC QN AUTO: 33.4 PG (ref 26.5–33)
MCHC RBC AUTO-ENTMCNC: 32.6 G/DL (ref 31.5–36.5)
MCV RBC AUTO: 103 FL (ref 78–100)
MUCOUS THREADS #/AREA URNS LPF: PRESENT /LPF
NITRATE UR QL: NEGATIVE
PH UR STRIP: 6 PH (ref 5–7)
PLATELET # BLD AUTO: 175 10E9/L (ref 150–450)
POTASSIUM SERPL-SCNC: 3.7 MMOL/L (ref 3.4–5.3)
RBC # BLD AUTO: 3.05 10E12/L (ref 3.8–5.2)
RBC #/AREA URNS AUTO: 29 /HPF (ref 0–2)
SODIUM SERPL-SCNC: 139 MMOL/L (ref 133–144)
SOURCE: ABNORMAL
SP GR UR STRIP: 1.01 (ref 1–1.03)
SPECIMEN SOURCE: ABNORMAL
SQUAMOUS #/AREA URNS AUTO: 1 /HPF (ref 0–1)
UROBILINOGEN UR STRIP-MCNC: NORMAL MG/DL (ref 0–2)
WBC # BLD AUTO: 7.5 10E9/L (ref 4–11)
WBC #/AREA URNS AUTO: >182 /HPF (ref 0–5)
WBC CLUMPS #/AREA URNS HPF: PRESENT /HPF

## 2021-02-11 PROCEDURE — 85027 COMPLETE CBC AUTOMATED: CPT | Performed by: INTERNAL MEDICINE

## 2021-02-11 PROCEDURE — 99232 SBSQ HOSP IP/OBS MODERATE 35: CPT | Performed by: INTERNAL MEDICINE

## 2021-02-11 PROCEDURE — 80048 BASIC METABOLIC PNL TOTAL CA: CPT | Performed by: INTERNAL MEDICINE

## 2021-02-11 PROCEDURE — 92610 EVALUATE SWALLOWING FUNCTION: CPT | Mod: GN

## 2021-02-11 PROCEDURE — 120N000001 HC R&B MED SURG/OB

## 2021-02-11 PROCEDURE — 87086 URINE CULTURE/COLONY COUNT: CPT | Performed by: INTERNAL MEDICINE

## 2021-02-11 PROCEDURE — 36415 COLL VENOUS BLD VENIPUNCTURE: CPT | Performed by: INTERNAL MEDICINE

## 2021-02-11 PROCEDURE — 250N000013 HC RX MED GY IP 250 OP 250 PS 637: Performed by: INTERNAL MEDICINE

## 2021-02-11 PROCEDURE — G0463 HOSPITAL OUTPT CLINIC VISIT: HCPCS

## 2021-02-11 PROCEDURE — 81001 URINALYSIS AUTO W/SCOPE: CPT | Performed by: INTERNAL MEDICINE

## 2021-02-11 PROCEDURE — 250N000011 HC RX IP 250 OP 636: Performed by: INTERNAL MEDICINE

## 2021-02-11 RX ORDER — LISINOPRIL 10 MG/1
10 TABLET ORAL DAILY
Status: DISCONTINUED | OUTPATIENT
Start: 2021-02-12 | End: 2021-02-12

## 2021-02-11 RX ORDER — FUROSEMIDE 40 MG
40 TABLET ORAL DAILY
Status: DISCONTINUED | OUTPATIENT
Start: 2021-02-12 | End: 2021-02-12

## 2021-02-11 RX ADMIN — ASPIRIN 81 MG CHEWABLE TABLET 81 MG: 81 TABLET CHEWABLE at 09:23

## 2021-02-11 RX ADMIN — Medication 12.5 MG: at 09:22

## 2021-02-11 RX ADMIN — PANTOPRAZOLE SODIUM 40 MG: 40 TABLET, DELAYED RELEASE ORAL at 09:24

## 2021-02-11 RX ADMIN — FUROSEMIDE 40 MG: 10 INJECTION, SOLUTION INTRAMUSCULAR; INTRAVENOUS at 09:21

## 2021-02-11 RX ADMIN — LINEZOLID 600 MG: 600 TABLET, FILM COATED ORAL at 09:23

## 2021-02-11 RX ADMIN — CLOPIDOGREL BISULFATE 75 MG: 75 TABLET ORAL at 09:23

## 2021-02-11 RX ADMIN — MICONAZOLE NITRATE: 20 POWDER TOPICAL at 22:38

## 2021-02-11 RX ADMIN — LINEZOLID 600 MG: 600 TABLET, FILM COATED ORAL at 20:24

## 2021-02-11 RX ADMIN — MULTIPLE VITAMINS W/ MINERALS TAB 1 TABLET: TAB at 09:23

## 2021-02-11 RX ADMIN — ATORVASTATIN CALCIUM 20 MG: 20 TABLET, FILM COATED ORAL at 09:23

## 2021-02-11 ASSESSMENT — ACTIVITIES OF DAILY LIVING (ADL)
ADLS_ACUITY_SCORE: 32

## 2021-02-11 NOTE — H&P
[facility]    History and Physical  Hospitalist       Date of Admission:  2/10/2021    Assessment & Plan   Tracey Moran is a 89 year old female who presents with generalized weakness, lethargy.    She is a 89-year-old lady with history of hypertension, chronic kidney disease (Cr 0.6-1.1), anemia (hgb 10's), advanced dementia, positive COVID-19 in November, inferior ST elevation MI in January 2021 treated conservatively without PCI recent admission in February for facial droop and UTI.    She was sent to the emergency room by her care facility because of worsening weakness and lethargy.  Her most recent admission was from 2/6/2021-2/8/2021 at [facility] for facial droop.  She was seen by neurology team.  MRI was deferred in the setting of her advanced dementia and little symptoms.  Possibility of UTI was there.  She was discharged on Keflex.  Later on, her urine culture came back positive for VRE.  But as the patient was already getting better no further treatment was recommended.    Today, she was sent to the emergency room because of worsening generalized weakness and lethargy.  Dr. Cortes from the emergency room spoke to the [facility].  Apparently at baseline the patient is not able to walk but able to self propel herself in a wheelchair.    She has not been able to do any of that.  For that reason she was sent to the emergency room.  In the ER, the main issue is suspected to be pulmonary edema and CHF exacerbation.  She noted to have a BNP of 24,000, no previous BNP available.  Chemistry was unremarkable.  CBC was also unremarkable.  Chest x-ray shows pulmonary edema.    Patient was given IV linezolid in the emergency room for the recent VRE urine culture.  Also received IV Lasix 60 mg.  She is being admitted to internal medicine service for CHF exacerbation.    Problem List:    CHF exacerbation.  Heart failure with preserved ejection fraction.  -Not on  Lasix at baseline.  Continue Lasix 40 mg daily.  -Urine output monitoring.  Daily weight check.  -Continue lisinopril and metoprolol as before.  -Patient had an echocardiogram just a few days ago ejection fraction 60 to 65%, mild aortic regurgitation, normal RV.    Recent positive urine culture showing VRE Enterococcus.  -Was given IV linezolid.  Continue oral linezolid 600 twice daily.    Coronary artery disease.  History of inferior ST elevation MI in January.  -It was treated conservatively.  -Continue aspirin, Plavix, lisinopril, metoprolol, statin.    Advanced dementia    Patient is Covid recovered.  She also received both doses of her COVID-19 vaccine recently.    Plan discussed with patient's son who is in the room.    DVT Prophylaxis: Pneumatic Compression Devices  Code Status: DNR / DNI    Viral Oneil MD    Primary Care Physician   [facility]    Chief Complaint   Generalized weakness      History of Present Illness   Tracey Moran is a 89 year old female presented with generalized weakness      Past Medical History    I have reviewed this patient's medical history and updated it with pertinent information if needed.   Past Medical History:   Diagnosis Date     Hypertension    Advanced dementia  Coronary artery disease, status post inferior ST elevation MI in January.  COVID-19 infection in November.  UTI.  Hypertension.    Past Surgical History   I have reviewed this patient's surgical history and updated it with pertinent information if needed.  Past Surgical History:   Procedure Laterality Date     ESOPHAGOSCOPY, GASTROSCOPY, DUODENOSCOPY (EGD), COMBINED N/A 12/6/2017    Procedure: COMBINED ESOPHAGOSCOPY, GASTROSCOPY, DUODENOSCOPY (EGD);  gastroscopy;  Surgeon: Melchor Mancera MD;  Location:  GI       Prior to Admission Medications   Prior to Admission Medications   Prescriptions Last Dose Informant Patient Reported? Taking?   Criselda Protect (EUCERIN) external cream   Yes No   Sig:  Apply topically 2 times daily Criselda Barrier Cream (apply to incontinence rash)   Nutritional Supplements (ENSURE PO)   Yes No   Si two times daily (Family supplies)   acetaminophen (TYLENOL) 500 MG tablet   Yes No   Sig: Take 500 mg by mouth 3 times daily as needed for mild pain   aspirin (ASA) 81 MG chewable tablet   No No   Sig: Take 1 tablet (81 mg) by mouth daily   atorvastatin (LIPITOR) 20 MG tablet   No No   Sig: Take 1 tablet (20 mg) by mouth daily   cefuroxime (CEFTIN) 500 MG tablet   No No   Sig: Take 1 tablet (500 mg) by mouth 2 times daily for 5 days   clopidogrel (PLAVIX) 75 MG tablet   No No   Sig: Take 1 tablet (75 mg) by mouth daily   diclofenac (VOLTAREN) 1 % topical gel   Yes No   Sig: Place 2 g onto the skin 3 times daily For right knee pain   lisinopril (ZESTRIL) 20 MG tablet   No No   Sig: Take 1 tablet (20 mg) by mouth 2 times daily   menthol-zinc oxide (CALMOSEPTINE) 0.44-20.6 % OINT ointment   Yes No   Sig: Apply 1 g topically 4 times daily as needed for skin protection (to open area on right buttock)   metoprolol tartrate (LOPRESSOR) 25 MG tablet   Yes No   Sig: Take 12.5 mg by mouth 2 times daily   miconazole (MICATIN) 2 % external powder   No No   Sig: Apply topically 3 times daily   multivitamin w/minerals (THERA-VIT-M) tablet  Nursing Home Yes No   Sig: Take 1 tablet by mouth daily   nystatin (MYCOSTATIN) 244208 UNIT/GM external powder  Nursing Home Yes No   Sig: Apply topically 2 times daily Apply to rash under bilateral breast until resolved   pantoprazole (PROTONIX) 40 MG EC tablet   Yes No   Sig: Take 40 mg by mouth daily   polyethylene glycol (MIRALAX) 17 g packet   No No   Sig: Take 17 g by mouth daily as needed for constipation   senna-docusate (SENOKOT-S/PERICOLACE) 8.6-50 MG tablet   No No   Sig: Take 1 tablet by mouth 2 times daily as needed for constipation      Facility-Administered Medications: None     Allergies   Allergies   Allergen Reactions     Demerol [Meperidine]       Dust Mites      Peanuts [Nuts]      Penicillins      Has tolerated cephalosporins (ceftriaxone, cefdinir)     Pollen Extract        Social History   I have reviewed this patient's social history and updated it with pertinent information if needed. Tracey Moran  reports that she has never smoked. She has never used smokeless tobacco. She reports current alcohol use. She reports that she does not use drugs.    Family History   Discussed with son, none significant.    Review of Systems   The 10 point Review of Systems is negative other than noted in the HPI or here.     Physical Exam   Temp: 98.2  F (36.8  C) Temp src: Oral BP: (!) 178/71 Pulse: 74   Resp: 22 SpO2: 98 %      Vital Signs with Ranges  Temp:  [98.2  F (36.8  C)] 98.2  F (36.8  C)  Pulse:  [74-94] 74  Resp:  [18-22] 22  BP: (142-178)/(67-86) 178/71  SpO2:  [90 %-98 %] 98 %  0 lbs 0 oz    Constitutional: Awake, alert, cooperative, no apparent distress.  Dementia present.  Eyes: Conjunctiva and pupils examined and normal.  HEENT: Moist mucous membranes, normal dentition.  Respiratory: Bilateral crackles, no wheezing.  Cardiovascular: Regular rate and rhythm, normal S1 and S2, and no murmur noted.  GI: Soft, non-distended, non-tender, normal bowel sounds.  Skin: No rashes, no cyanosis, no edema.  Musculoskeletal: No joint swelling, erythema or tenderness.  Neurologic: Cranial nerves 2-12 intact, normal strength and sensation.  Advanced dementia, unable to provide any history.  Psychiatric: Alert, no obvious anxiety or depression.    Data     Recent Labs   Lab 02/10/21  1637 02/08/21  0754 02/07/21  0752 02/07/21  0151 02/06/21  1227 02/06/21  1227   WBC 9.5 5.6 5.0  --   --  8.3   HGB 10.2* 8.6* 8.6*  --   --  10.9*   * 103* 104*  --   --  102*    161 164  --   --  236   INR  --   --   --   --   --  1.15*    142 143  --   --  139   POTASSIUM 4.5 4.1 4.0  --   --  4.9   CHLORIDE 109 113* 113*  --   --  106   CO2 23 23 24  --   --  23    BUN 18 21 28  --   --  30   CR 0.73 0.70 0.91  --   --  0.86   ANIONGAP 6 6 6  --   --  10   RENETTA 8.5 8.2* 8.4*  --   --  9.0   GLC 82 77 72  --   --  107*   ALBUMIN 2.4*  --   --   --   --  3.2*   PROTTOTAL 6.5*  --   --   --   --  7.6   BILITOTAL 0.5  --   --   --   --  0.5   ALKPHOS 56  --   --   --   --  60   ALT 17  --   --   --   --  20   AST 33  --   --   --   --  23   TROPI 0.023  --  0.058* 0.069*   < > 0.050*    < > = values in this interval not displayed.       Recent Results (from the past 24 hour(s))   XR Chest Port 1 View    Narrative    EXAM: XR CHEST PORT 1 VW  LOCATION: [facility]  DATE/TIME: 2/10/2021 4:35 PM    INDICATION: Shortness of breath and weakness. Evaluate for infection.  COMPARISON: 10/26/2019      Impression    IMPRESSION: Moderate cardiac enlargement with pulmonary vascular congestion compatible CHF/volume overload. Interstitial edema/infiltrates throughout both lungs likely related to the volume overload. Minimal bilateral pleural fluid collections. Calcified   thoracic aorta. Degenerative changes in the spine.

## 2021-02-11 NOTE — ED NOTES
"Pt in bed with son at bedside, he says pt is \"doing good\". Hospitalist at bedside. No urine output yet.  "

## 2021-02-11 NOTE — ED NOTES
[facility]  ED Nurse Handoff Report    Tracey Moran is a 89 year old female   ED Chief complaint: Generalized Weakness  . ED Diagnosis:   Final diagnoses:   None     Allergies:   Allergies   Allergen Reactions     Demerol [Meperidine]      Dust Mites      Peanuts [Nuts]      Penicillins      Has tolerated cephalosporins (ceftriaxone, cefdinir)     Pollen Extract        Code Status: DNR / DNI  Activity level - Baseline/Home:  Wheelchair. Activity Level - Current:   Assist X 2. Lift room needed: No. Bariatric: No   Needed: No   Isolation: Yes. 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     Vital Signs:   Vitals:    02/10/21 1800 02/10/21 1815 02/10/21 1830 02/10/21 1845   BP: (!) 164/67 (!) 166/72 (!) 172/75 (!) 172/70   Pulse: 80 79 78 77   Resp:       Temp:       TempSrc:       SpO2: 98% 98% 97% 97%       Cardiac Rhythm:  ,      Pain level:    Patient confused: Yes. Patient Falls Risk: Yes.   Elimination Status: Has voided   Patient Report - Initial Complaint: Lethargy. Focused Assessment: Pt is nonverbal at baseline, she is cooperative, She normally uses a wheelchair baseline. Pt comes to ED for lethargy. She wasn't able to get out of bed today. Pt was recently at [facility] for stroke like symptoms and diagnosed with UTI. Results came back today as VRE.    Tests Performed: lab, imaging. Abnormal Results:   Labs Ordered and Resulted from Time of ED Arrival Up to the Time of Departure from the ED   CBC WITH PLATELETS DIFFERENTIAL - Abnormal; Notable for the following components:       Result Value    RBC Count 3.09 (*)     Hemoglobin 10.2 (*)     Hematocrit 31.7 (*)      (*)     All other components within normal limits   HEPATIC PANEL - Abnormal; Notable for the following components:    Albumin 2.4 (*)     Protein Total 6.5 (*)     All other components within normal limits   NT PROBNP INPATIENT - Abnormal; Notable for the following components:    N-Terminal Pro BNP Inpatient 24,947  (*)     All other components within normal limits   BASIC METABOLIC PANEL   LACTIC ACID WHOLE BLOOD   TROPONIN I   UA MACROSCOPIC WITH REFLEX TO MICRO AND CULTURE   BLOOD CULTURE   BLOOD CULTURE     XR Chest Port 1 View   Final Result   IMPRESSION: Moderate cardiac enlargement with pulmonary vascular congestion compatible CHF/volume overload. Interstitial edema/infiltrates throughout both lungs likely related to the volume overload. Minimal bilateral pleural fluid collections. Calcified    thoracic aorta. Degenerative changes in the spine.        .   Treatments provided: See MAR  Family Comments: None  OBS brochure/video discussed/provided to patient:  N/A  ED Medications:   Medications   furosemide (LASIX) injection 60 mg (has no administration in time range)   linezolid (ZYVOX) infusion 600 mg (0 mg Intravenous Stopped 2/10/21 1900)     Drips infusing:  No  For the majority of the shift, the patient's behavior Green. Interventions performed were None.    Sepsis treatment initiated: No     Patient tested for COVID 19 prior to admission: YES    ED Nurse Name/Phone Number: Georgi Gomez RN,   7:04 PM    RECEIVING UNIT ED HANDOFF REVIEW    Above ED Nurse Handoff Report was reviewed: Yes  Reviewed by: LIZA arrieta RN on February 10, 2021 at 8:44 PM

## 2021-02-11 NOTE — PHARMACY-ADMISSION MEDICATION HISTORY
Med list was faxed about 1130.  Last doses were entered on pta list    Admission medication history interview status for this patient is complete. See [facility] admission navigator for allergy information, prior to admission medications and immunization status.     Medication history interview done via telephone during Covid-19 pandemic, indicate source(s): Caregiver  Medication history resources (including written lists, pill bottles, clinic record):med list from [facility]  Pharmacy:   Patient resides at [facility]   Phone [phone].  Med list on chart - called for MAR so could have last doses.  No response for MAR request.    Changes made to PTA medication list:  Added:   Deleted:   Changed:     Actions taken by pharmacist (provider contacted, etc):None     Additional medication history information:None    Medication reconciliation/reorder completed by provider prior to medication history?  Y   (Y/N)       Prior to Admission medications    Medication Sig Last Dose Taking? Auth Provider   acetaminophen (TYLENOL) 500 MG tablet Take 500 mg by mouth 3 times daily as needed for mild pain  Yes Unknown, Entered By History   aspirin (ASA) 81 MG chewable tablet Take 1 tablet (81 mg) by mouth daily  Yes Neva Claudio MD   atorvastatin (LIPITOR) 20 MG tablet Take 1 tablet (20 mg) by mouth daily  Yes Neva Claudio MD   Criselda Protect (EUCERIN) external cream Apply topically 2 times daily Criselda Barrier Cream (apply to incontinence rash)  Yes Guilherme Graves MD   cefuroxime (CEFTIN) 500 MG tablet Take 1 tablet (500 mg) by mouth 2 times daily for 5 days  Yes Bobbi Arechiga MD   clopidogrel (PLAVIX) 75 MG tablet Take 1 tablet (75 mg) by mouth daily  Yes Neva Claudio MD   diclofenac (VOLTAREN) 1 % topical gel Place 2 g onto the skin 3 times daily For right knee pain  Yes Reported, Patient   lisinopril (ZESTRIL) 20 MG tablet Take 1 tablet (20 mg) by mouth 2 times daily  Yes Bobbi Arechiga MD   menthol-zinc oxide  (CALMOSEPTINE) 0.44-20.6 % OINT ointment Apply 1 g topically 4 times daily as needed for skin protection (to open area on right buttock)  Yes Unknown, Entered By History   metoprolol tartrate (LOPRESSOR) 25 MG tablet Take 12.5 mg by mouth 2 times daily  Yes Unknown, Entered By History   miconazole (MICATIN) 2 % external powder Apply topically 3 times daily  Yes Bobbi Arechiga MD   multivitamin w/minerals (THERA-VIT-M) tablet Take 1 tablet by mouth daily  Yes Unknown, Entered By History   nystatin (MYCOSTATIN) 873739 UNIT/GM external powder Apply topically 2 times daily Apply to rash under bilateral breast until resolved  Yes Unknown, Entered By History   pantoprazole (PROTONIX) 40 MG EC tablet Take 40 mg by mouth daily  Yes Unknown, Entered By History   polyethylene glycol (MIRALAX) 17 g packet Take 17 g by mouth daily as needed for constipation  Yes Bobbi Arechiga MD   senna-docusate (SENOKOT-S/PERICOLACE) 8.6-50 MG tablet Take 1 tablet by mouth 2 times daily as needed for constipation  Yes Agustina Sanchez PA-C   Nutritional Supplements (ENSURE PO) 1 two times daily (Family supplies)   Unknown, Entered By History

## 2021-02-11 NOTE — UTILIZATION REVIEW
Admission Status; Secondary Review Determination       Under the authority of the Utilization Management Committee, the utilization review process indicated a secondary review on the above patient. The review outcome is based on review of the medical records, discussions with staff, and applying clinical experience noted on the date of the review.     (x) Inpatient Status Appropriate - This patient's medical care is consistent with medical management for inpatient care and reasonable inpatient medical practice.     RATIONALE FOR DETERMINATION     89-year-old lady with history of hypertension, chronic kidney disease (Cr 0.6-1.1), anemia (hgb 10's), advanced dementia, positive COVID-19 in November, inferior ST elevation MI in January 2021 treated conservatively without PCI recent admission in February for facial droop and UTI. She was sent to the emergency room by her care facility because of worsening weakness and lethargy.  Her most recent admission was from 2/6/2021-2/8/2021 at [facility] for facial droop.  She was seen by neurology team.  MRI was deferred in the setting of her advanced dementia and little symptoms.  Possibility of UTI was there.  She was discharged on Keflex.  Later on, her urine culture came back positive for VRE. Patient was given IV linezolid in the emergency room for the recent VRE urine culture.  Also received IV Lasix 60 mg.  She is being admitted to internal medicine service for CHF exacerbation.    The expected length of stay at the time of admission was more than 2 nights because of the severity of illness, intensity of service provided, and risk for adverse outcome. Inpatient admission is appropriate.     This document was produced using voice recognition software       The information on this document is developed by the utilization review team in order for the business office to ensure compliance. This only denotes the appropriateness of proper admission status and  does not reflect the quality of care rendered.   The definitions of Inpatient Status and Observation Status used in making the determination above are those provided in the CMS Coverage Manual, Chapter 1 and Chapter 6, section 70.4.   Sincerely,   SAUL DAVISON MD   System Medical Director   Utilization Management   [facility].

## 2021-02-11 NOTE — PROGRESS NOTES
Care Management Follow Up    Length of Stay (days): 1    Expected Discharge Date:       Concerns to be Addressed:       Patient plan of care discussed at interdisciplinary rounds: Yes    Anticipated Discharge Disposition:  [facility]     Additional Information:  CTS received a call from nurse Jonny at [facility].  Phone [phone] fax [phone] Jonny said that the pt has steadily been declining over the past few months. At the facility she was a full code, he has concerns about code status at discharge and family expectations and would appreciate a Palliative care consult.  Currently she is requiring assist 1-2 with transfers and assistance with feeding due to poor appetite.  She receives total care (AM/PM cares, bathing, grooming, medications administration, toileting, safety checks and pt has a emergency pendent).    Lissa Patel RN, BSN, PHN, CTS  Care Coordinator  [facility]  [phone]

## 2021-02-11 NOTE — PROGRESS NOTES
"Clinical Swallow Evaluation:      02/11/21 1500   General Information   Onset of Illness/Injury or Date of Surgery 02/10/21   Referring Physician Danie Jones DO   Patient/Family Therapy Goal Statement (SLP) Stated \"yes\" to being thirsty   Pertinent History of Current Problem Pt is a 89 year old female who presents with generalized weakness, lethargy. PMHx of chronic kidney disease,  anemia, advanced dementia, positive COVID-19 in November, inferior ST elevation MI in January 2021. Her most recent admission was from 2/6/2021-2/8/2021 at [facility] for facial droop. Admitted for worsening generalized weakness and lethargy.     General Observations   Increased AMS compared to recent admission at [facility] and [facility]. Also increased L facial droop and dysarthria compared to prior admits, though likely 2/2 upper dentures not in place/not present.     Past History of Dysphagia   Pt seen during recent 1/2021 admission at [facility] and 2/2021 admission at [facility] where a mechanical dental soft solid, thin liquid diet were recommended. Baseline crush pills with puree.     Oral Motor   Oral Musculature anomalies present   Structural Abnormalities none present   Mucosal Quality good   Dentition (Oral Motor)   Dentition (Oral Motor) significant number of missing teeth;dental appliance/dentures  (upper dentures not present)   Facial Symmetry (Oral Motor)   Facial Symmetry (Oral Motor) left side impairment   Left Side Facial Asymmetry minimal impairment;moderate impairment  (increased likely 2/2 upper dentures not present this admissi)   Lip Function (Oral Motor)   Lip Range of Motion (Oral Motor) unable/difficult to assess   Tongue Function (Oral Motor)   Tongue ROM (Oral Motor) WNL   Jaw Function (Oral Motor)   Jaw Function (Oral Motor) WNL   Cough/Swallow/Gag Reflex (Oral Motor)   Soft Palate/Velum (Oral Motor) unable/difficult to assess   Vocal Quality/Secretion Management (Oral Motor) "   Vocal Quality (Oral Motor) WNL   Secretion Management (Oral Motor) WNL   General Swallowing Observations   Current Diet/Method of Nutritional Intake (General Swallowing Observations, NIS) regular diet;thin liquids   Respiratory Support (General Swallowing Observations) none   Swallowing Evaluation Clinical swallow evaluation   Clinical Swallow Evaluation   Feeding Assistance other (see comments)  (Mod-max assistance required )   Additional evaluation(s) completed today No   Clinical Swallow Evaluation Textures Trialed Thin Liquids;Puree Textures   Clinical Swallow Eval: Thin Liquid Texture Trial   Mode of Presentation, Thin Liquids cup;straw;fed by clinician   Volume of Liquid or Food Presented 4 oz   Oral Phase of Swallow WFL   Pharyngeal Phase of Swallow intact   Diagnostic Statement Adequate oral control, timely swallow, no overt signs/sx aspiration noted   Clinical Swallow Evaluation: Puree Solid Texture Trial   Mode of Presentation, Puree spoon;fed by clinician   Volume of Puree Presented 2 tbs of applesauce    Oral Phase, Puree WFL   Pharyngeal Phase, Puree intact   Diagnostic Statement Timely A/P transit, timely swallow, no overt signs/sx aspiration noted   Clinical Swallow Evaluation: Semisolid Texture Trial   Mode of Presentation, Semisolid self-fed   Volume of Semisolid Food Presented 4 bites of banana    Oral Phase, Semisolid Residue in oral cavity   Oral Residue, Semisolid mid posterior tongue   Pharyngeal Phase, Semisolid intact   Diagnostic Statement Slow mastication, oral residuals clearing with liquid washes provided by writer   Esophageal Phase of Swallow   Patient reports or presents with symptoms of esophageal dysphagia No   Swallowing Recommendations   Diet Consistency Recommendations dysphagia level 2 (mechanically altered);thin liquids   Supervision Level for Intake 1:1 supervision needed   Mode of Delivery Recommendations bolus size, small;slow rate of intake   Swallowing Maneuver  Recommendations alternate food and liquid intake;extra swallow   Monitoring/Assistance Required (Eating/Swallowing) monitor for cough or change in vocal quality with intake;check mouth frequently for oral residue/pocketing   Recommended Feeding/Eating Techniques (Swallow Eval) maintain upright sitting position for eating;maintain upright posture during/after eating for 30 minutes;provide assist with feeding   Medication Administration Recommendations, Swallowing (SLP) Crushed in applesauce (pt's baseline)   General Therapy Interventions   Planned Therapy Interventions Dysphagia Treatment   Dysphagia treatment Modified diet education;Instruction of safe swallow strategies;Compensatory strategies for swallowing   SLP Therapy Assessment/Plan   Criteria for Skilled Therapeutic Interventions Met (SLP Eval) yes;treatment indicated   SLP Diagnosis Mild oropharyngel dysphagia d/t AMS and significant number of teeth missing.    Rehab Potential (SLP Eval) fair, will monitor progress closely   Therapy Frequency (SLP Eval) 2 times/wk   Predicted Duration of Therapy Intervention (SLP Eval) 1/wk   Comment, Therapy Assessment/Plan (SLP) Noted SLP followed during previous x2 hospitalizations with recommended mechanical/dental soft diet. Pt with increased AMS compared to last admission at [facility] resulting in increased oral dysphagia and self-feeding difficulties, however still near her baseline - currently presents with mild deficits. Pt accepted trials of thin liquids by straw, puree solids and DD2 solids.  Prolonged mastication, mild delay with occasional double swallows, moderate oral residue on lingual surface and buccals requiring liquid wash to clear with solids. No overt s/sx of aspiration/penetration.    Therapy Plan Review/Discharge Plan (SLP)   Therapy Plan Review (SLP) evaluation/treatment results reviewed;patient   SLP Discharge Planning    SLP Discharge Recommendation (DC Rec) Long term care facility   SLP Rationale  for DC Rec Pt near baseline, slightly below 2/2 AMS   SLP Brief overview of current status  Follow up tx with meal. Pt slightly below recent baseline. Recommend: DD2 solids, thin liquids when fully upright, 1:1 assist/assist with feeding as needed, reduce distraction with meals (e.g., turn TV off), alternate between solids/liquid and monitor for pocketing. Meds crushed with puree (baseline).     Total Evaluation Time   Total Evaluation Time (Minutes) 15

## 2021-02-11 NOTE — CONSULTS
Care Management Initial Consult    General Information  Assessment completed with: Nick Jennings  Type of CM/SW Visit: Initial Assessment    Primary Care Provider verified and updated as needed: Yes   Readmission within the last 30 days: current reason for admission unrelated to previous admission      Reason for Consult: discharge planning  Advance Care Planning:            Communication Assessment  Patient's communication style: spoken language (English or Bilingual)             Cognitive  Cognitive/Neuro/Behavioral: .WDL except, orientation, speech  Level of Consciousness: confused  Arousal Level: opens eyes spontaneously  Orientation: disoriented x 4  Mood/Behavior: calm     Speech: incoherent, incomprehensible sounds, word-finding difficulty    Living Environment:   People in home:       Current living Arrangements: assisted living  Name of Facility: [facility], [phone], [phone]  Able to return to prior arrangements:  Yes, son is expecting patient to be able to return to the care suites       Family/Social Support:  Care provided by: other (see comments)(lives in care suites)  Provides care for:    Marital Status:   Children          Description of Support System: Supportive    Support Assessment: Adequate family and caregiver support    Current Resources:   Patient receiving home care services: Yes([facility] )[phone]  Skilled Home Care Services: Skilled Nursing, Physicial Therapy, Speech Therapy, Occupational Therapy  Community Resources: Home Care  Equipment currently used at home: wheelchair, manual  Supplies currently used at home:      Employment/Financial:  Employment Status:   N/A       Financial Concerns:      N/A       Lifestyle & Psychosocial Needs:        Socioeconomic History     Marital status:      Spouse name: Not on file     Number of children: Not on file     Years of education: Not on file     Highest education level: Not on file      Tobacco Use     Smoking status: Never Smoker     Smokeless tobacco: Never Used   Substance and Sexual Activity     Alcohol use: Yes     Drug use: No     Sexual activity: Not Currently       Functional Status:  Prior to admission patient needed assistance: assist of 1 to stand pivot to wheelchair, self propels wheelchair in facility        Mental Health Status:      N/A    Chemical Dependency Status:      N/A          Values/Beliefs:  Spiritual, Cultural Beliefs, Hinduism Practices, Values that affect care:                 Additional Information:   for [facility] nursing office at [phone] and await call back regarding patient's ability to return.  Spoke with son Nick Shepherd, he does expect patient to be able to return to the care suites.  Patient has been getting home care services through [facility], receiving RN/PT/OT/ST, he feels these services have been beneficial and should continue.  Patient does use a wheelchair and self propels in the facility and is ok with a wheelchair transport back to facility and is aware of cost of a wheelchair transport.     DE boss

## 2021-02-11 NOTE — PROGRESS NOTES
[facility]    Medicine Progress Note - Hospitalist Service       Date of Admission:  2/10/2021  Assessment & Plan       Ms. Moran is a 89-year-old lady with history of hypertension, chronic kidney disease (Cr 0.6-1.1), anemia (hgb 10's), advanced dementia, positive COVID-19 in November, inferior ST elevation MI in January 2021 treated conservatively without PCI recent admission in February for facial droop and UTI.  She was brought to the emergency room due to increased generalized weakness.    1.  Recent urine culture with vancomycin-resistant Enterococcus.  Continue linezolid 600 mg twice a day.    2.  Acute exacerbation of chronic heart failure with preserved ejection fraction.  Initially requiring IV furosemide.  Does not appear significantly fluid overloaded on exam today.  Change to oral furosemide 40 mg a day starting tomorrow.    3.  Known coronary artery disease.  Current plan is for medical management.  Continue aspirin, atorvastatin, clopidogrel, lisinopril, and metoprolol.  TLC consult for goals of care.  Would likely benefit from transition to hospice care.    4.  Likely recent acute stroke.  Hospitalized 2/7 for symptoms of acute stroke.  Declined MRI or neurology evaluation at that time.  Continues to have facial droop likely due to recent stroke.  This is a likely contributing cause of her continued weakness at living facility.  Physical therapy consult.  TLC consult.  Continue aspirin, clopidogrel, atorvastatin.    5.  Dysphagia.  Speech pathology consult.    6.  Chronic kidney disease stage II.  Creatinine at baseline.  Avoid nephrotoxins as able.    7.  Hypertension.  Continue Toprol.  Decrease lisinopril to 10 mg a day.    8.  GERD.  Pantoprazole 40 mg a day.    9.  Hyperlipidemia.  Continue atorvastatin.    10.  Acute on chronic weakness.  Suspect acute issues multifactorial due to UTI and likely recent stroke.  Treat UTI as noted above with linezolid.  Physical therapy  consult.       Diet: Combination Diet Regular Diet Adult    DVT Prophylaxis: Pneumatic Compression Devices  Walton Catheter: not present  Code Status: No CPR- Do NOT Intubate           Disposition Plan   Expected discharge: Tomorrow, recommended to transitional care unit     Danie Jones DO  Hospitalist Service  [facility]  Contact information available via [facility] Paging/Directory    ______________________________________________________________________    Interval History   Awake.  Seems to attempt to answer questions.  Words unintelligible.    Data reviewed today: I reviewed all medications, new labs and imaging results over the last 24 hours.    Physical Exam   Vital Signs: Temp: 97.5  F (36.4  C) Temp src: Axillary BP: 111/47 Pulse: 74   Resp: 18 SpO2: 93 % O2 Device: None (Room air)    Weight: 115 lbs 11.2 oz  Gen:  NAD, Awake.    Eyes:  PERRL, sclera anicteric.  Neck:  Supple.  CV:  Fairly regular, no loud murmurs.  Lung:  Minimal crackles at bases b/l, normal effort.  Ab:  +BS, soft.  Skin:  Warm, dry to touch.  No rash.  Ext:  No pitting edema LE b/l.      Data   Recent Labs   Lab 02/11/21  0725 02/10/21  2156 02/10/21  1637 02/08/21  0754 02/07/21  0752 02/07/21  0151 02/06/21  1227 02/06/21  1227   WBC 7.5  --  9.5 5.6 5.0  --   --  8.3   HGB 10.2*  --  10.2* 8.6* 8.6*  --   --  10.9*   *  --  103* 103* 104*  --   --  102*     --  195 161 164  --   --  236   INR  --   --   --   --   --   --   --  1.15*     --  138 142 143  --   --  139   POTASSIUM 3.7 4.0 4.5 4.1 4.0  --   --  4.9   CHLORIDE 106  --  109 113* 113*  --   --  106   CO2 26  --  23 23 24  --   --  23   BUN 17  --  18 21 28  --   --  30   CR 0.76  --  0.73 0.70 0.91  --   --  0.86   ANIONGAP 7  --  6 6 6  --   --  10   RENETTA 8.4*  --  8.5 8.2* 8.4*  --   --  9.0   GLC 84  --  82 77 72  --   --  107*   ALBUMIN  --   --  2.4*  --   --   --   --  3.2*   PROTTOTAL  --   --  6.5*  --   --   --   --  7.6    BILITOTAL  --   --  0.5  --   --   --   --  0.5   ALKPHOS  --   --  56  --   --   --   --  60   ALT  --   --  17  --   --   --   --  20   AST  --   --  33  --   --   --   --  23   TROPI  --   --  0.023  --  0.058* 0.069*   < > 0.050*    < > = values in this interval not displayed.

## 2021-02-11 NOTE — CONSULTS
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 Cleanse with Criselda and soft wipe  2. Apply Criselda to moisturize and protect area     Pressure injury prevention:  1. Float heels off bed at all times with pillows  2. Sidelying in bed      Miconazole to groin    Orders Written  Recommended provider order: None, at this time  WO Nurse follow-up plan:weekly  Nursing to notify the Provider(s) and re-consult the WO Nurse if wound(s) deteriorates or new skin concern.    Patient History  According to provider note(s):  Tracey Moran is a 89 year old female who presents with generalized weakness, lethargy.     She is a 89-year-old lady with history of hypertension, chronic kidney disease (Cr 0.6-1.1), anemia (hgb 10's), advanced dementia, positive COVID-19 in November, inferior ST elevation MI in January 2021 treated conservatively without PCI recent admission in February for facial droop and UTI.    Objective Data  Containment of urine/stool: Incontinence Protocol and External catheter    Active Diet Order  Orders Placed This Encounter      Combination Diet Regular Diet Adult      Output:   I/O last 3 completed shifts:  In: -   Out: 1200 [Urine:1200]    Risk Assessment:   Sensory Perception: 2-->very limited  Moisture: 3-->occasionally moist  Activity: 1-->bedfast  Mobility: 2-->very limited  Nutrition: 2-->probably inadequate  Friction and Shear: 2-->potential problem  Thad Score: 12                          Labs:   Recent Labs   Lab 02/11/21  8130  02/10/21  1637 02/06/21  1227 02/06/21  1227   ALBUMIN  --  2.4*  --  3.2*   HGB 10.2* 10.2*   < > 10.9*   INR  --   --   --  1.15*   WBC 7.5 9.5   < > 8.3   A1C  --   --   --  5.1   CRP  --   --   --  17.0*    < > = values in this interval not displayed.       Physical Exam  Areas of skin assessed: focused Right shin, left buttocks, groin    Wound Location:  Right shin  Date of last photo 2/11/21  Wound History: Likely point of trauma, patient unsure how injury occurred.      Wound Base: 100 % blood blister     Palpation of the wound bed: normal      Drainage: none     Description of drainage: none     Measurements (length x width x depth, in cm) 1.5 x 2.5 cm      Tunneling N/A     Undermining N/A  Periwound skin: multiple bruises to bilateral legs      Color: normal and consistent with surrounding tissue      Temperature: normal   Odor: none  Pain: denies , none    Wound Location:  Left upper buttock  Date of last photo none  Wound History: Patient with history of IAD and skin breakdown from coccyx to perianal area.  Wound Base: 100 % epidermis     Palpation of the wound bed: normal      Drainage: none     Description of drainage: none     Measurements (length x width x depth, in cm) 2 areas each measuring 0.3x0.3cm, almost completely healed     Tunneling N/A     Undermining N/A  Periwound skin: hyperpigmentation from previous open areas      Color: pink      Temperature: normal   Odor: none    Groin with erythema and satellite lesions, no open areas noted.    Right lateral heel with barely blanchable erythema    Interventions  Visual inspection and assessment completed   Wound Care Rationale Promote moist wound healing without tissue dehydration  and Provide protection   Wound Care: done per plan of care  Supplies: at bedside  Current off-loading measures: Pillows under calves and Pillows  Current support surface: Standard  Atmos Air mattress  Education provided to: importance of repositioning and plan of  care  Discussed plan of care with Patient and Nurse    Christopher Carlson RN CWOCN

## 2021-02-12 ENCOUNTER — APPOINTMENT (OUTPATIENT)
Dept: PHYSICAL THERAPY | Facility: CLINIC | Age: 86
DRG: 291 | End: 2021-02-12
Attending: INTERNAL MEDICINE
Payer: COMMERCIAL

## 2021-02-12 LAB
ANION GAP SERPL CALCULATED.3IONS-SCNC: 6 MMOL/L (ref 3–14)
BACTERIA SPEC CULT: NO GROWTH
BACTERIA SPEC CULT: NO GROWTH
BACTERIA SPEC CULT: NORMAL
BUN SERPL-MCNC: 31 MG/DL (ref 7–30)
CALCIUM SERPL-MCNC: 8.2 MG/DL (ref 8.5–10.1)
CHLORIDE SERPL-SCNC: 106 MMOL/L (ref 94–109)
CO2 SERPL-SCNC: 26 MMOL/L (ref 20–32)
CREAT SERPL-MCNC: 1.32 MG/DL (ref 0.52–1.04)
GFR SERPL CREATININE-BSD FRML MDRD: 34 ML/MIN/{1.73_M2}
GLUCOSE SERPL-MCNC: 93 MG/DL (ref 70–99)
Lab: NORMAL
POTASSIUM SERPL-SCNC: 3.8 MMOL/L (ref 3.4–5.3)
SODIUM SERPL-SCNC: 138 MMOL/L (ref 133–144)
SPECIMEN SOURCE: NORMAL

## 2021-02-12 PROCEDURE — 99223 1ST HOSP IP/OBS HIGH 75: CPT | Performed by: NURSE PRACTITIONER

## 2021-02-12 PROCEDURE — 97530 THERAPEUTIC ACTIVITIES: CPT | Mod: GP | Performed by: PHYSICAL THERAPIST

## 2021-02-12 PROCEDURE — 258N000003 HC RX IP 258 OP 636: Performed by: INTERNAL MEDICINE

## 2021-02-12 PROCEDURE — 80048 BASIC METABOLIC PNL TOTAL CA: CPT | Performed by: INTERNAL MEDICINE

## 2021-02-12 PROCEDURE — 250N000013 HC RX MED GY IP 250 OP 250 PS 637: Performed by: INTERNAL MEDICINE

## 2021-02-12 PROCEDURE — 97161 PT EVAL LOW COMPLEX 20 MIN: CPT | Mod: GP | Performed by: PHYSICAL THERAPIST

## 2021-02-12 PROCEDURE — 120N000001 HC R&B MED SURG/OB

## 2021-02-12 PROCEDURE — 99232 SBSQ HOSP IP/OBS MODERATE 35: CPT | Performed by: INTERNAL MEDICINE

## 2021-02-12 PROCEDURE — 36415 COLL VENOUS BLD VENIPUNCTURE: CPT | Performed by: INTERNAL MEDICINE

## 2021-02-12 RX ADMIN — SODIUM CHLORIDE 500 ML: 9 INJECTION, SOLUTION INTRAVENOUS at 18:55

## 2021-02-12 RX ADMIN — PANTOPRAZOLE SODIUM 40 MG: 40 TABLET, DELAYED RELEASE ORAL at 09:06

## 2021-02-12 RX ADMIN — CLOPIDOGREL BISULFATE 75 MG: 75 TABLET ORAL at 09:06

## 2021-02-12 RX ADMIN — MICONAZOLE NITRATE: 20 POWDER TOPICAL at 09:00

## 2021-02-12 RX ADMIN — MULTIPLE VITAMINS W/ MINERALS TAB 1 TABLET: TAB at 09:06

## 2021-02-12 RX ADMIN — Medication 12.5 MG: at 09:07

## 2021-02-12 RX ADMIN — ASPIRIN 81 MG CHEWABLE TABLET 81 MG: 81 TABLET CHEWABLE at 09:06

## 2021-02-12 RX ADMIN — Medication 12.5 MG: at 20:37

## 2021-02-12 RX ADMIN — LINEZOLID 600 MG: 600 TABLET, FILM COATED ORAL at 20:37

## 2021-02-12 RX ADMIN — MICONAZOLE NITRATE: 20 POWDER TOPICAL at 20:38

## 2021-02-12 RX ADMIN — LISINOPRIL 10 MG: 10 TABLET ORAL at 09:07

## 2021-02-12 RX ADMIN — ATORVASTATIN CALCIUM 20 MG: 20 TABLET, FILM COATED ORAL at 09:06

## 2021-02-12 RX ADMIN — SODIUM CHLORIDE 500 ML: 9 INJECTION, SOLUTION INTRAVENOUS at 12:31

## 2021-02-12 RX ADMIN — LINEZOLID 600 MG: 600 TABLET, FILM COATED ORAL at 09:06

## 2021-02-12 SDOH — ECONOMIC STABILITY: FOOD INSECURITY: WITHIN THE PAST 12 MONTHS, YOU WORRIED THAT YOUR FOOD WOULD RUN OUT BEFORE YOU GOT MONEY TO BUY MORE.: NOT ASKED

## 2021-02-12 SDOH — ECONOMIC STABILITY: TRANSPORTATION INSECURITY
IN THE PAST 12 MONTHS, HAS THE LACK OF TRANSPORTATION KEPT YOU FROM MEDICAL APPOINTMENTS OR FROM GETTING MEDICATIONS?: NOT ASKED

## 2021-02-12 SDOH — ECONOMIC STABILITY: FOOD INSECURITY: WITHIN THE PAST 12 MONTHS, THE FOOD YOU BOUGHT JUST DIDN'T LAST AND YOU DIDN'T HAVE MONEY TO GET MORE.: NOT ASKED

## 2021-02-12 SDOH — ECONOMIC STABILITY: TRANSPORTATION INSECURITY
IN THE PAST 12 MONTHS, HAS LACK OF TRANSPORTATION KEPT YOU FROM MEETINGS, WORK, OR FROM GETTING THINGS NEEDED FOR DAILY LIVING?: NOT ASKED

## 2021-02-12 SDOH — ECONOMIC STABILITY: INCOME INSECURITY: HOW HARD IS IT FOR YOU TO PAY FOR THE VERY BASICS LIKE FOOD, HOUSING, MEDICAL CARE, AND HEATING?: NOT ASKED

## 2021-02-12 ASSESSMENT — ACTIVITIES OF DAILY LIVING (ADL)
ADLS_ACUITY_SCORE: 27
ADLS_ACUITY_SCORE: 28
ADLS_ACUITY_SCORE: 27

## 2021-02-12 NOTE — PROGRESS NOTES
Care Management Follow Up    Length of Stay (days): 2    Expected Discharge Date: 02/13/21     Concerns to be Addressed: discharge planning     Patient plan of care discussed at interdisciplinary rounds: Yes    Anticipated Discharge Disposition: Assisted Living, Home Care     Anticipated Discharge Services: [facility]   Anticipated Discharge DME:      Patient/family educated on Medicare website which has current facility and service quality ratings:    Education Provided on the Discharge Plan:    Patient/Family in Agreement with the Plan:      Referrals Placed by CM/SW:    Private pay costs discussed: Not applicable    Additional Information:  Spoke with son Nick, [phone], he is anxious that patient will get discharged home before she is ready, he wants to talk with the MD as he wants a medical update before patient discharges.  Did speak with Johnathon at [facility] and they will be able to take patient back even with the need for an EZstand or lift.  Patient will need a wheelchair transport arranged at discharge.      DE boss

## 2021-02-12 NOTE — PLAN OF CARE
End of Shift Summary  For vital signs and complete assessments, please see documentation flowsheets.     Pertinent assessments: VSS. Afebrile. LS clear. BS x4. Pt denies having pain. DD2 diet with thin liquids, patient did drink well during the night. Ax2 with Lift to move, turning as tolerated. PIV - SL. Pt confused, only orientated to self; alarm on bed for safety. Skin with some redness on bottom and sores on legs; treating per plan of care. Slept well.   Major Shift Events: none   Treatment Plan: Zyvox

## 2021-02-12 NOTE — PROGRESS NOTES
[facility]    Medicine Progress Note - Hospitalist Service       Date of Admission:  2/10/2021  Assessment & Plan       Ms. Moran is a 89-year-old lady with history of hypertension, chronic kidney disease (Cr 0.6-1.1), anemia (hgb 10's), advanced dementia, positive COVID-19 in November, inferior ST elevation MI in January 2021 treated conservatively without PCI recent admission in February for facial droop and UTI.  She was brought to the emergency room due to increased generalized weakness.     1.  UTI with vancomycin-resistant Enterococcus.    -Continue linezolid 600 mg twice a day.     2.  Acute exacerbation of chronic heart failure with preserved ejection fraction.  Initially requiring IV furosemide.  Does not appear significantly fluid overloaded on exam again today, 2/12.  Has now developed acute kidney injury with creatinine at 1.3.    -Stop furosemide.  -Give one-time normal saline fluid bolus 500 cc IV.    3.  Known coronary artery disease.  Medical management.    -Continue aspirin, atorvastatin, clopidogrel, lisinopril, and metoprolol.    -TLC consult for goals of care.      4.  Likely recent acute stroke.  Hospitalized 2/7 for symptoms of acute stroke.  Declined MRI or neurology evaluation at that time.  Continues to have facial droop likely due to recent stroke.  This is a likely contributing cause of her continued weakness at living facility.  Physical therapy consult.  TLC consult.    -Continue aspirin, clopidogrel, atorvastatin.     5.  Dysphagia.  Speech pathology consult.  -Dysphagia diet.     6.  Chronic kidney disease stage II.  Creatinine at baseline.  Avoid nephrotoxins as able.     7.  Hypertension.  Continue metoprol.  Hold lisinopril.     8.  GERD.  Pantoprazole 40 mg a day.     9.  Hyperlipidemia.  Continue atorvastatin.     10.  Acute on chronic weakness.  Suspect acute issues multifactorial due to UTI and likely recent stroke.  Treat UTI as noted above with linezolid.   Physical therapy consult.    11.  Acute kidney injury.  Creatinine increased to 1.3.  -Hold lisinopril.    -Hold furosemide.    -Give normal saline 500 cc IV bolus once.  -Avoid nephrotoxins as able.          Diet: Combination Diet Dysphagia Diet Level 2: Mechan Altered; Thin Liquids (water, ice chips, juice, milk gelatin, ice cream, etc)    DVT Prophylaxis: Pneumatic Compression Devices  Walton Catheter: not present  Code Status: No CPR- Do NOT Intubate           Disposition Plan   Expected discharge: 1-2 days, recommended to prior living arrangement    Danie Jones, DO  Hospitalist Service  [facility]  Contact information available via [facility] Paging/Directory    ______________________________________________________________________    Interval History   Awake.  Will say yes.  Does not say any other words at this time.  Not answering any questions.    Data reviewed today: I reviewed all medications, new labs and imaging results over the last 24 hours.     Physical Exam   Vital Signs: Temp: 97.4  F (36.3  C) Temp src: Axillary BP: 111/46 Pulse: 69   Resp: 18 SpO2: 93 % O2 Device: None (Room air)    Weight: 110 lbs 0 oz  Gen: Awake.  Will only say yes.  Is not following commands.  Eyes:  PERRL, sclera anicteric.  OP:  MMM, no lesions.  CV:  Regular, no murmurs.  Lung:  CTA b/l, normal effort.  Ab:  +BS, soft.  Skin:  Warm, dry to touch.  No rash.  Ext:  No pitting edema LE b/l.      Data   Recent Labs   Lab 02/12/21  0713 02/11/21  0725 02/10/21  2156 02/10/21  1637 02/08/21  0754 02/07/21  0752 02/07/21  0151 02/06/21  1227 02/06/21  1227   WBC  --  7.5  --  9.5 5.6 5.0  --   --  8.3   HGB  --  10.2*  --  10.2* 8.6* 8.6*  --   --  10.9*   MCV  --  103*  --  103* 103* 104*  --   --  102*   PLT  --  175  --  195 161 164  --   --  236   INR  --   --   --   --   --   --   --   --  1.15*    139  --  138 142 143  --   --  139   POTASSIUM 3.8 3.7 4.0 4.5 4.1 4.0  --   --  4.9   CHLORIDE  106 106  --  109 113* 113*  --   --  106   CO2 26 26  --  23 23 24  --   --  23   BUN 31* 17  --  18 21 28  --   --  30   CR 1.32* 0.76  --  0.73 0.70 0.91  --   --  0.86   ANIONGAP 6 7  --  6 6 6  --   --  10   RENETTA 8.2* 8.4*  --  8.5 8.2* 8.4*  --   --  9.0   GLC 93 84  --  82 77 72  --   --  107*   ALBUMIN  --   --   --  2.4*  --   --   --   --  3.2*   PROTTOTAL  --   --   --  6.5*  --   --   --   --  7.6   BILITOTAL  --   --   --  0.5  --   --   --   --  0.5   ALKPHOS  --   --   --  56  --   --   --   --  60   ALT  --   --   --  17  --   --   --   --  20   AST  --   --   --  33  --   --   --   --  23   TROPI  --   --   --  0.023  --  0.058* 0.069*   < > 0.050*    < > = values in this interval not displayed.

## 2021-02-12 NOTE — PLAN OF CARE
"End of Shift Summary  For vital signs and complete assessments, please see documentation flowsheets.     Pertinent assessments: disoriented x4, incomprehensible speech. Blanchable and nonblanchable redness to buttocks and heels, bruising to extremities & blood blisters on legs [employer] of [employer], purewick in place - some retention  ml, less than shift before, pt voided 150 ml plus voided whilst being changed. Assist x 2 and repo. On DD2 thin liquid diet - 1:1 assist feeding, monitor for pocketing, pills crushed in apple suace. On PO Zyvox and PO Lasix. UC pending.      Major Shift Events: Uneventful     Treatment Plan: Potassium protocol, PO Lasix. VRE in urine - PO Zyvox.     Bedside Nurse: Marlene Sweeney RN   Vital signs:  Temp: 97.6  F (36.4  C) Temp src: Axillary BP: 96/52 Pulse: 90   Resp: 20 SpO2: 93 % O2 Device: None (Room air)     Weight: 52.5 kg (115 lb 11.2 oz)  Estimated body mass index is 21.16 kg/m  as calculated from the following:    Height as of 1/18/21: 1.575 m (5' 2\").    Weight as of this encounter: 52.5 kg (115 lb 11.2 oz).        "

## 2021-02-12 NOTE — PROGRESS NOTES
02/12/21 1025   Quick Adds   Type of Visit Initial PT Evaluation   Living Environment   People in home facility resident   Current Living Arrangements assisted living   Home Accessibility no concerns   Living Environment Comments Receives total cares   Self-Care   Usual Activity Tolerance fair   Current Activity Tolerance poor   Regular Exercise No   Equipment Currently Used at Home wheelchair, manual   Activity/Exercise/Self-Care Comment Patient unable to provide history, per chart review, patient does not ambulate, transfers between bed and wheelchair with staff   Disability/Function   Number of times patient has fallen within last six months   (pt unable to state)   Change in Functional Status Since Onset of Current Illness/Injury yes   General Information   Onset of Illness/Injury or Date of Surgery 02/10/21   Referring Physician Danie Jonse, DO   Patient/Family Therapy Goals Statement (PT) Return to facility   Pertinent History of Current Problem (include personal factors and/or comorbidities that impact the POC) Ms. Moran is a 89-year-old lady with history of hypertension, chronic kidney disease (Cr 0.6-1.1), anemia (hgb 10's), advanced dementia, positive COVID-19 in November, inferior ST elevation MI in January 2021 treated conservatively without PCI recent admission in February for facial droop and UTI.  She was brought to the emergency room due to increased generalized weakness.   Existing Precautions/Restrictions fall   Cognition   Orientation Status (Cognition) person   Affect/Mental Status (Cognition) confused   Follows Commands (Cognition) 25-49% accuracy  (does well with gestures)   Safety Deficit (Cognition) ability to follow commands;awareness of need for assistance;insight into deficits/self-awareness;problem-solving   Pain Assessment   Patient Currently in Pain No   Integumentary/Edema   Integumentary/Edema no deficits were identifed   Posture    Posture Forward head position;Protracted  shoulders   Range of Motion (ROM)   ROM Comment B LEs WFL   Strength   Strength Comments significant global strength deficits   Bed Mobility   Comment (Bed Mobility) maxA sit<>supine   Transfers   Transfer Safety Comments sit<>stand with maxA, unable to pivot at this time   Gait/Stairs (Locomotion)   Comment (Gait/Stairs) nonambulatory at baseline   Balance   Balance Comments fair sitting balance, poor standing balance   Coordination   Coordination no deficits were identified   Muscle Tone   Muscle Tone no deficits were identified   Clinical Impression   Criteria for Skilled Therapeutic Intervention yes, treatment indicated   PT Diagnosis (PT) Impaired functional mobility   Influenced by the following impairments Significant weakness, dementia   Functional limitations due to impairments Difficulty with bed mobility, transfers   Clinical Presentation Stable/Uncomplicated   Clinical Presentation Rationale medically stable, good support   Clinical Decision Making (Complexity) low complexity   Therapy Frequency (PT) 5x/week   Predicted Duration of Therapy Intervention (days/wks) 3 days   Planned Therapy Interventions (PT) bed mobility training;strengthening;stretching;transfer training   Risk & Benefits of therapy have been explained evaluation/treatment results reviewed;care plan/treatment goals reviewed;risks/benefits reviewed;current/potential barriers reviewed;participants voiced agreement with care plan;participants included;patient  (will need to readdress)   PT Discharge Planning    PT Discharge Recommendation (DC Rec) Long term care facility   PT Rationale for DC Rec Rec discharge back to LTC facility with sarastedy/EZ stand or lift transfers. Continue home cares as previously.    PT Brief overview of current status  Unable to pivot this date, rec nursing continue to utilize lift.    Total Evaluation Time   Total Evaluation Time (Minutes) 5

## 2021-02-12 NOTE — PLAN OF CARE
Disoriented x4. Minimal speech. Up with lift, sat in chair. Incont, has purewick in place, minimal output, bladder scanned to check for retention, <300mL. On DD2/Thin diet, total feed, appetite fair. Doesn't appear to be in pain. Buttock red & raw, applied mepilex & barrier cream. Incont small stool. PIV SL. Cr 1.32, gave 500mL bolus. On Zyvox.

## 2021-02-12 NOTE — PLAN OF CARE
/59 (BP Location: Right arm)   Pulse 83   Temp 97.9  F (36.6  C) (Oral)   Resp 24   Wt 52.5 kg (115 lb 11.2 oz)   SpO2 92%   BMI 21.16 kg/m      End of Shift Summary  For vital signs and complete assessments, please see documentation flowsheets.     Pertinent assessments: disoriented, incomprehensible speech. Blanchable and nonblanchable redness to buttocks, bruising to extremities & Blood blisters on legs [employer], purewick in place - some retention  ml. Assist x 2 and repo. On DD2 thin liquid diet - 1:1 assist feeding, monitor for pocketing, pills crushed in apple suace. On PO zyvox and PO lasix. UC pending.    Major Shift Events: Uneventful    Treatment Plan: potassium protocol, IV lasix. VRE in urine - po zyvox.     Bedside Nurse: Deangelo Nichols RN

## 2021-02-12 NOTE — CONSULTS
[facility]  Palliative Care Consultation   Text Page    Assessment & Plan   Tracey Moran is a 89 year old female who was admitted on 2/10/2021.   Consulted by Dr. Danie Jones, DO  to assist goals of care with symptom of increasing weakness and lethargy in the setting of CHF exacerbation and UTI .    Recommendations:  1. Goals of Care- No CPR- Do NOT Intubate  Hospitalization goals discussed reviewed code status and completed POLST. Restorative care with hospitalization.  The degree of aggresive or invasive procedures like imaging and tube feedings would be on a case by a case basis.      A.Decisional Capacity-  Unreliable. Patient has an advance directive dated 02.22.18.  Consents and decision making should be done by  Ronal Moran , the primary Health Care Agent.  Alternates are  Michele Shepherd  The alterate is the  for update.  Attempt to include both in decision making.     B. POLST updated today indicating rehospitalization with selective treatment.                  Chronic dementia with functional decline 26 lb weight loss from 6/6/19 to 1/19/21.  Stage 3 buttocks pressure sores, Thad score 12       2. Pain does not appear to be in pain. 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   Patient's opioid use in past 24 hours: 0 = 0mg Daily Morphine Equivalent  [place] Board of Pharmacy Data Base Reviewed:    YES: As expected, no concern for misuse/abuse of controlled medications based on this report.  Opioid risk score 0  ORT 0   -continue on going monitoring for signs and symptoms of pain      3. Dyspnea none apparent continue to monitor in the presence of pulmonary edema. CHF exacerbation, heart failure with preserved EF.      4. Impairment of skin integrity ---follow by [facility] appreciate input from Christopher Luevano RN      5. Generalized weakness    Physical Therapy   Speech language  "for SPL recommend DD2 solids, thin liquids      6. Spiritual Care  Oriented to Spiritual Health as part of Palliative Care team.  Spiritual Background:  Christianity free     7. Care Planning  Appreciate Care of Lissa Luevano with care coordination and care dispostion .   Likely discharge to [facility]  Medications for discharge  No changes     Medical Decision Making and Goals of Care:  Discussed on February 12, 2021 with Katarina BEDOYAC, APRN, CNP, ACHPN:  I met with the son Michele at the bedside,  He smiled and was hold the patient 's hand in loving way.  The patient seemed aware of his presence.   I asked if the POLST could be reviewed as the current one in the e- record is not up to date with code status. I reminded this form is a communication tool between all care providers,hardik with transitioning back to [facility] at discharge  After the POLST was reviewed and sign he said well \"things have not changed from last time.\"   He still would like no CPR or intubation,  Restorative care with hospitalization.  The degree of aggresive or invasive procedures like imaging and tube feedings would be on a case by a case basis.   POLST original given to son. Emailed to honoring choices for urgent validation to medical record    Thank you for involving us in the patient's care.     Katarina BEDOYAC, APRN, CNP, ACHPN  Pain Management and Palliative Care  [facility]  Pgr: [phone]    Time Spent on this Encounter   Total unit/floor time 120  minutes, time consisted of the following, examination of the patient, reviewing the record and completing documentation. >50% of time spent in counseling and coordination of care, Bedside Nurse Macey Negron RN .  Time spend counseling with family consisted of the following topics, goals of care and symptom management.    Understanding of disease process:   Son Michele has a good understanding of the natural disease " progression of dementia. We discussed number of admission over past 6 months as well as decline in functional status.  He seems in agreement that the direction of care toward comfort may be indicated by    Further decline, recurrent admission, discomfort, sleeping more, weight loss.    .    History of Present Illness   History is obtained from the electronic health record    Tracey Moran is a 89 year old female with a past medical history HTN, dementia, CKD, dysphagia, expressive aphasia and is [place]. She had COVID-19 this past November and is now COVID recovered.   The patient presents to the ED on 2/10 /21 from her [facility] with increasing weakness and lethargy. She was recently discharged from [facility] 01/18- 01/21 following a inferior STEMI. The patient at that time also has labial perineal wounds.  She had visit in the ED on 02/06 for acute cystitis.  On this admission, noted were traumatic bruising to shins and perineal wounds and with further studies supporting CHF exacerbation and UTI.    As noted she has been hospitalized or in the ED  3  times in the past 6  months for  above and fall this past October. There has been a decline in daily function due to generalized weakness and concerns for increasing dysphagia.  There is no reported or documented weight loss since last admission, but prior to this noting a 16 # weight loss over the past 18 months .  Speech therapy has been consulted and observed dysphagia slightly below baseline  with out  evidence of aspiration.      Past Medical History   I have reviewed this patient's medical history and updated it with pertinent information if needed.   Past Medical History:   Diagnosis Date     Hypertension        Past Surgical History   I have reviewed this patient's surgical history and updated it with pertinent information if needed.  Past Surgical History:   Procedure Laterality Date     ESOPHAGOSCOPY, GASTROSCOPY, DUODENOSCOPY (EGD), COMBINED N/A 12/6/2017    Procedure:  COMBINED ESOPHAGOSCOPY, GASTROSCOPY, DUODENOSCOPY (EGD);  gastroscopy;  Surgeon: Melchor Mancera MD;  Location:  GI       Prior to Admission Medications   Prior to Admission Medications   Prescriptions Last Dose Informant Patient Reported? Taking?   Criselda Protect (EUCERIN) external cream 2/10/2021 at Unknown time  Yes Yes   Sig: Apply topically 2 times daily Criselda Barrier Cream (apply to incontinence rash)   Nutritional Supplements (ENSURE PO)   Yes No   Si two times daily (Family supplies)   acetaminophen (TYLENOL) 500 MG tablet   Yes No   Sig: Take 500 mg by mouth 3 times daily as needed for mild pain   aspirin (ASA) 81 MG chewable tablet 2/10/2021 at Unknown time  No Yes   Sig: Take 1 tablet (81 mg) by mouth daily   atorvastatin (LIPITOR) 20 MG tablet 2/10/2021 at Unknown time  No Yes   Sig: Take 1 tablet (20 mg) by mouth daily   cefuroxime (CEFTIN) 500 MG tablet 2/10/2021 at Unknown time  No Yes   Sig: Take 1 tablet (500 mg) by mouth 2 times daily for 5 days   clopidogrel (PLAVIX) 75 MG tablet 2/10/2021 at Unknown time  No Yes   Sig: Take 1 tablet (75 mg) by mouth daily   diclofenac (VOLTAREN) 1 % topical gel 2/10/2021 at Unknown time  Yes Yes   Sig: Place 2 g onto the skin 3 times daily For right knee pain   lisinopril (ZESTRIL) 20 MG tablet 2/10/2021 at Unknown time  No Yes   Sig: Take 1 tablet (20 mg) by mouth 2 times daily   menthol-zinc oxide (CALMOSEPTINE) 0.44-20.6 % OINT ointment 2/10/2021 at Unknown time  Yes Yes   Sig: Apply 1 g topically 4 times daily as needed for skin protection (to open area on right buttock)   metoprolol tartrate (LOPRESSOR) 25 MG tablet 2/10/2021 at Unknown time  Yes Yes   Sig: Take 12.5 mg by mouth 2 times daily   miconazole (MICATIN) 2 % external powder 2/10/2021 at Unknown time  No Yes   Sig: Apply topically 3 times daily   multivitamin w/minerals (THERA-VIT-M) tablet 2/10/2021 at Unknown time Nursing Home Yes Yes   Sig: Take 1 tablet by mouth daily   nystatin (MYCOSTATIN)  789853 UNIT/GM external powder 2/10/2021 at Unknown time Nursing Home Yes Yes   Sig: Apply topically 2 times daily Apply to rash under bilateral breast until resolved   pantoprazole (PROTONIX) 40 MG EC tablet 2/10/2021 at Unknown time  Yes Yes   Sig: Take 40 mg by mouth daily   polyethylene glycol (MIRALAX) 17 g packet   No Yes   Sig: Take 17 g by mouth daily as needed for constipation   senna-docusate (SENOKOT-S/PERICOLACE) 8.6-50 MG tablet   No Yes   Sig: Take 1 tablet by mouth 2 times daily as needed for constipation      Facility-Administered Medications: None     Allergies   Allergies   Allergen Reactions     Demerol [Meperidine]      Dust Mites      Peanuts [Nuts]      Penicillins      Has tolerated cephalosporins (ceftriaxone, cefdinir)     Pollen Extract        Social History   I have updated and reviewed the following Social History Narrative:   Social History     Social History Narrative     Not on file           Living situation:  Lives at [facility] and receives home care from [facility]        Support system:  [facility]ibal        Actual/Potential Caregiver:  [facility] and staff jensen Duran        Functional status:  Needs assistance with all ADL's, w/chair dependent        Financial concerns:  None        Substance use disorder (past / present):  None     History of substance use/abuse:  None    Family History   I have reviewed this patient's family history and updated it with pertinent information if needed.   No family history on file.    Review of Systems   The 10 point Review of Systems is negative other than noted in the HPI or here.     Palliative Symptom Review (0=no symptom/no concern, 1=mild, 2=moderate, 3=severe):      Pain: 0-none      Fatigue: 2-moderate      Nausea: 0-none      Constipation: 0-none      Diarrhea: 0-none      Depressive Symptoms: 0-none      Anxiety: 0-none      Drowsiness: 0-none      Poor Appetite: 1-mild      Shortness of Breath: 0-none      Insomnia:  0-none      Other: aphasia  3-severe      Overall (0 good/no concerns, 3 very poor):  2    Physical Exam   Temp:  [97.4  F (36.3  C)-98.1  F (36.7  C)] 97.5  F (36.4  C)  Pulse:  [69-90] 74  Resp:  [16-24] 18  BP: ()/(43-59) 116/43  SpO2:  [91 %-93 %] 92 %  110 lbs 0 oz  Exam:  GENERAL APPEARANCE:  Alert, thin, cooperative  EYES:  EOM, conjunctivae, lids, pupils and irises normal  NECK:  No adenopathy,masses or thyromegaly  RESP:  respiratory effort and palpation of chest normal, crackles  at bases bilaterally   CV:  Palpation and auscultation of heart done , regular rate and rhythm, no murmur, rub, or gallop, no edema  ABDOMEN:  normal bowel sounds, soft, nontender, no hepatosplenomegaly or other masses, no guarding or rebound, no organomegaly  :    Examination of external genitalia with pure wick   M/S:   RUIZ X 4 with stimulation, non tender throughout   SKIN:  wound appearance: stage 2 coccyxgeal with redness, brusing lower legs, heel pads w/i redenss  NEURO:   Examination of sensation by touch is throughout,  left facial droop at baseline   PSYCH:  disoriented, calm cooperative    Delirium Screen/CAM:  Delirium = (#1 and #2 = YES) + (#3 and/or #4)   1) Acute onset and fluctuating course:   No   (acute change in mental status from baseline over last 24 hours)  2) Inattention:   YES   (difficulty focusing, distractible, can't follow conversation)  3) Disorganized thinking:   Not applicable   (score only if #1 and #2 are YES)  (rambling/irrelevant conversation, unclear/illogical thoughts, inconsistency)  4) Altered level of consciousness:   No   (score only if #1 and #2 are YES)  (other than alert, calm, cooperative)    Delirium/CAM score: 2/4  Interpretation:  1)  Delirium:  Absent  2)  Type:    3)  Severity:       Data   Results for orders placed or performed during the hospital encounter of 02/10/21 (from the past 24 hour(s))   Basic metabolic panel   Result Value Ref Range    Sodium 138 133 - 144 mmol/L     Potassium 3.8 3.4 - 5.3 mmol/L    Chloride 106 94 - 109 mmol/L    Carbon Dioxide 26 20 - 32 mmol/L    Anion Gap 6 3 - 14 mmol/L    Glucose 93 70 - 99 mg/dL    Urea Nitrogen 31 (H) 7 - 30 mg/dL    Creatinine 1.32 (H) 0.52 - 1.04 mg/dL    GFR Estimate 34 (L) >60 mL/min/[1.73_m2]    GFR Estimate If Black 39 (L) >60 mL/min/[1.73_m2]    Calcium 8.2 (L) 8.5 - 10.1 mg/dL

## 2021-02-13 LAB
ANION GAP SERPL CALCULATED.3IONS-SCNC: 5 MMOL/L (ref 3–14)
BUN SERPL-MCNC: 29 MG/DL (ref 7–30)
CALCIUM SERPL-MCNC: 8.2 MG/DL (ref 8.5–10.1)
CHLORIDE SERPL-SCNC: 108 MMOL/L (ref 94–109)
CO2 SERPL-SCNC: 26 MMOL/L (ref 20–32)
CREAT SERPL-MCNC: 1.03 MG/DL (ref 0.52–1.04)
ERYTHROCYTE [DISTWIDTH] IN BLOOD BY AUTOMATED COUNT: 14.1 % (ref 10–15)
GFR SERPL CREATININE-BSD FRML MDRD: 46 ML/MIN/{1.73_M2}
GLUCOSE SERPL-MCNC: 83 MG/DL (ref 70–99)
HCT VFR BLD AUTO: 31.5 % (ref 35–47)
HGB BLD-MCNC: 10 G/DL (ref 11.7–15.7)
MCH RBC QN AUTO: 33.4 PG (ref 26.5–33)
MCHC RBC AUTO-ENTMCNC: 31.7 G/DL (ref 31.5–36.5)
MCV RBC AUTO: 105 FL (ref 78–100)
PLATELET # BLD AUTO: 219 10E9/L (ref 150–450)
POTASSIUM SERPL-SCNC: 4 MMOL/L (ref 3.4–5.3)
RBC # BLD AUTO: 2.99 10E12/L (ref 3.8–5.2)
SODIUM SERPL-SCNC: 139 MMOL/L (ref 133–144)
WBC # BLD AUTO: 11.2 10E9/L (ref 4–11)

## 2021-02-13 PROCEDURE — 120N000001 HC R&B MED SURG/OB

## 2021-02-13 PROCEDURE — 99207 PR CDG-MDM COMPONENT: MEETS LOW - DOWN CODED: CPT | Performed by: INTERNAL MEDICINE

## 2021-02-13 PROCEDURE — 99232 SBSQ HOSP IP/OBS MODERATE 35: CPT | Performed by: INTERNAL MEDICINE

## 2021-02-13 PROCEDURE — 80048 BASIC METABOLIC PNL TOTAL CA: CPT | Performed by: INTERNAL MEDICINE

## 2021-02-13 PROCEDURE — 85027 COMPLETE CBC AUTOMATED: CPT | Performed by: INTERNAL MEDICINE

## 2021-02-13 PROCEDURE — 36415 COLL VENOUS BLD VENIPUNCTURE: CPT | Performed by: INTERNAL MEDICINE

## 2021-02-13 PROCEDURE — 250N000013 HC RX MED GY IP 250 OP 250 PS 637: Performed by: INTERNAL MEDICINE

## 2021-02-13 RX ADMIN — ASPIRIN 81 MG CHEWABLE TABLET 81 MG: 81 TABLET CHEWABLE at 08:52

## 2021-02-13 RX ADMIN — ATORVASTATIN CALCIUM 20 MG: 20 TABLET, FILM COATED ORAL at 08:53

## 2021-02-13 RX ADMIN — MICONAZOLE NITRATE: 20 POWDER TOPICAL at 09:00

## 2021-02-13 RX ADMIN — Medication 12.5 MG: at 08:52

## 2021-02-13 RX ADMIN — MULTIPLE VITAMINS W/ MINERALS TAB 1 TABLET: TAB at 08:52

## 2021-02-13 RX ADMIN — PANTOPRAZOLE SODIUM 40 MG: 40 TABLET, DELAYED RELEASE ORAL at 08:53

## 2021-02-13 RX ADMIN — LINEZOLID 600 MG: 600 TABLET, FILM COATED ORAL at 20:18

## 2021-02-13 RX ADMIN — Medication 12.5 MG: at 20:17

## 2021-02-13 RX ADMIN — MICONAZOLE NITRATE: 20 POWDER TOPICAL at 20:17

## 2021-02-13 RX ADMIN — LINEZOLID 600 MG: 600 TABLET, FILM COATED ORAL at 08:53

## 2021-02-13 RX ADMIN — CLOPIDOGREL BISULFATE 75 MG: 75 TABLET ORAL at 08:53

## 2021-02-13 ASSESSMENT — ACTIVITIES OF DAILY LIVING (ADL)
ADLS_ACUITY_SCORE: 27

## 2021-02-13 NOTE — PROGRESS NOTES
[facility]  Hospitalist Progress Note  Enrrique Fields MD 02/13/2021    Reason for Stay/active problem list      VRE urinary tract infection    Suspected acute CHF, diastolic    Acute kidney injury    Advanced dementia with poor functional status at baseline         Assessment and Plan:        Summary of Stay:     Tracey Moran is a 89 year old female who presents with generalized weakness, lethargy.     She is a 89-year-old lady with history of hypertension, chronic kidney disease (Cr 0.6-1.1), anemia (hgb 10's), advanced dementia, positive COVID-19 in November, inferior ST elevation MI in January 2021 treated conservatively without PCI recent admission in February for facial droop and UTI.     She was sent to the emergency room by her care facility because of worsening weakness and lethargy.  Her most recent admission was from 2/6/2021-2/8/2021 at [facility] for facial droop.  She was seen by neurology team.  MRI was deferred in the setting of her advanced dementia and little symptoms.    Possibility of UTI was there.  She was discharged on Keflex.  Later on, her urine culture came back positive for VRE.  But as the patient was already getting better no further treatment was recommended.     She was sent to the emergency room because of worsening generalized weakness and lethargy.  Dr. Cortes from the emergency room spoke to the [facility].  Apparently at baseline the patient is not able to walk but able to self propel herself in a wheelchair.     She has not been able to do any of that.  For that reason she was sent to the emergency room.  In the ER, the main issue is suspected to be pulmonary edema and CHF exacerbation.  She noted to have a BNP of 24,000, no previous BNP available.  Chemistry was unremarkable.  CBC was also unremarkable.  Chest x-ray shows pulmonary edema.     Patient was given IV linezolid in the emergency room for the recent VRE urine  culture.  Also received IV Lasix 60 mg.  She is being admitted to internal medicine service for CHF exacerbation.      Patient progress during stay: Patient was admitted and was continued with oral Zyvox and was closely monitored.  She was initially treated with IV Lasix which resulted in acute kidney injury and this was discontinued.  Patient is also required IV fluid bolus for decreased urine output.  She remained afebrile without significant overwhelming sepsis  She remained very weak and deconditioned in the setting of advanced dementia.  Palliative care team was consulted            Problem List with Assessment and Plan          1. Advanced dementia with generalized weakness acute on chronic in the setting of UTI, CHF and acute kidney injury      Patient is DNR/DNI, and was seen by palliative care team.  Likely benefits from hospice care    We will discuss with family    2. VRE UTI: Afebrile, continue Zyvox orally and may be continued for additional 10 days    3.  Acute CHF: Initial diastolic congestive heart failure with pulmonary edema treated with IV Lasix.  Currently she appears to be dehydrated and with elevated serum creatinine.    Continue to hold Lasix, monitor for now    4.  Acute injury: Improving serum creatinine at 1.03 today down from 1.32 yesterday    Continue to monitor kidney function.  Avoid nephrotoxic medications    5.  Chronic comorbid medical conditions    Frailty    CVA    Dysphagia    Chronic kidney disease    GERD    Hyperlipidemia    Advanced dementia in a long-term care facility        Plan for today:    Appears to have poor prognosis    We will discuss with family regarding further care plan including consideration of hospice care        LDA     Access : Peripheral     Walton Catheter: not present        FEN :    Orders Placed This Encounter      Combination Diet Dysphagia Diet Level 2: Mechan Altered; Thin Liquids (water, ice chips, juice, milk gelatin, ice cream, etc)            Intake/Output Summary (Last 24 hours) at 2/13/2021 1247  Last data filed at 2/13/2021 0820  Gross per 24 hour   Intake 420 ml   Output 350 ml   Net 70 ml          DVT Prophylaxis: Pneumatic Compression Devices    Code Status:  DNR / DNI    Estimated discharge  disposition and plan:  May discharge  to long-term care in the next 2 days.        Interval History (Subjective):        Patient is seen and examined by me today and medical record reviewed.Overnight events noted and care discussed with nursing staff.  Patient care assumed today.  She looks very weak and unable to communicate.  But she is alert and interactive to some extent.                        Physical Exam:        Last Vital Signs:  /56 (BP Location: Right arm)   Pulse 76   Temp 98.4  F (36.9  C) (Oral)   Resp 18   Wt 47.2 kg (104 lb 1.6 oz)   SpO2 98%   BMI 19.04 kg/m      I/O last 3 completed shifts:  In: 540 [P.O.:540]  Out: 350 [Urine:350]    Wt Readings from Last 5 Encounters:   02/13/21 47.2 kg (104 lb 1.6 oz)   02/06/21 55.8 kg (123 lb)   01/18/21 49.9 kg (110 lb 0.2 oz)   03/11/20 56.1 kg (123 lb 11.2 oz)   10/28/19 56.7 kg (125 lb 1.6 oz)        Constitutional:  Alert but confused.  Unable to interact effectively     Respiratory: Clear to auscultation bilaterally, no crackles or wheezing   Cardiovascular: Regular rate and rhythm, normal S1 and S2, and no murmur noted   Abdomen: Normal bowel sounds, soft, non-distended, non-tender   Skin: No rashes, no cyanosis, dry to touch   Neuro: Alert, confused.  Pleasant   Extremities: No edema, normal range of motion   Other(s):        All other systems: Negative          Medications:        All current medications were reviewed with changes reflected in problem list.         Data:      All new lab and imaging data was reviewed.      Data reviewed today: I reviewed all new labs and imaging results over the last 24 hours. I personally reviewed no images or EKG's today      Recent Labs    Lab 02/13/21  0723 02/11/21  0725 02/10/21  1637   WBC 11.2* 7.5 9.5   HGB 10.0* 10.2* 10.2*   HCT 31.5* 31.3* 31.7*   * 103* 103*    175 195     Recent Labs   Lab 02/11/21  1310 02/10/21  1637 02/07/21  1630 02/06/21  1303 02/06/21  1302   CULT 50,000 to 100,000 colonies/mL  mixed urogenital gloria  Susceptibility testing not routinely done   No growth after 3 days  No growth after 3 days >100,000 colonies/mL  Enterococcus faecium  *  >100,000 colonies/mL  Strain 2  Enterococcus faecium (VRE)  *  Critical Value/Significant Value, preliminary result only, called to and read back by  FREDERICK GONCALVES RN 1059 2.11.21 [facility]   No growth  No growth >100,000 colonies/mL  mixed urogenital gloria  Susceptibility testing not routinely done    Multiple morphotypes present with no predominant organism.  Growth consistent with   probable contamination during collection.  Suggest repeat specimen if clinically   indicated.       Recent Labs   Lab 02/13/21  0723 02/12/21  0713 02/11/21  0725 02/10/21  1637 02/10/21  1637 02/07/21  0752 02/07/21  0752    138 139  --  138   < > 143   POTASSIUM 4.0 3.8 3.7   < > 4.5   < > 4.0   CHLORIDE 108 106 106  --  109   < > 113*   CO2 26 26 26  --  23   < > 24   ANIONGAP 5 6 7  --  6   < > 6   GLC 83 93 84  --  82   < > 72   BUN 29 31* 17  --  18   < > 28   CR 1.03 1.32* 0.76  --  0.73   < > 0.91   GFRESTIMATED 46* 34* 66  --  70   < > 53*   GFRESTBLACK 53* 39* 77  --  81   < > 62   RENETTA 8.2* 8.2* 8.4*  --  8.5   < > 8.4*   MAG  --   --   --   --   --   --  1.8   PROTTOTAL  --   --   --   --  6.5*  --   --    ALBUMIN  --   --   --   --  2.4*  --   --    BILITOTAL  --   --   --   --  0.5  --   --    ALKPHOS  --   --   --   --  56  --   --    AST  --   --   --   --  33  --   --    ALT  --   --   --   --  17  --   --     < > = values in this interval not displayed.       Recent Labs   Lab 02/13/21  0723 02/12/21  0713 02/11/21  0725 02/10/21  1637 02/08/21  0754 02/07/21  9213  02/07/21  0047 02/07/21  0047 02/06/21  2247   GLC 83 93 84 82 77  --    < >  --   --    BGM  --   --   --   --   --  105*  --  92 82    < > = values in this interval not displayed.       No results for input(s): INR in the last 168 hours.      Recent Labs   Lab 02/10/21  1637 02/07/21  0752 02/07/21  0151   TROPI 0.023 0.058* 0.069*       No results found for this or any previous visit (from the past 48 hour(s)).    COVID Status:  COVID-19 PCR Results    COVID-19 PCR Results 8/6/20 2/6/21 2/10/21   COVID-19 Virus PCR to [facility] - Result Not Detected     COVID-19 Virus PCR to [facility] - Source Nasopharyngeal     SARS-CoV-2 Virus Specimen Source   Nasopharyngeal   Flu A/B & SARS-COV-2 PCR Source  Nasopharyngeal    SARS-CoV-2 PCR Result  NEGATIVE NEGATIVE      Comments are available for some flowsheets but are not being displayed.         COVID-19 Antibody Results, Testing for Immunity    COVID-19 Antibody Results, Testing for Immunity   No data to display.              Disclaimer: This note consists of symbols derived from keyboarding, dictation and/or voice recognition software. As a result, there may be errors in the script that have gone undetected. Please consider this when interpreting information found in this chart.

## 2021-02-13 NOTE — PROGRESS NOTES
Cross coverage.  Paged by nursing staff due to continued poor urine output.  Notable elevated creatinine.  Will give 500 NS bolus x1.

## 2021-02-13 NOTE — PLAN OF CARE
Pertinent assessments: Disoriented x4. Minimal speech. Up with lift, Incont, has purewick in place. On DD2/Thin diet, total feed. Doesn't appear to be in pain. Buttock red & raw, applied mepilex & barrier cream.  PIV SL.     Major Shift Events: Uneventful    Treatment Plan: Zyvox     Bedside Nurse: Vita Loo RN

## 2021-02-14 ENCOUNTER — APPOINTMENT (OUTPATIENT)
Dept: PHYSICAL THERAPY | Facility: CLINIC | Age: 86
DRG: 291 | End: 2021-02-14
Payer: COMMERCIAL

## 2021-02-14 ENCOUNTER — APPOINTMENT (OUTPATIENT)
Dept: SPEECH THERAPY | Facility: CLINIC | Age: 86
DRG: 291 | End: 2021-02-14
Payer: COMMERCIAL

## 2021-02-14 LAB
ANION GAP SERPL CALCULATED.3IONS-SCNC: 5 MMOL/L (ref 3–14)
BUN SERPL-MCNC: 27 MG/DL (ref 7–30)
CALCIUM SERPL-MCNC: 8.1 MG/DL (ref 8.5–10.1)
CHLORIDE SERPL-SCNC: 109 MMOL/L (ref 94–109)
CO2 SERPL-SCNC: 25 MMOL/L (ref 20–32)
CREAT SERPL-MCNC: 0.88 MG/DL (ref 0.52–1.04)
ERYTHROCYTE [DISTWIDTH] IN BLOOD BY AUTOMATED COUNT: 13.8 % (ref 10–15)
GFR SERPL CREATININE-BSD FRML MDRD: 55 ML/MIN/{1.73_M2}
GLUCOSE SERPL-MCNC: 85 MG/DL (ref 70–99)
HCT VFR BLD AUTO: 30.4 % (ref 35–47)
HGB BLD-MCNC: 9.7 G/DL (ref 11.7–15.7)
MCH RBC QN AUTO: 33.8 PG (ref 26.5–33)
MCHC RBC AUTO-ENTMCNC: 31.9 G/DL (ref 31.5–36.5)
MCV RBC AUTO: 106 FL (ref 78–100)
PLATELET # BLD AUTO: 226 10E9/L (ref 150–450)
POTASSIUM SERPL-SCNC: 4.1 MMOL/L (ref 3.4–5.3)
RBC # BLD AUTO: 2.87 10E12/L (ref 3.8–5.2)
SODIUM SERPL-SCNC: 139 MMOL/L (ref 133–144)
WBC # BLD AUTO: 8.6 10E9/L (ref 4–11)

## 2021-02-14 PROCEDURE — 36415 COLL VENOUS BLD VENIPUNCTURE: CPT | Performed by: INTERNAL MEDICINE

## 2021-02-14 PROCEDURE — 92526 ORAL FUNCTION THERAPY: CPT | Mod: GN | Performed by: SPEECH-LANGUAGE PATHOLOGIST

## 2021-02-14 PROCEDURE — 250N000013 HC RX MED GY IP 250 OP 250 PS 637: Performed by: INTERNAL MEDICINE

## 2021-02-14 PROCEDURE — 99232 SBSQ HOSP IP/OBS MODERATE 35: CPT | Performed by: INTERNAL MEDICINE

## 2021-02-14 PROCEDURE — 97530 THERAPEUTIC ACTIVITIES: CPT | Mod: GP | Performed by: PHYSICAL THERAPIST

## 2021-02-14 PROCEDURE — 120N000001 HC R&B MED SURG/OB

## 2021-02-14 PROCEDURE — 99207 PR CDG-MDM COMPONENT: MEETS LOW - DOWN CODED: CPT | Performed by: INTERNAL MEDICINE

## 2021-02-14 PROCEDURE — 85027 COMPLETE CBC AUTOMATED: CPT | Performed by: INTERNAL MEDICINE

## 2021-02-14 PROCEDURE — 80048 BASIC METABOLIC PNL TOTAL CA: CPT | Performed by: INTERNAL MEDICINE

## 2021-02-14 RX ADMIN — MULTIPLE VITAMINS W/ MINERALS TAB 1 TABLET: TAB at 09:02

## 2021-02-14 RX ADMIN — PANTOPRAZOLE SODIUM 40 MG: 40 TABLET, DELAYED RELEASE ORAL at 09:02

## 2021-02-14 RX ADMIN — CLOPIDOGREL BISULFATE 75 MG: 75 TABLET ORAL at 09:02

## 2021-02-14 RX ADMIN — MICONAZOLE NITRATE: 20 POWDER TOPICAL at 09:03

## 2021-02-14 RX ADMIN — MICONAZOLE NITRATE: 20 POWDER TOPICAL at 19:55

## 2021-02-14 RX ADMIN — ATORVASTATIN CALCIUM 20 MG: 20 TABLET, FILM COATED ORAL at 09:02

## 2021-02-14 RX ADMIN — Medication 12.5 MG: at 19:55

## 2021-02-14 RX ADMIN — ASPIRIN 81 MG CHEWABLE TABLET 81 MG: 81 TABLET CHEWABLE at 09:02

## 2021-02-14 RX ADMIN — LINEZOLID 600 MG: 600 TABLET, FILM COATED ORAL at 19:55

## 2021-02-14 RX ADMIN — Medication 12.5 MG: at 09:02

## 2021-02-14 RX ADMIN — LINEZOLID 600 MG: 600 TABLET, FILM COATED ORAL at 09:02

## 2021-02-14 ASSESSMENT — ACTIVITIES OF DAILY LIVING (ADL)
ADLS_ACUITY_SCORE: 31

## 2021-02-14 NOTE — PROGRESS NOTES
[facility]  Hospitalist Progress Note  Enrrique Fields MD 02/14/2021    Reason for Stay/active problem list      VRE urinary tract infection    Suspected acute CHF, diastolic    Acute kidney injury    Advanced dementia with poor functional status at baseline         Assessment and Plan:        Summary of Stay:     Tracey Moran is a 89 year old female who presents with generalized weakness, lethargy.     She is a 89-year-old lady with history of hypertension, chronic kidney disease (Cr 0.6-1.1), anemia (hgb 10's), advanced dementia, positive COVID-19 in November, inferior ST elevation MI in January 2021 treated conservatively without PCI recent admission in February for facial droop and UTI.     She was sent to the emergency room by her care facility because of worsening weakness and lethargy.  Her most recent admission was from 2/6/2021-2/8/2021 at [facility] for facial droop.  She was seen by neurology team.  MRI was deferred in the setting of her advanced dementia and little symptoms.    Possibility of UTI was there.  She was discharged on Keflex.  Later on, her urine culture came back positive for VRE.  But as the patient was already getting better no further treatment was recommended.     She was sent to the emergency room because of worsening generalized weakness and lethargy.  Dr. Cortes from the emergency room spoke to the [facility].  Apparently at baseline the patient is not able to walk but able to self propel herself in a wheelchair.     She has not been able to do any of that.  For that reason she was sent to the emergency room.  In the ER, the main issue is suspected to be pulmonary edema and CHF exacerbation.  She noted to have a BNP of 24,000, no previous BNP available.  Chemistry was unremarkable.  CBC was also unremarkable.  Chest x-ray shows pulmonary edema.     Patient was given IV linezolid in the emergency room for the recent VRE urine  culture.  Also received IV Lasix 60 mg.  She is being admitted to internal medicine service for CHF exacerbation.      Patient progress during stay: Patient was admitted and was continued with oral Zyvox and was closely monitored.  She was initially treated with IV Lasix which resulted in acute kidney injury and this was discontinued.  Patient is also required IV fluid bolus for decreased urine output.  She remained afebrile without significant overwhelming sepsis  She remained very weak and deconditioned in the setting of advanced dementia.  Palliative care team was consulted            Problem List with Assessment and Plan          1. Advanced dementia with generalized weakness acute on chronic in the setting of UTI, CHF and acute kidney injury      Patient is DNR/DNI, and was seen by palliative care team.  Likely benefits from hospice care    Patient is slightly better today but continues to be demented with poor interaction with provider    Spoke with patient's son Nick over the phone and discussed care plan.  He is considering hospice care and would like to discuss with palliative care team tomorrow before likely discharge in the afternoon.    2. VRE UTI: Afebrile, continue Zyvox orally and may be continued for additional 10 days    3.  Acute CHF: Initial diastolic congestive heart failure with pulmonary edema treated with IV Lasix.  Currently she appears to be dehydrated and with elevated serum creatinine.    Continue to hold Lasix, monitor for now    May resume tomorrow    4.  Acute injury: Improving serum creatinine at 1.03 today down from 1.32 yesterday    Continue to monitor kidney function.  Avoid nephrotoxic medications    5.  Chronic comorbid medical conditions    Frailty    CVA    Dysphagia    Chronic kidney disease    GERD    Hyperlipidemia    Advanced dementia in a long-term care facility        Plan for today:    Social service and palliative care team to discuss with family tomorrow regarding  hospice care plan        LDA     Access : Peripheral     Walton Catheter: not present        FEN :    Orders Placed This Encounter      Combination Diet Dysphagia Diet Level 1: Pureed; Thin Liquids (water, ice chips, juice, milk gelatin, ice cream, etc)           Intake/Output Summary (Last 24 hours) at 2/13/2021 1247  Last data filed at 2/13/2021 0820  Gross per 24 hour   Intake 420 ml   Output 350 ml   Net 70 ml          DVT Prophylaxis: Pneumatic Compression Devices    Code Status:  DNR / DNI    Estimated discharge  disposition and plan: May discharge  tomorrow in the afternoon        Interval History (Subjective):        Patient is seen and examined by me today and medical record reviewed.Overnight events noted and care discussed with nursing staff.  Slightly better today.  No complaint of fever, chills pain or shortness of breath.  I called patient's son and discussed with him about care plan.  He is interested in discussing with palliative care team tomorrow about possibility of hospice care                       Physical Exam:        Last Vital Signs:  /50 (BP Location: Right arm)   Pulse 71   Temp 98.4  F (36.9  C) (Axillary)   Resp 20   Wt 48.3 kg (106 lb 6.4 oz)   SpO2 99%   BMI 19.46 kg/m      I/O last 3 completed shifts:  In: 340 [P.O.:340]  Out: 150 [Urine:150]    Wt Readings from Last 5 Encounters:   02/14/21 48.3 kg (106 lb 6.4 oz)   02/06/21 55.8 kg (123 lb)   01/18/21 49.9 kg (110 lb 0.2 oz)   03/11/20 56.1 kg (123 lb 11.2 oz)   10/28/19 56.7 kg (125 lb 1.6 oz)        Constitutional:  Alert but confused.  Unable to interact effectively     Respiratory: Clear to auscultation bilaterally, no crackles or wheezing   Cardiovascular: Regular rate and rhythm, normal S1 and S2, and no murmur noted   Abdomen: Normal bowel sounds, soft, non-distended, non-tender   Skin: No rashes, no cyanosis, dry to touch   Neuro: Alert, confused.  Pleasant   Extremities: No edema, normal range of motion    Other(s):        All other systems: Negative          Medications:        All current medications were reviewed with changes reflected in problem list.         Data:      All new lab and imaging data was reviewed.      Data reviewed today: I reviewed all new labs and imaging results over the last 24 hours. I personally reviewed no images or EKG's today      Recent Labs   Lab 02/14/21  0655 02/13/21  0723 02/11/21  0725   WBC 8.6 11.2* 7.5   HGB 9.7* 10.0* 10.2*   HCT 30.4* 31.5* 31.3*   * 105* 103*    219 175     Recent Labs   Lab 02/11/21  1310 02/10/21  1637 02/07/21  1630   CULT 50,000 to 100,000 colonies/mL  mixed urogenital gloria  Susceptibility testing not routinely done   No growth after 4 days  No growth after 4 days >100,000 colonies/mL  Enterococcus faecium  *  >100,000 colonies/mL  Strain 2  Enterococcus faecium (VRE)  *  Critical Value/Significant Value, preliminary result only, called to and read back by  FREDERICK GONCALVES RN 1059 2.11.21 [facility]       Recent Labs   Lab 02/14/21  0655 02/13/21  0723 02/12/21  0713 02/10/21  1637 02/10/21  1637    139 138   < > 138   POTASSIUM 4.1 4.0 3.8   < > 4.5   CHLORIDE 109 108 106   < > 109   CO2 25 26 26   < > 23   ANIONGAP 5 5 6   < > 6   GLC 85 83 93   < > 82   BUN 27 29 31*   < > 18   CR 0.88 1.03 1.32*   < > 0.73   GFRESTIMATED 55* 46* 34*   < > 70   GFRESTBLACK 64 53* 39*   < > 81   RENETTA 8.1* 8.2* 8.2*   < > 8.5   PROTTOTAL  --   --   --   --  6.5*   ALBUMIN  --   --   --   --  2.4*   BILITOTAL  --   --   --   --  0.5   ALKPHOS  --   --   --   --  56   AST  --   --   --   --  33   ALT  --   --   --   --  17    < > = values in this interval not displayed.       Recent Labs   Lab 02/14/21 0655 02/13/21  0723 02/12/21  0713 02/11/21  0725 02/10/21  1637 02/07/21 2157 02/07/21 2157   GLC 85 83 93 84 82   < >  --    BGM  --   --   --   --   --   --  105*    < > = values in this interval not displayed.       No results for input(s): INR in the  last 168 hours.      Recent Labs   Lab 02/10/21  1637   TROPI 0.023       No results found for this or any previous visit (from the past 48 hour(s)).    COVID Status:  COVID-19 PCR Results    COVID-19 PCR Results 8/6/20 2/6/21 2/10/21   COVID-19 Virus PCR to [facility] - Result Not Detected     COVID-19 Virus PCR to [facility] - Source Nasopharyngeal     SARS-CoV-2 Virus Specimen Source   Nasopharyngeal   Flu A/B & SARS-COV-2 PCR Source  Nasopharyngeal    SARS-CoV-2 PCR Result  NEGATIVE NEGATIVE      Comments are available for some flowsheets but are not being displayed.         COVID-19 Antibody Results, Testing for Immunity    COVID-19 Antibody Results, Testing for Immunity   No data to display.              Disclaimer: This note consists of symbols derived from keyboarding, dictation and/or voice recognition software. As a result, there may be errors in the script that have gone undetected. Please consider this when interpreting information found in this chart.

## 2021-02-14 NOTE — PLAN OF CARE
Pt pleasantly confused. Incont of bladder & bowel. Jaja area reddened, applied barrier & miconazole. Did have purewick in place but leaving off d/t area being very tender. Buttocks red, blanchable, mepilex applied. Switched to puree diet, spits out anything that isn't soft, appetite fair.

## 2021-02-14 NOTE — PLAN OF CARE
Pertinent assessments: Disoriented x4. Minimal speech. Up with lift, Incontinent. 1 soft stool over night. On DD2/Thin diet, total feed. Doesn't appear to be in pain. Buttock red & raw, applied mepilex & barrier cream.  PIV SL.     Major Shift Events: Uneventful    Treatment Plan: Zyvox     Bedside Nurse: Vita Loo RN

## 2021-02-14 NOTE — PLAN OF CARE
Pt up with lift. Confused. Minimal verbal communication. Incont of B&B, vladimir area excoriated, tender, barrier cream applied. Buttocks red, blanchable, mepilex on, T&R. Switched to puree diet, spits out food if it is not soft consistency, total feed, appetite good. Plan for possible discharge tomorrow afternoon.

## 2021-02-14 NOTE — PROGRESS NOTES
SLP - Pt spitting out soft solids per RN; diet downgraded to puree. Tolerating puree solids, sips of thin liquids with max feeding assist at slow rate, small bites/sips, alternate liquids/solids. Recommend cont puree solids/thin liquids at discharge; consider liberalizing if goals of care change, pt expresses desire for specific items not included on puree diet. No further IP needs at this time; do not anticipate advancing diet this admission.    Speech Language Therapy Discharge Summary    Reason for therapy discharge:    All goals and outcomes met, no further needs identified.  No further expectations of functional progress.    Progress towards therapy goal(s). See goals on Care Plan in [facility] electronic health record for goal details.  Goals met    Therapy recommendation(s):    No further therapy is recommended.     Discharge diet: Dysphagia Diet Level 1 (Puree) with THIN liquids

## 2021-02-15 ENCOUNTER — APPOINTMENT (OUTPATIENT)
Dept: PHYSICAL THERAPY | Facility: CLINIC | Age: 86
DRG: 291 | End: 2021-02-15
Payer: COMMERCIAL

## 2021-02-15 VITALS
SYSTOLIC BLOOD PRESSURE: 109 MMHG | HEART RATE: 66 BPM | TEMPERATURE: 97.7 F | BODY MASS INDEX: 19.46 KG/M2 | WEIGHT: 106.4 LBS | DIASTOLIC BLOOD PRESSURE: 45 MMHG | OXYGEN SATURATION: 97 % | RESPIRATION RATE: 18 BRPM

## 2021-02-15 PROCEDURE — 250N000013 HC RX MED GY IP 250 OP 250 PS 637: Performed by: INTERNAL MEDICINE

## 2021-02-15 PROCEDURE — 97530 THERAPEUTIC ACTIVITIES: CPT | Mod: GP | Performed by: PHYSICAL THERAPIST

## 2021-02-15 PROCEDURE — 99233 SBSQ HOSP IP/OBS HIGH 50: CPT | Performed by: CLINICAL NURSE SPECIALIST

## 2021-02-15 PROCEDURE — 99239 HOSP IP/OBS DSCHRG MGMT >30: CPT | Performed by: INTERNAL MEDICINE

## 2021-02-15 RX ORDER — LINEZOLID 600 MG/1
600 TABLET, FILM COATED ORAL EVERY 12 HOURS
Qty: 20 TABLET | Refills: 0 | COMMUNITY
Start: 2021-02-15 | End: 2021-06-02

## 2021-02-15 RX ORDER — LINEZOLID 600 MG/1
600 TABLET, FILM COATED ORAL EVERY 12 HOURS
Qty: 20 TABLET | Refills: 0 | Status: SHIPPED | OUTPATIENT
Start: 2021-02-15 | End: 2021-02-15

## 2021-02-15 RX ADMIN — MULTIPLE VITAMINS W/ MINERALS TAB 1 TABLET: TAB at 08:11

## 2021-02-15 RX ADMIN — LINEZOLID 600 MG: 600 TABLET, FILM COATED ORAL at 08:11

## 2021-02-15 RX ADMIN — Medication 12.5 MG: at 08:11

## 2021-02-15 RX ADMIN — CLOPIDOGREL BISULFATE 75 MG: 75 TABLET ORAL at 08:11

## 2021-02-15 RX ADMIN — MICONAZOLE NITRATE: 20 POWDER TOPICAL at 08:17

## 2021-02-15 RX ADMIN — ATORVASTATIN CALCIUM 20 MG: 20 TABLET, FILM COATED ORAL at 08:11

## 2021-02-15 RX ADMIN — ASPIRIN 81 MG CHEWABLE TABLET 81 MG: 81 TABLET CHEWABLE at 08:11

## 2021-02-15 ASSESSMENT — ACTIVITIES OF DAILY LIVING (ADL)
ADLS_ACUITY_SCORE: 31
ADLS_ACUITY_SCORE: 32
ADLS_ACUITY_SCORE: 31
ADLS_ACUITY_SCORE: 31

## 2021-02-15 NOTE — PROGRESS NOTES
"SPIRITUAL HEALTH SERVICES Progress Note  [facility]. Surg. 5    Saw pt Sharee per staff referral from Palliative Care Nurse Bebe Simon requesting spiritual support for pt.  Pt has a history of dementia and struggles to speak; occasionally her narrative is confused.  Offered emotional/spiritual support through light-hearted conversation, reassurance, and prayer.  Sharee reported feeling \"fine\" today and laughed when this author pointed out the Debra's Day balloons in her room.  When asked, she reported having nine children and [place].  Sharee welcomed prayer.  Assured her of God's and family's care.  Her son Michele arrived just as this author was leaving the room.  He reported that she is living a [facility] and that in the last two years her ability to communicate has declined: \"but she understands more than what she says.\"    No further plans as pt will discharge back to her [facility] today.    Holden Villegas M.Div., [facility]  Staff   Phone [phone]    "

## 2021-02-15 NOTE — DISCHARGE SUMMARY
Physician Discharge Summary     Name: Tracey Moran    MRN: [id number]     YOB: 1925    Age: 89 year old                                             [facility]  Hospitalist Discharge Summary-[facility]      Primary care provider: Johnna Hinojosa Physician      Admit date:  2/10/2021      Discharge date and time: 2/15/2021       Discharge Physician:  Enrrique Fields MD      Primary Discharge Diagnosis        VRE urinary tract infection    Suspected acute CHF, diastolic- resolved     Acute kidney injury-resolved     Advanced dementia with poor functional status at baseline    Secondary Diagnosis /chronic medical conditions     Hypertension  Advanced dementia  Coronary artery disease, status post inferior ST elevation MI in January.  COVID-19 infection in November.  UTI.  Hypertension.       Brief Summary of Hospital stay :       Please refer to  Admission H&P note for full details of patient presentation.    Reason for Hospitalization(C/C,HPI and brief patient summary): generalized weakness        Significant findings(Primary diagnosis )Procedures and treatments provided(Hospital course ,consults, procedures):Please see bellow for details      Tracey Moran is a 89 year old female who presents with generalized weakness, lethargy.     She is a 89-year-old lady with history of hypertension, chronic kidney disease (Cr 0.6-1.1), anemia (hgb 10's), advanced dementia, positive COVID-19 in November, inferior ST elevation MI in January 2021 treated conservatively without PCI recent admission in February for facial droop and UTI.     She was sent to the emergency room by her care facility because of worsening weakness and lethargy.  Her most recent admission was from 2/6/2021-2/8/2021 at [facility] for facial droop.  She was seen by neurology team.  MRI was deferred in the setting of her advanced dementia and little symptoms.    Possibility of UTI was there.  She was  discharged on Keflex.  Later on, her urine culture came back positive for VRE.  But as the patient was already getting better no further treatment was recommended.     She was sent to the emergency room because of worsening generalized weakness and lethargy.  Dr. Cortes from the emergency room spoke to the [facility].  Apparently at baseline the patient is not able to walk but able to self propel herself in a wheelchair.     She has not been able to do any of that.  For that reason she was sent to the emergency room.  In the ER, the main issue is suspected to be pulmonary edema and CHF exacerbation.  She noted to have a BNP of 24,000, no previous BNP available.  Chemistry was unremarkable.  CBC was also unremarkable.  Chest x-ray shows pulmonary edema.     Patient was given IV linezolid in the emergency room for the recent VRE urine culture.  Also received IV Lasix 60 mg.  She is being admitted to internal medicine service for CHF exacerbation.      Patient was admitted and was continued with oral Zyvox and was closely monitored.  She was initially treated with IV Lasix which resulted in acute kidney injury and this was discontinued.  Patient is also required IV fluid bolus for decreased urine output.  She remained afebrile without significant overwhelming sepsis  She remained very weak and deconditioned in the setting of advanced dementia.  Palliative care team was consulted and given patients decline in setting of advanced dementia,consideration of palliative care was offered and family would like to continue restorative care for now.    Consultations during hospital stay:    WOUND OSTOMY CONTINENCE NURSE  IP CONSULT  CARE MANAGEMENT / SOCIAL WORK IP CONSULT  SPEECH LANGUAGE PATH ADULT IP CONSULT  PHYSICAL THERAPY ADULT IP CONSULT  SOCIAL WORK IP CONSULT  PALLIATIVE CARE ADULT IP CONSULT  SPIRITUAL HEALTH SERVICES IP CONSULT      Patient discharge Condition:     stable    /45 (BP Location: Right  arm)   Pulse 66   Temp 97.7  F (36.5  C) (Oral)   Resp 18   Wt 48.3 kg (106 lb 6.4 oz)   SpO2 97%   BMI 19.46 kg/m         Discharge Instructions:       Patient/family instructions: Written discharge instruction given to patient/family    Discharge Medications:       Review of your medicines      START taking      Dose / Directions   linezolid 600 MG tablet  Commonly known as: ZYVOX  Indication: Urinary Tract Infection      Dose: 600 mg  Take 1 tablet (600 mg) by mouth every 12 hours for 10 days  Quantity: 20 tablet  Refills: 0        CONTINUE these medicines which have NOT CHANGED      Dose / Directions   acetaminophen 500 MG tablet  Commonly known as: TYLENOL      Dose: 500 mg  Take 500 mg by mouth 3 times daily as needed for mild pain  Refills: 0     aspirin 81 MG chewable tablet  Commonly known as: ASA  Used for: ST elevation myocardial infarction involving right coronary artery (H)      Dose: 81 mg  Take 1 tablet (81 mg) by mouth daily  Quantity: 100 tablet  Refills: 0     atorvastatin 20 MG tablet  Commonly known as: LIPITOR  Used for: ST elevation myocardial infarction involving right coronary artery (H)      Dose: 20 mg  Take 1 tablet (20 mg) by mouth daily  Quantity: 30 tablet  Refills: 0     Criselda Protect external cream      Apply topically 2 times daily Criselda Barrier Cream (apply to incontinence rash)  Refills: 0     clopidogrel 75 MG tablet  Commonly known as: PLAVIX  Used for: ST elevation myocardial infarction involving right coronary artery (H)      Dose: 75 mg  Take 1 tablet (75 mg) by mouth daily  Quantity: 30 tablet  Refills: 0     diclofenac 1 % topical gel  Commonly known as: VOLTAREN      Dose: 2 g  Place 2 g onto the skin 3 times daily For right knee pain  Refills: 0     ENSURE PO      1 two times daily (Family supplies)  Refills: 0     lisinopril 20 MG tablet  Commonly known as: ZESTRIL  Indication: High Blood Pressure Disorder  Used for: Benign essential hypertension      Dose: 20  mg  Take 1 tablet (20 mg) by mouth 2 times daily  Quantity: 60 tablet  Refills: 1     menthol-zinc oxide 0.44-20.6 % Oint ointment  Commonly known as: CALMOSEPTINE      Dose: 1 g  Apply 1 g topically 4 times daily as needed for skin protection (to open area on right buttock)  Refills: 0     metoprolol tartrate 25 MG tablet  Commonly known as: LOPRESSOR      Dose: 12.5 mg  Take 12.5 mg by mouth 2 times daily  Refills: 0     miconazole 2 % external powder  Commonly known as: MICATIN      Apply topically 3 times daily  Quantity:    Refills: 0     multivitamin w/minerals tablet      Dose: 1 tablet  Take 1 tablet by mouth daily  Refills: 0     nystatin 893172 UNIT/GM external powder  Commonly known as: MYCOSTATIN      Apply topically 2 times daily Apply to rash under bilateral breast until resolved  Refills: 0     pantoprazole 40 MG EC tablet  Commonly known as: PROTONIX      Dose: 40 mg  Take 40 mg by mouth daily  Refills: 0     polyethylene glycol 17 g packet  Commonly known as: MIRALAX      Dose: 17 g  Take 17 g by mouth daily as needed for constipation  Quantity: 30 packet  Refills: 0     senna-docusate 8.6-50 MG tablet  Commonly known as: SENOKOT-S/PERICOLACE  Used for: Constipation, unspecified constipation type      Dose: 1 tablet  Take 1 tablet by mouth 2 times daily as needed for constipation  Refills: 0        STOP taking    cefuroxime 500 MG tablet  Commonly known as: CEFTIN              Where to get your medicines      These medications were sent to [facility] [address] [place] [id number]    Phone: [phone]     linezolid 600 MG tablet          Discharge diet:Orders Placed This Encounter      Combination Diet Dysphagia Diet Level 1: Pureed; Thin Liquids (water, ice chips, juice, milk gelatin, ice cream, etc)      Diet        Discharge activity:Activity as tolerated      Discharge follow-up:    Follow up with primary care provider in 7  days or earlier if symptoms return or gets worse.    Follow up with palliative  Care team and social service       Other instructions:    We discussed with patient/family about detail discharge instructions as well as discharge medications above including potential risks,side effects and benefits.Patient/family understood benefits and potential serious side effects of taking these medications and need to follow up with PCP if the patient develops complications.  Patient is also advised to see a doctor immediately for severe symptoms.        Major procedure performed/  Significant Diagnostic Studies:       Results for orders placed or performed during the hospital encounter of 02/10/21   XR Chest Port 1 View    Narrative    EXAM: XR CHEST PORT 1 VW  LOCATION: [facility]  DATE/TIME: 2/10/2021 4:35 PM    INDICATION: Shortness of breath and weakness. Evaluate for infection.  COMPARISON: 10/26/2019      Impression    IMPRESSION: Moderate cardiac enlargement with pulmonary vascular congestion compatible CHF/volume overload. Interstitial edema/infiltrates throughout both lungs likely related to the volume overload. Minimal bilateral pleural fluid collections. Calcified   thoracic aorta. Degenerative changes in the spine.       Recent Labs   Lab 02/14/21  0655 02/13/21  0723 02/11/21  0725   WBC 8.6 11.2* 7.5   HGB 9.7* 10.0* 10.2*   HCT 30.4* 31.5* 31.3*   * 105* 103*    219 175     Recent Labs   Lab 02/11/21  1310 02/10/21  1637   CULT 50,000 to 100,000 colonies/mL  mixed urogenital gloria  Susceptibility testing not routinely done   No growth after 5 days  No growth after 5 days     Recent Labs   Lab 02/14/21  0655 02/13/21  0723 02/12/21  0713 02/10/21  1637 02/10/21  1637    139 138   < > 138   POTASSIUM 4.1 4.0 3.8   < > 4.5   CHLORIDE 109 108 106   < > 109   CO2 25 26 26   < > 23   ANIONGAP 5 5 6   < > 6   GLC 85 83 93   < > 82   BUN 27 29 31*   < > 18   CR 0.88 1.03 1.32*   < > 0.73    GFRESTIMATED 55* 46* 34*   < > 70   GFRESTBLACK 64 53* 39*   < > 81   RENETTA 8.1* 8.2* 8.2*   < > 8.5   PROTTOTAL  --   --   --   --  6.5*   ALBUMIN  --   --   --   --  2.4*   BILITOTAL  --   --   --   --  0.5   ALKPHOS  --   --   --   --  56   AST  --   --   --   --  33   ALT  --   --   --   --  17    < > = values in this interval not displayed.       Recent Labs   Lab 02/14/21  0655 02/13/21  0723 02/12/21  0713 02/11/21  0725 02/10/21  1637   GLC 85 83 93 84 82       No results for input(s): INR in the last 168 hours.      Incidental findings that was discussed with patient/family and need follow up with PCP: none     Pending Results:       Unresulted Labs Ordered in the Past 30 Days of this Admission     Date and Time Order Name Status Description    2/10/2021 1626 Blood culture Preliminary     2/10/2021 1626 Blood culture Preliminary              Patient Allergies:       Allergies   Allergen Reactions     Demerol [Meperidine]      Dust Mites      Peanuts [Nuts]      Penicillins      Has tolerated cephalosporins (ceftriaxone, cefdinir)     Pollen Extract          Disposition:     Disposition: home    Discharge needs: home care          I saw and evaluated the patient on day of discharge and  discharge instructions reviewed  and  all the patient's questions and concerns addressed. Over 30 minutes spent on discharge and coordination of discharge process for this patient.      Disclaimer: This note consists of symbols derived from keyboarding, dictation and/or voice recognition software. As a result, there may be errors in the script that have gone undetected. Please consider this when interpreting information found in this chart

## 2021-02-15 NOTE — PLAN OF CARE
Pertinent assessments: Disoriented x4. Minimal speech. Up with lift, Incontinent. 1 soft stool over night. Total feed. Doesn't appear to be in pain. Buttock red & raw, applied mepilex & barrier cream.  No PIV    Major Shift Events: Uneventful    Treatment Plan: Zyvox     Bedside Nurse: Vita Loo RN

## 2021-02-15 NOTE — PLAN OF CARE
Care Management Discharge Note    Discharge Date: 02/15/21  Expected Time of Departure: [facility] wheelchair @ 1430 today    Discharge Disposition: Assisted Living    Discharge Services: Transportation Services    Discharge DME:      Discharge Transportation: agency    Private pay costs discussed: transportation costs wheelchair, $75 base $5    PAS Confirmation Code:  None  Patient/family educated on Medicare website which has current facility and service quality ratings: yes    Education Provided on the Discharge Plan:  yes  Persons Notified of Discharge Plans: patient and sonShan  Patient/Family in Agreement with the Plan: yes    Handoff Referral Completed: Yes to Patricia Castro    Additional Information:  Met with patient and sonShan, reviewed discharge plans. Shan is in agreement with discharge plans. Answered all questions. Faxed orders to [facility] and [facility] .    [facility] wheelchair set for 1430, unit and [facility] updated on discharge time.    ALFRED Burleson   Inpatient Care Coordination   Supervisor  [employer]  [phone]        [place]

## 2021-02-15 NOTE — PROGRESS NOTES
Pt to D/C to home (cj at [facility]).  Pt provided with d/c instructions, including new medications, when medications were last given, and when to take them again.  Pt also informed to f/u with primary with in 7 days.  Pt verbalized understanding of all d/c and f/u instructions.  All questions were answered at this time.  Copy of paperwork sent with pt.  Scripts ([facility]Safello)  Sent to [facility].  [facility] to provide transport.  All personal belongings sent with pt.

## 2021-02-15 NOTE — PROGRESS NOTES
"[facility]  Palliative Care Progress Note  Text Page     Assessment & Plan     Goals of Care - No CPR- Do NOT Intubate - Restorative care.     1.  Decisional Capacity -  Unreliable due to advanced dementia. Patient has an advance directive dated 02/22/18. Consents and decision making should be done by her son Ronal Moran, the primary Health Care Agent. Alternates are her son Michele Shepherd  The alterate is the  for update.  Attempt to include both in decision making.  POLST dated 2/12/2021 indicating rehospitalization with selective treatment.        2.  Dysphagia - Appreciate input of Speech Therapist Bradly Vásuqez, SLP recommendation for Dysphagia Diet Level 1 (Puree) with THIN liquids.      3.  Dyspnea - None noted. Her son is quite concerned that she may be dehydrated as he was present last Wednesday after copious urine output after getting 60 mg IV lasix. He is well aware of her history of heart failure with preserved EF.       4.  Impairment of skin integrity - Appreciate input of [facility] Nurse Christopher Luevano, RN      5.  Generalized weakness - Appreciate input of Physical Therapist Dina Medina, DEMETRIUS. Son had wondered if TCU would be appropriate, but acknowledged she has               6.  Spiritual Care - Spiritual Health consulted as son believes she is depressed due to loosing her independence.  Spiritual Background:  Mandaeism free      7.  Care Planning - Appreciate input of  DE Burleson in dismissal plan to return to [facility] (without hospice)       Medical Decision Making and Goals of Care:  Discussed on February 15, 2021 with Bebe THOMAS, CNS:    I phoned the patient's son/alternate health care agent Nick Shepherd at [phone]. He had many questions regarding therapy for his mother and concerns regarding her VRE. I acknowledged Speech Therapy had seen his mother and recommended Dysphagia Diet 1.  Nick acknowledged \"She's always wanted " "everything done. It was quite a step to make her do not resuscitate.\" He had visited over the weekend and was concerned \"she seemed withdrawn\". Nick explained that he considered having his mother go to rehab to get stronger. I offered that Physical Therapy had seen his mother this hospitalization and offered that we could ask if she would have any rehab potential. Nick explained his mother had \"always enjoyed her independence and now she's dependent on people for everything.\" He acknowledged she would likely improve with returning to [facility] where the staff are familiar with her care. I asked if there we other things that would help support her beyond the medical help. Nick offered that his mother was raised [place] in [facility]. She later joined [employer] at [email or web address] on  and . His parents  when he was young and each remarried and were with their spouses for more than 50 years. His mother and stepfather moved to [place] to care for his elderly parents. In  his mother moved back to [place] when her second   to be close to family. Nick explained that she enjoys [employer] type [employer] groups and may find support in that type of gospel. Nick explained that his wife is an LPN who worked in hospice, so he is aware of the benefit if his mother does not improve. \"I just hope she is able to make this rally. She was much worse off when she had the COVID.\"       Bebe Simon MS, RN, CNS, APRN, ACHPN, FAACVPR  Pain and Palliative Care  Pager [phone]  Office [phone]       Time Spent on this Encounter   Total unit/floor time 11 AM until Noon, time consisted of the following, examination of the patient, reviewing the record and completing documentation. >50% of time spent in counseling and coordination of care.  Time spend counseling with family consisted of the following topics, goals of care and care planning for discharge.  Time spent in " coordination of care with Bedside Nurse Kelle Klein RN, Hospitalist Enrrique Fields MD and  DE Burleson.     Interval History   Chart reviewed - patient making slow progress    Review of Systems     Patient sleeping - not assessed     Physical Exam   Temp:  [97.7  F (36.5  C)-99  F (37.2  C)] 97.7  F (36.5  C)  Pulse:  [70-94] 70  Resp:  [18-20] 18  BP: (106-153)/(42-59) 128/57  SpO2:  [94 %-99 %] 96 %  106 lbs 6.4 oz  GEN:  Cachetic and frail elderly female peacefully sleeping in a chair.     Medications       aspirin  81 mg Oral Daily     atorvastatin  20 mg Oral Daily     clopidogrel  75 mg Oral Daily     linezolid  600 mg Oral Q12H LIZBETH     metoprolol tartrate  12.5 mg Oral BID     miconazole   Topical BID     multivitamin w/minerals  1 tablet Oral Daily     pantoprazole  40 mg Oral Daily       Data   No results found for this or any previous visit (from the past 24 hour(s)).

## 2021-02-15 NOTE — DISCHARGE INSTRUCTIONS
You have a resumption order for [facility] for OT, PT, RN & Speech services. They will be contacting you within 24-48 hours to set up resumption visit. If you have not heard from them after this time, please contact them at ([phone]).

## 2021-02-15 NOTE — PLAN OF CARE
Physical Therapy Discharge Summary    Reason for therapy discharge:    Discharged to home with home therapy.    Progress towards therapy goal(s). See goals on Care Plan in [facility] electronic health record for goal details.  Goals partially met.  Barriers to achieving goals:   discharge from facility.    Therapy recommendation(s):    Continued therapy is recommended.  Rationale/Recommendations:  HHPT to maximize safety and indep with mobility.

## 2021-02-16 LAB
BACTERIA SPEC CULT: NO GROWTH
BACTERIA SPEC CULT: NO GROWTH
SPECIMEN SOURCE: NORMAL
SPECIMEN SOURCE: NORMAL

## 2021-02-17 ENCOUNTER — DOCUMENTATION ONLY (OUTPATIENT)
Dept: OTHER | Facility: CLINIC | Age: 86
End: 2021-02-17

## 2021-02-17 ENCOUNTER — AMBULATORY - HEALTHEAST (OUTPATIENT)
Dept: OTHER | Facility: CLINIC | Age: 86
End: 2021-02-17

## 2021-03-04 ENCOUNTER — RECORDS - HEALTHEAST (OUTPATIENT)
Dept: LAB | Facility: CLINIC | Age: 86
End: 2021-03-04

## 2021-03-05 LAB — ALT SERPL W P-5'-P-CCNC: 12 U/L (ref 0–45)

## 2021-03-18 ENCOUNTER — RECORDS - HEALTHEAST (OUTPATIENT)
Dept: LAB | Facility: CLINIC | Age: 86
End: 2021-03-18

## 2021-03-19 LAB
ERYTHROCYTE [DISTWIDTH] IN BLOOD BY AUTOMATED COUNT: 14.4 % (ref 11–14.5)
HCT VFR BLD AUTO: 27.3 % (ref 35–47)
HGB BLD-MCNC: 8.6 G/DL (ref 12–16)
MCH RBC QN AUTO: 32.5 PG (ref 27–34)
MCHC RBC AUTO-ENTMCNC: 31.5 G/DL (ref 32–36)
MCV RBC AUTO: 103 FL (ref 80–100)
PLATELET # BLD AUTO: 207 THOU/UL (ref 140–440)
PMV BLD AUTO: 11.2 FL (ref 8.5–12.5)
RBC # BLD AUTO: 2.65 MILL/UL (ref 3.8–5.4)
WBC: 7 THOU/UL (ref 4–11)

## 2021-03-24 ENCOUNTER — RECORDS - HEALTHEAST (OUTPATIENT)
Dept: LAB | Facility: CLINIC | Age: 86
End: 2021-03-24

## 2021-03-26 LAB — HGB BLD-MCNC: 9.9 G/DL (ref 12–16)

## 2021-03-31 ENCOUNTER — RECORDS - HEALTHEAST (OUTPATIENT)
Dept: LAB | Facility: CLINIC | Age: 86
End: 2021-03-31

## 2021-03-31 LAB
C DIFF TOX B STL QL: NEGATIVE
RIBOTYPE 027/NAP1/BI: NORMAL

## 2021-05-20 ENCOUNTER — APPOINTMENT (OUTPATIENT)
Dept: CT IMAGING | Facility: CLINIC | Age: 86
End: 2021-05-20
Attending: PHYSICIAN ASSISTANT
Payer: COMMERCIAL

## 2021-05-20 ENCOUNTER — HOSPITAL ENCOUNTER (EMERGENCY)
Facility: CLINIC | Age: 86
Discharge: NURSING, REHAB OR LONG-TERM CARE | End: 2021-05-20
Attending: PHYSICIAN ASSISTANT | Admitting: PHYSICIAN ASSISTANT
Payer: COMMERCIAL

## 2021-05-20 VITALS
DIASTOLIC BLOOD PRESSURE: 73 MMHG | HEART RATE: 97 BPM | SYSTOLIC BLOOD PRESSURE: 150 MMHG | TEMPERATURE: 98.5 F | OXYGEN SATURATION: 95 %
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
## 2021-05-20 DIAGNOSIS — S09.90XA INJURY OF HEAD, INITIAL ENCOUNTER: ICD-10-CM

## 2021-05-20 PROCEDURE — 99284 EMERGENCY DEPT VISIT MOD MDM: CPT | Mod: 25

## 2021-05-20 PROCEDURE — 70450 CT HEAD/BRAIN W/O DYE: CPT

## 2021-05-20 NOTE — ED NOTES
Bed: ED36  Expected date: 5/20/21  Expected time: 3:47 PM  Means of arrival: Ambulance  Comments:  BV5

## 2021-05-20 NOTE — ED PROVIDER NOTES
History   Chief Complaint:  Fall     HPI   Tracey Moran is a 89 year old female anticoagulated on Plavix with history of hypertension, hyperlipidemia, chronic kidney disease who presents after a fall. Patient had an unwitnessed fall today at her living facility while trying to transfer herself from her wheelchair onto her bed. No signs of injury and patient denies any pain. Unsure if she hit her head. Mental status at baseline.     Review of Systems   Unable to perform ROS: Dementia     Allergies:  Demerol [Meperidine]  Penicillins    Medications:  Zyvox  Zestril  Aspirin 81 mg  Plavix  Lipitor  Lopressor  Protonix  Mycostatin    Past Medical History:    Hypertension   STEMI  Anemia  Chronic kidney disease   Dementia  Hypothyroidism  Thrombocytopenia  Hyperlipidemia   Leukocytosis    Social History:  Presents alone  Lives in care facility    Physical Exam     Patient Vitals for the past 24 hrs:   BP Temp Temp src Pulse SpO2   05/20/21 1715 -- -- -- -- 95 %   05/20/21 1700 (!) 150/73 -- -- 97 94 %   05/20/21 1645 136/69 -- -- 96 93 %   05/20/21 1630 134/63 -- -- 98 93 %   05/20/21 1615 (!) 154/71 -- -- 102 93 %   05/20/21 1601 -- 98.5  F (36.9  C) Oral -- --   05/20/21 1600 (!) 177/77 -- -- 104 94 %       Physical Exam  Vitals signs and nursing note reviewed.   Constitutional:       General: She is not in acute distress.     Appearance: She is not diaphoretic.   HENT:      Head: Normocephalic and atraumatic.   Eyes:      General: No scleral icterus.     Extraocular Movements: Extraocular movements intact.      Pupils: Pupils are equal, round, and reactive to light.   Neck:      Musculoskeletal: No muscular tenderness.   Cardiovascular:      Rate and Rhythm: Normal rate and regular rhythm.      Pulses: Normal pulses.      Heart sounds: Normal heart sounds.   Pulmonary:      Effort: Pulmonary effort is normal. No respiratory distress.      Breath sounds: Normal breath sounds.   Abdominal:      General: There is no  distension.      Tenderness: There is no abdominal tenderness. There is no guarding.   Musculoskeletal:         General: No tenderness.   Skin:     General: Skin is warm.      Findings: No rash.   Neurological:      Mental Status: She is alert. Mental status is at baseline. She is disoriented.       Emergency Department Course     Imaging:  Head CT w/o Contrast  1.  No CT evidence for acute intracranial process.   2.  Brain atrophy and presumed chronic microvascular ischemic changes as above.     3.  Complete/near opacification of the right mastoid air cells. Presumably representing benign inflammatory fluid.  As read by Radiology.      Emergency Department Course:    Reviewed:  I reviewed nursing notes, vitals, past medical history and care everywhere    Assessments:  1640 I obtained history and examined the patient as noted above.   1750 I rechecked the patient and explained findings.     Disposition:  The patient was discharged to home.     Impression & Plan     Medical Decision Making:  She ranges all extremities without pain or difficulty. There is no obvious evidence of injury. CT head obtained to evaluate for clinically significant injury and this is unremarkable for an acute process. Cervical spine non-tender. Discharged to outpatient care.      Diagnosis:    ICD-10-CM    1. Fall, initial encounter  W19.XXXA    2. Injury of head, initial encounter  S09.90XA        Discharge Medications:  New Prescriptions    No medications on file       Scribe Disclosure:  I, Deanne Hensley, am serving as a scribe at 3:56 PM on 5/20/2021 to document services personally performed by Jorge Marquez PA-C based on my observations and the provider's statements to me.             Jorge Marquez PA-C  05/20/21 0229

## 2021-05-20 NOTE — ED TRIAGE NOTES
Patient arrives via EMS from her assisted living facility after she experienced an unwitnessed fall today trying to transfer herself from her wheelchair to her bed. She is on Plavix. No obvious sign of injury but staff is concerned that she may have hit her head. She has dementia at baseline and per staff she does not appear altered. VSS, ABCs intact.

## 2021-05-26 VITALS
RESPIRATION RATE: 20 BRPM | HEART RATE: 86 BPM | SYSTOLIC BLOOD PRESSURE: 141 MMHG | DIASTOLIC BLOOD PRESSURE: 62 MMHG | OXYGEN SATURATION: 96 %

## 2021-05-27 VITALS
OXYGEN SATURATION: 90 % | RESPIRATION RATE: 20 BRPM | SYSTOLIC BLOOD PRESSURE: 150 MMHG | HEART RATE: 98 BPM | TEMPERATURE: 100.7 F | DIASTOLIC BLOOD PRESSURE: 90 MMHG

## 2021-05-27 VITALS
SYSTOLIC BLOOD PRESSURE: 166 MMHG | DIASTOLIC BLOOD PRESSURE: 79 MMHG | RESPIRATION RATE: 22 BRPM | OXYGEN SATURATION: 92 % | TEMPERATURE: 98.9 F | HEART RATE: 98 BPM

## 2021-06-02 ENCOUNTER — APPOINTMENT (OUTPATIENT)
Dept: GENERAL RADIOLOGY | Facility: CLINIC | Age: 86
DRG: 563 | End: 2021-06-02
Payer: COMMERCIAL

## 2021-06-02 ENCOUNTER — HOSPITAL ENCOUNTER (INPATIENT)
Facility: CLINIC | Age: 86
LOS: 3 days | Discharge: NURSING, REHAB OR LONG-TERM CARE | DRG: 563 | End: 2021-06-05
Attending: EMERGENCY MEDICINE | Admitting: INTERNAL MEDICINE
Payer: COMMERCIAL

## 2021-06-02 ENCOUNTER — MEDICAL CORRESPONDENCE (OUTPATIENT)
Dept: HEALTH INFORMATION MANAGEMENT | Facility: CLINIC | Age: 86
End: 2021-06-02

## 2021-06-02 DIAGNOSIS — S82.201A CLOSED FRACTURE OF SHAFT OF RIGHT TIBIA, UNSPECIFIED FRACTURE MORPHOLOGY, INITIAL ENCOUNTER: ICD-10-CM

## 2021-06-02 PROBLEM — S82.209A FRACTURE, TIBIA: Status: ACTIVE | Noted: 2021-06-02

## 2021-06-02 LAB
ANION GAP SERPL CALCULATED.3IONS-SCNC: 4 MMOL/L (ref 3–14)
BASOPHILS # BLD AUTO: 0 10E9/L (ref 0–0.2)
BASOPHILS NFR BLD AUTO: 0.4 %
BUN SERPL-MCNC: 33 MG/DL (ref 7–30)
CALCIUM SERPL-MCNC: 9.1 MG/DL (ref 8.5–10.1)
CHLORIDE SERPL-SCNC: 110 MMOL/L (ref 94–109)
CO2 SERPL-SCNC: 26 MMOL/L (ref 20–32)
CREAT SERPL-MCNC: 1.12 MG/DL (ref 0.52–1.04)
DIFFERENTIAL METHOD BLD: ABNORMAL
EOSINOPHIL # BLD AUTO: 0.2 10E9/L (ref 0–0.7)
EOSINOPHIL NFR BLD AUTO: 1.8 %
ERYTHROCYTE [DISTWIDTH] IN BLOOD BY AUTOMATED COUNT: 13.2 % (ref 10–15)
GFR SERPL CREATININE-BSD FRML MDRD: 41 ML/MIN/{1.73_M2}
GLUCOSE SERPL-MCNC: 90 MG/DL (ref 70–99)
HCT VFR BLD AUTO: 31.7 % (ref 35–47)
HGB BLD-MCNC: 10.2 G/DL (ref 11.7–15.7)
IMM GRANULOCYTES # BLD: 0 10E9/L (ref 0–0.4)
IMM GRANULOCYTES NFR BLD: 0.3 %
LABORATORY COMMENT REPORT: NORMAL
LYMPHOCYTES # BLD AUTO: 1.6 10E9/L (ref 0.8–5.3)
LYMPHOCYTES NFR BLD AUTO: 17.6 %
MCH RBC QN AUTO: 32.4 PG (ref 26.5–33)
MCHC RBC AUTO-ENTMCNC: 32.2 G/DL (ref 31.5–36.5)
MCV RBC AUTO: 101 FL (ref 78–100)
MONOCYTES # BLD AUTO: 0.7 10E9/L (ref 0–1.3)
MONOCYTES NFR BLD AUTO: 7.8 %
NEUTROPHILS # BLD AUTO: 6.7 10E9/L (ref 1.6–8.3)
NEUTROPHILS NFR BLD AUTO: 72.1 %
NRBC # BLD AUTO: 0 10*3/UL
NRBC BLD AUTO-RTO: 0 /100
PLATELET # BLD AUTO: 219 10E9/L (ref 150–450)
POTASSIUM SERPL-SCNC: 4.3 MMOL/L (ref 3.4–5.3)
POTASSIUM SERPL-SCNC: 4.5 MMOL/L (ref 3.4–5.3)
RBC # BLD AUTO: 3.15 10E12/L (ref 3.8–5.2)
SARS-COV-2 RNA RESP QL NAA+PROBE: NEGATIVE
SODIUM SERPL-SCNC: 140 MMOL/L (ref 133–144)
SPECIMEN SOURCE: NORMAL
WBC # BLD AUTO: 9.3 10E9/L (ref 4–11)

## 2021-06-02 PROCEDURE — 85025 COMPLETE CBC W/AUTO DIFF WBC: CPT | Performed by: STUDENT IN AN ORGANIZED HEALTH CARE EDUCATION/TRAINING PROGRAM

## 2021-06-02 PROCEDURE — 99223 1ST HOSP IP/OBS HIGH 75: CPT | Mod: AI | Performed by: INTERNAL MEDICINE

## 2021-06-02 PROCEDURE — 73590 X-RAY EXAM OF LOWER LEG: CPT | Mod: RT

## 2021-06-02 PROCEDURE — 250N000013 HC RX MED GY IP 250 OP 250 PS 637: Performed by: INTERNAL MEDICINE

## 2021-06-02 PROCEDURE — 84132 ASSAY OF SERUM POTASSIUM: CPT | Performed by: INTERNAL MEDICINE

## 2021-06-02 PROCEDURE — 27750 TREATMENT OF TIBIA FRACTURE: CPT | Mod: RT

## 2021-06-02 PROCEDURE — 36415 COLL VENOUS BLD VENIPUNCTURE: CPT | Performed by: INTERNAL MEDICINE

## 2021-06-02 PROCEDURE — 99285 EMERGENCY DEPT VISIT HI MDM: CPT | Mod: 25

## 2021-06-02 PROCEDURE — 250N000011 HC RX IP 250 OP 636: Performed by: STUDENT IN AN ORGANIZED HEALTH CARE EDUCATION/TRAINING PROGRAM

## 2021-06-02 PROCEDURE — 80048 BASIC METABOLIC PNL TOTAL CA: CPT | Performed by: STUDENT IN AN ORGANIZED HEALTH CARE EDUCATION/TRAINING PROGRAM

## 2021-06-02 PROCEDURE — 87635 SARS-COV-2 COVID-19 AMP PRB: CPT | Performed by: EMERGENCY MEDICINE

## 2021-06-02 PROCEDURE — 96374 THER/PROPH/DIAG INJ IV PUSH: CPT

## 2021-06-02 PROCEDURE — C9803 HOPD COVID-19 SPEC COLLECT: HCPCS

## 2021-06-02 PROCEDURE — 120N000001 HC R&B MED SURG/OB

## 2021-06-02 RX ORDER — HYDRALAZINE HYDROCHLORIDE 20 MG/ML
10 INJECTION INTRAMUSCULAR; INTRAVENOUS EVERY 6 HOURS PRN
Status: DISCONTINUED | OUTPATIENT
Start: 2021-06-02 | End: 2021-06-05 | Stop reason: HOSPADM

## 2021-06-02 RX ORDER — HYDROMORPHONE HCL IN WATER/PF 6 MG/30 ML
0.2 PATIENT CONTROLLED ANALGESIA SYRINGE INTRAVENOUS EVERY 6 HOURS PRN
Status: DISCONTINUED | OUTPATIENT
Start: 2021-06-02 | End: 2021-06-03

## 2021-06-02 RX ORDER — LIDOCAINE 40 MG/G
CREAM TOPICAL
Status: DISCONTINUED | OUTPATIENT
Start: 2021-06-02 | End: 2021-06-05 | Stop reason: HOSPADM

## 2021-06-02 RX ORDER — CEFAZOLIN SODIUM 2 G/100ML
2 INJECTION, SOLUTION INTRAVENOUS SEE ADMIN INSTRUCTIONS
Status: CANCELLED | OUTPATIENT
Start: 2021-06-02

## 2021-06-02 RX ORDER — POLYETHYLENE GLYCOL 3350 17 G/17G
17 POWDER, FOR SOLUTION ORAL DAILY PRN
Status: DISCONTINUED | OUTPATIENT
Start: 2021-06-02 | End: 2021-06-05 | Stop reason: HOSPADM

## 2021-06-02 RX ORDER — NALOXONE HYDROCHLORIDE 0.4 MG/ML
0.4 INJECTION, SOLUTION INTRAMUSCULAR; INTRAVENOUS; SUBCUTANEOUS
Status: DISCONTINUED | OUTPATIENT
Start: 2021-06-02 | End: 2021-06-05 | Stop reason: HOSPADM

## 2021-06-02 RX ORDER — FENTANYL CITRATE 50 UG/ML
50 INJECTION, SOLUTION INTRAMUSCULAR; INTRAVENOUS ONCE
Status: COMPLETED | OUTPATIENT
Start: 2021-06-02 | End: 2021-06-02

## 2021-06-02 RX ORDER — BISACODYL 10 MG
10 SUPPOSITORY, RECTAL RECTAL DAILY PRN
Status: DISCONTINUED | OUTPATIENT
Start: 2021-06-02 | End: 2021-06-05 | Stop reason: HOSPADM

## 2021-06-02 RX ORDER — ACETAMINOPHEN 325 MG/1
975 TABLET ORAL EVERY 8 HOURS
Status: DISCONTINUED | OUTPATIENT
Start: 2021-06-02 | End: 2021-06-03

## 2021-06-02 RX ORDER — CEFAZOLIN SODIUM 2 G/100ML
2 INJECTION, SOLUTION INTRAVENOUS
Status: CANCELLED | OUTPATIENT
Start: 2021-06-02

## 2021-06-02 RX ORDER — NALOXONE HYDROCHLORIDE 0.4 MG/ML
0.2 INJECTION, SOLUTION INTRAMUSCULAR; INTRAVENOUS; SUBCUTANEOUS
Status: DISCONTINUED | OUTPATIENT
Start: 2021-06-02 | End: 2021-06-05 | Stop reason: HOSPADM

## 2021-06-02 RX ORDER — ATORVASTATIN CALCIUM 20 MG/1
20 TABLET, FILM COATED ORAL DAILY
Status: DISCONTINUED | OUTPATIENT
Start: 2021-06-02 | End: 2021-06-05 | Stop reason: HOSPADM

## 2021-06-02 RX ORDER — AMOXICILLIN 250 MG
1 CAPSULE ORAL 2 TIMES DAILY PRN
Status: DISCONTINUED | OUTPATIENT
Start: 2021-06-02 | End: 2021-06-05 | Stop reason: HOSPADM

## 2021-06-02 RX ORDER — ONDANSETRON 4 MG/1
4 TABLET, ORALLY DISINTEGRATING ORAL EVERY 6 HOURS PRN
Status: DISCONTINUED | OUTPATIENT
Start: 2021-06-02 | End: 2021-06-05 | Stop reason: HOSPADM

## 2021-06-02 RX ORDER — SODIUM CHLORIDE 9 MG/ML
INJECTION, SOLUTION INTRAVENOUS CONTINUOUS
Status: DISCONTINUED | OUTPATIENT
Start: 2021-06-03 | End: 2021-06-03

## 2021-06-02 RX ORDER — ONDANSETRON 2 MG/ML
4 INJECTION INTRAMUSCULAR; INTRAVENOUS EVERY 6 HOURS PRN
Status: DISCONTINUED | OUTPATIENT
Start: 2021-06-02 | End: 2021-06-05 | Stop reason: HOSPADM

## 2021-06-02 RX ORDER — ACETAMINOPHEN 650 MG/1
650 SUPPOSITORY RECTAL EVERY 4 HOURS PRN
Status: DISCONTINUED | OUTPATIENT
Start: 2021-06-02 | End: 2021-06-05 | Stop reason: HOSPADM

## 2021-06-02 RX ADMIN — FENTANYL CITRATE 50 MCG: 50 INJECTION, SOLUTION INTRAMUSCULAR; INTRAVENOUS at 13:56

## 2021-06-02 RX ADMIN — ATORVASTATIN CALCIUM 20 MG: 20 TABLET, FILM COATED ORAL at 21:19

## 2021-06-02 RX ADMIN — ACETAMINOPHEN 975 MG: 325 TABLET, FILM COATED ORAL at 21:19

## 2021-06-02 RX ADMIN — Medication 12.5 MG: at 21:19

## 2021-06-02 ASSESSMENT — ACTIVITIES OF DAILY LIVING (ADL)
DEPENDENT_IADLS:: CLEANING;COOKING;LAUNDRY;SHOPPING;MEAL PREPARATION;MEDICATION MANAGEMENT;MONEY MANAGEMENT;TRANSPORTATION;INCONTINENCE
WALKING_OR_CLIMBING_STAIRS_DIFFICULTY: YES
CONCENTRATING,_REMEMBERING_OR_MAKING_DECISIONS_DIFFICULTY: YES
WEAR_GLASSES_OR_BLIND: YES
DESCRIBE_HEARING_LOSS: BILATERAL HEARING LOSS
TOILETING_ISSUES: YES
DIFFICULTY_EATING/SWALLOWING: NO
DOING_ERRANDS_INDEPENDENTLY_DIFFICULTY: YES
FALL_HISTORY_WITHIN_LAST_SIX_MONTHS: YES
WERE_AUXILIARY_AIDS_OFFERED?: NO
TOILETING_ASSISTANCE: TOILETING DIFFICULTY, DEPENDENT
DRESSING/BATHING: BATHING DIFFICULTY, REQUIRES EQUIPMENT
DRESSING/BATHING_DIFFICULTY: YES
EQUIPMENT_CURRENTLY_USED_AT_HOME: WHEELCHAIR, MANUAL
DIFFICULTY_COMMUNICATING: YES
ADLS_ACUITY_SCORE: 34
PATIENT_/_FAMILY_COMMUNICATION_STYLE: SPOKEN LANGUAGE (ENGLISH OR BILINGUAL)
WALKING_OR_CLIMBING_STAIRS: AMBULATION DIFFICULTY, REQUIRES EQUIPMENT
PATIENT'S_PREFERRED_MEANS_OF_COMMUNICATION: OTHER
HEARING_DIFFICULTY_OR_DEAF: YES
COMMUNICATION: DIFFICULTY SPEAKING
THE_FOLLOWING_AIDS_WERE_PROVIDED;: POCKET TALKER

## 2021-06-02 NOTE — PHARMACY-ADMISSION MEDICATION HISTORY
Admission medication history interview status for this patient is complete. See [facility] admission navigator for allergy information, prior to admission medications and immunization status.     Medication history interview done, indicate source(s): med list from [facility]  Medication history resources (including written lists, pill bottles, clinic record):None    Changes made to PTA medication list:  Added: miconazole  Deleted: zyvox  Changed: none    Actions taken by pharmacist (provider contacted, etc):None     Additional medication history information:None    Medication reconciliation/reorder completed by provider prior to medication history?  N   (Y/N)     Prior to Admission medications    Medication Sig Last Dose Taking? Auth Provider   acetaminophen (TYLENOL) 500 MG tablet Take 500 mg by mouth 3 times daily as needed for mild pain 6/2/2021 at Unknown time Yes Unknown, Entered By History   aspirin (ASA) 81 MG chewable tablet Take 1 tablet (81 mg) by mouth daily 6/2/2021 at Unknown time Yes Neva Claudio MD   atorvastatin (LIPITOR) 20 MG tablet Take 1 tablet (20 mg) by mouth daily 6/2/2021 at Unknown time Yes Neva Claudio MD   Criselda Protect (EUCERIN) external cream Apply topically 2 times daily Criselda Barrier Cream (apply to incontinence rash) 6/2/2021 at am Yes Guilherme Graves MD   clopidogrel (PLAVIX) 75 MG tablet Take 1 tablet (75 mg) by mouth daily 6/2/2021 at Unknown time Yes Neva Claudio MD   diclofenac (VOLTAREN) 1 % topical gel Place 2 g onto the skin 3 times daily For right knee pain 6/2/2021 at am Yes Reported, Patient   lisinopril (ZESTRIL) 20 MG tablet Take 1 tablet (20 mg) by mouth 2 times daily 6/2/2021 at am Yes Bobbi Arechiga MD   menthol-zinc oxide (CALMOSEPTINE) 0.44-20.6 % OINT ointment Apply 1 g topically 4 times daily as needed for skin protection (to open area on right buttock)  Yes Unknown, Entered By History   metoprolol tartrate (LOPRESSOR) 25 MG tablet Take 12.5 mg by mouth 2 times  daily 6/2/2021 at am Yes Unknown, Entered By History   miconazole (MICATIN) 2 % external powder Apply topically 3 times daily 6/2/2021 at am Yes Unknown, Entered By History   multivitamin w/minerals (THERA-VIT-M) tablet Take 1 tablet by mouth daily 6/2/2021 at Unknown time Yes Unknown, Entered By History   nystatin (MYCOSTATIN) 113215 UNIT/GM external powder Apply topically 2 times daily Apply to rash under bilateral breast until resolved 6/2/2021 at am Yes Unknown, Entered By History   pantoprazole (PROTONIX) 40 MG EC tablet Take 40 mg by mouth daily 6/2/2021 at Unknown time Yes Unknown, Entered By History   polyethylene glycol (MIRALAX) 17 g packet Take 17 g by mouth daily as needed for constipation  Yes Bobbi Arechiga MD   senna-docusate (SENOKOT-S/PERICOLACE) 8.6-50 MG tablet Take 1 tablet by mouth 2 times daily as needed for constipation  Yes Agustina Sanchez, PAAguilaC

## 2021-06-02 NOTE — H&P
[facility]  Hospitalist Admission Note  Name: Tracey Moran    MRN: [id number]  YOB: 1925    Age: 89 year old  Date of admission: 6/2/2021  Primary care provider: Guilherme Graves            Assessment and Plan:   Tracey Moran is a 89 year old female with known history of advanced dementia who is currently residing in a memory care facility, prior history of VRE UTI, CKD, frequent falls, prior tibial plateau fracture, CAD on antiplatelets with Plavix, hypertension, prior anemia, prior COVID-19 infection back in November 2020, who was brought into the emergency room earlier today as care staff noticed that patient was protecting her right leg most of the time since yesterday.  There was no report of obvious fall injury, trauma and was not able to ambulate her leg today and was found with bruising and significant swelling.  In the ER she was found to be a very poor historian but collateral history from family stated that she usually can scoot from bed to the wheelchair and they did not see any previous bruising or swelling on her ankle with her last visit 2 days prior to this presentation.       1.  Right tibia fracture  2.  No reported recent obvious fall or injury  3.  Advanced dementia with behavioral disturbances  4.  History of diastolic CHF with preserved EF not in exacerbation  5.  History of CKD  6.  Prior hospitalization for VRE UTI  7.  History of CAD, on antiplatelets with Plavix  8.  Dyslipidemia  9.  Hypertension  10.  History of CVA    Admit as inpatient.  At risk for clinical deterioration.  Appreciate prompt input from orthopedic service.  -Keep her n.p.o. after midnight.  Patient and family will update us later on if they are agreeing in pursuing further operative/surgical intervention.  -Orthopedics discussed with them regarding expectations, anticipated issues, with operative approach and nonoperative management  -Patient is hemodynamically stable, not hypoxic, no  cardiorespiratory symptoms, and will not needing any further testing if pursuing operative intervention  -We will continue home regimen of metoprolol, hold ACE inhibitors for now and continue to monitor creatinine and blood pressure monitoring  -Hold aspirin and Plavix until optimized from orthopedics perspective  -It was reported to me that the family is a bit hesitant in providing narcotic regimen which is understandable given patient's is at risk for behavioral/delirium with history of advanced dementia.  However we have to weigh in and balance risk and benefits as severe pain can also lead to delirium process.  -There is an available very low-dose narcotics if needed.  -Fall precautions.  -PT/OT evaluation and will await further instructions from orthopedics  -Social service to see for discharge planning  -Mechanical prophylaxis for now        Code status: DNR/DNI consistent with prior known directives  Admit to inpatient  Prophylaxis: Mechanical  Disposition: To be determined but likely will be needing at least 2 inpatient days          Chief Complaint:   Ankle pain         Source of Information:   Patient with poor reliability  Patient has history of advanced dementia  Discussion with ED physician  Review of E chart records         History of Present Illness:   Tracey Moran is a 89 year old female with known history of advanced dementia who is currently residing in a memory care facility, prior history of VRE UTI, CKD, frequent falls, prior tibial plateau fracture, CAD on antiplatelets with Plavix, hypertension, prior anemia, prior COVID-19 infection back in November 2020, who was brought into the emergency room earlier today as care staff noticed that patient was protecting her right leg most of the time since yesterday.  There was no report of obvious fall injury, trauma and was not able to ambulate her leg today and was found with bruising and significant swelling.  In the ER she was found to be a very  poor historian but collateral history from family stated that she usually can scoot from bed to the wheelchair and they did not see any previous bruising or swelling on her ankle with her last visit 2 days prior to this presentation.   There was no report of any worsening mental status issues or changes.  Upon presentation the emergency room she was found with ankle swelling at right lower extremity with stable hemodynamics, afebrile and not hypoxic.  Further investigation did reveal obliquely oriented mildly displaced fracture of the tibia.   She was seen by orthopedic service at the emergency room after splinting.  Extensive discussion ensued between patient, family and orthopedic service regarding recommended plan of care.  Family is considering their options and will provide their decisions if they will be wanting further operative, surgical intervention.  She tolerated immobilization and splinting and received intravenous fentanyl at that time.               Past Medical History:     Past Medical History:   Diagnosis Date     Hypertension              Past Surgical History:     Past Surgical History:   Procedure Laterality Date     ESOPHAGOSCOPY, GASTROSCOPY, DUODENOSCOPY (EGD), COMBINED N/A 12/6/2017    Procedure: COMBINED ESOPHAGOSCOPY, GASTROSCOPY, DUODENOSCOPY (EGD);  gastroscopy;  Surgeon: Melchor Mancera MD;  Location: [facility]             Social History:     Social History     Tobacco Use     Smoking status: Never Smoker     Smokeless tobacco: Never Used   Substance Use Topics     Alcohol use: Yes             Family History:   Family history was fully reviewed and non-contributory in this case.         Allergies:     Allergies   Allergen Reactions     Demerol [Meperidine]      Dust Mites      Peanuts [Nuts]      Penicillins      Has tolerated cephalosporins (ceftriaxone, cefdinir)     Pollen Extract              Medications:     Prior to Admission medications    Medication Sig Last Dose Taking? Auth  Provider   acetaminophen (TYLENOL) 500 MG tablet Take 500 mg by mouth 3 times daily as needed for mild pain 6/2/2021 at Unknown time Yes Unknown, Entered By History   aspirin (ASA) 81 MG chewable tablet Take 1 tablet (81 mg) by mouth daily 6/2/2021 at Unknown time Yes Neva Claudio MD   atorvastatin (LIPITOR) 20 MG tablet Take 1 tablet (20 mg) by mouth daily 6/2/2021 at Unknown time Yes Neva Claudio MD   Criselda Protect (EUCERIN) external cream Apply topically 2 times daily Criselda Barrier Cream (apply to incontinence rash) 6/2/2021 at am Yes Guilherme Graves MD   clopidogrel (PLAVIX) 75 MG tablet Take 1 tablet (75 mg) by mouth daily 6/2/2021 at Unknown time Yes Neva Claudio MD   diclofenac (VOLTAREN) 1 % topical gel Place 2 g onto the skin 3 times daily For right knee pain 6/2/2021 at am Yes Reported, Patient   lisinopril (ZESTRIL) 20 MG tablet Take 1 tablet (20 mg) by mouth 2 times daily 6/2/2021 at am Yes Bobbi Arechiga MD   menthol-zinc oxide (CALMOSEPTINE) 0.44-20.6 % OINT ointment Apply 1 g topically 4 times daily as needed for skin protection (to open area on right buttock)  Yes Unknown, Entered By History   metoprolol tartrate (LOPRESSOR) 25 MG tablet Take 12.5 mg by mouth 2 times daily 6/2/2021 at am Yes Unknown, Entered By History   miconazole (MICATIN) 2 % external powder Apply topically 3 times daily 6/2/2021 at am Yes Unknown, Entered By History   multivitamin w/minerals (THERA-VIT-M) tablet Take 1 tablet by mouth daily 6/2/2021 at Unknown time Yes Unknown, Entered By History   nystatin (MYCOSTATIN) 345857 UNIT/GM external powder Apply topically 2 times daily Apply to rash under bilateral breast until resolved 6/2/2021 at am Yes Unknown, Entered By History   pantoprazole (PROTONIX) 40 MG EC tablet Take 40 mg by mouth daily 6/2/2021 at Unknown time Yes Unknown, Entered By History   polyethylene glycol (MIRALAX) 17 g packet Take 17 g by mouth daily as needed for constipation  Yes Bobbi Arechiga MD    senna-docusate (SENOKOT-S/PERICOLACE) 8.6-50 MG tablet Take 1 tablet by mouth 2 times daily as needed for constipation  Yes Agustina Sanchez PA-C             Review of Systems:   Cannot be obtained fully as patient is a poor historian       Physical Exam:   Blood pressure (!) 188/92, pulse 76, temperature 98.3  F (36.8  C), temperature source Oral, SpO2 97 %, not currently breastfeeding.  Wt Readings from Last 1 Encounters:   02/14/21 48.3 kg (106 lb 6.4 oz)     Exam:  GENERAL: No apparent distress. Awake, alert, follow some very simple instructions  HEENT: Normocephalic, atraumatic. Extraocular movements intact.  CARDIOVASCULAR: Regular rate and rhythm without murmurs or rubs.   PULMONARY: Clear to auscultation, no wheezes, crackles  ABDOMINAL: Soft, non-tender, non-distended. Bowel sounds normoactive. No hepatosplenomegaly.  EXTREMITIES: No cyanosis or clubbing.  Edema, right lower extremity, presence of hematoma and swollen.  Limitation of range of movement due to pain.  .  NEUROLOGICAL: CN 2-12 grossly intact, awake and alert, disoriented, poor historian, spontaneous and coherent speech. no focal neurological deficits.  DERMATOLOGICAL: No rash, ulcer, ecchymoses, jaundice.  Psych: not agitation, not combative  Lymph nodes: no obvious palpable  cervical or axillary lymphadenopathy         Data:   EKG:    No new EKG seen  I reviewed her most recent echocardiogram done in February 2021 showed preserved EF, no wall motion abnormalities, grade 1 diastolic dysfunction    Imaging:  Recent Results (from the past 48 hour(s))   XR Tibia & Fibula Right 2 Views    Narrative    TIBIA AND FIBULA RIGHT TWO VIEWS June 2, 2021 1:08 PM     HISTORY: Suspected fall, pain and bruising.    COMPARISON: None.      Impression    IMPRESSION: There is an obliquely oriented mildly displaced fracture  at the junction of the middle and distal thirds of the left tibia. No  evidence of fibular fracture.    Vascular calcifications. Old mild  fracture deformity of the lateral  tibial plateau.    CRISTI RUEDA MD       Labs:  No results for input(s): CULT in the last 168 hours.  Recent Labs   Lab 06/02/21  1331   WBC 9.3   HGB 10.2*   HCT 31.7*   *        Recent Labs   Lab 06/02/21  1331      POTASSIUM 4.5   CHLORIDE 110*   CO2 26   ANIONGAP 4   GLC 90   BUN 33*   CR 1.12*   GFRESTIMATED 41*   GFRESTBLACK 48*   RENETTA 9.1     Recent Labs   Lab 06/02/21  1331      POTASSIUM 4.5   CHLORIDE 110*   CO2 26   GLC 90     No results for input(s): SED, CRP in the last 168 hours.  Recent Labs   Lab 06/02/21  1331   GLC 90     No results for input(s): INR in the last 168 hours.  No results for input(s): TROPONIN, TROPI, TROPR in the last 168 hours.    Invalid input(s): TROP, TROPONINIES  No results for input(s): TSH in the last 168 hours.

## 2021-06-02 NOTE — ED PROVIDER NOTES
"  History     Chief Complaint:  Ankle Pain      HPI   Tracey Moran is a 89 year old female on Plavix w/HTN, CKD, dementia in memory care, frequent falls, tibial plateau fracture, presenting with by EMS from her care facility with right leg pain.  History is limited secondary to patient's being a poor historian due to her underlying dementia, collateral obtained from patient's son who presents, verbal report to EMS from care staff.  Staff reportedly noticed patient protecting her right leg yesterday, no known fall or trauma.  Was not able to ambulate on the leg today, had significant swelling, bruising.  They called EMS and patient was transported to the emergency department for evaluation.    Patient repeats that \" it hurts\" but does not specify where, tolerates exam but is distressed with movement of her right lower extremity. With her son, states that it is a pain she has never had before.    Spoke with son who provides supplementary history.  He last saw the patient 2 days ago at which time her lower extremities were normal, she does usually have some degree of bruising on bilateral lateral shins that she gets when transferring herself in and out of her wheelchair.  She has a wound nurse who regularly checks her forearms and legs for abrasions, her wound nurse is the person who called EMS today.  Reportedly patient's blood pressure was in the 80s over 30s and she was not tolerating even light touch to her right lower extremity. She is acting at her baseline mental status per her son.     Review of Systems   Unable to perform ROS: Dementia   Musculoskeletal:        Pain and swelling in right lower leg, bruising in right lower leg.  No swelling or discoloration of knees.   Skin:        No wounds on hands.       Allergies:  Demerol [Meperidine]  Dust Mites  Peanuts [Nuts]  Penicillins  Pollen Extract      Medications:    acetaminophen (TYLENOL) 500 MG tablet  aspirin (ASA) 81 MG chewable tablet  atorvastatin " (LIPITOR) 20 MG tablet  Criselda Protect (EUCERIN) external cream  clopidogrel (PLAVIX) 75 MG tablet  diclofenac (VOLTAREN) 1 % topical gel  lisinopril (ZESTRIL) 20 MG tablet  menthol-zinc oxide (CALMOSEPTINE) 0.44-20.6 % OINT ointment  metoprolol tartrate (LOPRESSOR) 25 MG tablet  miconazole (MICATIN) 2 % external powder  multivitamin w/minerals (THERA-VIT-M) tablet  nystatin (MYCOSTATIN) 443649 UNIT/GM external powder  pantoprazole (PROTONIX) 40 MG EC tablet  polyethylene glycol (MIRALAX) 17 g packet  senna-docusate (SENOKOT-S/PERICOLACE) 8.6-50 MG tablet        Past Medical History:      Past Medical History:   Diagnosis Date     Hypertension      Patient Active Problem List    Diagnosis Date Noted     Fracture, tibia 06/02/2021     Priority: Medium     Acute pulmonary edema (H) 02/10/2021     Priority: Medium     Facial droop 02/06/2021     Priority: Medium     Fever, unspecified fever cause 02/06/2021     Priority: Medium     STEMI (ST elevation myocardial infarction) (H) 01/18/2021     Priority: Medium     ST elevation myocardial infarction (STEMI), unspecified artery (H) 01/18/2021     Priority: Medium     Dehydration 01/18/2021     Priority: Medium     UTI (urinary tract infection) 08/06/2020     Priority: Medium     Urinary tract infection 10/27/2019     Priority: Medium     Essential hypertension 07/23/2019     Priority: Medium     Anemia, unspecified type 07/01/2019     Priority: Medium     Hypotension, unspecified hypotension type 06/24/2019     Priority: Medium     Closed fracture of lateral portion of right tibial plateau with routine healing, subsequent encounter: 4/'19 06/13/2019     Priority: Medium     Stage 3 chronic kidney disease 05/21/2019     Priority: Medium     Dementia without behavioral disturbance, unspecified dementia type (H) 05/21/2019     Priority: Medium     Right knee pain 05/16/2019     Priority: Medium     right knee pain 05/15/2019     Priority: Medium     Yeast dermatitis 11/26/2018      Priority: Medium     CKD (chronic kidney disease) stage 2, GFR 60-89 ml/min 11/23/2018     Priority: Medium     Physical deconditioning 11/23/2018     Priority: Medium     Hypothyroidism, unspecified type 11/23/2018     Priority: Medium     Generalized muscle weakness 11/23/2018     Priority: Medium     Fall 02/08/2018     Priority: Medium     Gastrointestinal hemorrhage, unspecified gastrointestinal hemorrhage type 12/13/2017     Priority: Medium     Thrombocytopenia (H) 12/06/2017     Priority: Medium     CRF (chronic renal failure), stage 3 (moderate) 12/06/2017     Priority: Medium     Constipation, unspecified constipation type 12/06/2017     Priority: Medium     Benign essential hypertension 05/18/2017     Priority: Medium     Elevated CK 12/15/2016     Priority: Medium     Abrasion of left scapular region, subsequent encounter 12/15/2016     Priority: Medium     Leukocytosis, unspecified type 12/13/2016     Priority: Medium     Hypercholesterolemia 05/21/2014     Priority: Medium        Past Surgical History:      Past Surgical History:   Procedure Laterality Date     ESOPHAGOSCOPY, GASTROSCOPY, DUODENOSCOPY (EGD), COMBINED N/A 12/6/2017    Procedure: COMBINED ESOPHAGOSCOPY, GASTROSCOPY, DUODENOSCOPY (EGD);  gastroscopy;  Surgeon: Melchor Mancera MD;  Location:  GI       Family History:      No family history on file.    Social History:  Patient lives in memory care.     Physical Exam     Patient Vitals for the past 24 hrs:   BP Temp Temp src Pulse SpO2   06/02/21 1515 (!) 164/73 -- -- -- 97 %   06/02/21 1500 (!) 142/66 -- -- -- 97 %   06/02/21 1445 -- -- -- -- 95 %   06/02/21 1430 131/73 -- -- -- 94 %   06/02/21 1415 (!) 154/69 -- -- -- 94 %   06/02/21 1345 (!) 144/61 -- -- -- 96 %   06/02/21 1336 139/60 -- -- 76 94 %   06/02/21 1220 127/55 98.3  F (36.8  C) Oral 74 97 %       Physical Exam  Vitals signs and nursing note reviewed.   Constitutional:       Comments: Mild distress with movement   HENT:       Head: Normocephalic and atraumatic.      Nose: Nose normal.      Mouth/Throat:      Mouth: Mucous membranes are moist.      Pharynx: Oropharynx is clear.   Eyes:      Extraocular Movements: Extraocular movements intact.      Pupils: Pupils are equal, round, and reactive to light.   Neck:      Musculoskeletal: Normal range of motion and neck supple. No muscular tenderness.   Cardiovascular:      Rate and Rhythm: Normal rate and regular rhythm.      Pulses: Normal pulses.      Heart sounds: Normal heart sounds.   Pulmonary:      Effort: Pulmonary effort is normal. No respiratory distress.      Breath sounds: Normal breath sounds.   Abdominal:      General: Bowel sounds are normal. There is no distension.      Palpations: Abdomen is soft.      Tenderness: There is no abdominal tenderness. There is no guarding.   Musculoskeletal:      Comments: Right lower extremity: with mild swelling at mid lower leg, circumferential bruising and swelling.  Does not tolerate passive flexion extension at the ankle.  Not sensitive to light touch, tender to palpation from 2 inches below tibial plateau to level of malleoli.  Left lower extremity, bilateral knees bilateral hips bilateral shoulders bilateral elbows bilateral wrist bilateral hands nontender tolerates deep palpation atraumatic.  C-spine T-spine L-spine nontender without step-offs, deformity.   Skin:     General: Skin is warm and dry.      Capillary Refill: Capillary refill takes less than 2 seconds.   Neurological:      Mental Status: She is alert.      Comments: Demented at baseline, answers some questions.  Per son who was present she is mentating at baseline.         Emergency Department Course     Imaging:  XR Tibia & Fibula Right 2 Views   Preliminary Result   IMPRESSION: There is an obliquely oriented mildly displaced fracture   at the junction of the middle and distal thirds of the left tibia. No   evidence of fibular fracture.      Vascular calcifications. Old mild  fracture deformity of the lateral   tibial plateau.          Laboratory:  Hgb 10.2 (stable), WBC 9.3    Procedures:    Fracture Reduction     SITE: Right tibia     CONSENT: Risks and benefits, along with alternative treatment modalities, were discussed with the patient and her son  The patient consented to the procedure verbally and signed the proper paperwork.    ANESTHESIA:  The patient was given 50 mcg of fentanyl    TECHNIQUE: Traction was applied and angulation was recreated and reduced.  Good alignment was confirmed, cap refill and pulses checked with good response.  The patient tolerated the procedure well and there were no complications. A posterior splint and stirrup were applied with orthoglass and repeat check of capillary refill was     OUTCOME:  Rechecked the patient after sedation was no longer present, findings and plan explained to the patient. Patients status improved.  Pain was reduced and feeling more comfortably.       Emergency Department Course:    Reviewed:  I reviewed nursing notes, vitals and past history    Assessments:  1220 I obtained history and examined the patient as noted above.   1326 I rechecked the patient and spoke with her son and explained findings. Discussed presence of fracture, use of pain medication for comfort during splint.    1400   Gutter splint with stirrup placed, reduction of unstable rotating tibia  1422   Spoke with Nikki FRY from [facility], advised patient be admitted for surgical management  1520   Spoke with Dr Gibbons from [facility]. Advise surgery for comfort and stability. Patient's son will need to talk to the family before consenting for surgery however he does agree with plan to admit for pain control and support. If patient's son declines surgery, patient will require above the knee/to mid thigh splint to maximally stabilize unstable tibia fracture.     Consults:   Orthopaedic surgery        Interventions:    Medications   fentaNYL (PF) (SUBLIMAZE) injection 50 mcg  (50 mcg Intravenous Given 21 2732)       Disposition:  The patient was admitted to the hospital.    Impression & Plan      Medical Decision Makin-year-old woman significant history of dementia, CKD, frequent falls presenting via EMS from her memory care at [facility] after a fall.  Reportedly also had BP of 80s/30s once this morning, she has been normotensive while in the ED. Patient is at high risk for mechanical falls, has frequently gotten her leg stuck in her wheelchair as she transfers.  History is limited due to patient's baseline dementia however she is not indicating dizziness or chest pain, per her son her caregivers have not noticed any change in her mental status, complaints suggestive of an infectious etiology that would put her at high acute high risk for falls.  She does have significant bruising and swelling in her right lower extremity, sprain versus fracture versus cellulitis.  Exam not consistent with necrotizing fasciitis or cellulitis, imaging reveals fracture shaft of tibia, unstable we are able to rotate the foot independently from the knee.  Patient was reduced and placed in a posterior and stirrup splint, she tolerated the procedure well.  Spoke with Nikki FRY from [facility] who in light of patient's unstable fracture and her use of her legs to pull herself along her wheelchair agree patient would benefit from admission for surgical stabilization of her fracture; patient's son will discuss with family to determine whether the patient will undergo surgery. She will be admitted for pain control, re-splinting and possible ORIF.  Critical Care time:  none    Covid-19  Tracey Moran was evaluated during a global COVID-19 pandemic, which necessitated consideration that the patient might be at risk for infection with the SARS-CoV-2 virus that causes COVID-19.   Applicable protocols for evaluation were followed during the patient's care.       Diagnosis:    ICD-10-CM    1. Closed  fracture of shaft of right tibia, unspecified fracture morphology, initial encounter  S82.201A Basic metabolic panel       Discharge Medications:  New Prescriptions    No medications on file       MD Baljeet Berger, Jayna Mcpherson MD  Resident  06/02/21 154       Jayna Delong MD  Resident  06/02/21 [id number]

## 2021-06-02 NOTE — ED NOTES
RECEIVING UNIT ED HANDOFF REVIEW    Above ED Nurse Handoff Report was reviewed: Yes  Reviewed by: Hawk Rios RN on June 2, 2021 at 3:52 PM   [facility]  ED Nurse Handoff Report    Tracey Moran is a 89 year old female   ED Chief complaint: Ankle Pain  . ED Diagnosis:   Final diagnoses:   Closed fracture of shaft of right tibia, unspecified fracture morphology, initial encounter     Allergies:   Allergies   Allergen Reactions     Demerol [Meperidine]      Dust Mites      Peanuts [Nuts]      Penicillins      Has tolerated cephalosporins (ceftriaxone, cefdinir)     Pollen Extract        Code Status: DNR / DNI  Activity level - Baseline/Home:  Assist X 1. Activity Level - Current:   Total Care. Lift room needed: Yes. Bariatric: No   Needed: No   Isolation: Yes. 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     Vital Signs:   Vitals:    06/02/21 1345 06/02/21 1415 06/02/21 1430 06/02/21 1445   BP: (!) 144/61 (!) 154/69 131/73    Pulse:       Temp:       TempSrc:       SpO2: 96% 94% 94% 95%       Cardiac Rhythm:  ,      Pain level:    Patient confused: Yes. Patient Falls Risk: Yes.   Elimination Status: Incontinent at baseline. Incontinence care provided. Purewick placed.  Patient Report - Initial Complaint: Pt arrived via ems. Pt reported to have c/o right ankle pain since yesterday and low bp today 92-96 sbp. bs in [id number]  Focused Assessment: Skin - Skin WDL: .WDL except  Skin Color: naseem (bruising to right ankle/shin) Skin Temperature: cool  Skin Elasticity: slow return to original state   Musculoskeletal - Musculoskeletal WDL: .WDL except; joint(s)  Right Joint Swelling: ankle; swelling  Right Joint Tenderness: ankle; tenderness  CMS Intact: Yes   Peripheral Neurovascular - Peripheral Neurovascular WDL: .WDL except; edema   Edema - Edema: ankle, right   Dorsalis Pedis Pulse - Right Dorsalis Pedis Pulse: 2+ (normal)   Edema - Ankle, Right Edema: 2+ (Mild)   Tests Performed: Labs,  XR  Tib/fib  Abnormal Results:   Abnormal Labs Reviewed   CBC WITH PLATELETS DIFFERENTIAL - Abnormal; Notable for the following components:       Result Value    RBC Count 3.15 (*)     Hemoglobin 10.2 (*)     Hematocrit 31.7 (*)      (*)     All other components within normal limits   BASIC METABOLIC PANEL - Abnormal; Notable for the following components:    Chloride 110 (*)     All other components within normal limits     XR Tibia & Fibula Right 2 Views   Preliminary Result   IMPRESSION: There is an obliquely oriented mildly displaced fracture   at the junction of the middle and distal thirds of the left tibia. No   evidence of fibular fracture.      Vascular calcifications. Old mild fracture deformity of the lateral   tibial plateau.          Treatments provided: Fentanyl  Family Comments: Son at bedside  OBS brochure/video discussed/provided to patient:  N/A  ED Medications:   Medications   fentaNYL (PF) (SUBLIMAZE) injection 50 mcg (50 mcg Intravenous Given 6/2/21 6433)     Drips infusing:  No  For the majority of the shift, the patient's behavior Green. Interventions performed were N/A.    Sepsis treatment initiated: No     Patient tested for COVID 19 prior to admission: YES    ED Nurse Name/Phone Number: Jammie Martinez RN,   3:01 PM

## 2021-06-02 NOTE — ED TRIAGE NOTES
Pt arrived via ems. Pt reported to have c/o right ankle pain since yesterday and low bp today 92-96 sbp. bs in [id number].

## 2021-06-02 NOTE — ED NOTES
Care Management Initial Consult    General Information  Assessment completed with: Children, Met with patient and son Nick.  Type of CM/SW Visit: Initial Assessment    Primary Care Provider verified and updated as needed: Yes   Readmission within the last 30 days: no previous admission in last 30 days(However, patient did come to the ED on 5/20/21 and then d/c.)         Advance Care Planning: Advance Care Planning Reviewed: present on chart          Communication Assessment  Patient's communication style: spoken language (English or Bilingual)             Cognitive  Cognitive/Neuro/Behavioral: (Patient's son states she understands.  See note.)                      Living Environment:   People in home:       Current living Arrangements: assisted living  [facility] of Facility[facility] [place] [facility],   Able to return to prior arrangements: other (see comments)(To be determined.)       Family/Social Support:  Care provided by: other (see comments)(staff)  Provides care for: no one, unable/limited ability to care for self  Marital Status:   Children          Description of Support System: Supportive, Involved    Support Assessment: Adequate family and caregiver support    Current Resources:   Patient receiving home care services:       Community Resources:    Equipment currently used at home: walker, standard  Supplies currently used at home: Incontinence Supplies, Gloves, Wipes, Chux    Employment/Financial:  Employment Status:          Financial Concerns: No concerns identified           Lifestyle & Psychosocial Needs:        Socioeconomic History     Marital status:      Spouse name: Not on file     Number of children: 2     Years of education: Not on file     Highest education level: Not on file     Tobacco Use     Smoking status: Never Smoker     Smokeless tobacco: Never Used   Substance and Sexual Activity     Alcohol use: Yes     Drug use: No     Sexual activity: Not Currently       Functional  Status:  Prior to admission patient needed assistance:   Dependent ADLs:: Bathing, Dressing, Eating, Grooming, Incontinence, Positioning, Transfers, Wheelchair-with assist, Toileting  Dependent IADLs:: Cleaning, Cooking, Laundry, Shopping, Meal Preparation, Medication Management, Money Management, Transportation, Incontinence       Mental Health Status:          Chemical Dependency Status:                Values/Beliefs:  Spiritual, Cultural Beliefs, Anglican Practices, Values that affect care:                 Additional Information:  Chart reviewed.  Recent ER visit on 5/20/21 and then discharged back to [facility].  Nurse line:  [phone].  Fax: [phone].    Son Nick is here with patient and he will be talking with his family on how to proceed with her displaced fracture of tibia.  He doctors have given them options. Nick states that she is assist of two for transfers.  She is total care.  He is hoping that she can return to her care suite.  She has also been to TCU in the past.  Call placed to the nurse at the Care Suit[facility] and informed her of admission.  Also asked if they could accept patient back with a cast.  She would like us to speak to their RN to get their opinion.  They were not available at this time.  It will also depend on if she will have surgery, and if not she could have a long cast up to her thigh.      Nick was also going to talk with her doctor, Dr Franklin to get his perspective on the options as he knows her well and he values his opinion.    Sharee was very pleasant during my visit but I could not understand anything she was saying.      Margo Bolden RN Care Coordinator  Inpatient Care Coordination - Emergency Department  [facility]  [phone]

## 2021-06-02 NOTE — ED PROVIDER NOTES
Emergency Department Attending Supervision Note  6/2/2021  12:17 PM      I evaluated this patient in conjunction with Dr. Jayna Delong.       Briefly, the patient presented with right ankle pain. Staff at the patient's care facility report that they noticed the patient protecting her right ankle more today as it had significant bruising and swelling. The patient has a history of dementia and reports her ankle hurts, but is not able to provide more information. The son of the patient was present at the bedside and reports that she often catches her ankles in her wheel chair. He last saw the patient 2 days ago and notes that she did not have any injuries to her ankle at that time.      On my exam,  BP (!) 164/73   Pulse 76   Temp 98.3  F (36.8  C) (Oral)   SpO2 97%   General: Elderly female sitting upright  Eyes: PERRL, Conjunctive pale pink.  ENT: Moist mucous membranes, oropharynx clear.   Neck: No rigidity.  CV: Normal S1S2, no murmur, rub or gallop. Regular rate and rhythm.  2+ DP pulse right foot.  Resp: Normal respiratory effort.  MSK: Mild edema and tenderness mid right lower leg with crepitus palpable with attempts at movement of the right lower extremity which causes significant pain.  No palpable deformity.  Nontender of the right ankle or foot.  Able to range the left lower extremity without difficulty.  Skin: Warm and dry. No rashes or lesions or ecchymoses on visible skin.  Neuro: Alert to person.  Speech is difficult to understand.  Facial droop noted.  Responds appropriately to all commands.   Psych: Appropriate mood and affect.        Procedures     Posterior Splint and Stirrup Splint Placement     Custom-made Ortho-Glass short leg posterior and sugar tong splint was applied with appropriate padding and after placement I checked and adjusted the fit to ensure proper positioning. Patient was more comfortable with splint in place. Sensation and circulation are intact after splint placement.      Results:    XR Tibia & Fibula Right 2 Views   Preliminary Result   IMPRESSION: There is an obliquely oriented mildly displaced fracture   at the junction of the middle and distal thirds of the left tibia. No   evidence of fibular fracture.      Vascular calcifications. Old mild fracture deformity of the lateral   tibial plateau.        Labs Ordered and Resulted from Time of ED Arrival Up to the Time of Departure from the ED   CBC WITH PLATELETS DIFFERENTIAL - Abnormal; Notable for the following components:       Result Value    RBC Count 3.15 (*)     Hemoglobin 10.2 (*)     Hematocrit 31.7 (*)      (*)     All other components within normal limits   BASIC METABOLIC PANEL - Abnormal; Notable for the following components:    Chloride 110 (*)     All other components within normal limits   SARS-COV-2 (COVID-19) VIRUS RT-PCR        ED course:    1223 I spoke with Dr. Delong regarding the patient.   1344 I spoke with ANG Martinez, orthopedics, regarding the patient.   1346 I obtained a history and performed a physical examination as documented above.    1359 I checked on the patient and performed the fracture reduction and splinting as documented above. She was given 50 mcg of Fentanyl prior to any manipulation.  1422 I spoke with ANG Martinez, orthopedics again.   1523 Dr. Delong and I spoke with Dr. Townsend, orthopedics, after he evaluated the patient here in the emergency department.  He is recommended.  Son is deciding on whether he would like to proceed or not.    My impression is a pathologic fracture likely secondary to osteoporosis of the right tibia that is unstable and placed in Ortho-Glass splint for comfort and decreased mobility.  She is neurovascular intact.  No other obvious injuries.  Recommended surgery and will admit due to ongoing fall risk and dementia.        Diagnosis    ICD-10-CM    1. Closed fracture of shaft of right tibia, unspecified fracture morphology, initial encounter   S82.201A          I, Favio Alicea, am serving as a scribe on 6/2/2021 at 12:17 PM to personally document services performed by Liana Felton MD based on my observations and the provider's statements to me.      Liana Felton MD  06/02/21 1623

## 2021-06-02 NOTE — CONSULTS
Consult Date: 06/02/2021    ORTHOPEDIC CONSULTATION    REQUESTING PHYSICIAN:   Liana Felton MD, Emergency Room    DIAGNOSIS:  Right tibia fracture.    HISTORY OF PRESENT ILLNESS:  Ms. Moran is a very pleasant 89-year-old female who is on Plavix with dementia and in a memory care facility.  She has frequent falls with history of a tibial plateau fracture.  She presented from her facility with right leg pain.  Her history is limited due to her dementia, but from her son and verbal report from the staff, the patient was protecting her right leg.  She had no known trauma, was not able to ambulate on the leg today and significant swelling and bruising. Due to pain, she was taken to [facility] Emergency Room where she was evaluated and found to have a tibia fracture and I was asked to evaluate for treatment.    REVIEW OF SYSTEMS:  A 10-point review of systems is unable to be performed due to her dementia, but there have been no other constitutional symptoms.    MEDICATIONS ON ADMISSION:  Tylenol, aspirin, Lipitor, Plavix, Voltaren gel, Zestril, Lopressor, Mycostatin, Protonix, MiraLax, Senokot.    PAST MEDICAL HISTORY:  Significant for pulmonary edema, facial droop, ST elevation MI, dehydration, UTI, hypertension, anemia, hypotension, right tibial plateau fracture, stage III kidney disease, GI bleed, thrombocytopenia, chronic renal failure, constipation, BPH, elevated CK, leukocytosis.    PAST SURGICAL HISTORY:  Significant for an EGD.    FAMILY HISTORY:  Noncontributory.    SOCIAL HISTORY:  She lives in a memory care center.    PHYSICAL EXAMINATION:    GENERAL:  Lying in her hospital bed.  She is somewhat sedated secondary to pain medication, is otherwise not conversive.  HEENT:  She has no obvious head trauma.    EXTREMITIES:  Right and left upper extremities are within normal limits.  Skin is clean, dry and intact.  Palpable radial pulses.  Radial, ulnar, median nerve sensation and function intact  bilaterally.  No tenderness to palpation or crepitus.  On her right lower extremity, she has a splint on her right foot.  The visible skin is clean, dry and intact but she is tender to palpation around the right ankle where she is splinted.  She has full motion of her toes bilaterally.  She has palpable dorsalis pedis pulse on the left lower extremity.  Left lower extremity skin is clean, dry and intact.  There is no tenderness to palpation or crepitus in the left lower extremity.      X-rays show an oblique displaced fracture at the distal third of the tibial diaphysis.    LABORATORY DATA:  On admission, her laboratory data today is sodium 144, potassium 4.5, creatinine 1.12.  White blood cell count 9.3, hemoglobin 10.2.  CT shows generalized brain atrophy with chronic microvascular ischemic changes.    IMPRESSION, REPORT AND PLAN:  I had a long discussion with Tracey and her son as far as treatment options.  Given the instability of this fracture and the nature of the fracture in the distal shaft of the tibia, these long bone fractures would be most amenable to a tibial nail.  We discussed that the advantage of this would be that she would not need any other immobilization.  It will allow for early weightbearing and bone stabilization for palliation.  The family is somewhat concerned about the invasiveness of doing a surgical procedure on their 89-year-old mother who has severe dementia.  I understand their concern, but the other option would be to treat her in a splint or cast for immobilization of the fracture but this would make any mobilization of her much more difficult.  She is not one who would tolerate mobilizing on 1 limb, which means likely she would end up being either bedridden her chair-ridden.  Certainly, there also would be concern for sores within the cast as well as pain control.  Family is going to think about their options and will make a determination.  If they wish to proceed with surgery  and if she is medically cleared, we could proceed with surgery as early as tomorrow for a tibial nail.  If they decide to proceed without doing surgery, I think I would place her in a long leg splint to immobilize both the knee and the ankle joint to stabilize this fracture and then arrange for followup as an outpatient in about 7-14 days.      Corby Townsend MD        D: 2021   T: 2021   MT: PAKMT    Name:     LIV KNIGSTON  MRN:      [id number]        Account:      [id number]   :      1925           Consult Date: 2021     Document: [id number]    cc:  Liana Felton MD

## 2021-06-03 ENCOUNTER — APPOINTMENT (OUTPATIENT)
Dept: GENERAL RADIOLOGY | Facility: CLINIC | Age: 86
DRG: 563 | End: 2021-06-03
Attending: PHYSICIAN ASSISTANT
Payer: COMMERCIAL

## 2021-06-03 LAB
ANION GAP SERPL CALCULATED.3IONS-SCNC: 5 MMOL/L (ref 3–14)
BUN SERPL-MCNC: 31 MG/DL (ref 7–30)
CALCIUM SERPL-MCNC: 8.5 MG/DL (ref 8.5–10.1)
CHLORIDE SERPL-SCNC: 110 MMOL/L (ref 94–109)
CO2 SERPL-SCNC: 24 MMOL/L (ref 20–32)
CREAT SERPL-MCNC: 0.94 MG/DL (ref 0.52–1.04)
GFR SERPL CREATININE-BSD FRML MDRD: 51 ML/MIN/{1.73_M2}
GLUCOSE SERPL-MCNC: 87 MG/DL (ref 70–99)
POTASSIUM SERPL-SCNC: 4.3 MMOL/L (ref 3.4–5.3)
SODIUM SERPL-SCNC: 139 MMOL/L (ref 133–144)

## 2021-06-03 PROCEDURE — 258N000003 HC RX IP 258 OP 636: Performed by: INTERNAL MEDICINE

## 2021-06-03 PROCEDURE — 120N000001 HC R&B MED SURG/OB

## 2021-06-03 PROCEDURE — 250N000013 HC RX MED GY IP 250 OP 250 PS 637: Performed by: INTERNAL MEDICINE

## 2021-06-03 PROCEDURE — 73590 X-RAY EXAM OF LOWER LEG: CPT | Mod: RT

## 2021-06-03 PROCEDURE — 99232 SBSQ HOSP IP/OBS MODERATE 35: CPT | Performed by: INTERNAL MEDICINE

## 2021-06-03 PROCEDURE — 250N000011 HC RX IP 250 OP 636: Performed by: INTERNAL MEDICINE

## 2021-06-03 PROCEDURE — 99222 1ST HOSP IP/OBS MODERATE 55: CPT | Performed by: NURSE PRACTITIONER

## 2021-06-03 PROCEDURE — 36415 COLL VENOUS BLD VENIPUNCTURE: CPT | Performed by: INTERNAL MEDICINE

## 2021-06-03 PROCEDURE — 250N000013 HC RX MED GY IP 250 OP 250 PS 637: Performed by: NURSE PRACTITIONER

## 2021-06-03 PROCEDURE — 80048 BASIC METABOLIC PNL TOTAL CA: CPT | Performed by: INTERNAL MEDICINE

## 2021-06-03 RX ORDER — ACETAMINOPHEN 325 MG/1
975 TABLET ORAL EVERY 8 HOURS PRN
Status: DISCONTINUED | OUTPATIENT
Start: 2021-06-03 | End: 2021-06-03

## 2021-06-03 RX ORDER — ASPIRIN 81 MG/1
81 TABLET, CHEWABLE ORAL DAILY
Status: DISCONTINUED | OUTPATIENT
Start: 2021-06-03 | End: 2021-06-05 | Stop reason: HOSPADM

## 2021-06-03 RX ORDER — PANTOPRAZOLE SODIUM 40 MG/1
40 TABLET, DELAYED RELEASE ORAL DAILY
Status: DISCONTINUED | OUTPATIENT
Start: 2021-06-03 | End: 2021-06-05 | Stop reason: HOSPADM

## 2021-06-03 RX ORDER — OXYCODONE HCL 5 MG/5 ML
2.5 SOLUTION, ORAL ORAL EVERY 4 HOURS PRN
Status: DISCONTINUED | OUTPATIENT
Start: 2021-06-03 | End: 2021-06-05 | Stop reason: HOSPADM

## 2021-06-03 RX ORDER — LISINOPRIL 20 MG/1
20 TABLET ORAL 2 TIMES DAILY
Status: DISCONTINUED | OUTPATIENT
Start: 2021-06-03 | End: 2021-06-05 | Stop reason: HOSPADM

## 2021-06-03 RX ORDER — GABAPENTIN 250 MG/5ML
100 SOLUTION ORAL 2 TIMES DAILY
Status: DISCONTINUED | OUTPATIENT
Start: 2021-06-03 | End: 2021-06-04

## 2021-06-03 RX ORDER — CLOPIDOGREL BISULFATE 75 MG/1
75 TABLET ORAL DAILY
Status: DISCONTINUED | OUTPATIENT
Start: 2021-06-03 | End: 2021-06-05 | Stop reason: HOSPADM

## 2021-06-03 RX ORDER — HYDROMORPHONE HCL IN WATER/PF 6 MG/30 ML
0.2 PATIENT CONTROLLED ANALGESIA SYRINGE INTRAVENOUS
Status: DISCONTINUED | OUTPATIENT
Start: 2021-06-03 | End: 2021-06-05 | Stop reason: HOSPADM

## 2021-06-03 RX ORDER — GABAPENTIN 100 MG/1
100 CAPSULE ORAL 2 TIMES DAILY
Status: DISCONTINUED | OUTPATIENT
Start: 2021-06-03 | End: 2021-06-04

## 2021-06-03 RX ORDER — MULTIPLE VITAMINS W/ MINERALS TAB 9MG-400MCG
1 TAB ORAL DAILY
Status: DISCONTINUED | OUTPATIENT
Start: 2021-06-04 | End: 2021-06-05 | Stop reason: HOSPADM

## 2021-06-03 RX ORDER — ACETAMINOPHEN 325 MG/1
650 TABLET ORAL EVERY 8 HOURS
Status: DISCONTINUED | OUTPATIENT
Start: 2021-06-03 | End: 2021-06-05 | Stop reason: HOSPADM

## 2021-06-03 RX ADMIN — Medication 12.5 MG: at 21:10

## 2021-06-03 RX ADMIN — PANTOPRAZOLE SODIUM 40 MG: 40 TABLET, DELAYED RELEASE ORAL at 16:54

## 2021-06-03 RX ADMIN — GABAPENTIN 100 MG: 100 CAPSULE ORAL at 21:10

## 2021-06-03 RX ADMIN — HYDROMORPHONE HYDROCHLORIDE 0.2 MG: 0.2 INJECTION, SOLUTION INTRAMUSCULAR; INTRAVENOUS; SUBCUTANEOUS at 06:21

## 2021-06-03 RX ADMIN — Medication 12.5 MG: at 08:35

## 2021-06-03 RX ADMIN — ATORVASTATIN CALCIUM 20 MG: 20 TABLET, FILM COATED ORAL at 21:10

## 2021-06-03 RX ADMIN — HYDROMORPHONE HYDROCHLORIDE 0.2 MG: 0.2 INJECTION, SOLUTION INTRAMUSCULAR; INTRAVENOUS; SUBCUTANEOUS at 12:28

## 2021-06-03 RX ADMIN — ACETAMINOPHEN 650 MG: 325 TABLET, FILM COATED ORAL at 16:54

## 2021-06-03 RX ADMIN — ACETAMINOPHEN 325 MG: 325 TABLET, FILM COATED ORAL at 05:23

## 2021-06-03 RX ADMIN — ASPIRIN 81 MG: 81 TABLET, CHEWABLE ORAL at 16:54

## 2021-06-03 RX ADMIN — MICONAZOLE NITRATE: 20 POWDER TOPICAL at 14:20

## 2021-06-03 RX ADMIN — SODIUM CHLORIDE, PRESERVATIVE FREE: 5 INJECTION INTRAVENOUS at 00:15

## 2021-06-03 RX ADMIN — MICONAZOLE NITRATE: 20 POWDER TOPICAL at 21:10

## 2021-06-03 RX ADMIN — CLOPIDOGREL BISULFATE 75 MG: 75 TABLET ORAL at 16:54

## 2021-06-03 RX ADMIN — LISINOPRIL 20 MG: 20 TABLET ORAL at 21:10

## 2021-06-03 ASSESSMENT — ACTIVITIES OF DAILY LIVING (ADL)
ADLS_ACUITY_SCORE: 30
ADLS_ACUITY_SCORE: 34
ADLS_ACUITY_SCORE: 30
ADLS_ACUITY_SCORE: 30

## 2021-06-03 NOTE — PLAN OF CARE
Pt disorientated x4. Slept most of the night, crying/moaning out towards the morning. Only able to take one Tylenol, gave 0.2 of dilaudid. IV infusing. Purewick in place, incontinent of stool x1. RLE is splinted and wrapped. Repo in bed with assist of 2. Will continue to monitor

## 2021-06-03 NOTE — UTILIZATION REVIEW
Admission Status; Secondary Review Determination       Under the authority of the Utilization Management Committee, the utilization review process indicated a secondary review on the above patient. The review outcome is based on review of the medical records, discussions with staff, and applying clinical experience noted on the date of the review.     (x) Inpatient Status Appropriate - This patient's medical care is consistent with medical management for inpatient care and reasonable inpatient medical practice.     RATIONALE FOR DETERMINATION   89 year old female with known history of advanced dementia who is currently residing in a memory care facility, prior history of VRE UTI, CKD, frequent falls, prior tibial plateau fracture, CAD on antiplatelets with Plavix, hypertension, prior anemia, prior COVID-19 infection back in November 2020, who was brought into the emergency room earlier today as care staff noticed that patient was protecting her right leg most of the time since yesterday.  Further evaluation revealed right tibial fracture in a patient with advanced dementia and at significant risk for severe delirium, prior history of VRE UTI and multiple comorbidities.  The expected length of stay at the time of admission was more than 2 nights because of the severity of illness, intensity of service provided, and risk for adverse outcome. Inpatient admission is appropriate.     This document was produced using voice recognition software       The information on this document is developed by the utilization review team in order for the business office to ensure compliance. This only denotes the appropriateness of proper admission status and does not reflect the quality of care rendered.   The definitions of Inpatient Status and Observation Status used in making the determination above are those provided in the CMS Coverage Manual, Chapter 1 and Chapter 6, section 70.4.   Sincerely,   SAUL DAVISON MD   System  Medical Director   Utilization Management   [facility].

## 2021-06-03 NOTE — PROGRESS NOTES
Orthopedic Surgery  6/3/2021    S: Patient sleeping did not wake.     O: Blood pressure 122/45, pulse 67, temperature 97.7  F (36.5  C), temperature source Temporal, resp. rate 16, weight 52.7 kg (116 lb 1.6 oz), SpO2 98 %, not currently breastfeeding.  Lab Results   Component Value Date    HGB 10.2 06/02/2021     Lab Results   Component Value Date    INR 1.15 02/06/2021        Right leg with ankle splint in place    A: Ms. Moran is doing well with right tibial shaft fracture.    P: I have had a long discussion yesterday with the patients family.  I would recommend surgical fixation of the fracture for pain control, but non-operative treatment is reasonable.  The family will let us know how they wish to proceed.     the Splint she has is not really adequate, if we are not going to proceed with surgery, I would recommend either a long leg cast or long leg splint    Pain management, if we are not going to proceed with surgery, I would consider a palliative care consult to determine what is the goal of care and pain control  Discharge planning    Corby Townsend MD  [phone]

## 2021-06-03 NOTE — PROGRESS NOTES
[facility]  Hospitalist Progress Note  Reggie Voss MD, MD 06/03/2021  (Text Page)  Reason for Stay (Diagnosis): Right tibia fracture         Assessment and Plan:      Summary of Stay: Tracey Moran is a 89 year old female with known history of advanced dementia who is currently residing in a memory care facility, prior history of VRE UTI, CKD, frequent falls, prior tibial plateau fracture, CAD on antiplatelets with Plavix, hypertension, prior anemia, prior COVID-19 infection back in November 2020, who was brought into the emergency room earlier today as care staff noticed that patient was protecting her right leg most of the time since yesterday.  There was no report of obvious fall injury, trauma and was not able to ambulate her leg today and was found with bruising and significant swelling.  In the ER she was found to be a very poor historian but collateral history from family stated that she usually can scoot from bed to the wheelchair and they did not see any previous bruising or swelling on her ankle with her last visit 2 days prior to this presentation.         1.  Right tibia fracture  2.  No reported recent obvious fall or injury  3.  Advanced dementia with behavioral disturbances  4.  History of diastolic CHF with preserved EF not in exacerbation  5.  History of CKD  6.  Prior hospitalization for VRE UTI  7.  History of CAD, on antiplatelets with Plavix  8.  Dyslipidemia  9.  Hypertension  10.  History of CVA    -May have regular diet  -No plans for operative surgical intervention  -Patient's family had a multiple extensive discussion with orthopedic service regarding this matter.  -Orthopedics applying long-leg cast as patient will not be undergoing operative surgical intervention  -requesting palliative care input for goals of care, pain control and assistance with patient's care  -Restarted antiplatelets as no plans for surgery  -Resume home regimen of ACE inhibitors  -Remain at  risk for delirium, behavioral disturbances with history of advanced dementia, potentially can also be initiated but if with severe pain  --It was reported to me that the family is a bit hesitant in providing narcotic regimen which is understandable given patient's is at risk for behavioral/delirium with history of advanced dementia.  However we have to weigh in and balance risk and benefits as severe pain can also lead to delirium process.  -There is an available very low-dose narcotics if needed.  -Fall precautions.  -PT/OT evaluation and will await further instructions from orthopedics  -Social service to see for discharge planning  -Mechanical prophylaxis for now    DVT Prophylaxis: Pneumatic Compression Devices  Code Status: DNR / DNI  Discharge Dispo: Back to prior living arrangement  Estimated Disch Date / # of Days until Disch: 1 to 2 days        Interval History (Subjective):      Continuing care.  No significant reported events overnight.  Stable hemodynamics.  No episodes of being combative or agitated  Decisions made by patient and family not to further pursue operative surgical intervention.              Physical Exam:      Last Vital Signs:  BP (!) 140/46 (BP Location: Right arm)   Pulse 70   Temp 98.5  F (36.9  C) (Temporal)   Resp 16   Wt 52.7 kg (116 lb 1.6 oz)   SpO2 98%   BMI 21.23 kg/m      I/O last 3 completed shifts:  In: 384 [I.V.:384]  Out: 150 [Urine:150]  Wt Readings from Last 1 Encounters:   06/02/21 52.7 kg (116 lb 1.6 oz)     Vitals:    06/02/21 1720   Weight: 52.7 kg (116 lb 1.6 oz)       Constitutional: Awake, easily aroused, no apparent distress   Respiratory: Clear to auscultation bilaterally, no crackles or wheezing   Cardiovascular: Regular rate and rhythm, normal S1 and S2, and no murmur noted   Abdomen: Normal bowel sounds, soft, non-distended, non-tender   Skin: No rashes, no cyanosis, dry to touch   Neuro: Alert and oriented, disoriented to time place and person, no  weakness, spontaneous and coherent speech   Extremities:  Long casts right lower extremity being applied    Other(s): Euthymic mood, not agitated       All other systems: Negative          Medications:      All current medications were reviewed with changes reflected in problem list.         Data:      All new lab and imaging data was reviewed.   Labs:  No results for input(s): CULT in the last 168 hours.  Recent Labs   Lab 06/02/21  1331   WBC 9.3   HGB 10.2*   HCT 31.7*   *        Recent Labs   Lab 06/03/21  0722 06/02/21  1815 06/02/21  1331     --  140   POTASSIUM 4.3 4.3 4.5   CHLORIDE 110*  --  110*   CO2 24  --  26   ANIONGAP 5  --  4   GLC 87  --  90   BUN 31*  --  33*   CR 0.94  --  1.12*   GFRESTIMATED 51*  --  41*   GFRESTBLACK 59*  --  48*   RENETTA 8.5  --  9.1     No results for input(s): SED, CRP in the last 168 hours.  Recent Labs   Lab 06/03/21  0722 06/02/21  1331   GLC 87 90     No results for input(s): INR in the last 168 hours.  No results for input(s): TROPONIN, TROPI, TROPR in the last 168 hours.    Invalid input(s): TROP, TROPONINIES  No results for input(s): COLOR, APPEARANCE, URINEGLC, URINEBILI, URINEKETONE, SG, UBLD, URINEPH, PROTEIN, UROBILINOGEN, NITRITE, LEUKEST, RBCU, WBCU in the last 168 hours.   Imaging:   Results for orders placed or performed during the hospital encounter of 06/02/21   XR Tibia & Fibula Right 2 Views    Narrative    TIBIA AND FIBULA RIGHT TWO VIEWS June 2, 2021 1:08 PM     HISTORY: Suspected fall, pain and bruising.    COMPARISON: None.      Impression    IMPRESSION: There is an obliquely oriented mildly displaced fracture  at the junction of the middle and distal thirds of the left tibia. No  evidence of fibular fracture.    Vascular calcifications. Old mild fracture deformity of the lateral  tibial plateau.    CRISTI RUEDA MD

## 2021-06-03 NOTE — PLAN OF CARE
Family decided to treat fracture comservatively so no surgery, pt placed in a long leg cast. Palliative care team ordered to see the pt. She did eat a small amount of lunch and tolerated fluids. IVF SL'd. IV dilaudid given for the cast placement. Palliative team did order a number of different pain meds for her to use. Voiding per pure wick but incontinent around it as well. Periarea reddened, barrier cream applied. Discharge planning pending.

## 2021-06-03 NOTE — PLAN OF CARE
Physical Therapy - per discussion with nurse, pt no longer NPO and not having surgery. Per ortho note if patient/family does not proceed with surgical fixation to R tibial fx, he recommend long leg cast or splint as current splint not adequate.  Will continue to follow once R LE adequately stabilized and MD provides mobility orders.

## 2021-06-03 NOTE — TREATMENT PLAN
Discussed treatment options with son, Nick, for 30 minutes.  Family would like to proceed with attempting non-operative management.     Plan:  Able to eat today  A long leg cast will be applied to the right LE.    NWB right LE  Keep cast applied, do not get wet.  Dr Townsend recommending palliative care consult   Ok to discharge to  facility vs TCU once discharge plan established   Follow-up with Dr Townsend 7-14 days or have xrays sent to clinic.  [phone]    Nikki Martinez PA-C on 6/3/2021 at 12:10 PM

## 2021-06-03 NOTE — PROGRESS NOTES
"SPIRITUAL HEALTH SERVICES Progress Note  [facility] Ortho 6th floor     consult per pt/family request. Pt, \"June\", is listed as [employer] Free in the medical record. Accompanying me is  Intern Bettie.     Sharee has advanced dementia and is lying on her side currently, eyes open and smiling. Introduced Sharee to  support and she tries to voice something but it is unintelligible. She does maintain eye contact. She appears to be in no distress and is gradually falling asleep during the time we spent together. Offered the Lord's Prayer and she fell asleep during this.     Pt unable to communicate her own needs at this time.  will f/u with family as to discern how best to support pt during hospitalization.  remains available.    ZOIE Jurado.  Staff [facility] Office [phone]  Pronouns: he/him/his    "

## 2021-06-03 NOTE — CONSULTS
[facility]  Palliative Care Consultation   Text Page    Assessment & Plan   Tracey Moran is a 89 year old female who was admitted on 6/2/2021.   Consulted by Dr. Josias MD  to assist with symptom management, goals of care, and facilitate development of plan of care    Recommendations:  1. Goals of Care- No CPR- Do NOT Intubate  Hospitalization goals discussed  No discussion done. Called and left message to informed our service is part of her care.  Also encouraged to call back today ( til 5 pm) or I will contact in am.   Decisional Capacity- Unreliable. Patient has an advance directive dated 02.12.18.  Consents and decision making should be done by  Ronal Moran, the primary Health Care Agent.  Alternates are  Michele Shepherd Primary agent lives out of town and alternate in primary care giver and decision maker .   POLST indicates restorative care with selective treatment.  No surgical intervention at this time for right pretibial fx   2. Dementia history  Chronic dementia with functional decline 26 lb weight loss from 6/6/19 to 1/19/21, with no further weight loss since that time.  Sharee is non verbal, non agitated, confused    -cluster cares    -reduce stimulation    -monitor pain control as non verbal expression of pain   .    3. Pain  S/P right tibial fracture 6/2/21 Post procedure cast day 0   Patient's opioid use in past 24 hours:  50 mcg Fentanyl, dilaudid 0.2 mg = 50 mg Daily Morphine Equivalent  [place] Board of Pharmacy Data Base Reviewed:    YES; As expected, no concern for misuse/abuse of controlled medications based on this report.  No chronic opioid use per PMD   -oxycodone 2.5 mg every 4 hrs prn oral or solution   -dilaudid 0.2 mg every 3 hrs prn pain BTP   -multimodal    Gabapentin 100 mg bid oral or solution   -CAM therapies    Music   Elevated leg   -Physical Therapy     4. Spiritual Care  Not able to Oriented to Spiritual Health as part of Palliative Care team.  appreciate esther visit from intern  Spiritual Background:  [employer]     5. Care Planning  noted case management consultation for d/c planning.    Medications for discharge  To be determined     Medical Decision Making and Goals of Care:  Discussed on Sharee 3, 2021 with Katarina Laura RN, PGMT-BC, APRN, CNP, ACHPN: called Nick Shepherd and left message to call our office.     Thank you for involving us in the patient's care.     Katarina Laura RN, PGMT- BC, APRN, CNP, ACHPN  Pain Management and Palliative Care  [facility]  Pgr: [phone]    Time Spent on this Encounter   Total unit/floor time 65 minutes, time consisted of the following, examination of the patient, reviewing the record and completing documentation. >50% of time spent in counseling and coordination of care, Bedside Nurse Rosalee Strong RN .  Time spend counseling with patient consisted of the following topics, symptom management.    Understanding of disease process:   This has not been discussed patient has severe dementia.    History of Present Illness   History is obtained from the electronic health record    Tracey Moran is a 89 year old female with a past medical history of HTN, dementia, expressive aphasia, CKD, dysphagia, [place], NSTEMI, CHF and is COVID-19 recovered from this past November, who lives in memory care at [facility]. She  presents with  right ankle pain and swelling noting a  Pretibial fracture on x-ray.    The patient and her son were in consult with Dr. Corby Townsend with recommendations for surgical fixation. Her son Michele, health care advocate did not want to risk surgery so a full hard cast was put on today.  She has been hospitalized or in the ED twice in the past  6 months for CHF exacerbation, cystitis and perianal wounds. There is no reported or documented further weight loss.  Palliative Performance Scale (PPS): 50%  Extensive disease. Normal or  reduced intake; normal LOC or confusion; little ambulation (mainly sit/lie), ADLs w/much assistance, unable to do any work.  Estimated Median Survival in Days: 30 to 41 days.      Past Medical History   I have reviewed this patient's medical history and updated it with pertinent information if needed.   Past Medical History:   Diagnosis Date     Hypertension        Past Surgical History   I have reviewed this patient's surgical history and updated it with pertinent information if needed.  Past Surgical History:   Procedure Laterality Date     ESOPHAGOSCOPY, GASTROSCOPY, DUODENOSCOPY (EGD), COMBINED N/A 12/6/2017    Procedure: COMBINED ESOPHAGOSCOPY, GASTROSCOPY, DUODENOSCOPY (EGD);  gastroscopy;  Surgeon: Melchor Mancera MD;  Location:  GI       Prior to Admission Medications   Prior to Admission Medications   Prescriptions Last Dose Informant Patient Reported? Taking?   Criselda Protect (EUCERIN) external cream 6/2/2021 at am  Yes Yes   Sig: Apply topically 2 times daily Criselda Barrier Cream (apply to incontinence rash)   acetaminophen (TYLENOL) 500 MG tablet 6/2/2021 at Unknown time  Yes Yes   Sig: Take 500 mg by mouth 3 times daily as needed for mild pain   aspirin (ASA) 81 MG chewable tablet 6/2/2021 at Unknown time  No Yes   Sig: Take 1 tablet (81 mg) by mouth daily   atorvastatin (LIPITOR) 20 MG tablet 6/2/2021 at Unknown time  No Yes   Sig: Take 1 tablet (20 mg) by mouth daily   clopidogrel (PLAVIX) 75 MG tablet 6/2/2021 at Unknown time  No Yes   Sig: Take 1 tablet (75 mg) by mouth daily   diclofenac (VOLTAREN) 1 % topical gel 6/2/2021 at am  Yes Yes   Sig: Place 2 g onto the skin 3 times daily For right knee pain   lisinopril (ZESTRIL) 20 MG tablet 6/2/2021 at am  No Yes   Sig: Take 1 tablet (20 mg) by mouth 2 times daily   menthol-zinc oxide (CALMOSEPTINE) 0.44-20.6 % OINT ointment   Yes Yes   Sig: Apply 1 g topically 4 times daily as needed for skin protection (to open area on right buttock)   metoprolol  tartrate (LOPRESSOR) 25 MG tablet 6/2/2021 at am  Yes Yes   Sig: Take 12.5 mg by mouth 2 times daily   miconazole (MICATIN) 2 % external powder 6/2/2021 at am  Yes Yes   Sig: Apply topically 3 times daily   multivitamin w/minerals (THERA-VIT-M) tablet 6/2/2021 at Unknown time Nursing Home Yes Yes   Sig: Take 1 tablet by mouth daily   nystatin (MYCOSTATIN) 246909 UNIT/GM external powder 6/2/2021 at am Nursing Home Yes Yes   Sig: Apply topically 2 times daily Apply to rash under bilateral breast until resolved   pantoprazole (PROTONIX) 40 MG EC tablet 6/2/2021 at Unknown time  Yes Yes   Sig: Take 40 mg by mouth daily   polyethylene glycol (MIRALAX) 17 g packet   No Yes   Sig: Take 17 g by mouth daily as needed for constipation   senna-docusate (SENOKOT-S/PERICOLACE) 8.6-50 MG tablet   No Yes   Sig: Take 1 tablet by mouth 2 times daily as needed for constipation      Facility-Administered Medications: None     Allergies   Allergies   Allergen Reactions     Demerol [Meperidine]      Dust Mites      Peanuts [Nuts]      Penicillins      Has tolerated cephalosporins (ceftriaxone, cefdinir)     Pollen Extract        Social History   I have updated and reviewed the following Social History Narrative:   Social History     Social History Narrative     Not on file            Living situation:  Lives at [facility] and receives home care from [facility]        Support system: sons       Actual/Potential Caregiver:  arias MERCHANT       Functional status: w/chair dependent assist with bathing eating dressing         Financial concerns:  None        Substance use disorder (past / present):  None        Occupation: unknown        Hobbies:    History of substance use/abuse:  None     Family History   I have reviewed this patient's family history and updated it with pertinent information if needed.   History reviewed. No pertinent family history.    Review of Systems   The 10 point Review of Systems is negative other  than noted in the HPI or here.     Palliative Symptom Review (0=no symptom/no concern, 1=mild, 2=moderate, 3=severe):      Pain: 0-none      Fatigue: 1-mild      Nausea: 0-none      Constipation: 0-none      Diarrhea: 0-none      Depressive Symptoms: 0-none      Anxiety: 1-mild      Drowsiness: 1-mild      Poor Appetite:  NPO,  post cast placement      Shortness of Breath: 0-none      Insomnia: 0-none      Other:        Overall (0 good/no concerns, 3 very poor):       Physical Exam   Temp:  [97.7  F (36.5  C)-99.2  F (37.3  C)] 98.5  F (36.9  C)  Pulse:  [] 70  Resp:  [16-18] 16  BP: (122-188)/(45-92) 140/46  SpO2:  [94 %-98 %] 98 %  116 lbs 1.6 oz  Exam:  GEN:  Alert or drifts off to sleep  eye contact with name calling,  comfortable, NAD.  HEENT:  Normocephalic/atraumatic, no scleral icterus, no nasal discharge, mouth moist.  CV:  RRR, S1, S2; no murmurs or other irregularities noted.  +3 DP/PT pulses bilatererally; no edema BLE.  RESP:  Clear to auscultation bilaterally without rales/rhonchi/wheezing/retractions.  Symmetric chest rise on inhalation noted.  Normal respiratory effort.  ABD:  Rounded, soft, non-tender/non-distended.  +BS  EXT:  Edema & pulses as noted above.  CMS intact x 4.     M/S:   Tender to palpation throughout.    SKIN:  Dry to touch, no exanthems noted in the visualized areas.    NEURO: Symmetric strength +5/5.  Sensation to touch intact all extremities.   There is no area of allodynia or hyperesthesia.  PAIN BEHAVIOR: Cooperative  Psych:  Normal affect.  Calm, cooperative, conversant appropriately.    Delirium Screen/CAM:  Delirium = (#1 and #2 = YES) + (#3 and/or #4)   1) Acute onset and fluctuating course:   No   (acute change in mental status from baseline over last 24 hours)  2) Inattention:   YES   (difficulty focusing, distractible, can't follow conversation)  3) Disorganized thinking:   YES   (score only if #1 and #2 are YES)  (rambling/irrelevant conversation, unclear/illogical  thoughts, inconsistency)  4) Altered level of consciousness:   YES   (score only if #1 and #2 are YES)  (other than alert, calm, cooperative)    Delirium/CAM score: 3/4  Interpretation:  1)  Delirium:  Absent  2)  Type:  mixed  3)  Severity:  severe    Data   Results for orders placed or performed during the hospital encounter of 06/02/21 (from the past 24 hour(s))   Asymptomatic SARS-CoV-2 COVID-19 Virus (Coronavirus) by PCR    Specimen: Nasopharyngeal   Result Value Ref Range    SARS-CoV-2 Virus Specimen Source Nasopharyngeal     SARS-CoV-2 PCR Result NEGATIVE     SARS-CoV-2 PCR Comment (Note)    Care Management / Social Work IP Consult    Narrative    Margo Bolden RN     6/2/2021  4:37 PM  See ED note for .   Potassium   Result Value Ref Range    Potassium 4.3 3.4 - 5.3 mmol/L   Basic metabolic panel   Result Value Ref Range    Sodium 139 133 - 144 mmol/L    Potassium 4.3 3.4 - 5.3 mmol/L    Chloride 110 (H) 94 - 109 mmol/L    Carbon Dioxide 24 20 - 32 mmol/L    Anion Gap 5 3 - 14 mmol/L    Glucose 87 70 - 99 mg/dL    Urea Nitrogen 31 (H) 7 - 30 mg/dL    Creatinine 0.94 0.52 - 1.04 mg/dL    GFR Estimate 51 (L) >60 mL/min/[1.73_m2]    GFR Estimate If Black 59 (L) >60 mL/min/[1.73_m2]    Calcium 8.5 8.5 - 10.1 mg/dL

## 2021-06-03 NOTE — PROGRESS NOTES
Patient vital signs are at baseline: Yes  Patient able to ambulate as they were prior to admission or with assist devices provided by therapies during their stay:  No,  Reason:  Painful   Patient MUST void prior to discharge:  Yes  Patient able to tolerate oral intake:  Yes  Pain has adequate pain control using Oral analgesics:  Yes.   Arrived to the unit around 1715, family updated. Surgical bath and shampoo done in case of surgery. Incontinent of B&B. Moaning when turned or moved, comfortable and without physiologic signs at rest. Will keep monitoring.

## 2021-06-04 ENCOUNTER — APPOINTMENT (OUTPATIENT)
Dept: PHYSICAL THERAPY | Facility: CLINIC | Age: 86
DRG: 563 | End: 2021-06-04
Attending: INTERNAL MEDICINE
Payer: COMMERCIAL

## 2021-06-04 PROCEDURE — 97110 THERAPEUTIC EXERCISES: CPT | Mod: GP

## 2021-06-04 PROCEDURE — 97530 THERAPEUTIC ACTIVITIES: CPT | Mod: GP

## 2021-06-04 PROCEDURE — 250N000013 HC RX MED GY IP 250 OP 250 PS 637: Performed by: NURSE PRACTITIONER

## 2021-06-04 PROCEDURE — 120N000001 HC R&B MED SURG/OB

## 2021-06-04 PROCEDURE — 97162 PT EVAL MOD COMPLEX 30 MIN: CPT | Mod: GP

## 2021-06-04 PROCEDURE — 250N000013 HC RX MED GY IP 250 OP 250 PS 637: Performed by: INTERNAL MEDICINE

## 2021-06-04 PROCEDURE — 99232 SBSQ HOSP IP/OBS MODERATE 35: CPT | Performed by: NURSE PRACTITIONER

## 2021-06-04 PROCEDURE — 99239 HOSP IP/OBS DSCHRG MGMT >30: CPT | Performed by: INTERNAL MEDICINE

## 2021-06-04 RX ORDER — GABAPENTIN 250 MG/5ML
100 SOLUTION ORAL 3 TIMES DAILY
Status: DISCONTINUED | OUTPATIENT
Start: 2021-06-04 | End: 2021-06-05 | Stop reason: HOSPADM

## 2021-06-04 RX ORDER — GABAPENTIN 100 MG/1
100 CAPSULE ORAL 2 TIMES DAILY
Qty: 30 CAPSULE | Refills: 0 | Status: SHIPPED | OUTPATIENT
Start: 2021-06-04 | End: 2021-06-04

## 2021-06-04 RX ORDER — OXYCODONE HYDROCHLORIDE 5 MG/1
2.5 TABLET ORAL EVERY 6 HOURS PRN
Qty: 6 TABLET | Refills: 0 | Status: SHIPPED | OUTPATIENT
Start: 2021-06-04

## 2021-06-04 RX ORDER — GABAPENTIN 100 MG/1
100 CAPSULE ORAL 3 TIMES DAILY
Status: DISCONTINUED | OUTPATIENT
Start: 2021-06-04 | End: 2021-06-05 | Stop reason: HOSPADM

## 2021-06-04 RX ORDER — GABAPENTIN 100 MG/1
100 CAPSULE ORAL 3 TIMES DAILY
Qty: 30 CAPSULE | Refills: 0 | DISCHARGE
Start: 2021-06-04

## 2021-06-04 RX ADMIN — ACETAMINOPHEN 650 MG: 325 TABLET, FILM COATED ORAL at 01:50

## 2021-06-04 RX ADMIN — OXYCODONE HYDROCHLORIDE 2.5 MG: 5 TABLET ORAL at 08:52

## 2021-06-04 RX ADMIN — MICONAZOLE NITRATE: 20 POWDER TOPICAL at 14:06

## 2021-06-04 RX ADMIN — LISINOPRIL 20 MG: 20 TABLET ORAL at 20:28

## 2021-06-04 RX ADMIN — ACETAMINOPHEN 650 MG: 325 TABLET, FILM COATED ORAL at 08:43

## 2021-06-04 RX ADMIN — CLOPIDOGREL BISULFATE 75 MG: 75 TABLET ORAL at 08:43

## 2021-06-04 RX ADMIN — ACETAMINOPHEN 650 MG: 325 TABLET, FILM COATED ORAL at 17:11

## 2021-06-04 RX ADMIN — ASPIRIN 81 MG: 81 TABLET, CHEWABLE ORAL at 08:43

## 2021-06-04 RX ADMIN — Medication 12.5 MG: at 20:28

## 2021-06-04 RX ADMIN — PANTOPRAZOLE SODIUM 40 MG: 40 TABLET, DELAYED RELEASE ORAL at 08:42

## 2021-06-04 RX ADMIN — MICONAZOLE NITRATE: 20 POWDER TOPICAL at 08:53

## 2021-06-04 RX ADMIN — Medication 12.5 MG: at 08:42

## 2021-06-04 RX ADMIN — GABAPENTIN 100 MG: 100 CAPSULE ORAL at 08:43

## 2021-06-04 RX ADMIN — MULTIPLE VITAMINS W/ MINERALS TAB 1 TABLET: TAB at 08:43

## 2021-06-04 RX ADMIN — MICONAZOLE NITRATE: 20 POWDER TOPICAL at 20:28

## 2021-06-04 RX ADMIN — LISINOPRIL 20 MG: 20 TABLET ORAL at 08:43

## 2021-06-04 RX ADMIN — GABAPENTIN 100 MG: 100 CAPSULE ORAL at 14:03

## 2021-06-04 RX ADMIN — ATORVASTATIN CALCIUM 20 MG: 20 TABLET, FILM COATED ORAL at 20:28

## 2021-06-04 RX ADMIN — GABAPENTIN 100 MG: 100 CAPSULE ORAL at 20:28

## 2021-06-04 ASSESSMENT — ACTIVITIES OF DAILY LIVING (ADL)
ADLS_ACUITY_SCORE: 34
ADLS_ACUITY_SCORE: 34
ADLS_ACUITY_SCORE: 30
ADLS_ACUITY_SCORE: 30
ADLS_ACUITY_SCORE: 34
ADLS_ACUITY_SCORE: 34

## 2021-06-04 NOTE — PROGRESS NOTES
[facility]  Palliative Care Progress Note  Text Page     Assessment & Plan   Recommendations:    Tracey Moran is a 89 year old female who was admitted on 6/2/2021.   Consulted by Dr. Josias MD  to assist with symptom management, goals of care, and facilitate development of plan of care     Recommendations:  1. Goals of Care- No CPR- Do NOT Intubate    Hospitalization goals discussed  no change in goals of care   Decisional Capacity- Unreliable. Patient has an advance directive dated 02.12.18.  Consents and decision making should be done by  Ronal Moran, the primary Health Care Agent.  Alternates are  Michele Shepherd Primary agent lives out of town and alternate in primary care giver and decision maker .   POLST indicates restorative care with selective treatment.  No surgical intervention at this time for right pretibial fx.    2. Dementia history  Chronic dementia with functional decline 26 lb weight loss from 6/6/19 to 1/19/21, with no further weight loss since that time.  Sharee is non verbal, non agitated, confused              -cluster cares             -reduce stimulation              -monitor pain control as non verbal expression of pain   .    3. Pain  S/P right tibial fracture 6/2/21 Post procedure cast day 1   Patient's opioid use in past 24 hours:  dilaudid 0.2 mg = 15 mg Daily Morphine Equivalent  [place] Board of Pharmacy Data Base Reviewed:    YES; As expected, no concern for misuse/abuse of controlled medications based on this report.  No chronic opioid use per PMD   -oxycodone 2.5 mg every 4 hrs prn oral or solution minimize use if able   -dilaudid 0.2 mg every 3 hrs prn pain BTP   -multimodal              Gabapentin 100 mg tid oral or solution   -CAM therapies              Music             Elevated leg   -Physical Therapy      4. Spiritual Care  Not able to Oriented to Spiritual Health as part of Palliative Care team. appreciate esther visit from  "intern  Spiritual Background:  [employer]      5. Care Planning  noted case management consultation for d/c planning.    Medications for discharge  To be determined      Medical Decision Making and Goals of Care:  Discussed on 2021 with Katarina Laura RN, PGMT-BC, APRN, CNP, ACHPN: Talked with Nick Shepherd out side of patient room.  He remains hopeful she will bounce back with her cognition some. He is pleased overall how well she was doing prior to this event.  [place]  Overall at baseline she is content, seems happy and weights are stable. He tells me I know this will not last forever, but Im not ready to \"throw in the towel yet.\" (hospice).  He at this time does not want to change the direction of care.   He tells me that his father  recently from a stroke ( lives in [place]), he was on hospice and it was not a good experience      Thank you for involving us in the patient's care.     Katarina Laura RN, PGMT-BC, APRN, CNP, ACHPN  Pain Management and Palliative Care  [facility]  Pgr: [phone]      Time Spent on this Encounter   Total unit/floor time 30 minutes, time consisted of the following, examination of the patient, reviewing the record and completing documentation. >50% of time spent in counseling and coordination of care, Bedside Nurse Rosalee Strong, RN and Hospitalist Reggie Soares MD .  Time spend counseling with family consisted of the following topics, goals of care, care planning for discharge and symptom management.    Review of Systems    CONSTITUTIONAL: NEGATIVE for fever, chills, change in weight  ENT/MOUTH: NEGATIVE for ear, mouth and throat problems  RESP: NEGATIVE for significant cough or SOB  CV: NEGATIVE for chest pain, palpitations or peripheral edema    Palliative Symptom Review (0=no symptom/no concern, 1=mild, 2=moderate, 3=severe):      Pain: 1-mild      Fatigue: 0-none      " Nausea: 0-none      Constipation: 0-none      Diarrhea: 0-none      Depressive Symptoms: 0-none      Anxiety: 0-none      Drowsiness: 1-mild      Poor Appetite: 0-none      Shortness of Breath: 0-none      Insomnia: 0-none      Other:  0-none      Overall (0 good/no concerns, 3 very poor):  1  Delirium Screen/CAM:  Delirium = (#1 and #2 = YES) + (#3 and/or #4)   1) Acute onset and fluctuating course:   No   (acute change in mental status from baseline over last 24 hours)  2) Inattention:   YES   (difficulty focusing, distractible, can't follow conversation)  3) Disorganized thinking:   YES   (score only if #1 and #2 are YES)  (rambling/irrelevant conversation, unclear/illogical thoughts, inconsistency)  4) Altered level of consciousness:   No   (score only if #1 and #2 are YES)  (other than alert, calm, cooperative)    Delirium/CAM score: 2/4  Interpretation:  1)  Delirium:  Present  2)  Type:  mixed  3)  Severity:  severe      Physical Exam   Temp:  [96.1  F (35.6  C)-98  F (36.7  C)] 98  F (36.7  C)  Pulse:  [59-80] 64  Resp:  [16] 16  BP: (134-178)/(39-75) 136/41  SpO2:  [96 %-98 %] 96 %  116 lbs 1.6 oz  Exam:  GEN:  Alert, oriented x1 ( smiling and look at examiner), appears comfortable, NAD.  HEENT:  Normocephalic/atraumatic, no scleral icterus, no nasal discharge, mouth moist.  CV:  RRR, S1, S2; no murmurs or other irregularities noted.  +3 DP/PT pulses bilatererally; no edema BLE.  RESP:  Clear to auscultation bilaterally without rales/rhonchi/wheezing/retractions.  Symmetric chest rise on inhalation noted.  Normal respiratory effort.  ABD:  Rounded, soft, non-tender/non-distended.  +BS  EXT:  Edema & pulses as noted above.  CMS intact x 4.     M/S:  Nontender to palpation, full right leg cast intact  SKIN:  Dry to touch, no exanthems noted in the visualized areas.    NEURO: Symmetric strength +5/5.  Sensation to touch intact all extremities.   There is no area of allodynia or hyperesthesia.  PAIN BEHAVIOR:  Cooperative  Psych:  Normal affect.  Calm, cooperative following some simple command.    Medications       acetaminophen  650 mg Oral Q8H     aspirin  81 mg Oral Daily     atorvastatin  20 mg Oral Daily     clopidogrel  75 mg Oral Daily     gabapentin  100 mg Oral TID    Or     gabapentin  100 mg Oral TID     lisinopril  20 mg Oral BID     metoprolol tartrate  12.5 mg Oral BID     miconazole   Topical TID     multivitamin w/minerals  1 tablet Oral Daily     pantoprazole  40 mg Oral Daily     sodium chloride (PF)  3 mL Intracatheter Q8H       Data   Results for orders placed or performed during the hospital encounter of 06/02/21 (from the past 24 hour(s))   XR Tibia & Fibula Port Right 2 Views    Narrative    EXAM: XR TIBIA and FIBULA PORT RIGHT 2 VIEWS  LOCATION: [facility]  DATE/TIME: 6/3/2021 5:48 PM    INDICATION: Status post cast application.  COMPARISON: 6/2/2021.      Impression    IMPRESSION: Spiral oblique fracture of the distal tibia, minimally displaced and angulated, fracture position and alignment unchanged from the prior exam. Probable nondisplaced fibular fracture. New cast in place. No new bone or joint abnormality.

## 2021-06-04 NOTE — DISCHARGE SUMMARY
[facility]  Discharge Summary  Name: Tracey Moran    MRN: [id number]  YOB: 1925    Age: 89 year old  Date of Discharge:  6/4/2021  Date of Admission: 6/2/2021  Primary Care Provider: Guilherme Graves  Discharge Physician:  Reggie Voss MD  Discharging Service:  Hospitalist      Discharge Diagnosis:  1.  Right tibia fracture updated for medical approach, now with long leg cast  2.  No reported recent obvious fall or injury  3.  Advanced dementia with behavioral disturbances  4.  History of diastolic CHF with preserved EF not in exacerbation  5.  History of CKD  6.  Prior hospitalization for VRE UTI  7.  History of CAD, on antiplatelets with Plavix  8.  Dyslipidemia  9.  Hypertension  10.  History of CVA     Addendum: June 5, 2021 8:20 AM  No significant reported events overnight  Stable hemodynamics.  Will proceed with planned discharge to facility today as bed will be available for placement    Other Diagnosis:  Past Medical History:   Diagnosis Date     Hypertension           Discharge Disposition:  Discharged to nursing home     Allergies:  Allergies   Allergen Reactions     Demerol [Meperidine]      Dust Mites      Peanuts [Nuts]      Penicillins      Has tolerated cephalosporins (ceftriaxone, cefdinir)     Pollen Extract         Discharge Medications:   Current Discharge Medication List      START taking these medications    Details   gabapentin (NEURONTIN) 100 MG capsule Take 1 capsule (100 mg) by mouth 3 times daily  Qty: 30 capsule, Refills: 0    Associated Diagnoses: Closed fracture of shaft of right tibia, unspecified fracture morphology, initial encounter      oxyCODONE (ROXICODONE) 5 MG tablet Take 0.5 tablets (2.5 mg) by mouth every 6 hours as needed for moderate to severe pain  Qty: 6 tablet, Refills: 0    Associated Diagnoses: Closed fracture of shaft of right tibia, unspecified fracture morphology, initial encounter         CONTINUE these medications which have NOT  CHANGED    Details   acetaminophen (TYLENOL) 500 MG tablet Take 500 mg by mouth 3 times daily as needed for mild pain      aspirin (ASA) 81 MG chewable tablet Take 1 tablet (81 mg) by mouth daily  Qty: 100 tablet, Refills: 0    Associated Diagnoses: ST elevation myocardial infarction involving right coronary artery (H)      atorvastatin (LIPITOR) 20 MG tablet Take 1 tablet (20 mg) by mouth daily  Qty: 30 tablet, Refills: 0    Associated Diagnoses: ST elevation myocardial infarction involving right coronary artery (H)      Criselda Protect (EUCERIN) external cream Apply topically 2 times daily Criselda Barrier Cream (apply to incontinence rash)      clopidogrel (PLAVIX) 75 MG tablet Take 1 tablet (75 mg) by mouth daily  Qty: 30 tablet, Refills: 0    Associated Diagnoses: ST elevation myocardial infarction involving right coronary artery (H)      diclofenac (VOLTAREN) 1 % topical gel Place 2 g onto the skin 3 times daily For right knee pain      lisinopril (ZESTRIL) 20 MG tablet Take 1 tablet (20 mg) by mouth 2 times daily  Qty: 60 tablet, Refills: 1    Associated Diagnoses: Benign essential hypertension      menthol-zinc oxide (CALMOSEPTINE) 0.44-20.6 % OINT ointment Apply 1 g topically 4 times daily as needed for skin protection (to open area on right buttock)      metoprolol tartrate (LOPRESSOR) 25 MG tablet Take 12.5 mg by mouth 2 times daily      miconazole (MICATIN) 2 % external powder Apply topically 3 times daily      multivitamin w/minerals (THERA-VIT-M) tablet Take 1 tablet by mouth daily      nystatin (MYCOSTATIN) 478650 UNIT/GM external powder Apply topically 2 times daily Apply to rash under bilateral breast until resolved      pantoprazole (PROTONIX) 40 MG EC tablet Take 40 mg by mouth daily      polyethylene glycol (MIRALAX) 17 g packet Take 17 g by mouth daily as needed for constipation  Qty: 30 packet, Refills: 0    Associated Diagnoses: Acute cystitis without hematuria      senna-docusate  (SENOKOT-S/PERICOLACE) 8.6-50 MG tablet Take 1 tablet by mouth 2 times daily as needed for constipation    Associated Diagnoses: Constipation, unspecified constipation type              Condition on Discharge:  Discharge condition: Stable   Discharge vitals: Blood pressure 136/41, pulse 64, temperature 98  F (36.7  C), temperature source Temporal, resp. rate 16, weight 52.7 kg (116 lb 1.6 oz), SpO2 96 %, not currently breastfeeding.   Code status on discharge: DNR / DNI     History of Present Illness:  See detailed admission note for full details.        Significant Physical Exam Findings Day of Discharge:  Ms. Tolliver is a very poor historian with history of advanced dementia  HEENT; Atraumatic, normocephalic, pinkish conjuctiva, pupils bilateral reactive   Lungs: equal chest expansion, clear to auscultation, no wheezes, no stridor, no crackles,   Heart: normal rate, normal rhythm, no rubs or gallops.   Abdomen: normal bowel sounds, no tenderness, no peritoneal signs, no guarding  Extremities: no deformities, long cast een  Neuro; follow commands, alert,  spontaneous speech,         Procedures other than Imaging:  none     Imaging:  Results for orders placed or performed during the hospital encounter of 06/02/21   XR Tibia & Fibula Right 2 Views    Narrative    TIBIA AND FIBULA RIGHT TWO VIEWS June 2, 2021 1:08 PM     HISTORY: Suspected fall, pain and bruising.    COMPARISON: None.      Impression    IMPRESSION: There is an obliquely oriented mildly displaced fracture  at the junction of the middle and distal thirds of the left tibia. No  evidence of fibular fracture.    Vascular calcifications. Old mild fracture deformity of the lateral  tibial plateau.    CRISTI RUEDA MD   XR Tibia & Fibula Port Right 2 Views    Narrative    EXAM: XR TIBIA and FIBULA PORT RIGHT 2 VIEWS  LOCATION: [facility]  DATE/TIME: 6/3/2021 5:48 PM    INDICATION: Status post cast application.  COMPARISON: 6/2/2021.       Impression    IMPRESSION: Spiral oblique fracture of the distal tibia, minimally displaced and angulated, fracture position and alignment unchanged from the prior exam. Probable nondisplaced fibular fracture. New cast in place. No new bone or joint abnormality.        Consultations:  Consultation during this admission received from orthopedics and palliative care.     Recent Lab Results:  Recent Labs   Lab 06/02/21  1331   WBC 9.3   HGB 10.2*   HCT 31.7*   *        No results for input(s): CULT in the last 168 hours.  Recent Labs   Lab 06/03/21  0722 06/02/21  1815 06/02/21  1331     --  140   POTASSIUM 4.3 4.3 4.5   CHLORIDE 110*  --  110*   CO2 24  --  26   ANIONGAP 5  --  4   GLC 87  --  90   BUN 31*  --  33*   CR 0.94  --  1.12*   GFRESTIMATED 51*  --  41*   GFRESTBLACK 59*  --  48*   RENETTA 8.5  --  9.1     Recent Labs   Lab 06/03/21  0722 06/02/21  1331   GLC 87 90     No results for input(s): LACT in the last 168 hours.  No results for input(s): TROPONIN, TROPI, TROPR in the last 168 hours.    Invalid input(s): TROP, TROPONINIES  No results for input(s): COLOR, APPEARANCE, URINEGLC, URINEBILI, URINEKETONE, SG, UBLD, URINEPH, PROTEIN, UROBILINOGEN, NITRITE, LEUKEST, RBCU, WBCU in the last 168 hours.       Pending Results:    Unresulted Labs Ordered in the Past 30 Days of this Admission     No orders found from 5/3/2021 to 6/3/2021.           Discharge Instructions and Follow-Up:   Discharge diet: Orders Placed This Encounter      Regular Diet Adult      Advance Diet as Tolerated     Discharge activity: Activity as tolerated   Discharge follow-up: 1-2 weeks with PCP   Outpatient therapy: None    Other instructions: None      Hospital Course:  Summary of Stay: Tracey Moran is a 89 year old female with known history of advanced dementia who is currently residing in a memory care facility, prior history of VRE UTI, CKD, frequent falls, prior tibial plateau fracture, CAD on antiplatelets with  Plavix, hypertension, prior anemia, prior COVID-19 infection back in November 2020, who was brought into the emergency room earlier today as care staff noticed that patient was protecting her right leg most of the time since yesterday.  There was no report of obvious fall injury, trauma and was not able to ambulate her leg today and was found with bruising and significant swelling.  In the ER she was found to be a very poor historian but collateral history from family stated that she usually can scoot from bed to the wheelchair and they did not see any previous bruising or swelling on her ankle with her last visit 2 days prior to this presentation..    Multiple extensive discussion ensued between orthopedics and patient and family regarding options for approach and plan of care with her right tibial fracture.  Decisions made and opted for medical approach and no further operative surgical intervention as per family's preference.  Orthopedics applied a long-leg casts at right lower extremity.  Instructions provided regarding weightbearing status, activity, follow-up care care of orthopedic service  -Palliative care input highly appreciated.  Started on gabapentin 100 twice daily for optimization of pain control as well  -I wrote small amount of very low-dose oxycodone to be used on as-needed basis as I am anticipating patient will still have intermittent bouts of severe pain especially when being moved from bed to chair activities.    -She tolerated 2.5 oxycodone earlier with no reported behavioral changes, agitation or combativeness.  -We will proceed with discharge once facility is accepting  -Resumption of numerous home regimen such as dual antiplatelets and no other new prescriptions added.    I will refer you to excerpts of my prior progress notes as listed below for other details of her stay.  Summary of Stay: Tracey Moran is a 89 year old female with known history of advanced dementia who is currently  residing in a memory care facility, prior history of VRE UTI, CKD, frequent falls, prior tibial plateau fracture, CAD on antiplatelets with Plavix, hypertension, prior anemia, prior COVID-19 infection back in November 2020, who was brought into the emergency room earlier today as care staff noticed that patient was protecting her right leg most of the time since yesterday.  There was no report of obvious fall injury, trauma and was not able to ambulate her leg today and was found with bruising and significant swelling.  In the ER she was found to be a very poor historian but collateral history from family stated that she usually can scoot from bed to the wheelchair and they did not see any previous bruising or swelling on her ankle with her last visit 2 days prior to this presentation.         1.  Right tibia fracture  2.  No reported recent obvious fall or injury  3.  Advanced dementia with behavioral disturbances  4.  History of diastolic CHF with preserved EF not in exacerbation  5.  History of CKD  6.  Prior hospitalization for VRE UTI  7.  History of CAD, on antiplatelets with Plavix  8.  Dyslipidemia  9.  Hypertension  10.  History of CVA     -May have regular diet  -No plans for operative surgical intervention  -Patient's family had a multiple extensive discussion with orthopedic service regarding this matter.  -Orthopedics applying long-leg cast as patient will not be undergoing operative surgical intervention  -requesting palliative care input for goals of care, pain control and assistance with patient's care  -Restarted antiplatelets as no plans for surgery  -Resume home regimen of ACE inhibitors  -Remain at risk for delirium, behavioral disturbances with history of advanced dementia, potentially can also be initiated but if with severe pain  --It was reported to me that the family is a bit hesitant in providing narcotic regimen which is understandable given patient's is at risk for behavioral/delirium  with history of advanced dementia.  However we have to weigh in and balance risk and benefits as severe pain can also lead to delirium process.  -There is an available very low-dose narcotics if needed.  -Fall precautions.  -PT/OT evaluation and will await further instructions from orthopedics  -Social service to see for discharge planning  -Mechanical prophylaxis for now     DVT Prophylaxis: Pneumatic Compression Devices  Code Status: DNR / DNI  Discharge Dispo: Back to prior living arrangement     Total time spent in face to face contact with the patient and coordinating discharge was:  > 30 Minutes.

## 2021-06-04 NOTE — PLAN OF CARE
Afeb, vss, cms intact, cast clean, dry and intact. C/o pain with movement but otherwsie comfortable. Ate well when fed, took pills the big ones crushed. PT worked with pt to sit her on edge of bed with assist. Son here at the time and was happy to see her progress.Palliative care visited and spoke with the son a long time to make sure his mom would be good going back to her memory care. Pt going back to [facility] .

## 2021-06-04 NOTE — PLAN OF CARE
OT: Orders received. Chart reviewed and discussed with care team.  Acute care OT not indicated due to pt from [facility] and receives max assist with ADLs at [facility] per physical therapist. Pt currently cannot tolerate two therapy disciplines due to pain. Defer discharge recommendations to PT. Defer OT to next level of care. Will complete orders.

## 2021-06-04 NOTE — PROGRESS NOTES
Patient vital signs are at baseline: Yes  Patient able to ambulate as they were prior to admission or with assist devices provided by therapies during their stay:  No,  Reason:  NWB, cast intact    Patient MUST void prior to discharge:  Yes  Patient able to tolerate oral intake:  Yes  Pain has adequate pain control using Oral analgesics:  Yes. Resting comfortably at the end of the shift, resisting cares at times. Will keep monitoring.

## 2021-06-04 NOTE — PROGRESS NOTES
CM attempted to contact RN at [facility] ([phone]). LM for return call to inquire about barriers to return: long leg cast & weekend return. Await return call.    CM will continue to follow patient until discharge for any additional needs.     Pat Kahn, RN, BSN, CPHN, CM  Inpatient Care Coordination - M/S, [facility]  [phone]    Addendum 11:15am - CM received return call from Johnathon KOLB -  at [facility]. They normally don't accept residents back on weekends. He will be working this weekend so they will accept patient back on Saturday. He requested PT notes to arrange for therapies as soon as possible. CM contacted [email or web address] ([phone]) spoke with David. Scheduled wheelchair transport for Saturday, 6/5/21 at 11:30am. Faxed therapy notes to [phone].   evens

## 2021-06-04 NOTE — PROGRESS NOTES
06/04/21 1200   Quick Adds   Type of Visit Initial PT Evaluation   Living Environment   People in home facility resident   Current Living Arrangements assisted living   Home Accessibility no concerns   Living Environment Comments Patient currently receives full care at [facility], assisting with toileting, dressing, eating, and mobility needs.    Self-Care   Usual Activity Tolerance fair   Current Activity Tolerance poor   Regular Exercise No   Equipment Currently Used at Home wheelchair, manual   Disability/Function   Hearing Difficulty or Deaf yes   Patient's preferred means of communication   (Not using hearing aids at baseline)   Describe hearing loss bilateral hearing loss   Use of hearing assistive devices none   Were auxiliary aids offered? no   The following aids were provided; pocket talker   General Information   Referring Physician Reggie Voss MD   Patient/Family Therapy Goals Statement (PT) Son was present for therapy session, and would like mother to return back home to [facility] with hopes of continue to maintain and improve current mobility status.    Pertinent History of Current Problem (include personal factors and/or comorbidities that impact the POC)  Patient is a 89 y/o female with known history of advanced dementia who is currently residing in a memory care facility, prior history of VRE UTI, CKD, frequent falls, prior tibial plateau fracture, CAD on antiplatelets with Plavix, hypertension, prior anemia, prior COVID-19 infection back in November 2020, who was brought into the emergency room on 6/2/21 as care staff noticed that patient was protecting her R LE since 6/1/21. Imaging showed right tibial fracture, with orthopedics applying a long-leg casts on RLE.    Existing Precautions/Restrictions   (NWB with R LE )   Weight-Bearing Status - RLE nonweight-bearing   Cognition   Orientation Status (Cognition) unable/difficult to assess  (Difficulty vocalizing and  visually tracking PT )   Affect/Mental Status (Cognition) unresponsive  (Required verbal and tactile cueing for mobility )   Follows Commands (Cognition) delayed response/completion;increased processing time needed;physical/tactile prompts required   Posture    Posture Comments Difficult time to achieve upright seated posterior, demonstrating posterior pelvic tilt and forward flexed trunk.    Range of Motion (ROM)   ROM Comment Hypertonicity noted in left hamstrings    Strength   Strength Comments Able to maintain hold L LE against gravity, unable to maintain position when manual resistance was provided   Bed Mobility   Comment (Bed Mobility) Max A x 1 with bed mobility.   Transfers   Transfer Safety Comments Max A x 1 with supine <> sit transfer.    Gait/Stairs (Locomotion)   Comment (Gait/Stairs) Did not assess.    Balance   Balance Comments Difficulty maintaining upright sitting posture without upper extremity support on bed rail and EOB, along with minimal to supervision support.    Clinical Impression   Criteria for Skilled Therapeutic Intervention yes, treatment indicated   PT Diagnosis (PT) Impaired functional mobility, decreased activity tolerance, impaired balance/coordination in sitting    Influenced by the following impairments decreased lower extremity strength, limited ROM, poor motor control and activiation with verbal commands, impaired sitting balance    Functional limitations due to impairments bed mobility, transfers. and static sitting    Clinical Presentation Evolving/Changing   Clinical Presentation Rationale Per PT Clincial Judgement based on current medical status.    Clinical Decision Making (Complexity) moderate complexity   Therapy Frequency (PT) Daily   Predicted Duration of Therapy Intervention (days/wks) 3-4 day   Planned Therapy Interventions (PT) balance training;neuromuscular re-education;ROM (range of motion);strengthening;stretching   Risk & Benefits of therapy have been explained  evaluation/treatment results reviewed;care plan/treatment goals reviewed;risks/benefits reviewed;current/potential barriers reviewed;participants voiced agreement with care plan;participants included;patient;son   PT Discharge Planning    PT Discharge Recommendation (DC Rec) Long term care facility  (Returning to Memory Care Facility )   PT Rationale for DC Rec Patient requires Max assist for bed mobility and transfers, along with assist with IADLS and would benefit from additional care to assist with these mobility/functional deficits for improved safety and to prevent fall or injury with activity.   PT Brief overview of current status  Patient requires Max A x 1 with bed mobility and transfers.    Total Evaluation Time   Total Evaluation Time (Minutes) 20

## 2021-06-04 NOTE — PLAN OF CARE
Patient vital signs are at baseline: Yes  Patient able to ambulate as they were prior to admission or with assist devices provided by therapies during their stay:  No,  Reason:  pt is NWB on right, unable to comprehend instructions, using Lift to mobilize.  Patient MUST void prior to discharge:  Yes, voiding adequately via [employer]  Patient able to tolerate oral intake:  Yes  Pain has adequate pain control using Oral analgesics:  Yes  Slept well. Tolerating cast well. Does not appear to be in pain.

## 2021-06-05 ENCOUNTER — APPOINTMENT (OUTPATIENT)
Dept: PHYSICAL THERAPY | Facility: CLINIC | Age: 86
DRG: 563 | End: 2021-06-05
Payer: COMMERCIAL

## 2021-06-05 VITALS
WEIGHT: 110 LBS | OXYGEN SATURATION: 92 % | DIASTOLIC BLOOD PRESSURE: 65 MMHG | SYSTOLIC BLOOD PRESSURE: 130 MMHG | HEIGHT: 62 IN | BODY MASS INDEX: 20.24 KG/M2 | TEMPERATURE: 98 F | RESPIRATION RATE: 22 BRPM | HEART RATE: 76 BPM

## 2021-06-05 VITALS
WEIGHT: 109.4 LBS | DIASTOLIC BLOOD PRESSURE: 84 MMHG | OXYGEN SATURATION: 95 % | RESPIRATION RATE: 22 BRPM | TEMPERATURE: 96 F | SYSTOLIC BLOOD PRESSURE: 128 MMHG | BODY MASS INDEX: 19.38 KG/M2 | HEART RATE: 96 BPM | HEIGHT: 63 IN

## 2021-06-05 VITALS
DIASTOLIC BLOOD PRESSURE: 60 MMHG | WEIGHT: 116.1 LBS | HEART RATE: 75 BPM | TEMPERATURE: 98.8 F | BODY MASS INDEX: 21.23 KG/M2 | OXYGEN SATURATION: 93 % | SYSTOLIC BLOOD PRESSURE: 138 MMHG | RESPIRATION RATE: 18 BRPM

## 2021-06-05 PROCEDURE — 99232 SBSQ HOSP IP/OBS MODERATE 35: CPT | Performed by: INTERNAL MEDICINE

## 2021-06-05 PROCEDURE — 250N000013 HC RX MED GY IP 250 OP 250 PS 637: Performed by: INTERNAL MEDICINE

## 2021-06-05 PROCEDURE — 250N000013 HC RX MED GY IP 250 OP 250 PS 637: Performed by: NURSE PRACTITIONER

## 2021-06-05 PROCEDURE — 97530 THERAPEUTIC ACTIVITIES: CPT | Mod: GP

## 2021-06-05 RX ADMIN — CLOPIDOGREL BISULFATE 75 MG: 75 TABLET ORAL at 08:55

## 2021-06-05 RX ADMIN — MULTIPLE VITAMINS W/ MINERALS TAB 1 TABLET: TAB at 08:55

## 2021-06-05 RX ADMIN — ASPIRIN 81 MG: 81 TABLET, CHEWABLE ORAL at 08:55

## 2021-06-05 RX ADMIN — ACETAMINOPHEN 650 MG: 325 TABLET, FILM COATED ORAL at 02:05

## 2021-06-05 RX ADMIN — MICONAZOLE NITRATE: 20 POWDER TOPICAL at 08:58

## 2021-06-05 RX ADMIN — ACETAMINOPHEN 650 MG: 325 TABLET, FILM COATED ORAL at 08:55

## 2021-06-05 RX ADMIN — LISINOPRIL 20 MG: 20 TABLET ORAL at 08:56

## 2021-06-05 RX ADMIN — GABAPENTIN 100 MG: 100 CAPSULE ORAL at 08:55

## 2021-06-05 RX ADMIN — PANTOPRAZOLE SODIUM 40 MG: 40 TABLET, DELAYED RELEASE ORAL at 08:55

## 2021-06-05 RX ADMIN — Medication 12.5 MG: at 08:55

## 2021-06-05 ASSESSMENT — ACTIVITIES OF DAILY LIVING (ADL)
ADLS_ACUITY_SCORE: 34

## 2021-06-05 NOTE — PROGRESS NOTES
Patient vital signs are at baseline: Yes  Patient able to ambulate as they were prior to admission or with assist devices provided by therapies during their stay:  No,  Reason:  NWB on the right leg  Patient MUST void prior to discharge:  Yes  Patient able to tolerate oral intake:  Yes  Pain has adequate pain control using Oral analgesics:  Yes. Pt resting comfortably between cares, cooperative with cares. Plan to discharge to memory care tomorrow. Will keep monitoring.

## 2021-06-05 NOTE — PROGRESS NOTES
CM faxed discharge orders from yesterday to [facility] at [phone] with update that revised orders will be sent once new signature in place. [facility] scheduled for 11:30am via wheelchair.     CM will continue to follow patient until discharge for any additional needs.     Pat Kahn RN, BSN, CPHN, CM  Inpatient Care Coordination - M/S, [facility]  [phone]

## 2021-06-05 NOTE — PLAN OF CARE
Physical Therapy Discharge Summary    Reason for therapy discharge:    Discharged to long term care facility.    Progress towards therapy goal(s). See goals on Care Plan in [facility] electronic health record for goal details.  Goals partially met.  Barriers to achieving goals:   discharge from facility.    Therapy recommendation(s):    Continued therapy is recommended.  Rationale/Recommendations:  Patient would benefit from continued PT services to improve function.

## 2021-06-05 NOTE — PROGRESS NOTES
[facility]  Hospitalist Progress Note  Reggie Voss MD, MD 06/05/2021  (Text Page)  Reason for Stay (Diagnosis): Right tibia fracture         Assessment and Plan:      Summary of Stay: Tracey Moran is a 89 year old female with known history of advanced dementia who is currently residing in a memory care facility, prior history of VRE UTI, CKD, frequent falls, prior tibial plateau fracture, CAD on antiplatelets with Plavix, hypertension, prior anemia, prior COVID-19 infection back in November 2020, who was brought into the emergency room earlier today as care staff noticed that patient was protecting her right leg most of the time since yesterday.  There was no report of obvious fall injury, trauma and was not able to ambulate her leg today and was found with bruising and significant swelling.  In the ER she was found to be a very poor historian but collateral history from family stated that she usually can scoot from bed to the wheelchair and they did not see any previous bruising or swelling on her ankle with her last visit 2 days prior to this presentation.         1.  Right tibia fracture  2.  No reported recent obvious fall or injury  3.  Advanced dementia with behavioral disturbances  4.  History of diastolic CHF with preserved EF not in exacerbation  5.  History of CKD  6.  Prior hospitalization for VRE UTI  7.  History of CAD, on antiplatelets with Plavix  8.  Dyslipidemia  9.  Hypertension  10.  History of CVA    - regular diet  -No plans for operative surgical intervention  -Patient's family had a multiple extensive discussion with orthopedic service regarding this matter.  -Orthopedics applying long-leg cast as patient will not be undergoing operative surgical intervention  -requesting palliative care input for goals of care, pain control and assistance with patient's care  -Restarted antiplatelets as no plans for surgery  -Resume home regimen of ACE inhibitors  -Remain at risk for  delirium, behavioral disturbances with history of advanced dementia, potentially can also be initiated but if with severe pain  -Palliative care adjusted her gabapentin 200 milligrams 3 times daily    DVT Prophylaxis: Pneumatic Compression Devices  Code Status: DNR / DNI  Discharge Dispo: Back to her [facility] care  Estimated Disch Date / # of Days until Disch: Discharge to memory care today        Interval History (Subjective):      Continuing care.  No significant reported events overnight.  Stable hemodynamics.    Plans for discharge to her [facility]              Physical Exam:      Last Vital Signs:  /60 (BP Location: Left arm)   Pulse 75   Temp 98.8  F (37.1  C) (Temporal)   Resp 18   Wt 52.7 kg (116 lb 1.6 oz)   SpO2 93%   BMI 21.23 kg/m      I/O last 3 completed shifts:  In: 900 [P.O.:900]  Out: 1090 [Urine:1090]  Wt Readings from Last 1 Encounters:   06/02/21 52.7 kg (116 lb 1.6 oz)     Vitals:    06/02/21 1720   Weight: 52.7 kg (116 lb 1.6 oz)       Constitutional: Awake, easily aroused, no apparent distress   Respiratory: Clear to auscultation bilaterally, no crackles or wheezing   Cardiovascular: Regular rate and rhythm, normal S1 and S2, and no murmur noted   Abdomen: Normal bowel sounds, soft, non-distended, non-tender   Skin: No rashes, no cyanosis, dry to touch   Neuro: Alert and oriented, disoriented to time place and person, no weakness, spontaneous and coherent speech   Extremities:  Long casts right lower extremity being applied    Other(s): Euthymic mood, not agitated       All other systems: Negative          Medications:      All current medications were reviewed with changes reflected in problem list.         Data:      All new lab and imaging data was reviewed.   Labs:  No results for input(s): CULT in the last 168 hours.  Recent Labs   Lab 06/02/21  1331   WBC 9.3   HGB 10.2*   HCT 31.7*   *        Recent Labs   Lab 06/03/21  0722 06/02/21  1815 06/02/21  1331   NA  139  --  140   POTASSIUM 4.3 4.3 4.5   CHLORIDE 110*  --  110*   CO2 24  --  26   ANIONGAP 5  --  4   GLC 87  --  90   BUN 31*  --  33*   CR 0.94  --  1.12*   GFRESTIMATED 51*  --  41*   GFRESTBLACK 59*  --  48*   RENETTA 8.5  --  9.1     No results for input(s): SED, CRP in the last 168 hours.  Recent Labs   Lab 06/03/21  0722 06/02/21  1331   GLC 87 90     No results for input(s): INR in the last 168 hours.  No results for input(s): TROPONIN, TROPI, TROPR in the last 168 hours.    Invalid input(s): TROP, TROPONINIES  No results for input(s): COLOR, APPEARANCE, URINEGLC, URINEBILI, URINEKETONE, SG, UBLD, URINEPH, PROTEIN, UROBILINOGEN, NITRITE, LEUKEST, RBCU, WBCU in the last 168 hours.   Imaging:   Results for orders placed or performed during the hospital encounter of 06/02/21   XR Tibia & Fibula Right 2 Views    Narrative    TIBIA AND FIBULA RIGHT TWO VIEWS June 2, 2021 1:08 PM     HISTORY: Suspected fall, pain and bruising.    COMPARISON: None.      Impression    IMPRESSION: There is an obliquely oriented mildly displaced fracture  at the junction of the middle and distal thirds of the left tibia. No  evidence of fibular fracture.    Vascular calcifications. Old mild fracture deformity of the lateral  tibial plateau.    CRISTI RUEDA MD

## 2021-06-13 NOTE — PROGRESS NOTES
Code Status:  FULL CODE  Visit Type: Follow-up (Covid pneumonia )     Facility:  [facility] [[id number]]        Facility Type: SNF (Skilled Nursing Facility, TCU)    History of Present Illness: Tracey Moran is a 89 y.o. female with a past medical history for hypertension, hypothyroidism, CKD, HLD, osteoarthritis, dementia without behaviors.  She was recently transferred to this facility for isolation after testing positive for COVID-19 on 11/18/2020.    Last week she developed mild hypoxia requiring oxygen supplementation.  I did start her on a 7-day dose of dexamethasone.  Chest x-ray revealed pneumonia and so she was started on Levaquin.  Both dexamethasone and Levaquin will end after today.  BMP showed a slightly elevated creatinine of 1.18 and so I did hold her lisinopril and increase her metoprolol.  Her blood pressures have increased with systolics in the 160s to 170s.  Recheck BMP today showed resolution of CASSIE after 1 L of IV fluids over the weekend.  Nursing reports she is taking in adequate p.o. fluids however food is minimal to moderate.    Review of Systems   ROS difficult to obtain due to patient's dementia.    Physical Exam   In order to maintain social distancing during the COVID pandemic, a visual exam was completed.     Vitals:    12/01/20 0830   BP: 166/79   Pulse: 98   Resp: 22   Temp: 98.9  F (37.2  C)   SpO2: 92%        Patient is well nourished and no acute distress.  Head is AT/NC, EOM intact. Respiratory effort normal.  Lung assessment is difficult due to patient not following commands secondary to dementia.  No visible LE edema. Alert with significant dementia, face symmetric. No visible skin issues or rashes. Mood euthymic      Labs:    Recent Results (from the past 240 hour(s))   Basic Metabolic Panel   Result Value Ref Range    Sodium 144 136 - 145 mmol/L    Potassium 4.4 3.5 - 5.0 mmol/L    Chloride 110 (H) 98 - 107 mmol/L    CO2 24 22 - 31 mmol/L    Anion Gap,  Calculation 10 5 - 18 mmol/L    Glucose 110 70 - 125 mg/dL    Calcium 8.6 8.5 - 10.5 mg/dL    BUN 51 (H) 8 - 28 mg/dL    Creatinine 1.13 (H) 0.60 - 1.10 mg/dL    GFR MDRD Af Amer 54 (L) >60 mL/min/1.73m2    GFR MDRD Non Af Amer 45 (L) >60 mL/min/1.73m2   C-Reactive Protein (CRP)   Result Value Ref Range    CRP 4.5 (H) 0.0 - 0.8 mg/dL   Procalcitonin   Result Value Ref Range    Procalcitonin 0.12 0.00 - 0.49 ng/mL   HM1 (CBC with Diff)   Result Value Ref Range    WBC 5.7 4.0 - 11.0 thou/uL    RBC 3.62 (L) 3.80 - 5.40 mill/uL    Hemoglobin 11.8 (L) 12.0 - 16.0 g/dL    Hematocrit 35.6 35.0 - 47.0 %    MCV 98 80 - 100 fL    MCH 32.6 27.0 - 34.0 pg    MCHC 33.1 32.0 - 36.0 g/dL    RDW 12.5 11.0 - 14.5 %    Platelets 126 (L) 140 - 440 thou/uL    MPV 12.1 8.5 - 12.5 fL    Neutrophils % 84 (H) 50 - 70 %    Lymphocytes % 11 (L) 20 - 40 %    Monocytes % 4 2 - 10 %    Eosinophils % 0 0 - 6 %    Basophils % 0 0 - 2 %    Immature Granulocyte % 1 (H) <=0 %    Neutrophils Absolute 4.8 2.0 - 7.7 thou/uL    Lymphocytes Absolute 0.6 (L) 0.8 - 4.4 thou/uL    Monocytes Absolute 0.2 0.0 - 0.9 thou/uL    Eosinophils Absolute 0.0 0.0 - 0.4 thou/uL    Basophils Absolute 0.0 0.0 - 0.2 thou/uL    Immature Granulocyte Absolute 0.0 <=0.0 thou/uL   Basic Metabolic Panel   Result Value Ref Range    Sodium 143 136 - 145 mmol/L    Potassium 4.5 3.5 - 5.0 mmol/L    Chloride 114 (H) 98 - 107 mmol/L    CO2 22 22 - 31 mmol/L    Anion Gap, Calculation 7 5 - 18 mmol/L    Glucose 97 70 - 125 mg/dL    Calcium 8.3 (L) 8.5 - 10.5 mg/dL    BUN 32 (H) 8 - 28 mg/dL    Creatinine 0.69 0.60 - 1.10 mg/dL    GFR MDRD Af Amer >60 >60 mL/min/1.73m2    GFR MDRD Non Af Amer >60 >60 mL/min/1.73m2   C-Reactive Protein (CRP)   Result Value Ref Range    CRP 3.2 (H) 0.0 - 0.8 mg/dL   Platelet Count   Result Value Ref Range    Platelets 170 140 - 440 thou/uL         Assessment:  1. 2019 novel coronavirus disease (COVID-19)     2. HCAP (healthcare-associated pneumonia)      3. Stage 3b chronic kidney disease     4. Dementia without behavioral disturbance, unspecified dementia type (H)     5. Thrombocytopenia (H)         Plan:   COVID-19: Respiratory failure is improving and nursing can wean oxygen.  Continue to monitor closely and encourage fluid intake.  On Eliquis for 30 days    HCAP: Showing improvement with respiratory failure, continue continue dexamethasone and Levaquin through today.  cough medicine as needed.    CASSIE on CKD: resolved, continue to push oral fluids can restart lisinopril and decrease metoprolol to home dose.     Dementia: needs increased reminders and encourage to move and take in po    Thrombocytopenia: last week plt was 126, now back to normal at 170.     care    Electronically signed by: Hayley Huber CNP

## 2021-06-13 NOTE — PROGRESS NOTES
Code Status:  FULL CODE  Visit Type: Discharge Summary     Facility:  [facility] [[id number]]          PCP:  Guilherme Graves MD  [phone]       Admission Date to our Facility: 11/23/2020 Discharge Date from our Facility: 12/4/2020    Discharge Diagnosis:    1. 2019 novel coronavirus disease (COVID-19)     2. HCAP (healthcare-associated pneumonia)     3. Stage 3b chronic kidney disease     4. Dementia without behavioral disturbance, unspecified dementia type (H)     5. Thrombocytopenia (H)     6. Anemia, unspecified type          History of Present Illness: Tracey Moran is a 89 y.o. female with a past medical history for hypertension, hypothyroidism, CKD, HLD, osteoarthritis, dementia without behaviors.  She was recently transferred to this facility for isolation after testing positive for COVID-19 on 11/18/2020.      Skilled Nursing Facility Course: On 11/24/2020 her O2 sats dipped below 90% on RA and sos she was started on supplemental O2.  At that time, she was started on dexamethasone and eliquis (VTE prophylaxis). She did have fevers ranging from 99.5-100.7 and poor po intake.  Last week her Cr bumped up to 1.13 and CRP was 4.5, with normal procalcitonin, normal WBC and low plts at 126.  Chest xray showed pneumonia and so she was started on a 7 day course of Levaquin.  She was given 1L of IVF and CR improved and her appetite improved. Plts returned to normal and CRP was slightly better at 3.2.  I had put her lisinopril on hold and increased her metoprolol while her Cr was elevated.  I restarted her PTA dose of lisinopril and changed her metoprolol to her PTA dose yesterday.  Nursing attempted to wean O2 over night however she did dip down below 90% and so today when I see her she is on 2L.  Nursing reports she is 96% on 2L this morning and will attempt to wean the O2 again.  Levaquin and dexamethasone finished yesterday.  She will need to continue the Eliquis for 30 days for VTE prophylaxis.  Also, staff will need to continue to push fluids for adequate hydration.         Discharge Medications:    Current Outpatient Medications   Medication Sig Dispense Refill     acetaminophen (TYLENOL) 500 MG tablet Take 500 mg by mouth 3 (three) times a day as needed.       apixaban ANTICOAGULANT (ELIQUIS) 2.5 mg Tab tablet Take by mouth 2 (two) times a day.       diclofenac sodium (VOLTAREN) 1 % Gel Place 2 g on the skin 3 (three) times a day.       dimethicone-zinc oxide (DIMETHICONE-ZINC OXIDE) Crea Apply 1 application topically 2 (two) times a day.       lisinopriL (PRINIVIL,ZESTRIL) 20 MG tablet Take 20 mg by mouth 2 (two) times a day. One tablet twice daily. HOLD IF SBP <110-CALL IF HELD TWICE IN A ROW OR SYMPTOMATIC       metoprolol succinate (TOPROL-XL) 25 MG Take 12.5 mg by mouth daily.       multivit-min/ferrous gluconate (CEROVITE ORAL) Take 1 tablet by mouth daily.       nystatin (MYCOSTATIN) powder Apply 1 application topically 2 (two) times a day.       polyethylene glycol (GLYCOLAX) 17 gram/dose powder Take 17 g by mouth daily as needed.       senna-docusate (PERICOLACE) 8.6-50 mg tablet Take 1 tablet by mouth 2 (two) times a day as needed.       No current facility-administered medications for this visit.        For most current and accurate medication list, please contact the skilled nursing facility that this patient visit took place at.      Discharge Plan:  Stable to return to previous facility.  Recommend close follow up with PCP for BMP, P.O. intake and a follow up chest xray in 4 weeks.     Review of Systems   ROS is difficult due to patient's dementia        Physical Exam  In order to maintain social distancing during the COVID pandemic, a visual exam was completed.     Vitals:    12/02/20 1036   BP: 141/62   Pulse: 86   Resp: 20   SpO2: 96%        Patient is well nourished and no acute distress.  Head is AT/NC, EOM intact. Respiratory effort normal.  Lung assessment is difficult due to patient  not following commands secondary to dementia.  No visible LE edema. Alert with significant dementia, face symmetric. No visible skin issues or rashes. Mood euthymic    Labs:    Recent Results (from the past 240 hour(s))   Basic Metabolic Panel   Result Value Ref Range    Sodium 144 136 - 145 mmol/L    Potassium 4.4 3.5 - 5.0 mmol/L    Chloride 110 (H) 98 - 107 mmol/L    CO2 24 22 - 31 mmol/L    Anion Gap, Calculation 10 5 - 18 mmol/L    Glucose 110 70 - 125 mg/dL    Calcium 8.6 8.5 - 10.5 mg/dL    BUN 51 (H) 8 - 28 mg/dL    Creatinine 1.13 (H) 0.60 - 1.10 mg/dL    GFR MDRD Af Amer 54 (L) >60 mL/min/1.73m2    GFR MDRD Non Af Amer 45 (L) >60 mL/min/1.73m2   C-Reactive Protein (CRP)   Result Value Ref Range    CRP 4.5 (H) 0.0 - 0.8 mg/dL   Procalcitonin   Result Value Ref Range    Procalcitonin 0.12 0.00 - 0.49 ng/mL   HM1 (CBC with Diff)   Result Value Ref Range    WBC 5.7 4.0 - 11.0 thou/uL    RBC 3.62 (L) 3.80 - 5.40 mill/uL    Hemoglobin 11.8 (L) 12.0 - 16.0 g/dL    Hematocrit 35.6 35.0 - 47.0 %    MCV 98 80 - 100 fL    MCH 32.6 27.0 - 34.0 pg    MCHC 33.1 32.0 - 36.0 g/dL    RDW 12.5 11.0 - 14.5 %    Platelets 126 (L) 140 - 440 thou/uL    MPV 12.1 8.5 - 12.5 fL    Neutrophils % 84 (H) 50 - 70 %    Lymphocytes % 11 (L) 20 - 40 %    Monocytes % 4 2 - 10 %    Eosinophils % 0 0 - 6 %    Basophils % 0 0 - 2 %    Immature Granulocyte % 1 (H) <=0 %    Neutrophils Absolute 4.8 2.0 - 7.7 thou/uL    Lymphocytes Absolute 0.6 (L) 0.8 - 4.4 thou/uL    Monocytes Absolute 0.2 0.0 - 0.9 thou/uL    Eosinophils Absolute 0.0 0.0 - 0.4 thou/uL    Basophils Absolute 0.0 0.0 - 0.2 thou/uL    Immature Granulocyte Absolute 0.0 <=0.0 thou/uL   Basic Metabolic Panel   Result Value Ref Range    Sodium 143 136 - 145 mmol/L    Potassium 4.5 3.5 - 5.0 mmol/L    Chloride 114 (H) 98 - 107 mmol/L    CO2 22 22 - 31 mmol/L    Anion Gap, Calculation 7 5 - 18 mmol/L    Glucose 97 70 - 125 mg/dL    Calcium 8.3 (L) 8.5 - 10.5 mg/dL    BUN 32 (H) 8 - 28  mg/dL    Creatinine 0.69 0.60 - 1.10 mg/dL    GFR MDRD Af Amer >60 >60 mL/min/1.73m2    GFR MDRD Non Af Amer >60 >60 mL/min/1.73m2   C-Reactive Protein (CRP)   Result Value Ref Range    CRP 3.2 (H) 0.0 - 0.8 mg/dL   Platelet Count   Result Value Ref Range    Platelets 170 140 - 440 thou/uL         Assessment:  1. 2019 novel coronavirus disease (COVID-19)     2. HCAP (healthcare-associated pneumonia)     3. Stage 3b chronic kidney disease     4. Dementia without behavioral disturbance, unspecified dementia type (H)     5. Thrombocytopenia (H)     6. Anemia, unspecified type         MEDICAL EQUIPMENT NEEDS:  NA      The patient is, or has been, under my care and I have initiated the establishment of the plan of care. This patient will be followed by a physician who will periodically review the plan of care.      Electronically signed by: Hayley Huber CNP

## 2021-06-13 NOTE — PROGRESS NOTES
Code Status:  FULL CODE  Visit Type: Problem Visit (pneumonia, COVID-19)     Facility:  [facility] [[id number]]      Facility Type: SNF (Skilled Nursing Facility, TCU)    History of Present Illness:   Facility Admission Date: 11/23/2020 from [facility]     Tracey Moran is a 89 y.o. female with a past medical history for hypertension, hypothyroidism, CKD, HLD, osteoarthritis, dementia without behaviors.  She was recently transferred to this facility for isolation after testing positive for COVID-19 on 11/18/2020.      Today, nurse reports that she is a bit combative with her treatments.  Upon exam she is smiling and giggling.  She continues to require supplemental O2 and has diminished lung bases.  Nursing does report that she often does take off her nasal cannula.  I do not hear any cough or crackles.  Chest x-ray from last night showed studies consistent with bronchitis with superimposed left lower lobe infiltrate.  Did start her on dexamethasone and Xarelto yesterday.  Nursing reports she is eating and drinking and she does look hydrated.  She continues to run a low-grade fever.      Review of Systems   ROS difficult to obtain due to patient's dementia.    Physical Exam  In order to maintain social distancing during the COVID pandemic, a visual exam was completed.     /84, heart rate 83, respirations 20, temp 99.6, 90% on 2 L.    Patient is well nourished and no acute distress.  Head is AT/NC, EOM intact. Respiratory effort normal.  Lung assessment is difficult due to patient not following commands secondary to dementia.  No visible LE edema. Alert with significant dementia, face symmetric. No visible skin issues or rashes. Mood euthymic      Labs:  No results found for this or any previous visit (from the past 240 hour(s)).      Assessment:  1. 2019 novel coronavirus disease (COVID-19)     2. HCAP (healthcare-associated pneumonia)     3. Stage 3b chronic kidney disease     4.  Dementia without behavioral disturbance, unspecified dementia type (H)         Plan:   COVID-19: She continues to require supplemental oxygen and has low-grade fevers.  She is in taking mild to moderate p.o.  Continue with dexamethasone and Xarelto for DVT prophylaxis.    HCAP: Treat with Levaquin 500 mg daily x7 days.    CKD: Awaiting BMP to be drawn today if GFR is low we may need to consider adjusting her Levaquin dose.  Last GFR in July 2020 was greater than 60.    Dementia: Needs lots of encouragement to maintain treatments and mobility.        Electronically signed by: Hayley Huber, CNP

## 2021-06-13 NOTE — TELEPHONE ENCOUNTER
[facility] Nurse Triage Telephone Note      Provider: DOMINIK Driscoll  Facility: [facility] Type: TCU    Caller: Sunita   Call Back Number:  [phone]    Allergies: Bee pollen, Demerol [meperidine], Other environmental allergy, Peanut oil, and Penicillins    Reason for call:   PLT- 170 improving  CRP- 3.2 improving  BMP- stable     Verbal Order/Direction given by Provider: NNO    Provider giving order: DOMINIK Driscoll    Verbal order given to: Michelle Dominique RN

## 2021-06-13 NOTE — PROGRESS NOTES
Code Status:  FULL CODE  Visit Type: Follow-up (Covid pneumonia, malnutrition)     Facility:  [facility] [[id number]]        Facility Type: SNF (Skilled Nursing Facility, TCU)    History of Present Illness: Tracey Moran is a 89 y.o. female with a past medical history for hypertension, hypothyroidism, CKD, HLD, osteoarthritis, dementia without behaviors.  She was recently transferred to this facility for isolation after testing positive for COVID-19 on 11/18/2020. 2 weeks ago,  she developed mild hypoxia requiring oxygen supplementation. She completed a course of dexamethasone and Levaquin for pna found on chest xray. Nursing reports that they feel her poor intake this week is more related to issues with chewing her food.  She continues in this facility in order to finish therapy prior to returning to her previous facility.  Today, she is breathing comfortably and has been weaned from O2 for the past 5 days.      Review of Systems   ROS difficult to obtain due to patient's dementia.    Physical Exam   In order to maintain social distancing during the COVID pandemic, a visual exam was completed.     Vitals:    12/08/20 0909   BP: 128/84   Pulse: 96   Resp: 22   Temp: (!) 96  F (35.6  C)   SpO2: 95%        Patient is frail, elderly woman in no acute distress.  Head is AT/NC, EOM intact. Respiratory effort normal. LS CTA  No visible LE edema. Alert with significant dementia, face symmetric. No visible skin issues or rashes. Mood euthymic      Labs:    Recent Results (from the past 240 hour(s))   Basic Metabolic Panel   Result Value Ref Range    Sodium 143 136 - 145 mmol/L    Potassium 4.5 3.5 - 5.0 mmol/L    Chloride 114 (H) 98 - 107 mmol/L    CO2 22 22 - 31 mmol/L    Anion Gap, Calculation 7 5 - 18 mmol/L    Glucose 97 70 - 125 mg/dL    Calcium 8.3 (L) 8.5 - 10.5 mg/dL    BUN 32 (H) 8 - 28 mg/dL    Creatinine 0.69 0.60 - 1.10 mg/dL    GFR MDRD Af Amer >60 >60 mL/min/1.73m2    GFR MDRD Non Af Amer >60  >60 mL/min/1.73m2   C-Reactive Protein (CRP)   Result Value Ref Range    CRP 3.2 (H) 0.0 - 0.8 mg/dL   Platelet Count   Result Value Ref Range    Platelets 170 140 - 440 thou/uL         Assessment:  1. 2019 novel coronavirus disease (COVID-19)     2. HCAP (healthcare-associated pneumonia)     3. Stage 3b chronic kidney disease     4. Malnutrition, unspecified type (H)         Plan:   COVID-19: hypoxia resolved, Continue to monitor closely and encourage fluid intake.  On Eliquis thru 12/24    HCAP: resolved after course of Levaquin    CASSIE on CKD: resolved, continue to push oral fluids    Malnutrition: change diet to mechanical soft and thin liquids, if questions of swallowing have ST eval.  Add Nutritional supplements     Electronically signed by: Hayley Huber, CNP

## 2021-06-13 NOTE — PROGRESS NOTES
Code Status:  FULL CODE  Visit Type: Discharge Summary     Facility:  [facility] [[id number]]          PCP:  Guilherme Graves MD  [phone]       Admission Date to our Facility: 11/23/2020 Discharge Date from our Facility: 12/21/2020    Discharge Diagnosis:    1. 2019 novel coronavirus disease (COVID-19)     2. HCAP (healthcare-associated pneumonia)     3. Stage 3b chronic kidney disease     4. Malnutrition, unspecified type (H)     5. Dementia without behavioral disturbance, unspecified dementia type (H)     6. Thrombocytopenia (H)     7. Anemia, unspecified type          History of Present Illness: Tracey Moran is a 89 y.o. female with a past medical history for hypertension, hypothyroidism, CKD, HLD, osteoarthritis, dementia without behaviors.             Skilled Nursing Facility Course: She was transferred to this facility for isolation after testing positive for COVID-19 on 11/18/2020. She did develop mild hypoxia requiring oxygen supplementation. She was quickly weaned from the oxygen. She completed a course of dexamethasone and Levaquin for pna found on chest xray.  She was put on Eliquis for VTE prophylaxis with the discontinuing date of 12/24/2020.  She did have a slight bump her creatinine on 11/27 at 1.13 and so I did give her IVF and hold her lisinopril for a few days.  Creatinine recovered to 0.69 on 11/30. She did have trouble chewing her food and so her diet was changed to mechanical soft.     Plt on 11/27 was 126 which improved to 170 on 11/30    Therapy reports that she is improving and strength with transfers.  She is able to do more reps with leg exercises and follows those directions.        Discharge Medications:    Current Outpatient Medications   Medication Sig Dispense Refill     acetaminophen (TYLENOL) 500 MG tablet Take 500 mg by mouth 3 (three) times a day as needed.       apixaban ANTICOAGULANT (ELIQUIS) 2.5 mg Tab tablet Take by mouth 2 (two) times a day.        diclofenac sodium (VOLTAREN) 1 % Gel Place 2 g on the skin 3 (three) times a day.       dimethicone-zinc oxide (DIMETHICONE-ZINC OXIDE) Crea Apply 1 application topically 2 (two) times a day.       lisinopriL (PRINIVIL,ZESTRIL) 20 MG tablet Take 20 mg by mouth 2 (two) times a day. One tablet twice daily. HOLD IF SBP <110-CALL IF HELD TWICE IN A ROW OR SYMPTOMATIC       metoprolol succinate (TOPROL-XL) 25 MG Take 12.5 mg by mouth daily.       multivit-min/ferrous gluconate (CEROVITE ORAL) Take 1 tablet by mouth daily.       nystatin (MYCOSTATIN) powder Apply 1 application topically 2 (two) times a day.       polyethylene glycol (GLYCOLAX) 17 gram/dose powder Take 17 g by mouth daily as needed.       senna-docusate (PERICOLACE) 8.6-50 mg tablet Take 1 tablet by mouth 2 (two) times a day as needed.       No current facility-administered medications for this visit.        For most current and accurate medication list, please contact the skilled nursing facility that this patient visit took place at.      Discharge Plan: Patient is stable to discharge to previous living situation with home PT/OT.  Will need follow-up with her PCP in the next 2 weeks for reestablishment.    Review of Systems   Difficult to obtain due to patient's dementia    Physical Exam   In order to maintain social distancing during the COVID pandemic, a visual exam was completed.     Vitals:    12/18/20 1005   BP: 130/65   Pulse: 76   Resp: 22   Temp: 98  F (36.7  C)   SpO2: 92%        Patient is thin, elderly woman in no acute distress.  Head is AT/NC, EOM intact. Respiratory effort normal.  Lung sounds are clear to auscultation.  Heart: RRR, S1, S2 with no murmurs, gallops or rubs.  No visible LE edema. Alert with cognitive impairment, nonverbal, face symmetric. No visible skin issues or rashes. Mood euthymic      Labs: Results for [facility] (MRN [id number]) as of 12/18/2020 13:58   Ref. Range 11/27/2020 05:21   Sodium Latest Ref Range: 136 - 145  mmol/L 144   Potassium Latest Ref Range: 3.5 - 5.0 mmol/L 4.4   Chloride Latest Ref Range: 98 - 107 mmol/L 110 (H)   CO2 Latest Ref Range: 22 - 31 mmol/L 24   Anion Gap, Calculation Latest Ref Range: 5 - 18 mmol/L 10   BUN Latest Ref Range: 8 - 28 mg/dL 51 (H)   Creatinine Latest Ref Range: 0.60 - 1.10 mg/dL 1.13 (H)   GFR MDRD Af Amer Latest Ref Range: >60 mL/min/1.73m2 54 (L)   GFR MDRD Non Af Amer Latest Ref Range: >60 mL/min/1.73m2 45 (L)   Calcium Latest Ref Range: 8.5 - 10.5 mg/dL 8.6   Glucose Latest Ref Range: 70 - 125 mg/dL 110   CRP Latest Ref Range: 0.0 - 0.8 mg/dL 4.5 (H)   Procalcitonin Latest Ref Range: 0.00 - 0.49 ng/mL 0.12   WBC Latest Ref Range: 4.0 - 11.0 thou/uL 5.7   RBC Latest Ref Range: 3.80 - 5.40 mill/uL 3.62 (L)   Hemoglobin Latest Ref Range: 12.0 - 16.0 g/dL 11.8 (L)   Hematocrit Latest Ref Range: 35.0 - 47.0 % 35.6   MCV Latest Ref Range: 80 - 100 fL 98   MCH Latest Ref Range: 27.0 - 34.0 pg 32.6   MCHC Latest Ref Range: 32.0 - 36.0 g/dL 33.1   RDW Latest Ref Range: 11.0 - 14.5 % 12.5   Platelets Latest Ref Range: 140 - 440 thou/uL 126 (L)   MPV Latest Ref Range: 8.5 - 12.5 fL 12.1   Neutrophils % Latest Ref Range: 50 - 70 % 84 (H)   Lymphocytes % Latest Ref Range: 20 - 40 % 11 (L)   Monocytes % Latest Ref Range: 2 - 10 % 4   Eosinophils % Latest Ref Range: 0 - 6 % 0   Basophils % Latest Ref Range: 0 - 2 % 0   Neutrophils Absolute Latest Ref Range: 2.0 - 7.7 thou/uL 4.8   Lymphocytes Absolute Latest Ref Range: 0.8 - 4.4 thou/uL 0.6 (L)   Monocytes Absolute Latest Ref Range: 0.0 - 0.9 thou/uL 0.2   Eosinophils Absolute Latest Ref Range: 0.0 - 0.4 thou/uL 0.0   Basophils Absolute Latest Ref Range: 0.0 - 0.2 thou/uL 0.0       Assessment:  1. 2019 novel coronavirus disease (COVID-19)     2. HCAP (healthcare-associated pneumonia)     3. Stage 3b chronic kidney disease     4. Malnutrition, unspecified type (H)     5. Dementia without behavioral disturbance, unspecified dementia type (H)     6.  Thrombocytopenia (H)     7. Anemia, unspecified type         MEDICAL EQUIPMENT NEEDS:  Na      DISCHARGE PLAN/FACE TO FACE:  I certify that services are/were furnished while this patient was under the care of a physician and that a physician or an allowed non-physician practitioner (NPP), had a face-to-face encounter that meets the physician face-to-face encounter requirements. The encounter was in whole, or in part, related to the primary reason for home health. The patient is confined to his/her home and needs intermittent skilled nursing, physical therapy, speech-language pathology, or the continued need for occupational therapy. A plan of care has been established by a physician and is periodically reviewed by a physician.    I certify that this patient is under my care and that I, or a nurse practitioner or physician's assistant working with me, had a face-to-face encounter that meets the physician face-to-face encounter requirements with this patient.   Date of Face-to-Face Encounter: 12/18/2020    I certify that, based on my findings, the following services are medically necessary home health services: PT/OT    My clinical findings support the need for the above skilled services because: PT/OT for ongoing endurance and strengthening.    This patient is homebound because: Patient requires maximum effort in order to get out into the community on a regular basis.    The patient is, or has been, under my care and I have initiated the establishment of the plan of care. This patient will be followed by a physician who will periodically review the plan of care.        Electronically signed by: Hayley Huber CNP

## 2021-06-15 ENCOUNTER — TRANSFERRED RECORDS (OUTPATIENT)
Dept: HEALTH INFORMATION MANAGEMENT | Facility: CLINIC | Age: 86
End: 2021-06-15

## 2021-06-21 NOTE — LETTER
Letter by Hayley Huber CNP at      Author: Hayley Huber CNP Service: -- Author Type: --    Filed:  Encounter Date: 12/18/2020 Status: (Other)         [facility]  [address] [address]                                  December 18, 2020    Patient: Tracey Moran   MR Number: [id number]   YOB: 1925   Date of Visit: 12/18/2020     Dear Dr. Shah:    Thank you for referring Tracey Moran to me for evaluation. Below are the relevant portions of my assessment and plan of care.    If you have questions, please do not hesitate to call me. I look forward to following Tracey along with you.    Sincerely,        Hayley Huber CNP          CC  No Recipients  Hayley Huber CNP  12/18/2020  2:04 PM  Signed      Code Status:  FULL CODE  Visit Type: Discharge Summary     Facility:  [facility] [[id number]]          PCP:  Guilherme Graves MD  [phone]       Admission Date to our Facility: 11/23/2020 Discharge Date from our Facility: 12/21/2020    Discharge Diagnosis:    1. 2019 novel coronavirus disease (COVID-19)     2. HCAP (healthcare-associated pneumonia)     3. Stage 3b chronic kidney disease     4. Malnutrition, unspecified type (H)     5. Dementia without behavioral disturbance, unspecified dementia type (H)     6. Thrombocytopenia (H)     7. Anemia, unspecified type          History of Present Illness: Tracey Moran is a 89 y.o. female with a past medical history for hypertension, hypothyroidism, CKD, HLD, osteoarthritis, dementia without behaviors.             Skilled Nursing Facility Course: She was transferred to this facility for isolation after testing positive for COVID-19 on 11/18/2020. She did develop mild hypoxia requiring oxygen supplementation. She was quickly weaned from the oxygen. She completed a course of dexamethasone and Levaquin for pna found on chest xray.  She was put on Eliquis for VTE prophylaxis with the  discontinuing date of 12/24/2020.  She did have a slight bump her creatinine on 11/27 at 1.13 and so I did give her IVF and hold her lisinopril for a few days.  Creatinine recovered to 0.69 on 11/30. She did have trouble chewing her food and so her diet was changed to mechanical soft.     Plt on 11/27 was 126 which improved to 170 on 11/30    Therapy reports that she is improving and strength with transfers.  She is able to do more reps with leg exercises and follows those directions.        Discharge Medications:    Current Outpatient Medications   Medication Sig Dispense Refill   ? acetaminophen (TYLENOL) 500 MG tablet Take 500 mg by mouth 3 (three) times a day as needed.     ? apixaban ANTICOAGULANT (ELIQUIS) 2.5 mg Tab tablet Take by mouth 2 (two) times a day.     ? diclofenac sodium (VOLTAREN) 1 % Gel Place 2 g on the skin 3 (three) times a day.     ? dimethicone-zinc oxide (DIMETHICONE-ZINC OXIDE) Crea Apply 1 application topically 2 (two) times a day.     ? lisinopriL (PRINIVIL,ZESTRIL) 20 MG tablet Take 20 mg by mouth 2 (two) times a day. One tablet twice daily. HOLD IF SBP <110-CALL IF HELD TWICE IN A ROW OR SYMPTOMATIC     ? metoprolol succinate (TOPROL-XL) 25 MG Take 12.5 mg by mouth daily.     ? multivit-min/ferrous gluconate (CEROVITE ORAL) Take 1 tablet by mouth daily.     ? nystatin (MYCOSTATIN) powder Apply 1 application topically 2 (two) times a day.     ? polyethylene glycol (GLYCOLAX) 17 gram/dose powder Take 17 g by mouth daily as needed.     ? senna-docusate (PERICOLACE) 8.6-50 mg tablet Take 1 tablet by mouth 2 (two) times a day as needed.       No current facility-administered medications for this visit.        For most current and accurate medication list, please contact the skilled nursing facility that this patient visit took place at.      Discharge Plan: Patient is stable to discharge to previous living situation with home PT/OT.  Will need follow-up with her PCP in the next 2 weeks for  reestablishment.    Review of Systems   Difficult to obtain due to patient's dementia    Physical Exam   In order to maintain social distancing during the COVID pandemic, a visual exam was completed.     Vitals:    12/18/20 1005   BP: 130/65   Pulse: 76   Resp: 22   Temp: 98  F (36.7  C)   SpO2: 92%        Patient is thin, elderly woman in no acute distress.  Head is AT/NC, EOM intact. Respiratory effort normal.  Lung sounds are clear to auscultation.  Heart: RRR, S1, S2 with no murmurs, gallops or rubs.  No visible LE edema. Alert with cognitive impairment, nonverbal, face symmetric. No visible skin issues or rashes. Mood euthymic      Labs: Results for [facility] (MRN [id number]) as of 12/18/2020 13:58   Ref. Range 11/27/2020 05:21   Sodium Latest Ref Range: 136 - 145 mmol/L 144   Potassium Latest Ref Range: 3.5 - 5.0 mmol/L 4.4   Chloride Latest Ref Range: 98 - 107 mmol/L 110 (H)   CO2 Latest Ref Range: 22 - 31 mmol/L 24   Anion Gap, Calculation Latest Ref Range: 5 - 18 mmol/L 10   BUN Latest Ref Range: 8 - 28 mg/dL 51 (H)   Creatinine Latest Ref Range: 0.60 - 1.10 mg/dL 1.13 (H)   GFR MDRD Af Amer Latest Ref Range: >60 mL/min/1.73m2 54 (L)   GFR MDRD Non Af Amer Latest Ref Range: >60 mL/min/1.73m2 45 (L)   Calcium Latest Ref Range: 8.5 - 10.5 mg/dL 8.6   Glucose Latest Ref Range: 70 - 125 mg/dL 110   CRP Latest Ref Range: 0.0 - 0.8 mg/dL 4.5 (H)   Procalcitonin Latest Ref Range: 0.00 - 0.49 ng/mL 0.12   WBC Latest Ref Range: 4.0 - 11.0 thou/uL 5.7   RBC Latest Ref Range: 3.80 - 5.40 mill/uL 3.62 (L)   Hemoglobin Latest Ref Range: 12.0 - 16.0 g/dL 11.8 (L)   Hematocrit Latest Ref Range: 35.0 - 47.0 % 35.6   MCV Latest Ref Range: 80 - 100 fL 98   MCH Latest Ref Range: 27.0 - 34.0 pg 32.6   MCHC Latest Ref Range: 32.0 - 36.0 g/dL 33.1   RDW Latest Ref Range: 11.0 - 14.5 % 12.5   Platelets Latest Ref Range: 140 - 440 thou/uL 126 (L)   MPV Latest Ref Range: 8.5 - 12.5 fL 12.1   Neutrophils % Latest Ref Range: 50 -  70 % 84 (H)   Lymphocytes % Latest Ref Range: 20 - 40 % 11 (L)   Monocytes % Latest Ref Range: 2 - 10 % 4   Eosinophils % Latest Ref Range: 0 - 6 % 0   Basophils % Latest Ref Range: 0 - 2 % 0   Neutrophils Absolute Latest Ref Range: 2.0 - 7.7 thou/uL 4.8   Lymphocytes Absolute Latest Ref Range: 0.8 - 4.4 thou/uL 0.6 (L)   Monocytes Absolute Latest Ref Range: 0.0 - 0.9 thou/uL 0.2   Eosinophils Absolute Latest Ref Range: 0.0 - 0.4 thou/uL 0.0   Basophils Absolute Latest Ref Range: 0.0 - 0.2 thou/uL 0.0       Assessment:  1. 2019 novel coronavirus disease (COVID-19)     2. HCAP (healthcare-associated pneumonia)     3. Stage 3b chronic kidney disease     4. Malnutrition, unspecified type (H)     5. Dementia without behavioral disturbance, unspecified dementia type (H)     6. Thrombocytopenia (H)     7. Anemia, unspecified type         MEDICAL EQUIPMENT NEEDS:  Na      DISCHARGE PLAN/FACE TO FACE:  I certify that services are/were furnished while this patient was under the care of a physician and that a physician or an allowed non-physician practitioner (NPP), had a face-to-face encounter that meets the physician face-to-face encounter requirements. The encounter was in whole, or in part, related to the primary reason for home health. The patient is confined to his/her home and needs intermittent skilled nursing, physical therapy, speech-language pathology, or the continued need for occupational therapy. A plan of care has been established by a physician and is periodically reviewed by a physician.    I certify that this patient is under my care and that I, or a nurse practitioner or physician's assistant working with me, had a face-to-face encounter that meets the physician face-to-face encounter requirements with this patient.   Date of Face-to-Face Encounter: 12/18/2020    I certify that, based on my findings, the following services are medically necessary home health services: PT/OT    My clinical findings support  the need for the above skilled services because: PT/OT for ongoing endurance and strengthening.    This patient is homebound because: Patient requires maximum effort in order to get out into the community on a regular basis.    The patient is, or has been, under my care and I have initiated the establishment of the plan of care. This patient will be followed by a physician who will periodically review the plan of care.        Electronically signed by: Hayley Huber CNP

## 2021-06-21 NOTE — LETTER
Letter by Hayley Huber CNP at      Author: Hayley Huber CNP Service: -- Author Type: --    Filed:  Encounter Date: 12/8/2020 Status: (Other)         [facility] [address] [address]                                  December 8, 2020    Patient: Tracey Moran   MR Number: [id number]   YOB: 1925   Date of Visit: 12/8/2020     Dear Dr. Shah:    Thank you for referring Tracey Moran to me for evaluation. Below are the relevant portions of my assessment and plan of care.    If you have questions, please do not hesitate to call me. I look forward to following Tracey along with you.    Sincerely,        Hayley Huber CNP          CC  No Recipients  Hayley Huber CNP  12/8/2020  2:17 PM  Signed  Code Status:  FULL CODE  Visit Type: Follow-up (Covid pneumonia, malnutrition)     Facility:  [facility] [[id number]]        Facility Type: SNF (Skilled Nursing Facility, TCU)    History of Present Illness: Tracey Moran is a 89 y.o. female with a past medical history for hypertension, hypothyroidism, CKD, HLD, osteoarthritis, dementia without behaviors.  She was recently transferred to this facility for isolation after testing positive for COVID-19 on 11/18/2020. 2 weeks ago,  she developed mild hypoxia requiring oxygen supplementation. She completed a course of dexamethasone and Levaquin for pna found on chest xray. Nursing reports that they feel her poor intake this week is more related to issues with chewing her food.  She continues in this facility in order to finish therapy prior to returning to her previous facility.  Today, she is breathing comfortably and has been weaned from O2 for the past 5 days.      Review of Systems   ROS difficult to obtain due to patient's dementia.    Physical Exam   In order to maintain social distancing during the COVID pandemic, a visual exam was completed.     Vitals:    12/08/20 0909   BP: 128/84    Pulse: 96   Resp: 22   Temp: (!) 96  F (35.6  C)   SpO2: 95%        Patient is frail, elderly woman in no acute distress.  Head is AT/NC, EOM intact. Respiratory effort normal. LS CTA  No visible LE edema. Alert with significant dementia, face symmetric. No visible skin issues or rashes. Mood euthymic      Labs:    Recent Results (from the past 240 hour(s))   Basic Metabolic Panel   Result Value Ref Range    Sodium 143 136 - 145 mmol/L    Potassium 4.5 3.5 - 5.0 mmol/L    Chloride 114 (H) 98 - 107 mmol/L    CO2 22 22 - 31 mmol/L    Anion Gap, Calculation 7 5 - 18 mmol/L    Glucose 97 70 - 125 mg/dL    Calcium 8.3 (L) 8.5 - 10.5 mg/dL    BUN 32 (H) 8 - 28 mg/dL    Creatinine 0.69 0.60 - 1.10 mg/dL    GFR MDRD Af Amer >60 >60 mL/min/1.73m2    GFR MDRD Non Af Amer >60 >60 mL/min/1.73m2   C-Reactive Protein (CRP)   Result Value Ref Range    CRP 3.2 (H) 0.0 - 0.8 mg/dL   Platelet Count   Result Value Ref Range    Platelets 170 140 - 440 thou/uL         Assessment:  1. 2019 novel coronavirus disease (COVID-19)     2. HCAP (healthcare-associated pneumonia)     3. Stage 3b chronic kidney disease     4. Malnutrition, unspecified type (H)         Plan:   COVID-19: hypoxia resolved, Continue to monitor closely and encourage fluid intake.  On Eliquis thru 12/24    HCAP: resolved after course of Levaquin    CASSIE on CKD: resolved, continue to push oral fluids    Malnutrition: change diet to mechanical soft and thin liquids, if questions of swallowing have ST eval.  Add Nutritional supplements     Electronically signed by: Hayley Huber CNP

## 2021-06-21 NOTE — LETTER
Letter by Hayley Huber CNP at      Author: Hayley Huber CNP Service: -- Author Type: --    Filed:  Encounter Date: 11/24/2020 Status: (Other)         [facility] [place] [facility]- [address] [address]                                  November 24, 2020    Patient: Tracey Moran   MR Number: [id number]   YOB: 1925   Date of Visit: 11/24/2020     Dear Dr. Shah:    Thank you for referring Tracey Moran to me for evaluation. Below are the relevant portions of my assessment and plan of care.    If you have questions, please do not hesitate to call me. I look forward to following Tracey along with you.    Sincerely,        Hayley Huber CNP          CC  No Recipients  Hayley Huber CNP  11/24/2020  5:07 PM  Signed  Code Status:  FULL CODE  Visit Type: H & P ([facility] admit, Covid 19 )     Facility:  [facility] [[id number]]      Facility Type: SNF (Skilled Nursing Facility, TCU)    History of Present Illness:   Facility Admission Date: 11/23/2020 from [facility]     Tracey Moran is a 89 y.o. female with a past medical history for hypertension, hypothyroidism, CKD, HLD, osteoarthritis, dementia without behaviors.  She was recently transferred to this facility for isolation after testing positive for COVID-19 on 11/18/2020.  Today nursing reports that she was started on oxygen due to O2 sats dipping below 90% on room air.  She is quite demented and not able to answer any of my questions however continues to smile.  She has been febrile with temps from 99.5-100.7 over the past 24 hours.  Nursing does not note any cough. She is taking in moderate po and is able to take her medications.     Past Medical History:   Diagnosis Date   ? Abrasion of left scapular region, subsequent encounter 12/15/2016   ? Anemia, unspecified type 7/1/2019   ? Benign essential hypertension 5/18/2017   ? CKD (chronic kidney disease) stage 2, GFR 60-89 ml/min 11/23/2018    ? Closed fracture of lateral portion of right tibial plateau with routine healing, subsequent encounter 6/13/2019   ? Constipation, unspecified constipation type 12/6/2017   ? CRF (chronic renal failure), stage 3 (moderate) 12/6/2017   ? Dementia without behavioral disturbance, unspecified dementia type (H) 5/21/2019   ? Elevated CK 12/15/2016   ? Fall 2/8/2018   ? Gastrointestinal hemorrhage, unspecified gastrointestinal hemorrhage type 12/13/2017   ? Generalized muscle weakness 11/23/2018   ? Hypercholesterolemia 5/21/2014   ? Hypotension, unspecified hypotension type 6/24/2019   ? Hypothyroidism, unspecified type 11/23/2018   ? Left knee pain 5/15/2019   ? Leukocytosis, unspecified type 12/13/2016   ? Physical deconditioning 11/23/2018   ? Right knee pain 5/16/2019   ? Stage 3 chronic kidney disease 5/21/2019   ? Thrombocytopenia (H) 12/6/2017   ? Urinary tract infection 10/27/2019   ? Yeast dermatitis 11/26/2018     Past Surgical History:   Procedure Laterality Date   ? Duodenoscopy (egd) combined      12/7/2017   ? ESOPHAGOSCOPY  12/06/2017   ? GASTROSCOPY  12/06/2017     History reviewed. No pertinent family history.  Social History     Socioeconomic History   ? Marital status:      Spouse name: Not on file   ? Number of children: Not on file   ? Years of education: Not on file   ? Highest education level: Not on file   Occupational History   ? Not on file   Social Needs   ? Financial resource strain: Not on file   ? Food insecurity     Worry: Not on file     Inability: Not on file   ? Transportation needs     Medical: Not on file     Non-medical: Not on file   Tobacco Use   ? Smoking status: Never Smoker   ? Smokeless tobacco: Never Used   Substance and Sexual Activity   ? Alcohol use: Yes   ? Drug use: Not on file   ? Sexual activity: Not on file   Lifestyle   ? Physical activity     Days per week: Not on file     Minutes per session: Not on file   ? Stress: Not on file   Relationships   ? Social  connections     Talks on phone: Not on file     Gets together: Not on file     Attends Sabianist service: Not on file     Active member of club or organization: Not on file     Attends meetings of clubs or organizations: Not on file     Relationship status: Not on file   ? Intimate partner violence     Fear of current or ex partner: Not on file     Emotionally abused: Not on file     Physically abused: Not on file     Forced sexual activity: Not on file   Other Topics Concern   ? Not on file   Social History Narrative   ? Not on file     Current Outpatient Medications   Medication Sig Dispense Refill   ? acetaminophen (TYLENOL) 500 MG tablet Take 500 mg by mouth 3 (three) times a day as needed.     ? apixaban ANTICOAGULANT (ELIQUIS) 2.5 mg Tab tablet Take by mouth 2 (two) times a day.     ? [START ON 11/25/2020] dexAMETHasone (DECADRON) 6 MG tablet Take 6 mg by mouth daily.     ? diclofenac sodium (VOLTAREN) 1 % Gel Place 2 g on the skin 3 (three) times a day.     ? dimethicone-zinc oxide (DIMETHICONE-ZINC OXIDE) Crea Apply 1 application topically 2 (two) times a day.     ? lisinopriL (PRINIVIL,ZESTRIL) 20 MG tablet Take 20 mg by mouth 2 (two) times a day. One tablet twice daily. HOLD IF SBP <110-CALL IF HELD TWICE IN A ROW OR SYMPTOMATIC     ? metoprolol succinate (TOPROL-XL) 25 MG Take 12.5 mg by mouth daily.     ? multivit-min/ferrous gluconate (CEROVITE ORAL) Take 1 tablet by mouth daily.     ? nystatin (MYCOSTATIN) powder Apply 1 application topically 2 (two) times a day.     ? polyethylene glycol (GLYCOLAX) 17 gram/dose powder Take 17 g by mouth daily as needed.     ? senna-docusate (PERICOLACE) 8.6-50 mg tablet Take 1 tablet by mouth 2 (two) times a day as needed.       No current facility-administered medications for this visit.      Allergies   Allergen Reactions   ? Bee Pollen    ? Demerol [Meperidine]    ? Other Environmental Allergy    ? Peanut Oil    ? Penicillins      Immunization History    Administered Date(s) Administered   ? Pneumo Conj 13-V (2010&after) 05/18/2017   ? Pneumo Polysac 23-V 01/01/2012   ? Tdap 01/01/2012       Post Discharge Medication Reconciliation Status: discharge medications reconciled and changed, per note/orders    Review of Systems   ROS difficult to obtain due to patient's dementia.    Physical Exam  In order to maintain social distancing during the COVID pandemic, a visual exam was completed.     Vitals:    11/24/20 1120   BP: 150/90   Pulse: 98   Resp: 20   Temp: (!) 100.7  F (38.2  C)   SpO2: 90%        Patient is well nourished and no acute distress.  Head is AT/NC, EOM intact. Respiratory effort normal.  Lung assessment is difficult due to patient not following commands secondary to dementia.  No visible LE edema. Alert with significant dementia, face symmetric. No visible skin issues or rashes. Mood euthymic      Labs:  No results found for this or any previous visit (from the past 240 hour(s)).      Assessment:  1. 2019 novel coronavirus disease (COVID-19)     2. Hypoxia     3. Stage 3b chronic kidney disease     4. Anemia, unspecified type     5. Dementia without behavioral disturbance, unspecified dementia type (H)         Plan:   COVID-19: Symptoms are fevers and hypoxia.  We will add dexamethasone 6 mg daily x5 days could consider to increase that to 10 days will monitor symptoms.  Check blood sugar in 2 days for random blood sugar check.  Add Eliquis 2.5 mg twice daily for VTE prophylaxis.  Check CBC with differential, BMP, CRP and procalcitonin.  Did discuss with director of nursing the need to have CODE STATUS discussion with family.    Hypoxia: Currently is comfortable on 2 L of oxygen will continue with supplemental oxygen and I have added dexamethasone.  Check a chest x-ray and add antibiotics if infiltrates.    CKD: Likely some dehydration we will check a BMP and consider IV fluids if needed.    Anemia: Checking a CBC.    Significant dementia we will  continue to provide supportive cares.        Electronically signed by: Hayley Huber CNP

## 2021-06-21 NOTE — LETTER
Letter by Hayley Huber CNP at      Author: Hayley Huber CNP Service: -- Author Type: --    Filed:  Encounter Date: 12/1/2020 Status: (Other)         [facility]- [address] [address]                                  December 1, 2020    Patient: Tracey Moran   MR Number: [id number]   YOB: 1925   Date of Visit: 12/1/2020     Dear Dr. Shah:    Thank you for referring Tracey Moran to me for evaluation. Below are the relevant portions of my assessment and plan of care.    If you have questions, please do not hesitate to call me. I look forward to following Tracey along with you.    Sincerely,        Hayley Huber CNP          CC  No Recipients  Hayley Huber CNP  12/1/2020  2:59 PM  Signed  Code Status:  FULL CODE  Visit Type: Follow-up (Covid pneumonia )     Facility:  [facility] [[id number]]        Facility Type: SNF (Skilled Nursing Facility, TCU)    History of Present Illness: Tracey Moran is a 89 y.o. female with a past medical history for hypertension, hypothyroidism, CKD, HLD, osteoarthritis, dementia without behaviors.  She was recently transferred to this facility for isolation after testing positive for COVID-19 on 11/18/2020.    Last week she developed mild hypoxia requiring oxygen supplementation.  I did start her on a 7-day dose of dexamethasone.  Chest x-ray revealed pneumonia and so she was started on Levaquin.  Both dexamethasone and Levaquin will end after today.  BMP showed a slightly elevated creatinine of 1.18 and so I did hold her lisinopril and increase her metoprolol.  Her blood pressures have increased with systolics in the 160s to 170s.  Recheck BMP today showed resolution of CASSIE after 1 L of IV fluids over the weekend.  Nursing reports she is taking in adequate p.o. fluids however food is minimal to moderate.    Review of Systems   ROS difficult to obtain due to patient's dementia.    Physical Exam   In  order to maintain social distancing during the COVID pandemic, a visual exam was completed.     Vitals:    12/01/20 0830   BP: 166/79   Pulse: 98   Resp: 22   Temp: 98.9  F (37.2  C)   SpO2: 92%        Patient is well nourished and no acute distress.  Head is AT/NC, EOM intact. Respiratory effort normal.  Lung assessment is difficult due to patient not following commands secondary to dementia.  No visible LE edema. Alert with significant dementia, face symmetric. No visible skin issues or rashes. Mood euthymic      Labs:    Recent Results (from the past 240 hour(s))   Basic Metabolic Panel   Result Value Ref Range    Sodium 144 136 - 145 mmol/L    Potassium 4.4 3.5 - 5.0 mmol/L    Chloride 110 (H) 98 - 107 mmol/L    CO2 24 22 - 31 mmol/L    Anion Gap, Calculation 10 5 - 18 mmol/L    Glucose 110 70 - 125 mg/dL    Calcium 8.6 8.5 - 10.5 mg/dL    BUN 51 (H) 8 - 28 mg/dL    Creatinine 1.13 (H) 0.60 - 1.10 mg/dL    GFR MDRD Af Amer 54 (L) >60 mL/min/1.73m2    GFR MDRD Non Af Amer 45 (L) >60 mL/min/1.73m2   C-Reactive Protein (CRP)   Result Value Ref Range    CRP 4.5 (H) 0.0 - 0.8 mg/dL   Procalcitonin   Result Value Ref Range    Procalcitonin 0.12 0.00 - 0.49 ng/mL   HM1 (CBC with Diff)   Result Value Ref Range    WBC 5.7 4.0 - 11.0 thou/uL    RBC 3.62 (L) 3.80 - 5.40 mill/uL    Hemoglobin 11.8 (L) 12.0 - 16.0 g/dL    Hematocrit 35.6 35.0 - 47.0 %    MCV 98 80 - 100 fL    MCH 32.6 27.0 - 34.0 pg    MCHC 33.1 32.0 - 36.0 g/dL    RDW 12.5 11.0 - 14.5 %    Platelets 126 (L) 140 - 440 thou/uL    MPV 12.1 8.5 - 12.5 fL    Neutrophils % 84 (H) 50 - 70 %    Lymphocytes % 11 (L) 20 - 40 %    Monocytes % 4 2 - 10 %    Eosinophils % 0 0 - 6 %    Basophils % 0 0 - 2 %    Immature Granulocyte % 1 (H) <=0 %    Neutrophils Absolute 4.8 2.0 - 7.7 thou/uL    Lymphocytes Absolute 0.6 (L) 0.8 - 4.4 thou/uL    Monocytes Absolute 0.2 0.0 - 0.9 thou/uL    Eosinophils Absolute 0.0 0.0 - 0.4 thou/uL    Basophils Absolute 0.0 0.0 - 0.2 thou/uL     Immature Granulocyte Absolute 0.0 <=0.0 thou/uL   Basic Metabolic Panel   Result Value Ref Range    Sodium 143 136 - 145 mmol/L    Potassium 4.5 3.5 - 5.0 mmol/L    Chloride 114 (H) 98 - 107 mmol/L    CO2 22 22 - 31 mmol/L    Anion Gap, Calculation 7 5 - 18 mmol/L    Glucose 97 70 - 125 mg/dL    Calcium 8.3 (L) 8.5 - 10.5 mg/dL    BUN 32 (H) 8 - 28 mg/dL    Creatinine 0.69 0.60 - 1.10 mg/dL    GFR MDRD Af Amer >60 >60 mL/min/1.73m2    GFR MDRD Non Af Amer >60 >60 mL/min/1.73m2   C-Reactive Protein (CRP)   Result Value Ref Range    CRP 3.2 (H) 0.0 - 0.8 mg/dL   Platelet Count   Result Value Ref Range    Platelets 170 140 - 440 thou/uL         Assessment:  1. 2019 novel coronavirus disease (COVID-19)     2. HCAP (healthcare-associated pneumonia)     3. Stage 3b chronic kidney disease     4. Dementia without behavioral disturbance, unspecified dementia type (H)     5. Thrombocytopenia (H)         Plan:   COVID-19: Respiratory failure is improving and nursing can wean oxygen.  Continue to monitor closely and encourage fluid intake.  On Eliquis for 30 days    HCAP: Showing improvement with respiratory failure, continue continue dexamethasone and Levaquin through today.  cough medicine as needed.    CASSIE on CKD: resolved, continue to push oral fluids can restart lisinopril and decrease metoprolol to home dose.     Dementia: needs increased reminders and encourage to move and take in po    Thrombocytopenia: last week plt was 126, now back to normal at 170.     care    Electronically signed by: Hayley Huber, CNP

## 2021-06-21 NOTE — LETTER
Letter by Hayley Huber CNP at      Author: Hayley Huber CNP Service: -- Author Type: --    Filed:  Encounter Date: 11/25/2020 Status: (Other)         Patient: Tracey Moran   MR Number: [id number]   YOB: 1925   Date of Visit: 11/25/2020     Code Status:  FULL CODE  Visit Type: Problem Visit (pneumonia, COVID-19)     Facility:  [facility] [[id number]]      Facility Type: SNF (Skilled Nursing Facility, TCU)    History of Present Illness:   Facility Admission Date: 11/23/2020 from [facility]     Tracey Moran is a 89 y.o. female with a past medical history for hypertension, hypothyroidism, CKD, HLD, osteoarthritis, dementia without behaviors.  She was recently transferred to this facility for isolation after testing positive for COVID-19 on 11/18/2020.      Today, nurse reports that she is a bit combative with her treatments.  Upon exam she is smiling and giggling.  She continues to require supplemental O2 and has diminished lung bases.  Nursing does report that she often does take off her nasal cannula.  I do not hear any cough or crackles.  Chest x-ray from last night showed studies consistent with bronchitis with superimposed left lower lobe infiltrate.  Did start her on dexamethasone and Xarelto yesterday.  Nursing reports she is eating and drinking and she does look hydrated.  She continues to run a low-grade fever.      Review of Systems   ROS difficult to obtain due to patient's dementia.    Physical Exam  In order to maintain social distancing during the COVID pandemic, a visual exam was completed.     /84, heart rate 83, respirations 20, temp 99.6, 90% on 2 L.    Patient is well nourished and no acute distress.  Head is AT/NC, EOM intact. Respiratory effort normal.  Lung assessment is difficult due to patient not following commands secondary to dementia.  No visible LE edema. Alert with significant dementia, face symmetric. No visible skin issues or rashes.  Mood euthymic      Labs:  No results found for this or any previous visit (from the past 240 hour(s)).      Assessment:  1. 2019 novel coronavirus disease (COVID-19)     2. HCAP (healthcare-associated pneumonia)     3. Stage 3b chronic kidney disease     4. Dementia without behavioral disturbance, unspecified dementia type (H)         Plan:   COVID-19: She continues to require supplemental oxygen and has low-grade fevers.  She is in taking mild to moderate p.o.  Continue with dexamethasone and Xarelto for DVT prophylaxis.    HCAP: Treat with Levaquin 500 mg daily x7 days.    CKD: Awaiting BMP to be drawn today if GFR is low we may need to consider adjusting her Levaquin dose.  Last GFR in July 2020 was greater than 60.    Dementia: Needs lots of encouragement to maintain treatments and mobility.        Electronically signed by: Hayley Huber CNP

## 2021-06-21 NOTE — LETTER
Letter by Hayley Huber CNP at      Author: Hayley Huber CNP Service: -- Author Type: --    Filed:  Encounter Date: 12/2/2020 Status: (Other)         [place] [facility]  [address] [address]                                  December 2, 2020    Patient: Tracey Moran   MR Number: [id number]   YOB: 1925   Date of Visit: 12/2/2020     Dear Dr. Shah:    Thank you for referring Tracey Moran to me for evaluation. Below are the relevant portions of my assessment and plan of care.    If you have questions, please do not hesitate to call me. I look forward to following Tracey along with you.    Sincerely,        Hayley Huber CNP          CC  No Recipients  Hayley Huber CNP  12/2/2020 11:32 AM  Signed  Code Status:  FULL CODE  Visit Type: Discharge Summary     Facility:  [facility] [[id number]]          PCP:  Guilherme Graves MD  [phone]       Admission Date to our Facility: 11/23/2020 Discharge Date from our Facility: 12/4/2020    Discharge Diagnosis:    1. 2019 novel coronavirus disease (COVID-19)     2. HCAP (healthcare-associated pneumonia)     3. Stage 3b chronic kidney disease     4. Dementia without behavioral disturbance, unspecified dementia type (H)     5. Thrombocytopenia (H)     6. Anemia, unspecified type          History of Present Illness: Tracey Moran is a 89 y.o. female with a past medical history for hypertension, hypothyroidism, CKD, HLD, osteoarthritis, dementia without behaviors.  She was recently transferred to this facility for isolation after testing positive for COVID-19 on 11/18/2020.      Skilled Nursing Facility Course: On 11/24/2020 her O2 sats dipped below 90% on RA and sos she was started on supplemental O2.  At that time, she was started on dexamethasone and eliquis (VTE prophylaxis). She did have fevers ranging from 99.5-100.7 and poor po intake.  Last week her Cr bumped up to 1.13 and CRP was 4.5, with  normal procalcitonin, normal WBC and low plts at 126.  Chest xray showed pneumonia and so she was started on a 7 day course of Levaquin.  She was given 1L of IVF and CR improved and her appetite improved. Plts returned to normal and CRP was slightly better at 3.2.  I had put her lisinopril on hold and increased her metoprolol while her Cr was elevated.  I restarted her PTA dose of lisinopril and changed her metoprolol to her PTA dose yesterday.  Nursing attempted to wean O2 over night however she did dip down below 90% and so today when I see her she is on 2L.  Nursing reports she is 96% on 2L this morning and will attempt to wean the O2 again.  Levaquin and dexamethasone finished yesterday.  She will need to continue the Eliquis for 30 days for VTE prophylaxis. Also, staff will need to continue to push fluids for adequate hydration.         Discharge Medications:    Current Outpatient Medications   Medication Sig Dispense Refill   ? acetaminophen (TYLENOL) 500 MG tablet Take 500 mg by mouth 3 (three) times a day as needed.     ? apixaban ANTICOAGULANT (ELIQUIS) 2.5 mg Tab tablet Take by mouth 2 (two) times a day.     ? diclofenac sodium (VOLTAREN) 1 % Gel Place 2 g on the skin 3 (three) times a day.     ? dimethicone-zinc oxide (DIMETHICONE-ZINC OXIDE) Crea Apply 1 application topically 2 (two) times a day.     ? lisinopriL (PRINIVIL,ZESTRIL) 20 MG tablet Take 20 mg by mouth 2 (two) times a day. One tablet twice daily. HOLD IF SBP <110-CALL IF HELD TWICE IN A ROW OR SYMPTOMATIC     ? metoprolol succinate (TOPROL-XL) 25 MG Take 12.5 mg by mouth daily.     ? multivit-min/ferrous gluconate (CEROVITE ORAL) Take 1 tablet by mouth daily.     ? nystatin (MYCOSTATIN) powder Apply 1 application topically 2 (two) times a day.     ? polyethylene glycol (GLYCOLAX) 17 gram/dose powder Take 17 g by mouth daily as needed.     ? senna-docusate (PERICOLACE) 8.6-50 mg tablet Take 1 tablet by mouth 2 (two) times a day as needed.        No current facility-administered medications for this visit.        For most current and accurate medication list, please contact the skilled nursing facility that this patient visit took place at.      Discharge Plan:  Stable to return to previous facility.  Recommend close follow up with PCP for BMP, P.O. intake and a follow up chest xray in 4 weeks.     Review of Systems   ROS is difficult due to patient's dementia        Physical Exam  In order to maintain social distancing during the COVID pandemic, a visual exam was completed.     Vitals:    12/02/20 1036   BP: 141/62   Pulse: 86   Resp: 20   SpO2: 96%        Patient is well nourished and no acute distress.  Head is AT/NC, EOM intact. Respiratory effort normal.  Lung assessment is difficult due to patient not following commands secondary to dementia.  No visible LE edema. Alert with significant dementia, face symmetric. No visible skin issues or rashes. Mood euthymic    Labs:    Recent Results (from the past 240 hour(s))   Basic Metabolic Panel   Result Value Ref Range    Sodium 144 136 - 145 mmol/L    Potassium 4.4 3.5 - 5.0 mmol/L    Chloride 110 (H) 98 - 107 mmol/L    CO2 24 22 - 31 mmol/L    Anion Gap, Calculation 10 5 - 18 mmol/L    Glucose 110 70 - 125 mg/dL    Calcium 8.6 8.5 - 10.5 mg/dL    BUN 51 (H) 8 - 28 mg/dL    Creatinine 1.13 (H) 0.60 - 1.10 mg/dL    GFR MDRD Af Amer 54 (L) >60 mL/min/1.73m2    GFR MDRD Non Af Amer 45 (L) >60 mL/min/1.73m2   C-Reactive Protein (CRP)   Result Value Ref Range    CRP 4.5 (H) 0.0 - 0.8 mg/dL   Procalcitonin   Result Value Ref Range    Procalcitonin 0.12 0.00 - 0.49 ng/mL   HM1 (CBC with Diff)   Result Value Ref Range    WBC 5.7 4.0 - 11.0 thou/uL    RBC 3.62 (L) 3.80 - 5.40 mill/uL    Hemoglobin 11.8 (L) 12.0 - 16.0 g/dL    Hematocrit 35.6 35.0 - 47.0 %    MCV 98 80 - 100 fL    MCH 32.6 27.0 - 34.0 pg    MCHC 33.1 32.0 - 36.0 g/dL    RDW 12.5 11.0 - 14.5 %    Platelets 126 (L) 140 - 440 thou/uL    MPV 12.1 8.5 -  12.5 fL    Neutrophils % 84 (H) 50 - 70 %    Lymphocytes % 11 (L) 20 - 40 %    Monocytes % 4 2 - 10 %    Eosinophils % 0 0 - 6 %    Basophils % 0 0 - 2 %    Immature Granulocyte % 1 (H) <=0 %    Neutrophils Absolute 4.8 2.0 - 7.7 thou/uL    Lymphocytes Absolute 0.6 (L) 0.8 - 4.4 thou/uL    Monocytes Absolute 0.2 0.0 - 0.9 thou/uL    Eosinophils Absolute 0.0 0.0 - 0.4 thou/uL    Basophils Absolute 0.0 0.0 - 0.2 thou/uL    Immature Granulocyte Absolute 0.0 <=0.0 thou/uL   Basic Metabolic Panel   Result Value Ref Range    Sodium 143 136 - 145 mmol/L    Potassium 4.5 3.5 - 5.0 mmol/L    Chloride 114 (H) 98 - 107 mmol/L    CO2 22 22 - 31 mmol/L    Anion Gap, Calculation 7 5 - 18 mmol/L    Glucose 97 70 - 125 mg/dL    Calcium 8.3 (L) 8.5 - 10.5 mg/dL    BUN 32 (H) 8 - 28 mg/dL    Creatinine 0.69 0.60 - 1.10 mg/dL    GFR MDRD Af Amer >60 >60 mL/min/1.73m2    GFR MDRD Non Af Amer >60 >60 mL/min/1.73m2   C-Reactive Protein (CRP)   Result Value Ref Range    CRP 3.2 (H) 0.0 - 0.8 mg/dL   Platelet Count   Result Value Ref Range    Platelets 170 140 - 440 thou/uL         Assessment:  1. 2019 novel coronavirus disease (COVID-19)     2. HCAP (healthcare-associated pneumonia)     3. Stage 3b chronic kidney disease     4. Dementia without behavioral disturbance, unspecified dementia type (H)     5. Thrombocytopenia (H)     6. Anemia, unspecified type         MEDICAL EQUIPMENT NEEDS:  NA      The patient is, or has been, under my care and I have initiated the establishment of the plan of care. This patient will be followed by a physician who will periodically review the plan of care.      Electronically signed by: Hayley Huber CNP

## 2021-06-30 ENCOUNTER — APPOINTMENT (OUTPATIENT)
Dept: CT IMAGING | Facility: CLINIC | Age: 86
End: 2021-06-30
Attending: EMERGENCY MEDICINE
Payer: COMMERCIAL

## 2021-06-30 ENCOUNTER — APPOINTMENT (OUTPATIENT)
Dept: GENERAL RADIOLOGY | Facility: CLINIC | Age: 86
End: 2021-06-30
Attending: EMERGENCY MEDICINE
Payer: COMMERCIAL

## 2021-06-30 ENCOUNTER — HOSPITAL ENCOUNTER (EMERGENCY)
Facility: CLINIC | Age: 86
Discharge: HOME | End: 2021-07-01
Attending: EMERGENCY MEDICINE | Admitting: EMERGENCY MEDICINE
Payer: COMMERCIAL

## 2021-06-30 VITALS
RESPIRATION RATE: 18 BRPM | HEART RATE: 65 BPM | TEMPERATURE: 98.3 F | OXYGEN SATURATION: 99 % | DIASTOLIC BLOOD PRESSURE: 56 MMHG | SYSTOLIC BLOOD PRESSURE: 135 MMHG

## 2021-06-30 DIAGNOSIS — N39.0 URINARY TRACT INFECTION WITHOUT HEMATURIA, SITE UNSPECIFIED: ICD-10-CM

## 2021-06-30 DIAGNOSIS — N17.9 ACUTE RENAL FAILURE, UNSPECIFIED ACUTE RENAL FAILURE TYPE (H): ICD-10-CM
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
## 2021-06-30 DIAGNOSIS — E86.0 DEHYDRATION: ICD-10-CM

## 2021-06-30 LAB
ALBUMIN UR-MCNC: 70 MG/DL
ANION GAP SERPL CALCULATED.3IONS-SCNC: 3 MMOL/L (ref 3–14)
APPEARANCE UR: ABNORMAL
BACTERIA #/AREA URNS HPF: ABNORMAL /HPF
BASOPHILS # BLD AUTO: 0 10E9/L (ref 0–0.2)
BASOPHILS NFR BLD AUTO: 0.4 %
BILIRUB UR QL STRIP: NEGATIVE
BUN SERPL-MCNC: 64 MG/DL (ref 7–30)
CALCIUM SERPL-MCNC: 8.6 MG/DL (ref 8.5–10.1)
CHLORIDE SERPL-SCNC: 109 MMOL/L (ref 94–109)
CO2 SERPL-SCNC: 26 MMOL/L (ref 20–32)
COLOR UR AUTO: YELLOW
CREAT SERPL-MCNC: 1.79 MG/DL (ref 0.52–1.04)
DIFFERENTIAL METHOD BLD: ABNORMAL
EOSINOPHIL # BLD AUTO: 0.5 10E9/L (ref 0–0.7)
EOSINOPHIL NFR BLD AUTO: 5.6 %
ERYTHROCYTE [DISTWIDTH] IN BLOOD BY AUTOMATED COUNT: 13.1 % (ref 10–15)
GFR SERPL CREATININE-BSD FRML MDRD: 24 ML/MIN/{1.73_M2}
GLUCOSE SERPL-MCNC: 116 MG/DL (ref 70–99)
GLUCOSE UR STRIP-MCNC: NEGATIVE MG/DL
HCT VFR BLD AUTO: 29.6 % (ref 35–47)
HGB BLD-MCNC: 9.3 G/DL (ref 11.7–15.7)
HGB UR QL STRIP: ABNORMAL
IMM GRANULOCYTES # BLD: 0 10E9/L (ref 0–0.4)
IMM GRANULOCYTES NFR BLD: 0.3 %
KETONES UR STRIP-MCNC: NEGATIVE MG/DL
LEUKOCYTE ESTERASE UR QL STRIP: ABNORMAL
LYMPHOCYTES # BLD AUTO: 1.5 10E9/L (ref 0.8–5.3)
LYMPHOCYTES NFR BLD AUTO: 15.4 %
MCH RBC QN AUTO: 32 PG (ref 26.5–33)
MCHC RBC AUTO-ENTMCNC: 31.4 G/DL (ref 31.5–36.5)
MCV RBC AUTO: 102 FL (ref 78–100)
MONOCYTES # BLD AUTO: 0.6 10E9/L (ref 0–1.3)
MONOCYTES NFR BLD AUTO: 6.1 %
NEUTROPHILS # BLD AUTO: 6.8 10E9/L (ref 1.6–8.3)
NEUTROPHILS NFR BLD AUTO: 72.2 %
NITRATE UR QL: POSITIVE
NRBC # BLD AUTO: 0 10*3/UL
NRBC BLD AUTO-RTO: 0 /100
PH UR STRIP: 6 PH (ref 5–7)
PLATELET # BLD AUTO: 247 10E9/L (ref 150–450)
POTASSIUM SERPL-SCNC: 5.4 MMOL/L (ref 3.4–5.3)
RBC # BLD AUTO: 2.91 10E12/L (ref 3.8–5.2)
RBC #/AREA URNS AUTO: 23 /HPF (ref 0–2)
SODIUM SERPL-SCNC: 138 MMOL/L (ref 133–144)
SOURCE: ABNORMAL
SP GR UR STRIP: 1.01 (ref 1–1.03)
SQUAMOUS #/AREA URNS AUTO: 1 /HPF (ref 0–1)
UROBILINOGEN UR STRIP-MCNC: NORMAL MG/DL (ref 0–2)
WBC # BLD AUTO: 9.5 10E9/L (ref 4–11)
WBC #/AREA URNS AUTO: >182 /HPF (ref 0–5)
WBC CLUMPS #/AREA URNS HPF: PRESENT /HPF

## 2021-06-30 PROCEDURE — 72125 CT NECK SPINE W/O DYE: CPT

## 2021-06-30 PROCEDURE — 96360 HYDRATION IV INFUSION INIT: CPT

## 2021-06-30 PROCEDURE — 81001 URINALYSIS AUTO W/SCOPE: CPT | Performed by: EMERGENCY MEDICINE

## 2021-06-30 PROCEDURE — 99285 EMERGENCY DEPT VISIT HI MDM: CPT | Mod: 25

## 2021-06-30 PROCEDURE — 85025 COMPLETE CBC W/AUTO DIFF WBC: CPT | Performed by: EMERGENCY MEDICINE

## 2021-06-30 PROCEDURE — 96361 HYDRATE IV INFUSION ADD-ON: CPT

## 2021-06-30 PROCEDURE — 70450 CT HEAD/BRAIN W/O DYE: CPT

## 2021-06-30 PROCEDURE — 87186 SC STD MICRODIL/AGAR DIL: CPT | Performed by: EMERGENCY MEDICINE

## 2021-06-30 PROCEDURE — 258N000003 HC RX IP 258 OP 636: Performed by: EMERGENCY MEDICINE

## 2021-06-30 PROCEDURE — 87088 URINE BACTERIA CULTURE: CPT | Performed by: EMERGENCY MEDICINE

## 2021-06-30 PROCEDURE — 73590 X-RAY EXAM OF LOWER LEG: CPT | Mod: RT

## 2021-06-30 PROCEDURE — 87086 URINE CULTURE/COLONY COUNT: CPT | Performed by: EMERGENCY MEDICINE

## 2021-06-30 PROCEDURE — 80048 BASIC METABOLIC PNL TOTAL CA: CPT | Performed by: EMERGENCY MEDICINE

## 2021-06-30 RX ORDER — CEPHALEXIN 500 MG/1
500 CAPSULE ORAL 2 TIMES DAILY
Qty: 10 CAPSULE | Refills: 0 | Status: SHIPPED | OUTPATIENT
Start: 2021-06-30 | End: 2021-07-05

## 2021-06-30 RX ORDER — SODIUM CHLORIDE 9 MG/ML
INJECTION, SOLUTION INTRAVENOUS CONTINUOUS
Status: DISCONTINUED | OUTPATIENT
Start: 2021-06-30 | End: 2021-07-01 | Stop reason: HOSPADM

## 2021-06-30 RX ADMIN — SODIUM CHLORIDE 1000 ML: 9 INJECTION, SOLUTION INTRAVENOUS at 22:31

## 2021-06-30 ASSESSMENT — ENCOUNTER SYMPTOMS
ACTIVITY CHANGE: 1
EYE REDNESS: 1
EYE DISCHARGE: 1
COLOR CHANGE: 1
FATIGUE: 1
APPETITE CHANGE: 0

## 2021-07-01 NOTE — ED PROVIDER NOTES
History   Chief Complaint:  Fall       HPI   Tracey Moran is an anticoagulated 89 year old female with history of STEMI, hypertension, tibia fracture, dementia, and CKD, who presents after a fall.  Per staff at the patient's living facility, she suffered an unwitnessed fall earlier this evening. She was seen in her bed 45 minutes prior to staff finding her on the floor in her room. Her mentation is within normal limits. She recently suffered a tibia fracture to her right leg about a month ago. The healing process seems to be going well.     Per the patient's son, the patient has not been energetic over the past few days. Her eyes have appeared red and puffy and he has noticed some weeping. She has been more sedentary since her fracture, but has been eating regularly.     Review of Systems   Constitutional: Positive for activity change and fatigue. Negative for appetite change.   Eyes: Positive for discharge and redness.   Skin: Positive for color change.   All other systems reviewed and are negative.      Allergies:  Demerol  Peanuts  Penicillins    Medications:  Tylenol  Aspirin  Atorvastatin  Plavix  Gabapentin  Lisinopril  Lopressor  Nystatin  Pantoprazole  Senna    Past Medical History:    Hypertension  Fracture, tibia  Acute pulmonary edema  Facial droop  STEMI  UTI  Dementia  CKD  Gastrointestinal hemorrhage  Hypercholesterolemia  Constipation    Past Surgical History:    EGD    Family History:    No known family history    Social History:  Accompanied by son  Arrives via EMS    Physical Exam     Patient Vitals for the past 24 hrs:   BP Temp Temp src Pulse Resp SpO2   06/30/21 2300 -- -- -- -- -- 99 %   06/30/21 2245 117/46 -- -- -- -- 99 %   06/30/21 2230 (!) 114/35 -- -- 65 -- 97 %   06/30/21 2200 -- -- -- -- -- 97 %   06/30/21 2145 101/41 -- -- -- -- 98 %   06/30/21 2130 108/45 -- -- -- -- 100 %   06/30/21 2115 98/40 -- -- -- -- 96 %   06/30/21 2109 -- 98.3  F (36.8  C) -- -- -- --   06/30/21 2104  120/49 (!) 46.9  F (8.3  C) Oral 72 18 95 %       Physical Exam  General: Moaning at times but alert   Head:  The scalp, face, and head appear normal, atraumatic, no hematoma   Eyes:  Conjunctivae are normal  ENT:    The nose is normal    Pinnae are normal    External acoustic canals are normal    Dry mucous membranes   Neck:  Trachea midline, no cervical spine tenderness  CV:  Pulses are normal  Resp:  No respiratory distress   Abdomen:      Soft, non-tender, non-distended  Musc:  Normal muscular tone    No major joint effusions    No asymmetric leg swelling    Right lower extremity in brace, non-tender, 2+ DP pulse    No other bony tenderness on exam   Skin:  No rash or lesions noted  Neuro:  Speech is normal and fluent. Face is symmetric.     Moving all extremities well.       Emergency Department Course     Imaging:    Cervical spine CT w/o contrast  HEAD CT:  1.  No acute intracranial abnormality or significant change compared to the prior study.    CERVICAL SPINE CT:  1.  No CT evidence for acute fracture or post traumatic subluxation.    CT Head w/o Contrast  HEAD CT:  1.  No acute intracranial abnormality or significant change compared to the prior study.    CERVICAL SPINE CT:  1.  No CT evidence for acute fracture or post traumatic subluxation.    XR Tibia & Fibula Right 2 Views  1. A spiral fracture of the mid to distal diaphysis of the right tibia is again noted. There is a minimal amount of periosteal new bone formation about the fracture since the comparison study, but the fracture line is still visible, consistent with   incomplete healing.  2. No other convincing acute fractures or malalignment of the right tibia or fibula.  3. Diffuse osteopenia.     Reads per radiology    Laboratory:    CBC: WBC 9.5, HGB 9.3 (L),     BMP: potassium 5.4 (H), glucose 116 (H), bun 64 (H), gfr 24 (L) o/w WNL (Creatinine 1.79 (H))      Emergency Department Course:    Reviewed:  I reviewed nursing notes, vitals,  past medical history and care everywhere    Assessments:  2126 I obtained history and examined the patient as noted above.     Interventions:  2231 Normal Saline 1000 mL IV    Disposition:  The patient was discharged to home.       Impression & Plan       Medical Decision Making:  Tracey Moran is a 89 year old female who presents with unwitnessed fall.  Patient has had history of falls and recently had a tib-fib fracture.  She has no focal pain on exam and she is difficult to understand however her son can understand her for the most part.  CT head and neck was done as she is on Plavix which was unremarkable.  Her x-rays of her tib-fib seem unchanged from prior x-rays.  He does admit that she has been on a decline for the past few days as she has not been eating and drinking as much and she has had foul-smelling urine that has been cloudy.  She does live in a facility that does frequent cares.  She does appear quite dehydrated so a liter of fluid was given and she does have an CASSIE with a creatinine of 1.79.  Her baseline is normal.  She has a very mild hyperkalemia with this.  I suspect this will improve with IV hydration however I had a long discussion with son as I am not sure if this will improve as this may be a decline given her age.  He does not want her hospitalized and will work on her hydration at home.  When we did a urine sample we noted that she had a fair amount of perineal breakdown and so I discussed with the son in [facility] and they will do more wound cares.  Barrier cream was placed in this area.  Urine showed obvious signs of infection so we will do a urine culture.  Her vitals were unremarkable.  Return precautions given.  Patient discharged back to [facility].    Diagnosis:    ICD-10-CM    1. Fall, initial encounter  W19.XXXA Urine Culture   2. Urinary tract infection without hematuria, site unspecified  N39.0    3. Dehydration  E86.0    4. Acute renal failure, unspecified acute renal failure  type (H)  N17.9        Discharge Medications:  New Prescriptions    No medications on file       Scribe Disclosure:  I, Luther Edwards, am serving as a scribe at 9:26 PM on 6/30/2021 to document services personally performed by Bess Brian MD based on my observations and the provider's statements to me.              Bess Brian MD  07/01/21 154

## 2021-07-01 NOTE — RESULT ENCOUNTER NOTE
[facility] Emergency Dept discharge antibiotic (if prescribed): Cephalexin (Keflex) 500 mg capsule, 1 capsule (500 mg) by mouth 2 times daily for 5 days.  Date of Rx (if applicable):  6/30/21  No changes in treatment per [facility] ED Lab Result Urine culture protocol.

## 2021-07-01 NOTE — ED TRIAGE NOTES
"Pt arrives via EMS after an unwitnessed fall. Pt is from memory care unit. Staff reports pt was last seen in bed 45 minutes prior to finding pt on floor near bed. Staff reports pt is not ambulatory at baseline. H/o tib/fib fx. Staff reports h/o dementia, and that pt's current mentation is WNL. Pt takes plavix. ABC's intact. Alert, disoriented to time, place, person. Blood sugar 156. VSS. Pt can only respond \"yes\" to any questions asked.   "

## 2021-07-02 ENCOUNTER — PATIENT OUTREACH (OUTPATIENT)
Dept: CARE COORDINATION | Facility: CLINIC | Age: 86
End: 2021-07-02

## 2021-07-02 DIAGNOSIS — Z71.89 OTHER SPECIFIED COUNSELING: ICD-10-CM

## 2021-07-02 LAB
BACTERIA SPEC CULT: ABNORMAL
BACTERIA SPEC CULT: ABNORMAL
Lab: ABNORMAL
SPECIMEN SOURCE: ABNORMAL

## 2021-07-02 NOTE — PROGRESS NOTES
Clinic Care Coordination Contact  Community Health Worker Initial Outreach            Patient accepts CC: No, CTA spoke with staff member and she don't need any CC for they take care of her needs along with her Son. CTA will close referral at this time.

## 2021-07-08 ENCOUNTER — TRANSFERRED RECORDS (OUTPATIENT)
Dept: HEALTH INFORMATION MANAGEMENT | Facility: CLINIC | Age: 86
End: 2021-07-08

## 2021-07-17 ENCOUNTER — LAB REQUISITION (OUTPATIENT)
Dept: LAB | Facility: CLINIC | Age: 86
End: 2021-07-17
Payer: COMMERCIAL

## 2021-07-17 DIAGNOSIS — N17.9 ACUTE KIDNEY FAILURE, UNSPECIFIED (H): ICD-10-CM

## 2021-07-18 ENCOUNTER — APPOINTMENT (OUTPATIENT)
Dept: GENERAL RADIOLOGY | Facility: CLINIC | Age: 86
End: 2021-07-18
Attending: EMERGENCY MEDICINE
Payer: COMMERCIAL

## 2021-07-18 ENCOUNTER — HOSPITAL ENCOUNTER (EMERGENCY)
Facility: CLINIC | Age: 86
Discharge: HOME | End: 2021-07-18
Attending: EMERGENCY MEDICINE | Admitting: EMERGENCY MEDICINE
Payer: COMMERCIAL

## 2021-07-18 VITALS
OXYGEN SATURATION: 95 % | TEMPERATURE: 99.3 F | RESPIRATION RATE: 16 BRPM | SYSTOLIC BLOOD PRESSURE: 147 MMHG | HEART RATE: 82 BPM | DIASTOLIC BLOOD PRESSURE: 67 MMHG

## 2021-07-18 DIAGNOSIS — S82.832A CLOSED FRACTURE OF DISTAL END OF LEFT FIBULA, UNSPECIFIED FRACTURE MORPHOLOGY, INITIAL ENCOUNTER: ICD-10-CM

## 2021-07-18 DIAGNOSIS — D64.9 ANEMIA, UNSPECIFIED TYPE: ICD-10-CM

## 2021-07-18 LAB
ANION GAP SERPL CALCULATED.3IONS-SCNC: 5 MMOL/L (ref 3–14)
BASOPHILS # BLD AUTO: 0 10E3/UL (ref 0–0.2)
BASOPHILS NFR BLD AUTO: 1 %
BUN SERPL-MCNC: 31 MG/DL (ref 7–30)
CALCIUM SERPL-MCNC: 8.7 MG/DL (ref 8.5–10.1)
CHLORIDE BLD-SCNC: 110 MMOL/L (ref 94–109)
CO2 SERPL-SCNC: 27 MMOL/L (ref 20–32)
CREAT SERPL-MCNC: 0.83 MG/DL (ref 0.52–1.04)
EOSINOPHIL # BLD AUTO: 0.1 10E3/UL (ref 0–0.7)
EOSINOPHIL NFR BLD AUTO: 1 %
ERYTHROCYTE [DISTWIDTH] IN BLOOD BY AUTOMATED COUNT: 13.9 % (ref 10–15)
GFR SERPL CREATININE-BSD FRML MDRD: 60 ML/MIN/1.73M2
GLUCOSE BLD-MCNC: 102 MG/DL (ref 70–99)
HCT VFR BLD AUTO: 24.9 % (ref 35–47)
HGB BLD-MCNC: 7.8 G/DL (ref 11.7–15.7)
HOLD SPECIMEN: NORMAL
HOLD SPECIMEN: NORMAL
IMM GRANULOCYTES # BLD: 0 10E3/UL
IMM GRANULOCYTES NFR BLD: 1 %
LYMPHOCYTES # BLD AUTO: 1 10E3/UL (ref 0.8–5.3)
LYMPHOCYTES NFR BLD AUTO: 11 %
MCH RBC QN AUTO: 32.2 PG (ref 26.5–33)
MCHC RBC AUTO-ENTMCNC: 31.3 G/DL (ref 31.5–36.5)
MCV RBC AUTO: 103 FL (ref 78–100)
MONOCYTES # BLD AUTO: 0.7 10E3/UL (ref 0–1.3)
MONOCYTES NFR BLD AUTO: 8 %
NEUTROPHILS # BLD AUTO: 7 10E3/UL (ref 1.6–8.3)
NEUTROPHILS NFR BLD AUTO: 78 %
NRBC # BLD AUTO: 0 10E3/UL
NRBC BLD AUTO-RTO: 0 /100
PLATELET # BLD AUTO: 227 10E3/UL (ref 150–450)
POTASSIUM BLD-SCNC: 4.6 MMOL/L (ref 3.4–5.3)
RBC # BLD AUTO: 2.42 10E6/UL (ref 3.8–5.2)
SODIUM SERPL-SCNC: 142 MMOL/L (ref 133–144)
WBC # BLD AUTO: 8.8 10E3/UL (ref 4–11)

## 2021-07-18 PROCEDURE — 80048 BASIC METABOLIC PNL TOTAL CA: CPT | Performed by: EMERGENCY MEDICINE

## 2021-07-18 PROCEDURE — 73590 X-RAY EXAM OF LOWER LEG: CPT | Mod: LT

## 2021-07-18 PROCEDURE — 99284 EMERGENCY DEPT VISIT MOD MDM: CPT | Mod: 25

## 2021-07-18 PROCEDURE — 85025 COMPLETE CBC W/AUTO DIFF WBC: CPT | Performed by: EMERGENCY MEDICINE

## 2021-07-18 PROCEDURE — 73630 X-RAY EXAM OF FOOT: CPT | Mod: LT

## 2021-07-18 PROCEDURE — 27786 TREATMENT OF ANKLE FRACTURE: CPT | Mod: LT

## 2021-07-18 PROCEDURE — 36592 COLLECT BLOOD FROM PICC: CPT | Performed by: EMERGENCY MEDICINE

## 2021-07-18 PROCEDURE — 36415 COLL VENOUS BLD VENIPUNCTURE: CPT | Performed by: EMERGENCY MEDICINE

## 2021-07-18 RX ORDER — ACETAMINOPHEN 500 MG
500 TABLET ORAL ONCE
Status: DISCONTINUED | OUTPATIENT
Start: 2021-07-18 | End: 2021-07-18 | Stop reason: HOSPADM

## 2021-07-18 RX ORDER — OXYCODONE HYDROCHLORIDE 5 MG/1
2.5 TABLET ORAL EVERY 6 HOURS PRN
Qty: 8 TABLET | Refills: 0 | Status: ON HOLD | OUTPATIENT
Start: 2021-07-18 | End: 2021-08-02

## 2021-07-18 ASSESSMENT — ENCOUNTER SYMPTOMS: MYALGIAS: 1

## 2021-07-18 NOTE — PROGRESS NOTES
Received a call from ED nurse stating that pt comes from [facility].Apparently pt has a fracture and RN wanted to know if she should file a report through [facility].  I let RN know that since she is a mandated  and is questioning the situation she should be the one to call and make the report.    Irene NEIL, [facility]  Inpatient Care Coordination   [facility]   [phone]

## 2021-07-18 NOTE — ED PROVIDER NOTES
History   Chief Complaint:  Leg pain     The history is provided by the EMS personnel and a relative. History limited by: dementia.      Tracey Moran is a 89 year old female on Plavix with a history of dementia and NSTEMI who presents via EMS from [facility] for evaluation of left lower leg pain and bruising. Of note, the patient is currently in a cast from R tibia fracture back in June, she was initially placed in a hard cast but has been in a brace for the last 4 weeks. She has been transferring with the use of Concepcion lift as assisted by staff at [facility]. More recently, she started developing pain and bruising to her left lower leg, staff had this evaluated with X-ray which did confirm a lower fibula fracture. It is unknown as to the mechanism of her most recent injury or if she fell. Staff at [facility] provides 24/7 care for patient and have not reported any falls. Patient's son has come to visit her at her care facility every day for the past week, he himself has not witnessed any falls but believes that the injury occurred within the last 1-2 days. She was prescribed PRN oxycodone for pain management, last given at 8:00 a.m. this morning. Son is primarily concerned with her ongoing bruising as she is on Plavix.    Review of Systems   Unable to perform ROS: Dementia   Musculoskeletal: Positive for myalgias (L lower leg).   Skin:        (+) Bruising to LLE     Allergies:  Demerol [Meperidine]  Penicillins    Medications:  Aspirin 81 mg  Atorvastatin  Clopidogrel  Gabapentin  Lisinopril  Metoprolol tartrate  Pantoprazole  Senna docusate  Oxycodone    Past Medical History:    Hypertension  CKD stage 3  Dementia  GI hemorrhage  Anemia, unspecified type  Hypercholesterolemia  Hypothyroidism  STEMI    Past Surgical History:    EGD x2    Social History:  The patient was accompanied to the ED by EMS and family.  PCP: Guilherme Graves  The patient resides at [facility]    Physical Exam      Patient Vitals for the past 24 hrs:   BP Temp Temp src Pulse Resp SpO2   07/18/21 1400 137/62 -- -- 78 -- 92 %   07/18/21 1336 -- 99.3  F (37.4  C) Oral 79 18 98 %       Physical Exam  Constitutional: Well appearing.  HEENT: Atraumatic. Moist mucous membranes.  Neck: Soft.  Supple.    Cardiac: Regular rate and rhythm.  No murmur or rub.  Respiratory: Clear to auscultation bilaterally.  No respiratory distress.    Abdomen: Soft and nontender.Nondistended.  Musculoskeletal: There is bruising noted to the distal lower left extremity with slight blistering on the medial aspect of the lower extremity.  There is swelling and bruising noted to the dorsal proximal left foot.  Her distal pulses in the left extremity are 2+ and her capillary refill is less than 3 seconds throughout the leg.  No open wounds.  No effusion at the ankle joint.  Neurologic: Alert and do son.  Nonverbal.  Consistent with dementia.  Normal tone and bulk.  She is moving her toes without difficulty.  Sensation to light touch appears intact throughout the left leg.  Skin: No rashes.  No edema.  Psych: Normal affect.  Normal behavior.    Emergency Department Course     Imaging:  XR Tibia & Fibula Left 2 Views:  Acute oblique nondisplaced fracture of the lateral malleolus with overlying soft tissue swelling. No evidence of additional acute fractures. Chronic appearing nondisplaced fracture of the tip of the medial malleolus. Osteopenia. Normal alignment. Small plantar calcaneal spur.  Report per radiology.    Foot XR, G/E 3 views, left:  Acute oblique nondisplaced fracture of the lateral malleolus with overlying soft tissue swelling. No evidence of additional acute fractures. Chronic appearing nondisplaced fracture of the tip of the medial malleolus. Osteopenia. Normal alignment. Small plantar calcaneal spur.  Report per radiology.    Laboratory:  CBC: WBC 8.8, HGB 7.8 (L),   BMP: Chloride 110 (H), Urea nitrogen 31 (H), Glucose 102 (H), GFR  estimate 60 (L), o/w WNL (Creatinine 0.83)    Procedures    Short Splint Placement     Splint was applied to left lower leg, after placement I checked and adjusted the fit to ensure proper positioning. Patient was more comfortable with splint in place. Sensation and circulation are intact after splint placement.    Emergency Department Course:    Reviewed:  I reviewed the patient's nursing notes, vitals, past medical history and care everywhere.     Assessments:  1335 I performed an exam of the patient in room 29 as documented above.  1458 Patient and family rechecked and updated on imaging findings.    1540 I rechecked the patient and updated family on the plan.  1551 Rechecked patient. Splint placed.    Interventions:  Tylenol 500 mg PO    Disposition:  The patient was discharged to home.     Impression & Plan   Medical Decision Making:  Tracey Moran is a 89 year old female who is afebrile and hemodynamically stable.  X-ray demonstrates a distal nondisplaced fibular fracture no other injuries.  She has bruising and swelling noted but no open wounds.  She is neurovascularly intact best I can tell on exam given her dementia.  I see no other injuries.  We discussed plan for splint placement and close orthopedic follow-up as she has been followed up with orthopedics for her right tibia fracture and has been nonweightbearing at her facility.  Pain control given as above.  I did write a prescription for pain medication if needed at the nursing facility and they will use sparingly as needed given her age.  Her son is present and is in agreement with this plan.  Their questions were answered.  She was in no distress at time of discharge back to her facility.    Diagnosis:    ICD-10-CM    1. Closed fracture of distal end of left fibula, unspecified fracture morphology, initial encounter  S82.832A    2. Anemia, unspecified type  D64.9        Discharge Medications:   New Prescriptions    OXYCODONE (ROXICODONE) 5 MG TABLET     Take 0.5 tablets (2.5 mg) by mouth every 6 hours as needed for severe pain       Scribe Disclosure:  I, Bess Daniela, am serving as a scribe at 1:35 PM on 7/18/2021 to document services personally performed by David Leo MD based on my observations and the provider's statements to me.     This note was completed in part using Dragon voice recognition software. Although reviewed after completion, some word and grammatical errors may occur.     Bess Suarez  7/18/2021   [facility] EMERGENCY DEPARTMENT           David Leo MD  07/19/21 4743

## 2021-07-18 NOTE — ED TRIAGE NOTES
Pt presents from [facility] w/ hx of dementia. Pt has bruising to L. Lower leg that family noticed. Leg is tender to the touch. Xray done at facility w/ confirmed lower tibia fracture. Pt uses [facility] left @ nursing home. ABCs intact.

## 2021-07-19 LAB
ANION GAP SERPL CALCULATED.3IONS-SCNC: 11 MMOL/L (ref 5–18)
BUN SERPL-MCNC: 25 MG/DL (ref 8–28)
CALCIUM SERPL-MCNC: 8.4 MG/DL (ref 8.5–10.5)
CHLORIDE BLD-SCNC: 107 MMOL/L (ref 98–107)
CO2 SERPL-SCNC: 22 MMOL/L (ref 22–31)
CREAT SERPL-MCNC: 0.79 MG/DL (ref 0.6–1.1)
GFR SERPL CREATININE-BSD FRML MDRD: 63 ML/MIN/1.73M2
GLUCOSE BLD-MCNC: 155 MG/DL (ref 70–125)
POTASSIUM BLD-SCNC: 4 MMOL/L (ref 3.5–5)
SODIUM SERPL-SCNC: 140 MMOL/L (ref 136–145)

## 2021-07-19 PROCEDURE — 80048 BASIC METABOLIC PNL TOTAL CA: CPT | Mod: ORL | Performed by: NURSE PRACTITIONER

## 2021-07-19 PROCEDURE — 36415 COLL VENOUS BLD VENIPUNCTURE: CPT | Mod: ORL | Performed by: NURSE PRACTITIONER

## 2021-07-19 PROCEDURE — P9603 ONE-WAY ALLOW PRORATED MILES: HCPCS | Mod: ORL | Performed by: NURSE PRACTITIONER

## 2021-07-20 ENCOUNTER — LAB REQUISITION (OUTPATIENT)
Dept: LAB | Facility: CLINIC | Age: 86
End: 2021-07-20
Payer: COMMERCIAL

## 2021-07-20 DIAGNOSIS — D64.9 ANEMIA, UNSPECIFIED: ICD-10-CM

## 2021-07-21 LAB
ERYTHROCYTE [DISTWIDTH] IN BLOOD BY AUTOMATED COUNT: 13.6 % (ref 10–15)
FOLATE SERPL-MCNC: 18.6 NG/ML
HCT VFR BLD AUTO: 28.4 % (ref 35–47)
HGB BLD-MCNC: 8.8 G/DL (ref 11.7–15.7)
MCH RBC QN AUTO: 31.5 PG (ref 26.5–33)
MCHC RBC AUTO-ENTMCNC: 31 G/DL (ref 31.5–36.5)
MCV RBC AUTO: 102 FL (ref 78–100)
PLATELET # BLD AUTO: 309 10E3/UL (ref 150–450)
RBC # BLD AUTO: 2.79 10E6/UL (ref 3.8–5.2)
VIT B12 SERPL-MCNC: 487 PG/ML (ref 213–816)
WBC # BLD AUTO: 9.5 10E3/UL (ref 4–11)

## 2021-07-21 PROCEDURE — 82607 VITAMIN B-12: CPT | Mod: ORL | Performed by: NURSE PRACTITIONER

## 2021-07-21 PROCEDURE — 85027 COMPLETE CBC AUTOMATED: CPT | Mod: ORL | Performed by: NURSE PRACTITIONER

## 2021-07-21 PROCEDURE — 82746 ASSAY OF FOLIC ACID SERUM: CPT | Mod: ORL | Performed by: NURSE PRACTITIONER

## 2021-07-21 PROCEDURE — P9604 ONE-WAY ALLOW PRORATED TRIP: HCPCS | Mod: ORL | Performed by: NURSE PRACTITIONER

## 2021-07-21 PROCEDURE — 36415 COLL VENOUS BLD VENIPUNCTURE: CPT | Mod: ORL | Performed by: NURSE PRACTITIONER

## 2021-07-24 ENCOUNTER — APPOINTMENT (OUTPATIENT)
Dept: GENERAL RADIOLOGY | Facility: CLINIC | Age: 86
DRG: 871 | End: 2021-07-24
Attending: EMERGENCY MEDICINE
Payer: COMMERCIAL

## 2021-07-24 ENCOUNTER — HOSPITAL ENCOUNTER (INPATIENT)
Facility: CLINIC | Age: 86
LOS: 9 days | Discharge: HOSPICE | DRG: 871 | End: 2021-08-02
Attending: EMERGENCY MEDICINE | Admitting: INTERNAL MEDICINE
Payer: COMMERCIAL

## 2021-07-24 ENCOUNTER — APPOINTMENT (OUTPATIENT)
Dept: CT IMAGING | Facility: CLINIC | Age: 86
DRG: 871 | End: 2021-07-24
Attending: EMERGENCY MEDICINE
Payer: COMMERCIAL

## 2021-07-24 DIAGNOSIS — G93.49 INFECTIOUS ENCEPHALOPATHY: ICD-10-CM

## 2021-07-24 DIAGNOSIS — N39.0 URINARY TRACT INFECTION WITHOUT HEMATURIA, SITE UNSPECIFIED: ICD-10-CM

## 2021-07-24 DIAGNOSIS — N28.9 RENAL INSUFFICIENCY: ICD-10-CM

## 2021-07-24 DIAGNOSIS — R79.89 ELEVATED TROPONIN: ICD-10-CM

## 2021-07-24 DIAGNOSIS — B99.9 INFECTIOUS ENCEPHALOPATHY: ICD-10-CM

## 2021-07-24 LAB
ALBUMIN SERPL-MCNC: 2.3 G/DL (ref 3.4–5)
ALBUMIN UR-MCNC: 70 MG/DL
ALP SERPL-CCNC: 77 U/L (ref 40–150)
ALT SERPL W P-5'-P-CCNC: 14 U/L (ref 0–50)
ANION GAP SERPL CALCULATED.3IONS-SCNC: 3 MMOL/L (ref 3–14)
APPEARANCE UR: ABNORMAL
AST SERPL W P-5'-P-CCNC: 20 U/L (ref 0–45)
BACTERIA #/AREA URNS HPF: ABNORMAL /HPF
BASE EXCESS BLDV CALC-SCNC: 3 MMOL/L (ref -7.7–1.9)
BASOPHILS # BLD AUTO: 0 10E3/UL (ref 0–0.2)
BASOPHILS NFR BLD AUTO: 0 %
BILIRUB SERPL-MCNC: 0.4 MG/DL (ref 0.2–1.3)
BILIRUB UR QL STRIP: NEGATIVE
BUN SERPL-MCNC: 35 MG/DL (ref 7–30)
CALCIUM SERPL-MCNC: 9 MG/DL (ref 8.5–10.1)
CHLORIDE BLD-SCNC: 113 MMOL/L (ref 94–109)
CO2 SERPL-SCNC: 28 MMOL/L (ref 20–32)
COLOR UR AUTO: ABNORMAL
CREAT SERPL-MCNC: 1.09 MG/DL (ref 0.52–1.04)
EOSINOPHIL # BLD AUTO: 0.1 10E3/UL (ref 0–0.7)
EOSINOPHIL NFR BLD AUTO: 1 %
ERYTHROCYTE [DISTWIDTH] IN BLOOD BY AUTOMATED COUNT: 13.4 % (ref 10–15)
GFR SERPL CREATININE-BSD FRML MDRD: 43 ML/MIN/1.73M2
GLUCOSE BLD-MCNC: 107 MG/DL (ref 70–99)
GLUCOSE BLDC GLUCOMTR-MCNC: 101 MG/DL (ref 70–99)
GLUCOSE UR STRIP-MCNC: NEGATIVE MG/DL
HCO3 BLDV-SCNC: 29 MMOL/L (ref 21–28)
HCO3 BLDV-SCNC: 30 MMOL/L (ref 21–28)
HCT VFR BLD AUTO: 30.3 % (ref 35–47)
HGB BLD-MCNC: 9.2 G/DL (ref 11.7–15.7)
HGB UR QL STRIP: ABNORMAL
HOLD SPECIMEN: NORMAL
IMM GRANULOCYTES # BLD: 0.1 10E3/UL
IMM GRANULOCYTES NFR BLD: 1 %
KETONES UR STRIP-MCNC: NEGATIVE MG/DL
LACTATE BLD-SCNC: 1.4 MMOL/L
LACTATE SERPL-SCNC: 1.5 MMOL/L (ref 0.7–2)
LEUKOCYTE ESTERASE UR QL STRIP: ABNORMAL
LYMPHOCYTES # BLD AUTO: 1.9 10E3/UL (ref 0.8–5.3)
LYMPHOCYTES NFR BLD AUTO: 13 %
MCH RBC QN AUTO: 31.8 PG (ref 26.5–33)
MCHC RBC AUTO-ENTMCNC: 30.4 G/DL (ref 31.5–36.5)
MCV RBC AUTO: 105 FL (ref 78–100)
MONOCYTES # BLD AUTO: 0.6 10E3/UL (ref 0–1.3)
MONOCYTES NFR BLD AUTO: 4 %
NEUTROPHILS # BLD AUTO: 11.8 10E3/UL (ref 1.6–8.3)
NEUTROPHILS NFR BLD AUTO: 81 %
NITRATE UR QL: POSITIVE
NRBC # BLD AUTO: 0 10E3/UL
NRBC BLD AUTO-RTO: 0 /100
O2/TOTAL GAS SETTING VFR VENT: 0 %
PCO2 BLDV: 50 MM HG (ref 40–50)
PCO2 BLDV: 50 MM HG (ref 40–50)
PH BLDV: 7.37 [PH] (ref 7.32–7.43)
PH BLDV: 7.39 [PH] (ref 7.32–7.43)
PH UR STRIP: 6 [PH] (ref 5–7)
PLATELET # BLD AUTO: 353 10E3/UL (ref 150–450)
PO2 BLDV: 15 MM HG (ref 25–47)
PO2 BLDV: 20 MM HG (ref 25–47)
POTASSIUM BLD-SCNC: 5 MMOL/L (ref 3.4–5.3)
PROT SERPL-MCNC: 7.1 G/DL (ref 6.8–8.8)
RBC # BLD AUTO: 2.89 10E6/UL (ref 3.8–5.2)
RBC URINE: 10 /HPF
SAO2 % BLDV: 19 % (ref 94–100)
SARS-COV-2 RNA RESP QL NAA+PROBE: NEGATIVE
SODIUM SERPL-SCNC: 144 MMOL/L (ref 133–144)
SP GR UR STRIP: 1.02 (ref 1–1.03)
TRANSITIONAL EPI: 2 /HPF
TROPONIN I SERPL-MCNC: 0.07 UG/L (ref 0–0.04)
UROBILINOGEN UR STRIP-MCNC: 2 MG/DL
WBC # BLD AUTO: 14.5 10E3/UL (ref 4–11)
WBC CLUMPS #/AREA URNS HPF: PRESENT /HPF
WBC URINE: >182 /HPF

## 2021-07-24 PROCEDURE — 82803 BLOOD GASES ANY COMBINATION: CPT

## 2021-07-24 PROCEDURE — 36415 COLL VENOUS BLD VENIPUNCTURE: CPT | Performed by: EMERGENCY MEDICINE

## 2021-07-24 PROCEDURE — 120N000001 HC R&B MED SURG/OB

## 2021-07-24 PROCEDURE — 258N000003 HC RX IP 258 OP 636: Performed by: INTERNAL MEDICINE

## 2021-07-24 PROCEDURE — 71045 X-RAY EXAM CHEST 1 VIEW: CPT

## 2021-07-24 PROCEDURE — 80053 COMPREHEN METABOLIC PANEL: CPT | Performed by: EMERGENCY MEDICINE

## 2021-07-24 PROCEDURE — 96365 THER/PROPH/DIAG IV INF INIT: CPT | Mod: 59

## 2021-07-24 PROCEDURE — 87086 URINE CULTURE/COLONY COUNT: CPT | Performed by: EMERGENCY MEDICINE

## 2021-07-24 PROCEDURE — 87635 SARS-COV-2 COVID-19 AMP PRB: CPT | Performed by: EMERGENCY MEDICINE

## 2021-07-24 PROCEDURE — C9803 HOPD COVID-19 SPEC COLLECT: HCPCS

## 2021-07-24 PROCEDURE — 70450 CT HEAD/BRAIN W/O DYE: CPT

## 2021-07-24 PROCEDURE — 83605 ASSAY OF LACTIC ACID: CPT | Performed by: EMERGENCY MEDICINE

## 2021-07-24 PROCEDURE — 99223 1ST HOSP IP/OBS HIGH 75: CPT | Mod: AI | Performed by: INTERNAL MEDICINE

## 2021-07-24 PROCEDURE — 84484 ASSAY OF TROPONIN QUANT: CPT | Performed by: EMERGENCY MEDICINE

## 2021-07-24 PROCEDURE — 87040 BLOOD CULTURE FOR BACTERIA: CPT | Performed by: EMERGENCY MEDICINE

## 2021-07-24 PROCEDURE — 81001 URINALYSIS AUTO W/SCOPE: CPT | Performed by: EMERGENCY MEDICINE

## 2021-07-24 PROCEDURE — 250N000009 HC RX 250: Performed by: HOSPITALIST

## 2021-07-24 PROCEDURE — 99207 PR APP CREDIT; MD BILLING SHARED VISIT: CPT | Performed by: HOSPITALIST

## 2021-07-24 PROCEDURE — 250N000013 HC RX MED GY IP 250 OP 250 PS 637: Performed by: EMERGENCY MEDICINE

## 2021-07-24 PROCEDURE — 93005 ELECTROCARDIOGRAM TRACING: CPT

## 2021-07-24 PROCEDURE — 87186 SC STD MICRODIL/AGAR DIL: CPT | Performed by: EMERGENCY MEDICINE

## 2021-07-24 PROCEDURE — 85025 COMPLETE CBC W/AUTO DIFF WBC: CPT | Performed by: EMERGENCY MEDICINE

## 2021-07-24 PROCEDURE — 99285 EMERGENCY DEPT VISIT HI MDM: CPT | Mod: 25

## 2021-07-24 PROCEDURE — 82803 BLOOD GASES ANY COMBINATION: CPT | Performed by: EMERGENCY MEDICINE

## 2021-07-24 PROCEDURE — 258N000003 HC RX IP 258 OP 636: Performed by: EMERGENCY MEDICINE

## 2021-07-24 PROCEDURE — 250N000011 HC RX IP 250 OP 636: Performed by: EMERGENCY MEDICINE

## 2021-07-24 RX ORDER — LINEZOLID 2 MG/ML
600 INJECTION, SOLUTION INTRAVENOUS ONCE
Status: COMPLETED | OUTPATIENT
Start: 2021-07-24 | End: 2021-07-24

## 2021-07-24 RX ORDER — PANTOPRAZOLE SODIUM 40 MG/1
40 TABLET, DELAYED RELEASE ORAL DAILY
Status: DISCONTINUED | OUTPATIENT
Start: 2021-07-25 | End: 2021-08-02 | Stop reason: HOSPADM

## 2021-07-24 RX ORDER — ONDANSETRON 4 MG/1
4 TABLET, ORALLY DISINTEGRATING ORAL EVERY 6 HOURS PRN
Status: DISCONTINUED | OUTPATIENT
Start: 2021-07-24 | End: 2021-08-02 | Stop reason: HOSPADM

## 2021-07-24 RX ORDER — ATORVASTATIN CALCIUM 20 MG/1
20 TABLET, FILM COATED ORAL DAILY
Status: DISCONTINUED | OUTPATIENT
Start: 2021-07-25 | End: 2021-08-02 | Stop reason: HOSPADM

## 2021-07-24 RX ORDER — METOPROLOL TARTRATE 1 MG/ML
5 INJECTION, SOLUTION INTRAVENOUS ONCE
Status: COMPLETED | OUTPATIENT
Start: 2021-07-24 | End: 2021-07-24

## 2021-07-24 RX ORDER — CEFTRIAXONE 1 G/1
1 INJECTION, POWDER, FOR SOLUTION INTRAMUSCULAR; INTRAVENOUS DAILY
Status: DISCONTINUED | OUTPATIENT
Start: 2021-07-25 | End: 2021-07-30

## 2021-07-24 RX ORDER — AMOXICILLIN 250 MG
1 CAPSULE ORAL 2 TIMES DAILY PRN
Status: DISCONTINUED | OUTPATIENT
Start: 2021-07-24 | End: 2021-08-02 | Stop reason: HOSPADM

## 2021-07-24 RX ORDER — LIDOCAINE 40 MG/G
CREAM TOPICAL
Status: DISCONTINUED | OUTPATIENT
Start: 2021-07-24 | End: 2021-08-02 | Stop reason: HOSPADM

## 2021-07-24 RX ORDER — ASPIRIN 300 MG/1
300 SUPPOSITORY RECTAL ONCE
Status: DISCONTINUED | OUTPATIENT
Start: 2021-07-24 | End: 2021-07-25

## 2021-07-24 RX ORDER — ONDANSETRON 2 MG/ML
4 INJECTION INTRAMUSCULAR; INTRAVENOUS EVERY 6 HOURS PRN
Status: DISCONTINUED | OUTPATIENT
Start: 2021-07-24 | End: 2021-08-02 | Stop reason: HOSPADM

## 2021-07-24 RX ORDER — AMOXICILLIN 250 MG
2 CAPSULE ORAL 2 TIMES DAILY PRN
Status: DISCONTINUED | OUTPATIENT
Start: 2021-07-24 | End: 2021-08-02 | Stop reason: HOSPADM

## 2021-07-24 RX ORDER — GABAPENTIN 100 MG/1
100 CAPSULE ORAL 3 TIMES DAILY
Status: DISCONTINUED | OUTPATIENT
Start: 2021-07-24 | End: 2021-08-02 | Stop reason: HOSPADM

## 2021-07-24 RX ORDER — LISINOPRIL 20 MG/1
20 TABLET ORAL 2 TIMES DAILY
Status: DISCONTINUED | OUTPATIENT
Start: 2021-07-24 | End: 2021-08-02 | Stop reason: HOSPADM

## 2021-07-24 RX ORDER — CLOPIDOGREL BISULFATE 75 MG/1
75 TABLET ORAL DAILY
Status: DISCONTINUED | OUTPATIENT
Start: 2021-07-25 | End: 2021-08-02 | Stop reason: HOSPADM

## 2021-07-24 RX ORDER — ASPIRIN 81 MG/1
81 TABLET, CHEWABLE ORAL DAILY
Status: DISCONTINUED | OUTPATIENT
Start: 2021-07-25 | End: 2021-08-02 | Stop reason: HOSPADM

## 2021-07-24 RX ORDER — LINEZOLID 600 MG/1
600 TABLET, FILM COATED ORAL EVERY 12 HOURS SCHEDULED
Status: DISPENSED | OUTPATIENT
Start: 2021-07-25 | End: 2021-07-29

## 2021-07-24 RX ORDER — SODIUM CHLORIDE, SODIUM LACTATE, POTASSIUM CHLORIDE, CALCIUM CHLORIDE 600; 310; 30; 20 MG/100ML; MG/100ML; MG/100ML; MG/100ML
INJECTION, SOLUTION INTRAVENOUS CONTINUOUS
Status: DISCONTINUED | OUTPATIENT
Start: 2021-07-24 | End: 2021-07-30

## 2021-07-24 RX ADMIN — LINEZOLID 600 MG: 600 INJECTION, SOLUTION INTRAVENOUS at 15:28

## 2021-07-24 RX ADMIN — SODIUM CHLORIDE, POTASSIUM CHLORIDE, SODIUM LACTATE AND CALCIUM CHLORIDE 1000 ML: 600; 310; 30; 20 INJECTION, SOLUTION INTRAVENOUS at 13:49

## 2021-07-24 RX ADMIN — SODIUM CHLORIDE, POTASSIUM CHLORIDE, SODIUM LACTATE AND CALCIUM CHLORIDE: 600; 310; 30; 20 INJECTION, SOLUTION INTRAVENOUS at 17:50

## 2021-07-24 RX ADMIN — METOPROLOL TARTRATE 5 MG: 5 INJECTION INTRAVENOUS at 19:11

## 2021-07-24 RX ADMIN — ACETAMINOPHEN 975 MG: 325 SUPPOSITORY RECTAL at 13:49

## 2021-07-24 RX ADMIN — CEFEPIME HYDROCHLORIDE 2 G: 2 INJECTION, POWDER, FOR SOLUTION INTRAVENOUS at 13:49

## 2021-07-24 ASSESSMENT — ACTIVITIES OF DAILY LIVING (ADL)
DEPENDENT_IADLS:: CLEANING;COOKING;LAUNDRY;SHOPPING;MEAL PREPARATION;MEDICATION MANAGEMENT;TRANSPORTATION;INCONTINENCE
ADLS_ACUITY_SCORE: 34

## 2021-07-24 NOTE — PROGRESS NOTES
Received call from nursing staff that patient had gabapentin, lisinopril, metoprolol due to night, but due to patient's mental status she was unable to take these by mouth. Patient's blood pressure is acceptable right now, will hold lisinopril and gabapentin and give 5 mg IV metoprolol holding her oral metoprolol as well.

## 2021-07-24 NOTE — PHARMACY-ADMISSION MEDICATION HISTORY
Admission medication history interview status for this patient is complete. See [facility] admission navigator for allergy information, prior to admission medications and immunization status.     Medication history interview done, indicate source(s): Caregiver  [facility] lists[facility] pill bottles[facility]  Pharmacy: unknown    Changes made to PTA medication list:  Added: none  Changed: none  Reported as Not Taking: none  Removed: none    Actions taken by pharmacist (provider contacted, etc): Updated PTA list per MAR faxed by living facility.     Additional medication history information:None    Medication reconciliation/reorder completed by provider prior to medication history?  Y    Prior to Admission medications    Medication Sig Last Dose Taking? Auth Provider   aspirin (ASA) 81 MG chewable tablet Take 1 tablet (81 mg) by mouth daily 7/24/2021 at 0800 Yes Neva Claudio MD   atorvastatin (LIPITOR) 20 MG tablet Take 1 tablet (20 mg) by mouth daily 7/24/2021 at 0800 Yes Neva Claudio MD   Criselda Protect (EUCERIN) external cream Apply topically 2 times daily Criselda Barrier Cream (apply to incontinence rash) 7/24/2021 at 0800 Yes Guilherme Graves MD   clopidogrel (PLAVIX) 75 MG tablet Take 1 tablet (75 mg) by mouth daily 7/24/2021 at 0800 Yes Neva Claudio MD   diclofenac (VOLTAREN) 1 % topical gel Place 2 g onto the skin 3 times daily For right knee pain 7/24/2021 at 0800 Yes Reported, Patient   gabapentin (NEURONTIN) 100 MG capsule Take 1 capsule (100 mg) by mouth 3 times daily 7/24/2021 at 0800 Yes Katarina Laura APRN CNP   lisinopril (ZESTRIL) 20 MG tablet Take 1 tablet (20 mg) by mouth 2 times daily 7/24/2021 at 0800 Yes Bobbi Arechiga MD   metoprolol tartrate (LOPRESSOR) 25 MG tablet Take 12.5 mg by mouth 2 times daily 7/24/2021 at 0800 Yes Unknown, Entered By History   miconazole (MICATIN) 2 % external powder Apply topically 3 times  daily 7/24/2021 at 0800 Yes Unknown, Entered By History   multivitamin w/minerals (THERA-VIT-M) tablet Take 1 tablet by mouth daily 7/24/2021 at 0800 Yes Unknown, Entered By History   nystatin (MYCOSTATIN) 194122 UNIT/GM external powder Apply topically 2 times daily Apply to rash under bilateral breast until resolved 7/24/2021 at 0800 Yes Unknown, Entered By History   pantoprazole (PROTONIX) 40 MG EC tablet Take 40 mg by mouth daily 7/24/2021 at 0800 Yes Unknown, Entered By History   acetaminophen (TYLENOL) 500 MG tablet Take 500 mg by mouth 3 times daily as needed for mild pain Unknown  Unknown, Entered By History   menthol-zinc oxide (CALMOSEPTINE) 0.44-20.6 % OINT ointment Apply 1 g topically 4 times daily as needed for skin protection (to open area on right buttock) Unknown  Unknown, Entered By History   oxyCODONE (ROXICODONE) 5 MG tablet Take 0.5 tablets (2.5 mg) by mouth every 6 hours as needed for severe pain Unknown  David Leo MD   oxyCODONE (ROXICODONE) 5 MG tablet Take 0.5 tablets (2.5 mg) by mouth every 6 hours as needed for moderate to severe pain Unknown  Reggie Voss MD   polyethylene glycol (MIRALAX) 17 g packet Take 17 g by mouth daily as needed for constipation Unknown  Bobbi Arechiga MD   senna-docusate (SENOKOT-S/PERICOLACE) 8.6-50 MG tablet Take 1 tablet by mouth 2 times daily as needed for constipation Unknown  Agustina Sanchez PA-C

## 2021-07-24 NOTE — ED NOTES
[facility]  ED Nurse Handoff Report    Tracey Moran is a 89 year old female   ED Chief complaint: Altered Mental Status  . ED Diagnosis:   Final diagnoses:   Infectious encephalopathy   Urinary tract infection without hematuria, site unspecified   Elevated troponin   Renal insufficiency     Allergies:   Allergies   Allergen Reactions     Demerol [Meperidine]      Dust Mites      Peanuts [Nuts]      Penicillins      Has tolerated cephalosporins (ceftriaxone, cefdinir)     Pollen Extract        Code Status: DNR / DNI  Activity level - Baseline/Home:  Total Care. Activity Level - Current:   Total Care. Lift room needed: No. Bariatric: No   Needed: No   Isolation: No. 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     Vital Signs:   Vitals:    07/24/21 1257   BP: (!) 186/104   Pulse: 103   Resp: 22   Temp: (!) 102.8  F (39.3  C)   TempSrc: Rectal   SpO2: 93%   Weight: 52.6 kg (116 lb)       Cardiac Rhythm:  ,      Pain level:    Patient confused: Yes. Patient Falls Risk: Yes.   Elimination Status: Has voided   Patient Report - Initial Complaint: Plavix with history of dementia and STEMI who presents with altered mental status. The son of the patient reports that the patient has been experiencing a gradual decline in her mentation over the past two days, stating that she is typically alert and interactive at her baseline. Today, EMS reports that a staff member at the patient's memory care facility noticed that the patient was minimally responsive in an altered mental state when they checked on her, prompting them to alert EMS.  Son confirms that she is DNR/DNI.  Focused Assessment:   Tests Performed: labs, imaging. Abnormal Results:   Abnormal Labs Reviewed   COMPREHENSIVE METABOLIC PANEL - Abnormal; Notable for the following components:       Result Value    Chloride 113 (*)     Urea Nitrogen 35 (*)     Creatinine 1.09 (*)     Glucose 107 (*)     Albumin 2.3 (*)     GFR Estimate 43 (*)     All other  components within normal limits   BLOOD GAS VENOUS - Abnormal; Notable for the following components:    pO2 Venous 20 (*)     Bicarbonate Venous 29 (*)     Base Excess/Deficit (+/-) 3.0 (*)     All other components within normal limits   GLUCOSE BY METER - Abnormal; Notable for the following components:    GLUCOSE BY METER POCT 101 (*)     All other components within normal limits   ISTAT GASES LACTATE VENOUS POCT - Abnormal; Notable for the following components:    Bicarbonate Venous POCT 30 (*)     O2 Sat, Venous POCT 19 (*)     pO2 Venous POCT 15 (*)     All other components within normal limits   CBC WITH PLATELETS AND DIFFERENTIAL - Abnormal; Notable for the following components:    WBC Count 14.5 (*)     RBC Count 2.89 (*)     Hemoglobin 9.2 (*)     Hematocrit 30.3 (*)      (*)     MCHC 30.4 (*)     Absolute Neutrophils 11.8 (*)     Absolute Immature Granulocytes 0.1 (*)     All other components within normal limits   TROPONIN I - Abnormal; Notable for the following components:    Troponin I 0.065 (*)     All other components within normal limits   ROUTINE UA WITH MICROSCOPIC REFLEX TO CULTURE - Abnormal; Notable for the following components:    Color Urine Dark Yellow (*)     Appearance Urine Cloudy (*)     Blood Urine Moderate (*)     Protein Albumin Urine 70  (*)     Nitrite Urine Positive (*)     Leukocyte Esterase Urine Large (*)     Bacteria Urine Many (*)     WBC Clumps Urine Present (*)     RBC Urine 10 (*)     WBC Urine >182 (*)     Transitional Epithelials Urine 2 (*)     All other components within normal limits    Narrative:     Urine Culture ordered based on laboratory criteria      Treatments provided: see MAR  Family Comments: son, Jonny involved, DANIS  OBS brochure/video discussed/provided to patient:  N/A  ED Medications:   Medications   sodium chloride (PF) 0.9% PF flush 3 mL (has no administration in time range)   sodium chloride (PF) 0.9% PF flush 3 mL (has no administration in time  range)   linezolid (ZYVOX) infusion 600 mg (has no administration in time range)   aspirin (ASA) Suppository 300 mg (300 mg Rectal Not Given 7/24/21 1500)   lactated ringers BOLUS 1,000 mL (0 mLs Intravenous Stopped 7/24/21 1501)   ceFEPIme (MAXIPIME) 2 g vial to attach to  ml bag for ADULTS or 50 ml bag for PEDS (0 g Intravenous Stopped 7/24/21 1501)   acetaminophen (TYLENOL) Suppository 975 mg (975 mg Rectal Given 7/24/21 1349)     Drips infusing:  Yes  For the majority of the shift, the patient's behavior Green. Interventions performed were NA.  Sepsis treatment initiated: No    Patient tested for COVID 19 prior to admission: YES    ED Nurse Name/Phone Number: Kinza Mauro RN,   3:12 PM  RECEIVING UNIT ED HANDOFF REVIEW    Above ED Nurse Handoff Report was reviewed: Yes  Reviewed by: Iliana Ralph RN on July 24, 2021 at 3:57 PM

## 2021-07-24 NOTE — ED NOTES
Care Management Initial Consult    General Information  Assessment completed with: Patient, Children,  (Spoke with sonMichele. Patient was not alert. )  Type of CM/SW Visit: Offer D/C Planning    Primary Care Provider verified and updated as needed: No   Readmission within the last 30 days: no previous admission in last 30 days         Advance Care Planning: Advance Care Planning Reviewed: present on chart, verified with patient, questions answered, other (comment) (POLST on chart, but not validated. Discussed with son. )   (POLST on chart not validated. Son verfied POLST from 2/17/21)       Communication Assessment  Patient's communication style: spoken language (English or Bilingual)             Cognitive  Cognitive/Neuro/Behavioral:  (Not assessed. )                      Living Environment:   People in home: alone, facility resident     Current living Arrangements: residential facility  Name of Facility:  ([facility])   Able to return to prior arrangements: yes  Living Arrangement Comments:  (Son is looking into Hospice. )    Family/Social Support:  Care provided by: other (see comments) ([facility])  Provides care for: no one, unable/limited ability to care for self     Children (Children and assisted living. )          Description of Support System: Supportive, Involved         Current Resources:   Patient receiving home care services: No     Community Resources: Other (see comment) (Gets care from assisted living. )  Equipment currently used at home: grab bar, toilet, grab bar, tub/shower, wheelchair, manual  Supplies currently used at home: Incontinence Supplies    Employment/Financial:  Employment Status:          Financial Concerns:             Lifestyle & Psychosocial Needs:  Social Determinants of Health     Tobacco Use: Low Risk      Smoking Tobacco Use: Never Smoker     Smokeless Tobacco Use: Never Used   Alcohol Use:      Frequency of Alcohol Consumption:      Average Number  of Drinks:      Frequency of Binge Drinking:    Financial Resource Strain:      Difficulty of Paying Living Expenses:    Food Insecurity:      Worried About Running Out of Food in the Last Year:      Ran Out of Food in the Last Year:    Transportation Needs:      Lack of Transportation (Medical):      Lack of Transportation (Non-Medical):    Physical Activity:      Days of Exercise per Week:      Minutes of Exercise per Session:    Stress:      Feeling of Stress :    Social Connections:      Frequency of Communication with Friends and Family:      Frequency of Social Gatherings with Friends and Family:      Attends Shinto Services:      Active Member of Clubs or Organizations:      Attends Club or Organization Meetings:      Marital Status:    Intimate Partner Violence:      Fear of Current or Ex-Partner:      Emotionally Abused:      Physically Abused:      Sexually Abused:    Depression: Not at risk     PHQ-2 Score: 0   Housing Stability:      Unable to Pay for Housing in the Last Year:      Number of Places Lived in the Last Year:      Unstable Housing in the Last Year:        Functional Status:  Prior to admission patient needed assistance:   Dependent ADLs:: Bathing, Dressing, Eating, Grooming, Incontinence, Positioning, Transfers, Wheelchair-with assist, Toileting  Dependent IADLs:: Cleaning, Cooking, Laundry, Shopping, Meal Preparation, Medication Management, Transportation, Incontinence  Assesssment of Functional Status: Not at baseline with ADL Functioning, Not at baseline with mobility, Not at  functional baseline, Needs assistance with handling finances, Needs assistance with laundry/houskeeping, Needs assistance with meals, Needs assistance with dressing, Needs assistance with bathing, Needs assistance with medications, Needs assistance with shopping, Needs assistance with transportation    Mental Health Status:          Chemical Dependency Status:                Values/Beliefs:  Spiritual, Cultural  Beliefs, Restoration Practices, Values that affect care:                 Additional Information:  RENAY MELENDEZ and RN Care Coordinator Margo met with patient and her son Michele. Michele is the first person to contact for discharge planning. Patient was not alert during meeting. The patient has dementia. Patient's son was able to answer questions for the client and offered up information.     The patient's son has been visiting his mother at her home and taking her outside as well.     Patient currently lives in a memory care unit at [facility] located in [place]. The phone number is [phone]. RENAY MELENDEZ called [facility] to inform them of the admission. There was no answer, so a messaged was left.     Patient receives cares at [facility] including medication administration, meals, bathing, transfers, and toileting. The patient's son reported that the patient is currently in a cast, so she has been using a wheelchair.  The patient can feed self, but can also get help from her nursing care staff for feeding.      The patient's son noted he has been exploring options for his mother, which includes hospice. He has already talked to the Director of Home Care to talk about hospice.     The patient previously had home care, but was signed off on per the son.     Advanced care planning was discussed with the patients son. A POLST is on-file, but is not validated. [employer] was emailed about the POLST not being validated. The patient's son reported that the POLST from 2/17/21 is accurate.   Lidya Harrison

## 2021-07-24 NOTE — H&P
[facility]    History and Physical  Hospitalist       Date of Admission:  7/24/2021    Assessment & Plan   Tracey Moran is a 89 year old female who presents with fever, altered mental status.    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    She has a history of advanced dementia but she can interact and usually is alert.  Per patient's son, her mental status has gradually declined over the last couple of days.  She is becoming less and less responsive and more confused at the memory care unit.  So she was brought into the emergency room.    In the ER, patient had a high-grade temperature.  Also noted to have leukocytosis and abnormal urinalysis.  CT head and chest x-ray were unremarkable.  Patient is being admitted to internal medicine service for urinary tract infection and septic encephalopathy.    Problem List:    UTI.  Septic encephalopathy.  -Admission to medicine service.  -Last urine culture was positive for E. coli susceptible to ceftriaxone.  Before that urine culture positive for VRE.  -Keep the patient on IV ceftriaxone and linezolid.  -Gentle IV hydration.  -Mentation should improve with treatment of UTI.    Coronary artery disease.  ST elevation MI in January.  -Continue home medications of aspirin, Plavix, lisinopril, statin, metoprolol.  -Mild troponin elevation is due to demand ischemia.    Hypertension  -Continue lisinopril and metoprolol    Advanced dementia    Chronic anemia  -Hemoglobin close to baseline    Covid recovered.  Covid vaccinated.  Covid test negative in the ER.      DVT Prophylaxis: Pneumatic Compression Devices  Code Status: DNR / DNI    Vrial Oneil MD    Primary Care Physician   Guilherme MULLIGAN  Star    Chief Complaint   Altered mental status      History of Present Illness   Tracey Moran is a 89 year old female presented with altered mental status.      Past Medical History    I have reviewed this patient's medical history and updated it with pertinent information if needed.   Past Medical History:   Diagnosis Date     Abrasion of left scapular region, subsequent encounter 12/15/2016     Anemia, unspecified type 7/1/2019     Benign essential hypertension 5/18/2017     CKD (chronic kidney disease) stage 2, GFR 60-89 ml/min 11/23/2018     Closed fracture of lateral portion of right tibial plateau with routine healing, subsequent encounter 6/13/2019     Constipation, unspecified constipation type 12/6/2017     CRF (chronic renal failure), stage 3 (moderate) 12/6/2017     Dementia without behavioral disturbance, unspecified dementia type (H) 5/21/2019     Elevated CK 12/15/2016     Fall 2/8/2018     Gastrointestinal hemorrhage, unspecified gastrointestinal hemorrhage type 12/13/2017     Generalized muscle weakness 11/23/2018     Hypercholesterolemia 5/21/2014     Hypertension      Hypotension, unspecified hypotension type 6/24/2019     Hypothyroidism, unspecified type 11/23/2018     Left knee pain 5/15/2019     Leukocytosis, unspecified type 12/13/2016     Physical deconditioning 11/23/2018     Right knee pain 5/16/2019     Stage 3 chronic kidney disease 5/21/2019     Thrombocytopenia (H) 12/6/2017     Urinary tract infection 10/27/2019     Yeast dermatitis 11/26/2018       Past Surgical History   I have reviewed this patient's surgical history and updated it with pertinent information if needed.  Past Surgical History:   Procedure Laterality Date     ESOPHAGOSCOPY  12/06/2017     ESOPHAGOSCOPY, GASTROSCOPY, DUODENOSCOPY (EGD), COMBINED N/A 12/6/2017    Procedure: COMBINED ESOPHAGOSCOPY, GASTROSCOPY, DUODENOSCOPY (EGD);  gastroscopy;  Surgeon: Melchor Mancera MD;  Location: [facility]     GASTROSCOPY   12/06/2017     OTHER SURGICAL HISTORY      Duodenoscopy (egd) [id number]/7/2017       Prior to Admission Medications   Prior to Admission Medications   Prescriptions Last Dose Informant Patient Reported? Taking?   Criselda Protect (EUCERIN) external cream   Yes No   Sig: Apply topically 2 times daily Criselda Barrier Cream (apply to incontinence rash)   acetaminophen (TYLENOL) 500 MG tablet   Yes No   Sig: Take 500 mg by mouth 3 times daily as needed for mild pain   aspirin (ASA) 81 MG chewable tablet   No No   Sig: Take 1 tablet (81 mg) by mouth daily   atorvastatin (LIPITOR) 20 MG tablet   No No   Sig: Take 1 tablet (20 mg) by mouth daily   clopidogrel (PLAVIX) 75 MG tablet   No No   Sig: Take 1 tablet (75 mg) by mouth daily   diclofenac (VOLTAREN) 1 % topical gel   Yes No   Sig: Place 2 g onto the skin 3 times daily For right knee pain   gabapentin (NEURONTIN) 100 MG capsule   No No   Sig: Take 1 capsule (100 mg) by mouth 3 times daily   lisinopril (ZESTRIL) 20 MG tablet   No No   Sig: Take 1 tablet (20 mg) by mouth 2 times daily   menthol-zinc oxide (CALMOSEPTINE) 0.44-20.6 % OINT ointment   Yes No   Sig: Apply 1 g topically 4 times daily as needed for skin protection (to open area on right buttock)   metoprolol tartrate (LOPRESSOR) 25 MG tablet   Yes No   Sig: Take 12.5 mg by mouth 2 times daily   miconazole (MICATIN) 2 % external powder   Yes No   Sig: Apply topically 3 times daily   multivitamin w/minerals (THERA-VIT-M) tablet  Nursing Home Yes No   Sig: Take 1 tablet by mouth daily   nystatin (MYCOSTATIN) 776998 UNIT/GM external powder  Nursing Home Yes No   Sig: Apply topically 2 times daily Apply to rash under bilateral breast until resolved   oxyCODONE (ROXICODONE) 5 MG tablet   No No   Sig: Take 0.5 tablets (2.5 mg) by mouth every 6 hours as needed for moderate to severe pain   oxyCODONE (ROXICODONE) 5 MG tablet   No No   Sig: Take 0.5 tablets (2.5 mg) by mouth every 6 hours as needed for severe pain    pantoprazole (PROTONIX) 40 MG EC tablet   Yes No   Sig: Take 40 mg by mouth daily   polyethylene glycol (MIRALAX) 17 g packet   No No   Sig: Take 17 g by mouth daily as needed for constipation   senna-docusate (SENOKOT-S/PERICOLACE) 8.6-50 MG tablet   No No   Sig: Take 1 tablet by mouth 2 times daily as needed for constipation      Facility-Administered Medications: None     Allergies   Allergies   Allergen Reactions     Demerol [Meperidine]      Dust Mites      Peanuts [Nuts]      Penicillins      Has tolerated cephalosporins (ceftriaxone, cefdinir)     Pollen Extract        Social History   I have reviewed this patient's social history and updated it with pertinent information if needed. Tracey Moran  reports that she has never smoked. She has never used smokeless tobacco. She reports current alcohol use. She reports that she does not use drugs.    Family History   None significant    Review of Systems   The 10 point Review of Systems is negative other than noted in the HPI or here.     Physical Exam   Temp: (!) 102.8  F (39.3  C) Temp src: Rectal BP: (!) 187/84 Pulse: 100   Resp: 24 SpO2: 97 % O2 Device: Nasal cannula Oxygen Delivery: 1 LPM  Vital Signs with Ranges  Temp:  [102.8  F (39.3  C)] 102.8  F (39.3  C)  Pulse:  [100-103] 100  Resp:  [22-24] 24  BP: (186-187)/() 187/84  SpO2:  [93 %-97 %] 97 %  116 lbs 0 oz    Constitutional: somnolent, arousable but drifts off to sleep, unable to get any history  Eyes: Conjunctiva and pupils examined and normal.  HEENT: dry mucous membranes, normal dentition.  Respiratory: Clear to auscultation bilaterally, no crackles or wheezing.  Cardiovascular: Regular rate and rhythm, normal S1 and S2, and no murmur noted.  GI: Soft, non-distended, non-tender, normal bowel sounds.  Skin: No rashes, no cyanosis, no edema.  Musculoskeletal: No joint swelling, erythema or tenderness.  Neurologic: very sleepy, unable to participate in CNS exam      Data     Recent Labs   Lab  07/24/21  1306 07/24/21  1305 07/21/21  0952 07/19/21  1028 07/18/21  1353   WBC 14.5*  --  9.5  --  8.8   HGB 9.2*  --  8.8*  --  7.8*   *  --  102*  --  103*     --  309  --  227     --   --  140 142   POTASSIUM 5.0  --   --  4.0 4.6   CHLORIDE 113*  --   --  107 110*   CO2 28  --   --  22 27   BUN 35*  --   --  25 31*   CR 1.09*  --   --  0.79 0.83   ANIONGAP 3  --   --  11 5   RENETTA 9.0  --   --  8.4* 8.7   * 101*  --  155* 102*   ALBUMIN 2.3*  --   --   --   --    PROTTOTAL 7.1  --   --   --   --    BILITOTAL 0.4  --   --   --   --    ALKPHOS 77  --   --   --   --    ALT 14  --   --   --   --    AST 20  --   --   --   --    TROPONIN 0.065*  --   --   --   --        Recent Results (from the past 24 hour(s))   Head CT w/o contrast    Narrative    CT OF THE HEAD WITHOUT CONTRAST   7/24/2021 2:35 PM     COMPARISON: Head CT 6/30/2021    HISTORY:  AMS     TECHNIQUE:  Axial CT images of the head from the skull base to the  vertex were acquired without IV contrast.    FINDINGS:   INTRACRANIAL CONTENTS: No intracranial hemorrhage, extraaxial  collection, or mass effect.  No CT evidence of acute infarct.    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 Superimposed moderate presumed  chronic small vessel ischemic change and moderate generalized volume  loss.    VISUALIZED ORBITS/SINUSES/MASTOIDS: No significant orbital  abnormality.  No significant paranasal sinus mucosal disease.  Opacified right middle ear and mastoids. Clear left middle ear and  mastoids.    OSSEOUS STRUCTURES/SOFT TISSUES: No significant abnormality.      Impression    IMPRESSION:  1.  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  2.  Moderate presumed chronic small vessel ischemic change and  generalized volume loss.  3.  Opacified right middle ear and mastoids.  4.  No change compared to 6/30/2021.    Radiation dose for this scan was reduced using automated exposure  control, adjustment of the mA and/or kV according to patient size,  or  iterative reconstruction technique    CINTHIA DE LEON MD         SYSTEM ID:  [id number]   XR Chest Port 1 View    Narrative    CHEST PORTABLE ONE VIEW   7/24/2021 2:58 PM     HISTORY: Fever    COMPARISON: 2/10/2021.      Impression    IMPRESSION: No focal airspace disease identified. No effusion.  Borderline cardiomegaly.

## 2021-07-24 NOTE — ED PROVIDER NOTES
History   Chief Complaint:  Altered Mental Status       HPI   History is limited secondary to dementia. Available history is provided by EMS and the patient's son at bedside.     Tracey Moran is a 89 year old female on Plavix with history of dementia and STEMI who presents with altered mental status. The son of the patient reports that the patient has been experiencing a gradual decline in her mentation over the past two days, stating that she is typically alert and interactive at her baseline. Today, EMS reports that a staff member at the patient's memory care facility noticed that the patient was minimally responsive in an altered mental state when they checked on her, prompting them to alert EMS.  Son confirms that she is DNR/DNI.      Review of Systems   Unable to perform ROS: Dementia       Allergies:  Demerol   Penicillins    Medications:  Aspirin 81 mg  Atorvastatin  Clopidogrel  Gabapentin  Lisinopril  Metoprolol tartrate  Pantoprazole  Senna docusate  Oxycodone    Past Medical History:    Hypertension  CKD stage 3  Dementia  GI hemorrhage  Anemia, unspecified type  Hypercholesterolemia  Hypothyroidism  STEMI  Thrombocytopenia     Past Surgical History:    EGD x 2     Social History:  PCP: Guilherme Graves  Presents to the ED via EMS  The patient resides at [facility].    Physical Exam     Patient Vitals for the past 24 hrs:   BP Temp Temp src Pulse Resp SpO2 Weight   07/24/21 1400 (!) 187/84 -- -- 100 24 97 % --   07/24/21 1257 (!) 186/104 (!) 102.8  F (39.3  C) Rectal 103 22 93 % 52.6 kg (116 lb)       Physical Exam    General:   Ill appearing elderly demented  female  HEENT:    Oropharynx is dry  Eyes:    Conjunctiva normal     PERRL  Neck:    Supple, no meningismus.     CV:     Tachycardic, regular rhythm.      No murmurs, rubs or gallops.       No unilateral leg swelling.       2+ radial pulses bilateral.       No lower extremity edema.  PULM:    Clear to auscultation  bilateral.       No respiratory distress.      Good air exchange.     No rales or wheezing.  ABD:    Soft, non-tender, non-distended.       No pulsatile masses.       No rebound, guarding or rigidity.  MSK:     Splint to LLE     Pre-fabricated splint to RLE  LYMPH:   No cervical lymphadenopathy.  NEURO:   Somnolent     GCS: E4 M4 V2 = 10     Occasionally moans in apparent discomfort     No tremor.   Skin:    Hot, dry     Superficial skin breakdown to right popliteal fossa      Emergency Department Course   ECG  ECG taken at 1324, ECG read at 1330  Sinus tachycardia with premature atrial complexes with aberrant conduction. Left axis deviation. Left ventricular hypertrophy with repolarization abnormality. Abnormal ECG.  No significant change as compared to prior, dated 2/10/21.  Rate 106 bpm. GA interval 134 ms. QRS duration 84 ms. QT/QTc 352/467 ms. P-R-T axes 41 -35 66.     Imaging:    XR Chest Portable 1 View:   No focal airspace disease identified. No effusion.   Borderline cardiomegaly. As per radiology.     CT Head without contrast:  1.  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   2.  Moderate presumed chronic small vessel ischemic change and   generalized volume loss.   3.  Opacified right middle ear and mastoids.   4.  No change compared to 6/30/2021. As per radiology.     Imaging independently reviewed and agree with radiologist interpretation.     Laboratory:     CBC: WBC 14.5 (H), HGB 9.2 (L),     CMP: Chloride 113 (H), Urea Nitrogen 35 (H), Creatinine 1.09 (H), Glucose 107 (H), Albumin 2.3 (L), GFR 43 (L), o/w WNL      Troponin (Collected 1306): 0.065 (H)    Lactic acid (result time 1329) 1.5     Glucose by meter (Collected 1305): 101 (H)    ISTAT gases lactate sol POCT: PO2: 20 (L), Bicarbonate: 29 (H), Base Excess 3.0 (H), o/w WNL    Symptomatic Influenza A/B & SARS-CoV2 (COVID19) Virus PCR Multiplex: Negative     UA with microscopic: Color Dark Yellow (A), Appearance Cloudy (A), Blood Moderate (A),  Protein Albumin 70 (A), Nitrite Positive (A), Leukocyte Esterase Large (A), Bacteria Many (A), WBC Clumps Present (A), RBC 10 (H), WBC >182 (H), Transitional Epithelial 2 (H), o/w WNL     Urine Culture Aerobic Bacterial: Pending    Blood cultures: Pending     Emergency Department Course:    Reviewed:  I reviewed nursing notes, vitals, past medical history and care everywhere    Assessments:  1300 I obtained history and examined the patient as noted above.     1340 I returned to recheck the patient and obtain a more detailed history from the son of the patient.    1426 I rechecked the patient and explained findings to the son.     Consults:  1458  I consulted with Dr. Oneil of the hospitalist services. They are in agreement to accept the patient for admission for further monitoring, evaluation, and treatment.    Interventions:  1349 LR bolus 1000mL IV   1349 Maxipime 2 g IV  1349 Tylenol 975 mg Rectal  1500 Aspirin 300 mg Rectal  1528 Zyvox 600 mg IV    Disposition:  The patient was admitted to the hospital under the care of Dr. Oneil.       Impression & Plan     Medical Decision Makin-year-old female with dementia presented to the ED with decreased responsiveness and noted to be febrile on presentation.  Her clinical examination was most consistent with encephalopathy likely of infectious etiology.  There is no evidence of soft tissue infection or pneumonia.  Patient given broad-spectrum antibiotics initially with cefepime given history of penicillin allergy but tolerance to cephalosporins.  Ultimately urinalysis returned positive as a clear source for infection.  She has a history of VRE with prior treatment with of linezolid.  Linezolid provided in the ED.  Her troponin was mildly elevated but no ischemic changes on EKG.  This likely represents type II demand ischemia related to infection.  Patient clearly requires hospitalization.    I had a prolonged discussion with the son regarding goals of care.  He  is adamant that the patient is DNR/DNI but is comfortable with medical management including IV antibiotics and hospitalization for acute infection.  Patient safe for transfer the floor.      Covid-19  Tracey Moran was evaluated during a global COVID-19 pandemic, which necessitated consideration that the patient might be at risk for infection with the SARS-CoV-2 virus that causes COVID-19.   Applicable protocols for evaluation were followed during the patient's care.   COVID-19 was considered as part of the patient's evaluation. The plan for testing is:  a test was obtained during this visit.    Diagnosis:    ICD-10-CM    1. Infectious encephalopathy  G93.49     B99.9    2. Urinary tract infection without hematuria, site unspecified  N39.0    3. Elevated troponin  R77.8    4. Renal insufficiency  N28.9        Scribe Disclosure:  I, Melchor Feliciano, am serving as a scribe at 1:00 PM on 7/24/2021 to document services personally performed by Shiraz Minor MD based on my observations and the provider's statements to me.            Shiraz Minor MD  07/24/21 [id number]

## 2021-07-24 NOTE — PROVIDER NOTIFICATION
MD paged.  Pt is not able to swallow food or  medication. I couldn't give po meds.  Please advice.  Thank you.  MD aware.  Placed a new order.

## 2021-07-25 LAB
ANION GAP SERPL CALCULATED.3IONS-SCNC: 2 MMOL/L (ref 3–14)
BUN SERPL-MCNC: 35 MG/DL (ref 7–30)
CALCIUM SERPL-MCNC: 8.5 MG/DL (ref 8.5–10.1)
CHLORIDE BLD-SCNC: 114 MMOL/L (ref 94–109)
CO2 SERPL-SCNC: 27 MMOL/L (ref 20–32)
CREAT SERPL-MCNC: 0.97 MG/DL (ref 0.52–1.04)
ERYTHROCYTE [DISTWIDTH] IN BLOOD BY AUTOMATED COUNT: 13.2 % (ref 10–15)
GFR SERPL CREATININE-BSD FRML MDRD: 49 ML/MIN/1.73M2
GLUCOSE BLD-MCNC: 87 MG/DL (ref 70–99)
HCT VFR BLD AUTO: 24 % (ref 35–47)
HGB BLD-MCNC: 7.3 G/DL (ref 11.7–15.7)
MCH RBC QN AUTO: 32 PG (ref 26.5–33)
MCHC RBC AUTO-ENTMCNC: 30.4 G/DL (ref 31.5–36.5)
MCV RBC AUTO: 105 FL (ref 78–100)
PLATELET # BLD AUTO: 276 10E3/UL (ref 150–450)
POTASSIUM BLD-SCNC: 3.9 MMOL/L (ref 3.4–5.3)
RBC # BLD AUTO: 2.28 10E6/UL (ref 3.8–5.2)
SODIUM SERPL-SCNC: 143 MMOL/L (ref 133–144)
WBC # BLD AUTO: 14.8 10E3/UL (ref 4–11)

## 2021-07-25 PROCEDURE — 80048 BASIC METABOLIC PNL TOTAL CA: CPT | Performed by: INTERNAL MEDICINE

## 2021-07-25 PROCEDURE — 99233 SBSQ HOSP IP/OBS HIGH 50: CPT | Performed by: INTERNAL MEDICINE

## 2021-07-25 PROCEDURE — 120N000001 HC R&B MED SURG/OB

## 2021-07-25 PROCEDURE — 250N000011 HC RX IP 250 OP 636: Performed by: INTERNAL MEDICINE

## 2021-07-25 PROCEDURE — 258N000003 HC RX IP 258 OP 636: Performed by: INTERNAL MEDICINE

## 2021-07-25 PROCEDURE — 250N000013 HC RX MED GY IP 250 OP 250 PS 637: Performed by: INTERNAL MEDICINE

## 2021-07-25 PROCEDURE — 85027 COMPLETE CBC AUTOMATED: CPT | Performed by: INTERNAL MEDICINE

## 2021-07-25 PROCEDURE — 36415 COLL VENOUS BLD VENIPUNCTURE: CPT | Performed by: INTERNAL MEDICINE

## 2021-07-25 RX ADMIN — GABAPENTIN 100 MG: 100 CAPSULE ORAL at 21:58

## 2021-07-25 RX ADMIN — SODIUM CHLORIDE 500 ML: 9 INJECTION, SOLUTION INTRAVENOUS at 14:53

## 2021-07-25 RX ADMIN — ASPIRIN 81 MG CHEWABLE TABLET 81 MG: 81 TABLET CHEWABLE at 13:43

## 2021-07-25 RX ADMIN — CEFTRIAXONE 1 G: 1 INJECTION, POWDER, FOR SOLUTION INTRAMUSCULAR; INTRAVENOUS at 11:01

## 2021-07-25 RX ADMIN — CLOPIDOGREL BISULFATE 75 MG: 75 TABLET ORAL at 13:44

## 2021-07-25 RX ADMIN — ATORVASTATIN CALCIUM 20 MG: 20 TABLET, FILM COATED ORAL at 13:42

## 2021-07-25 RX ADMIN — GABAPENTIN 100 MG: 100 CAPSULE ORAL at 17:17

## 2021-07-25 RX ADMIN — SODIUM CHLORIDE, POTASSIUM CHLORIDE, SODIUM LACTATE AND CALCIUM CHLORIDE: 600; 310; 30; 20 INJECTION, SOLUTION INTRAVENOUS at 05:55

## 2021-07-25 RX ADMIN — METOPROLOL TARTRATE 12.5 MG: 25 TABLET, FILM COATED ORAL at 21:58

## 2021-07-25 RX ADMIN — LISINOPRIL 20 MG: 20 TABLET ORAL at 13:42

## 2021-07-25 RX ADMIN — LISINOPRIL 20 MG: 20 TABLET ORAL at 21:58

## 2021-07-25 RX ADMIN — PANTOPRAZOLE SODIUM 40 MG: 40 TABLET, DELAYED RELEASE ORAL at 13:44

## 2021-07-25 RX ADMIN — GABAPENTIN 100 MG: 100 CAPSULE ORAL at 13:47

## 2021-07-25 RX ADMIN — METOPROLOL TARTRATE 12.5 MG: 25 TABLET, FILM COATED ORAL at 13:42

## 2021-07-25 RX ADMIN — LINEZOLID 600 MG: 600 TABLET, FILM COATED ORAL at 19:40

## 2021-07-25 RX ADMIN — SODIUM CHLORIDE, POTASSIUM CHLORIDE, SODIUM LACTATE AND CALCIUM CHLORIDE: 600; 310; 30; 20 INJECTION, SOLUTION INTRAVENOUS at 23:11

## 2021-07-25 ASSESSMENT — ACTIVITIES OF DAILY LIVING (ADL)
ADLS_ACUITY_SCORE: 33

## 2021-07-25 NOTE — PROGRESS NOTES
[facility]  Hospitalist Progress Note  Patient Name: Tracey Moran    MRN: [id number]  Provider: Andrea Shore MD  07/25/21             Assessment and Plan:      Summary of Stay: Tracey Moran is a 89 year old female with history of hypertension, chronic anemia, advanced dementia, COVID-19 in November 2020, inferior ST elevation MI in January 2021 treated conservatively without PCI, admission in February for facial droop and UTI, VRE UTI, and recent admission in June for right tibia fracture.  She presented to the [facility] ED on 7/24/21 for evaluation of fever and altered mental status.  She has a history of advanced dementia but she can interact and usually is alert.  Per patient's son, her mental status has gradually declined over the few days prior to presentation.  She had become less and less responsive and more confused at the memory care unit so was brought to the ED.  In the ED she had a fever (102.8), leukocytosis (14.5), and abnormal urinalysis (> 182 WBC, large leukocyte esterase, positive nitrite, many bacteria, and WBC clumps).  CT head and chest x-ray were unremarkable.  June was admitted to the hospitalist service with sepsis and septic encephalopathy due to  urinary tract infection. She was treated with Rocephin and linezolid. UC grew e coli and enterococcus faecalis.      Problem List:     Sepsis due to UTI with e coli and enterococcus  Septic encephalopathy  History of VRE  -Continue ceftriaxone and linezolid.  -Continue gentle IV hydration.  -Supportive cares for encephalopathy     Coronary artery disease  ST elevation MI in January  -Continue home medications of aspirin, Plavix, lisinopril, statin, metoprolol.  -Mild troponin elevation was likely due to demand ischemia.     Hypertension  -Continue lisinopril and metoprolol     Advanced dementia  -Increases risk of encephalopathy     Chronic anemia  -Hemoglobin close to baseline     Covid recovered  Covid vaccinated    Covid testing  was negative         DVT Prophylaxis: Pneumatic Compression Devices  Code Status: DNR / DNI  Disposition:  Continue inpatient cares        Interval History:      No new problems. Still pretty confused and not interacting much.                  Physical Exam:      Last Vital Signs:  BP (!) 149/67 (BP Location: Left arm)   Pulse 83   Temp 98.2  F (36.8  C) (Axillary)   Resp 18   Wt 52.6 kg (116 lb)   SpO2 94%   BMI 21.22 kg/m    No intake or output data in the 24 hours ending 07/25/21 1306    GENERAL:  Comfortable. Cooperative.  PSYCH: No acute distress.  EYES: PERRLA, Normal conjunctiva.  HEART:  Regular rate and rhythm. No JVD. Pulses normal. No edema.  LUNGS:  Clear to auscultation, normal Respiratory effort.  ABDOMEN:  Soft, no hepatosplenomegaly, normal bowel sounds.  EXTREMETIES: No clubbing, cyanosis or ischemia  SKIN:  Dry to touch, No rash.           Medications:      All current medications were reviewed.         Data:      All new lab and imaging data was reviewed.   Labs:  No results for input(s): CULT in the last 168 hours.       Lab Results   Component Value Date     07/25/2021     07/24/2021     07/19/2021     06/30/2021     06/03/2021     06/02/2021    Lab Results   Component Value Date    CHLORIDE 114 07/25/2021    CHLORIDE 113 07/24/2021    CHLORIDE 107 07/19/2021    CHLORIDE 109 06/30/2021    CHLORIDE 110 06/03/2021    CHLORIDE 110 06/02/2021    Lab Results   Component Value Date    BUN 35 07/25/2021    BUN 35 07/24/2021    BUN 25 07/19/2021    BUN 64 06/30/2021    BUN 31 06/03/2021    BUN 33 06/02/2021      Lab Results   Component Value Date    POTASSIUM 3.9 07/25/2021    POTASSIUM 5.0 07/24/2021    POTASSIUM 4.0 07/19/2021    POTASSIUM 5.4 06/30/2021    POTASSIUM 4.3 06/03/2021    POTASSIUM 4.3 06/02/2021    Lab Results   Component Value Date    CO2 27 07/25/2021    CO2 28 07/24/2021    CO2 22 07/19/2021    CO2 26 06/30/2021    CO2 24 06/03/2021    CO2 26  06/02/2021    Lab Results   Component Value Date    CR 0.97 07/25/2021    CR 1.09 07/24/2021    CR 0.79 07/19/2021    CR 1.79 06/30/2021    CR 0.94 06/03/2021    CR 1.12 06/02/2021        Recent Labs   Lab 07/25/21  0641 07/24/21  1306 07/21/21  0952   WBC 14.8* 14.5* 9.5   HGB 7.3* 9.2* 8.8*   HCT 24.0* 30.3* 28.4*   * 105* 102*    353 309

## 2021-07-25 NOTE — PLAN OF CARE
Admitting Diagnosis:encephalopathy/UTI  Pertinent History: HTN, chronic anemia, advanced dementia, UTI, h/o VRE , Right tibia fx. For vitals and assessment please see flow sheet.   Living Situation: memory care.   Pain plan: JUAN DAVID  Mobility: assistance, 2 people/lift  Baseline activity: As 2/ lift  Alarms/Safety: BA  LDA's: 2 PIV.   Pertinent test results:CR: 1.09, K: 5.0, PLT: 353, hgb: 9.2.  Consults: none   Abnormals/Pending: none  Other Cares/Comments: Alert, VSS, LS clear, 02 93% RA. IVF 75 ml/hr infusing.   Discharge Disposition: TBD  Discharge Time:TBD.

## 2021-07-25 NOTE — PROVIDER NOTIFICATION
Dr Shore paged: no UO this shift. bladder scan 210. LR infusing 75\hr. do you want a straight cath or turner? pt normally eats soft foods I will change diet to mechanical soft. MD ordered NS bolus

## 2021-07-25 NOTE — PLAN OF CARE
Vss confused. Nonverbal. Does moan occasionally with turns. Lethargic this morning. Would not open eyes. More alert this afternoon when son came to visit. Q2h turn and repo. Did eat an apple sauce and half a pudding. No dif swallowing soft foods. Meds crushed and given in apple sauce. Spoon fed water. Oral cares done prior. Pt wasn't able to drink from straw. Buttocks red open area covered with mepix. Barrier applied. Lose mucous stool. No uo. Bs 210 ns bolus infusing. MD would like staff to reassess uo after bolus.

## 2021-07-25 NOTE — PLAN OF CARE
VSS. On room air. LS clear. Patient not following commands, history of dementia. Opening eyes spontaneously. PEERLA. On contact precautions for VRE. Mepilex applied to sacrum, redness blanchable. On ceftriaxone for abnormal UA. Left leg wrapped with ace bandage and splint. Previous fracture in right tibia.  Writer unsure as to why splint is in place, did not unwrap. Repositioning patient throughout shift. On IV fluids. Continue to monitor,

## 2021-07-26 ENCOUNTER — DOCUMENTATION ONLY (OUTPATIENT)
Dept: OTHER | Facility: CLINIC | Age: 86
End: 2021-07-26

## 2021-07-26 LAB
ATRIAL RATE - MUSE: 106 BPM
DIASTOLIC BLOOD PRESSURE - MUSE: NORMAL MMHG
INTERPRETATION ECG - MUSE: NORMAL
P AXIS - MUSE: 41 DEGREES
PR INTERVAL - MUSE: 134 MS
QRS DURATION - MUSE: 84 MS
QT - MUSE: 352 MS
QTC - MUSE: 467 MS
R AXIS - MUSE: -35 DEGREES
SYSTOLIC BLOOD PRESSURE - MUSE: NORMAL MMHG
T AXIS - MUSE: 66 DEGREES
VENTRICULAR RATE- MUSE: 106 BPM

## 2021-07-26 PROCEDURE — G0463 HOSPITAL OUTPT CLINIC VISIT: HCPCS

## 2021-07-26 PROCEDURE — 99233 SBSQ HOSP IP/OBS HIGH 50: CPT | Performed by: INTERNAL MEDICINE

## 2021-07-26 PROCEDURE — 258N000003 HC RX IP 258 OP 636: Performed by: INTERNAL MEDICINE

## 2021-07-26 PROCEDURE — 250N000011 HC RX IP 250 OP 636: Performed by: INTERNAL MEDICINE

## 2021-07-26 PROCEDURE — 120N000001 HC R&B MED SURG/OB

## 2021-07-26 PROCEDURE — 250N000013 HC RX MED GY IP 250 OP 250 PS 637: Performed by: INTERNAL MEDICINE

## 2021-07-26 RX ORDER — CIPROFLOXACIN 2 MG/ML
400 INJECTION, SOLUTION INTRAVENOUS EVERY 24 HOURS
Status: DISCONTINUED | OUTPATIENT
Start: 2021-07-26 | End: 2021-07-26

## 2021-07-26 RX ADMIN — GABAPENTIN 100 MG: 100 CAPSULE ORAL at 09:32

## 2021-07-26 RX ADMIN — CEFTRIAXONE 1 G: 1 INJECTION, POWDER, FOR SOLUTION INTRAMUSCULAR; INTRAVENOUS at 09:30

## 2021-07-26 RX ADMIN — LISINOPRIL 20 MG: 20 TABLET ORAL at 09:32

## 2021-07-26 RX ADMIN — LINEZOLID 600 MG: 600 TABLET, FILM COATED ORAL at 21:23

## 2021-07-26 RX ADMIN — ASPIRIN 81 MG CHEWABLE TABLET 81 MG: 81 TABLET CHEWABLE at 09:32

## 2021-07-26 RX ADMIN — SODIUM CHLORIDE, POTASSIUM CHLORIDE, SODIUM LACTATE AND CALCIUM CHLORIDE: 600; 310; 30; 20 INJECTION, SOLUTION INTRAVENOUS at 09:46

## 2021-07-26 RX ADMIN — GABAPENTIN 100 MG: 100 CAPSULE ORAL at 21:23

## 2021-07-26 RX ADMIN — METOPROLOL TARTRATE 12.5 MG: 25 TABLET, FILM COATED ORAL at 09:32

## 2021-07-26 RX ADMIN — LINEZOLID 600 MG: 600 TABLET, FILM COATED ORAL at 09:31

## 2021-07-26 RX ADMIN — GABAPENTIN 100 MG: 100 CAPSULE ORAL at 16:55

## 2021-07-26 RX ADMIN — CLOPIDOGREL BISULFATE 75 MG: 75 TABLET ORAL at 09:32

## 2021-07-26 RX ADMIN — METOPROLOL TARTRATE 12.5 MG: 25 TABLET, FILM COATED ORAL at 21:23

## 2021-07-26 RX ADMIN — ATORVASTATIN CALCIUM 20 MG: 20 TABLET, FILM COATED ORAL at 09:31

## 2021-07-26 RX ADMIN — LISINOPRIL 20 MG: 20 TABLET ORAL at 21:23

## 2021-07-26 RX ADMIN — PANTOPRAZOLE SODIUM 40 MG: 40 TABLET, DELAYED RELEASE ORAL at 09:32

## 2021-07-26 ASSESSMENT — ACTIVITIES OF DAILY LIVING (ADL)
ADLS_ACUITY_SCORE: 33

## 2021-07-26 NOTE — PLAN OF CARE
Admitting Diagnosis:encephalopathy/UTI  Pertinent History: HTN, chronic anemia, advanced dementia, UTI, h/o VRE , Right tibia fx. For vitals and assessment please see flow sheet.   Living Situation: memory care.   Pain plan: JUAN DAVID  Mobility: assistance, 2 people/lift  Baseline activity: As 2/ lift  Alarms/Safety: BA  LDA's: 2 PIV.   Pertinent test results:CR: 1.09, K: 3.9, PLT: 2.76, hgb: 7.3  Consults:WOC following  Abnormals/Pending: none  Other Cares/Comments: Alert, VSS, LS clear, 02 93% RA. IVF 75 ml/hr infusing.  Wound dressing changed.   Discharge Disposition: TBD  Discharge Time:TBD.

## 2021-07-26 NOTE — PROVIDER NOTIFICATION
Pt  after getting IVF Bolus.  Bladder scan  volume 212,  voided 100 ml.  there is no clear parameter to strait cath.  Please advise?  Thank you.  MD placed a parameter rder.

## 2021-07-26 NOTE — DISCHARGE INSTRUCTIONS
Sacral wound: Every 3 days   1. Cleanse with wound cleanser and dry  2. Apply Mepilex sacral  3. Sidelying in bed, reposition Q2hrs in bed and Q1hr in chair     Right posterior knee wound: Every 3 days   1. Cleanse with wound cleanser and dry  2. Apply Mepilex 4x4

## 2021-07-26 NOTE — PROGRESS NOTES
[facility]  Hospitalist Progress Note  Patient Name: Tracey Moran    MRN: [id number]  Provider: Andrea Shore MD  07/26/21             Assessment and Plan:      Summary of Stay: Tracey Moran is a 89 year old female with history of hypertension, chronic anemia, advanced dementia, COVID-19 in November 2020, inferior ST elevation MI in January 2021 treated conservatively without PCI, admission in February for facial droop and UTI, VRE UTI, and recent admission in June for right tibia fracture.  She presented to the [facility] ED on 7/24/21 for evaluation of fever and altered mental status.  She has a history of advanced dementia but she can interact and usually is alert.  Per patient's son, her mental status had gradually declined over the few days prior to presentation.  She had become less and less responsive and more confused at the memory care unit so was brought to the ED.  In the ED she had a fever (102.8), leukocytosis (14.5), and abnormal urinalysis (> 182 WBC, large leukocyte esterase, positive nitrite, many bacteria, and WBC clumps).  CT head and chest x-ray were unremarkable.  June was admitted to the hospitalist service with sepsis and septic encephalopathy due to  urinary tract infection. She was treated with Rocephin and linezolid. UC grew e coli and enterococcus faecalis.      Problem List:     Sepsis due to UTI with e coli and enterococcus  Septic encephalopathy  History of VRE  -E. coli is susceptible to ceftriaxone.  Enterococcus susceptibilities are pending  -Continue ceftriaxone and linezolid.  -Continue gentle IV hydration.  -Supportive cares for encephalopathy- still pretty somnolent and not very interactive     Coronary artery disease  ST elevation MI in January  -Continue PTA aspirin, Plavix, lisinopril, statin, and metoprolol.  -Mild troponin elevation was likely due to demand ischemia.     Hypertension  -Continue PTA lisinopril and metoprolol     Advanced dementia  -Increases risk  of encephalopathy     Chronic anemia  -Hemoglobin close to baseline    Care planning  -I spoke with patient's son Nick who seems to be the most involved of Sharee's three children.  Sharee has had a rough 8 months with COVID in 11/20, NSTEMI in 1/21, VRE UTI in 2/21, and tibia fracture in 6/21. Nick feels as though she is declining overall. We discussed that Sharee has been slow to recover but does have potentially recoverable illness. We agreed to continue current cares. If no improvement in a few days could discuss temporary feeding tube but Nick is not sure they would want to go that direction. He would not want a permanent feeding tube. If it becomes clear that Sharee is not improving Nick would be open to considering hospice.      Covid recovered  Covid vaccinated    Covid testing was negative         DVT Prophylaxis: Pneumatic Compression Devices  Code Status: DNR / DNI  Disposition:  Continue inpatient cares        Interval History:      No new problems. Still quite sleepy and not answering questions.                   Physical Exam:      Last Vital Signs:  BP (!) 146/64 (BP Location: Left arm)   Pulse 68   Temp 98.3  F (36.8  C) (Axillary)   Resp 24   Wt 52.6 kg (116 lb)   SpO2 96%   BMI 21.22 kg/m      Intake/Output Summary (Last 24 hours) at 7/26/2021 1606  Last data filed at 7/26/2021 1400  Gross per 24 hour   Intake 1650.25 ml   Output 450 ml   Net 1200.25 ml       GENERAL:  Comfortable. Cooperative.  PSYCH: No acute distress.  EYES: PERRLA, Normal conjunctiva.  HEART:  Regular rate and rhythm. No JVD. Pulses normal. No edema.  LUNGS:  Clear to auscultation, normal Respiratory effort.  ABDOMEN:  Soft, no hepatosplenomegaly, normal bowel sounds.  EXTREMETIES: No clubbing, cyanosis or ischemia  SKIN:  Dry to touch, No rash.           Medications:      All current medications were reviewed.         Data:      All new lab and imaging data was reviewed.   Labs:  No results for input(s): CULT in the last 168  hours.       Lab Results   Component Value Date     07/25/2021     07/24/2021     07/19/2021     06/30/2021     06/03/2021     06/02/2021    Lab Results   Component Value Date    CHLORIDE 114 07/25/2021    CHLORIDE 113 07/24/2021    CHLORIDE 107 07/19/2021    CHLORIDE 109 06/30/2021    CHLORIDE 110 06/03/2021    CHLORIDE 110 06/02/2021    Lab Results   Component Value Date    BUN 35 07/25/2021    BUN 35 07/24/2021    BUN 25 07/19/2021    BUN 64 06/30/2021    BUN 31 06/03/2021    BUN 33 06/02/2021      Lab Results   Component Value Date    POTASSIUM 3.9 07/25/2021    POTASSIUM 5.0 07/24/2021    POTASSIUM 4.0 07/19/2021    POTASSIUM 5.4 06/30/2021    POTASSIUM 4.3 06/03/2021    POTASSIUM 4.3 06/02/2021    Lab Results   Component Value Date    CO2 27 07/25/2021    CO2 28 07/24/2021    CO2 22 07/19/2021    CO2 26 06/30/2021    CO2 24 06/03/2021    CO2 26 06/02/2021    Lab Results   Component Value Date    CR 0.97 07/25/2021    CR 1.09 07/24/2021    CR 0.79 07/19/2021    CR 1.79 06/30/2021    CR 0.94 06/03/2021    CR 1.12 06/02/2021        Recent Labs   Lab 07/25/21  0641 07/24/21  1306 07/21/21  0952   WBC 14.8* 14.5* 9.5   HGB 7.3* 9.2* 8.8*   HCT 24.0* 30.3* 28.4*   * 105* 102*    353 309

## 2021-07-26 NOTE — PLAN OF CARE
VSS, elevated BP. JUAN DAVID orientation. PERRLA. Patient nonverbal, groans occasionally when being turned. Turned and repositioned Q2h. LS clear. Reddened coccyx and perineum, barrier cream and mepilex in place. Incontinent of stool and urine. Purewick in place, 100mL out. Brief soaked with every turn, changed. Bladder scanned for 148, 238, and 147. LR infusing at 75mL. Discharge TBD.

## 2021-07-26 NOTE — PLAN OF CARE
Pt nonverbal, doron orientation. Turned and checked every 2 hours. Purewick in place. Bladder scan 140s, 188, . Mepilex in place on bottom, behind R knee. Splint in place on L leg. LR @ 75. IV ceftrioxone. Meds crushed in apple sauce. Spoke with Renee Miller for update.

## 2021-07-27 LAB
ABO/RH(D): NORMAL
ANION GAP SERPL CALCULATED.3IONS-SCNC: 2 MMOL/L (ref 3–14)
ANTIBODY SCREEN: NEGATIVE
BACTERIA UR CULT: ABNORMAL
BACTERIA UR CULT: ABNORMAL
BUN SERPL-MCNC: 18 MG/DL (ref 7–30)
CALCIUM SERPL-MCNC: 8.2 MG/DL (ref 8.5–10.1)
CHLORIDE BLD-SCNC: 114 MMOL/L (ref 94–109)
CO2 SERPL-SCNC: 26 MMOL/L (ref 20–32)
CREAT SERPL-MCNC: 0.69 MG/DL (ref 0.52–1.04)
ERYTHROCYTE [DISTWIDTH] IN BLOOD BY AUTOMATED COUNT: 12.9 % (ref 10–15)
GFR SERPL CREATININE-BSD FRML MDRD: 74 ML/MIN/1.73M2
GLUCOSE BLD-MCNC: 80 MG/DL (ref 70–99)
HCT VFR BLD AUTO: 27 % (ref 35–47)
HGB BLD-MCNC: 8.1 G/DL (ref 11.7–15.7)
MCH RBC QN AUTO: 31.6 PG (ref 26.5–33)
MCHC RBC AUTO-ENTMCNC: 30 G/DL (ref 31.5–36.5)
MCV RBC AUTO: 106 FL (ref 78–100)
PLATELET # BLD AUTO: 325 10E3/UL (ref 150–450)
POTASSIUM BLD-SCNC: 3.5 MMOL/L (ref 3.4–5.3)
RBC # BLD AUTO: 2.56 10E6/UL (ref 3.8–5.2)
SODIUM SERPL-SCNC: 142 MMOL/L (ref 133–144)
SPECIMEN EXPIRATION DATE: NORMAL
WBC # BLD AUTO: 9.3 10E3/UL (ref 4–11)

## 2021-07-27 PROCEDURE — 250N000013 HC RX MED GY IP 250 OP 250 PS 637: Performed by: INTERNAL MEDICINE

## 2021-07-27 PROCEDURE — 120N000001 HC R&B MED SURG/OB

## 2021-07-27 PROCEDURE — 86900 BLOOD TYPING SEROLOGIC ABO: CPT | Performed by: INTERNAL MEDICINE

## 2021-07-27 PROCEDURE — 36415 COLL VENOUS BLD VENIPUNCTURE: CPT | Performed by: INTERNAL MEDICINE

## 2021-07-27 PROCEDURE — 250N000011 HC RX IP 250 OP 636: Performed by: INTERNAL MEDICINE

## 2021-07-27 PROCEDURE — 99233 SBSQ HOSP IP/OBS HIGH 50: CPT | Performed by: INTERNAL MEDICINE

## 2021-07-27 PROCEDURE — 85027 COMPLETE CBC AUTOMATED: CPT | Performed by: INTERNAL MEDICINE

## 2021-07-27 PROCEDURE — 80048 BASIC METABOLIC PNL TOTAL CA: CPT | Performed by: INTERNAL MEDICINE

## 2021-07-27 PROCEDURE — 258N000003 HC RX IP 258 OP 636: Performed by: INTERNAL MEDICINE

## 2021-07-27 RX ADMIN — ATORVASTATIN CALCIUM 20 MG: 20 TABLET, FILM COATED ORAL at 08:13

## 2021-07-27 RX ADMIN — GABAPENTIN 100 MG: 100 CAPSULE ORAL at 08:13

## 2021-07-27 RX ADMIN — METOPROLOL TARTRATE 12.5 MG: 25 TABLET, FILM COATED ORAL at 20:25

## 2021-07-27 RX ADMIN — LISINOPRIL 20 MG: 20 TABLET ORAL at 08:13

## 2021-07-27 RX ADMIN — LISINOPRIL 20 MG: 20 TABLET ORAL at 20:25

## 2021-07-27 RX ADMIN — METOPROLOL TARTRATE 12.5 MG: 25 TABLET, FILM COATED ORAL at 08:13

## 2021-07-27 RX ADMIN — GABAPENTIN 100 MG: 100 CAPSULE ORAL at 16:43

## 2021-07-27 RX ADMIN — SODIUM CHLORIDE, POTASSIUM CHLORIDE, SODIUM LACTATE AND CALCIUM CHLORIDE: 600; 310; 30; 20 INJECTION, SOLUTION INTRAVENOUS at 17:45

## 2021-07-27 RX ADMIN — LINEZOLID 600 MG: 600 TABLET, FILM COATED ORAL at 20:25

## 2021-07-27 RX ADMIN — LINEZOLID 600 MG: 600 TABLET, FILM COATED ORAL at 08:13

## 2021-07-27 RX ADMIN — SODIUM CHLORIDE, POTASSIUM CHLORIDE, SODIUM LACTATE AND CALCIUM CHLORIDE: 600; 310; 30; 20 INJECTION, SOLUTION INTRAVENOUS at 07:00

## 2021-07-27 RX ADMIN — PANTOPRAZOLE SODIUM 40 MG: 40 TABLET, DELAYED RELEASE ORAL at 08:13

## 2021-07-27 RX ADMIN — GABAPENTIN 100 MG: 100 CAPSULE ORAL at 21:49

## 2021-07-27 RX ADMIN — CLOPIDOGREL BISULFATE 75 MG: 75 TABLET ORAL at 08:13

## 2021-07-27 RX ADMIN — ASPIRIN 81 MG CHEWABLE TABLET 81 MG: 81 TABLET CHEWABLE at 08:12

## 2021-07-27 RX ADMIN — CEFTRIAXONE 1 G: 1 INJECTION, POWDER, FOR SOLUTION INTRAMUSCULAR; INTRAVENOUS at 08:16

## 2021-07-27 ASSESSMENT — ACTIVITIES OF DAILY LIVING (ADL)
ADLS_ACUITY_SCORE: 33
ADLS_ACUITY_SCORE: 33
ADLS_ACUITY_SCORE: 35
ADLS_ACUITY_SCORE: 35
ADLS_ACUITY_SCORE: 33
ADLS_ACUITY_SCORE: 35

## 2021-07-27 NOTE — PROGRESS NOTES
[facility]  Hospitalist Progress Note  Patient Name: Tracey Moran    MRN: [id number]  Provider: Andrea Shore MD  07/27/21             Assessment and Plan:      Summary of Stay: Tracey Moran is a 89 year old female with history of hypertension, chronic anemia, advanced dementia, COVID-19 in November 2020, inferior ST elevation MI in January 2021 treated conservatively without PCI, admission in February for facial droop and UTI, VRE UTI, and recent admission in June for right tibia fracture.  She presented to the [facility] ED on 7/24/21 for evaluation of fever and altered mental status.  She has a history of advanced dementia but she can interact and usually is alert.  Per patient's son, her mental status had gradually declined over the few days prior to presentation.  She had become less and less responsive and more confused at the memory care unit so was brought to the ED.  In the ED she had a fever (102.8), leukocytosis (14.5), and abnormal urinalysis (> 182 WBC, large leukocyte esterase, positive nitrite, many bacteria, and WBC clumps).  CT head and chest x-ray were unremarkable.  Sharee was admitted to the hospitalist service with sepsis and septic encephalopathy due to  urinary tract infection. She was treated with Rocephin and linezolid. UC grew e coli and enterococcus faecalis (not VRE).      Problem List:     Sepsis due to UTI with e coli and enterococcus  Septic encephalopathy  History of VRE  -E. coli is susceptible to ceftriaxone.  Enterococcus is pan-sensitive.  -Sharee has PCN allergy   -Continue ceftriaxone for e coli.  -Continue linezolid for enerococcus (PCN allergy, too old for nitrofurantoin, seems unnecessary to change to vancomycin). Stop linezolid after 5 days (10 doses)- end date entered in order.  -Continue gentle IV hydration until eating better.  -Supportive cares for encephalopathy- a little more alert today but not making sense yet.      Coronary artery disease  ST elevation MI  in January  -Continue PTA aspirin, Plavix, lisinopril, statin, and metoprolol.  -Mild troponin elevation was likely due to demand ischemia.     Hypertension  -Continue PTA lisinopril and metoprolol     Advanced dementia  -Increases risk of encephalopathy     Chronic anemia  -Hemoglobin close to baseline     Care planning  -I spoke with patient's son Nick who seems to be the most involved of Sharee's three children.  Sharee has had a rough 8 months with COVID in 11/20, NSTEMI in 1/21, VRE UTI in 2/21, and tibia fracture in 6/21. Nick feels as though she is declining overall. We discussed that Sharee has been slow to recover but does have potentially recoverable illness. We agreed to continue current cares. If no improvement in a few days could discuss temporary feeding tube but Nick is not sure they would want to go that direction. He would not want a permanent feeding tube. If it becomes clear that Sharee is not improving Nick would be open to considering hospice.      Covid recovered  Covid vaccinated    Covid testing was negative         DVT Prophylaxis: Pneumatic Compression Devices  Code Status: DNR / DNI  Disposition:  Continue inpatient cares- likely 2-3 more days until mental status improves and then back to facility        Interval History:      More alert and responsive but not able to answer questions.                   Physical Exam:      Last Vital Signs:  BP (!) 168/82 (BP Location: Left arm)   Pulse 67   Temp 97.5  F (36.4  C) (Axillary)   Resp 18   Wt 52.6 kg (116 lb)   SpO2 96%   BMI 21.22 kg/m      Intake/Output Summary (Last 24 hours) at 7/27/2021 1620  Last data filed at 7/27/2021 1400  Gross per 24 hour   Intake 200 ml   Output 710 ml   Net -510 ml       GENERAL:  Comfortable. Cries out when examined. No obvious focal point of pain.   PSYCH:  No acute distress.  EYES: PERRLA, Normal conjunctiva.  HEART:  Regular rate and rhythm. No JVD. Pulses normal. No edema.  LUNGS:  Clear to auscultation,  normal Respiratory effort.  ABDOMEN:  Soft, no hepatosplenomegaly, normal bowel sounds.  EXTREMETIES: No clubbing, cyanosis or ischemia  SKIN:  Dry to touch, No rash.           Medications:      All current medications were reviewed.         Data:      All new lab and imaging data was reviewed.   Labs:  No results for input(s): CULT in the last 168 hours.       Lab Results   Component Value Date     07/27/2021     07/25/2021     07/24/2021     06/30/2021     06/03/2021     06/02/2021    Lab Results   Component Value Date    CHLORIDE 114 07/27/2021    CHLORIDE 114 07/25/2021    CHLORIDE 113 07/24/2021    CHLORIDE 109 06/30/2021    CHLORIDE 110 06/03/2021    CHLORIDE 110 06/02/2021    Lab Results   Component Value Date    BUN 18 07/27/2021    BUN 35 07/25/2021    BUN 35 07/24/2021    BUN 64 06/30/2021    BUN 31 06/03/2021    BUN 33 06/02/2021      Lab Results   Component Value Date    POTASSIUM 3.5 07/27/2021    POTASSIUM 3.9 07/25/2021    POTASSIUM 5.0 07/24/2021    POTASSIUM 5.4 06/30/2021    POTASSIUM 4.3 06/03/2021    POTASSIUM 4.3 06/02/2021    Lab Results   Component Value Date    CO2 26 07/27/2021    CO2 27 07/25/2021    CO2 28 07/24/2021    CO2 26 06/30/2021    CO2 24 06/03/2021    CO2 26 06/02/2021    Lab Results   Component Value Date    CR 0.69 07/27/2021    CR 0.97 07/25/2021    CR 1.09 07/24/2021    CR 1.79 06/30/2021    CR 0.94 06/03/2021    CR 1.12 06/02/2021        Recent Labs   Lab 07/27/21  0656 07/25/21  0641 07/24/21  1306   WBC 9.3 14.8* 14.5*   HGB 8.1* 7.3* 9.2*   HCT 27.0* 24.0* 30.3*   * 105* 105*    276 353

## 2021-07-27 NOTE — PLAN OF CARE
Tracey Moran is a 89 year old female patient.     Isolation -contact for VRE in urine.     Active Problems:    UTI (urinary tract infection)    Renal insufficiency    Elevated troponin    Infectious encephalopathy    Blood pressure (!) 168/82, pulse 67, temperature 97.5  F (36.4  C), temperature source Axillary, resp. rate 18, weight 52.6 kg (116 lb), SpO2 96 %, not currently breastfeeding.    Subjective: patient was intermittently alert and awake during shift, able to follow some commands.   Objective: IV antibiotics given in AM. Patient intake of 25-50% for breakfast and lunch. Incontinent stool x1. Coccyx dressing replaced during skin assessment. Purewick in place; bladder scan performed at 1500 (175mL). LR infusing at 125mL/hr.   Assessment & Plan: monitor patient status and continue IV antibiotics.     CORETTA Snyder   7/27/2021

## 2021-07-27 NOTE — PLAN OF CARE
For VS and complete assessments, please see documentation flowsheets.     Pertinent shift assessments: VSS. Patient is nonverbal, unable to assess orientation. No signs of pain based on PAINAD scale. LS clear. LR infusing at 125mL/hr. Using A2 to complete Q2hr turns. Purewick in place, urine is jeanne in color. Incontinent BM this shift. Mepilex on sacrum and under Right knee, both clean dry and intact. WOC consulted.      Treatment Plan: Continue to provide supportive cares, possible hospice consult based on family preferences and if no improvement noted.      Expected Discharge Date/Disposition: TBD

## 2021-07-27 NOTE — PLAN OF CARE
Non-verbal. Turn and repositioned every 2 hours. Bladder scan with each repositioning and straight cath if greater than 200. Bladder scan was 196 and 164. Out put from purwick 290 ml jeanne color. Maplilex applied to coccyx wound with celestine cream to vladimir area. Total feeder. BP sightly elevated: 159/59. Continue monitoring.

## 2021-07-28 LAB
ANION GAP SERPL CALCULATED.3IONS-SCNC: 6 MMOL/L (ref 3–14)
BUN SERPL-MCNC: 11 MG/DL (ref 7–30)
CALCIUM SERPL-MCNC: 7.9 MG/DL (ref 8.5–10.1)
CHLORIDE BLD-SCNC: 110 MMOL/L (ref 94–109)
CO2 SERPL-SCNC: 24 MMOL/L (ref 20–32)
CREAT SERPL-MCNC: 0.63 MG/DL (ref 0.52–1.04)
ERYTHROCYTE [DISTWIDTH] IN BLOOD BY AUTOMATED COUNT: 13.1 % (ref 10–15)
GFR SERPL CREATININE-BSD FRML MDRD: 76 ML/MIN/1.73M2
GLUCOSE BLD-MCNC: 126 MG/DL (ref 70–99)
HCT VFR BLD AUTO: 24 % (ref 35–47)
HGB BLD-MCNC: 7.6 G/DL (ref 11.7–15.7)
MCH RBC QN AUTO: 32.2 PG (ref 26.5–33)
MCHC RBC AUTO-ENTMCNC: 31.7 G/DL (ref 31.5–36.5)
MCV RBC AUTO: 102 FL (ref 78–100)
PLATELET # BLD AUTO: 324 10E3/UL (ref 150–450)
POTASSIUM BLD-SCNC: 3.4 MMOL/L (ref 3.4–5.3)
RBC # BLD AUTO: 2.36 10E6/UL (ref 3.8–5.2)
SODIUM SERPL-SCNC: 140 MMOL/L (ref 133–144)
WBC # BLD AUTO: 8.6 10E3/UL (ref 4–11)

## 2021-07-28 PROCEDURE — G0463 HOSPITAL OUTPT CLINIC VISIT: HCPCS

## 2021-07-28 PROCEDURE — 250N000013 HC RX MED GY IP 250 OP 250 PS 637: Performed by: INTERNAL MEDICINE

## 2021-07-28 PROCEDURE — 85027 COMPLETE CBC AUTOMATED: CPT | Performed by: HOSPITALIST

## 2021-07-28 PROCEDURE — 250N000011 HC RX IP 250 OP 636: Performed by: INTERNAL MEDICINE

## 2021-07-28 PROCEDURE — 120N000001 HC R&B MED SURG/OB

## 2021-07-28 PROCEDURE — 258N000003 HC RX IP 258 OP 636: Performed by: INTERNAL MEDICINE

## 2021-07-28 PROCEDURE — 80048 BASIC METABOLIC PNL TOTAL CA: CPT | Performed by: HOSPITALIST

## 2021-07-28 PROCEDURE — 36415 COLL VENOUS BLD VENIPUNCTURE: CPT | Performed by: HOSPITALIST

## 2021-07-28 PROCEDURE — 99233 SBSQ HOSP IP/OBS HIGH 50: CPT | Performed by: HOSPITALIST

## 2021-07-28 RX ADMIN — CLOPIDOGREL BISULFATE 75 MG: 75 TABLET ORAL at 10:19

## 2021-07-28 RX ADMIN — SODIUM CHLORIDE, POTASSIUM CHLORIDE, SODIUM LACTATE AND CALCIUM CHLORIDE: 600; 310; 30; 20 INJECTION, SOLUTION INTRAVENOUS at 22:57

## 2021-07-28 RX ADMIN — LINEZOLID 600 MG: 600 TABLET, FILM COATED ORAL at 20:56

## 2021-07-28 RX ADMIN — CEFTRIAXONE 1 G: 1 INJECTION, POWDER, FOR SOLUTION INTRAMUSCULAR; INTRAVENOUS at 10:51

## 2021-07-28 RX ADMIN — ASPIRIN 81 MG CHEWABLE TABLET 81 MG: 81 TABLET CHEWABLE at 10:19

## 2021-07-28 RX ADMIN — SODIUM CHLORIDE, POTASSIUM CHLORIDE, SODIUM LACTATE AND CALCIUM CHLORIDE: 600; 310; 30; 20 INJECTION, SOLUTION INTRAVENOUS at 05:03

## 2021-07-28 RX ADMIN — SODIUM CHLORIDE, POTASSIUM CHLORIDE, SODIUM LACTATE AND CALCIUM CHLORIDE: 600; 310; 30; 20 INJECTION, SOLUTION INTRAVENOUS at 14:28

## 2021-07-28 RX ADMIN — PANTOPRAZOLE SODIUM 40 MG: 40 TABLET, DELAYED RELEASE ORAL at 10:18

## 2021-07-28 RX ADMIN — ATORVASTATIN CALCIUM 20 MG: 20 TABLET, FILM COATED ORAL at 10:18

## 2021-07-28 RX ADMIN — LISINOPRIL 20 MG: 20 TABLET ORAL at 20:56

## 2021-07-28 RX ADMIN — GABAPENTIN 100 MG: 100 CAPSULE ORAL at 10:19

## 2021-07-28 RX ADMIN — LINEZOLID 600 MG: 600 TABLET, FILM COATED ORAL at 10:19

## 2021-07-28 RX ADMIN — METOPROLOL TARTRATE 12.5 MG: 25 TABLET, FILM COATED ORAL at 10:19

## 2021-07-28 RX ADMIN — GABAPENTIN 100 MG: 100 CAPSULE ORAL at 17:32

## 2021-07-28 RX ADMIN — LISINOPRIL 20 MG: 20 TABLET ORAL at 10:19

## 2021-07-28 RX ADMIN — METOPROLOL TARTRATE 12.5 MG: 25 TABLET, FILM COATED ORAL at 21:07

## 2021-07-28 RX ADMIN — GABAPENTIN 100 MG: 100 CAPSULE ORAL at 21:37

## 2021-07-28 ASSESSMENT — ACTIVITIES OF DAILY LIVING (ADL)
ADLS_ACUITY_SCORE: 35
ADLS_ACUITY_SCORE: 35
ADLS_ACUITY_SCORE: 31
ADLS_ACUITY_SCORE: 35

## 2021-07-28 NOTE — PLAN OF CARE
VSS on RA. Pt nonverbal, unable to make needs known. Incontinent of bowel and bladder, purewick in place, adequate urine output. WOC consult for pre-existing pressure injuries, saw pt on shift. Appetite improving. LR infusing 125 mL/hr. Discharge timeline TBD. Will continue to monitor.

## 2021-07-28 NOTE — PROGRESS NOTES
[facility] Nurse Inpatient Pressure Injury Assessment   Reason for consultation: Evaluate and treat Sacrum, right posterior knee      ASSESSMENT  Asked to assess new area on right medial malleolus.  Did not re-assess rest of areas, see previous assessment listed below for these.      Right medial malleolus with barely blanchable erythema, not currently open.      Pressure Injury: on Sacrum , present on admission   Pressure Injury is Stage 2   Contributing factor of the pressure injury: pressure, shear, age and immobility  Status: initial assessment  Recommend provider order: None, at this time     Right posterior knee due to MASD  Status: initial assessment     TREATMENT PLAN  Right medial ankle: Every 3 days  1. Cover with Mepilex 4x4  2. Prevalon offloading boots on at all times when not assessing skin    Sacral wound: Every 3 days   1. Cleanse with wound cleanser and dry  2. Apply Mepilex sacral  3. Sidelying in bed, reposition Q2hrs in bed and Q1hr in chair     Right posterior knee wound: Every 3 days   1. Cleanse with wound cleanser and dry  2. Apply Mepilex 4x4  Orders Written  WO Nurse follow-up plan:weekly  Nursing to notify the Provider(s) and re-consult the WO Nurse if wound(s) deteriorates or new skin concern.    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  She presented to the [facility] ED on 7/24/21 for evaluation of fever and altered mental status.    Objective Data  Containment of urine/stool: External catheter    Current Diet/ Nutrition:  Orders Placed This Encounter      Combination Diet Regular Diet Adult; Mechanical/Dental Soft Diet      Output:   I/O last 3 completed shifts:  In: 1895  [P.O.:520; I.V.:1375]  Out: 650 [Urine:650]    Risk Assessment:   Sensory Perception: 3-->slightly limited  Moisture: 3-->occasionally moist  Activity: 1-->bedfast  Mobility: 2-->very limited  Nutrition: 1-->very poor  Friction and Shear: 1-->problem  Thad Score: 11      Labs:   Recent Labs   Lab 07/28/21  1300 07/24/21  1306   ALBUMIN  --  2.3*   HGB 7.6* 9.2*   WBC 8.6 14.5*       Physical Exam  Skin inspection: focused sacrum, right posterior knee    Wound Location:  Sacrum  Date of last Photo none  Wound History: Pressure injury  Measurements (length x width x depth, in cm) 0.5 cm x 0.7 cm   Wound Base:  100 % agranular  Tunneling N/A  Undermining N/A  Palpation of the wound bed: normal   Periwound skin: intact  Color: normal and consistent with surrounding tissue  Temperature: normal   Drainage:, small  Description of drainage: serosanguinous  Odor: none  Pain: no grimacing or signs of discomfort    Wound Location:  Right posterior knee  Date of last Photo none  Wound History: Moisture skin damage  Measurements (length x width x depth, in cm) 1 cm x 1.5 cm   Wound Base:  100 % fibrin  Tunneling N/A  Undermining N/A  Palpation of the wound bed: normal   Periwound skin: intact  Color: normal and consistent with surrounding tissue  Temperature: normal   Drainage:, small  Description of drainage: serosanguinous  Odor: none    Interventions  Current support surface: Standard  Atmos Air mattress-start pulsate  Current off-loading measures: Pillows under calves and Pillows  Repositioning aid: Pillows  Visual inspection of wound(s) completed   Wound Care: was done per plan of care.  Supplies: at bedside  Educated provided: importance of repositioning, plan of care and Off-loading pressure  Education provided to: patient  and nurse  Discussed importance of:repositioning every 2 hours, off-loading pressure to wound and moisture management  Discussed plan of care with Patient and Nurse    Christopher Carlson RN CWOCN

## 2021-07-28 NOTE — PLAN OF CARE
Pt nonverbal at baseline, JUAN DAVID orientation. Mumbled, garbled noises at times, VSS ex elevated BP. PAINAD 0. Incontinent of bowel and bladder, purwick in place. Small loose/mucous BM. New PIV placed, previous PIV on R hand infiltrated, leaving trace edema to arm/hand (has improved with elevation). Assist of two lift, provided frequent turn/repositioning in bed throughout the night. Offloading boots in place. Alarms on for safety. Diet: Regular, soft foods. Redness to buttocks and behind L knee. WOC, ID following.

## 2021-07-28 NOTE — PROGRESS NOTES
[facility]    Hospitalist Progress Note      Assessment & Plan   Tracey Moran is a 89 year old female with history of hypertension, chronic anemia, advanced dementia, COVID-19 in November 2020, inferior ST elevation MI in January 2021 treated conservatively without PCI, admission in February for facial droop and UTI, VRE UTI, and recent admission in June for right tibia fracture who presents for fever and altered mental status.    #Sepsis due to UTI with e coli and enteroccocus, septic encephalopathy, history of VRE: Patient has a history of advanced dementia but usually interacts and is alert.  Son had noted that her mental status declined in the days prior to presentation.  She became less responsive and more confused.  In the ED she had a fever, leukocytosis and abnormal UA.  CT head negative.  Chest x-ray without infiltrate.  -Urine culture now showing E. coli that is susceptible to ceftriaxone and Enterococcus that is pansensitive.  She has been on ceftriaxone and linezolid which we will continue.  -Gentle IV fluids as patient is not eating very much.  -Patient awakens to voice but does not talk or interact very much.  Will discuss with son baseline functional status.  She is in memory care unit.    #Coronary artery disease, ST elevation MI in January  -Continue PTA aspirin, Plavix, lisinopril, statin, and metoprolol.  -Mild troponin elevation was likely due to demand ischemia.     #Hypertension  -Continue PTA lisinopril and metoprolol     #Advanced dementia  -Increases risk of encephalopathy     #Chronic anemia  -Baseline Hgb between 8-10.  Will monitor.  No evidence of bleeding.      #Care planning: My partner, Gurmeet Shore MD spoke with patient's son Nick.  Sharee has had a rough 8 months with COVID in 11/20, NSTEMI in 1/21, VRE UTI in 2/21, and tibia fracture in 6/21. Nick feels as though she is declining overall. We discussed that Sharee has been slow to recover but does have  potentially recoverable illness.  Discussed continuing cares as above but if she is not eating or drinking then may need temporary feeding tube.  Nick is not sure she would want that.    -I I did discuss with Nick again today, 7/28.  He confirms that his mother is at about 50% baseline.  She is more alert than when she initially came in.  She is at least opening her eyes and trying to talk to him he thinks.  He is realistic and understands we will treat for infection as above and see how she progresses.  It sounds like he is in touch with hospice to learn more given her decline over the previous months.    #Stage 2 pressure injury on sacrum, present on admission    #Covid recovered, Covid vaccinated. Covid testing was negative      DVT Prophylaxis: Pneumatic Compression Devices  Code Status: DNR / DNI  Disposition:  Continue inpatient cares.  Likely another few days.     Stef Brandt MD  Text Page    Interval History   No events.  Assumed care.  Patient lethargic.  She opens her eyes to stimulation but not very interactive.  She does not answer questions.  Does not appear in distress.    -Data reviewed today: I reviewed all new labs and imaging results over the last 24 hours.     Physical Exam   Temp: 97.7  F (36.5  C) Temp src: Axillary BP: (!) 182/75 Pulse: 69   Resp: 18 SpO2: 94 % O2 Device: None (Room air)    Vitals:    07/24/21 1257   Weight: 52.6 kg (116 lb)     Vital Signs with Ranges  Temp:  [97  F (36.1  C)-97.7  F (36.5  C)] 97.7  F (36.5  C)  Pulse:  [67-98] 69  Resp:  [16-18] 18  BP: (138-182)/(50-95) 182/75  SpO2:  [94 %-98 %] 94 %  I/O last 3 completed shifts:  In: 1895 [P.O.:520; I.V.:1375]  Out: 650 [Urine:650]    GENERAL:  Comfortable.  She opens her eyes to stimulation but does not interact.  Does not follow commands.  Nonverbal.  PSYCH:  No acute distress.  EYES: PERRLA, Normal conjunctiva.  HEART:  Regular rate and rhythm. No JVD. Pulses normal. No edema.  LUNGS:  Clear to auscultation, normal  Respiratory effort.  ABDOMEN:  Soft, no hepatosplenomegaly, normal bowel sounds.  EXTREMETIES: Right leg with dressings in place. Dry. Warm.   SKIN:  Dry to touch, No rash.    Medications     lactated ringers 125 mL/hr at 07/28/21 0503       aspirin  81 mg Oral Daily     atorvastatin  20 mg Oral Daily     cefTRIAXone  1 g Intravenous Daily     clopidogrel  75 mg Oral Daily     gabapentin  100 mg Oral TID     linezolid  600 mg Oral Q12H LIZBETH     lisinopril  20 mg Oral BID     metoprolol tartrate  12.5 mg Oral BID     pantoprazole  40 mg Oral Daily     sodium chloride (PF)  3 mL Intracatheter Q8H       Data   Recent Labs   Lab 07/27/21  0656 07/25/21  0641 07/24/21  1306   WBC 9.3 14.8* 14.5*   HGB 8.1* 7.3* 9.2*   * 105* 105*    276 353    143 144   POTASSIUM 3.5 3.9 5.0   CHLORIDE 114* 114* 113*   CO2 26 27 28   BUN 18 35* 35*   CR 0.69 0.97 1.09*   ANIONGAP 2* 2* 3   RENETTA 8.2* 8.5 9.0   GLC 80 87 107*   ALBUMIN  --   --  2.3*   PROTTOTAL  --   --  7.1   BILITOTAL  --   --  0.4   ALKPHOS  --   --  77   ALT  --   --  14   AST  --   --  20   TROPONIN  --   --  0.065*       No results found for this or any previous visit (from the past 24 hour(s)).

## 2021-07-29 ENCOUNTER — APPOINTMENT (OUTPATIENT)
Dept: GENERAL RADIOLOGY | Facility: CLINIC | Age: 86
DRG: 871 | End: 2021-07-29
Attending: PHYSICIAN ASSISTANT
Payer: COMMERCIAL

## 2021-07-29 LAB
BACTERIA BLD CULT: NO GROWTH
BACTERIA BLD CULT: NO GROWTH

## 2021-07-29 PROCEDURE — 73590 X-RAY EXAM OF LOWER LEG: CPT | Mod: RT

## 2021-07-29 PROCEDURE — 73610 X-RAY EXAM OF ANKLE: CPT | Mod: LT

## 2021-07-29 PROCEDURE — 250N000013 HC RX MED GY IP 250 OP 250 PS 637: Performed by: INTERNAL MEDICINE

## 2021-07-29 PROCEDURE — 258N000003 HC RX IP 258 OP 636: Performed by: HOSPITALIST

## 2021-07-29 PROCEDURE — 120N000001 HC R&B MED SURG/OB

## 2021-07-29 PROCEDURE — 99232 SBSQ HOSP IP/OBS MODERATE 35: CPT | Performed by: HOSPITALIST

## 2021-07-29 PROCEDURE — 258N000003 HC RX IP 258 OP 636: Performed by: INTERNAL MEDICINE

## 2021-07-29 PROCEDURE — 250N000011 HC RX IP 250 OP 636: Performed by: INTERNAL MEDICINE

## 2021-07-29 RX ORDER — ACETAMINOPHEN 325 MG/1
650 TABLET ORAL EVERY 4 HOURS PRN
Status: DISCONTINUED | OUTPATIENT
Start: 2021-07-29 | End: 2021-07-30

## 2021-07-29 RX ORDER — MULTIPLE VITAMINS W/ MINERALS TAB 9MG-400MCG
1 TAB ORAL DAILY
Status: DISCONTINUED | OUTPATIENT
Start: 2021-07-30 | End: 2021-08-02 | Stop reason: HOSPADM

## 2021-07-29 RX ORDER — ACETAMINOPHEN 650 MG/1
650 SUPPOSITORY RECTAL EVERY 4 HOURS PRN
Status: DISCONTINUED | OUTPATIENT
Start: 2021-07-29 | End: 2021-07-30

## 2021-07-29 RX ADMIN — GABAPENTIN 100 MG: 100 CAPSULE ORAL at 18:33

## 2021-07-29 RX ADMIN — ATORVASTATIN CALCIUM 20 MG: 20 TABLET, FILM COATED ORAL at 10:10

## 2021-07-29 RX ADMIN — METOPROLOL TARTRATE 12.5 MG: 25 TABLET, FILM COATED ORAL at 21:05

## 2021-07-29 RX ADMIN — CLOPIDOGREL BISULFATE 75 MG: 75 TABLET ORAL at 10:10

## 2021-07-29 RX ADMIN — GABAPENTIN 100 MG: 100 CAPSULE ORAL at 10:10

## 2021-07-29 RX ADMIN — CEFTRIAXONE 1 G: 1 INJECTION, POWDER, FOR SOLUTION INTRAMUSCULAR; INTRAVENOUS at 10:11

## 2021-07-29 RX ADMIN — SODIUM CHLORIDE, POTASSIUM CHLORIDE, SODIUM LACTATE AND CALCIUM CHLORIDE: 600; 310; 30; 20 INJECTION, SOLUTION INTRAVENOUS at 18:49

## 2021-07-29 RX ADMIN — LISINOPRIL 20 MG: 20 TABLET ORAL at 21:05

## 2021-07-29 RX ADMIN — LISINOPRIL 20 MG: 20 TABLET ORAL at 10:10

## 2021-07-29 RX ADMIN — SODIUM CHLORIDE, POTASSIUM CHLORIDE, SODIUM LACTATE AND CALCIUM CHLORIDE: 600; 310; 30; 20 INJECTION, SOLUTION INTRAVENOUS at 06:38

## 2021-07-29 RX ADMIN — LINEZOLID 600 MG: 600 TABLET, FILM COATED ORAL at 10:10

## 2021-07-29 RX ADMIN — PANTOPRAZOLE SODIUM 40 MG: 40 TABLET, DELAYED RELEASE ORAL at 10:10

## 2021-07-29 RX ADMIN — METOPROLOL TARTRATE 12.5 MG: 25 TABLET, FILM COATED ORAL at 10:10

## 2021-07-29 RX ADMIN — ACETAMINOPHEN 650 MG: 325 TABLET, FILM COATED ORAL at 21:03

## 2021-07-29 RX ADMIN — GABAPENTIN 100 MG: 100 CAPSULE ORAL at 22:48

## 2021-07-29 RX ADMIN — ASPIRIN 81 MG CHEWABLE TABLET 81 MG: 81 TABLET CHEWABLE at 10:10

## 2021-07-29 ASSESSMENT — ACTIVITIES OF DAILY LIVING (ADL)
ADLS_ACUITY_SCORE: 33
ADLS_ACUITY_SCORE: 31
ADLS_ACUITY_SCORE: 31
ADLS_ACUITY_SCORE: 33
ADLS_ACUITY_SCORE: 35
ADLS_ACUITY_SCORE: 31

## 2021-07-29 NOTE — PLAN OF CARE
For VS and complete assessments, please see documentation flowsheets.     Pertinent shift assessments: BP elevated, otherwise VSS. Pt remains nonverbal, responding to touch. LS clear.  LR infusing at 125mL/hr, see MAR. Wound dressings remain clean,dry, and intact. Purewick still in place. Remains incontinent. On bed rest. Q2 repositioning and turns done.      Treatment Plan: Continue providing supportive cares.     Expected Discharge Date/Disposition: D

## 2021-07-29 NOTE — PROGRESS NOTES
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     #Sepsis due to UTI with e coli and enteroccocus, septic encephalopathy, history of VRE: Patient has a history of advanced dementia but usually interacts and is alert.  Son had noted that her mental status declined in the days prior to presentation.  She became less responsive and more confused.  In the ED she had a fever, leukocytosis and abnormal UA.  CT head negative.  Chest x-ray without infiltrate.  -Urine culture now showing E. coli that is susceptible to ceftriaxone and Enterococcus that is pansensitive.  She has been on ceftriaxone and linezolid.  Linezolid will stop today. Continue ceftriaxone for 7 day total course.    -Reducing IVF today as patient seems to be taking more PO.  May discontinue tomorrow pending PO intake.   -Patient continues to awaken to voice.  She is eating more yesterday which son states is better.  She is still quite somnolent and son does voice concern about her ability to recover.  I did explain that given her underlying medical conditions in the setting of advanced dementia we are approaching where she should land in terms of functional status.  She has already received 5 days of IV abx.       #Coronary artery disease, ST elevation MI in January  -Continue PTA aspirin, Plavix, lisinopril, statin, and metoprolol.  -Mild troponin elevation was likely due to demand ischemia.     #Hypertension  -Continue PTA lisinopril and metoprolol     #Advanced dementia  -Increases risk of encephalopathy     #Chronic anemia  -Baseline Hgb between  8-10.  Will monitor.  No evidence of bleeding. Lab holiday today.      #Care planning: My partner, Gurmeet Shore MD spoke with patient's son Nick.  Sharee has had a rough 8 months with COVID in 11/20, NSTEMI in 1/21, VRE UTI in 2/21, and tibia fracture in 6/21. Nick feels as though she is declining overall. We discussed that Sharee has been slow to recover but does have potentially recoverable illness.  Discussed continuing cares as above but if she is not eating or drinking then may need temporary feeding tube.  Nick is not sure she would want that.    -I did discuss with Nick again today, 7/29.  He notes that she was better yesterday than today.  She is less interactive.  I did explain that given her significant comorbidities and age in the setting of multiple acute issues over the last several months that she may not fully recover.  He did voice understanding and is realistic.  We are going to treat for course of antibiotics as above and see where she lands from a functional status.  It sounds like he has been in touch with hospice leading into this.       #Stage 2 pressure injury on sacrum, present on admission     #Covid recovered, Covid vaccinated. Covid testing was negative      DVT Prophylaxis: Pneumatic Compression Devices  Code Status: DNR / DNI  Disposition:  Continue inpatient cares.  We will treat for 7 day course and see where Sharee lands functionally over weekend.      Stef Brandt MD  Text Page    Interval History   No events.  Pt opens eyes and is eating.  Not interacting otherwise and nonverbal.      -Data reviewed today: I reviewed all new labs and imaging results over the last 24 hours.     Physical Exam   Temp: 97.9  F (36.6  C) Temp src: Oral BP: (!) 168/74 Pulse: 72   Resp: 20 SpO2: 99 % O2 Device: None (Room air)    Vitals:    07/24/21 1257 07/29/21 0557   Weight: 52.6 kg (116 lb) 53.4 kg (117 lb 12.8 oz)     Vital Signs with Ranges  Temp:  [97.8  F (36.6  C)-98.3  F (36.8  C)] 97.9  F (36.6   C)  Pulse:  [72-90] 72  Resp:  [18-20] 20  BP: (150-170)/(74-89) 168/74  SpO2:  [96 %-100 %] 99 %  I/O last 3 completed shifts:  In: 480 [P.O.:480]  Out: 400 [Urine:400]    GENERAL:  Comfortable.  She opens her eyes to stimulation but does not interact.  Does not follow commands.  Nonverbal.  Eating with assistance.   PSYCH:  No acute distress.  EYES: PERRLA, Normal conjunctiva.  HEART:  Regular rate and rhythm. No JVD. Pulses normal. No edema.  LUNGS:  Clear to auscultation, normal Respiratory effort.  ABDOMEN:  Soft, no hepatosplenomegaly, normal bowel sounds.  EXTREMETIES: Left leg with dressings in place. Dry. Warm.   SKIN:  Dry to touch, No rash.    Medications     lactated ringers Stopped (07/29/21 1010)       aspirin  81 mg Oral Daily     atorvastatin  20 mg Oral Daily     cefTRIAXone  1 g Intravenous Daily     clopidogrel  75 mg Oral Daily     gabapentin  100 mg Oral TID     linezolid  600 mg Oral Q12H LIZBETH     lisinopril  20 mg Oral BID     metoprolol tartrate  12.5 mg Oral BID     pantoprazole  40 mg Oral Daily     sodium chloride (PF)  3 mL Intracatheter Q8H       Data   Recent Labs   Lab 07/28/21  1300 07/27/21  0656 07/25/21  0641 07/24/21  1306   WBC 8.6 9.3 14.8* 14.5*   HGB 7.6* 8.1* 7.3* 9.2*   * 106* 105* 105*    325 276 353    142 143 144   POTASSIUM 3.4 3.5 3.9 5.0   CHLORIDE 110* 114* 114* 113*   CO2 24 26 27 28   BUN 11 18 35* 35*   CR 0.63 0.69 0.97 1.09*   ANIONGAP 6 2* 2* 3   RENETTA 7.9* 8.2* 8.5 9.0   * 80 87 107*   ALBUMIN  --   --   --  2.3*   PROTTOTAL  --   --   --  7.1   BILITOTAL  --   --   --  0.4   ALKPHOS  --   --   --  77   ALT  --   --   --  14   AST  --   --   --  20   TROPONIN  --   --   --  0.065*       No results found for this or any previous visit (from the past 24 hour(s)).

## 2021-07-29 NOTE — PLAN OF CARE
VSS on RA. Pt nonverbal at baseline, unable to assess orientation. Moves assist of 2 w/ lift device. Turn and repo Q2H. Pre-existing pressure wounds on coccyx, rt knee, and left ankle, WOC following. Takes meds crushed with vanilla pudding. Visit from ortho team and son on shift. Went down for xray on shift. LR infusing 75 mL/hr in PIV in left hand. Incontinent of bowel and bladder, Purewick in place. No discharge plans at this time. Will continue with plan of care.

## 2021-07-29 NOTE — PROGRESS NOTES
Orthopedic Surgery  7/29/2021    S: Pt well known to us for previous right tibia fracture - and seen today at the request of pt's son as she is due for follow up in clinic with us today.  Patient resting comfortably in bed, nonverbal, but arousable.    O: Blood pressure (!) 168/74, pulse 72, temperature 97.9  F (36.6  C), temperature source Oral, resp. rate 20, weight 53.4 kg (117 lb 12.8 oz), SpO2 99 %, not currently breastfeeding.  Lab Results   Component Value Date    HGB 7.6 07/28/2021    HGB 9.3 06/30/2021     Lab Results   Component Value Date    INR 1.15 02/06/2021        I/O last 3 completed shifts:  In: 480 [P.O.:480]  Out: 400 [Urine:400]    Neurovascularly intact. Cap refill intact.  Calves are negative bilaterally, both soft and nontender.  Local wound cares in place, with unna boots, and left short leg splint in place.    A: Ms. Moran is doing well status post right tibial shaft fracture appx 8 weeks ago, and left distal fibula fracture appx 1.5 weeks ago, both nonoperative    P:   1. NWB - mobility limited as is, palomo brace to right leg during transfers and repositioning, left short leg splint in place for now  2. Fractures - xrays of right tibia and left ankle ordered for fracture assessment this afternoon, as pt due to return to clinic for outpatient follow up today.  Will determine need for palomo brace after xrays, and splint vs boot for left ankle.  Concerns of skin wounds with increased use of brace/splint wear.  Possible use of orthoglass splint with ACE wrap to secure during transfers and repositioning only, to prevent skin breakdown.  3. Pain management - limit narcotics due to dementia, which has worsened due to her current UTI.  4. Discharge planning per medical team.  Will follow tomorrow.      Graciela House PA-C  O: [phone]

## 2021-07-29 NOTE — PROGRESS NOTES
Patient is alert to voice but patient does not speak. Unable to assess level of disorientation. Incontinent to bowel and bladder.  Patient is bed bound.  Assistance +2 with lift. Vital signs stable. No tele. Unable to voice if she is in pain but no facial expressions show pain. Patient is completely dependent / total care. Wound R inner ankle covered with foam dressing.  Scant drainage present.  Abrasion on outer R ankle - no intervention needed.  Wound on posterior L knee - covered with foam dressing. Scant drainage present.  Sacral wound covered by foam dressing.  Scant drainage / blanchable area.  Pure wick changed twice this shift due to incontinent stools. Patient turned 2 times with pillows repositioned. Room air - 02 96%. Unable to educate patient due to dementia / disorientation / nonverbal.  Patient successful in taking medications with pudding and water.    Treatment plan: continue abx,  Monitor skin breakdown    Discharge plan: TBD

## 2021-07-30 LAB
ANION GAP SERPL CALCULATED.3IONS-SCNC: 4 MMOL/L (ref 3–14)
BUN SERPL-MCNC: 5 MG/DL (ref 7–30)
CALCIUM SERPL-MCNC: 8.1 MG/DL (ref 8.5–10.1)
CHLORIDE BLD-SCNC: 113 MMOL/L (ref 94–109)
CO2 SERPL-SCNC: 27 MMOL/L (ref 20–32)
CREAT SERPL-MCNC: 0.57 MG/DL (ref 0.52–1.04)
ERYTHROCYTE [DISTWIDTH] IN BLOOD BY AUTOMATED COUNT: 13.2 % (ref 10–15)
GFR SERPL CREATININE-BSD FRML MDRD: 78 ML/MIN/1.73M2
GLUCOSE BLD-MCNC: 80 MG/DL (ref 70–99)
HCT VFR BLD AUTO: 24.5 % (ref 35–47)
HGB BLD-MCNC: 7.7 G/DL (ref 11.7–15.7)
MAGNESIUM SERPL-MCNC: 1.6 MG/DL (ref 1.6–2.3)
MCH RBC QN AUTO: 31.2 PG (ref 26.5–33)
MCHC RBC AUTO-ENTMCNC: 31.4 G/DL (ref 31.5–36.5)
MCV RBC AUTO: 99 FL (ref 78–100)
PLATELET # BLD AUTO: 309 10E3/UL (ref 150–450)
POTASSIUM BLD-SCNC: 4 MMOL/L (ref 3.4–5.3)
RBC # BLD AUTO: 2.47 10E6/UL (ref 3.8–5.2)
SODIUM SERPL-SCNC: 144 MMOL/L (ref 133–144)
WBC # BLD AUTO: 6.8 10E3/UL (ref 4–11)

## 2021-07-30 PROCEDURE — 250N000011 HC RX IP 250 OP 636: Performed by: INTERNAL MEDICINE

## 2021-07-30 PROCEDURE — 36415 COLL VENOUS BLD VENIPUNCTURE: CPT | Performed by: HOSPITALIST

## 2021-07-30 PROCEDURE — 83735 ASSAY OF MAGNESIUM: CPT | Performed by: HOSPITALIST

## 2021-07-30 PROCEDURE — 85027 COMPLETE CBC AUTOMATED: CPT | Performed by: HOSPITALIST

## 2021-07-30 PROCEDURE — 250N000013 HC RX MED GY IP 250 OP 250 PS 637: Performed by: INTERNAL MEDICINE

## 2021-07-30 PROCEDURE — 80048 BASIC METABOLIC PNL TOTAL CA: CPT | Performed by: HOSPITALIST

## 2021-07-30 PROCEDURE — 250N000013 HC RX MED GY IP 250 OP 250 PS 637: Performed by: HOSPITALIST

## 2021-07-30 PROCEDURE — 99232 SBSQ HOSP IP/OBS MODERATE 35: CPT | Performed by: HOSPITALIST

## 2021-07-30 PROCEDURE — 258N000003 HC RX IP 258 OP 636: Performed by: HOSPITALIST

## 2021-07-30 PROCEDURE — 120N000001 HC R&B MED SURG/OB

## 2021-07-30 PROCEDURE — G0463 HOSPITAL OUTPT CLINIC VISIT: HCPCS

## 2021-07-30 RX ORDER — NALOXONE HYDROCHLORIDE 0.4 MG/ML
0.4 INJECTION, SOLUTION INTRAMUSCULAR; INTRAVENOUS; SUBCUTANEOUS
Status: DISCONTINUED | OUTPATIENT
Start: 2021-07-30 | End: 2021-08-02 | Stop reason: HOSPADM

## 2021-07-30 RX ORDER — NALOXONE HYDROCHLORIDE 0.4 MG/ML
0.2 INJECTION, SOLUTION INTRAMUSCULAR; INTRAVENOUS; SUBCUTANEOUS
Status: DISCONTINUED | OUTPATIENT
Start: 2021-07-30 | End: 2021-08-02 | Stop reason: HOSPADM

## 2021-07-30 RX ORDER — ACETAMINOPHEN 325 MG/1
975 TABLET ORAL 3 TIMES DAILY
Status: DISCONTINUED | OUTPATIENT
Start: 2021-07-30 | End: 2021-08-02 | Stop reason: HOSPADM

## 2021-07-30 RX ORDER — HYDRALAZINE HYDROCHLORIDE 20 MG/ML
10 INJECTION INTRAMUSCULAR; INTRAVENOUS EVERY 4 HOURS PRN
Status: DISCONTINUED | OUTPATIENT
Start: 2021-07-30 | End: 2021-08-02 | Stop reason: HOSPADM

## 2021-07-30 RX ADMIN — MULTIPLE VITAMINS W/ MINERALS TAB 1 TABLET: TAB at 08:49

## 2021-07-30 RX ADMIN — LISINOPRIL 20 MG: 20 TABLET ORAL at 08:49

## 2021-07-30 RX ADMIN — METOPROLOL TARTRATE 12.5 MG: 25 TABLET, FILM COATED ORAL at 08:49

## 2021-07-30 RX ADMIN — ASPIRIN 81 MG CHEWABLE TABLET 81 MG: 81 TABLET CHEWABLE at 08:49

## 2021-07-30 RX ADMIN — LISINOPRIL 20 MG: 20 TABLET ORAL at 21:10

## 2021-07-30 RX ADMIN — CEFTRIAXONE 1 G: 1 INJECTION, POWDER, FOR SOLUTION INTRAMUSCULAR; INTRAVENOUS at 08:49

## 2021-07-30 RX ADMIN — ACETAMINOPHEN 975 MG: 325 TABLET, FILM COATED ORAL at 21:10

## 2021-07-30 RX ADMIN — GABAPENTIN 100 MG: 100 CAPSULE ORAL at 21:10

## 2021-07-30 RX ADMIN — PANTOPRAZOLE SODIUM 40 MG: 40 TABLET, DELAYED RELEASE ORAL at 08:49

## 2021-07-30 RX ADMIN — ATORVASTATIN CALCIUM 20 MG: 20 TABLET, FILM COATED ORAL at 08:49

## 2021-07-30 RX ADMIN — CLOPIDOGREL BISULFATE 75 MG: 75 TABLET ORAL at 08:49

## 2021-07-30 RX ADMIN — OXYCODONE HYDROCHLORIDE 2.5 MG: 5 TABLET ORAL at 21:28

## 2021-07-30 RX ADMIN — ACETAMINOPHEN 975 MG: 325 TABLET, FILM COATED ORAL at 09:37

## 2021-07-30 RX ADMIN — DICLOFENAC SODIUM 2 G: 10 GEL TOPICAL at 17:31

## 2021-07-30 RX ADMIN — SODIUM CHLORIDE, POTASSIUM CHLORIDE, SODIUM LACTATE AND CALCIUM CHLORIDE: 600; 310; 30; 20 INJECTION, SOLUTION INTRAVENOUS at 05:45

## 2021-07-30 RX ADMIN — METOPROLOL TARTRATE 12.5 MG: 25 TABLET, FILM COATED ORAL at 21:10

## 2021-07-30 RX ADMIN — GABAPENTIN 100 MG: 100 CAPSULE ORAL at 17:31

## 2021-07-30 RX ADMIN — GABAPENTIN 100 MG: 100 CAPSULE ORAL at 08:49

## 2021-07-30 RX ADMIN — ACETAMINOPHEN 975 MG: 325 TABLET, FILM COATED ORAL at 17:31

## 2021-07-30 ASSESSMENT — ACTIVITIES OF DAILY LIVING (ADL)
ADLS_ACUITY_SCORE: 35
ADLS_ACUITY_SCORE: 35
ADLS_ACUITY_SCORE: 31
ADLS_ACUITY_SCORE: 35

## 2021-07-30 NOTE — PROVIDER NOTIFICATION
Patient is nonverbal but showing nonverbal indicators of pain.  Do you want tylenol? Thanks!    MD added tylenol PRN to patients orders.

## 2021-07-30 NOTE — PROGRESS NOTES
[facility] Nurse Inpatient Pressure Injury Assessment   Reason for consultation: Evaluate and treat Sacrum, right posterior knee      ASSESSMENT  Right medial malleolus with barely blanchable erythema, not currently open.  This is unchanged from 2 days ago.  Suspect this may be discoloration from a previous wound rather than an active area.      Pressure Injury: on Sacrum , present on admission   Pressure Injury is Stage 2   Contributing factor of the pressure injury: pressure, shear, age and immobility  Status: healing  Recommend provider order: None, at this time     Right posterior knee due to MASD  Status: stable     TREATMENT PLAN  Right medial ankle: Every 3 days  1. Cover with Mepilex 4x4  2. Prevalon offloading boots on at all times when not assessing skin    Sacral wound: Every 3 days   1. Cleanse with wound cleanser and dry  2. Apply Mepilex sacral  3. Sidelying in bed, reposition Q2hrs in bed and Q1hr in chair     Right posterior knee wound: Every 3 days   1. Cleanse with wound cleanser and dry  2. Apply Mepilex 4x4  Orders Reviewed  WO Nurse follow-up plan:weekly  Nursing to notify the Provider(s) and re-consult the [facility] Nurse if wound(s) deteriorates or new skin concern.    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  She presented to the [facility] ED on 7/24/21 for evaluation of fever and altered mental status.    Objective Data  Containment of urine/stool: External catheter    Current Diet/ Nutrition:  Orders Placed This Encounter      Combination Diet Regular Diet Adult; Mechanical/Dental Soft Diet      Output:   I/O last 3 completed shifts:  In: 240 [P.O.:240]  Out: 1300 [Urine:1300]    Risk  Assessment:   Sensory Perception: 3-->slightly limited  Moisture: 3-->occasionally moist  Activity: 1-->bedfast  Mobility: 2-->very limited  Nutrition: 2-->probably inadequate  Friction and Shear: 1-->problem  Thad Score: 12      Labs:   Recent Labs   Lab 07/30/21  0839 07/24/21  1306   ALBUMIN  --  2.3*   HGB 7.7* 9.2*   WBC 6.8 14.5*       Physical Exam  Skin inspection: focused sacrum, right posterior knee    Wound Location:  Sacrum  Date of last Photo none  Wound History: Pressure injury  Measurements (length x width x depth, in cm) 0.7 cm x 0.3 cm   Wound Base:  100 % agranular  Tunneling N/A  Undermining N/A  Palpation of the wound bed: normal   Periwound skin: intact  Color: normal and consistent with surrounding tissue  Temperature: normal   Drainage:, small  Description of drainage: serosanguinous  Odor: none  Pain: no grimacing or signs of discomfort    Wound Location:  Right posterior knee  Date of last Photo none  Wound History: Moisture skin damage  Measurements (length x width x depth, in cm) 0.7 cm x 1.5 cm   Wound Base:  100 % fibrin  Tunneling N/A  Undermining N/A  Palpation of the wound bed: normal   Periwound skin: intact  Color: normal and consistent with surrounding tissue  Temperature: normal   Drainage:, small  Description of drainage: serosanguinous  Odor: none    Interventions  Current support surface: Standard  Low air loss mattress with pulsation   Current off-loading measures: Pillows under calves and Pillows  Repositioning aid: Pillows  Visual inspection of wound(s) completed   Wound Care: was done per plan of care.  Supplies: at bedside  Educated provided: importance of repositioning, plan of care and Off-loading pressure  Education provided to: patient  and nurse  Discussed importance of:repositioning every 2 hours, off-loading pressure to wound and moisture management  Discussed plan of care with Patient and Nurse    Christopher Carlson RN CWOCN

## 2021-07-30 NOTE — CONSULTS
"CLINICAL NUTRITION SERVICES  -  ASSESSMENT NOTE      MALNUTRITION:  % Weight Loss:  None noted  % Intake:  </= 50% for >/= 5 days (severe malnutrition)  Subcutaneous Fat Loss:  Orbital region mild to moderate depletion and Upper arm region mild-moderate depletion --> limited to visual today d/t blood pressure concerns  Muscle Loss:  Temporal region mild to moderate depletion, Clavicle bone region mild to moderate depletion, Acromion bone region at least mild to moderate depletion, Patellar region at least mild to moderate depletion, Anterior thigh region at least mild to moderate depletion and Posterior calf region at least mild to moderate depletion --> limited to visual today d/t blood pressure concerns  Fluid Retention: None noted or documented    Malnutrition Diagnosis: Non-Severe malnutrition  In Context of:  Acute illness or injury with underlying chronic illness or disease        REASON FOR ASSESSMENT  Tracey Moran is a 89 year old female seen by Registered Dietitian for LOS.    PMH of: HTN, chronic anemia, advanced dementia, COVID-recovered, recent admit for tib fracture.    Admit 2/2: AMS, sepsis with UTI.    NUTRITION HISTORY  - Information obtained from chart as patient not oriented and no family in room.  - Resides in Memory Care Unit.  - Allergies: Peanuts.      CURRENT NUTRITION ORDERS  Diet Order:     Regular/mechanical soft, 4 oz Ensure with meals TID    Current Intake/Tolerance:  Has been lethargic at times, mentation also impacting PO trending.  Nursing downgraded diet to mechanical soft as this was reported to be baseline.  % intakes since admission.  Based on MD documentation from 7/28: \"Discussed continuing cares as above but if she is not eating or drinking then may need temporary feeding tube.  Nick is not sure she would want that.\"  Based on available documentation, MD feels MAGDALENA and PO intakes improved slightly over the past few days and was able to stop IVFs.  Plan is to monitor " "overall status over the weekend, with ongoing goals of care discussions.  Per discussion with RN, had a small amount of 2 food items this AM (pudding).        NUTRITION FOCUSED PHYSICAL ASSESSMENT FOR DIAGNOSING MALNUTRITION)  Yes though limited to visual per RN's request d/t blood pressure    Obtained from Chart/Interdisciplinary Team:  - WOCN following for stage 2 PI to sacrum, R knee wound d/t MASD   - Stooling patterns reviewed    ANTHROPOMETRICS  Height: 5' 2\"  Weight: 117 lbs 12.8 oz  Body mass index is 21.55 kg/m .  Weight Status:  Normal BMI  Weight History:  Wt Readings from Last 10 Encounters:   07/29/21 53.4 kg (117 lb 12.8 oz)   06/02/21 52.7 kg (116 lb 1.6 oz)   02/14/21 48.3 kg (106 lb 6.4 oz)   02/06/21 55.8 kg (123 lb)   01/18/21 49.9 kg (110 lb 0.2 oz)   12/18/20 49.9 kg (110 lb)   12/08/20 49.6 kg (109 lb 6.4 oz)   03/11/20 56.1 kg (123 lb 11.2 oz)   10/28/19 56.7 kg (125 lb 1.6 oz)   07/08/19 58.5 kg (129 lb)     - No current documentation of edema.  It seems 110-116# is likely reflective of true wt?  Suspect has gained weight when compared to 1/2021 and 2/2021 wt trending.  Admit wt consistent with that 1 month ago.    LABS  Labs reviewed:  Electrolytes  Potassium (mmol/L)   Date Value   07/30/2021 4.0   07/28/2021 3.4   07/27/2021 3.5   06/30/2021 5.4 (H)   06/03/2021 4.3   06/02/2021 4.3     Phosphorus (mg/dL)   Date Value   11/17/2018 3.1    Blood Glucose  Glucose (mg/dL)   Date Value   07/30/2021 80   07/28/2021 126 (H)   07/27/2021 80   07/25/2021 87   07/24/2021 107 (H)   06/30/2021 116 (H)   06/03/2021 87   06/02/2021 90   02/14/2021 85   02/13/2021 83     Hemoglobin A1C (%)   Date Value   02/06/2021 5.1   12/06/2017 Canceled, Test credited    Inflammatory Markers  CRP Inflammation (mg/L)   Date Value   02/06/2021 17.0 (H)     CRP (mg/dL)   Date Value   11/30/2020 3.2 (H)   11/27/2020 4.5 (H)     WBC (10e9/L)   Date Value   06/30/2021 9.5   06/02/2021 9.3   02/14/2021 8.6     WBC Count " (10e3/uL)   Date Value   07/30/2021 6.8   07/28/2021 8.6   07/27/2021 9.3     Albumin (g/dL)   Date Value   07/24/2021 2.3 (L)   02/10/2021 2.4 (L)   02/06/2021 3.2 (L)   10/26/2019 3.5      Magnesium (mg/dL)   Date Value   07/30/2021 1.6   02/07/2021 1.8   01/21/2021 2.0   01/20/2021 1.8     Sodium (mmol/L)   Date Value   07/30/2021 144   07/28/2021 140   07/27/2021 142   06/30/2021 138   06/03/2021 139   06/02/2021 140    Renal  Urea Nitrogen (mg/dL)   Date Value   07/30/2021 5 (L)   07/28/2021 11   07/27/2021 18   06/30/2021 64 (H)   06/03/2021 31 (H)   06/02/2021 33 (H)     Creatinine (mg/dL)   Date Value   07/30/2021 0.57   07/28/2021 0.63   07/27/2021 0.69   06/30/2021 1.79 (H)   06/03/2021 0.94   06/02/2021 1.12 (H)     Additional  Triglycerides (mg/dL)   Date Value   02/07/2021 60   11/17/2018 84   05/21/2014 124     Ketones Urine (mg/dL)   Date Value   07/24/2021 Negative   06/30/2021 Negative        B/P: 203/80, T: 97.6, P: 78, R: 18      MEDICATIONS  Medications reviewed:    acetaminophen  975 mg Oral TID     aspirin  81 mg Oral Daily     atorvastatin  20 mg Oral Daily     clopidogrel  75 mg Oral Daily     gabapentin  100 mg Oral TID     lisinopril  20 mg Oral BID     metoprolol tartrate  12.5 mg Oral BID     multivitamin w/minerals  1 tablet Oral Daily     pantoprazole  40 mg Oral Daily     sodium chloride (PF)  3 mL Intracatheter Q8H             ASSESSED NUTRITION NEEDS PER APPROVED PRACTICE GUIDELINES:    Dosing Weight 53 kg   Estimated Energy Needs: 25-30+ Kcal/Kg  Justification: minimum maintenance, wound healing  Estimated Protein Needs: >/=1.2 g pro/Kg  Justification: wound healing and preservation of lean body mass, advanced age  Estimated Fluid Needs: per MD      NUTRITION DIAGNOSIS:  Inadequate oral intake related to AMS/MAGDALENA, weakness, suspect acute on chronic decreased appetite as evidenced by likely meeting <50% needs orally x6 day admit, coding of malnutrition with combination fat/muscle  loss.     NUTRITION INTERVENTIONS  Recommendations / Nutrition Prescription  Diet per MD.    Continue offering 4 oz Ensure with meals while ongoing goals of care discussions.      Continue daily MVI/M as ordered.    Overall nutrition-related POC per MD, pending clinical status and ongoing goals of care discussions.       Implementation  Nutrition education: Not appropriate at this time due to patient condition.    Collaboration and Referral of Nutrition care: Discussed POC with team during rounds and with RN.    Nutrition Goals  Ongoing goals of care discussions w/in 24-72 hrs.      MONITORING AND EVALUATION:  Progress towards goals will be monitored and evaluated per protocol and Practice Guidelines          Katarina Woods RDN, LD  Clinical Dietitian  3rd floor/ICU: [phone]  All other floors: [phone]  Weekend/holiday: [phone]

## 2021-07-30 NOTE — PROGRESS NOTES
"SPIRITUAL HEALTH SERVICES Progress Note  [facility]    Visited pt Tracey \"June\" Dean per length of stay. Oriented to Spiritual Health Services. Sharee non-verbal, maintained eye contact and occasionally grunted. Offered June a prayer, recited the Lord's Prayer, and provided a blessing. SHS remain available.    Gerry Tinoco   Intern  Pager [phone]   "

## 2021-07-30 NOTE — PLAN OF CARE
End of Shift Note  See flowsheets for vital signs and complete assessments.    Pertinent Assessments: Unable to assess orientation as pt is nonverbal. Pt grunts with repositioning, but relaxes after repositioning is done. Wound care done to 3 sites with [facility] nurse. Pt is a total care, repositioned every 2 hours. New PIV placed in left forearm.    Major Shift Events: BP elevated to 203/80 before morning meds. Decreased to 187/75 after morning meds. Went to give IV hydralazine but BP decreased to 160/65.    Treatment Plan: Scheduled tylenol, voltaren. PRN oxycodone added for pain. IV hydralazine PRN.    Discharge: TBD    Bedside RN: Sunita Medina

## 2021-07-30 NOTE — PLAN OF CARE
Nonverbal baseline, doron orientation. VSS ex elevated BP. Incontinent of bowel and bladder. Purwick in place, mild redness to vladimir area. IV infusing LR. Assist of 2, frequent turn/repo throughout the night. Wounds to coccyx, R knee, L ankle, WOC following. Alarms on for safety. Iso contact maintained.

## 2021-07-30 NOTE — PLAN OF CARE
Patient nonverbal / total care. Vital signs stable. No tele. Not able to voice pain.  Showed signs of nonverbal pain - MD notified, prn tylenol added. Incontinent to bowel \ bladder.  Bedfast. Repositioned every 2 hours. Assist +2 with lift.    Treatment plan: continue abx for UTI    Discharge plan: TBD

## 2021-07-30 NOTE — PROGRESS NOTES
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     #Sepsis due to UTI with e coli and enteroccocus, septic encephalopathy, history of VRE: Patient has a history of advanced dementia but usually interacts and is alert.  Son had noted that her mental status declined in the days prior to presentation.  She became less responsive and more confused.  In the ED she had a fever, leukocytosis and abnormal UA.  CT head negative.  Chest x-ray without infiltrate.  -Urine culture now showing E. coli that is susceptible to ceftriaxone and Enterococcus that is pansensitive.  She has completed a course of ceftriaxone and linezolid.  Stopped antibiotics on 7/30 after 7 days of ceftriaxone.  -We will stop IV fluids today and see how she does with p.o. intake alone.  It does seem as though p.o. intake is improved over the last 2 to 3 days.  -Patient continues to awaken to voice.    She is lethargic and nonverbal.  She is eating more over the last 2 to 3 days.  I do suspect that her functional status in setting of chronic baseline, severe dementia with multiple insults as below has led to downward trajectory.  We will see where she lands over weekend in terms of new baseline functional status.      #Coronary artery disease, ST elevation MI in January  -Continue PTA aspirin, Plavix, lisinopril, statin, and metoprolol.  -Mild troponin elevation was likely due to demand ischemia.     #Hypertension  -Continue PTA lisinopril and metoprolol. Prn hydralazine.       #Advanced dementia  -Increases risk of encephalopathy     #Chronic  anemia  -Baseline Hgb between 8-10.  Will monitor.  No evidence of bleeding.     #Nonsevere malnutrition in setting of acute and chronic illness: Nutrition consulted.     #Right tibial shaft fx 06/02/2021, left distal fibula fx on 07/18/2021, both nonoperative: Followed by orthopedics.  Repeat x-rays of the right tibia and left ankle completed on 7/29 showed unchanged alignment.  Also showed medial malleolar nondisplaced fx.    -Orthopedics following.  Appreciate recs.  Scheduling Tylenol.  Gabapentin low dose TID.  Starting low dose oxycodone for breakthrough pain after discussion with son Jonny as below.    #Care planning: My partner, Gurmeet Shore MD spoke with patient's son Nick.  Sharee has had a rough 8 months with COVID in 11/20, NSTEMI in 1/21, VRE UTI in 2/21, and tibia fracture in 6/21. Nick feels as though she is declining overall.    -I did discuss with Nick multiple times.  I did explain that given her significant comorbidities and age in the setting of multiple acute issues over the last several months that she may not fully recover.  He did voice understanding and is realistic.  We are going to treat for course of antibiotics as above and see where she lands from a functional status.    -He does want better pain control for his mom even if it may affect her mental status.  He is in touch with  from [facility] and if she does not show any further improvement he is leaning toward hospice.  He has had discussion with rest of his family regarding this as well.  We will restart low dose oxycodone prn and topicals as well.         #Stage 2 pressure injury on sacrum, present on admission     #Covid recovered, Covid vaccinated. Covid testing was negative      DVT Prophylaxis: Pneumatic Compression Devices  Code Status: DNR / DNI  Disposition:  Continue inpatient cares.  Anticipate dispo pending goals of care with son, Jonny, over the weekend.  She may discharge back to facility with hospice.          Stef Brandt MD  Text Page    Interval History   No events overnight.  Patient with nonverbal indicators of pain particularly with movement.  Eating and drinking.  She continues to be nonverbal and opens her eyes to stimulation but is not meaningfully interactive.    -Data reviewed today: I reviewed all new labs and imaging results over the last 24 hours.     Physical Exam   Temp: 97.6  F (36.4  C) Temp src: Axillary BP: (!) 203/80 Pulse: 78   Resp: 18 SpO2: 96 % O2 Device: None (Room air)    Vitals:    07/24/21 1257 07/29/21 0557   Weight: 52.6 kg (116 lb) 53.4 kg (117 lb 12.8 oz)     Vital Signs with Ranges  Temp:  [97.4  F (36.3  C)-97.7  F (36.5  C)] 97.6  F (36.4  C)  Pulse:  [74-78] 78  Resp:  [18-20] 18  BP: (168-203)/(69-88) 203/80  SpO2:  [95 %-97 %] 96 %  I/O last 3 completed shifts:  In: 240 [P.O.:240]  Out: 1300 [Urine:1300]    GENERAL:  Comfortable.  She opens her eyes to stimulation but does not interact.  Does not follow commands.  Nonverbal.    PSYCH:  No acute distress.  EYES: PERRLA, Normal conjunctiva.  HEART:  Regular rate and rhythm. No JVD. Pulses normal. No edema.  LUNGS:  Clear to auscultation, normal Respiratory effort.  ABDOMEN:  Soft, no hepatosplenomegaly, normal bowel sounds.  EXTREMETIES: Left short splint in place. Dry. Warm.   SKIN:  Dry to touch, No rash.    Medications     lactated ringers 75 mL/hr at 07/30/21 0545       acetaminophen  975 mg Oral TID     aspirin  81 mg Oral Daily     atorvastatin  20 mg Oral Daily     clopidogrel  75 mg Oral Daily     gabapentin  100 mg Oral TID     lisinopril  20 mg Oral BID     metoprolol tartrate  12.5 mg Oral BID     multivitamin w/minerals  1 tablet Oral Daily     pantoprazole  40 mg Oral Daily     sodium chloride (PF)  3 mL Intracatheter Q8H       Data   Recent Labs   Lab 07/30/21  0839 07/28/21  1300 07/27/21  0656 07/24/21  1306   WBC 6.8 8.6 9.3 14.5*   HGB 7.7* 7.6* 8.1* 9.2*   MCV 99 102* 106* 105*    324 325 353    140  142 144   POTASSIUM 4.0 3.4 3.5 5.0   CHLORIDE 113* 110* 114* 113*   CO2 27 24 26 28   BUN 5* 11 18 35*   CR 0.57 0.63 0.69 1.09*   ANIONGAP 4 6 2* 3   RENETTA 8.1* 7.9* 8.2* 9.0   GLC 80 126* 80 107*   ALBUMIN  --   --   --  2.3*   PROTTOTAL  --   --   --  7.1   BILITOTAL  --   --   --  0.4   ALKPHOS  --   --   --  77   ALT  --   --   --  14   AST  --   --   --  20   TROPONIN  --   --   --  0.065*       Recent Results (from the past 24 hour(s))   XR Ankle Left G/E 3 Views    Narrative    XR ANKLE LEFT G/E 3 VIEWS 7/29/2021 2:28 PM     HISTORY: distal fibula fracture - assess for fracture healing - NWB    COMPARISON: 7/18/2021 tibial radiographs.      Impression    IMPRESSION: Lateral malleolar nondisplaced fracture is somewhat  obscured by overlying splint material. Alignment is probably  unchanged. Medial malleolar nondisplaced fracture is also noted. The  ankle mortise is grossly congruent. Osteopenia. Medial talar dome  osteochondral lesion with sclerotic margins, unchanged.    ERNA HERMOSILLO MD         SYSTEM ID:  [id number]   XR Tibia & Fibula Right 2 Views    Narrative    XR TIBIA & FIBULA RIGHT 2 VIEWS 7/29/2021 2:29 PM     HISTORY: tibia fracture appx 8 weeks ago - assess fracture healing -  NWB      Impression    IMPRESSION: Tibial distal diaphyseal nondisplaced spiral fracture with  developing callus formation, alignment unchanged from 6/30/2021.  Osteopenia.    ERNA HERMOSILLO MD         SYSTEM ID:  [id number]

## 2021-07-31 LAB — SARS-COV-2 RNA RESP QL NAA+PROBE: POSITIVE

## 2021-07-31 PROCEDURE — 250N000013 HC RX MED GY IP 250 OP 250 PS 637: Performed by: INTERNAL MEDICINE

## 2021-07-31 PROCEDURE — 250N000013 HC RX MED GY IP 250 OP 250 PS 637: Performed by: HOSPITALIST

## 2021-07-31 PROCEDURE — 99232 SBSQ HOSP IP/OBS MODERATE 35: CPT | Performed by: INTERNAL MEDICINE

## 2021-07-31 PROCEDURE — 87635 SARS-COV-2 COVID-19 AMP PRB: CPT | Performed by: INTERNAL MEDICINE

## 2021-07-31 PROCEDURE — 120N000001 HC R&B MED SURG/OB

## 2021-07-31 RX ADMIN — METOPROLOL TARTRATE 12.5 MG: 25 TABLET, FILM COATED ORAL at 08:07

## 2021-07-31 RX ADMIN — MULTIPLE VITAMINS W/ MINERALS TAB 1 TABLET: TAB at 08:07

## 2021-07-31 RX ADMIN — CLOPIDOGREL BISULFATE 75 MG: 75 TABLET ORAL at 08:08

## 2021-07-31 RX ADMIN — ACETAMINOPHEN 975 MG: 325 TABLET, FILM COATED ORAL at 22:15

## 2021-07-31 RX ADMIN — DICLOFENAC SODIUM 2 G: 10 GEL TOPICAL at 14:11

## 2021-07-31 RX ADMIN — ACETAMINOPHEN 975 MG: 325 TABLET, FILM COATED ORAL at 08:07

## 2021-07-31 RX ADMIN — GABAPENTIN 100 MG: 100 CAPSULE ORAL at 08:07

## 2021-07-31 RX ADMIN — ACETAMINOPHEN 975 MG: 325 TABLET, FILM COATED ORAL at 17:12

## 2021-07-31 RX ADMIN — LISINOPRIL 20 MG: 20 TABLET ORAL at 08:07

## 2021-07-31 RX ADMIN — GABAPENTIN 100 MG: 100 CAPSULE ORAL at 17:13

## 2021-07-31 RX ADMIN — ATORVASTATIN CALCIUM 20 MG: 20 TABLET, FILM COATED ORAL at 08:07

## 2021-07-31 RX ADMIN — DICLOFENAC SODIUM 2 G: 10 GEL TOPICAL at 22:14

## 2021-07-31 RX ADMIN — METOPROLOL TARTRATE 12.5 MG: 25 TABLET, FILM COATED ORAL at 22:16

## 2021-07-31 RX ADMIN — ASPIRIN 81 MG CHEWABLE TABLET 81 MG: 81 TABLET CHEWABLE at 08:07

## 2021-07-31 RX ADMIN — LISINOPRIL 20 MG: 20 TABLET ORAL at 22:16

## 2021-07-31 RX ADMIN — GABAPENTIN 100 MG: 100 CAPSULE ORAL at 22:16

## 2021-07-31 RX ADMIN — PANTOPRAZOLE SODIUM 40 MG: 40 TABLET, DELAYED RELEASE ORAL at 08:07

## 2021-07-31 ASSESSMENT — ACTIVITIES OF DAILY LIVING (ADL)
ADLS_ACUITY_SCORE: 35
ADLS_ACUITY_SCORE: 35
ADLS_ACUITY_SCORE: 37
ADLS_ACUITY_SCORE: 37
ADLS_ACUITY_SCORE: 35
ADLS_ACUITY_SCORE: 35

## 2021-07-31 NOTE — PLAN OF CARE
Care provided from [id number]    ICU End of Shift Summary.  For vital signs and complete assessments, please see documentation flowsheets.     Pertinent assessments: Nonverbal, vitally stable, incontinent of urine   Major Shift Events: No acute changes. Appears comfortable. Ate 50% of small meals.   Plan (Upcoming Events): No plans, turn q2hr, keep pt comfortable. Palliative care consult  Discharge/Transfer Needs: TBD    Bedside Shift Report Completed : yes   Bedside Safety Check Completed: yes

## 2021-07-31 NOTE — PROGRESS NOTES
Care Management Follow Up    Length of Stay (days): 7    Expected Discharge Date: 08/01/2021     Concerns to be Addressed:       Patient plan of care discussed at interdisciplinary rounds: No    Anticipated Discharge Disposition:  TBD      Anticipated Discharge Services:  TBD  Anticipated Discharge DME:      Patient/family educated on Medicare website which has current facility and service quality ratings:    Education Provided on the Discharge Plan:    Patient/Family in Agreement with the Plan:      Referrals Placed by CM/SW:    Private pay costs discussed: Not applicable    Additional Information:  Left a message for patient's son Nick to discuss discharge planning. Writer requested a call back.       Marilyn Antonio, [facility]  Care management   [phone]

## 2021-07-31 NOTE — PROGRESS NOTES
[facility]    Hospitalist Progress Note      Assessment & Plan   Tracey Moran is a 89 year old female who was admitted on 7/24/2021.    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    Plan:    #Sepsis due to UTI with e coli and enteroccocus, septic encephalopathy, history of VRE:   Patient has a history of advanced dementia but usually interacts and is alert.  Son had noted that her mental status declined in the days prior to presentation.  She became less responsive and more confused.  In the ED she had a fever, leukocytosis and abnormal UA.  CT head negative.  Chest x-ray without infiltrate.  -Urine culture now showing E. coli that is susceptible to ceftriaxone and Enterococcus that is pansensitive.    --She has completed a course of ceftriaxone and linezolid.  Stopped antibiotics on 7/30 after 7 days of ceftriaxone.  -Patient continues to awaken to voice.    She is lethargic and nonverbal.  She is eating more over the last 2 to 3 days but overall oral intake remains poor.  I do suspect that her functional status in setting of chronic baseline, severe dementia with multiple insults as below has led to downward trajectory.      #Coronary artery disease, ST elevation MI in January  -Continue PTA aspirin, Plavix, lisinopril, statin, and metoprolol.  -Mild troponin elevation was likely due to demand ischemia.     #Hypertension  -Continue PTA lisinopril and metoprolol. Prn hydralazine.       #Advanced dementia  -Increases risk of encephalopathy     #Chronic anemia  -No evidence of bleeding.      #Nonsevere malnutrition in setting of acute and chronic  illness: Nutrition consulted.      #Right tibial shaft fx 06/02/2021, left distal fibula fx on 07/18/2021, both nonoperative: Followed by orthopedics.  Repeat x-rays of the right tibia and left ankle completed on 7/29 showed unchanged alignment.  Also showed medial malleolar nondisplaced fx.    -Orthopedics following.  Appreciate recs.  Scheduling Tylenol.  Gabapentin low dose TID.  Starting low dose oxycodone for breakthrough pain after discussion with son Jonny as below.     #Care planning:   My partner, Gurmeet Shore MD spoke with patient's son Nick.  Sharee has had a rough 8 months with COVID in 11/20, NSTEMI in 1/21, VRE UTI in 2/21, and tibia fracture in 6/21. Nick feels as though she is declining overall.    -Dr. Brandt has also discussed with the son on multiple occasions:  he explained that given her significant comorbidities and age in the setting of multiple acute issues over the last several months that she may not fully recover.  The son did voice understanding and is realistic.  He does want better pain control for his mom even if it may affect her mental status.  He is in touch with  from [facility] and if she does not show any further improvement he is leaning toward hospice.  He has had discussion with rest of his family regarding this as well.  started low dose oxycodone prn and topicals as well.         #Stage 2 pressure injury on sacrum, present on admission     #Covid recovered, Covid vaccinated. Covid testing was negative      DVT Prophylaxis: Pneumatic Compression Devices  Code Status: DNR / DNI  Disposition:  Continue inpatient cares.  Possibly discharge on Monday.  At this point, the plan is to monitor her clinical status over the weekend to see how much she improves.  Done on Monday plan for discharge, potentially going back to [facility] with hospice care.  I requested a palliative care consultation for Monday as well.    Viral Oneil MD  Text Page (7am - 6pm, M-F)    Interval History    Patient was evaluated with nursing staff. Overnight issues discussed.    Review of systems:   Unable to obtain    -Data reviewed today: Labs and medications.    Physical Exam   Temp: 98.9  F (37.2  C) Temp src: Axillary BP: (!) 170/61 Pulse: 74   Resp: 16 SpO2: 99 % O2 Device: None (Room air)    Vitals:    07/24/21 1257 07/29/21 0557   Weight: 52.6 kg (116 lb) 53.4 kg (117 lb 12.8 oz)     Vital Signs with Ranges  Temp:  [97.8  F (36.6  C)-98.9  F (37.2  C)] 98.9  F (37.2  C)  Pulse:  [66-74] 74  Resp:  [16-18] 16  BP: (159-170)/(61-71) 170/61  SpO2:  [98 %-99 %] 99 %  I/O last 3 completed shifts:  In: 90 [P.O.:90]  Out: 300 [Urine:300]    Constitutional: Was sleeping but easily arousable.  Nonverbal.  HEENT: Trachea midline, sclera is clear   Respiratory: No crackles. No wheezing. Equal breath sounds bilaterally.  Cardiovascular: Regular rate and rhythm, normal S1 and S2, and no murmur noted  GI: Normal bowel sounds, soft, non-distended, non-tender  Skin/Integumen: No rashes, no cyanosis    Medications       acetaminophen  975 mg Oral TID     aspirin  81 mg Oral Daily     atorvastatin  20 mg Oral Daily     clopidogrel  75 mg Oral Daily     diclofenac  2 g Topical 4x Daily     gabapentin  100 mg Oral TID     lisinopril  20 mg Oral BID     metoprolol tartrate  12.5 mg Oral BID     multivitamin w/minerals  1 tablet Oral Daily     pantoprazole  40 mg Oral Daily     sodium chloride (PF)  3 mL Intracatheter Q8H       Data   Recent Labs   Lab 07/30/21  0839 07/28/21  1300 07/27/21  0656   WBC 6.8 8.6 9.3   HGB 7.7* 7.6* 8.1*   MCV 99 102* 106*    324 325    140 142   POTASSIUM 4.0 3.4 3.5   CHLORIDE 113* 110* 114*   CO2 27 24 26   BUN 5* 11 18   CR 0.57 0.63 0.69   ANIONGAP 4 6 2*   RENETTA 8.1* 7.9* 8.2*   GLC 80 126* 80       No results found for this or any previous visit (from the past 24 hour(s)).

## 2021-07-31 NOTE — PLAN OF CARE
BP (!) 162/68 (BP Location: Left arm)   Pulse 66   Temp 98.8  F (37.1  C) (Axillary)   Resp 16   Wt 53.4 kg (117 lb 12.8 oz)   SpO2 98%   BMI 21.55 kg/m          BP elevated otherwise VSS. Alert to self. Nonverbal at baseline. Incontinent, external catheter in place. PIV SL. Wound on knee, ankle, sacrum- dressing in place, WOC following. Total care, requires feeding. Q2 repositioning. Contact precautions maintained. No nonverbal indicators of pain present. [facility] soft diet. Takes meds crushed in vanilla pudding. Discharge plans TBD, will continue POC.

## 2021-08-01 PROCEDURE — 250N000013 HC RX MED GY IP 250 OP 250 PS 637: Performed by: INTERNAL MEDICINE

## 2021-08-01 PROCEDURE — 99233 SBSQ HOSP IP/OBS HIGH 50: CPT | Performed by: INTERNAL MEDICINE

## 2021-08-01 PROCEDURE — 250N000011 HC RX IP 250 OP 636: Performed by: HOSPITALIST

## 2021-08-01 PROCEDURE — 250N000013 HC RX MED GY IP 250 OP 250 PS 637: Performed by: HOSPITALIST

## 2021-08-01 PROCEDURE — 120N000001 HC R&B MED SURG/OB

## 2021-08-01 RX ADMIN — LISINOPRIL 20 MG: 20 TABLET ORAL at 21:45

## 2021-08-01 RX ADMIN — DICLOFENAC SODIUM 2 G: 10 GEL TOPICAL at 08:59

## 2021-08-01 RX ADMIN — PANTOPRAZOLE SODIUM 40 MG: 40 TABLET, DELAYED RELEASE ORAL at 08:58

## 2021-08-01 RX ADMIN — HYDRALAZINE HYDROCHLORIDE 10 MG: 20 INJECTION INTRAMUSCULAR; INTRAVENOUS at 08:53

## 2021-08-01 RX ADMIN — ASPIRIN 81 MG CHEWABLE TABLET 81 MG: 81 TABLET CHEWABLE at 08:53

## 2021-08-01 RX ADMIN — DICLOFENAC SODIUM 2 G: 10 GEL TOPICAL at 17:12

## 2021-08-01 RX ADMIN — ATORVASTATIN CALCIUM 20 MG: 20 TABLET, FILM COATED ORAL at 08:58

## 2021-08-01 RX ADMIN — LISINOPRIL 20 MG: 20 TABLET ORAL at 08:58

## 2021-08-01 RX ADMIN — MULTIPLE VITAMINS W/ MINERALS TAB 1 TABLET: TAB at 08:58

## 2021-08-01 RX ADMIN — CLOPIDOGREL BISULFATE 75 MG: 75 TABLET ORAL at 08:58

## 2021-08-01 RX ADMIN — GABAPENTIN 100 MG: 100 CAPSULE ORAL at 08:58

## 2021-08-01 RX ADMIN — GABAPENTIN 100 MG: 100 CAPSULE ORAL at 17:12

## 2021-08-01 RX ADMIN — GABAPENTIN 100 MG: 100 CAPSULE ORAL at 21:45

## 2021-08-01 RX ADMIN — ACETAMINOPHEN 975 MG: 325 TABLET, FILM COATED ORAL at 17:12

## 2021-08-01 RX ADMIN — ACETAMINOPHEN 975 MG: 325 TABLET, FILM COATED ORAL at 21:45

## 2021-08-01 RX ADMIN — ACETAMINOPHEN 975 MG: 325 TABLET, FILM COATED ORAL at 08:58

## 2021-08-01 RX ADMIN — DICLOFENAC SODIUM 2 G: 10 GEL TOPICAL at 22:06

## 2021-08-01 RX ADMIN — DICLOFENAC SODIUM 2 G: 10 GEL TOPICAL at 12:22

## 2021-08-01 RX ADMIN — METOPROLOL TARTRATE 12.5 MG: 25 TABLET, FILM COATED ORAL at 08:58

## 2021-08-01 RX ADMIN — METOPROLOL TARTRATE 12.5 MG: 25 TABLET, FILM COATED ORAL at 21:46

## 2021-08-01 RX ADMIN — OXYCODONE HYDROCHLORIDE 2.5 MG: 5 TABLET ORAL at 21:50

## 2021-08-01 ASSESSMENT — ACTIVITIES OF DAILY LIVING (ADL)
ADLS_ACUITY_SCORE: 31
ADLS_ACUITY_SCORE: 35
ADLS_ACUITY_SCORE: 33
ADLS_ACUITY_SCORE: 35

## 2021-08-01 NOTE — PROGRESS NOTES
Patient's COVID-19 swab has come back positive.  It was negative on admission.  She had COVID-19 back in Nov 2020 and was subsequently, reportedly vaccinated for it.  Given how far out from her previous infection, would presume this is re-infection.  She is saturating in 90s on RA but certainly may be contributing to her encephalopathy.      -Will place her in precautions. Not going to give steroids given not hypoxemic with encephalopathy.  I attempted to call son, Jonny, whom I spoke with extensively over past 2-3 days to notify him and clarify her COVID-19 course but there was no answer.     Stef Brandt MD

## 2021-08-01 NOTE — PROGRESS NOTES
[facility]    Hospitalist Progress Note      Assessment & Plan   Tracey Moran is a 89 year old female who was admitted on 7/24/2021.    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    Plan:    #Sepsis due to UTI with e coli and enteroccocus, septic encephalopathy, history of VRE:   Patient has a history of advanced dementia but usually interacts and is alert.  Son had noted that her mental status declined in the days prior to presentation.  She became less responsive and more confused.  In the ED she had a fever, leukocytosis and abnormal UA.  CT head negative.  Chest x-ray without infiltrate.  -Urine culture now showing E. coli that is susceptible to ceftriaxone and Enterococcus that is pansensitive.    --She has completed a course of ceftriaxone and linezolid.  Stopped antibiotics on 7/30 after 7 days of ceftriaxone.  -Patient continues to awaken to voice.    She is lethargic and nonverbal.  She is eating more over the last 2 to 3 days but overall oral intake remains poor.  I do suspect that her functional status in setting of chronic baseline, severe dementia with multiple insults as below has led to downward trajectory.      #Coronary artery disease, ST elevation MI in January  -Continue PTA aspirin, Plavix, lisinopril, statin, and metoprolol.  -Mild troponin elevation was likely due to demand ischemia.     #Hypertension  -Continue PTA lisinopril and metoprolol. Prn hydralazine.       #Advanced dementia  -Increases risk of encephalopathy     #Chronic anemia  -No evidence of bleeding.      #Nonsevere malnutrition in setting of acute and chronic  illness: Nutrition consulted.      #Right tibial shaft fx 06/02/2021, left distal fibula fx on 07/18/2021, both nonoperative: Followed by orthopedics.  Repeat x-rays of the right tibia and left ankle completed on 7/29 showed unchanged alignment.  Also showed medial malleolar nondisplaced fx.    -Orthopedics following.  Appreciate recs.  Scheduling Tylenol.  Gabapentin low dose TID.  Starting low dose oxycodone for breakthrough pain after discussion with son Jonny as below.     #Care planning:   My partner, Gurmeet Shore MD spoke with patient's son Nick.  Sharee has had a rough 8 months with COVID in 11/20, NSTEMI in 1/21, VRE UTI in 2/21, and tibia fracture in 6/21. Nick feels as though she is declining overall.    -Dr. Brandt has also discussed with the son on multiple occasions:  he explained that given her significant comorbidities and age in the setting of multiple acute issues over the last several months that she may not fully recover.  The son did voice understanding and is realistic.  He does want better pain control for his mom even if it may affect her mental status.  He is in touch with  from [facility] and if she does not show any further improvement he is leaning toward hospice.  He has had discussion with rest of his family regarding this as well.  started low dose oxycodone prn and topicals as well.         #Stage 2 pressure injury on sacrum, present on admission     #Covid status   -- previous infection , recovered and vaccinated   -- tested positive 7/31 - significance unclear - afebrile , no hypoxia - continue isolation for now        DVT Prophylaxis: Pneumatic Compression Devices  Code Status: DNR / DNI  Disposition:  Continue inpatient cares.  Possibly discharge on Monday on hospice .  At this point, the plan is to monitor her clinical status over the weekend to see how much she improves.  May discharge  on Monday  potentially going back to [facility] with hospice care.[facility] team to see  patient and talk to family to assist with transition to comfort care       Enrrique Fields MD      Interval History   Patient was evaluated with nursing staff. Overnight issues discussed.  Has been comfortable   BP high and needed prn med   Tested positive for covid     Review of systems:   Unable to obtain    -Data reviewed today: Labs and medications.    Physical Exam   Temp: 98.2  F (36.8  C) Temp src: Axillary BP: (!) 182/87 Pulse: 80   Resp: 20 SpO2: 97 % O2 Device: None (Room air)    Vitals:    07/24/21 1257 07/29/21 0557   Weight: 52.6 kg (116 lb) 53.4 kg (117 lb 12.8 oz)     Vital Signs with Ranges  Temp:  [98  F (36.7  C)-98.2  F (36.8  C)] 98.2  F (36.8  C)  Pulse:  [74-94] 80  Resp:  [16-20] 20  BP: (146-182)/(65-87) 182/87  SpO2:  [96 %-100 %] 97 %  I/O last 3 completed shifts:  In: 260 [P.O.:260]  Out: 1250 [Urine:1250]    Constitutional: Was sleeping but easily arousable.  Nonverbal.  HEENT: Trachea midline, sclera is clear   Respiratory: No crackles. No wheezing. Equal breath sounds bilaterally.  Cardiovascular: Regular rate and rhythm, normal S1 and S2, and no murmur noted  GI: Normal bowel sounds, soft, non-distended, non-tender  Skin/Integumen: No rashes, no cyanosis    Medications       acetaminophen  975 mg Oral TID     aspirin  81 mg Oral Daily     atorvastatin  20 mg Oral Daily     clopidogrel  75 mg Oral Daily     diclofenac  2 g Topical 4x Daily     gabapentin  100 mg Oral TID     lisinopril  20 mg Oral BID     metoprolol tartrate  12.5 mg Oral BID     multivitamin w/minerals  1 tablet Oral Daily     pantoprazole  40 mg Oral Daily     sodium chloride (PF)  3 mL Intracatheter Q8H       Data   Recent Labs   Lab 07/30/21  0839 07/28/21  1300 07/27/21  0656   WBC 6.8 8.6 9.3   HGB 7.7* 7.6* 8.1*   MCV 99 102* 106*    324 325    140 142   POTASSIUM 4.0 3.4 3.5   CHLORIDE 113* 110* 114*   CO2 27 24 26   BUN 5* 11 18   CR 0.57 0.63 0.69   ANIONGAP 4 6 2*   RENETTA 8.1* 7.9* 8.2*   GLC 80  126* 80       No results found for this or any previous visit (from the past 24 hour(s)).

## 2021-08-01 NOTE — PLAN OF CARE
Vitals stable and afebrile. Tested positive for covid, placed in precautions, already in contact precautions for VRE. Inc of urine, using purewick. Turned with assist and feed meal. Meds crushed in applesauce. Responsive to dtr in law Brit and laughing appropriately to jokes.

## 2021-08-01 NOTE — PLAN OF CARE
BP was elevated this AM. Gave prn hydralazine and am meds.  Recheck was WDL.  Pt was turned and repositioned every 2 hours.  Purewick changed every 4 hours, adequate UO noted.  Pt is fed for meals and meds crushed with pudding.  Pt is tolerating this without difficulty.  Squeezes with left hand, but does not appear to do so on command. Son Jonny updated regarding new covid (+) result.  Son discussed with MD and a new swab will be performed tomorrow AM. Will continue to provide total cares.

## 2021-08-01 NOTE — PLAN OF CARE
BP (!) 146/65 (BP Location: Left arm)   Pulse 74   Temp 98  F (36.7  C) (Axillary)   Resp 20   Wt 53.4 kg (117 lb 12.8 oz)   SpO2 100%   BMI 21.55 kg/m      BP elevated otherwise VSS. Nonverbal. Covid recheck positive, precautions maintained. Repositioned Q2 overnight. Purewick in place with good UOP. PIV SL. Wound care following. [facility] soft diet, takes meds crushed in vanilla pudding. Will continue POC.

## 2021-08-02 VITALS
SYSTOLIC BLOOD PRESSURE: 151 MMHG | BODY MASS INDEX: 21.55 KG/M2 | RESPIRATION RATE: 16 BRPM | DIASTOLIC BLOOD PRESSURE: 67 MMHG | TEMPERATURE: 98.2 F | OXYGEN SATURATION: 96 % | HEART RATE: 84 BPM | WEIGHT: 117.8 LBS

## 2021-08-02 LAB — SARS-COV-2 RNA RESP QL NAA+PROBE: NEGATIVE

## 2021-08-02 PROCEDURE — 250N000013 HC RX MED GY IP 250 OP 250 PS 637: Performed by: INTERNAL MEDICINE

## 2021-08-02 PROCEDURE — 87635 SARS-COV-2 COVID-19 AMP PRB: CPT | Performed by: INTERNAL MEDICINE

## 2021-08-02 PROCEDURE — 250N000013 HC RX MED GY IP 250 OP 250 PS 637: Performed by: HOSPITALIST

## 2021-08-02 PROCEDURE — 99239 HOSP IP/OBS DSCHRG MGMT >30: CPT | Performed by: INTERNAL MEDICINE

## 2021-08-02 PROCEDURE — 250N000011 HC RX IP 250 OP 636: Performed by: HOSPITALIST

## 2021-08-02 RX ADMIN — GABAPENTIN 100 MG: 100 CAPSULE ORAL at 09:59

## 2021-08-02 RX ADMIN — LISINOPRIL 20 MG: 20 TABLET ORAL at 09:59

## 2021-08-02 RX ADMIN — METOPROLOL TARTRATE 12.5 MG: 25 TABLET, FILM COATED ORAL at 09:59

## 2021-08-02 RX ADMIN — OXYCODONE HYDROCHLORIDE 2.5 MG: 5 TABLET ORAL at 09:59

## 2021-08-02 RX ADMIN — CLOPIDOGREL BISULFATE 75 MG: 75 TABLET ORAL at 09:59

## 2021-08-02 RX ADMIN — HYDRALAZINE HYDROCHLORIDE 10 MG: 20 INJECTION INTRAMUSCULAR; INTRAVENOUS at 01:33

## 2021-08-02 RX ADMIN — DICLOFENAC SODIUM 2 G: 10 GEL TOPICAL at 10:37

## 2021-08-02 RX ADMIN — ATORVASTATIN CALCIUM 20 MG: 20 TABLET, FILM COATED ORAL at 09:59

## 2021-08-02 RX ADMIN — PANTOPRAZOLE SODIUM 40 MG: 40 TABLET, DELAYED RELEASE ORAL at 09:59

## 2021-08-02 RX ADMIN — OXYCODONE HYDROCHLORIDE 2.5 MG: 5 TABLET ORAL at 04:29

## 2021-08-02 RX ADMIN — ACETAMINOPHEN 975 MG: 325 TABLET, FILM COATED ORAL at 09:59

## 2021-08-02 RX ADMIN — DICLOFENAC SODIUM 2 G: 10 GEL TOPICAL at 13:43

## 2021-08-02 RX ADMIN — MULTIPLE VITAMINS W/ MINERALS TAB 1 TABLET: TAB at 09:59

## 2021-08-02 RX ADMIN — ASPIRIN 81 MG CHEWABLE TABLET 81 MG: 81 TABLET CHEWABLE at 09:59

## 2021-08-02 ASSESSMENT — ACTIVITIES OF DAILY LIVING (ADL)
ADLS_ACUITY_SCORE: 31
ADLS_ACUITY_SCORE: 35
ADLS_ACUITY_SCORE: 31

## 2021-08-02 NOTE — PROGRESS NOTES
Brief sw note:    Sw left a vm for the nurse at [facility], [phone], to discuss the pt's discharge plan.  Irene requested a call back.    RASHAAD St, [facility]  Inpatient Care Coordination  [facility]  [phone]

## 2021-08-02 NOTE — PROGRESS NOTES
Patient discharged to [facility] via [facility] stretcher transport.  Personal belongings gathered and sent with patient.  VSS. IV removed prior to discharge.

## 2021-08-02 NOTE — PLAN OF CARE
Pt alert, nonverbal, will not respond to commands. Will moan occasionally. Takes medications crushed in applesauce. BP elevated tonight, scheduled Lisinopril and lopressor given. Turned and repositioned, pillows in use to elevate heels. Purewick in place with dark yellow urine output. COVID precautions maintained.

## 2021-08-02 NOTE — PROGRESS NOTES
Care Management Discharge Note    Discharge Date: 08/02/2021       Discharge Disposition: Assisted Living, Hospice    Discharge Services: None    Discharge DME: None    Discharge Transportation: agency - [facility] stretcher    Private pay costs discussed: private room/amenity fees and transportation costs   Reviewed out of pocket cost for [facility] stretcher transport, $1042.75 for base rate and $25 per mile to the destination. Pt/family expressed understanding and are agreeable to this.      Patient requires stretcher transportation due to being confused, lethargic, and unable to sit unsupported in a wheelchair due to lack of trunk support.    Stretcher transportation has been arranged for today at 1730. Patient and bedside nurse notified of transportation time.    Ambulance PCS form completed and placed in patient chart. Ambulance PCS form should be given to transportation team.    PAS Confirmation Code:  N/A  Patient/family educated on Medicare website which has current facility and service quality ratings: yes    Education Provided on the Discharge Plan:  yes  Persons Notified of Discharge Plans: pt's [facility] Michele, [facility],   Patient/Family in Agreement with the Plan: yes    Handoff Referral Completed: Yes    Additional Information:  The pt will discharge back to [facility] today.  Eric spoke with Vane at [facility], [phone], and the CC faxed her the discharge orders.  Vane is aware of the pt's discharge time.  Vane questioned if the pt is COVID-19 positive or negative and eric told her that the pt is COVID-19 negative.  Eric told Vane that the son wants to get the pt back to the facility and settled and then will discuss admission to hospice.    Eric updated the pt's son Michele on the discharge plan for today and updated him on the discharge time.    Eric will continue to be available as needed until discharge.    RASHAAD St, [facility]  Inpatient Care Coordination  [facility]  [facility]  [phone]

## 2021-08-02 NOTE — PROGRESS NOTES
Patient was seen and examined by me this morning.  I spoke with patient's son Jonny over the phone.  She is hemodynamically stable and able to take oral diet.  SARS-CoV-2 PCR test repeated this morning was negative.  The positive test result yesterday is most likely false positive.  She has no clinical evidence of COVID-19 infection.  I discussed with her son regarding patient's overall declining health given multiple medical problems.  Hospice care is strongly recommended.  However,Jonny would like to address hospice care at the facility after she is discharged from the hospital.  Discussed care plan with social service team and plan is to discharge patient back to her facility today.

## 2021-08-02 NOTE — PROGRESS NOTES
Notified by nursing that patient unable to tolerate transport via wheelchair.  [facility] transport rescheduled for a stretcher at 1730 today. PCS form completed and in pt chart.    Left VM for Vane, director at [facility], [phone]. Left message updating on change in transport time.    Update 1400: Received call back from Vane at [facility]. No concerns with change in transport time.  Spoke with son Nick over the phone to update on change of transport time. Nick expressed understanding.     Deepika Yung RN Case Manager  Inpatient Care Coordination  [facility]   [phone]

## 2021-08-02 NOTE — PLAN OF CARE
BP (!) 163/81   Pulse 76   Temp 96.8  F (36  C) (Axillary)   Resp 18   Wt 53.4 kg (117 lb 12.8 oz)   SpO2 96%   BMI 21.55 kg/m      BP elevated otherwise VSS. Alert to self only. Nonverbal at baseline. PRN hydralazine given x1- see MAR. Incont. Of bowel and bladder- external catheter in place. One loose bowel movement this AM. Patient restless and moaning- given PRN oxy- see MAR. Frequently repositioned. Mech soft diet, needs to be fed at mealtime, takes pills crushed in vanilla pudding or applesauce. WOC following.  Discharge plans TBD. Will continue POC.

## 2021-08-02 NOTE — PLAN OF CARE
Vitals: Temp: 98.2  F (36.8  C) Temp src: Axillary BP: (!) 154/69 Pulse: 85   Resp: 18 SpO2: 97 % O2 Device: None (Room air)    Pain: PRN oxy given for pain; effective. See flowsheets  Neuro: Pt nonverbal, unable to assess orientation  Respiratory: WDL  Cardiac/tele: WDL  GI/: Incontinent of bowel and bladder, purewick in place  Skin: See flowsheets; brace on RLE  LDAs: PIV SL  Diet: Regular; mechanical dental soft, total feed  Activity: Total cares/ turn and repo q 2 hours.   Plan: discharge back to [facility], hospice reccommended per MD. NIÑO following.

## 2021-08-05 NOTE — DISCHARGE SUMMARY
Physician Discharge Summary     Name: Tracey Moran    MRN: [id number]     YOB: 1925    Age: 89 year old                                             [facility]  Hospitalist Discharge Summary-[facility]      Primary care provider: Guilherme Graves      Admit date:  7/24/2021      Discharge date and time: 8/2/2021  5:32 PM       Discharge Physician:  Enrrique Fields MD      Primary Discharge Diagnosis        #1.  Sepsis due to UTI with e coli and enteroccocus, septic encephalopathy, history of VRE  #2.  Advanced dementia with multiple medical problems considering hospice care  #3.  False positive SARS-CoV-2 PCR test  #4.  Acute encephalopathy: Toxic metabolic and septic    Secondary Diagnosis /chronic medical conditions         Past Medical History:   Diagnosis Date     Abrasion of left scapular region, subsequent encounter 12/15/2016     Anemia, unspecified type 7/1/2019     Benign essential hypertension 5/18/2017     CKD (chronic kidney disease) stage 2, GFR 60-89 ml/min 11/23/2018     Closed fracture of lateral portion of right tibial plateau with routine healing, subsequent encounter 6/13/2019     Constipation, unspecified constipation type 12/6/2017     CRF (chronic renal failure), stage 3 (moderate) 12/6/2017     Dementia without behavioral disturbance, unspecified dementia type (H) 5/21/2019     Elevated CK 12/15/2016     Fall 2/8/2018     Gastrointestinal hemorrhage, unspecified gastrointestinal hemorrhage type 12/13/2017     Generalized muscle weakness 11/23/2018     Hypercholesterolemia 5/21/2014     Hypertension      Hypotension, unspecified hypotension type 6/24/2019     Hypothyroidism, unspecified type 11/23/2018     Left knee pain 5/15/2019     Leukocytosis, unspecified type 12/13/2016     Physical deconditioning 11/23/2018     Right knee pain 5/16/2019     Stage 3 chronic kidney disease 5/21/2019     Thrombocytopenia (H) 12/6/2017     Urinary tract infection 10/27/2019      Yeast dermatitis 11/26/2018         Past Surgical History:      Past Surgical History:   Procedure Laterality Date     ESOPHAGOSCOPY  12/06/2017     ESOPHAGOSCOPY, GASTROSCOPY, DUODENOSCOPY (EGD), COMBINED N/A 12/6/2017    Procedure: COMBINED ESOPHAGOSCOPY, GASTROSCOPY, DUODENOSCOPY (EGD);  gastroscopy;  Surgeon: Melchor Mancera MD;  Location:  GI     GASTROSCOPY  12/06/2017     OTHER SURGICAL HISTORY      Duodenoscopy (egd) [id number]/7/2017               Brief Summary of Hospital stay :       Please refer to  Admission H&P note for full details of patient presentation.    Reason for Hospitalization(C/C,HPI and brief patient summary): Altered mental status        Significant findings(Primary diagnosis )Procedures and treatments provided(Hospital course ,consults, procedures):Please see bellow for details    Tracey Moran is a 89 year old female with history of hypertension, chronic anemia, advanced dementia, COVID-19 in November 2020, inferior ST elevation MI in January 2021 treated conservatively without PCI, admission in February for facial droop and UTI, VRE UTI, and recent admission in June for right tibia fracture who presents for fever and altered mental status.    Problem list  #1.  Sepsis: Patient was treated with antibiotic and completed course of ceftriaxone, linezolid.  She remained afebrile.  Please see electronic medical record in epic  #2.  False positive Covid test: No indication of COVID-19 infection  #3.  Multiple medical problems including coronary artery disease, hypertension, advanced dementia, chronic anemia, recent right tibial fracture left fibula fracture which was nonoperative.  We had extensive discussion about patient's overall downward trajectory in terms of recovery.  Patient benefits from palliative care and family aware of the recommendation and planning possible hospice transition when she gets back to her facility.    On the day of discharge, patient was comfortable but  remains very frail.  Discharge instructions given and family contacted about patient's discharge.      Consultations during hospital stay:    PHARMACY TO DOSE VANCO  CARE MANAGEMENT / SOCIAL WORK IP CONSULT  WOUND OSTOMY CONTINENCE NURSE  IP CONSULT  WOUND OSTOMY CONTINENCE NURSE  IP CONSULT  PALLIATIVE CARE ADULT IP CONSULT  SOCIAL WORK IP CONSULT  PALLIATIVE CARE ADULT IP CONSULT      Patient discharge Condition:     stable    BP (!) 151/67 (BP Location: Right arm)   Pulse 84   Temp 98.2  F (36.8  C) (Oral)   Resp 16   Wt 53.4 kg (117 lb 12.8 oz)   SpO2 96%   BMI 21.55 kg/m               Discharge Instructions:       Patient/family instructions: Written discharge instruction given to patient/family    Discharge Medications:       Review of your medicines      CONTINUE these medicines which may have CHANGED, or have new prescriptions. If we are uncertain of the size of tablets/capsules you have at home, strength may be listed as something that might have changed.      Dose / Directions   oxyCODONE 5 MG tablet  Commonly known as: ROXICODONE  This may have changed: Another medication with the same name was removed. Continue taking this medication, and follow the directions you see here.  Used for: Closed fracture of shaft of right tibia, unspecified fracture morphology, initial encounter      Dose: 2.5 mg  Take 0.5 tablets (2.5 mg) by mouth every 6 hours as needed for moderate to severe pain  Quantity: 6 tablet  Refills: 0        CONTINUE these medicines which have NOT CHANGED      Dose / Directions   acetaminophen 500 MG tablet  Commonly known as: TYLENOL      Dose: 500 mg  Take 500 mg by mouth 3 times daily as needed for mild pain  Refills: 0     aspirin 81 MG chewable tablet  Commonly known as: ASA  Used for: ST elevation myocardial infarction involving right coronary artery (H)      Dose: 81 mg  Take 1 tablet (81 mg) by mouth daily  Quantity: 100 tablet  Refills: 0     atorvastatin 20 MG tablet  Commonly  known as: LIPITOR  Used for: ST elevation myocardial infarction involving right coronary artery (H)      Dose: 20 mg  Take 1 tablet (20 mg) by mouth daily  Quantity: 30 tablet  Refills: 0     Criselda Protect external cream      Apply topically 2 times daily Criselda Barrier Cream (apply to incontinence rash)  Refills: 0     clopidogrel 75 MG tablet  Commonly known as: PLAVIX  Used for: ST elevation myocardial infarction involving right coronary artery (H)      Dose: 75 mg  Take 1 tablet (75 mg) by mouth daily  Quantity: 30 tablet  Refills: 0     diclofenac 1 % topical gel  Commonly known as: VOLTAREN      Dose: 2 g  Place 2 g onto the skin 3 times daily For right knee pain  Refills: 0     gabapentin 100 MG capsule  Commonly known as: NEURONTIN  Used for: Closed fracture of shaft of right tibia, unspecified fracture morphology, initial encounter      Dose: 100 mg  Take 1 capsule (100 mg) by mouth 3 times daily  Quantity: 30 capsule  Refills: 0     lisinopril 20 MG tablet  Commonly known as: ZESTRIL  Indication: High Blood Pressure Disorder  Used for: Benign essential hypertension      Dose: 20 mg  Take 1 tablet (20 mg) by mouth 2 times daily  Quantity: 60 tablet  Refills: 1     menthol-zinc oxide 0.44-20.6 % Oint ointment  Commonly known as: CALMOSEPTINE      Dose: 1 g  Apply 1 g topically 4 times daily as needed for skin protection (to open area on right buttock)  Refills: 0     metoprolol tartrate 25 MG tablet  Commonly known as: LOPRESSOR      Dose: 12.5 mg  Take 12.5 mg by mouth 2 times daily  Refills: 0     miconazole 2 % external powder  Commonly known as: MICATIN      Apply topically 3 times daily  Refills: 0     multivitamin w/minerals tablet      Dose: 1 tablet  Take 1 tablet by mouth daily  Refills: 0     nystatin 514605 UNIT/GM external powder  Commonly known as: MYCOSTATIN      Apply topically 2 times daily Apply to rash under bilateral breast until resolved  Refills: 0     pantoprazole 40 MG EC tablet  Commonly  known as: PROTONIX      Dose: 40 mg  Take 40 mg by mouth daily  Refills: 0     polyethylene glycol 17 g packet  Commonly known as: MIRALAX  Used for: Acute cystitis without hematuria      Dose: 17 g  Take 17 g by mouth daily as needed for constipation  Quantity: 30 packet  Refills: 0     senna-docusate 8.6-50 MG tablet  Commonly known as: SENOKOT-S/PERICOLACE  Used for: Constipation, unspecified constipation type      Dose: 1 tablet  Take 1 tablet by mouth 2 times daily as needed for constipation  Refills: 0             Discharge diet:Orders Placed This Encounter      Diet    regular diet      Discharge activity:Activity as tolerated      Discharge follow-up:    Follow up with primary care provider in 7 days or earlier if symptoms return or gets worse.        Other instructions:    We discussed with patient/family about detail discharge instructions as well as discharge medications above including potential risks,side effects and benefits.Patient/family understood benefits and potential serious side effects of taking these medications and need to follow up with PCP if the patient develops complications.  Patient is also advised to see a doctor immediately for severe symptoms.        Major procedure performed/  Significant Diagnostic Studies:            Results for orders placed or performed during the hospital encounter of 07/24/21   XR Chest Port 1 View    Narrative    CHEST PORTABLE ONE VIEW   7/24/2021 2:58 PM     HISTORY: Fever    COMPARISON: 2/10/2021.      Impression    IMPRESSION: No focal airspace disease identified. No effusion.  Borderline cardiomegaly.    SOPHIE KINGSTON MD         SYSTEM ID:  [id number]   Head CT w/o contrast    Narrative    CT OF THE HEAD WITHOUT CONTRAST   7/24/2021 2:35 PM     COMPARISON: Head CT 6/30/2021    HISTORY:  AMS     TECHNIQUE:  Axial CT images of the head from the skull base to the  vertex were acquired without IV contrast.    FINDINGS:   INTRACRANIAL CONTENTS: No intracranial  hemorrhage, extraaxial  collection, or mass effect.  No CT evidence of acute infarct.    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 Superimposed moderate presumed  chronic small vessel ischemic change and moderate generalized volume  loss.    VISUALIZED ORBITS/SINUSES/MASTOIDS: No significant orbital  abnormality.  No significant paranasal sinus mucosal disease.  Opacified right middle ear and mastoids. Clear left middle ear and  mastoids.    OSSEOUS STRUCTURES/SOFT TISSUES: No significant abnormality.      Impression    IMPRESSION:  1.  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  2.  Moderate presumed chronic small vessel ischemic change and  generalized volume loss.  3.  Opacified right middle ear and mastoids.  4.  No change compared to 6/30/2021.    Radiation dose for this scan was reduced using automated exposure  control, adjustment of the mA and/or kV according to patient size, or  iterative reconstruction technique    CINTHIA DE LEON MD         SYSTEM ID:  [id number]   XR Ankle Left G/E 3 Views    Narrative    XR ANKLE LEFT G/E 3 VIEWS 7/29/2021 2:28 PM     HISTORY: distal fibula fracture - assess for fracture healing - NWB    COMPARISON: 7/18/2021 tibial radiographs.      Impression    IMPRESSION: Lateral malleolar nondisplaced fracture is somewhat  obscured by overlying splint material. Alignment is probably  unchanged. Medial malleolar nondisplaced fracture is also noted. The  ankle mortise is grossly congruent. Osteopenia. Medial talar dome  osteochondral lesion with sclerotic margins, unchanged.    ERNA HERMOSILLO MD         SYSTEM ID:  [id number]   XR Tibia & Fibula Right 2 Views    Narrative    XR TIBIA & FIBULA RIGHT 2 VIEWS 7/29/2021 2:29 PM     HISTORY: tibia fracture appx 8 weeks ago - assess fracture healing -  NWB      Impression    IMPRESSION: Tibial distal diaphyseal nondisplaced spiral fracture with  developing callus formation, alignment unchanged from 6/30/2021.  Osteopenia.    ERNA HERMOSILLO MD          SYSTEM ID:  [id number]       Recent Labs   Lab 07/30/21  0839   WBC 6.8   HGB 7.7*   HCT 24.5*   MCV 99        No results for input(s): CULT in the last 168 hours.  Recent Labs   Lab 07/30/21  0839      POTASSIUM 4.0   CHLORIDE 113*   CO2 27   ANIONGAP 4   GLC 80   BUN 5*   CR 0.57   GFRESTIMATED 78   RENETTA 8.1*   MAG 1.6       Recent Labs   Lab 07/30/21  0839   GLC 80       No results for input(s): INR in the last 168 hours.      Incidental findings that was discussed with patient/family and need follow up with PCP: See imaging studies     Pending Results:       Unresulted Labs Ordered in the Past 30 Days of this Admission     No orders found from 6/24/2021 to 7/25/2021.             Patient Allergies:       Allergies   Allergen Reactions     Demerol [Meperidine]      Dust Mites      Peanuts [Nuts]      Penicillins      Has tolerated cephalosporins (ceftriaxone, cefdinir)     Pollen Extract          Disposition:     Disposition: long term care facility    Discharge needs: Hospice         I saw and evaluated the patient on day of discharge and  discharge instructions reviewed  and  all the patient's questions and concerns addressed. Over 30 minutes spent on discharge and coordination of discharge process for this patient.      Disclaimer: This note consists of symbols derived from keyboarding, dictation and/or voice recognition software. As a result, there may be errors in the script that have gone undetected. Please consider this when interpreting information found in this chart

## 2022-12-04 NOTE — CONSULTS
[facility] Nurse Inpatient Pressure Injury Assessment   Reason for consultation: Evaluate and treat Sacrum, right posterior knee      ASSESSMENT  Pressure Injury: on Sacrum , present on admission   Pressure Injury is Stage 2   Contributing factor of the pressure injury: pressure, shear, age and immobility  Status: initial assessment  Recommend provider order: None, at this time     Right posterior knee due to MASD  Status: initial assessment     TREATMENT PLAN  Sacral wound: Every 3 days   1. Cleanse with wound cleanser and dry  2. Apply Mepilex sacral  3. Sidelying in bed, reposition Q2hrs in bed and Q1hr in chair     Right posterior knee wound: Every 3 days   1. Cleanse with wound cleanser and dry  2. Apply Mepilex 4x4  Orders Written  WO Nurse follow-up plan:weekly  Nursing to notify the Provider(s) and re-consult the WO Nurse if wound(s) deteriorates or new skin concern.    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  She presented to the [facility] ED on 7/24/21 for evaluation of fever and altered mental status.    Objective Data  Containment of urine/stool: External catheter    Current Diet/ Nutrition:  Orders Placed This Encounter      Combination Diet Regular Diet Adult; Mechanical/Dental Soft Diet      Output:   I/O last 3 completed shifts:  In: 1299 [P.O.:235; I.V.:1064]  Out: 450 [Urine:450]    Risk Assessment:   Sensory Perception: 3-->slightly limited  Moisture: 3-->occasionally moist  Activity: 1-->bedfast  Mobility: 2-->very limited  Nutrition: 1-->very poor  Friction and Shear: 1-->problem  Thad Score: 11      Labs:   Recent Labs   Lab 07/25/21  0641 07/24/21  1306   ALBUMIN  --  2.3*   HGB  7.3* 9.2*   WBC 14.8* 14.5*       Physical Exam  Skin inspection: focused sacrum, right posterior knee    Wound Location:  Sacrum  Date of last Photo none  Wound History: Pressure injury  Measurements (length x width x depth, in cm) 0.5 cm x 0.7 cm   Wound Base:  100 % agranular  Tunneling N/A  Undermining N/A  Palpation of the wound bed: normal   Periwound skin: intact  Color: normal and consistent with surrounding tissue  Temperature: normal   Drainage:, small  Description of drainage: serosanguinous  Odor: none  Pain: no grimacing or signs of discomfort    Wound Location:  Right posterior knee  Date of last Photo none  Wound History: Moisture skin damage  Measurements (length x width x depth, in cm) 1 cm x 1.5 cm   Wound Base:  100 % fibrin  Tunneling N/A  Undermining N/A  Palpation of the wound bed: normal   Periwound skin: intact  Color: normal and consistent with surrounding tissue  Temperature: normal   Drainage:, small  Description of drainage: serosanguinous  Odor: none    Interventions  Current support surface: Standard  Atmos Air mattress-start pulsate  Current off-loading measures: Pillows under calves and Pillows  Repositioning aid: Pillows  Visual inspection of wound(s) completed   Wound Care: was done per plan of care.  Supplies: at bedside  Educated provided: importance of repositioning, plan of care and Off-loading pressure  Education provided to: patient  and nurse  Discussed importance of:repositioning every 2 hours, off-loading pressure to wound and moisture management  Discussed plan of care with Patient and Nurse    Christopher Carlson RN CWOCN       Improved

## 2024-07-29 NOTE — PLAN OF CARE
Therapy Communication Note    Patient Name: Kevin Dc  MRN: [id number]  Today's Date: 7/29/2024    Discipline: Physical Therapy    Missed Visit Reason:      Missed Time: Cancel    Comment: Per RN, Podiatry consult to be placed for foot wound. Cancel PT eval for at least the AM.     Update at 1600: Podiatry consult placed for foot wound. Cancel PT eval for today.   End of Shift Summary  For vital signs and complete assessments, please see documentation flowsheets.     Pertinent assessments: disoriented, incomprehensible speech. BA on   Major Shift Events: admitted to unit, k 4.0  Treatment Plan: potassium protocol, IV lasix. VRE in urine   Bedside Nurse: LIZA arrieta RN

## 2025-01-03 NOTE — TELEPHONE ENCOUNTER
"Requested Prescriptions   Pending Prescriptions Disp Refills     metoprolol succinate (TOPROL-XL) 50 MG 24 hr tablet  Last Written Prescription Date:  07/18/2017  Last Fill Quantity: 90,  # refills: 2   Last office visit: 4/9/2018 with prescribing provider:     Future Office Visit:     90 tablet 2     Sig: Take 1 tablet (50 mg) by mouth daily Hold if SBP <110 or HR <55 call if held x2 in a row or symptomatic    Beta-Blockers Protocol Failed    5/16/2018  2:11 PM       Failed - Blood pressure under 140/90 in past 12 months    BP Readings from Last 3 Encounters:   04/09/18 140/70   02/15/18 142/74   02/11/18 139/76                Passed - Patient is age 6 or older       Passed - Recent (12 mo) or future (30 days) visit within the authorizing provider's specialty    Patient had office visit in the last 12 months or has a visit in the next 30 days with authorizing provider or within the authorizing provider's specialty.  See \"Patient Info\" tab in inbasket, or \"Choose Columns\" in Meds & Orders section of the refill encounter.              "
Prescription approved per [facility] [place].    
Stable.

## 2025-02-10 NOTE — TELEPHONE ENCOUNTER
Group Topic:  Group Psychotherapy    Date: 2/10/2025  Start Time: 1030  End Time: 1130  Facilitators: Radha Wilkerson LCSW    Focus:  Coping Skills Exploration   Number in attendance: 7    Coping skills thumb ball: Patients took turns reading/responding to questions such as: 'when feeling low, i'd like to try..', 'what is an unhealthy coping skill?', 'a healthy way to show feelings is..'. Explored various strategies (deep breathing, mindfulness, journaling, etc) and discussed how to utilize them    Patient did not attend despite worker encouragement.     Will route to PCP to review.

## (undated) RX ORDER — FENTANYL CITRATE 50 UG/ML
INJECTION, SOLUTION INTRAMUSCULAR; INTRAVENOUS
Status: DISPENSED
Start: 2017-12-06